# Patient Record
Sex: MALE | Race: WHITE | NOT HISPANIC OR LATINO | Employment: OTHER | URBAN - METROPOLITAN AREA
[De-identification: names, ages, dates, MRNs, and addresses within clinical notes are randomized per-mention and may not be internally consistent; named-entity substitution may affect disease eponyms.]

---

## 2017-03-07 ENCOUNTER — TRANSCRIBE ORDERS (OUTPATIENT)
Dept: ADMINISTRATIVE | Facility: HOSPITAL | Age: 77
End: 2017-03-07

## 2017-03-07 ENCOUNTER — HOSPITAL ENCOUNTER (OUTPATIENT)
Dept: RADIOLOGY | Facility: HOSPITAL | Age: 77
Discharge: HOME | End: 2017-03-07
Payer: MEDICARE

## 2017-03-07 DIAGNOSIS — N20.0 KIDNEY STONE: Primary | ICD-10-CM

## 2017-03-07 DIAGNOSIS — N20.0 KIDNEY STONE: ICD-10-CM

## 2017-03-07 PROCEDURE — 74000 HB X-RAY EXAM OF ABDOMEN (SINGLE ANTEROPOSTERIOR VIEW): CPT

## 2017-04-15 ENCOUNTER — HOSPITAL ENCOUNTER (INPATIENT)
Facility: HOSPITAL | Age: 77
LOS: 5 days | Discharge: HOME | DRG: 291 | End: 2017-04-20
Attending: EMERGENCY MEDICINE | Admitting: FAMILY MEDICINE
Payer: MEDICARE

## 2017-04-15 ENCOUNTER — APPOINTMENT (EMERGENCY)
Dept: RADIOLOGY | Facility: HOSPITAL | Age: 77
DRG: 291 | End: 2017-04-15
Payer: MEDICARE

## 2017-04-15 DIAGNOSIS — N17.9 AKI (ACUTE KIDNEY INJURY) (HCC): ICD-10-CM

## 2017-04-15 DIAGNOSIS — I48.91 A-FIB (HCC): ICD-10-CM

## 2017-04-15 DIAGNOSIS — I50.9 CONGESTIVE HEART FAILURE (HCC): Primary | ICD-10-CM

## 2017-04-15 DIAGNOSIS — N18.9 CKD (CHRONIC KIDNEY DISEASE): ICD-10-CM

## 2017-04-15 DIAGNOSIS — N13.30 HYDRONEPHROSIS: ICD-10-CM

## 2017-04-15 PROBLEM — I48.20 CHRONIC ATRIAL FIBRILLATION WITH RVR (HCC): Status: ACTIVE | Noted: 2017-04-15

## 2017-04-15 PROBLEM — R06.02 SHORTNESS OF BREATH: Status: ACTIVE | Noted: 2017-04-15

## 2017-04-15 LAB
ALBUMIN SERPL BCP-MCNC: 3.2 G/DL (ref 3.5–5)
ALP SERPL-CCNC: 127 U/L (ref 46–116)
ALT SERPL W P-5'-P-CCNC: 24 U/L (ref 12–78)
ANION GAP SERPL CALCULATED.3IONS-SCNC: 9 MMOL/L (ref 4–13)
APTT PPP: 28 SECONDS (ref 24–33)
AST SERPL W P-5'-P-CCNC: 48 U/L (ref 5–45)
BACTERIA UR QL AUTO: ABNORMAL /HPF
BASOPHILS # BLD AUTO: 0 THOUSANDS/ΜL (ref 0–0.1)
BASOPHILS NFR BLD AUTO: 1 % (ref 0–1)
BILIRUB SERPL-MCNC: 0.8 MG/DL (ref 0.2–1)
BILIRUB UR QL STRIP: NEGATIVE
BUN SERPL-MCNC: 28 MG/DL (ref 5–25)
CALCIUM SERPL-MCNC: 9.2 MG/DL (ref 8.3–10.1)
CHLORIDE SERPL-SCNC: 105 MMOL/L (ref 100–108)
CLARITY UR: CLEAR
CO2 SERPL-SCNC: 24 MMOL/L (ref 21–32)
COLOR UR: ABNORMAL
CREAT SERPL-MCNC: 1.61 MG/DL (ref 0.6–1.3)
EOSINOPHIL # BLD AUTO: 0.3 THOUSAND/ΜL (ref 0–0.61)
EOSINOPHIL NFR BLD AUTO: 4 % (ref 0–6)
ERYTHROCYTE [DISTWIDTH] IN BLOOD BY AUTOMATED COUNT: 17.5 % (ref 11.6–15.1)
GFR SERPL CREATININE-BSD FRML MDRD: 42.2 ML/MIN/1.73SQ M
GLUCOSE SERPL-MCNC: 99 MG/DL (ref 65–140)
GLUCOSE UR STRIP-MCNC: NEGATIVE MG/DL
HCT VFR BLD AUTO: 38.4 % (ref 42–52)
HGB BLD-MCNC: 12.4 G/DL (ref 14–18)
HGB UR QL STRIP.AUTO: ABNORMAL
INR PPP: 1.08 (ref 0.86–1.16)
KETONES UR STRIP-MCNC: NEGATIVE MG/DL
LACTATE SERPL-SCNC: 0.9 MMOL/L (ref 0.5–2)
LEUKOCYTE ESTERASE UR QL STRIP: NEGATIVE
LYMPHOCYTES # BLD AUTO: 1.5 THOUSANDS/ΜL (ref 0.6–4.47)
LYMPHOCYTES NFR BLD AUTO: 22 % (ref 14–44)
MCH RBC QN AUTO: 26 PG (ref 27–31)
MCHC RBC AUTO-ENTMCNC: 32.4 G/DL (ref 31.4–37.4)
MCV RBC AUTO: 80 FL (ref 82–98)
MONOCYTES # BLD AUTO: 0.6 THOUSAND/ΜL (ref 0.17–1.22)
MONOCYTES NFR BLD AUTO: 10 % (ref 4–12)
MUCOUS THREADS UR QL AUTO: ABNORMAL
NEUTROPHILS # BLD AUTO: 4.2 THOUSANDS/ΜL (ref 1.85–7.62)
NEUTS SEG NFR BLD AUTO: 63 % (ref 43–75)
NITRITE UR QL STRIP: NEGATIVE
NON-SQ EPI CELLS URNS QL MICRO: ABNORMAL /HPF
NRBC BLD AUTO-RTO: 0 /100 WBCS
NT-PROBNP SERPL-MCNC: 3561 PG/ML
PH UR STRIP.AUTO: 5.5 [PH] (ref 5–9)
PLATELET # BLD AUTO: 193 THOUSANDS/UL (ref 130–400)
PMV BLD AUTO: 8.4 FL (ref 8.9–12.7)
POTASSIUM SERPL-SCNC: 5.9 MMOL/L (ref 3.5–5.3)
PROT SERPL-MCNC: 7.6 G/DL (ref 6.4–8.2)
PROT UR STRIP-MCNC: NEGATIVE MG/DL
PROTHROMBIN TIME: 11.4 SECONDS (ref 9.4–11.7)
RBC # BLD AUTO: 4.78 MILLION/UL (ref 4.7–6.1)
RBC #/AREA URNS AUTO: ABNORMAL /HPF
SODIUM SERPL-SCNC: 138 MMOL/L (ref 136–145)
SP GR UR STRIP.AUTO: 1.01 (ref 1–1.03)
TROPONIN I SERPL-MCNC: <0.02 NG/ML
TROPONIN I SERPL-MCNC: <0.02 NG/ML
UROBILINOGEN UR QL STRIP.AUTO: 0.2 E.U./DL
WBC # BLD AUTO: 6.6 THOUSAND/UL (ref 4.8–10.8)
WBC #/AREA URNS AUTO: ABNORMAL /HPF

## 2017-04-15 PROCEDURE — 85610 PROTHROMBIN TIME: CPT | Performed by: EMERGENCY MEDICINE

## 2017-04-15 PROCEDURE — 81001 URINALYSIS AUTO W/SCOPE: CPT | Performed by: EMERGENCY MEDICINE

## 2017-04-15 PROCEDURE — 96374 THER/PROPH/DIAG INJ IV PUSH: CPT

## 2017-04-15 PROCEDURE — 87081 CULTURE SCREEN ONLY: CPT | Performed by: INTERNAL MEDICINE

## 2017-04-15 PROCEDURE — 96375 TX/PRO/DX INJ NEW DRUG ADDON: CPT

## 2017-04-15 PROCEDURE — 36415 COLL VENOUS BLD VENIPUNCTURE: CPT | Performed by: EMERGENCY MEDICINE

## 2017-04-15 PROCEDURE — 84484 ASSAY OF TROPONIN QUANT: CPT | Performed by: EMERGENCY MEDICINE

## 2017-04-15 PROCEDURE — 99285 EMERGENCY DEPT VISIT HI MDM: CPT

## 2017-04-15 PROCEDURE — 85025 COMPLETE CBC W/AUTO DIFF WBC: CPT | Performed by: EMERGENCY MEDICINE

## 2017-04-15 PROCEDURE — 83880 ASSAY OF NATRIURETIC PEPTIDE: CPT | Performed by: EMERGENCY MEDICINE

## 2017-04-15 PROCEDURE — 71020 HB CHEST X-RAY 2VW FRONTAL&LATL: CPT

## 2017-04-15 PROCEDURE — 93005 ELECTROCARDIOGRAM TRACING: CPT | Performed by: EMERGENCY MEDICINE

## 2017-04-15 PROCEDURE — 85730 THROMBOPLASTIN TIME PARTIAL: CPT | Performed by: EMERGENCY MEDICINE

## 2017-04-15 PROCEDURE — 83605 ASSAY OF LACTIC ACID: CPT | Performed by: EMERGENCY MEDICINE

## 2017-04-15 PROCEDURE — 96361 HYDRATE IV INFUSION ADD-ON: CPT

## 2017-04-15 PROCEDURE — 94640 AIRWAY INHALATION TREATMENT: CPT

## 2017-04-15 PROCEDURE — 84484 ASSAY OF TROPONIN QUANT: CPT | Performed by: INTERNAL MEDICINE

## 2017-04-15 PROCEDURE — 80053 COMPREHEN METABOLIC PANEL: CPT | Performed by: EMERGENCY MEDICINE

## 2017-04-15 RX ORDER — PANTOPRAZOLE SODIUM 40 MG/1
40 TABLET, DELAYED RELEASE ORAL
Status: DISCONTINUED | OUTPATIENT
Start: 2017-04-16 | End: 2017-04-20 | Stop reason: HOSPADM

## 2017-04-15 RX ORDER — AMLODIPINE BESYLATE 10 MG/1
10 TABLET ORAL DAILY
COMMUNITY
Start: 2017-04-14 | End: 2017-04-20 | Stop reason: HOSPADM

## 2017-04-15 RX ORDER — IPRATROPIUM BROMIDE AND ALBUTEROL SULFATE 2.5; .5 MG/3ML; MG/3ML
3 SOLUTION RESPIRATORY (INHALATION) EVERY 6 HOURS PRN
Status: DISCONTINUED | OUTPATIENT
Start: 2017-04-15 | End: 2017-04-17

## 2017-04-15 RX ORDER — NICOTINE 21 MG/24HR
1 PATCH, TRANSDERMAL 24 HOURS TRANSDERMAL DAILY
Status: DISCONTINUED | OUTPATIENT
Start: 2017-04-15 | End: 2017-04-20 | Stop reason: HOSPADM

## 2017-04-15 RX ORDER — METHYLPREDNISOLONE SODIUM SUCCINATE 125 MG/2ML
125 INJECTION, POWDER, LYOPHILIZED, FOR SOLUTION INTRAMUSCULAR; INTRAVENOUS ONCE
Status: COMPLETED | OUTPATIENT
Start: 2017-04-15 | End: 2017-04-15

## 2017-04-15 RX ORDER — IPRATROPIUM BROMIDE AND ALBUTEROL SULFATE 2.5; .5 MG/3ML; MG/3ML
3 SOLUTION RESPIRATORY (INHALATION) ONCE
Status: COMPLETED | OUTPATIENT
Start: 2017-04-15 | End: 2017-04-15

## 2017-04-15 RX ORDER — DILTIAZEM HYDROCHLORIDE 5 MG/ML
15 INJECTION INTRAVENOUS ONCE
Status: COMPLETED | OUTPATIENT
Start: 2017-04-15 | End: 2017-04-15

## 2017-04-15 RX ORDER — FUROSEMIDE 10 MG/ML
40 INJECTION INTRAMUSCULAR; INTRAVENOUS
Status: DISCONTINUED | OUTPATIENT
Start: 2017-04-15 | End: 2017-04-16

## 2017-04-15 RX ORDER — FUROSEMIDE 10 MG/ML
40 INJECTION INTRAMUSCULAR; INTRAVENOUS ONCE
Status: COMPLETED | OUTPATIENT
Start: 2017-04-15 | End: 2017-04-15

## 2017-04-15 RX ORDER — FUROSEMIDE 10 MG/ML
40 INJECTION INTRAMUSCULAR; INTRAVENOUS
Status: DISCONTINUED | OUTPATIENT
Start: 2017-04-15 | End: 2017-04-15

## 2017-04-15 RX ORDER — ONDANSETRON 2 MG/ML
4 INJECTION INTRAMUSCULAR; INTRAVENOUS EVERY 6 HOURS PRN
Status: DISCONTINUED | OUTPATIENT
Start: 2017-04-15 | End: 2017-04-20 | Stop reason: HOSPADM

## 2017-04-15 RX ORDER — ACETAMINOPHEN 325 MG/1
650 TABLET ORAL EVERY 6 HOURS PRN
Status: DISCONTINUED | OUTPATIENT
Start: 2017-04-15 | End: 2017-04-20 | Stop reason: HOSPADM

## 2017-04-15 RX ADMIN — FUROSEMIDE 40 MG: 10 INJECTION, SOLUTION INTRAMUSCULAR; INTRAVENOUS at 13:26

## 2017-04-15 RX ADMIN — IPRATROPIUM BROMIDE AND ALBUTEROL SULFATE 3 ML: .5; 3 SOLUTION RESPIRATORY (INHALATION) at 13:22

## 2017-04-15 RX ADMIN — SODIUM CHLORIDE 500 ML: 0.9 INJECTION, SOLUTION INTRAVENOUS at 11:36

## 2017-04-15 RX ADMIN — METHYLPREDNISOLONE SODIUM SUCCINATE 125 MG: 125 INJECTION, POWDER, FOR SOLUTION INTRAMUSCULAR; INTRAVENOUS at 11:37

## 2017-04-15 RX ADMIN — ENOXAPARIN SODIUM 90 MG: 100 INJECTION SUBCUTANEOUS at 21:02

## 2017-04-15 RX ADMIN — METOPROLOL TARTRATE 25 MG: 25 TABLET ORAL at 17:01

## 2017-04-15 RX ADMIN — IPRATROPIUM BROMIDE AND ALBUTEROL SULFATE 3 ML: .5; 3 SOLUTION RESPIRATORY (INHALATION) at 11:37

## 2017-04-15 RX ADMIN — DILTIAZEM HYDROCHLORIDE 15 MG: 5 INJECTION INTRAVENOUS at 17:01

## 2017-04-15 RX ADMIN — FUROSEMIDE 40 MG: 10 INJECTION, SOLUTION INTRAMUSCULAR; INTRAVENOUS at 18:53

## 2017-04-15 RX ADMIN — IPRATROPIUM BROMIDE AND ALBUTEROL SULFATE 3 ML: .5; 3 SOLUTION RESPIRATORY (INHALATION) at 11:27

## 2017-04-16 ENCOUNTER — APPOINTMENT (INPATIENT)
Dept: RADIOLOGY | Facility: HOSPITAL | Age: 77
DRG: 291 | End: 2017-04-16
Payer: MEDICARE

## 2017-04-16 LAB
ANION GAP SERPL CALCULATED.3IONS-SCNC: 12 MMOL/L (ref 4–13)
BASOPHILS # BLD AUTO: 0 THOUSANDS/ΜL (ref 0–0.1)
BASOPHILS NFR BLD AUTO: 0 % (ref 0–1)
BUN SERPL-MCNC: 31 MG/DL (ref 5–25)
CALCIUM SERPL-MCNC: 8.8 MG/DL (ref 8.3–10.1)
CHLORIDE SERPL-SCNC: 102 MMOL/L (ref 100–108)
CHLORIDE UR-SCNC: <10 MMOL/L (ref 10–330)
CHOLEST SERPL-MCNC: 135 MG/DL (ref 50–200)
CO2 SERPL-SCNC: 24 MMOL/L (ref 21–32)
CREAT SERPL-MCNC: 2.25 MG/DL (ref 0.6–1.3)
CREAT UR-MCNC: 118 MG/DL
EOSINOPHIL # BLD AUTO: 0 THOUSAND/ΜL (ref 0–0.61)
EOSINOPHIL NFR BLD AUTO: 0 % (ref 0–6)
ERYTHROCYTE [DISTWIDTH] IN BLOOD BY AUTOMATED COUNT: 17.5 % (ref 11.6–15.1)
GFR SERPL CREATININE-BSD FRML MDRD: 28.7 ML/MIN/1.73SQ M
GLUCOSE SERPL-MCNC: 151 MG/DL (ref 65–140)
HCT VFR BLD AUTO: 34.5 % (ref 42–52)
HDLC SERPL-MCNC: 40 MG/DL (ref 40–60)
HGB BLD-MCNC: 11.4 G/DL (ref 14–18)
LDLC SERPL CALC-MCNC: 87 MG/DL (ref 0–100)
LYMPHOCYTES # BLD AUTO: 0.8 THOUSANDS/ΜL (ref 0.6–4.47)
LYMPHOCYTES NFR BLD AUTO: 9 % (ref 14–44)
MAGNESIUM SERPL-MCNC: 1.8 MG/DL (ref 1.6–2.6)
MCH RBC QN AUTO: 27 PG (ref 27–31)
MCHC RBC AUTO-ENTMCNC: 33.2 G/DL (ref 31.4–37.4)
MCV RBC AUTO: 81 FL (ref 82–98)
MONOCYTES # BLD AUTO: 0.2 THOUSAND/ΜL (ref 0.17–1.22)
MONOCYTES NFR BLD AUTO: 3 % (ref 4–12)
NEUTROPHILS # BLD AUTO: 8 THOUSANDS/ΜL (ref 1.85–7.62)
NEUTS SEG NFR BLD AUTO: 89 % (ref 43–75)
NRBC BLD AUTO-RTO: 0 /100 WBCS
PLATELET # BLD AUTO: 179 THOUSANDS/UL (ref 130–400)
PMV BLD AUTO: 8.8 FL (ref 8.9–12.7)
POTASSIUM SERPL-SCNC: 4 MMOL/L (ref 3.5–5.3)
RBC # BLD AUTO: 4.24 MILLION/UL (ref 4.7–6.1)
SODIUM 24H UR-SCNC: 13 MOL/L
SODIUM SERPL-SCNC: 138 MMOL/L (ref 136–145)
TRIGL SERPL-MCNC: 38 MG/DL
TSH SERPL DL<=0.05 MIU/L-ACNC: 0.88 UIU/ML (ref 0.36–3.74)
UUN 24H UR-MCNC: 813 MG/DL
WBC # BLD AUTO: 9 THOUSAND/UL (ref 4.8–10.8)

## 2017-04-16 PROCEDURE — 84300 ASSAY OF URINE SODIUM: CPT | Performed by: INTERNAL MEDICINE

## 2017-04-16 PROCEDURE — 84443 ASSAY THYROID STIM HORMONE: CPT | Performed by: INTERNAL MEDICINE

## 2017-04-16 PROCEDURE — 82436 ASSAY OF URINE CHLORIDE: CPT | Performed by: INTERNAL MEDICINE

## 2017-04-16 PROCEDURE — 82570 ASSAY OF URINE CREATININE: CPT | Performed by: INTERNAL MEDICINE

## 2017-04-16 PROCEDURE — 84540 ASSAY OF URINE/UREA-N: CPT | Performed by: INTERNAL MEDICINE

## 2017-04-16 PROCEDURE — 94640 AIRWAY INHALATION TREATMENT: CPT

## 2017-04-16 PROCEDURE — 71020 HB CHEST X-RAY 2VW FRONTAL&LATL: CPT

## 2017-04-16 PROCEDURE — 87070 CULTURE OTHR SPECIMN AEROBIC: CPT | Performed by: INTERNAL MEDICINE

## 2017-04-16 PROCEDURE — 83735 ASSAY OF MAGNESIUM: CPT | Performed by: INTERNAL MEDICINE

## 2017-04-16 PROCEDURE — 94760 N-INVAS EAR/PLS OXIMETRY 1: CPT

## 2017-04-16 PROCEDURE — 87205 SMEAR GRAM STAIN: CPT | Performed by: INTERNAL MEDICINE

## 2017-04-16 PROCEDURE — 85025 COMPLETE CBC W/AUTO DIFF WBC: CPT | Performed by: INTERNAL MEDICINE

## 2017-04-16 PROCEDURE — 80048 BASIC METABOLIC PNL TOTAL CA: CPT | Performed by: INTERNAL MEDICINE

## 2017-04-16 PROCEDURE — 87181 SC STD AGAR DILUTION PER AGT: CPT | Performed by: INTERNAL MEDICINE

## 2017-04-16 PROCEDURE — 80061 LIPID PANEL: CPT | Performed by: INTERNAL MEDICINE

## 2017-04-16 RX ORDER — FUROSEMIDE 40 MG/1
40 TABLET ORAL DAILY
Status: DISCONTINUED | OUTPATIENT
Start: 2017-04-17 | End: 2017-04-17

## 2017-04-16 RX ORDER — GUAIFENESIN/DEXTROMETHORPHAN 100-10MG/5
10 SYRUP ORAL EVERY 4 HOURS PRN
Status: DISCONTINUED | OUTPATIENT
Start: 2017-04-16 | End: 2017-04-20 | Stop reason: HOSPADM

## 2017-04-16 RX ORDER — FUROSEMIDE 10 MG/ML
40 INJECTION INTRAMUSCULAR; INTRAVENOUS ONCE AS NEEDED
Status: COMPLETED | OUTPATIENT
Start: 2017-04-16 | End: 2017-04-17

## 2017-04-16 RX ADMIN — ENOXAPARIN SODIUM 90 MG: 100 INJECTION SUBCUTANEOUS at 08:58

## 2017-04-16 RX ADMIN — PANTOPRAZOLE SODIUM 40 MG: 40 TABLET, DELAYED RELEASE ORAL at 06:01

## 2017-04-16 RX ADMIN — METOPROLOL TARTRATE 25 MG: 25 TABLET ORAL at 08:59

## 2017-04-16 RX ADMIN — METOPROLOL TARTRATE 25 MG: 25 TABLET ORAL at 00:08

## 2017-04-16 RX ADMIN — IPRATROPIUM BROMIDE AND ALBUTEROL SULFATE 3 ML: .5; 3 SOLUTION RESPIRATORY (INHALATION) at 09:13

## 2017-04-16 RX ADMIN — GUAIFENESIN AND DEXTROMETHORPHAN 10 ML: 100; 10 SYRUP ORAL at 18:53

## 2017-04-16 RX ADMIN — APIXABAN 5 MG: 5 TABLET, FILM COATED ORAL at 20:26

## 2017-04-16 RX ADMIN — IPRATROPIUM BROMIDE AND ALBUTEROL SULFATE 3 ML: .5; 3 SOLUTION RESPIRATORY (INHALATION) at 18:14

## 2017-04-16 RX ADMIN — METOPROLOL TARTRATE 25 MG: 25 TABLET ORAL at 17:41

## 2017-04-17 ENCOUNTER — APPOINTMENT (INPATIENT)
Dept: RADIOLOGY | Facility: HOSPITAL | Age: 77
DRG: 291 | End: 2017-04-17
Payer: MEDICARE

## 2017-04-17 ENCOUNTER — APPOINTMENT (INPATIENT)
Dept: OCCUPATIONAL THERAPY | Facility: HOSPITAL | Age: 77
DRG: 291 | End: 2017-04-17
Payer: MEDICARE

## 2017-04-17 ENCOUNTER — APPOINTMENT (INPATIENT)
Dept: NON INVASIVE DIAGNOSTICS | Facility: HOSPITAL | Age: 77
DRG: 291 | End: 2017-04-17
Payer: MEDICARE

## 2017-04-17 ENCOUNTER — APPOINTMENT (INPATIENT)
Dept: PHYSICAL THERAPY | Facility: HOSPITAL | Age: 77
DRG: 291 | End: 2017-04-17
Payer: MEDICARE

## 2017-04-17 ENCOUNTER — GENERIC CONVERSION - ENCOUNTER (OUTPATIENT)
Dept: OTHER | Facility: OTHER | Age: 77
End: 2017-04-17

## 2017-04-17 LAB
ANION GAP SERPL CALCULATED.3IONS-SCNC: 13 MMOL/L (ref 4–13)
ATRIAL RATE: 65 BPM
BASOPHILS # BLD AUTO: 0 THOUSANDS/ΜL (ref 0–0.1)
BASOPHILS NFR BLD AUTO: 0 % (ref 0–1)
BUN SERPL-MCNC: 43 MG/DL (ref 5–25)
CALCIUM SERPL-MCNC: 8.6 MG/DL (ref 8.3–10.1)
CHLORIDE SERPL-SCNC: 101 MMOL/L (ref 100–108)
CO2 SERPL-SCNC: 22 MMOL/L (ref 21–32)
CREAT SERPL-MCNC: 2.34 MG/DL (ref 0.6–1.3)
EOSINOPHIL # BLD AUTO: 0 THOUSAND/ΜL (ref 0–0.61)
EOSINOPHIL NFR BLD AUTO: 0 % (ref 0–6)
ERYTHROCYTE [DISTWIDTH] IN BLOOD BY AUTOMATED COUNT: 17.7 % (ref 11.6–15.1)
GFR SERPL CREATININE-BSD FRML MDRD: 27.4 ML/MIN/1.73SQ M
GLUCOSE SERPL-MCNC: 101 MG/DL (ref 65–140)
HCT VFR BLD AUTO: 36.3 % (ref 42–52)
HGB BLD-MCNC: 11.8 G/DL (ref 14–18)
LYMPHOCYTES # BLD AUTO: 1.9 THOUSANDS/ΜL (ref 0.6–4.47)
LYMPHOCYTES NFR BLD AUTO: 16 % (ref 14–44)
MAGNESIUM SERPL-MCNC: 2.2 MG/DL (ref 1.6–2.6)
MCH RBC QN AUTO: 27.1 PG (ref 27–31)
MCHC RBC AUTO-ENTMCNC: 32.5 G/DL (ref 31.4–37.4)
MCV RBC AUTO: 83 FL (ref 82–98)
MONOCYTES # BLD AUTO: 1.1 THOUSAND/ΜL (ref 0.17–1.22)
MONOCYTES NFR BLD AUTO: 9 % (ref 4–12)
MRSA NOSE QL CULT: NORMAL
NEUTROPHILS # BLD AUTO: 9.4 THOUSANDS/ΜL (ref 1.85–7.62)
NEUTS SEG NFR BLD AUTO: 76 % (ref 43–75)
NRBC BLD AUTO-RTO: 0 /100 WBCS
OVALOCYTES BLD QL SMEAR: PRESENT
PLATELET # BLD AUTO: 192 THOUSANDS/UL (ref 130–400)
PLATELET BLD QL SMEAR: ADEQUATE
PMV BLD AUTO: 9.2 FL (ref 8.9–12.7)
POTASSIUM SERPL-SCNC: 3.8 MMOL/L (ref 3.5–5.3)
QRS AXIS: -28 DEGREES
QRSD INTERVAL: 118 MS
QT INTERVAL: 376 MS
QTC INTERVAL: 454 MS
RBC # BLD AUTO: 4.35 MILLION/UL (ref 4.7–6.1)
SODIUM SERPL-SCNC: 136 MMOL/L (ref 136–145)
T WAVE AXIS: 39 DEGREES
VENTRICULAR RATE: 88 BPM
WBC # BLD AUTO: 12.5 THOUSAND/UL (ref 4.8–10.8)

## 2017-04-17 PROCEDURE — G8979 MOBILITY GOAL STATUS: HCPCS

## 2017-04-17 PROCEDURE — 85025 COMPLETE CBC W/AUTO DIFF WBC: CPT | Performed by: INTERNAL MEDICINE

## 2017-04-17 PROCEDURE — 80048 BASIC METABOLIC PNL TOTAL CA: CPT | Performed by: INTERNAL MEDICINE

## 2017-04-17 PROCEDURE — 93306 TTE W/DOPPLER COMPLETE: CPT

## 2017-04-17 PROCEDURE — 94760 N-INVAS EAR/PLS OXIMETRY 1: CPT

## 2017-04-17 PROCEDURE — 93970 EXTREMITY STUDY: CPT

## 2017-04-17 PROCEDURE — G8978 MOBILITY CURRENT STATUS: HCPCS

## 2017-04-17 PROCEDURE — 83735 ASSAY OF MAGNESIUM: CPT | Performed by: INTERNAL MEDICINE

## 2017-04-17 PROCEDURE — 94640 AIRWAY INHALATION TREATMENT: CPT

## 2017-04-17 PROCEDURE — 97165 OT EVAL LOW COMPLEX 30 MIN: CPT

## 2017-04-17 PROCEDURE — G8988 SELF CARE GOAL STATUS: HCPCS

## 2017-04-17 PROCEDURE — 51798 US URINE CAPACITY MEASURE: CPT

## 2017-04-17 PROCEDURE — G8987 SELF CARE CURRENT STATUS: HCPCS

## 2017-04-17 PROCEDURE — 97161 PT EVAL LOW COMPLEX 20 MIN: CPT

## 2017-04-17 RX ORDER — LEVALBUTEROL INHALATION SOLUTION 0.63 MG/3ML
0.63 SOLUTION RESPIRATORY (INHALATION) EVERY 4 HOURS PRN
Status: DISCONTINUED | OUTPATIENT
Start: 2017-04-17 | End: 2017-04-20 | Stop reason: HOSPADM

## 2017-04-17 RX ORDER — FUROSEMIDE 40 MG/1
40 TABLET ORAL
Status: DISCONTINUED | OUTPATIENT
Start: 2017-04-17 | End: 2017-04-18

## 2017-04-17 RX ORDER — FLUTICASONE PROPIONATE 220 UG/1
1 AEROSOL, METERED RESPIRATORY (INHALATION) EVERY 12 HOURS SCHEDULED
Status: DISCONTINUED | OUTPATIENT
Start: 2017-04-17 | End: 2017-04-20 | Stop reason: HOSPADM

## 2017-04-17 RX ORDER — FUROSEMIDE 10 MG/ML
40 INJECTION INTRAMUSCULAR; INTRAVENOUS
Status: DISCONTINUED | OUTPATIENT
Start: 2017-04-17 | End: 2017-04-17

## 2017-04-17 RX ADMIN — FLUTICASONE PROPIONATE 1 PUFF: 220 AEROSOL, METERED RESPIRATORY (INHALATION) at 20:45

## 2017-04-17 RX ADMIN — APIXABAN 5 MG: 5 TABLET, FILM COATED ORAL at 08:10

## 2017-04-17 RX ADMIN — FUROSEMIDE 40 MG: 40 TABLET ORAL at 16:06

## 2017-04-17 RX ADMIN — LEVALBUTEROL 0.63 MG: 0.63 SOLUTION RESPIRATORY (INHALATION) at 15:54

## 2017-04-17 RX ADMIN — FUROSEMIDE 40 MG: 10 INJECTION, SOLUTION INTRAMUSCULAR; INTRAVENOUS at 05:10

## 2017-04-17 RX ADMIN — METOPROLOL TARTRATE 25 MG: 25 TABLET ORAL at 16:06

## 2017-04-17 RX ADMIN — FUROSEMIDE 40 MG: 40 TABLET ORAL at 08:10

## 2017-04-17 RX ADMIN — FLUTICASONE PROPIONATE 1 PUFF: 220 AEROSOL, METERED RESPIRATORY (INHALATION) at 12:06

## 2017-04-17 RX ADMIN — APIXABAN 5 MG: 5 TABLET, FILM COATED ORAL at 20:44

## 2017-04-17 RX ADMIN — PANTOPRAZOLE SODIUM 40 MG: 40 TABLET, DELAYED RELEASE ORAL at 05:16

## 2017-04-17 RX ADMIN — METOPROLOL TARTRATE 25 MG: 25 TABLET ORAL at 00:35

## 2017-04-17 RX ADMIN — IPRATROPIUM BROMIDE AND ALBUTEROL SULFATE 3 ML: .5; 3 SOLUTION RESPIRATORY (INHALATION) at 05:23

## 2017-04-17 RX ADMIN — METOPROLOL TARTRATE 25 MG: 25 TABLET ORAL at 08:10

## 2017-04-18 ENCOUNTER — GENERIC CONVERSION - ENCOUNTER (OUTPATIENT)
Dept: OTHER | Facility: OTHER | Age: 77
End: 2017-04-18

## 2017-04-18 ENCOUNTER — APPOINTMENT (INPATIENT)
Dept: NON INVASIVE DIAGNOSTICS | Facility: HOSPITAL | Age: 77
DRG: 291 | End: 2017-04-18
Payer: MEDICARE

## 2017-04-18 ENCOUNTER — ANESTHESIA (INPATIENT)
Dept: PERIOP | Facility: HOSPITAL | Age: 77
DRG: 291 | End: 2017-04-18
Payer: MEDICARE

## 2017-04-18 ENCOUNTER — APPOINTMENT (INPATIENT)
Dept: RADIOLOGY | Facility: HOSPITAL | Age: 77
DRG: 291 | End: 2017-04-18
Payer: MEDICARE

## 2017-04-18 ENCOUNTER — HOSPITAL ENCOUNTER (INPATIENT)
Dept: RADIOLOGY | Facility: HOSPITAL | Age: 77
Discharge: HOME | DRG: 291 | End: 2017-04-18
Payer: MEDICARE

## 2017-04-18 ENCOUNTER — ANESTHESIA EVENT (INPATIENT)
Dept: PERIOP | Facility: HOSPITAL | Age: 77
DRG: 291 | End: 2017-04-18
Payer: MEDICARE

## 2017-04-18 LAB
ANION GAP SERPL CALCULATED.3IONS-SCNC: 10 MMOL/L (ref 4–13)
BASOPHILS # BLD AUTO: 0 THOUSANDS/ΜL (ref 0–0.1)
BASOPHILS NFR BLD AUTO: 0 % (ref 0–1)
BUN SERPL-MCNC: 44 MG/DL (ref 5–25)
CALCIUM SERPL-MCNC: 8.4 MG/DL (ref 8.3–10.1)
CHLORIDE SERPL-SCNC: 101 MMOL/L (ref 100–108)
CK SERPL-CCNC: 68 U/L (ref 39–308)
CO2 SERPL-SCNC: 28 MMOL/L (ref 21–32)
CREAT SERPL-MCNC: 2.28 MG/DL (ref 0.6–1.3)
EOSINOPHIL # BLD AUTO: 0.2 THOUSAND/ΜL (ref 0–0.61)
EOSINOPHIL NFR BLD AUTO: 2 % (ref 0–6)
ERYTHROCYTE [DISTWIDTH] IN BLOOD BY AUTOMATED COUNT: 17.4 % (ref 11.6–15.1)
GFR SERPL CREATININE-BSD FRML MDRD: 28.2 ML/MIN/1.73SQ M
GLUCOSE SERPL-MCNC: 97 MG/DL (ref 65–140)
HCT VFR BLD AUTO: 36.8 % (ref 42–52)
HGB BLD-MCNC: 11.9 G/DL (ref 14–18)
LYMPHOCYTES # BLD AUTO: 1.9 THOUSANDS/ΜL (ref 0.6–4.47)
LYMPHOCYTES NFR BLD AUTO: 21 % (ref 14–44)
MAGNESIUM SERPL-MCNC: 2 MG/DL (ref 1.6–2.6)
MAX DIASTOLIC BP: 80 MMHG
MAX HEART RATE: 141 BPM
MAX PREDICTED HEART RATE: 146 BPM
MAX. SYSTOLIC BP: 146 MMHG
MCH RBC QN AUTO: 25.8 PG (ref 27–31)
MCHC RBC AUTO-ENTMCNC: 32.3 G/DL (ref 31.4–37.4)
MCV RBC AUTO: 80 FL (ref 82–98)
MONOCYTES # BLD AUTO: 1 THOUSAND/ΜL (ref 0.17–1.22)
MONOCYTES NFR BLD AUTO: 12 % (ref 4–12)
NEUTROPHILS # BLD AUTO: 5.7 THOUSANDS/ΜL (ref 1.85–7.62)
NEUTS SEG NFR BLD AUTO: 65 % (ref 43–75)
NRBC BLD AUTO-RTO: 0 /100 WBCS
PLATELET # BLD AUTO: 183 THOUSANDS/UL (ref 130–400)
PMV BLD AUTO: 8.4 FL (ref 8.9–12.7)
POTASSIUM SERPL-SCNC: 3.1 MMOL/L (ref 3.5–5.3)
PROTOCOL NAME: NORMAL
RBC # BLD AUTO: 4.61 MILLION/UL (ref 4.7–6.1)
SODIUM SERPL-SCNC: 139 MMOL/L (ref 136–145)
TIME IN EXERCISE PHASE: 259 S
WBC # BLD AUTO: 8.8 THOUSAND/UL (ref 4.8–10.8)

## 2017-04-18 PROCEDURE — A9502 TC99M TETROFOSMIN: HCPCS

## 2017-04-18 PROCEDURE — 94640 AIRWAY INHALATION TREATMENT: CPT

## 2017-04-18 PROCEDURE — 74176 CT ABD & PELVIS W/O CONTRAST: CPT

## 2017-04-18 PROCEDURE — 78452 HT MUSCLE IMAGE SPECT MULT: CPT

## 2017-04-18 PROCEDURE — C2617 STENT, NON-COR, TEM W/O DEL: HCPCS | Performed by: UROLOGY

## 2017-04-18 PROCEDURE — 93005 ELECTROCARDIOGRAM TRACING: CPT

## 2017-04-18 PROCEDURE — 0TF68ZZ FRAGMENTATION IN RIGHT URETER, VIA NATURAL OR ARTIFICIAL OPENING ENDOSCOPIC: ICD-10-PCS | Performed by: UROLOGY

## 2017-04-18 PROCEDURE — 82550 ASSAY OF CK (CPK): CPT | Performed by: INTERNAL MEDICINE

## 2017-04-18 PROCEDURE — BT1D1ZZ FLUOROSCOPY OF RIGHT KIDNEY, URETER AND BLADDER USING LOW OSMOLAR CONTRAST: ICD-10-PCS | Performed by: UROLOGY

## 2017-04-18 PROCEDURE — 94760 N-INVAS EAR/PLS OXIMETRY 1: CPT

## 2017-04-18 PROCEDURE — 83735 ASSAY OF MAGNESIUM: CPT | Performed by: INTERNAL MEDICINE

## 2017-04-18 PROCEDURE — 80048 BASIC METABOLIC PNL TOTAL CA: CPT | Performed by: INTERNAL MEDICINE

## 2017-04-18 PROCEDURE — 74450 X-RAY URETHRA/BLADDER: CPT

## 2017-04-18 PROCEDURE — 93017 CV STRESS TEST TRACING ONLY: CPT

## 2017-04-18 PROCEDURE — 0T768DZ DILATION OF RIGHT URETER WITH INTRALUMINAL DEVICE, VIA NATURAL OR ARTIFICIAL OPENING ENDOSCOPIC: ICD-10-PCS | Performed by: UROLOGY

## 2017-04-18 PROCEDURE — 85025 COMPLETE CBC W/AUTO DIFF WBC: CPT | Performed by: INTERNAL MEDICINE

## 2017-04-18 PROCEDURE — C1769 GUIDE WIRE: HCPCS | Performed by: UROLOGY

## 2017-04-18 DEVICE — URETERAL STENT 6 FR X 26 CM INLAY
Type: IMPLANTABLE DEVICE | Site: URETER | Status: NON-FUNCTIONAL
Removed: 2017-08-15

## 2017-04-18 RX ORDER — POTASSIUM CHLORIDE 20 MEQ/1
20 TABLET, EXTENDED RELEASE ORAL DAILY
Status: DISCONTINUED | OUTPATIENT
Start: 2017-04-18 | End: 2017-04-20

## 2017-04-18 RX ORDER — ONDANSETRON 2 MG/ML
4 INJECTION INTRAMUSCULAR; INTRAVENOUS ONCE
Status: DISCONTINUED | OUTPATIENT
Start: 2017-04-18 | End: 2017-04-18 | Stop reason: HOSPADM

## 2017-04-18 RX ORDER — FUROSEMIDE 20 MG/1
20 TABLET ORAL DAILY
Status: DISCONTINUED | OUTPATIENT
Start: 2017-04-19 | End: 2017-04-18

## 2017-04-18 RX ORDER — POTASSIUM CHLORIDE 20 MEQ/1
40 TABLET, EXTENDED RELEASE ORAL ONCE
Status: COMPLETED | OUTPATIENT
Start: 2017-04-18 | End: 2017-04-18

## 2017-04-18 RX ORDER — FUROSEMIDE 40 MG/1
40 TABLET ORAL DAILY
Status: DISCONTINUED | OUTPATIENT
Start: 2017-04-19 | End: 2017-04-19

## 2017-04-18 RX ORDER — FUROSEMIDE 40 MG/1
40 TABLET ORAL DAILY
Status: DISCONTINUED | OUTPATIENT
Start: 2017-04-19 | End: 2017-04-18

## 2017-04-18 RX ORDER — FENTANYL CITRATE 50 UG/ML
INJECTION, SOLUTION INTRAMUSCULAR; INTRAVENOUS AS NEEDED
Status: DISCONTINUED | OUTPATIENT
Start: 2017-04-18 | End: 2017-04-18 | Stop reason: SURG

## 2017-04-18 RX ORDER — FENTANYL CITRATE/PF 50 MCG/ML
50 SYRINGE (ML) INJECTION
Status: DISCONTINUED | OUTPATIENT
Start: 2017-04-18 | End: 2017-04-18 | Stop reason: HOSPADM

## 2017-04-18 RX ORDER — MAGNESIUM HYDROXIDE 1200 MG/15ML
LIQUID ORAL AS NEEDED
Status: DISCONTINUED | OUTPATIENT
Start: 2017-04-18 | End: 2017-04-18 | Stop reason: HOSPADM

## 2017-04-18 RX ORDER — PROPOFOL 10 MG/ML
INJECTION, EMULSION INTRAVENOUS AS NEEDED
Status: DISCONTINUED | OUTPATIENT
Start: 2017-04-18 | End: 2017-04-18 | Stop reason: SURG

## 2017-04-18 RX ORDER — SODIUM CHLORIDE, SODIUM LACTATE, POTASSIUM CHLORIDE, CALCIUM CHLORIDE 600; 310; 30; 20 MG/100ML; MG/100ML; MG/100ML; MG/100ML
INJECTION, SOLUTION INTRAVENOUS CONTINUOUS PRN
Status: DISCONTINUED | OUTPATIENT
Start: 2017-04-18 | End: 2017-04-18 | Stop reason: SURG

## 2017-04-18 RX ADMIN — POTASSIUM CHLORIDE 20 MEQ: 1500 TABLET, EXTENDED RELEASE ORAL at 15:31

## 2017-04-18 RX ADMIN — CEFAZOLIN SODIUM 1000 MG: 1 SOLUTION INTRAVENOUS at 17:04

## 2017-04-18 RX ADMIN — PANTOPRAZOLE SODIUM 40 MG: 40 TABLET, DELAYED RELEASE ORAL at 05:17

## 2017-04-18 RX ADMIN — FENTANYL CITRATE 50 MCG: 50 INJECTION, SOLUTION INTRAMUSCULAR; INTRAVENOUS at 17:05

## 2017-04-18 RX ADMIN — SODIUM CHLORIDE, SODIUM LACTATE, POTASSIUM CHLORIDE, AND CALCIUM CHLORIDE: .6; .31; .03; .02 INJECTION, SOLUTION INTRAVENOUS at 17:00

## 2017-04-18 RX ADMIN — METOPROLOL TARTRATE 25 MG: 25 TABLET ORAL at 17:00

## 2017-04-18 RX ADMIN — FLUTICASONE PROPIONATE 1 PUFF: 220 AEROSOL, METERED RESPIRATORY (INHALATION) at 10:19

## 2017-04-18 RX ADMIN — METOPROLOL TARTRATE 25 MG: 25 TABLET ORAL at 01:15

## 2017-04-18 RX ADMIN — FUROSEMIDE 40 MG: 40 TABLET ORAL at 10:20

## 2017-04-18 RX ADMIN — PROPOFOL 100 MG: 10 INJECTION, EMULSION INTRAVENOUS at 17:07

## 2017-04-18 RX ADMIN — APIXABAN 5 MG: 5 TABLET, FILM COATED ORAL at 20:51

## 2017-04-18 RX ADMIN — PROPOFOL 50 MG: 10 INJECTION, EMULSION INTRAVENOUS at 17:15

## 2017-04-18 RX ADMIN — APIXABAN 5 MG: 5 TABLET, FILM COATED ORAL at 10:20

## 2017-04-18 RX ADMIN — LEVALBUTEROL 0.63 MG: 0.63 SOLUTION RESPIRATORY (INHALATION) at 04:44

## 2017-04-18 RX ADMIN — LEVALBUTEROL 0.63 MG: 0.63 SOLUTION RESPIRATORY (INHALATION) at 12:45

## 2017-04-18 RX ADMIN — METOPROLOL TARTRATE 25 MG: 25 TABLET ORAL at 10:19

## 2017-04-18 RX ADMIN — PROPOFOL 50 MG: 10 INJECTION, EMULSION INTRAVENOUS at 17:20

## 2017-04-18 RX ADMIN — FENTANYL CITRATE 50 MCG: 50 INJECTION, SOLUTION INTRAMUSCULAR; INTRAVENOUS at 17:07

## 2017-04-18 RX ADMIN — POTASSIUM CHLORIDE 40 MEQ: 1500 TABLET, EXTENDED RELEASE ORAL at 15:32

## 2017-04-18 RX ADMIN — LIDOCAINE HYDROCHLORIDE 60 MG: 20 INJECTION, SOLUTION INTRAVENOUS at 17:07

## 2017-04-18 RX ADMIN — FLUTICASONE PROPIONATE 1 PUFF: 220 AEROSOL, METERED RESPIRATORY (INHALATION) at 20:52

## 2017-04-19 LAB
ANION GAP SERPL CALCULATED.3IONS-SCNC: 7 MMOL/L (ref 4–13)
BUN SERPL-MCNC: 39 MG/DL (ref 5–25)
CALCIUM SERPL-MCNC: 9 MG/DL (ref 8.3–10.1)
CHLORIDE SERPL-SCNC: 101 MMOL/L (ref 100–108)
CO2 SERPL-SCNC: 31 MMOL/L (ref 21–32)
CREAT SERPL-MCNC: 2.23 MG/DL (ref 0.6–1.3)
GFR SERPL CREATININE-BSD FRML MDRD: 28.9 ML/MIN/1.73SQ M
GLUCOSE SERPL-MCNC: 88 MG/DL (ref 65–140)
POTASSIUM SERPL-SCNC: 4 MMOL/L (ref 3.5–5.3)
SODIUM SERPL-SCNC: 139 MMOL/L (ref 136–145)

## 2017-04-19 PROCEDURE — 80048 BASIC METABOLIC PNL TOTAL CA: CPT | Performed by: INTERNAL MEDICINE

## 2017-04-19 RX ORDER — CEPHALEXIN 500 MG/1
500 CAPSULE ORAL EVERY 12 HOURS SCHEDULED
Status: DISCONTINUED | OUTPATIENT
Start: 2017-04-19 | End: 2017-04-20 | Stop reason: HOSPADM

## 2017-04-19 RX ORDER — SODIUM CHLORIDE 9 MG/ML
60 INJECTION, SOLUTION INTRAVENOUS CONTINUOUS
Status: DISCONTINUED | OUTPATIENT
Start: 2017-04-19 | End: 2017-04-20

## 2017-04-19 RX ORDER — CEPHALEXIN 500 MG/1
500 CAPSULE ORAL EVERY 8 HOURS SCHEDULED
Status: DISCONTINUED | OUTPATIENT
Start: 2017-04-19 | End: 2017-04-19

## 2017-04-19 RX ADMIN — SODIUM CHLORIDE 60 ML/HR: 0.9 INJECTION, SOLUTION INTRAVENOUS at 16:17

## 2017-04-19 RX ADMIN — POTASSIUM CHLORIDE 20 MEQ: 1500 TABLET, EXTENDED RELEASE ORAL at 09:12

## 2017-04-19 RX ADMIN — GUAIFENESIN AND DEXTROMETHORPHAN 10 ML: 100; 10 SYRUP ORAL at 17:56

## 2017-04-19 RX ADMIN — FLUTICASONE PROPIONATE 1 PUFF: 220 AEROSOL, METERED RESPIRATORY (INHALATION) at 21:50

## 2017-04-19 RX ADMIN — CEPHALEXIN 500 MG: 500 CAPSULE ORAL at 21:50

## 2017-04-19 RX ADMIN — APIXABAN 5 MG: 5 TABLET, FILM COATED ORAL at 09:12

## 2017-04-19 RX ADMIN — FUROSEMIDE 40 MG: 40 TABLET ORAL at 09:13

## 2017-04-19 RX ADMIN — CEPHALEXIN 500 MG: 500 CAPSULE ORAL at 09:12

## 2017-04-19 RX ADMIN — PANTOPRAZOLE SODIUM 40 MG: 40 TABLET, DELAYED RELEASE ORAL at 06:42

## 2017-04-19 RX ADMIN — METOPROLOL TARTRATE 25 MG: 25 TABLET ORAL at 09:12

## 2017-04-19 RX ADMIN — GUAIFENESIN AND DEXTROMETHORPHAN 10 ML: 100; 10 SYRUP ORAL at 09:13

## 2017-04-19 RX ADMIN — FLUTICASONE PROPIONATE 1 PUFF: 220 AEROSOL, METERED RESPIRATORY (INHALATION) at 09:12

## 2017-04-19 RX ADMIN — METOPROLOL TARTRATE 25 MG: 25 TABLET ORAL at 16:07

## 2017-04-19 RX ADMIN — METOPROLOL TARTRATE 25 MG: 25 TABLET ORAL at 00:55

## 2017-04-20 VITALS
DIASTOLIC BLOOD PRESSURE: 61 MMHG | SYSTOLIC BLOOD PRESSURE: 101 MMHG | OXYGEN SATURATION: 92 % | WEIGHT: 182.98 LBS | TEMPERATURE: 96.9 F | RESPIRATION RATE: 16 BRPM | HEIGHT: 70 IN | HEART RATE: 74 BPM | BODY MASS INDEX: 26.2 KG/M2

## 2017-04-20 PROBLEM — R06.02 SHORTNESS OF BREATH: Status: RESOLVED | Noted: 2017-04-15 | Resolved: 2017-04-20

## 2017-04-20 PROBLEM — I50.9 CONGESTIVE HEART FAILURE (HCC): Status: RESOLVED | Noted: 2017-04-15 | Resolved: 2017-04-20

## 2017-04-20 LAB
ANION GAP SERPL CALCULATED.3IONS-SCNC: 7 MMOL/L (ref 4–13)
ATRIAL RATE: 86 BPM
BASOPHILS # BLD AUTO: 0 THOUSANDS/ΜL (ref 0–0.1)
BASOPHILS NFR BLD AUTO: 0 % (ref 0–1)
BUN SERPL-MCNC: 36 MG/DL (ref 5–25)
CALCIUM SERPL-MCNC: 8.6 MG/DL (ref 8.3–10.1)
CHLORIDE SERPL-SCNC: 102 MMOL/L (ref 100–108)
CO2 SERPL-SCNC: 30 MMOL/L (ref 21–32)
CREAT SERPL-MCNC: 1.97 MG/DL (ref 0.6–1.3)
EOSINOPHIL # BLD AUTO: 0.2 THOUSAND/ΜL (ref 0–0.61)
EOSINOPHIL NFR BLD AUTO: 2 % (ref 0–6)
ERYTHROCYTE [DISTWIDTH] IN BLOOD BY AUTOMATED COUNT: 17.3 % (ref 11.6–15.1)
GFR SERPL CREATININE-BSD FRML MDRD: 33.4 ML/MIN/1.73SQ M
GLUCOSE SERPL-MCNC: 98 MG/DL (ref 65–140)
HCT VFR BLD AUTO: 37.8 % (ref 42–52)
HGB BLD-MCNC: 12.2 G/DL (ref 14–18)
LYMPHOCYTES # BLD AUTO: 1.4 THOUSANDS/ΜL (ref 0.6–4.47)
LYMPHOCYTES NFR BLD AUTO: 16 % (ref 14–44)
MAGNESIUM SERPL-MCNC: 2 MG/DL (ref 1.6–2.6)
MCH RBC QN AUTO: 26 PG (ref 27–31)
MCHC RBC AUTO-ENTMCNC: 32.2 G/DL (ref 31.4–37.4)
MCV RBC AUTO: 81 FL (ref 82–98)
MONOCYTES # BLD AUTO: 1.1 THOUSAND/ΜL (ref 0.17–1.22)
MONOCYTES NFR BLD AUTO: 12 % (ref 4–12)
NEUTROPHILS # BLD AUTO: 6.3 THOUSANDS/ΜL (ref 1.85–7.62)
NEUTS SEG NFR BLD AUTO: 70 % (ref 43–75)
NRBC BLD AUTO-RTO: 0 /100 WBCS
PHOSPHATE SERPL-MCNC: 3.1 MG/DL (ref 2.3–4.1)
PLATELET # BLD AUTO: 180 THOUSANDS/UL (ref 130–400)
PMV BLD AUTO: 8.8 FL (ref 8.9–12.7)
POTASSIUM SERPL-SCNC: 3.5 MMOL/L (ref 3.5–5.3)
QRS AXIS: -82 DEGREES
QRSD INTERVAL: 122 MS
QT INTERVAL: 386 MS
QTC INTERVAL: 461 MS
RBC # BLD AUTO: 4.68 MILLION/UL (ref 4.7–6.1)
SODIUM SERPL-SCNC: 139 MMOL/L (ref 136–145)
T WAVE AXIS: 90 DEGREES
VENTRICULAR RATE: 86 BPM
WBC # BLD AUTO: 9 THOUSAND/UL (ref 4.8–10.8)

## 2017-04-20 PROCEDURE — 84100 ASSAY OF PHOSPHORUS: CPT | Performed by: INTERNAL MEDICINE

## 2017-04-20 PROCEDURE — 80048 BASIC METABOLIC PNL TOTAL CA: CPT | Performed by: INTERNAL MEDICINE

## 2017-04-20 PROCEDURE — 85025 COMPLETE CBC W/AUTO DIFF WBC: CPT | Performed by: INTERNAL MEDICINE

## 2017-04-20 PROCEDURE — 83735 ASSAY OF MAGNESIUM: CPT | Performed by: INTERNAL MEDICINE

## 2017-04-20 RX ORDER — NICOTINE 21 MG/24HR
1 PATCH, TRANSDERMAL 24 HOURS TRANSDERMAL DAILY
Qty: 30 PATCH | Refills: 0 | Status: SHIPPED | OUTPATIENT
Start: 2017-04-20 | End: 2017-05-20

## 2017-04-20 RX ORDER — PANTOPRAZOLE SODIUM 40 MG/1
40 TABLET, DELAYED RELEASE ORAL
Qty: 30 TABLET | Refills: 0 | Status: ON HOLD | OUTPATIENT
Start: 2017-04-20 | End: 2017-05-16 | Stop reason: ALTCHOICE

## 2017-04-20 RX ORDER — FUROSEMIDE 20 MG/1
20 TABLET ORAL DAILY
Qty: 30 TABLET | Refills: 0 | Status: SHIPPED | OUTPATIENT
Start: 2017-04-20 | End: 2017-06-02 | Stop reason: HOSPADM

## 2017-04-20 RX ORDER — POTASSIUM CHLORIDE 20 MEQ/1
40 TABLET, EXTENDED RELEASE ORAL ONCE
Status: COMPLETED | OUTPATIENT
Start: 2017-04-20 | End: 2017-04-20

## 2017-04-20 RX ORDER — POTASSIUM CHLORIDE 20 MEQ/1
20 TABLET, EXTENDED RELEASE ORAL ONCE
Status: COMPLETED | OUTPATIENT
Start: 2017-04-20 | End: 2017-04-20

## 2017-04-20 RX ADMIN — POTASSIUM CHLORIDE 20 MEQ: 1500 TABLET, EXTENDED RELEASE ORAL at 08:03

## 2017-04-20 RX ADMIN — PANTOPRAZOLE SODIUM 40 MG: 40 TABLET, DELAYED RELEASE ORAL at 06:39

## 2017-04-20 RX ADMIN — CEPHALEXIN 500 MG: 500 CAPSULE ORAL at 08:03

## 2017-04-20 RX ADMIN — POTASSIUM CHLORIDE 20 MEQ: 1500 TABLET, EXTENDED RELEASE ORAL at 12:30

## 2017-04-20 RX ADMIN — GUAIFENESIN AND DEXTROMETHORPHAN 10 ML: 100; 10 SYRUP ORAL at 00:01

## 2017-04-20 RX ADMIN — FLUTICASONE PROPIONATE 1 PUFF: 220 AEROSOL, METERED RESPIRATORY (INHALATION) at 08:04

## 2017-04-20 RX ADMIN — METOPROLOL TARTRATE 25 MG: 25 TABLET ORAL at 08:03

## 2017-04-20 RX ADMIN — GUAIFENESIN AND DEXTROMETHORPHAN 10 ML: 100; 10 SYRUP ORAL at 06:46

## 2017-04-20 RX ADMIN — SODIUM CHLORIDE 60 ML/HR: 0.9 INJECTION, SOLUTION INTRAVENOUS at 06:50

## 2017-04-20 RX ADMIN — POTASSIUM CHLORIDE 40 MEQ: 1500 TABLET, EXTENDED RELEASE ORAL at 09:52

## 2017-04-21 LAB
BACTERIA SPT RESP CULT: ABNORMAL
BACTERIA SPT RESP CULT: ABNORMAL
GRAM STN SPEC: ABNORMAL

## 2017-04-24 ENCOUNTER — TRANSCRIBE ORDERS (OUTPATIENT)
Dept: ADMINISTRATIVE | Facility: HOSPITAL | Age: 77
End: 2017-04-24

## 2017-04-24 ENCOUNTER — APPOINTMENT (OUTPATIENT)
Dept: LAB | Facility: HOSPITAL | Age: 77
End: 2017-04-24
Attending: INTERNAL MEDICINE
Payer: MEDICARE

## 2017-04-24 DIAGNOSIS — N17.9 AKI (ACUTE KIDNEY INJURY) (HCC): ICD-10-CM

## 2017-04-24 DIAGNOSIS — N18.9 CKD (CHRONIC KIDNEY DISEASE): ICD-10-CM

## 2017-04-24 LAB
ANION GAP SERPL CALCULATED.3IONS-SCNC: 9 MMOL/L (ref 4–13)
BUN SERPL-MCNC: 27 MG/DL (ref 5–25)
CALCIUM SERPL-MCNC: 9.2 MG/DL (ref 8.3–10.1)
CHLORIDE SERPL-SCNC: 105 MMOL/L (ref 100–108)
CO2 SERPL-SCNC: 29 MMOL/L (ref 21–32)
CREAT SERPL-MCNC: 1.92 MG/DL (ref 0.6–1.3)
GFR SERPL CREATININE-BSD FRML MDRD: 34.4 ML/MIN/1.73SQ M
GLUCOSE P FAST SERPL-MCNC: 98 MG/DL (ref 65–99)
POTASSIUM SERPL-SCNC: 3.7 MMOL/L (ref 3.5–5.3)
SODIUM SERPL-SCNC: 143 MMOL/L (ref 136–145)

## 2017-04-24 PROCEDURE — 80048 BASIC METABOLIC PNL TOTAL CA: CPT

## 2017-04-24 PROCEDURE — 36415 COLL VENOUS BLD VENIPUNCTURE: CPT

## 2017-04-25 ENCOUNTER — APPOINTMENT (OUTPATIENT)
Dept: LAB | Facility: HOSPITAL | Age: 77
End: 2017-04-25
Attending: INTERNAL MEDICINE
Payer: MEDICARE

## 2017-04-25 ENCOUNTER — TRANSCRIBE ORDERS (OUTPATIENT)
Dept: ADMINISTRATIVE | Facility: HOSPITAL | Age: 77
End: 2017-04-25

## 2017-04-25 DIAGNOSIS — D63.1 ANEMIA IN CHRONIC KIDNEY DISEASE(285.21): Primary | ICD-10-CM

## 2017-04-25 DIAGNOSIS — N18.9 ANEMIA IN CHRONIC KIDNEY DISEASE(285.21): Primary | ICD-10-CM

## 2017-04-25 DIAGNOSIS — D63.1 ANEMIA IN CHRONIC KIDNEY DISEASE(285.21): ICD-10-CM

## 2017-04-25 DIAGNOSIS — N18.9 ANEMIA IN CHRONIC KIDNEY DISEASE(285.21): ICD-10-CM

## 2017-04-25 LAB
BASOPHILS # BLD AUTO: 0 THOUSANDS/ΜL (ref 0–0.1)
BASOPHILS NFR BLD AUTO: 0 % (ref 0–1)
EOSINOPHIL # BLD AUTO: 0.3 THOUSAND/ΜL (ref 0–0.61)
EOSINOPHIL NFR BLD AUTO: 3 % (ref 0–6)
ERYTHROCYTE [DISTWIDTH] IN BLOOD BY AUTOMATED COUNT: 17.3 % (ref 11.6–15.1)
HCT VFR BLD AUTO: 37.1 % (ref 42–52)
HGB BLD-MCNC: 12.1 G/DL (ref 14–18)
LYMPHOCYTES # BLD AUTO: 2.2 THOUSANDS/ΜL (ref 0.6–4.47)
LYMPHOCYTES NFR BLD AUTO: 23 % (ref 14–44)
MCH RBC QN AUTO: 27 PG (ref 27–31)
MCHC RBC AUTO-ENTMCNC: 32.8 G/DL (ref 31.4–37.4)
MCV RBC AUTO: 82 FL (ref 82–98)
MONOCYTES # BLD AUTO: 1.2 THOUSAND/ΜL (ref 0.17–1.22)
MONOCYTES NFR BLD AUTO: 13 % (ref 4–12)
NEUTROPHILS # BLD AUTO: 5.7 THOUSANDS/ΜL (ref 1.85–7.62)
NEUTS SEG NFR BLD AUTO: 61 % (ref 43–75)
NRBC BLD AUTO-RTO: 0 /100 WBCS
PLATELET # BLD AUTO: 195 THOUSANDS/UL (ref 130–400)
PMV BLD AUTO: 8.9 FL (ref 8.9–12.7)
RBC # BLD AUTO: 4.51 MILLION/UL (ref 4.7–6.1)
WBC # BLD AUTO: 9.5 THOUSAND/UL (ref 4.8–10.8)

## 2017-04-25 PROCEDURE — 36415 COLL VENOUS BLD VENIPUNCTURE: CPT

## 2017-04-25 PROCEDURE — 85025 COMPLETE CBC W/AUTO DIFF WBC: CPT

## 2017-05-01 ENCOUNTER — ANESTHESIA EVENT (OUTPATIENT)
Dept: PERIOP | Facility: HOSPITAL | Age: 77
End: 2017-05-01
Payer: MEDICARE

## 2017-05-02 ENCOUNTER — HOSPITAL ENCOUNTER (OUTPATIENT)
Dept: RADIOLOGY | Facility: HOSPITAL | Age: 77
Setting detail: OUTPATIENT SURGERY
Discharge: HOME | End: 2017-05-02
Payer: MEDICARE

## 2017-05-02 ENCOUNTER — HOSPITAL ENCOUNTER (OUTPATIENT)
Facility: HOSPITAL | Age: 77
Setting detail: OUTPATIENT SURGERY
Discharge: HOME | End: 2017-05-02
Attending: UROLOGY | Admitting: UROLOGY
Payer: MEDICARE

## 2017-05-02 ENCOUNTER — ANESTHESIA (OUTPATIENT)
Dept: PERIOP | Facility: HOSPITAL | Age: 77
End: 2017-05-02
Payer: MEDICARE

## 2017-05-02 VITALS
BODY MASS INDEX: 26.05 KG/M2 | OXYGEN SATURATION: 96 % | HEIGHT: 70 IN | WEIGHT: 182 LBS | DIASTOLIC BLOOD PRESSURE: 89 MMHG | SYSTOLIC BLOOD PRESSURE: 139 MMHG | HEART RATE: 68 BPM | TEMPERATURE: 97.6 F | RESPIRATION RATE: 20 BRPM

## 2017-05-02 DIAGNOSIS — N20.1 CALCULUS OF URETER: ICD-10-CM

## 2017-05-02 DIAGNOSIS — R52 PAIN AGGRAVATED BY ACTIVITIES OF DAILY LIVING: ICD-10-CM

## 2017-05-02 PROBLEM — N13.5 STRICTURE OF URETER: Status: ACTIVE | Noted: 2017-05-02

## 2017-05-02 PROCEDURE — 74450 X-RAY URETHRA/BLADDER: CPT

## 2017-05-02 PROCEDURE — C2617 STENT, NON-COR, TEM W/O DEL: HCPCS | Performed by: UROLOGY

## 2017-05-02 PROCEDURE — 82360 CALCULUS ASSAY QUANT: CPT | Performed by: UROLOGY

## 2017-05-02 PROCEDURE — C1894 INTRO/SHEATH, NON-LASER: HCPCS | Performed by: UROLOGY

## 2017-05-02 PROCEDURE — 88300 SURGICAL PATH GROSS: CPT | Performed by: UROLOGY

## 2017-05-02 PROCEDURE — C1769 GUIDE WIRE: HCPCS | Performed by: UROLOGY

## 2017-05-02 DEVICE — URETERAL STENT 6 FR X 26 CM INLAY
Type: IMPLANTABLE DEVICE | Site: URETER | Status: NON-FUNCTIONAL
Removed: 2017-08-15

## 2017-05-02 RX ORDER — SODIUM CHLORIDE 9 MG/ML
50 INJECTION, SOLUTION INTRAVENOUS CONTINUOUS
Status: DISCONTINUED | OUTPATIENT
Start: 2017-05-02 | End: 2017-05-02 | Stop reason: HOSPADM

## 2017-05-02 RX ORDER — PROPOFOL 10 MG/ML
INJECTION, EMULSION INTRAVENOUS AS NEEDED
Status: DISCONTINUED | OUTPATIENT
Start: 2017-05-02 | End: 2017-05-02 | Stop reason: SURG

## 2017-05-02 RX ORDER — SODIUM CHLORIDE 9 MG/ML
INJECTION, SOLUTION INTRAVENOUS CONTINUOUS PRN
Status: DISCONTINUED | OUTPATIENT
Start: 2017-05-02 | End: 2017-05-02 | Stop reason: SURG

## 2017-05-02 RX ORDER — SODIUM CHLORIDE, SODIUM LACTATE, POTASSIUM CHLORIDE, CALCIUM CHLORIDE 600; 310; 30; 20 MG/100ML; MG/100ML; MG/100ML; MG/100ML
125 INJECTION, SOLUTION INTRAVENOUS CONTINUOUS
Status: DISCONTINUED | OUTPATIENT
Start: 2017-05-02 | End: 2017-05-02 | Stop reason: HOSPADM

## 2017-05-02 RX ORDER — SODIUM CHLORIDE 9 MG/ML
75 INJECTION, SOLUTION INTRAVENOUS CONTINUOUS
Status: DISCONTINUED | OUTPATIENT
Start: 2017-05-02 | End: 2017-05-02

## 2017-05-02 RX ORDER — EPHEDRINE SULFATE 50 MG/ML
INJECTION, SOLUTION INTRAVENOUS AS NEEDED
Status: DISCONTINUED | OUTPATIENT
Start: 2017-05-02 | End: 2017-05-02 | Stop reason: SURG

## 2017-05-02 RX ORDER — FENTANYL CITRATE/PF 50 MCG/ML
25 SYRINGE (ML) INJECTION
Status: DISCONTINUED | OUTPATIENT
Start: 2017-05-02 | End: 2017-05-02 | Stop reason: HOSPADM

## 2017-05-02 RX ORDER — FENTANYL CITRATE 50 UG/ML
INJECTION, SOLUTION INTRAMUSCULAR; INTRAVENOUS AS NEEDED
Status: DISCONTINUED | OUTPATIENT
Start: 2017-05-02 | End: 2017-05-02 | Stop reason: SURG

## 2017-05-02 RX ORDER — ONDANSETRON 2 MG/ML
INJECTION INTRAMUSCULAR; INTRAVENOUS AS NEEDED
Status: DISCONTINUED | OUTPATIENT
Start: 2017-05-02 | End: 2017-05-02 | Stop reason: SURG

## 2017-05-02 RX ORDER — ONDANSETRON 2 MG/ML
4 INJECTION INTRAMUSCULAR; INTRAVENOUS ONCE
Status: DISCONTINUED | OUTPATIENT
Start: 2017-05-02 | End: 2017-05-02 | Stop reason: HOSPADM

## 2017-05-02 RX ORDER — SODIUM CHLORIDE, SODIUM LACTATE, POTASSIUM CHLORIDE, CALCIUM CHLORIDE 600; 310; 30; 20 MG/100ML; MG/100ML; MG/100ML; MG/100ML
75 INJECTION, SOLUTION INTRAVENOUS CONTINUOUS
Status: DISCONTINUED | OUTPATIENT
Start: 2017-05-02 | End: 2017-05-02 | Stop reason: HOSPADM

## 2017-05-02 RX ADMIN — ONDANSETRON 4 MG: 2 INJECTION INTRAMUSCULAR; INTRAVENOUS at 16:13

## 2017-05-02 RX ADMIN — CEFAZOLIN SODIUM 1000 MG: 1 SOLUTION INTRAVENOUS at 15:07

## 2017-05-02 RX ADMIN — SODIUM CHLORIDE, SODIUM LACTATE, POTASSIUM CHLORIDE, AND CALCIUM CHLORIDE 125 ML/HR: .6; .31; .03; .02 INJECTION, SOLUTION INTRAVENOUS at 16:40

## 2017-05-02 RX ADMIN — FENTANYL CITRATE 25 MCG: 50 INJECTION, SOLUTION INTRAMUSCULAR; INTRAVENOUS at 16:13

## 2017-05-02 RX ADMIN — SODIUM CHLORIDE: 0.9 INJECTION, SOLUTION INTRAVENOUS at 15:07

## 2017-05-02 RX ADMIN — DEXAMETHASONE SODIUM PHOSPHATE 8 MG: 10 INJECTION INTRAMUSCULAR; INTRAVENOUS at 15:13

## 2017-05-02 RX ADMIN — FENTANYL CITRATE 50 MCG: 50 INJECTION, SOLUTION INTRAMUSCULAR; INTRAVENOUS at 15:09

## 2017-05-02 RX ADMIN — LIDOCAINE HYDROCHLORIDE 50 MG: 20 INJECTION, SOLUTION INTRAVENOUS at 15:09

## 2017-05-02 RX ADMIN — FENTANYL CITRATE 25 MCG: 50 INJECTION, SOLUTION INTRAMUSCULAR; INTRAVENOUS at 15:26

## 2017-05-02 RX ADMIN — PROPOFOL 180 MG: 10 INJECTION, EMULSION INTRAVENOUS at 15:09

## 2017-05-02 RX ADMIN — EPHEDRINE SULFATE 5 MG: 50 INJECTION, SOLUTION INTRAMUSCULAR; INTRAVENOUS; SUBCUTANEOUS at 15:34

## 2017-05-02 RX ADMIN — SODIUM CHLORIDE 75 ML/HR: 0.9 INJECTION, SOLUTION INTRAVENOUS at 12:57

## 2017-05-05 ENCOUNTER — ALLSCRIPTS OFFICE VISIT (OUTPATIENT)
Dept: OTHER | Facility: OTHER | Age: 77
End: 2017-05-05

## 2017-05-10 LAB
CA PHOS MFR STONE: 15 %
COLOR STONE: NORMAL
COM MFR STONE: 85 %
COMMENT-STONE3: NORMAL
COMPOSITION: NORMAL
LABORATORY COMMENT REPORT: NORMAL
NIDUS STONE QL: NORMAL
PHOTO: NORMAL
SIZE STONE: NORMAL MM
STONE ANALYSIS-IMP: NORMAL
STONE ANALYSIS-IMP: NORMAL
WT STONE: 331 MG

## 2017-05-12 RX ORDER — ASPIRIN 81 MG/1
81 TABLET ORAL DAILY
COMMUNITY
End: 2017-05-16 | Stop reason: HOSPADM

## 2017-05-15 ENCOUNTER — ALLSCRIPTS OFFICE VISIT (OUTPATIENT)
Dept: OTHER | Facility: OTHER | Age: 77
End: 2017-05-15

## 2017-05-15 ENCOUNTER — ANESTHESIA EVENT (OUTPATIENT)
Dept: PERIOP | Facility: HOSPITAL | Age: 77
End: 2017-05-15
Payer: MEDICARE

## 2017-05-16 ENCOUNTER — HOSPITAL ENCOUNTER (OUTPATIENT)
Dept: RADIOLOGY | Facility: HOSPITAL | Age: 77
Setting detail: OUTPATIENT SURGERY
Discharge: HOME | End: 2017-05-16
Payer: MEDICARE

## 2017-05-16 ENCOUNTER — HOSPITAL ENCOUNTER (OUTPATIENT)
Facility: HOSPITAL | Age: 77
Setting detail: OUTPATIENT SURGERY
Discharge: HOME | End: 2017-05-16
Attending: UROLOGY | Admitting: UROLOGY
Payer: MEDICARE

## 2017-05-16 ENCOUNTER — ANESTHESIA (OUTPATIENT)
Dept: PERIOP | Facility: HOSPITAL | Age: 77
End: 2017-05-16
Payer: MEDICARE

## 2017-05-16 VITALS
SYSTOLIC BLOOD PRESSURE: 129 MMHG | WEIGHT: 182 LBS | TEMPERATURE: 97 F | HEIGHT: 70 IN | BODY MASS INDEX: 26.05 KG/M2 | OXYGEN SATURATION: 96 % | DIASTOLIC BLOOD PRESSURE: 67 MMHG | HEART RATE: 75 BPM | RESPIRATION RATE: 18 BRPM

## 2017-05-16 DIAGNOSIS — N20.0 CALCULUS OF KIDNEY: ICD-10-CM

## 2017-05-16 DIAGNOSIS — N20.1 CALCULUS OF URETER: ICD-10-CM

## 2017-05-16 PROCEDURE — C1894 INTRO/SHEATH, NON-LASER: HCPCS | Performed by: UROLOGY

## 2017-05-16 PROCEDURE — 82360 CALCULUS ASSAY QUANT: CPT | Performed by: UROLOGY

## 2017-05-16 PROCEDURE — 88300 SURGICAL PATH GROSS: CPT | Performed by: UROLOGY

## 2017-05-16 PROCEDURE — 74450 X-RAY URETHRA/BLADDER: CPT

## 2017-05-16 PROCEDURE — C2617 STENT, NON-COR, TEM W/O DEL: HCPCS | Performed by: UROLOGY

## 2017-05-16 PROCEDURE — C1769 GUIDE WIRE: HCPCS | Performed by: UROLOGY

## 2017-05-16 DEVICE — URETERAL STENT 6 FR X 26 CM INLAY
Type: IMPLANTABLE DEVICE | Site: URETER | Status: NON-FUNCTIONAL
Removed: 2017-08-15

## 2017-05-16 RX ORDER — ONDANSETRON 2 MG/ML
INJECTION INTRAMUSCULAR; INTRAVENOUS AS NEEDED
Status: DISCONTINUED | OUTPATIENT
Start: 2017-05-16 | End: 2017-05-16 | Stop reason: SURG

## 2017-05-16 RX ORDER — FENTANYL CITRATE 50 UG/ML
INJECTION, SOLUTION INTRAMUSCULAR; INTRAVENOUS AS NEEDED
Status: DISCONTINUED | OUTPATIENT
Start: 2017-05-16 | End: 2017-05-16 | Stop reason: SURG

## 2017-05-16 RX ORDER — MAGNESIUM HYDROXIDE 1200 MG/15ML
LIQUID ORAL AS NEEDED
Status: DISCONTINUED | OUTPATIENT
Start: 2017-05-16 | End: 2017-05-16 | Stop reason: HOSPADM

## 2017-05-16 RX ORDER — FENTANYL CITRATE/PF 50 MCG/ML
50 SYRINGE (ML) INJECTION
Status: DISCONTINUED | OUTPATIENT
Start: 2017-05-16 | End: 2017-05-16 | Stop reason: HOSPADM

## 2017-05-16 RX ORDER — SODIUM CHLORIDE, SODIUM LACTATE, POTASSIUM CHLORIDE, CALCIUM CHLORIDE 600; 310; 30; 20 MG/100ML; MG/100ML; MG/100ML; MG/100ML
125 INJECTION, SOLUTION INTRAVENOUS CONTINUOUS
Status: DISCONTINUED | OUTPATIENT
Start: 2017-05-16 | End: 2017-05-16

## 2017-05-16 RX ORDER — CEFADROXIL 500 MG/1
500 CAPSULE ORAL 2 TIMES DAILY
Qty: 20 CAPSULE | Refills: 0 | Status: SHIPPED | OUTPATIENT
Start: 2017-05-16 | End: 2017-05-26

## 2017-05-16 RX ORDER — PROPOFOL 10 MG/ML
INJECTION, EMULSION INTRAVENOUS AS NEEDED
Status: DISCONTINUED | OUTPATIENT
Start: 2017-05-16 | End: 2017-05-16 | Stop reason: SURG

## 2017-05-16 RX ORDER — METOCLOPRAMIDE HYDROCHLORIDE 5 MG/ML
INJECTION INTRAMUSCULAR; INTRAVENOUS AS NEEDED
Status: DISCONTINUED | OUTPATIENT
Start: 2017-05-16 | End: 2017-05-16 | Stop reason: SURG

## 2017-05-16 RX ORDER — SODIUM CHLORIDE, SODIUM LACTATE, POTASSIUM CHLORIDE, CALCIUM CHLORIDE 600; 310; 30; 20 MG/100ML; MG/100ML; MG/100ML; MG/100ML
50 INJECTION, SOLUTION INTRAVENOUS CONTINUOUS
Status: DISCONTINUED | OUTPATIENT
Start: 2017-05-16 | End: 2017-05-16 | Stop reason: HOSPADM

## 2017-05-16 RX ADMIN — ONDANSETRON 4 MG: 2 INJECTION INTRAMUSCULAR; INTRAVENOUS at 08:24

## 2017-05-16 RX ADMIN — FENTANYL CITRATE 25 MCG: 50 INJECTION, SOLUTION INTRAMUSCULAR; INTRAVENOUS at 08:40

## 2017-05-16 RX ADMIN — SODIUM CHLORIDE, SODIUM LACTATE, POTASSIUM CHLORIDE, AND CALCIUM CHLORIDE 125 ML/HR: .6; .31; .03; .02 INJECTION, SOLUTION INTRAVENOUS at 07:12

## 2017-05-16 RX ADMIN — METOCLOPRAMIDE HYDROCHLORIDE 10 MG: 5 INJECTION INTRAMUSCULAR; INTRAVENOUS at 08:40

## 2017-05-16 RX ADMIN — PROPOFOL 150 MG: 10 INJECTION, EMULSION INTRAVENOUS at 08:16

## 2017-05-16 RX ADMIN — LIDOCAINE HYDROCHLORIDE 40 MG: 20 INJECTION, SOLUTION INTRAVENOUS at 08:16

## 2017-05-16 RX ADMIN — CEFAZOLIN SODIUM 1000 MG: 1 SOLUTION INTRAVENOUS at 08:11

## 2017-05-16 RX ADMIN — FENTANYL CITRATE 50 MCG: 50 INJECTION, SOLUTION INTRAMUSCULAR; INTRAVENOUS at 08:16

## 2017-05-16 RX ADMIN — PROPOFOL 50 MG: 10 INJECTION, EMULSION INTRAVENOUS at 08:30

## 2017-05-16 RX ADMIN — FENTANYL CITRATE 25 MCG: 50 INJECTION, SOLUTION INTRAMUSCULAR; INTRAVENOUS at 08:30

## 2017-05-21 DIAGNOSIS — I12.9 HYPERTENSIVE CHRONIC KIDNEY DISEASE WITH STAGE 1 THROUGH STAGE 4 CHRONIC KIDNEY DISEASE, OR UNSPECIFIED CHRONIC KIDNEY DISEASE: ICD-10-CM

## 2017-05-24 LAB
CA PHOS MFR STONE: 5 %
COLOR STONE: NORMAL
COM MFR STONE: 92 %
COMMENT-STONE3: NORMAL
COMPOSITION: NORMAL
LABORATORY COMMENT REPORT: NORMAL
NIDUS STONE QL: NORMAL
PHOTO: NORMAL
SIZE STONE: NORMAL MM
STONE ANALYSIS-IMP: NORMAL
STONE ANALYSIS-IMP: NORMAL
WT STONE: 175 MG

## 2017-06-01 ENCOUNTER — ANESTHESIA EVENT (OUTPATIENT)
Dept: PERIOP | Facility: HOSPITAL | Age: 77
End: 2017-06-01
Payer: MEDICARE

## 2017-06-02 ENCOUNTER — HOSPITAL ENCOUNTER (OUTPATIENT)
Dept: RADIOLOGY | Facility: HOSPITAL | Age: 77
Setting detail: OUTPATIENT SURGERY
Discharge: HOME | End: 2017-06-02
Payer: MEDICARE

## 2017-06-02 ENCOUNTER — ANESTHESIA (OUTPATIENT)
Dept: PERIOP | Facility: HOSPITAL | Age: 77
End: 2017-06-02
Payer: MEDICARE

## 2017-06-02 ENCOUNTER — HOSPITAL ENCOUNTER (OUTPATIENT)
Facility: HOSPITAL | Age: 77
Setting detail: OUTPATIENT SURGERY
Discharge: HOME | End: 2017-06-02
Attending: UROLOGY | Admitting: UROLOGY
Payer: MEDICARE

## 2017-06-02 VITALS
WEIGHT: 182 LBS | OXYGEN SATURATION: 96 % | TEMPERATURE: 97.8 F | HEART RATE: 88 BPM | SYSTOLIC BLOOD PRESSURE: 168 MMHG | RESPIRATION RATE: 18 BRPM | BODY MASS INDEX: 26.05 KG/M2 | HEIGHT: 70 IN | DIASTOLIC BLOOD PRESSURE: 84 MMHG

## 2017-06-02 DIAGNOSIS — N20.0 CALCULUS OF KIDNEY: ICD-10-CM

## 2017-06-02 PROCEDURE — C1894 INTRO/SHEATH, NON-LASER: HCPCS | Performed by: UROLOGY

## 2017-06-02 PROCEDURE — 88300 SURGICAL PATH GROSS: CPT | Performed by: UROLOGY

## 2017-06-02 PROCEDURE — C1769 GUIDE WIRE: HCPCS | Performed by: UROLOGY

## 2017-06-02 PROCEDURE — 74450 X-RAY URETHRA/BLADDER: CPT

## 2017-06-02 PROCEDURE — C2617 STENT, NON-COR, TEM W/O DEL: HCPCS | Performed by: UROLOGY

## 2017-06-02 PROCEDURE — 82360 CALCULUS ASSAY QUANT: CPT | Performed by: UROLOGY

## 2017-06-02 DEVICE — URETERAL STENT 6 FR X 26 CM INLAY
Type: IMPLANTABLE DEVICE | Site: URETER | Status: NON-FUNCTIONAL
Removed: 2017-08-15

## 2017-06-02 RX ORDER — SODIUM CHLORIDE, SODIUM LACTATE, POTASSIUM CHLORIDE, CALCIUM CHLORIDE 600; 310; 30; 20 MG/100ML; MG/100ML; MG/100ML; MG/100ML
100 INJECTION, SOLUTION INTRAVENOUS CONTINUOUS
Status: DISCONTINUED | OUTPATIENT
Start: 2017-06-02 | End: 2017-06-02

## 2017-06-02 RX ORDER — FUROSEMIDE 20 MG/1
20 TABLET ORAL EVERY MORNING
COMMUNITY
End: 2018-05-07

## 2017-06-02 RX ORDER — SODIUM CHLORIDE, SODIUM LACTATE, POTASSIUM CHLORIDE, CALCIUM CHLORIDE 600; 310; 30; 20 MG/100ML; MG/100ML; MG/100ML; MG/100ML
50 INJECTION, SOLUTION INTRAVENOUS CONTINUOUS
Status: DISCONTINUED | OUTPATIENT
Start: 2017-06-02 | End: 2017-06-02 | Stop reason: HOSPADM

## 2017-06-02 RX ORDER — FENTANYL CITRATE 50 UG/ML
INJECTION, SOLUTION INTRAMUSCULAR; INTRAVENOUS AS NEEDED
Status: DISCONTINUED | OUTPATIENT
Start: 2017-06-02 | End: 2017-06-02 | Stop reason: SURG

## 2017-06-02 RX ORDER — PROPOFOL 10 MG/ML
INJECTION, EMULSION INTRAVENOUS AS NEEDED
Status: DISCONTINUED | OUTPATIENT
Start: 2017-06-02 | End: 2017-06-02 | Stop reason: SURG

## 2017-06-02 RX ORDER — MIDAZOLAM HYDROCHLORIDE 1 MG/ML
INJECTION INTRAMUSCULAR; INTRAVENOUS AS NEEDED
Status: DISCONTINUED | OUTPATIENT
Start: 2017-06-02 | End: 2017-06-02 | Stop reason: SURG

## 2017-06-02 RX ORDER — FENTANYL CITRATE/PF 50 MCG/ML
25 SYRINGE (ML) INJECTION
Status: DISCONTINUED | OUTPATIENT
Start: 2017-06-02 | End: 2017-06-02 | Stop reason: HOSPADM

## 2017-06-02 RX ADMIN — FENTANYL CITRATE 50 MCG: 50 INJECTION, SOLUTION INTRAMUSCULAR; INTRAVENOUS at 13:50

## 2017-06-02 RX ADMIN — FENTANYL CITRATE 50 MCG: 50 INJECTION, SOLUTION INTRAMUSCULAR; INTRAVENOUS at 13:34

## 2017-06-02 RX ADMIN — PROPOFOL 150 MG: 10 INJECTION, EMULSION INTRAVENOUS at 13:34

## 2017-06-02 RX ADMIN — LIDOCAINE HYDROCHLORIDE 60 MG: 20 INJECTION, SOLUTION INTRAVENOUS at 13:34

## 2017-06-02 RX ADMIN — CEFAZOLIN SODIUM 1000 MG: 1 SOLUTION INTRAVENOUS at 13:27

## 2017-06-02 RX ADMIN — MIDAZOLAM HYDROCHLORIDE 2 MG: 1 INJECTION, SOLUTION INTRAMUSCULAR; INTRAVENOUS at 13:27

## 2017-06-02 RX ADMIN — SODIUM CHLORIDE, SODIUM LACTATE, POTASSIUM CHLORIDE, AND CALCIUM CHLORIDE: .6; .31; .03; .02 INJECTION, SOLUTION INTRAVENOUS at 13:00

## 2017-06-09 LAB
CA PHOS MFR STONE: 10 %
COLOR STONE: NORMAL
COM MFR STONE: 90 %
COMMENT-STONE3: NORMAL
COMPOSITION: NORMAL
LABORATORY COMMENT REPORT: NORMAL
LABORATORY COMMENT REPORT: NORMAL
NIDUS STONE QL: NORMAL
PHOTO: NORMAL
SIZE STONE: NORMAL MM
STONE ANALYSIS-IMP: NORMAL
STONE ANALYSIS-IMP: NORMAL
WT STONE: 208 MG

## 2017-06-21 ENCOUNTER — TRANSCRIBE ORDERS (OUTPATIENT)
Dept: ADMINISTRATIVE | Facility: HOSPITAL | Age: 77
End: 2017-06-21

## 2017-06-21 DIAGNOSIS — N20.0 URIC ACID NEPHROLITHIASIS: Primary | ICD-10-CM

## 2017-06-21 DIAGNOSIS — N20.1 CALCULUS OF URETER: ICD-10-CM

## 2017-06-30 ENCOUNTER — APPOINTMENT (OUTPATIENT)
Dept: LAB | Facility: HOSPITAL | Age: 77
End: 2017-06-30
Payer: MEDICARE

## 2017-06-30 ENCOUNTER — TRANSCRIBE ORDERS (OUTPATIENT)
Dept: ADMINISTRATIVE | Facility: HOSPITAL | Age: 77
End: 2017-06-30

## 2017-06-30 DIAGNOSIS — I12.9 HYPERTENSIVE CHRONIC KIDNEY DISEASE WITH STAGE 1 THROUGH STAGE 4 CHRONIC KIDNEY DISEASE, OR UNSPECIFIED CHRONIC KIDNEY DISEASE: ICD-10-CM

## 2017-06-30 DIAGNOSIS — N18.30 CHRONIC KIDNEY DISEASE, STAGE III (MODERATE) (HCC): ICD-10-CM

## 2017-06-30 LAB
25(OH)D3 SERPL-MCNC: 19.6 NG/ML (ref 30–100)
ANION GAP SERPL CALCULATED.3IONS-SCNC: 10 MMOL/L (ref 4–13)
BACTERIA UR QL AUTO: ABNORMAL /HPF
BILIRUB UR QL STRIP: NEGATIVE
BUN SERPL-MCNC: 31 MG/DL (ref 5–25)
CALCIUM SERPL-MCNC: 8.9 MG/DL (ref 8.3–10.1)
CHLORIDE SERPL-SCNC: 104 MMOL/L (ref 100–108)
CLARITY UR: CLEAR
CO2 SERPL-SCNC: 26 MMOL/L (ref 21–32)
COLOR UR: YELLOW
CREAT SERPL-MCNC: 2.15 MG/DL (ref 0.6–1.3)
ERYTHROCYTE [DISTWIDTH] IN BLOOD BY AUTOMATED COUNT: 16.4 % (ref 11.6–15.1)
GFR SERPL CREATININE-BSD FRML MDRD: 30.2 ML/MIN/1.73SQ M
GLUCOSE P FAST SERPL-MCNC: 99 MG/DL (ref 65–99)
GLUCOSE UR STRIP-MCNC: NEGATIVE MG/DL
HCT VFR BLD AUTO: 38.7 % (ref 42–52)
HGB BLD-MCNC: 12.8 G/DL (ref 14–18)
HGB UR QL STRIP.AUTO: ABNORMAL
KETONES UR STRIP-MCNC: NEGATIVE MG/DL
LEUKOCYTE ESTERASE UR QL STRIP: NEGATIVE
MAGNESIUM SERPL-MCNC: 2 MG/DL (ref 1.6–2.6)
MCH RBC QN AUTO: 27.3 PG (ref 27–31)
MCHC RBC AUTO-ENTMCNC: 33.1 G/DL (ref 31.4–37.4)
MCV RBC AUTO: 83 FL (ref 82–98)
NITRITE UR QL STRIP: NEGATIVE
NON-SQ EPI CELLS URNS QL MICRO: ABNORMAL /HPF
PH UR STRIP.AUTO: 5.5 [PH] (ref 5–9)
PHOSPHATE SERPL-MCNC: 3 MG/DL (ref 2.3–4.1)
PLATELET # BLD AUTO: 180 THOUSANDS/UL (ref 130–400)
PMV BLD AUTO: 8.5 FL (ref 8.9–12.7)
POTASSIUM SERPL-SCNC: 4.6 MMOL/L (ref 3.5–5.3)
PROT UR STRIP-MCNC: NEGATIVE MG/DL
PTH-INTACT SERPL-MCNC: 132.3 PG/ML (ref 14–72)
RBC # BLD AUTO: 4.69 MILLION/UL (ref 4.7–6.1)
RBC #/AREA URNS AUTO: ABNORMAL /HPF
SODIUM SERPL-SCNC: 140 MMOL/L (ref 136–145)
SP GR UR STRIP.AUTO: 1.02 (ref 1–1.03)
UROBILINOGEN UR QL STRIP.AUTO: 0.2 E.U./DL
WBC # BLD AUTO: 8.8 THOUSAND/UL (ref 4.8–10.8)
WBC #/AREA URNS AUTO: ABNORMAL /HPF

## 2017-06-30 PROCEDURE — 82306 VITAMIN D 25 HYDROXY: CPT

## 2017-06-30 PROCEDURE — 83735 ASSAY OF MAGNESIUM: CPT

## 2017-06-30 PROCEDURE — 80048 BASIC METABOLIC PNL TOTAL CA: CPT

## 2017-06-30 PROCEDURE — 36415 COLL VENOUS BLD VENIPUNCTURE: CPT

## 2017-06-30 PROCEDURE — 83970 ASSAY OF PARATHORMONE: CPT

## 2017-06-30 PROCEDURE — 84100 ASSAY OF PHOSPHORUS: CPT

## 2017-06-30 PROCEDURE — 81001 URINALYSIS AUTO W/SCOPE: CPT

## 2017-06-30 PROCEDURE — 85027 COMPLETE CBC AUTOMATED: CPT

## 2017-07-01 DIAGNOSIS — I12.9 HYPERTENSIVE CHRONIC KIDNEY DISEASE WITH STAGE 1 THROUGH STAGE 4 CHRONIC KIDNEY DISEASE, OR UNSPECIFIED CHRONIC KIDNEY DISEASE: ICD-10-CM

## 2017-07-01 DIAGNOSIS — N18.30 CHRONIC KIDNEY DISEASE, STAGE III (MODERATE) (HCC): ICD-10-CM

## 2017-07-05 ENCOUNTER — GENERIC CONVERSION - ENCOUNTER (OUTPATIENT)
Dept: OTHER | Facility: OTHER | Age: 77
End: 2017-07-05

## 2017-07-10 ENCOUNTER — HOSPITAL ENCOUNTER (OUTPATIENT)
Dept: RADIOLOGY | Facility: HOSPITAL | Age: 77
Discharge: HOME | End: 2017-07-10
Attending: UROLOGY
Payer: MEDICARE

## 2017-07-10 ENCOUNTER — APPOINTMENT (OUTPATIENT)
Dept: LAB | Facility: HOSPITAL | Age: 77
End: 2017-07-10
Attending: UROLOGY
Payer: MEDICARE

## 2017-07-10 DIAGNOSIS — N20.1 CALCULUS OF URETER: ICD-10-CM

## 2017-07-10 DIAGNOSIS — N20.0 URIC ACID NEPHROLITHIASIS: ICD-10-CM

## 2017-07-10 LAB
ANION GAP SERPL CALCULATED.3IONS-SCNC: 7 MMOL/L (ref 4–13)
BUN SERPL-MCNC: 27 MG/DL (ref 5–25)
CALCIUM SERPL-MCNC: 9 MG/DL (ref 8.3–10.1)
CHLORIDE SERPL-SCNC: 102 MMOL/L (ref 100–108)
CO2 SERPL-SCNC: 29 MMOL/L (ref 21–32)
CREAT SERPL-MCNC: 2.08 MG/DL (ref 0.6–1.3)
GFR SERPL CREATININE-BSD FRML MDRD: 31.4 ML/MIN/1.73SQ M
GLUCOSE P FAST SERPL-MCNC: 91 MG/DL (ref 65–99)
POTASSIUM SERPL-SCNC: 4.7 MMOL/L (ref 3.5–5.3)
SODIUM SERPL-SCNC: 138 MMOL/L (ref 136–145)

## 2017-07-10 PROCEDURE — 80048 BASIC METABOLIC PNL TOTAL CA: CPT

## 2017-07-10 PROCEDURE — 76770 US EXAM ABDO BACK WALL COMP: CPT

## 2017-07-10 PROCEDURE — 36415 COLL VENOUS BLD VENIPUNCTURE: CPT

## 2017-07-14 ENCOUNTER — APPOINTMENT (OUTPATIENT)
Dept: LAB | Facility: HOSPITAL | Age: 77
End: 2017-07-14
Payer: MEDICARE

## 2017-07-14 ENCOUNTER — TRANSCRIBE ORDERS (OUTPATIENT)
Dept: ADMINISTRATIVE | Facility: HOSPITAL | Age: 77
End: 2017-07-14

## 2017-07-14 DIAGNOSIS — I12.9 HYPERTENSIVE CHRONIC KIDNEY DISEASE WITH STAGE 1 THROUGH STAGE 4 CHRONIC KIDNEY DISEASE, OR UNSPECIFIED CHRONIC KIDNEY DISEASE: ICD-10-CM

## 2017-07-14 LAB
ANION GAP SERPL CALCULATED.3IONS-SCNC: 6 MMOL/L (ref 4–13)
BUN SERPL-MCNC: 29 MG/DL (ref 5–25)
CALCIUM SERPL-MCNC: 9.3 MG/DL (ref 8.3–10.1)
CHLORIDE SERPL-SCNC: 105 MMOL/L (ref 100–108)
CO2 SERPL-SCNC: 29 MMOL/L (ref 21–32)
CREAT SERPL-MCNC: 2.2 MG/DL (ref 0.6–1.3)
GFR SERPL CREATININE-BSD FRML MDRD: 29.4 ML/MIN/1.73SQ M
GLUCOSE P FAST SERPL-MCNC: 100 MG/DL (ref 65–99)
POTASSIUM SERPL-SCNC: 4.8 MMOL/L (ref 3.5–5.3)
SODIUM SERPL-SCNC: 140 MMOL/L (ref 136–145)

## 2017-07-14 PROCEDURE — 80048 BASIC METABOLIC PNL TOTAL CA: CPT

## 2017-07-14 PROCEDURE — 36415 COLL VENOUS BLD VENIPUNCTURE: CPT

## 2017-07-17 ENCOUNTER — OFFICE VISIT (OUTPATIENT)
Dept: LAB | Facility: HOSPITAL | Age: 77
End: 2017-07-17
Attending: UROLOGY
Payer: MEDICARE

## 2017-07-17 ENCOUNTER — TRANSCRIBE ORDERS (OUTPATIENT)
Dept: ADMINISTRATIVE | Facility: HOSPITAL | Age: 77
End: 2017-07-17

## 2017-07-17 ENCOUNTER — ANESTHESIA EVENT (OUTPATIENT)
Dept: PERIOP | Facility: HOSPITAL | Age: 77
End: 2017-07-17
Payer: MEDICARE

## 2017-07-17 ENCOUNTER — APPOINTMENT (OUTPATIENT)
Dept: LAB | Facility: HOSPITAL | Age: 77
End: 2017-07-17
Attending: UROLOGY
Payer: MEDICARE

## 2017-07-17 ENCOUNTER — APPOINTMENT (OUTPATIENT)
Dept: PREADMISSION TESTING | Facility: HOSPITAL | Age: 77
End: 2017-07-17
Payer: MEDICARE

## 2017-07-17 VITALS — WEIGHT: 192 LBS | HEIGHT: 70 IN | BODY MASS INDEX: 27.49 KG/M2

## 2017-07-17 DIAGNOSIS — Z01.818 PRE-OP TESTING: Primary | ICD-10-CM

## 2017-07-17 DIAGNOSIS — Z01.818 PRE-OP TESTING: ICD-10-CM

## 2017-07-17 LAB
ABO GROUP BLD: NORMAL
BACTERIA UR QL AUTO: ABNORMAL /HPF
BASOPHILS # BLD AUTO: 0 THOUSANDS/ΜL (ref 0–0.1)
BASOPHILS NFR BLD AUTO: 1 % (ref 0–1)
BILIRUB UR QL STRIP: NEGATIVE
BLD GP AB SCN SERPL QL: NEGATIVE
CLARITY UR: CLEAR
COLOR UR: YELLOW
EOSINOPHIL # BLD AUTO: 0.2 THOUSAND/ΜL (ref 0–0.61)
EOSINOPHIL NFR BLD AUTO: 3 % (ref 0–6)
ERYTHROCYTE [DISTWIDTH] IN BLOOD BY AUTOMATED COUNT: 16.4 % (ref 11.6–15.1)
GLUCOSE UR STRIP-MCNC: NEGATIVE MG/DL
HCT VFR BLD AUTO: 42.5 % (ref 42–52)
HGB BLD-MCNC: 13.8 G/DL (ref 14–18)
HGB UR QL STRIP.AUTO: ABNORMAL
KETONES UR STRIP-MCNC: NEGATIVE MG/DL
LEUKOCYTE ESTERASE UR QL STRIP: NEGATIVE
LYMPHOCYTES # BLD AUTO: 1.9 THOUSANDS/ΜL (ref 0.6–4.47)
LYMPHOCYTES NFR BLD AUTO: 28 % (ref 14–44)
MCH RBC QN AUTO: 26.9 PG (ref 27–31)
MCHC RBC AUTO-ENTMCNC: 32.4 G/DL (ref 31.4–37.4)
MCV RBC AUTO: 83 FL (ref 82–98)
MONOCYTES # BLD AUTO: 0.6 THOUSAND/ΜL (ref 0.17–1.22)
MONOCYTES NFR BLD AUTO: 9 % (ref 4–12)
NEUTROPHILS # BLD AUTO: 3.9 THOUSANDS/ΜL (ref 1.85–7.62)
NEUTS SEG NFR BLD AUTO: 59 % (ref 43–75)
NITRITE UR QL STRIP: NEGATIVE
NON-SQ EPI CELLS URNS QL MICRO: ABNORMAL /HPF
NRBC BLD AUTO-RTO: 0 /100 WBCS
PH UR STRIP.AUTO: 5.5 [PH] (ref 5–9)
PLATELET # BLD AUTO: 182 THOUSANDS/UL (ref 130–400)
PMV BLD AUTO: 8.7 FL (ref 8.9–12.7)
PROT UR STRIP-MCNC: NEGATIVE MG/DL
RBC # BLD AUTO: 5.12 MILLION/UL (ref 4.7–6.1)
RBC #/AREA URNS AUTO: ABNORMAL /HPF
RH BLD: POSITIVE
SP GR UR STRIP.AUTO: 1.02 (ref 1–1.03)
SPECIMEN EXPIRATION DATE: NORMAL
UROBILINOGEN UR QL STRIP.AUTO: 0.2 E.U./DL
WBC # BLD AUTO: 6.7 THOUSAND/UL (ref 4.8–10.8)
WBC #/AREA URNS AUTO: ABNORMAL /HPF

## 2017-07-17 PROCEDURE — 86850 RBC ANTIBODY SCREEN: CPT

## 2017-07-17 PROCEDURE — 36415 COLL VENOUS BLD VENIPUNCTURE: CPT

## 2017-07-17 PROCEDURE — 85025 COMPLETE CBC W/AUTO DIFF WBC: CPT

## 2017-07-17 PROCEDURE — 86901 BLOOD TYPING SEROLOGIC RH(D): CPT

## 2017-07-17 PROCEDURE — 81001 URINALYSIS AUTO W/SCOPE: CPT | Performed by: UROLOGY

## 2017-07-17 PROCEDURE — 86900 BLOOD TYPING SEROLOGIC ABO: CPT

## 2017-07-17 PROCEDURE — 93005 ELECTROCARDIOGRAM TRACING: CPT

## 2017-07-17 PROCEDURE — 87086 URINE CULTURE/COLONY COUNT: CPT

## 2017-07-17 RX ORDER — ERGOCALCIFEROL 1.25 MG/1
50000 CAPSULE ORAL WEEKLY
COMMUNITY
End: 2017-11-08

## 2017-07-18 ENCOUNTER — HOSPITAL ENCOUNTER (OUTPATIENT)
Dept: RADIOLOGY | Facility: HOSPITAL | Age: 77
Setting detail: OUTPATIENT SURGERY
Discharge: HOME | End: 2017-07-18
Payer: MEDICARE

## 2017-07-18 ENCOUNTER — ANESTHESIA (OUTPATIENT)
Dept: PERIOP | Facility: HOSPITAL | Age: 77
End: 2017-07-18
Payer: MEDICARE

## 2017-07-18 ENCOUNTER — HOSPITAL ENCOUNTER (OUTPATIENT)
Facility: HOSPITAL | Age: 77
Setting detail: OUTPATIENT SURGERY
Discharge: HOME | End: 2017-07-18
Attending: UROLOGY | Admitting: UROLOGY
Payer: MEDICARE

## 2017-07-18 VITALS
OXYGEN SATURATION: 98 % | SYSTOLIC BLOOD PRESSURE: 156 MMHG | RESPIRATION RATE: 18 BRPM | TEMPERATURE: 97.2 F | HEART RATE: 74 BPM | DIASTOLIC BLOOD PRESSURE: 75 MMHG

## 2017-07-18 DIAGNOSIS — N20.0 RENAL STONES: ICD-10-CM

## 2017-07-18 DIAGNOSIS — N20.0 CALCULUS OF KIDNEY: ICD-10-CM

## 2017-07-18 LAB
ATRIAL RATE: 84 BPM
BACTERIA UR CULT: NORMAL
QRS AXIS: -46 DEGREES
QRSD INTERVAL: 118 MS
QT INTERVAL: 404 MS
QTC INTERVAL: 448 MS
T WAVE AXIS: -14 DEGREES
VENTRICULAR RATE: 74 BPM

## 2017-07-18 PROCEDURE — 88300 SURGICAL PATH GROSS: CPT | Performed by: UROLOGY

## 2017-07-18 PROCEDURE — 82360 CALCULUS ASSAY QUANT: CPT | Performed by: UROLOGY

## 2017-07-18 PROCEDURE — 74450 X-RAY URETHRA/BLADDER: CPT

## 2017-07-18 PROCEDURE — C1769 GUIDE WIRE: HCPCS | Performed by: UROLOGY

## 2017-07-18 PROCEDURE — C1894 INTRO/SHEATH, NON-LASER: HCPCS | Performed by: UROLOGY

## 2017-07-18 PROCEDURE — C2617 STENT, NON-COR, TEM W/O DEL: HCPCS | Performed by: UROLOGY

## 2017-07-18 DEVICE — URETERAL STENT 6 FR X 26 CM INLAY
Type: IMPLANTABLE DEVICE | Status: NON-FUNCTIONAL
Removed: 2017-08-15

## 2017-07-18 RX ORDER — SODIUM CHLORIDE, SODIUM LACTATE, POTASSIUM CHLORIDE, CALCIUM CHLORIDE 600; 310; 30; 20 MG/100ML; MG/100ML; MG/100ML; MG/100ML
75 INJECTION, SOLUTION INTRAVENOUS CONTINUOUS
Status: DISCONTINUED | OUTPATIENT
Start: 2017-07-18 | End: 2017-07-18 | Stop reason: HOSPADM

## 2017-07-18 RX ORDER — PROPOFOL 10 MG/ML
INJECTION, EMULSION INTRAVENOUS AS NEEDED
Status: DISCONTINUED | OUTPATIENT
Start: 2017-07-18 | End: 2017-07-18 | Stop reason: SURG

## 2017-07-18 RX ORDER — FENTANYL CITRATE 50 UG/ML
INJECTION, SOLUTION INTRAMUSCULAR; INTRAVENOUS AS NEEDED
Status: DISCONTINUED | OUTPATIENT
Start: 2017-07-18 | End: 2017-07-18 | Stop reason: SURG

## 2017-07-18 RX ORDER — FENTANYL CITRATE/PF 50 MCG/ML
25 SYRINGE (ML) INJECTION
Status: DISCONTINUED | OUTPATIENT
Start: 2017-07-18 | End: 2017-07-18 | Stop reason: HOSPADM

## 2017-07-18 RX ORDER — ONDANSETRON 2 MG/ML
4 INJECTION INTRAMUSCULAR; INTRAVENOUS ONCE AS NEEDED
Status: DISCONTINUED | OUTPATIENT
Start: 2017-07-18 | End: 2017-07-18 | Stop reason: HOSPADM

## 2017-07-18 RX ORDER — MIDAZOLAM HYDROCHLORIDE 1 MG/ML
INJECTION INTRAMUSCULAR; INTRAVENOUS AS NEEDED
Status: DISCONTINUED | OUTPATIENT
Start: 2017-07-18 | End: 2017-07-18 | Stop reason: SURG

## 2017-07-18 RX ORDER — MAGNESIUM HYDROXIDE 1200 MG/15ML
LIQUID ORAL AS NEEDED
Status: DISCONTINUED | OUTPATIENT
Start: 2017-07-18 | End: 2017-07-18 | Stop reason: HOSPADM

## 2017-07-18 RX ORDER — SODIUM CHLORIDE, SODIUM LACTATE, POTASSIUM CHLORIDE, CALCIUM CHLORIDE 600; 310; 30; 20 MG/100ML; MG/100ML; MG/100ML; MG/100ML
125 INJECTION, SOLUTION INTRAVENOUS CONTINUOUS
Status: DISCONTINUED | OUTPATIENT
Start: 2017-07-18 | End: 2017-07-18

## 2017-07-18 RX ADMIN — SODIUM CHLORIDE, SODIUM LACTATE, POTASSIUM CHLORIDE, AND CALCIUM CHLORIDE: .6; .31; .03; .02 INJECTION, SOLUTION INTRAVENOUS at 12:44

## 2017-07-18 RX ADMIN — SODIUM CHLORIDE, SODIUM LACTATE, POTASSIUM CHLORIDE, AND CALCIUM CHLORIDE 125 ML/HR: .6; .31; .03; .02 INJECTION, SOLUTION INTRAVENOUS at 11:42

## 2017-07-18 RX ADMIN — CEFAZOLIN SODIUM 1000 MG: 1 SOLUTION INTRAVENOUS at 12:49

## 2017-07-18 RX ADMIN — MIDAZOLAM HYDROCHLORIDE 2 MG: 1 INJECTION, SOLUTION INTRAMUSCULAR; INTRAVENOUS at 12:54

## 2017-07-18 RX ADMIN — FENTANYL CITRATE 50 MCG: 50 INJECTION, SOLUTION INTRAMUSCULAR; INTRAVENOUS at 13:10

## 2017-07-18 RX ADMIN — PROPOFOL 200 MG: 10 INJECTION, EMULSION INTRAVENOUS at 12:55

## 2017-07-18 RX ADMIN — FENTANYL CITRATE 50 MCG: 50 INJECTION, SOLUTION INTRAMUSCULAR; INTRAVENOUS at 13:04

## 2017-07-24 ENCOUNTER — ALLSCRIPTS OFFICE VISIT (OUTPATIENT)
Dept: OTHER | Facility: OTHER | Age: 77
End: 2017-07-24

## 2017-07-26 ENCOUNTER — GENERIC CONVERSION - ENCOUNTER (OUTPATIENT)
Dept: OTHER | Facility: OTHER | Age: 77
End: 2017-07-26

## 2017-07-26 ENCOUNTER — TRANSCRIBE ORDERS (OUTPATIENT)
Dept: ADMINISTRATIVE | Facility: HOSPITAL | Age: 77
End: 2017-07-26

## 2017-07-26 ENCOUNTER — APPOINTMENT (OUTPATIENT)
Dept: LAB | Facility: HOSPITAL | Age: 77
End: 2017-07-26
Attending: INTERNAL MEDICINE
Payer: MEDICARE

## 2017-07-26 DIAGNOSIS — N18.30 CHRONIC KIDNEY DISEASE, STAGE III (MODERATE) (HCC): ICD-10-CM

## 2017-07-26 LAB
ANION GAP SERPL CALCULATED.3IONS-SCNC: 8 MMOL/L (ref 4–13)
BACTERIA UR QL AUTO: ABNORMAL /HPF
BILIRUB UR QL STRIP: NEGATIVE
BUN SERPL-MCNC: 27 MG/DL (ref 5–25)
CALCIUM SERPL-MCNC: 9.1 MG/DL (ref 8.3–10.1)
CHLORIDE SERPL-SCNC: 105 MMOL/L (ref 100–108)
CLARITY UR: CLEAR
CO2 SERPL-SCNC: 28 MMOL/L (ref 21–32)
COLOR UR: YELLOW
CREAT SERPL-MCNC: 1.65 MG/DL (ref 0.6–1.3)
ERYTHROCYTE [DISTWIDTH] IN BLOOD BY AUTOMATED COUNT: 16.3 % (ref 11.6–15.1)
GFR SERPL CREATININE-BSD FRML MDRD: 40 ML/MIN/1.73SQ M
GLUCOSE P FAST SERPL-MCNC: 119 MG/DL (ref 65–99)
GLUCOSE UR STRIP-MCNC: NEGATIVE MG/DL
HCT VFR BLD AUTO: 41.1 % (ref 42–52)
HGB BLD-MCNC: 13.2 G/DL (ref 14–18)
HGB UR QL STRIP.AUTO: ABNORMAL
KETONES UR STRIP-MCNC: NEGATIVE MG/DL
LEUKOCYTE ESTERASE UR QL STRIP: ABNORMAL
MCH RBC QN AUTO: 26.7 PG (ref 27–31)
MCHC RBC AUTO-ENTMCNC: 32 G/DL (ref 31.4–37.4)
MCV RBC AUTO: 83 FL (ref 82–98)
NITRITE UR QL STRIP: NEGATIVE
NON-SQ EPI CELLS URNS QL MICRO: ABNORMAL /HPF
PH UR STRIP.AUTO: 6 [PH] (ref 5–9)
PLATELET # BLD AUTO: 138 THOUSANDS/UL (ref 130–400)
PMV BLD AUTO: 9.1 FL (ref 8.9–12.7)
POTASSIUM SERPL-SCNC: 4.5 MMOL/L (ref 3.5–5.3)
PROT UR STRIP-MCNC: ABNORMAL MG/DL
RBC # BLD AUTO: 4.93 MILLION/UL (ref 4.7–6.1)
RBC #/AREA URNS AUTO: ABNORMAL /HPF
SODIUM SERPL-SCNC: 141 MMOL/L (ref 136–145)
SP GR UR STRIP.AUTO: 1.01 (ref 1–1.03)
UROBILINOGEN UR QL STRIP.AUTO: 0.2 E.U./DL
WBC # BLD AUTO: 7.1 THOUSAND/UL (ref 4.8–10.8)
WBC #/AREA URNS AUTO: ABNORMAL /HPF

## 2017-07-26 PROCEDURE — 36415 COLL VENOUS BLD VENIPUNCTURE: CPT

## 2017-07-26 PROCEDURE — 80048 BASIC METABOLIC PNL TOTAL CA: CPT

## 2017-07-26 PROCEDURE — 85027 COMPLETE CBC AUTOMATED: CPT

## 2017-07-26 PROCEDURE — 81001 URINALYSIS AUTO W/SCOPE: CPT

## 2017-07-31 ENCOUNTER — GENERIC CONVERSION - ENCOUNTER (OUTPATIENT)
Dept: OTHER | Facility: OTHER | Age: 77
End: 2017-07-31

## 2017-08-01 ENCOUNTER — GENERIC CONVERSION - ENCOUNTER (OUTPATIENT)
Dept: OTHER | Facility: OTHER | Age: 77
End: 2017-08-01

## 2017-08-01 LAB
CA PHOS MFR STONE: 5 %
COLOR STONE: NORMAL
COM MFR STONE: 95 %
COMMENT-STONE3: NORMAL
COMPOSITION: NORMAL
LABORATORY COMMENT REPORT: NORMAL
NIDUS STONE QL: NORMAL
PHOTO: NORMAL
SIZE STONE: NORMAL MM
STONE ANALYSIS-IMP: NORMAL
WT STONE: 14 MG

## 2017-08-10 RX ORDER — AMLODIPINE BESYLATE 10 MG/1
10 TABLET ORAL EVERY MORNING
COMMUNITY
End: 2020-11-17 | Stop reason: HOSPADM

## 2017-08-14 ENCOUNTER — ANESTHESIA EVENT (OUTPATIENT)
Dept: PERIOP | Facility: HOSPITAL | Age: 77
End: 2017-08-14
Payer: MEDICARE

## 2017-08-15 ENCOUNTER — HOSPITAL ENCOUNTER (OUTPATIENT)
Facility: HOSPITAL | Age: 77
Setting detail: OUTPATIENT SURGERY
Discharge: HOME | End: 2017-08-15
Attending: UROLOGY | Admitting: UROLOGY
Payer: MEDICARE

## 2017-08-15 ENCOUNTER — ANESTHESIA (OUTPATIENT)
Dept: PERIOP | Facility: HOSPITAL | Age: 77
End: 2017-08-15
Payer: MEDICARE

## 2017-08-15 ENCOUNTER — HOSPITAL ENCOUNTER (OUTPATIENT)
Dept: RADIOLOGY | Facility: HOSPITAL | Age: 77
Setting detail: OUTPATIENT SURGERY
Discharge: HOME | End: 2017-08-15
Payer: MEDICARE

## 2017-08-15 VITALS
OXYGEN SATURATION: 98 % | BODY MASS INDEX: 27.49 KG/M2 | HEIGHT: 70 IN | HEART RATE: 64 BPM | TEMPERATURE: 96 F | DIASTOLIC BLOOD PRESSURE: 75 MMHG | WEIGHT: 192 LBS | SYSTOLIC BLOOD PRESSURE: 124 MMHG | RESPIRATION RATE: 16 BRPM

## 2017-08-15 DIAGNOSIS — N20.9 STONES, URINARY TRACT: ICD-10-CM

## 2017-08-15 PROCEDURE — C2617 STENT, NON-COR, TEM W/O DEL: HCPCS | Performed by: UROLOGY

## 2017-08-15 PROCEDURE — 74450 X-RAY URETHRA/BLADDER: CPT

## 2017-08-15 PROCEDURE — C1769 GUIDE WIRE: HCPCS | Performed by: UROLOGY

## 2017-08-15 DEVICE — IMPLANTABLE DEVICE
Type: IMPLANTABLE DEVICE | Site: URETER | Status: NON-FUNCTIONAL
Removed: 2019-10-22

## 2017-08-15 RX ORDER — MAGNESIUM HYDROXIDE 1200 MG/15ML
LIQUID ORAL AS NEEDED
Status: DISCONTINUED | OUTPATIENT
Start: 2017-08-15 | End: 2017-08-15 | Stop reason: HOSPADM

## 2017-08-15 RX ORDER — SODIUM CHLORIDE, SODIUM LACTATE, POTASSIUM CHLORIDE, CALCIUM CHLORIDE 600; 310; 30; 20 MG/100ML; MG/100ML; MG/100ML; MG/100ML
50 INJECTION, SOLUTION INTRAVENOUS CONTINUOUS
Status: DISCONTINUED | OUTPATIENT
Start: 2017-08-15 | End: 2017-08-15 | Stop reason: HOSPADM

## 2017-08-15 RX ORDER — PROPOFOL 10 MG/ML
INJECTION, EMULSION INTRAVENOUS AS NEEDED
Status: DISCONTINUED | OUTPATIENT
Start: 2017-08-15 | End: 2017-08-15 | Stop reason: SURG

## 2017-08-15 RX ORDER — SODIUM CHLORIDE 9 MG/ML
75 INJECTION, SOLUTION INTRAVENOUS CONTINUOUS
Status: DISCONTINUED | OUTPATIENT
Start: 2017-08-15 | End: 2017-08-15 | Stop reason: HOSPADM

## 2017-08-15 RX ORDER — LIDOCAINE HYDROCHLORIDE 10 MG/ML
INJECTION, SOLUTION INFILTRATION; PERINEURAL AS NEEDED
Status: DISCONTINUED | OUTPATIENT
Start: 2017-08-15 | End: 2017-08-15 | Stop reason: SURG

## 2017-08-15 RX ORDER — FENTANYL CITRATE 50 UG/ML
INJECTION, SOLUTION INTRAMUSCULAR; INTRAVENOUS AS NEEDED
Status: DISCONTINUED | OUTPATIENT
Start: 2017-08-15 | End: 2017-08-15 | Stop reason: SURG

## 2017-08-15 RX ORDER — SODIUM CHLORIDE, SODIUM LACTATE, POTASSIUM CHLORIDE, CALCIUM CHLORIDE 600; 310; 30; 20 MG/100ML; MG/100ML; MG/100ML; MG/100ML
INJECTION, SOLUTION INTRAVENOUS CONTINUOUS PRN
Status: DISCONTINUED | OUTPATIENT
Start: 2017-08-15 | End: 2017-08-15 | Stop reason: SURG

## 2017-08-15 RX ORDER — FENTANYL CITRATE/PF 50 MCG/ML
25 SYRINGE (ML) INJECTION
Status: DISCONTINUED | OUTPATIENT
Start: 2017-08-15 | End: 2017-08-15 | Stop reason: HOSPADM

## 2017-08-15 RX ADMIN — FENTANYL CITRATE 50 MCG: 50 INJECTION, SOLUTION INTRAMUSCULAR; INTRAVENOUS at 14:45

## 2017-08-15 RX ADMIN — PROPOFOL 50 MG: 10 INJECTION, EMULSION INTRAVENOUS at 14:45

## 2017-08-15 RX ADMIN — PROPOFOL 50 MG: 10 INJECTION, EMULSION INTRAVENOUS at 14:40

## 2017-08-15 RX ADMIN — SODIUM CHLORIDE, SODIUM LACTATE, POTASSIUM CHLORIDE, AND CALCIUM CHLORIDE: .6; .31; .03; .02 INJECTION, SOLUTION INTRAVENOUS at 14:30

## 2017-08-15 RX ADMIN — PROPOFOL 60 MG: 10 INJECTION, EMULSION INTRAVENOUS at 14:49

## 2017-08-15 RX ADMIN — PROPOFOL 20 MG: 10 INJECTION, EMULSION INTRAVENOUS at 14:55

## 2017-08-15 RX ADMIN — CEFAZOLIN SODIUM 1000 MG: 1 SOLUTION INTRAVENOUS at 14:34

## 2017-08-15 RX ADMIN — FENTANYL CITRATE 50 MCG: 50 INJECTION, SOLUTION INTRAMUSCULAR; INTRAVENOUS at 14:35

## 2017-08-15 RX ADMIN — PROPOFOL 50 MG: 10 INJECTION, EMULSION INTRAVENOUS at 14:35

## 2017-08-15 RX ADMIN — LIDOCAINE HYDROCHLORIDE 30 MG: 10 INJECTION, SOLUTION INFILTRATION; PERINEURAL at 14:35

## 2017-08-30 ENCOUNTER — ALLSCRIPTS OFFICE VISIT (OUTPATIENT)
Dept: OTHER | Facility: OTHER | Age: 77
End: 2017-08-30

## 2017-08-30 DIAGNOSIS — N18.30 CHRONIC KIDNEY DISEASE, STAGE III (MODERATE) (HCC): ICD-10-CM

## 2017-08-30 DIAGNOSIS — E55.9 VITAMIN D DEFICIENCY: ICD-10-CM

## 2017-09-05 ENCOUNTER — TRANSCRIBE ORDERS (OUTPATIENT)
Dept: ADMINISTRATIVE | Facility: HOSPITAL | Age: 77
End: 2017-09-05

## 2017-09-05 ENCOUNTER — APPOINTMENT (OUTPATIENT)
Dept: LAB | Facility: HOSPITAL | Age: 77
End: 2017-09-05
Attending: INTERNAL MEDICINE
Payer: MEDICARE

## 2017-09-05 DIAGNOSIS — N18.30 CHRONIC KIDNEY DISEASE, STAGE III (MODERATE) (HCC): ICD-10-CM

## 2017-09-05 DIAGNOSIS — E55.9 VITAMIN D DEFICIENCY: ICD-10-CM

## 2017-09-05 LAB
25(OH)D3 SERPL-MCNC: 54.3 NG/ML (ref 30–100)
ANION GAP SERPL CALCULATED.3IONS-SCNC: 7 MMOL/L (ref 4–13)
BACTERIA UR QL AUTO: ABNORMAL /HPF
BILIRUB UR QL STRIP: NEGATIVE
BUN SERPL-MCNC: 28 MG/DL (ref 5–25)
CALCIUM SERPL-MCNC: 9.2 MG/DL (ref 8.3–10.1)
CHLORIDE SERPL-SCNC: 104 MMOL/L (ref 100–108)
CLARITY UR: CLEAR
CO2 SERPL-SCNC: 29 MMOL/L (ref 21–32)
COLOR UR: YELLOW
CREAT SERPL-MCNC: 1.66 MG/DL (ref 0.6–1.3)
CREAT UR-MCNC: 127 MG/DL
ERYTHROCYTE [DISTWIDTH] IN BLOOD BY AUTOMATED COUNT: 17.1 % (ref 11.6–15.1)
GFR SERPL CREATININE-BSD FRML MDRD: 40 ML/MIN/1.73SQ M
GLUCOSE P FAST SERPL-MCNC: 84 MG/DL (ref 65–99)
GLUCOSE UR STRIP-MCNC: NEGATIVE MG/DL
HCT VFR BLD AUTO: 39.1 % (ref 42–52)
HGB BLD-MCNC: 13 G/DL (ref 14–18)
HGB UR QL STRIP.AUTO: ABNORMAL
KETONES UR STRIP-MCNC: NEGATIVE MG/DL
LEUKOCYTE ESTERASE UR QL STRIP: ABNORMAL
MCH RBC QN AUTO: 28.1 PG (ref 27–31)
MCHC RBC AUTO-ENTMCNC: 33.3 G/DL (ref 31.4–37.4)
MCV RBC AUTO: 84 FL (ref 82–98)
NITRITE UR QL STRIP: NEGATIVE
NON-SQ EPI CELLS URNS QL MICRO: ABNORMAL /HPF
PH UR STRIP.AUTO: 5.5 [PH] (ref 5–9)
PLATELET # BLD AUTO: 157 THOUSANDS/UL (ref 130–400)
PMV BLD AUTO: 9.6 FL (ref 8.9–12.7)
POTASSIUM SERPL-SCNC: 4.4 MMOL/L (ref 3.5–5.3)
PROT UR STRIP-MCNC: ABNORMAL MG/DL
PROT UR-MCNC: 25 MG/DL
PROT/CREAT UR: 0.2 MG/G{CREAT} (ref 0–0.1)
PTH-INTACT SERPL-MCNC: 86.6 PG/ML (ref 14–72)
RBC # BLD AUTO: 4.63 MILLION/UL (ref 4.7–6.1)
RBC #/AREA URNS AUTO: ABNORMAL /HPF
SODIUM SERPL-SCNC: 140 MMOL/L (ref 136–145)
SP GR UR STRIP.AUTO: 1.01 (ref 1–1.03)
UROBILINOGEN UR QL STRIP.AUTO: 0.2 E.U./DL
WBC # BLD AUTO: 7.5 THOUSAND/UL (ref 4.8–10.8)
WBC #/AREA URNS AUTO: ABNORMAL /HPF

## 2017-09-05 PROCEDURE — 85027 COMPLETE CBC AUTOMATED: CPT

## 2017-09-05 PROCEDURE — 82306 VITAMIN D 25 HYDROXY: CPT

## 2017-09-05 PROCEDURE — 36415 COLL VENOUS BLD VENIPUNCTURE: CPT

## 2017-09-05 PROCEDURE — 80048 BASIC METABOLIC PNL TOTAL CA: CPT

## 2017-09-05 PROCEDURE — 83970 ASSAY OF PARATHORMONE: CPT

## 2017-09-05 PROCEDURE — 84156 ASSAY OF PROTEIN URINE: CPT

## 2017-09-05 PROCEDURE — 81001 URINALYSIS AUTO W/SCOPE: CPT

## 2017-09-05 PROCEDURE — 82570 ASSAY OF URINE CREATININE: CPT

## 2017-09-06 ENCOUNTER — GENERIC CONVERSION - ENCOUNTER (OUTPATIENT)
Dept: OTHER | Facility: OTHER | Age: 77
End: 2017-09-06

## 2017-09-08 NOTE — H&P
History & Physical - [facility]  Edwin Talavera 78 y o  male MRN: [id number]  Unit/Bed#:  Encounter: [id number]  11/14/17  Patient seen and examined  No changes in exam of heart and lungs  Consent confirmed  7fr stent  ASSESSMENT/PLAN:    Right renal and ureteral stones  Stent placement and holmium laser of stone and stricture 4/18/17  Holmium laser of stone and stent exchange 5/2/17  Renal stones treated elsewhere in [place] in past year (7 procedures)  Right laser lithotripsy, laser infundibultomy, and stone basket extraction 6/2/17  Right CRUSH and laser incision of proximal ureter 7/18/17  · Monitor for reoccurrence of stone    Right ureteral stricture  Stricture noted distal to right ureteral stones in the proximal to mid right ureter  Laser incision of stricture performed 4/18/17 and again 7/18/17 with treatment of his ureteral stone  Desires for ureteral stent exchanges for current time versus more invasive surgical options  · Stent removal, and replacement with 7fr ureteral stent and then onto an 8Fr  · The risks, benefits, alternatives,and probabilities of success were discussed in detail with no guarantee made as to outcome  All questions were answered to the patient's satisfaction  A-fib, CHF, HTN, Moderate MR, TR, and aortic regurgitation    HISTORY OF PRESENT ILLNESS:  77 y/o male with history of renal stones underwent right stent and laser stricture incision  Pt at current time does not desire additional surgery for his ureteral stricture disease  Presents for right ureteral stent exchange       PAST MEDICAL HISTORY:  Past Medical History:   Diagnosis Date    Arthritis     Atrial fibrillation     Essential hypertension, long-standing     Heart murmur, lifelong     History of cigarette smoking, chronic     62 year smoker at one half pack per day, quit 04/1/17    Kidney stones     12 episodes of kidney stones    Kidney stones with lithotripsy 03/2017    Done at [facility]    Pneumonia      X 2    Vitamin D deficiency     Water retention     Wears glasses     Wears partial dentures     upper       PAST SURGICAL HISTORY:  Past Surgical History:   Procedure Laterality Date    APPENDECTOMY  1960    CAROTID ENDARTERECTOMY Left 1998    Supposedly no internal stenosis found    CYSTOSCOPY Right 8/15/2017    Procedure: CYSTOSCOPY RIGHT STENT EXCHANGE;  Surgeon: Francis Gallegos MD;  Location: [address] [place] [address];  Service: Urology    EXTRACORPOREAL SHOCK WAVE LITHOTRIPSY  03/2017    For nephrolithiasis at [address] [place] [address] &INDWELL STENT INSRT Right 5/2/2017    Procedure: CYSTOSCOPY URETEROSCOPY WITH LITHOTRIPSY HOLMIUM LASER, RETROGRADE PYELOGRAM AND INSERTION STENT URETERAL;  Surgeon: Francis Gallegos MD;  Location: [address] [place] [address];  Service: Urology    NH CYSTO/URETERO W/LITHOTRIPSY &INDWELL STENT INSRT Right 5/16/2017    Procedure: CYSTOSCOPY, RETROGRADE PYELOGRAM,  FLEXIBLE URETEROSCOPY,  HOLMIUM LASER LITHOTRIPSY, STONE BASKET MANIPULATION,  STENT URETERAL EXCHANGE;  Surgeon: Francis Gallegos MD;  Location: [address] [place] [address];  Service: Urology    NH CYSTO/URETERO W/LITHOTRIPSY &INDWELL STENT INSRT Right 6/2/2017    Procedure: CYSTOSCOPY, RETROGRADE, STONE MANIPULATION WITH HOLMIUM LASER, STENT PLACEMENT;  Surgeon: Francis Gallegos MD;  Location: [facility];  Service: Urology    NH CYSTO/URETERO W/LITHOTRIPSY &INDWELL STENT INSRT Right 7/18/2017    Procedure: CYSTOSCOPY, RETROGRADE, URETEROSCOPY HOLMIUM LASER Luwatasneem Davalosws;  Surgeon: Francis Gallegos MD;  Location: [address] [place] [address];  Service: Urology    PROSTATE SURGERY  2015    Laser Prostatectomy  by Dr Leandra Cash Right 4/18/2017    Procedure: INSERTION STENT URETERAL, RIGHT URETEROSCOPY,  LASER OF URETERAL STRICTURE AND STONE;  Surgeon: Francis Gallegos MD;  Location: [facility];  Service:        ALLERGIES:  No Known Allergies    SOCIAL HISTORY:  History   Alcohol Use    Yes     Comment: rare     History   Drug Use No     History   Smoking Status    Former Smoker    Packs/day: 0 50    Years: 62 00    Types: Cigarettes    Quit date: 4/15/2017   Smokeless Tobacco    Never Used       FAMILY HISTORY:  Family History   Problem Relation Age of Onset    Hypertension Mother     Alcohol abuse Father     Cirrhosis Father     Cancer Son 13     knee       MEDICATIONS:  No current facility-administered medications for this encounter  Current Outpatient Prescriptions:     amLODIPine (NORVASC) 10 mg tablet, Take 10 mg by mouth daily, Disp: , Rfl:     ergocalciferol (VITAMIN D2) 50,000 units, Take 50,000 Units by mouth once a week Once week on Sunday, Disp: , Rfl:     furosemide (LASIX) 20 mg tablet, Take 20 mg by mouth every morning  , Disp: , Rfl:     metoprolol tartrate (LOPRESSOR) 25 mg tablet, Take 25 mg by mouth every 8 (eight) hours  , Disp: , Rfl:     Review of Systems   Constitutional: Negative for chills and fever  Respiratory: Negative for cough and chest tightness  Genitourinary: Negative for dysuria, flank pain and hematuria  PHYSICAL EXAM:  Physical Exam   Constitutional: He appears well-developed and well-nourished  HENT:   Head: Normocephalic  Cardiovascular: Normal rate and regular rhythm  Pulmonary/Chest: Effort normal and breath sounds normal    Abdominal: Soft  He exhibits no distension  There is no tenderness  Genitourinary: Penis normal    Neurological: He is alert  Skin: Skin is warm and dry  Psychiatric: He has a normal mood and affect   His behavior is normal  Judgment and thought content normal        LAB RESULTS:  Lab Results   Component Value Date    WBC 7 50 09/05/2017    HGB 13 0 (L) 09/05/2017    HCT 39 1 (L) 09/05/2017    MCV 84 09/05/2017     09/05/2017     Lab Results   Component Value Date    GLUCOSE 98 04/20/2017    CALCIUM 9 2 09/05/2017     09/05/2017 K 4 4 09/05/2017    CO2 29 09/05/2017     09/05/2017    BUN 28 (H) 09/05/2017    CREATININE 1 66 (H) 09/05/2017     Lab Results   Component Value Date    CALCIUM 9 2 09/05/2017    PHOS 3 0 06/30/2017       OTHER STUDIES:  CT abd/pelvis without CTS 4/18/17    RIGHT KIDNEY AND URETER:  Multiple calculi are seen within the proximal ureter resulting in moderate hydronephrosis and more proximal hydroureter  For reference, the largest calculus measures approximately 1 7 cm craniocaudal  Multiple calculi are also seen within the lower   pole, largest measuring 8 mm transverse  There is mild perinephric stranding without perinephric fluid collection      LEFT KIDNEY AND URETER:  No urinary tract calculi  Cyst is noted in the mid to lower pole  No hydronephrosis or hydroureter  No perinephric collection      URINARY BLADDER:  Unremarkable      Small bilateral pleural effusions and bibasilar atelectasis are present  Limited low radiation dose noncontrast CT evaluation demonstrates no clinically significant abnormality of liver, spleen, pancreas, or adrenal glands  No calcified gallstones or gallbladder wall thickening noted  No bowel obstruction  No ascites or lymphadenopathy  Diverticulosis without active diverticulitis is present  Limited evaluation demonstrates no evidence to suggest acute appendicitis  No acute fracture or destructive osseous lesion is identified          IMPRESSION:     Multiple calculi within the proximal right ureter, largest measuring 1 7 cm and resulting in moderate hydronephrosis and more proximal hydroureter  Multiple calculi also seen in the lower pole the right kidney        Small bilateral pleural effusions and bibasilar atelectasis      Diverticulosis without active diverticulitis        Renal US 7/10/17  9mm right lower pole stone  9mm right mid pole stone  7mm right proximal ureteral stone hydronephrosis   4mm x2 bladder stones     Araceli Tapia PA-C  09/08/17    Portions of the record may have been created with voice recognition software   Occasional wrong word or "sound a like" substitutions may have occurred due to the inherent limitations of voice recognition software   Read the chart carefully and recognize, using context, where substitutions have occurred

## 2017-10-13 ENCOUNTER — TRANSCRIBE ORDERS (OUTPATIENT)
Dept: ADMINISTRATIVE | Facility: HOSPITAL | Age: 77
End: 2017-10-13

## 2017-10-13 DIAGNOSIS — I10 ESSENTIAL HYPERTENSION, MALIGNANT: Primary | ICD-10-CM

## 2017-10-16 ENCOUNTER — TRANSCRIBE ORDERS (OUTPATIENT)
Dept: ADMINISTRATIVE | Facility: HOSPITAL | Age: 77
End: 2017-10-16

## 2017-10-16 ENCOUNTER — APPOINTMENT (OUTPATIENT)
Dept: LAB | Facility: HOSPITAL | Age: 77
End: 2017-10-16
Payer: MEDICARE

## 2017-10-16 DIAGNOSIS — I48.0 PAROXYSMAL ATRIAL FIBRILLATION (HCC): ICD-10-CM

## 2017-10-16 DIAGNOSIS — I10 ESSENTIAL HYPERTENSION, MALIGNANT: ICD-10-CM

## 2017-10-16 DIAGNOSIS — I10 ESSENTIAL HYPERTENSION, MALIGNANT: Primary | ICD-10-CM

## 2017-10-16 LAB
ANION GAP SERPL CALCULATED.3IONS-SCNC: 8 MMOL/L (ref 4–13)
BUN SERPL-MCNC: 26 MG/DL (ref 5–25)
CALCIUM SERPL-MCNC: 9.3 MG/DL (ref 8.3–10.1)
CHLORIDE SERPL-SCNC: 104 MMOL/L (ref 100–108)
CHOLEST SERPL-MCNC: 159 MG/DL (ref 50–200)
CO2 SERPL-SCNC: 29 MMOL/L (ref 21–32)
CREAT SERPL-MCNC: 1.74 MG/DL (ref 0.6–1.3)
GFR SERPL CREATININE-BSD FRML MDRD: 38 ML/MIN/1.73SQ M
GLUCOSE P FAST SERPL-MCNC: 94 MG/DL (ref 65–99)
HDLC SERPL-MCNC: 38 MG/DL (ref 40–60)
LDLC SERPL CALC-MCNC: 102 MG/DL (ref 0–100)
POTASSIUM SERPL-SCNC: 4.3 MMOL/L (ref 3.5–5.3)
SODIUM SERPL-SCNC: 141 MMOL/L (ref 136–145)
TRIGL SERPL-MCNC: 93 MG/DL

## 2017-10-16 PROCEDURE — 80048 BASIC METABOLIC PNL TOTAL CA: CPT | Performed by: DENTIST

## 2017-10-16 PROCEDURE — 36415 COLL VENOUS BLD VENIPUNCTURE: CPT | Performed by: DENTIST

## 2017-10-16 PROCEDURE — 80061 LIPID PANEL: CPT

## 2017-11-08 ENCOUNTER — APPOINTMENT (OUTPATIENT)
Dept: PREADMISSION TESTING | Facility: HOSPITAL | Age: 77
End: 2017-11-08
Payer: MEDICARE

## 2017-11-08 ENCOUNTER — TRANSCRIBE ORDERS (OUTPATIENT)
Dept: ADMINISTRATIVE | Facility: HOSPITAL | Age: 77
End: 2017-11-08

## 2017-11-08 ENCOUNTER — APPOINTMENT (OUTPATIENT)
Dept: LAB | Facility: HOSPITAL | Age: 77
End: 2017-11-08
Attending: INTERNAL MEDICINE
Payer: MEDICARE

## 2017-11-08 VITALS — HEIGHT: 70 IN | WEIGHT: 197 LBS | BODY MASS INDEX: 28.2 KG/M2

## 2017-11-08 DIAGNOSIS — Z01.818 PREOP EXAMINATION: ICD-10-CM

## 2017-11-08 DIAGNOSIS — N18.30 CHRONIC KIDNEY DISEASE, STAGE III (MODERATE) (HCC): ICD-10-CM

## 2017-11-08 DIAGNOSIS — N18.9 CKD (CHRONIC KIDNEY DISEASE): ICD-10-CM

## 2017-11-08 DIAGNOSIS — Z01.818 PREOP EXAMINATION: Primary | ICD-10-CM

## 2017-11-08 LAB
BACTERIA UR QL AUTO: ABNORMAL /HPF
BASOPHILS # BLD AUTO: 0 THOUSANDS/ΜL (ref 0–0.1)
BASOPHILS NFR BLD AUTO: 1 % (ref 0–1)
BILIRUB UR QL STRIP: NEGATIVE
CLARITY UR: CLEAR
COLOR UR: YELLOW
EOSINOPHIL # BLD AUTO: 0.3 THOUSAND/ΜL (ref 0–0.61)
EOSINOPHIL NFR BLD AUTO: 4 % (ref 0–6)
ERYTHROCYTE [DISTWIDTH] IN BLOOD BY AUTOMATED COUNT: 15.5 % (ref 11.6–15.1)
GLUCOSE UR STRIP-MCNC: NEGATIVE MG/DL
HCT VFR BLD AUTO: 42 % (ref 42–52)
HGB BLD-MCNC: 13.8 G/DL (ref 14–18)
HGB UR QL STRIP.AUTO: ABNORMAL
KETONES UR STRIP-MCNC: NEGATIVE MG/DL
LEUKOCYTE ESTERASE UR QL STRIP: ABNORMAL
LYMPHOCYTES # BLD AUTO: 2.2 THOUSANDS/ΜL (ref 0.6–4.47)
LYMPHOCYTES NFR BLD AUTO: 31 % (ref 14–44)
MCH RBC QN AUTO: 28.9 PG (ref 27–31)
MCHC RBC AUTO-ENTMCNC: 32.9 G/DL (ref 31.4–37.4)
MCV RBC AUTO: 88 FL (ref 82–98)
MONOCYTES # BLD AUTO: 0.8 THOUSAND/ΜL (ref 0.17–1.22)
MONOCYTES NFR BLD AUTO: 11 % (ref 4–12)
NEUTROPHILS # BLD AUTO: 3.9 THOUSANDS/ΜL (ref 1.85–7.62)
NEUTS SEG NFR BLD AUTO: 54 % (ref 43–75)
NITRITE UR QL STRIP: NEGATIVE
NON-SQ EPI CELLS URNS QL MICRO: ABNORMAL /HPF
NRBC BLD AUTO-RTO: 0 /100 WBCS
PH UR STRIP.AUTO: 6.5 [PH] (ref 5–9)
PLATELET # BLD AUTO: 162 THOUSANDS/UL (ref 130–400)
PMV BLD AUTO: 8.8 FL (ref 8.9–12.7)
PROT UR STRIP-MCNC: NEGATIVE MG/DL
RBC # BLD AUTO: 4.78 MILLION/UL (ref 4.7–6.1)
RBC #/AREA URNS AUTO: ABNORMAL /HPF
SP GR UR STRIP.AUTO: 1.01 (ref 1–1.03)
UROBILINOGEN UR QL STRIP.AUTO: 0.2 E.U./DL
WBC # BLD AUTO: 7.3 THOUSAND/UL (ref 4.8–10.8)
WBC #/AREA URNS AUTO: ABNORMAL /HPF

## 2017-11-08 PROCEDURE — 85025 COMPLETE CBC W/AUTO DIFF WBC: CPT

## 2017-11-08 PROCEDURE — 81001 URINALYSIS AUTO W/SCOPE: CPT | Performed by: UROLOGY

## 2017-11-08 PROCEDURE — 36415 COLL VENOUS BLD VENIPUNCTURE: CPT

## 2017-11-08 PROCEDURE — 87086 URINE CULTURE/COLONY COUNT: CPT

## 2017-11-08 RX ORDER — MELATONIN
1000
COMMUNITY

## 2017-11-08 NOTE — PRE-PROCEDURE INSTRUCTIONS
Pre-Surgery Instructions:   Medication Instructions    amLODIPine (NORVASC) 10 mg tablet Instructed patient per Anesthesia Guidelines   cholecalciferol (VITAMIN D3) 1,000 units tablet Instructed patient per Anesthesia Guidelines   furosemide (LASIX) 20 mg tablet Instructed patient per Anesthesia Guidelines   metoprolol tartrate (LOPRESSOR) 25 mg tablet Instructed patient per Anesthesia Guidelines  Pre op instructions given  Pt to take amlodipine,metoprolol the am of surgery   CatherineMy Surgical Experience    The following information was developed to assist you to prepare for your operation  What do I need to do before coming to the hospital?   Arrange for a responsible person to drive you to and from the hospital    Arrange care for your children at home  Children are not allowed in the recovery areas of the hospital   Plan to wear clothing that is easy to put on and take off  If you are having shoulder surgery, wear a shirt that buttons or zippers in the front  Bathing  o Shower the evening before and the morning of your surgery with an antibacterial soap  Please refer to the Pre Op Showering Instructions for Surgery Patients Sheet   o Remove nail polish and all body piercing jewelry  o Do not shave any body part for at least 24 hours before surgery-this includes face, arms, legs and upper body  Food  o Nothing to eat or drink after midnight the night before your surgery   This includes candy and chewing gum  o Exception: If your surgery is after 12:00pm (noon), you may have clear liquids such as 7-Up®, ginger ale, apple or cranberry juice, Jell-O®, water, or clear broth until 8:00 am  o Do not drink milk or juice with pulp on the morning before surgery  o Do not drink alcohol 24 hours before surgery  Medicine  o Follow instructions you received from your surgeon about which medicines you may take on the day of surgery  o If instructed to take medicine on the morning of surgery, take pills with just a small sip of water  Call your prescribing doctor for specific infroamtion on what to do if you take insulin    What should I bring to the hospital?    Bring:  Vevelyn Patricia or a walker, if you have them, for foot or knee surgery   A list of the daily medicines, vitamins, minerals, herbals and nutritional supplements you take  Include the dosages of medicines and the time you take them each day   Glasses, dentures or hearing aids   Minimal clothing; you will be wearing hospital sleepwear   Photo ID; required to verify your identity   If you have a Living Will or Power of , bring a copy of the documents   If you have an ostomy, bring an extra pouch and any supplies you use    Do not bring   Medicines or inhalers   Money, valuables or jewelry    What other information should I know about the day of surgery?  Notify your surgeons if you develop a cold, sore throat, cough, fever, rash or any other illness   Report to the Ambulatory Surgical/Same Day Surgery Unit   You will be instructed to stop at Registration only if you have not been pre-registered   Inform your  fi they do not stay that they will be asked by the staff to leave a phone number where they can be reached   Be available to be reached before surgery  In the event the operating room schedule changes, you may be asked to come in earlier or later than expected    *It is important to tell your doctor and others involved in your health care if you are taking or have been taking any non-prescription drugs, vitamins, minerals, herbals or other nutritional supplements   Any of these may interact with some food or medicines and cause a reaction

## 2017-11-10 LAB — BACTERIA UR CULT: NORMAL

## 2017-11-13 ENCOUNTER — ANESTHESIA EVENT (OUTPATIENT)
Dept: PERIOP | Facility: HOSPITAL | Age: 77
End: 2017-11-13
Payer: MEDICARE

## 2017-11-14 ENCOUNTER — HOSPITAL ENCOUNTER (OUTPATIENT)
Dept: RADIOLOGY | Facility: HOSPITAL | Age: 77
Setting detail: OUTPATIENT SURGERY
Discharge: HOME | End: 2017-11-14
Payer: MEDICARE

## 2017-11-14 ENCOUNTER — HOSPITAL ENCOUNTER (OUTPATIENT)
Facility: HOSPITAL | Age: 77
Setting detail: OUTPATIENT SURGERY
Discharge: HOME | End: 2017-11-14
Attending: UROLOGY | Admitting: UROLOGY
Payer: MEDICARE

## 2017-11-14 ENCOUNTER — ANESTHESIA (OUTPATIENT)
Dept: PERIOP | Facility: HOSPITAL | Age: 77
End: 2017-11-14
Payer: MEDICARE

## 2017-11-14 VITALS
HEART RATE: 50 BPM | SYSTOLIC BLOOD PRESSURE: 120 MMHG | DIASTOLIC BLOOD PRESSURE: 82 MMHG | TEMPERATURE: 97.5 F | OXYGEN SATURATION: 98 % | RESPIRATION RATE: 16 BRPM

## 2017-11-14 LAB
ATRIAL RATE: 102 BPM
ATRIAL RATE: 83 BPM
QRS AXIS: -45 DEGREES
QRS AXIS: -46 DEGREES
QRSD INTERVAL: 120 MS
QRSD INTERVAL: 122 MS
QT INTERVAL: 424 MS
QT INTERVAL: 428 MS
QTC INTERVAL: 430 MS
QTC INTERVAL: 437 MS
T WAVE AXIS: -2 DEGREES
T WAVE AXIS: 5 DEGREES
VENTRICULAR RATE: 62 BPM
VENTRICULAR RATE: 63 BPM

## 2017-11-14 PROCEDURE — C1769 GUIDE WIRE: HCPCS | Performed by: UROLOGY

## 2017-11-14 PROCEDURE — C2617 STENT, NON-COR, TEM W/O DEL: HCPCS | Performed by: UROLOGY

## 2017-11-14 PROCEDURE — 74450 X-RAY URETHRA/BLADDER: CPT

## 2017-11-14 PROCEDURE — 93005 ELECTROCARDIOGRAM TRACING: CPT | Performed by: ANESTHESIOLOGY

## 2017-11-14 DEVICE — IMPLANTABLE DEVICE
Type: IMPLANTABLE DEVICE | Site: URETER | Status: NON-FUNCTIONAL
Removed: 2019-10-22

## 2017-11-14 RX ORDER — SODIUM CHLORIDE 9 MG/ML
INJECTION, SOLUTION INTRAVENOUS CONTINUOUS PRN
Status: DISCONTINUED | OUTPATIENT
Start: 2017-11-14 | End: 2017-11-14 | Stop reason: SURG

## 2017-11-14 RX ORDER — ONDANSETRON 2 MG/ML
4 INJECTION INTRAMUSCULAR; INTRAVENOUS ONCE AS NEEDED
Status: DISCONTINUED | OUTPATIENT
Start: 2017-11-14 | End: 2017-11-14 | Stop reason: HOSPADM

## 2017-11-14 RX ORDER — PROPOFOL 10 MG/ML
INJECTION, EMULSION INTRAVENOUS CONTINUOUS PRN
Status: DISCONTINUED | OUTPATIENT
Start: 2017-11-14 | End: 2017-11-14 | Stop reason: SURG

## 2017-11-14 RX ORDER — LIDOCAINE HYDROCHLORIDE 10 MG/ML
INJECTION, SOLUTION INFILTRATION; PERINEURAL AS NEEDED
Status: DISCONTINUED | OUTPATIENT
Start: 2017-11-14 | End: 2017-11-14 | Stop reason: SURG

## 2017-11-14 RX ORDER — MAGNESIUM HYDROXIDE 1200 MG/15ML
LIQUID ORAL AS NEEDED
Status: DISCONTINUED | OUTPATIENT
Start: 2017-11-14 | End: 2017-11-14 | Stop reason: HOSPADM

## 2017-11-14 RX ORDER — HYDROMORPHONE HCL 110MG/55ML
0.4 PATIENT CONTROLLED ANALGESIA SYRINGE INTRAVENOUS
Status: DISCONTINUED | OUTPATIENT
Start: 2017-11-14 | End: 2017-11-14 | Stop reason: HOSPADM

## 2017-11-14 RX ORDER — FENTANYL CITRATE 50 UG/ML
INJECTION, SOLUTION INTRAMUSCULAR; INTRAVENOUS AS NEEDED
Status: DISCONTINUED | OUTPATIENT
Start: 2017-11-14 | End: 2017-11-14 | Stop reason: SURG

## 2017-11-14 RX ORDER — PROPOFOL 10 MG/ML
INJECTION, EMULSION INTRAVENOUS AS NEEDED
Status: DISCONTINUED | OUTPATIENT
Start: 2017-11-14 | End: 2017-11-14 | Stop reason: SURG

## 2017-11-14 RX ORDER — SODIUM CHLORIDE 9 MG/ML
100 INJECTION, SOLUTION INTRAVENOUS CONTINUOUS
Status: DISCONTINUED | OUTPATIENT
Start: 2017-11-14 | End: 2017-11-14 | Stop reason: HOSPADM

## 2017-11-14 RX ORDER — FENTANYL CITRATE/PF 50 MCG/ML
25 SYRINGE (ML) INJECTION AS NEEDED
Status: DISCONTINUED | OUTPATIENT
Start: 2017-11-14 | End: 2017-11-14 | Stop reason: HOSPADM

## 2017-11-14 RX ADMIN — CEFAZOLIN SODIUM 1000 MG: 1 SOLUTION INTRAVENOUS at 07:44

## 2017-11-14 RX ADMIN — SODIUM CHLORIDE: 0.9 INJECTION, SOLUTION INTRAVENOUS at 06:31

## 2017-11-14 RX ADMIN — PROPOFOL 50 MG: 10 INJECTION, EMULSION INTRAVENOUS at 07:51

## 2017-11-14 RX ADMIN — PROPOFOL 90 MCG/KG/MIN: 10 INJECTION, EMULSION INTRAVENOUS at 07:51

## 2017-11-14 RX ADMIN — FENTANYL CITRATE 50 MCG: 50 INJECTION, SOLUTION INTRAMUSCULAR; INTRAVENOUS at 07:51

## 2017-11-14 RX ADMIN — FENTANYL CITRATE 50 MCG: 50 INJECTION, SOLUTION INTRAMUSCULAR; INTRAVENOUS at 07:44

## 2017-11-14 RX ADMIN — LIDOCAINE HYDROCHLORIDE 20 MG: 10 INJECTION, SOLUTION INFILTRATION; PERINEURAL at 07:51

## 2017-11-14 NOTE — OP NOTE
OPERATIVE REPORT- Dr Toma White  PATIENT NAME: June Mark    :  1940  MRN: [id number]  Pt Location: [facility] OR ROOM 03    SURGERY DATE: 2017    Surgeon: Jamila Howell MD    Pre-op Diagnosis:  1  Right hydronephrosis  2  Right ureteral stricture  3  Right ureteral stones  4  Right renal stones    Post-op Diagnosis:  1  same    Procedure:  1  Cystoscopy  2  Fluoroscopy  3  Right retrograde pyelography  4  Right ureteral stent placement    Specimen(s): * No specimens in log *    Estimated Blood Loss: Minimal    Complications: None    Drains:  1  7 X 28 centimeter right ureteral stent    Anesthesia type: IV Sedation with Anesthesia    Indications for surgery:  Numerous prior procedures elsewhere for right ureteral stone left him with ureteral stricture  I made numerous attempts to incise it and dilated as well as clear out the stones to no avail  The patient does not desire a reimplantation or ureteral ureterostomy or ileal ureter  He prefers simply to exchange the stent as needed  Findings:  1  Very tight ureter which would not allow wire to pass by it  It required the stent to be brought down just past the strictured area just as the wire was prepared to pass proximally  This is a very tenuous maneuver  Procedure and Technique:   After obtaining consent and indentifying the patient, antibiotics were given as ordered and the patient was brought to the room  All appropriate leads and monitors were placed and the patient was appropriately positioned on the table  Anesthesia was administered and the patient was sterilely prepped and draped  A timeout was performed where the patient name, , procedure, antibiotics, allergies, etc  were discussed  All in the room were satisfied before the start of the operative procedure  What follows are the operative findings and events  Cystoscopy was undertaken   The bladder was systematically surveyed and found to be free of stones, tumors or infection  The right ureteral orifice was identified with the stent emanating from it  There was some mild encrustation  The guidewire was passed up alongside the stent but would not pass by the obstruction  An x-ray was taken to document this  The stent was grasped and pulled down just past the obstruction and then the wire was passed up into the empty space in the ureter  The stent was completely removed and the wire passed up to the kidney  The five Western Dyan open-ended catheter was placed over the wire and the wire was removed  A retrograde was performed  A guidewire was inserted up into the right ureter  This was confirmed with x-ray  The catheter was removed and a stent was placed over the wire and there was a good coil proximally and distally  Efflux was noted from the stent  The procedure was terminated and the patient was awakened without incident and transferred to the PACU in satisfactory condition  Plan:  1  Stent exchange in three months to an eight Western Dyan stent  SIGNATURE: Clemencia Delgadillo MD  DATE: November 14, 2017  TIME: 8:09 AM    Portions of the record may have been created with voice recognition software   Occasional wrong word or "sound a like" substitutions may have occurred due to the inherent limitations of voice recognition software   Read the chart carefully and recognize, using context, where substitutions have occurred

## 2017-11-14 NOTE — ANESTHESIA PREPROCEDURE EVALUATION
Review of Systems/Medical History  Patient summary reviewed  Chart reviewed      Cardiovascular  Hypertension , Valvular heart disease , mitral regurgitation, Dysrhythmias, atrial fibrillation,   Comment: Stress test 4/2017: SUMMARY:  -  Stress results: Duration of exercise was 4 min and 19 sec  Target heart rate was achieved  There was no chest pain during stress  -  ECG conclusions: Arrhythmia during stress: atrial fibrillation   -  Perfusion imaging: There were no perfusion defects   -  Gated SPECT: The calculated left ventricular ejection fraction was 43 %  Left ventricular ejection fraction was mildly decreased by visual estimate  There was no left ventricular regional abnormality      IMPRESSIONS: Normal study after maximal exercise  Myocardial perfusion imaging was normal at rest and with stress  Left ventricular systolic function was reduced, without distinct regional wall motion abnormalities  Echo 2017: LEFT VENTRICLE:  Systolic function was normal by visual assessment  Ejection fraction was estimated in the range of 50 % to 55 %  There were no regional wall motion abnormalities  There was mild concentric hypertrophy      LEFT ATRIUM:  The atrium was moderately dilated      RIGHT ATRIUM:  The atrium was moderately dilated      MITRAL VALVE:  There was moderate regurgitation      AORTIC VALVE:  The valve was trileaflet  Leaflets exhibited moderate calcification and markedly reduced cuspal separation  There was moderate stenosis  There was moderate regurgitation      TRICUSPID VALVE:  There was moderate regurgitation  Pulmonary artery systolic pressure was mildly to moderately increased      PROCEDURE: The procedure was performed at the bedside  This was a routine study  The transthoracic approach was used  The study included complete 2D imaging, M-mode, complete spectral Doppler, and color Doppler  The heart rate was 74 bpm,  at the start of the study   Images were obtained from the parasternal, apical, subcostal, and suprasternal notch acoustic windows  Image quality was adequate      LEFT VENTRICLE: Size was normal  Systolic function was normal by visual assessment  Ejection fraction was estimated in the range of 50 % to 55 %  There were no regional wall motion abnormalities  There was mild concentric hypertrophy  DOPPLER: There was no evidence of elevated ventricular filling pressure by Doppler parameters      RIGHT VENTRICLE: The size was normal  Systolic function was normal  DOPPLER: Systolic pressure was within the normal range      LEFT ATRIUM: The atrium was moderately dilated      RIGHT ATRIUM: The atrium was moderately dilated      MITRAL VALVE: Valve structure was normal  There was normal leaflet separation  No echocardiographic evidence for prolapse  DOPPLER: The transmitral velocity was within the normal range  There was no evidence for stenosis  There was  moderate regurgitation      AORTIC VALVE: The valve was trileaflet  Leaflets exhibited moderate calcification and markedly reduced cuspal separation  DOPPLER: Transaortic velocity was within the normal range  There was moderate stenosis  There was moderate  regurgitation      TRICUSPID VALVE: The valve structure was normal  There was normal leaflet separation  DOPPLER: The transtricuspid velocity was within the normal range  There was moderate regurgitation  Pulmonary artery systolic pressure was mildly to  moderately increased  Estimated peak PA pressure was 49 mmHg      PULMONIC VALVE: Leaflets exhibited normal thickness, no calcification, and normal cuspal separation  DOPPLER: The transpulmonic velocity was within the normal range  There was moderate regurgitation      PERICARDIUM: There was no thickening   There was no pericardial effusion      AORTA: The root exhibited normal size      PULMONARY ARTERY: The size was normal  The morphology appeared normal      SYSTEM MEASUREMENT TABLES     2D mode  AoR Diam 2D: 3 6 cm  LA Diam (2D): 5 1 cm  LA/Ao (2D): 1 42  FS (2D Teich): 28 6 %  IVSd (2D): 1 23 cm  LVDEV: 113 cm³  LVEDV MOD BP: 137 cm³  LVESV: 50 9 cm³  LVIDd(2D): 4 9 cm  LVISd (2D): 3 5 cm  LVOT Area 2D: 3 14 cm squared  LVPWd (2D): 1 15 cm  SV (Teich): 62 1 cm³     Apical four chamber  LVEF A4C: 50 %     Apical two chamber  LA Area: 19 8 cm squared  LA Volume: 55 cm³  LVEF A2C: 52 %     Unspecified Scan Mode  JOCELINE Cont Eq (Peak George): 0 9 cm squared  LVOT (VTI): 17 8 cm  LVOT Diam : 2 cm  LVOT Vmax: 846 mm/s  LVOT Vmax; Mean: 846 mm/s  Peak Grad ; Mean: 3 mm(Hg)  SV (LVOT): 56 cm³  VTI;Mean: 1 mm(Hg)  JOCELINE Cont Eq (VTI): 1 82 cm squared  MR Vol  (PISA): 100 cm³  MV Peak A George: 296 mm/s  MV Peak E George  Mean: 1750 mm/s  MVA (PHT): 2 68 cm squared  Max P mm(Hg)  PHT: 71 ms  V Max: 5140 mm/s  Vmax: 5140 mm/s  Max P mm(Hg)  Max P mm(Hg)  V Max: 3160 mm/s  Vmax: 3120 mm/s  RA Area: 34 3 cm squared  RA Volume: 128 cm³  TAPSE: 1 3 cm, Pulmonary hypertension Pulmonary       GI/Hepatic       Kidney stones,        Endo/Other     GYN       Hematology   Musculoskeletal       Neurology   Psychology           Physical Exam    Airway    Mallampati score: II  TM Distance: >3 FB  Neck ROM: full     Dental       Cardiovascular  Rhythm: irregular, Rate: normal,     Pulmonary  Breath sounds clear to auscultation,     Other Findings        Anesthesia Plan  ASA Score- 3       Anesthesia Type- general with ASA Monitors  Additional Monitors:   Airway Plan: LMA  Induction- intravenous  Informed Consent- Anesthetic plan and risks discussed with patient

## 2017-11-29 ENCOUNTER — GENERIC CONVERSION - ENCOUNTER (OUTPATIENT)
Dept: OTHER | Facility: OTHER | Age: 77
End: 2017-11-29

## 2017-12-01 ENCOUNTER — APPOINTMENT (OUTPATIENT)
Dept: LAB | Facility: HOSPITAL | Age: 77
End: 2017-12-01
Attending: INTERNAL MEDICINE
Payer: MEDICARE

## 2017-12-01 ENCOUNTER — TRANSCRIBE ORDERS (OUTPATIENT)
Dept: ADMINISTRATIVE | Facility: HOSPITAL | Age: 77
End: 2017-12-01

## 2017-12-01 DIAGNOSIS — N18.30 CHRONIC KIDNEY DISEASE, STAGE III (MODERATE) (HCC): Primary | ICD-10-CM

## 2017-12-01 DIAGNOSIS — N18.30 CHRONIC KIDNEY DISEASE, STAGE III (MODERATE) (HCC): ICD-10-CM

## 2017-12-01 LAB
ANION GAP SERPL CALCULATED.3IONS-SCNC: 9 MMOL/L (ref 4–13)
BACTERIA UR QL AUTO: ABNORMAL /HPF
BILIRUB UR QL STRIP: NEGATIVE
BUN SERPL-MCNC: 33 MG/DL (ref 5–25)
CALCIUM SERPL-MCNC: 9.7 MG/DL (ref 8.3–10.1)
CHLORIDE SERPL-SCNC: 106 MMOL/L (ref 100–108)
CLARITY UR: CLEAR
CO2 SERPL-SCNC: 26 MMOL/L (ref 21–32)
COLOR UR: YELLOW
CREAT SERPL-MCNC: 1.58 MG/DL (ref 0.6–1.3)
GFR SERPL CREATININE-BSD FRML MDRD: 42 ML/MIN/1.73SQ M
GLUCOSE P FAST SERPL-MCNC: 102 MG/DL (ref 65–99)
GLUCOSE UR STRIP-MCNC: NEGATIVE MG/DL
HGB UR QL STRIP.AUTO: ABNORMAL
KETONES UR STRIP-MCNC: NEGATIVE MG/DL
LEUKOCYTE ESTERASE UR QL STRIP: ABNORMAL
MUCOUS THREADS UR QL AUTO: ABNORMAL
NITRITE UR QL STRIP: NEGATIVE
NON-SQ EPI CELLS URNS QL MICRO: ABNORMAL /HPF
PH UR STRIP.AUTO: 5 [PH] (ref 5–9)
POTASSIUM SERPL-SCNC: 4.2 MMOL/L (ref 3.5–5.3)
PROT UR STRIP-MCNC: NEGATIVE MG/DL
RBC #/AREA URNS AUTO: ABNORMAL /HPF
SODIUM SERPL-SCNC: 141 MMOL/L (ref 136–145)
SP GR UR STRIP.AUTO: 1.02 (ref 1–1.03)
UROBILINOGEN UR QL STRIP.AUTO: 0.2 E.U./DL
WBC #/AREA URNS AUTO: ABNORMAL /HPF

## 2017-12-01 PROCEDURE — 80048 BASIC METABOLIC PNL TOTAL CA: CPT

## 2017-12-01 PROCEDURE — 81001 URINALYSIS AUTO W/SCOPE: CPT

## 2017-12-01 PROCEDURE — 36415 COLL VENOUS BLD VENIPUNCTURE: CPT

## 2018-01-03 ENCOUNTER — ALLSCRIPTS OFFICE VISIT (OUTPATIENT)
Dept: OTHER | Facility: OTHER | Age: 78
End: 2018-01-03

## 2018-01-04 NOTE — PROGRESS NOTES
Assessment   1  Benign hypertension with chronic kidney disease, stage III (403 10,585 3) (I12 9,N18 3)   2  Chronic kidney disease (CKD) stage G3a/A1, moderately decreased glomerular filtration     rate (GFR) between 45-59 mL/min/1 73 square meter and albuminuria creatinine ratio     less than 30 mg/g (585 3) (N18 3)    Plan   Benign hypertension with chronic kidney disease, stage III    · Furosemide 20 MG Oral Tablet (Lasix)   Rx By: Diane Connors; Dispense: 90 Days ; #:90 Tablet; Refill: 3;For: Benign hypertension with chronic kidney disease, stage III; YANIQUE = N; Sent To: [address]   · HydroCHLOROthiazide 12 5 MG Oral Capsule; TAKE 1 CAPSULE ONCE DAILY   Rx By: Diane Connors; Dispense: 30 Days ; #:30 Capsule; Refill: 3;For: Benign hypertension with chronic kidney disease, stage III; YANIQUE = N; Verified Transmission to [facility]; Last Updated By: System, [employer]; 1/3/2018 10:52:26 AM   · (1) BASIC METABOLIC PROFILE; Status:Active; Requested RIH:[id number]; Perform:[facility]; KNI:[id number];NATALIYA; For:Benign hypertension with chronic kidney disease, stage III; Ordered By:Celeste Negron;   · (1) MAGNESIUM; Status:Active; Requested SAH:[id number]; Perform:[facility]; CKS:[id number];ULPSDXL; For:Benign hypertension with chronic kidney disease, stage III; Ordered By:Celeste Negron;    Discussion/Summary      81-year-old male with a past medical history of CK D stage III Baseline Cr 1 6-1 8, Nephrolithiasis,HTN, A  fib, D CHF, BPH, 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, former smoker quit in April, hospitalized at [facility] in April for shortness of breath at which time nephrology was consulted due to acute kidney injury with a rising creatinine  Patient was found to have right-sided hydronephrosis with ureteral stricture and stone  Patient underwent ureteral stent and eventually had a lithotripsy  Patient presents for follow up visit for CKD  had stent exchange on 8/15/17 and again in Nov 2017  Elevated creatinine - baseline Cr approx 1 5-1 7 mg/dL, slowly rising  acute injury during hosp was secondary to obstruction secondary to stone, hydro, poor perfusion in setting of a fib/RVR  Renal u/s R 12 3, L 10 7 cm, R mod hydro  UA still with mod blood, 4-10 RBC  Renal ultrasound July 10, right kidney 12 8 cm, moderate right hydronephrosis, lower pole calculus measuring 9 mm, Left kidney 11 2 cm  Follows with Urology regarding stent and nephrolithiasis may need to consider mag 3 in future if requires nephrectomy, though this may be for more prognostic information and not for management change  UPCR 0 2 check BMP, CBC, UA, UPCR, PTH, Vit D in 3 months - i will give slips at next visit check BMP and mag in 2 weeks since changing diuretics - see below   Nephrolithiasis - follows with Urology  renal stone mainly ca oxalate 24 hour urine litholink - reviewed  pt is not drinking enough fluids  (Urine volume was 1 42 L) -- needs to increase to 2 L day  monitor weights daily given CHF  Goal is to increase UOP without causing vol depletion or expansion Needs low salt diet - already following  takes in approx 1 8 gm salt daily  at goal will change furosemide to HCTZ check BMP in one week reviewed that HCTZ is not ideal in person of his age, but given stone issues, if can tolerate then this may be optimal option   HTN - Blood pressures are relatively stable  Volume - history of dCHF  history of mild to mod pulm HTN  Euvolemic    i have called and left message for call back from Cardiologist Dr Man Marie to discuss changes in meds and increase in fluid restriction to 2 L from 1 5 L that patient was doing  daily weights    CKD MBD -   phos 3  Vit D 19 - in June; Vit D improving to 47 in September and PTH improving to 86 in sept   Repeat PTH, vitamin D and 2-3 months  PTH improving     Anemia - stable Hb   was a pleasure evaluating Mr Vera Hightower in the renal office  Thank you for allowing our team to participate in the care of your patient  STOP furosemide Start HCTZ (hydrochlorthiazide) one tablet once daily increase fluid intake to 2 L daily labwork in 1-2 weeks If your weight increases by more than 3 lbs in one day or 5 lbs in one week, please call right away  Reason For Visit   Follow-up visit for CKD      History of Present Illness   72-year-old male who presents for f/u visit for CKD  Denies complaints  No recent fevers, chills, nausea, vomiting, diarrhea  Review of Systems        Constitutional: No complaints of fever, no chills, no anorexia, no tiredness, no recent weight gain or weight loss  Integumentary: No complaints of skin rash  Gastrointestinal: No complains of abdominal pain, no constipation or diarrhea, no nausea or vomiting  Respiratory: No complaints of shortness of breath, no cough, no productive sputum  Cardiovascular: No complaints of orthopnea, no PND, no chest pain, no palpitations, no lower extremity edema  Musculoskeletal: No complaints of joint pain or swelling  Neurological: No complaints of headache, no lightheadedness or dizziness  Genitourinary: No dysuria, no hematuria, no nocturia, no urinary frequency, no incomplete emptying of bladder, no foamy urine  Eyes: No complaints of eyesight problems or dryness of eyes  ENT: no complaints of hearing loss, no nasal discharge  Psychiatric: Not suicidal, no sleep disturbance, no anxiety or depression, no change in personality, no emotional problems  ROS reviewed  Past Medical History      The active problems and past medical history were reviewed and updated today  Surgical History      The surgical history was reviewed and updated today  Family History      The family history was reviewed and updated today  Social History   The social history was reviewed and updated today        Current Meds    1  AmLODIPine Besylate 10 MG Oral Tablet; Take 1 tablet daily; Therapy: 21LVN6008 to Recorded   2  Furosemide 20 MG Oral Tablet; Take 1 tablet daily; Therapy: 11NZR3590 to (Holli Severance)  Requested for: 10OKK9435; Last     Rx:86Ljy5604 Ordered   3  Metoprolol Tartrate 25 MG Oral Tablet; Take one tablet every eight hours; Therapy: 76Bsj6922 to (Last FL:73QSG1034)  Requested for: 91LKZ8746 Ordered   4  PA Vitamin D-3 1000 UNIT Oral Tablet; TAKE 1 TABLET DAILY; Therapy: 28DKH7970 to (Evaluate:06Oct2017); Last Rx:22Ufn6515 Ordered     The medication list was reviewed and updated today  Allergies   1  No Known Drug Allergies    Vitals   Vital Signs    Recorded: 63WSW3995 06:10DP   Systolic 337, RUE, Sitting   Diastolic 70, RUE, Sitting   Height 5 ft 10 in   Weight 203 lb    BMI Calculated 29 13   BSA Calculated 2 1     Physical Exam        Constitutional: General appearance: No acute distress, well appearing and well nourished  ENT: External ears and nose appear normal          Eyes: Anicteric sclerae  Neck: No bruit heard over either carotid  Pulmonary: Respiratory effort: No increased work of breathing or signs of respiratory distress  -- Auscultation of lungs: Clear to auscultation  Cardiovascular: Auscultation of heart: Normal rate and rhythm, normal S1 and S2, without murmurs  Abdomen: Non-tender, no masses  Extremities: No cyanosis, clubbing or edema  Rash: No rash present  Neurologic: Non Focal          Psychiatric: Orientation to person, place, and time: Normal  -- and-- Mood and affect: Normal        Back: No CVA tenderness  Results/Data   Diagnostic Studies Reviewed: I personally reviewed the films/images/results in the office today  My interpretation follows        Signatures    Electronically signed by : KATY Blankenship ; Dao  3 2018 11:01AM EST                       (Author)

## 2018-01-09 NOTE — RESULT NOTES
Message   Cr stable  microscopic hematuria  could be secondary to urological procedures  will repeat   Vit D appropriate  pt was changed from 80778 units to once daily 1000 units at last appt  no changes for now  24 hour urine pending        Verified Results  (1) BASIC METABOLIC PROFILE 22UAA3650 08:49AM Shelle Dakins    Order Number: [id number]     Test Name Result Flag Reference   SODIUM 140 mmol/L  136-145   POTASSIUM 4 4 mmol/L  3 5-5 3   CHLORIDE 104 mmol/L  100-108   CARBON DIOXIDE 29 mmol/L  21-32   ANION GAP (CALC) 7 mmol/L  4-13   BLOOD UREA NITROGEN 28 mg/dL H 5-25   CREATININE 1 66 mg/dL H 0 60-1 30   Standardized to IDMS reference method   CALCIUM 9 2 mg/dL  8 3-10 1   eGFR 40 ml/min/1 73sq m     National Kidney Disease Education Program recommendations are as follows:  GFR calculation is accurate only with a steady state creatinine  Chronic Kidney disease less than 60 ml/min/1 73 sq  meters  Kidney failure less than 15 ml/min/1 73 sq  meters  GLUCOSE FASTING 84 mg/dL  65-99   Specimen collection should occur prior to Sulfasalazine administration due to the potential for falsely depressed results  Specimen collection should occur prior to Sulfapyridine administration due to the potential for falsely elevated results       (1) CBC/ PLT (NO DIFF) 38HUP9615 08:49AM Kaitlin Dakena    Order Number: [id number]     Test Name Result Flag Reference   HEMATOCRIT 39 1 % L 42 0-52 0   HEMOGLOBIN 13 0 g/dL L 14 0-18 0   MCHC 33 3 g/dL  31 4-37 4   MCH 28 1 pg  27 0-31 0   MCV 84 fL  82-98   PLATELET COUNT 665 Thousands/uL  130-400   RBC COUNT 4 63 Million/uL L 4 70-6 10   RDW 17 1 % H 11 6-15 1   WBC COUNT 7 50 Thousand/uL  4 80-10 80   MPV 9 6 fL  8 9-12 7     (1) URINALYSIS w URINE C/S REFLEX (will reflex a microscopy if leukocytes, occult blood, or nitrites are not within normal limits) 25LOT1246 08:49AM Kaitlin Dakins    Order Number: [id number]     Test Name Result Flag Reference   COLOR Yellow     CLARITY Clear     PH UA 5 5  5 0-9 0   LEUKOCYTE ESTERASE UA Small A Negative   NITRITE UA Negative  Negative   PROTEIN UA Trace mg/dl A Negative   GLUCOSE UA Negative mg/dl  Negative   KETONES UA Negative mg/dl  Negative   UROBILINOGEN UA 0 2 E U /dl  0 2, 1 0 E U /dl   BILIRUBIN UA Negative  Negative   BLOOD UA Moderate A Negative   SPECIFIC GRAVITY UA 1 010  1 000-1 030   BACTERIA Occasional /hpf  None Seen, Occasional   EPITHELIAL CELLS Occasional /hpf  None Seen, Occasional   RBC UA 20-30 /hpf A None Seen   WBC UA 2-4 /hpf A None Seen     (1) URINE PROTEIN CREATININE RATIO 44Bif1562 08:49AM Dwayne Sarna   TW Order Number: [id number]     Test Name Result Flag Reference   CREATININE URINE 127 0 mg/dL     URINE PROTEIN:CREATININE RATIO 0 20 H 0 00-0 10   URINE PROTEIN 25 mg/dL       (1) PTH N-TERMINAL (INTACT) 45Abv9592 08:49AM Dwayne Sarna   TW Order Number: [id number]     Test Name Result Flag Reference   PARATHYROID HORMONE INTACT 86 6 pg/mL H 14 0-72 0     (1) VITAMIN D 25-HYDROXY 25Iac1048 08:49AM Dwayne Sarna   TW Order Number: [id number]     Test Name Result Flag Reference   VIT D 25-HYDROX 54 3 ng/mL  30 0-100 0   This assay is a certified procedure of the CDC Vitamin D Standardization Certification Program (VDSCP)     Deficiency <20ng/ml   Insufficiency 20-30ng/ml   Sufficient  ng/ml     *Patients undergoing fluorescein dye angiography may retain small amounts of fluorescein in the body for 48-72 hours post procedure  Samples containing fluorescein can produce falsely elevated Vitamin D values  If the patient had this procedure, a specimen should be resubmitted post fluorescein clearance         Plan  Vitamin D deficiency    · Vitamin D (Ergocalciferol) 41210 UNIT Oral Capsule   · PA Vitamin D-3 1000 UNIT Oral Tablet; TAKE 1 TABLET DAILY

## 2018-01-11 NOTE — MISCELLANEOUS
Message   Recorded as Task   Date: 07/31/2017 09:57 PM, Created By: Gabino Rodriguez   Task Name: Follow Up   Assigned To: Susan Dominguez   Regarding Patient: Isabelle Luis, Status: In Progress   Comment:    Tereso Negrongurjit - 31 Jul 2017 9:57 PM     TASK CREATED  Hello    can patient be notified that renal function is improving to 1 6 creatinine  UA has blood, but this may be due to stent  Patient is to see Urology on 8/2 for follow up appt  Can patient be advised to have their team fax over their office notes from that appt  Thank you    Susan Rich - 01 Aug 2017 9:06 AM     TASK IN PROGRESS   Per Dr Paredes Done please notify the patient that the renal function is improving to 1 6 creatinine  UA has blood, but this may be due to stent  Patient is seeing Urologist tomorrow make sure they send us office note  I spoke with the patient and he is aware  Rossy Shepard    thank you    np      Active Problems    1  Acute kidney injury (584 9) (N17 9)   2  Aortic stenosis, moderate (424 1) (I35 0)   3  Benign hypertension with chronic kidney disease, stage III (403 10,585 3) (I12 9,N18 3)   4  Bilateral shoulder pain (719 41) (M25 511,M25 512)   5  Chronic atrial fibrillation (427 31) (I48 2)   6  Chronic diastolic CHF (congestive heart failure) (428 32,428 0) (I50 32)   7  Chronic kidney disease (CKD) stage G3a/A1, moderately decreased glomerular filtration   rate (GFR) between 45-59 mL/min/1 73 square meter and albuminuria creatinine ratio   less than 30 mg/g (585 3) (N18 3)   8  Dyslipidemia (272 4) (E78 5)   9  GERD (gastroesophageal reflux disease) (530 81) (K21 9)   10  Moderate aortic regurgitation (424 1) (I35 1)   11  Moderate mitral regurgitation (424 0) (I34 0)   12  Nephrolithiasis (592 0) (N20 0)   13  Patellofemoral syndrome of left knee (719 46) (M22 2X2)   14  Vitamin D deficiency (268 9) (E55 9)    Current Meds   1  AmLODIPine Besylate 10 MG Oral Tablet;  Take 1 tablet daily; Therapy: 17XZE8021 to Recorded   2  Furosemide 20 MG Oral Tablet; Take 1 tablet daily; Therapy: 27Apr2017 to (Last RZ:77SWI3846)  Requested for: 40RNC7845 Ordered   3  Metoprolol Tartrate 25 MG Oral Tablet; Take one tablet every eight hours; Therapy: 27Apr2017 to (Last WN:99SCF3315)  Requested for: 14CEY2852 Ordered   4  Vitamin D (Ergocalciferol) 10279 UNIT Oral Capsule; TAKE 1 CAPSULE A WEEK FOR 8   WEEKS; Therapy: 74KQD8982 to (Last Rx:97Gzr3103)  Requested for: 73Neb9190 Ordered    Allergies    1   No Known Drug Allergies    Signatures   Electronically signed by : KATY Be ; Aug  2 2017  5:00PM EST                       (Author)

## 2018-01-12 ENCOUNTER — TRANSCRIBE ORDERS (OUTPATIENT)
Dept: ADMINISTRATIVE | Facility: HOSPITAL | Age: 78
End: 2018-01-12

## 2018-01-12 ENCOUNTER — APPOINTMENT (OUTPATIENT)
Dept: LAB | Facility: HOSPITAL | Age: 78
End: 2018-01-12
Attending: INTERNAL MEDICINE
Payer: MEDICARE

## 2018-01-12 DIAGNOSIS — I12.9 HYPERTENSIVE CHRONIC KIDNEY DISEASE WITH STAGE 1 THROUGH STAGE 4 CHRONIC KIDNEY DISEASE, OR UNSPECIFIED CHRONIC KIDNEY DISEASE: ICD-10-CM

## 2018-01-12 LAB
ANION GAP SERPL CALCULATED.3IONS-SCNC: 7 MMOL/L (ref 4–13)
BUN SERPL-MCNC: 29 MG/DL (ref 5–25)
CALCIUM SERPL-MCNC: 9 MG/DL (ref 8.3–10.1)
CHLORIDE SERPL-SCNC: 104 MMOL/L (ref 100–108)
CO2 SERPL-SCNC: 31 MMOL/L (ref 21–32)
CREAT SERPL-MCNC: 1.59 MG/DL (ref 0.6–1.3)
GFR SERPL CREATININE-BSD FRML MDRD: 42 ML/MIN/1.73SQ M
GLUCOSE SERPL-MCNC: 130 MG/DL (ref 65–140)
MAGNESIUM SERPL-MCNC: 1.8 MG/DL (ref 1.6–2.6)
POTASSIUM SERPL-SCNC: 4 MMOL/L (ref 3.5–5.3)
SODIUM SERPL-SCNC: 142 MMOL/L (ref 136–145)

## 2018-01-12 PROCEDURE — 36415 COLL VENOUS BLD VENIPUNCTURE: CPT

## 2018-01-12 PROCEDURE — 80048 BASIC METABOLIC PNL TOTAL CA: CPT

## 2018-01-12 PROCEDURE — 83735 ASSAY OF MAGNESIUM: CPT

## 2018-01-13 VITALS
DIASTOLIC BLOOD PRESSURE: 80 MMHG | SYSTOLIC BLOOD PRESSURE: 140 MMHG | HEART RATE: 82 BPM | BODY MASS INDEX: 26.79 KG/M2 | OXYGEN SATURATION: 97 % | WEIGHT: 187.1 LBS | HEIGHT: 70 IN

## 2018-01-13 VITALS
DIASTOLIC BLOOD PRESSURE: 64 MMHG | BODY MASS INDEX: 27.06 KG/M2 | WEIGHT: 189 LBS | HEIGHT: 70 IN | SYSTOLIC BLOOD PRESSURE: 138 MMHG

## 2018-01-14 VITALS — HEIGHT: 70 IN | BODY MASS INDEX: 27.2 KG/M2 | WEIGHT: 190 LBS

## 2018-01-15 VITALS
SYSTOLIC BLOOD PRESSURE: 110 MMHG | WEIGHT: 193 LBS | HEIGHT: 70 IN | BODY MASS INDEX: 27.63 KG/M2 | DIASTOLIC BLOOD PRESSURE: 60 MMHG

## 2018-01-15 NOTE — RESULT NOTES
Verified Results  (1) BASIC METABOLIC PROFILE 96JDU7506 10:26AM [employer]    Order Number: [id number]     Test Name Result Flag Reference   SODIUM 141 mmol/L  136-145   POTASSIUM 4 5 mmol/L  3 5-5 3   CHLORIDE 105 mmol/L  100-108   CARBON DIOXIDE 28 mmol/L  21-32   ANION GAP (CALC) 8 mmol/L  4-13   BLOOD UREA NITROGEN 27 mg/dL H 5-25   CREATININE 1 65 mg/dL H 0 60-1 30   Standardized to IDMS reference method   CALCIUM 9 1 mg/dL  8 3-10 1   eGFR 40 ml/min/1 73sq m     National Kidney Disease Education Program recommendations are as follows:  GFR calculation is accurate only with a steady state creatinine  Chronic Kidney disease less than 60 ml/min/1 73 sq  meters  Kidney failure less than 15 ml/min/1 73 sq  meters     GLUCOSE FASTING 119 mg/dL H 65-99     (1) CBC/ PLT (NO DIFF) 05XQZ3744 10:26AM [employer]    Order Number: [id number]     Test Name Result Flag Reference   HEMATOCRIT 41 1 % L 42 0-52 0   HEMOGLOBIN 13 2 g/dL L 14 0-18 0   MCHC 32 0 g/dL  31 4-37 4   MCH 26 7 pg L 27 0-31 0   MCV 83 fL  82-98   PLATELET COUNT 808 Thousands/uL  130-400   RBC COUNT 4 93 Million/uL  4 70-6 10   RDW 16 3 % H 11 6-15 1   WBC COUNT 7 10 Thousand/uL  4 80-10 80   MPV 9 1 fL  8 9-12 7     (1) URINALYSIS w URINE C/S REFLEX (will reflex a microscopy if leukocytes, occult blood, or nitrites are not within normal limits) 12Nlk7782 10:26AM [employer]    Order Number: [id number]     Test Name Result Flag Reference   COLOR Yellow     CLARITY Clear     PH UA 6 0  5 0-9 0   LEUKOCYTE ESTERASE UA Small A Negative   NITRITE UA Negative  Negative   PROTEIN UA 30 (1+) mg/dl A Negative   GLUCOSE UA Negative mg/dl  Negative   KETONES UA Negative mg/dl  Negative   UROBILINOGEN UA 0 2 E U /dl  0 2, 1 0 E U /dl   BILIRUBIN UA Negative  Negative   BLOOD UA Large A Negative   SPECIFIC GRAVITY UA 1 010  1 000-1 030   BACTERIA Occasional /hpf  None Seen, Occasional   EPITHELIAL CELLS Occasional /hpf  None Seen, Occasional   RBC UA 30-50 /hpf A None Seen   WBC UA 4-10 /hpf A None Seen

## 2018-01-16 NOTE — MISCELLANEOUS
Message     Recorded as Task   Date: 07/03/2017 03:34 PM, Created By: Jose Spence   Task Name: Follow Up   Assigned To: Pricilla Marrufo   Regarding Patient: Malina Mejia, Status: Active   CommentMonkrysten Abbott - 03 Jul 2017 3:34 PM     TASK CREATED  Please tell Mr Chavo Jaimes that he will need to be on Vitamin D2- ergocalciferol weekly for 8 weeks because his vitamin D level is low  He will be seeing [facility] soon for follow up  Pt aware of the above  Active Problems    1  Acute kidney injury (584 9) (N17 9)   2  Aortic stenosis, moderate (424 1) (I35 0)   3  Benign hypertension with chronic kidney disease, stage III (403 10,585 3) (I12 9,N18 3)   4  Bilateral shoulder pain (719 41) (M25 511,M25 512)   5  Chronic atrial fibrillation (427 31) (I48 2)   6  Chronic diastolic CHF (congestive heart failure) (428 32,428 0) (I50 32)   7  Chronic kidney disease (CKD) stage G3a/A1, moderately decreased glomerular filtration   rate (GFR) between 45-59 mL/min/1 73 square meter and albuminuria creatinine ratio   less than 30 mg/g (585 3) (N18 3)   8  Dyslipidemia (272 4) (E78 5)   9  GERD (gastroesophageal reflux disease) (530 81) (K21 9)   10  Moderate aortic regurgitation (424 1) (I35 1)   11  Moderate mitral regurgitation (424 0) (I34 0)   12  Nephrolithiasis (592 0) (N20 0)   13  Patellofemoral syndrome of left knee (719 46) (M22 2X2)   14  Vitamin D deficiency (268 9) (E55 9)    Current Meds   1  Furosemide 20 MG Oral Tablet (Lasix); Take 1 tablet daily; Therapy: 27Apr2017 to (Last KW:42UCB2492)  Requested for: 55CUS3508 Ordered   2  Metoprolol Tartrate 25 MG Oral Tablet; Take one tablet every eight hours; Therapy: 27Apr2017 to (Last HY:37JUY5882)  Requested for: 50OIK2626 Ordered   3  Pantoprazole Sodium 40 MG Oral Tablet Delayed Release; Take 1 tablet daily; Therapy: 80TUB2099 to (Renew:11Nov2017) Recorded   4   Vitamin D (Ergocalciferol) 22016 UNIT Oral Capsule; TAKE 1 CAPSULE A WEEK FOR 8   WEEKS; Therapy: 55ARE1853 to (Last Rx:04Uwk8612)  Requested for: 43Hgm0028 Ordered    Allergies    1   No Known Drug Allergies    Signatures   Electronically signed by : Angel Luis Jimenez, ; Jul 5 2017  2:37PM EST                       (Author)

## 2018-01-17 ENCOUNTER — GENERIC CONVERSION - ENCOUNTER (OUTPATIENT)
Dept: OTHER | Facility: OTHER | Age: 78
End: 2018-01-17

## 2018-01-22 VITALS
HEIGHT: 70 IN | SYSTOLIC BLOOD PRESSURE: 128 MMHG | WEIGHT: 203 LBS | DIASTOLIC BLOOD PRESSURE: 70 MMHG | BODY MASS INDEX: 29.06 KG/M2

## 2018-01-23 NOTE — RESULT NOTES
Message    can the patient be notified that electrolytes are stable  Renal function is stable  Can continue the medications at current doses  Can you please verify with the patient that his weight is stable with the change in diuretics  If the weight rises by more than 3 lb in 1 day or 5 lb in 1 week he should notify our office  Thank you      above task request sent  these labs were because pt was changed from furosemide to HCTZ due to stones  Verified Results  (1) BASIC METABOLIC PROFILE 59EZM2620 10:35AM [employer] Order Number: [id number]     Test Name Result Flag Reference   GLUCOSE,RANDM 130 mg/dL     If the patient is fasting, the ADA then defines impaired fasting glucose as > 100 mg/dL and diabetes as > or equal to 123 mg/dL  Specimen collection should occur prior to Sulfasalazine administration due to the potential for falsely depressed results  Specimen collection should occur prior to Sulfapyridine administration due to the potential for falsely elevated results  SODIUM 142 mmol/L  136-145   POTASSIUM 4 0 mmol/L  3 5-5 3   CHLORIDE 104 mmol/L  100-108   CARBON DIOXIDE 31 mmol/L  21-32   ANION GAP (CALC) 7 mmol/L  4-13   BLOOD UREA NITROGEN 29 mg/dL H 5-25   CREATININE 1 59 mg/dL H 0 60-1 30   Standardized to IDMS reference method   CALCIUM 9 0 mg/dL  8 3-10 1   eGFR 42 ml/min/1 73sq m     [facility] [employer] Disease Education Program recommendations are as follows:  GFR calculation is accurate only with a steady state creatinine  Chronic Kidney disease less than 60 ml/min/1 73 sq  meters  Kidney failure less than 15 ml/min/1 73 sq  meters       (1) MAGNESIUM 12Jan2018 10:35AM [employer] Order Number: [id number]     Test Name Result Flag Reference   MAGNESIUM 1 8 mg/dL  1 6-2 6

## 2018-01-23 NOTE — RESULT NOTES
Message   task request sent      can patient be notified that renal function is stable  still with microscopic hematuria  can these labs be forwarded to his Urologist including the UA        pt has renal stent and stone disease      Thank you      ralph        Verified Results  (1) URINALYSIS w URINE C/S REFLEX (will reflex a microscopy if leukocytes, occult blood, or nitrites are not within normal limits) 13IPU0315 09:03AM [employer]     Test Name Result Flag Reference   COLOR Yellow     CLARITY Clear     PH UA 5 0  5 0-9 0   LEUKOCYTE ESTERASE UA Small A Negative   NITRITE UA Negative  Negative   PROTEIN UA Negative mg/dl  Negative   GLUCOSE UA Negative mg/dl  Negative   KETONES UA Negative mg/dl  Negative   UROBILINOGEN UA 0 2 E U /dl  0 2, 1 0 E U /dl   BILIRUBIN UA Negative  Negative   BLOOD UA Large A Negative   SPECIFIC GRAVITY UA 1 020  1 000-1 030     (1) URINALYSIS w URINE C/S REFLEX (will reflex a microscopy if leukocytes, occult blood, or nitrites are not within normal limits) 57EZS5910 09:03AM [employer]     Test Name Result Flag Reference   BACTERIA None Seen /hpf  None Seen, Occasional   EPITHELIAL CELLS Occasional /hpf  None Seen, Occasional   RBC UA 30-50 /hpf A None Seen, 0-5   WBC UA 2-4 /hpf A None Seen, 0-5, 5-55, 5-65   MUCOUS THREADS Occasional  Occasional, Moderate, Innumerable     (1) BASIC METABOLIC PROFILE 65FNI5031 09:03AM [employer]     Test Name Result Flag Reference   SODIUM 141 mmol/L  136-145   POTASSIUM 4 2 mmol/L  3 5-5 3   CHLORIDE 106 mmol/L  100-108   CARBON DIOXIDE 26 mmol/L  21-32   ANION GAP (CALC) 9 mmol/L  4-13   BLOOD UREA NITROGEN 33 mg/dL H 5-25   CREATININE 1 58 mg/dL H 0 60-1 30   Standardized to IDMS reference method   CALCIUM 9 7 mg/dL  8 3-10 1   eGFR 42 ml/min/1 73sq m     This is a patient instruction: Patient fasting for 8 hours or longer recommended        National Kidney Disease Education Program recommendations are as follows:  GFR calculation is accurate only with a steady state creatinine  Chronic Kidney disease less than 60 ml/min/1 73 sq  meters  Kidney failure less than 15 ml/min/1 73 sq  meters  GLUCOSE FASTING 102 mg/dL H 65-99   Specimen collection should occur prior to Sulfasalazine administration due to the potential for falsely depressed results  Specimen collection should occur prior to Sulfapyridine administration due to the potential for falsely elevated results  Plan  Benign hypertension with chronic kidney disease, stage III    · Furosemide 20 MG Oral Tablet (Lasix);  Take 1 tablet daily

## 2018-01-26 NOTE — H&P
History & Physical - [facility]  [facility] 78 y o  male MRN: [id number]  Unit/Bed#:  Encounter: [id number]  2/13/18  Patient seen and examined  No changes in exam of heart and lungs  Consent confirmed  7 Fr  Stent  11/14/17  Patient seen and examined  No changes in exam of heart and lungs  Consent confirmed  7fr stent  ASSESSMENT/PLAN:    Right renal and ureteral stones  Stent placement and holmium laser of stone and stricture 4/18/17  Holmium laser of stone and stent exchange 5/2/17  Renal stones treated elsewhere in [place] in past year (7 procedures)  Right laser lithotripsy, laser infundibultomy, and stone basket extraction 6/2/17  Right CRUSH and laser incision of proximal ureter 7/18/17  Last stent changed 11/2017  · Monitor for reoccurrence of stone    Right ureteral stricture  Stricture noted distal to right ureteral stones in the proximal to mid right ureter  Laser incision of stricture performed 4/18/17 and again 7/18/17 with treatment of his ureteral stone  Desires for ureteral stent exchanges for current time versus more invasive surgical options  · Stent removal, and replacement   · The risks, benefits, alternatives,and probabilities of success were discussed in detail with no guarantee made as to outcome  All questions were answered to the patient's satisfaction  A-fib, CHF, HTN, Moderate MR, TR, and aortic regurgitation    HISTORY OF PRESENT ILLNESS:  77 y/o male with history of renal stones underwent right stent and laser stricture incision  Pt at current time does not desire additional surgery for his ureteral stricture disease  Presents for right ureteral stent exchange       PAST MEDICAL HISTORY:  Past Medical History:   Diagnosis Date    Atrial fibrillation ([address] )     Essential hypertension, long-standing     Heart murmur, lifelong     History of cigarette smoking, chronic     62 year smoker at one half pack per day, quit 04/1/17    Kidney stones     12 episodes of kidney stones    Kidney stones with lithotripsy 03/2017    Done at [facility]    Pneumonia      X 2    Vitamin D deficiency     maintained with 1000 units of D    Water retention     on furosemide-controlled    Wears glasses     Wears partial dentures     upper       PAST SURGICAL HISTORY:  Past Surgical History:   Procedure Laterality Date    APPENDECTOMY  1960    CAROTID ENDARTERECTOMY Left 1998    Supposedly no internal stenosis found    CYSTOSCOPY Right 8/15/2017    Procedure: CYSTOSCOPY RIGHT STENT EXCHANGE;  Surgeon: Benjamin Cleveland MD;  Location: [address] [place] [address];  Service: Urology    CYSTOSCOPY     [address]  LITHOTRIPSY  03/2017    For nephrolithiasis at [address] [place] [address] &INDWELL STENT INSRT Right 5/2/2017    Procedure: CYSTOSCOPY URETEROSCOPY WITH LITHOTRIPSY HOLMIUM LASER, RETROGRADE PYELOGRAM AND INSERTION STENT URETERAL;  Surgeon: Benjamin Cleveland MD;  Location: [address] [place] [address];  Service: Urology    [place] CYSTO/URETERO W/LITHOTRIPSY &INDWELL STENT INSRT Right 5/16/2017    Procedure: CYSTOSCOPY, RETROGRADE PYELOGRAM,  FLEXIBLE URETEROSCOPY,  HOLMIUM LASER LITHOTRIPSY, STONE BASKET MANIPULATION,  STENT URETERAL EXCHANGE;  Surgeon: Benjamin Cleveland MD;  Location: [address] [place] [address];  Service: Urology    IA CYSTO/URETERO W/LITHOTRIPSY &INDWELL STENT INSRT Right 6/2/2017    Procedure: CYSTOSCOPY, RETROGRADE, STONE MANIPULATION WITH HOLMIUM LASER, STENT PLACEMENT;  Surgeon: Benjamin Cleveland MD;  Location: [address] [place] [address];  Service: Urology    IA CYSTO/URETERO W/LITHOTRIPSY &INDWELL STENT INSRT Right 7/18/2017    Procedure: CYSTOSCOPY, RETROGRADE, URETEROSCOPY HOLMIUM LASER [address];  Surgeon: Benjamin Cleveland MD;  Location: [address] [place] [address];  Service: Urology    IA CYSTOSCOPY,INSERT URETERAL STENT Right 11/14/2017    Procedure: EXCHANGE STENT URETERAL;  Surgeon: Benjamin Cleveland MD;  Location: [facility] OR;  Service: Urology    [place] CYSTOURETHROSCOPY,URETER CATHETER Right 11/14/2017    Procedure: CYSTOSCOPY RETROGRADE, STENT EXCHANGE;  Surgeon: James Nye MD;  Location: [address] [place] [address];  Service: Urology    PROSTATE SURGERY  2015    Laser Prostatectomy  by Dr Seth Manzano Right 4/18/2017    Procedure: INSERTION STENT URETERAL, RIGHT URETEROSCOPY,  LASER OF URETERAL STRICTURE AND STONE;  Surgeon: James Nye MD;  Location: [facility] OR;  Service:        ALLERGIES:  No Known Allergies    SOCIAL HISTORY:  History   Alcohol Use    Yes     Comment: rare     History   Drug Use No     History   Smoking Status    Former Smoker    Packs/day: 0 50    Years: 62 00    Types: Cigarettes    Quit date: 4/15/2017   Smokeless Tobacco    Never Used       FAMILY HISTORY:  Family History   Problem Relation Age of Onset    Hypertension Mother     Alcohol abuse Father     Cirrhosis Father     Cancer Son      cancer-knee and above-left-amputation    Other Brother      legally blind in one eye       MEDICATIONS:  No current facility-administered medications for this encounter  Current Outpatient Prescriptions:     amLODIPine (NORVASC) 10 mg tablet, Take 10 mg by mouth every morning  , Disp: , Rfl:     cholecalciferol (VITAMIN D3) 1,000 units tablet, Take 1,000 Units by mouth every morning, Disp: , Rfl:     furosemide (LASIX) 20 mg tablet, Take 20 mg by mouth every morning  , Disp: , Rfl:     metoprolol tartrate (LOPRESSOR) 25 mg tablet, Take 25 mg by mouth every 8 (eight) hours  , Disp: , Rfl:     Review of Systems   Constitutional: Negative for chills and fever  Respiratory: Negative for cough and chest tightness  Genitourinary: Negative for dysuria, flank pain and hematuria  PHYSICAL EXAM:  Physical Exam   Constitutional: He appears well-developed and well-nourished  HENT:   Head: Normocephalic  Cardiovascular: Normal rate and regular rhythm      Pulmonary/Chest: Effort normal and breath sounds normal    Abdominal: Soft  He exhibits no distension  There is no tenderness  Genitourinary: Penis normal    Neurological: He is alert  Skin: Skin is warm and dry  Psychiatric: He has a normal mood and affect  His behavior is normal  Judgment and thought content normal        LAB RESULTS:  Lab Results   Component Value Date    WBC 7 30 11/08/2017    HGB 13 8 (L) 11/08/2017    HCT 42 0 11/08/2017    MCV 88 11/08/2017     11/08/2017     Lab Results   Component Value Date    GLUCOSE 130 01/12/2018    CALCIUM 9 0 01/12/2018     01/12/2018    K 4 0 01/12/2018    CO2 31 01/12/2018     01/12/2018    BUN 29 (H) 01/12/2018    CREATININE 1 59 (H) 01/12/2018     Lab Results   Component Value Date    CALCIUM 9 0 01/12/2018    PHOS 3 0 06/30/2017       OTHER STUDIES:  CT abd/pelvis without CTS 4/18/17    RIGHT KIDNEY AND URETER:  Multiple calculi are seen within the proximal ureter resulting in moderate hydronephrosis and more proximal hydroureter  For reference, the largest calculus measures approximately 1 7 cm craniocaudal  Multiple calculi are also seen within the lower   pole, largest measuring 8 mm transverse  There is mild perinephric stranding without perinephric fluid collection      LEFT KIDNEY AND URETER:  No urinary tract calculi  Cyst is noted in the mid to lower pole  No hydronephrosis or hydroureter  No perinephric collection      URINARY BLADDER:  Unremarkable      Small bilateral pleural effusions and bibasilar atelectasis are present  Limited low radiation dose noncontrast CT evaluation demonstrates no clinically significant abnormality of liver, spleen, pancreas, or adrenal glands  No calcified gallstones or gallbladder wall thickening noted  No bowel obstruction  No ascites or lymphadenopathy  Diverticulosis without active diverticulitis is present  Limited evaluation demonstrates no evidence to suggest acute appendicitis    No acute fracture or destructive osseous lesion is identified          IMPRESSION:     Multiple calculi within the proximal right ureter, largest measuring 1 7 cm and resulting in moderate hydronephrosis and more proximal hydroureter  Multiple calculi also seen in the lower pole the right kidney        Small bilateral pleural effusions and bibasilar atelectasis      Diverticulosis without active diverticulitis  Renal US 7/10/17  9mm right lower pole stone  9mm right mid pole stone  7mm right proximal ureteral stone hydronephrosis   4mm x2 bladder stones     Diana Cordero PA-C  01/26/18    Portions of the record may have been created with voice recognition software   Occasional wrong word or "sound a like" substitutions may have occurred due to the inherent limitations of voice recognition software   Read the chart carefully and recognize, using context, where substitutions have occurred

## 2018-02-06 ENCOUNTER — APPOINTMENT (OUTPATIENT)
Dept: LAB | Facility: HOSPITAL | Age: 78
End: 2018-02-06
Attending: UROLOGY
Payer: MEDICARE

## 2018-02-06 ENCOUNTER — APPOINTMENT (OUTPATIENT)
Dept: PREADMISSION TESTING | Facility: HOSPITAL | Age: 78
End: 2018-02-06
Payer: MEDICARE

## 2018-02-06 ENCOUNTER — TRANSCRIBE ORDERS (OUTPATIENT)
Dept: ADMINISTRATIVE | Facility: HOSPITAL | Age: 78
End: 2018-02-06

## 2018-02-06 DIAGNOSIS — Z01.818 PREOP EXAMINATION: Primary | ICD-10-CM

## 2018-02-06 DIAGNOSIS — I10 ESSENTIAL HYPERTENSION: Primary | ICD-10-CM

## 2018-02-06 DIAGNOSIS — Z01.818 PREOP EXAMINATION: ICD-10-CM

## 2018-02-06 LAB
ALBUMIN SERPL BCP-MCNC: 3.3 G/DL (ref 3.5–5)
ALP SERPL-CCNC: 100 U/L (ref 46–116)
ALT SERPL W P-5'-P-CCNC: 25 U/L (ref 12–78)
ANION GAP SERPL CALCULATED.3IONS-SCNC: 2 MMOL/L (ref 4–13)
AST SERPL W P-5'-P-CCNC: 22 U/L (ref 5–45)
BACTERIA UR QL AUTO: ABNORMAL /HPF
BASOPHILS # BLD AUTO: 0 THOUSANDS/ΜL (ref 0–0.1)
BASOPHILS NFR BLD AUTO: 0 % (ref 0–1)
BILIRUB SERPL-MCNC: 0.8 MG/DL (ref 0.2–1)
BILIRUB UR QL STRIP: NEGATIVE
BUN SERPL-MCNC: 25 MG/DL (ref 5–25)
CALCIUM SERPL-MCNC: 9.6 MG/DL (ref 8.3–10.1)
CHLORIDE SERPL-SCNC: 103 MMOL/L (ref 100–108)
CLARITY UR: ABNORMAL
CO2 SERPL-SCNC: 33 MMOL/L (ref 21–32)
COLOR UR: YELLOW
CREAT SERPL-MCNC: 1.49 MG/DL (ref 0.6–1.3)
EOSINOPHIL # BLD AUTO: 0.3 THOUSAND/ΜL (ref 0–0.61)
EOSINOPHIL NFR BLD AUTO: 3 % (ref 0–6)
ERYTHROCYTE [DISTWIDTH] IN BLOOD BY AUTOMATED COUNT: 14.5 % (ref 11.6–15.1)
GFR SERPL CREATININE-BSD FRML MDRD: 45 ML/MIN/1.73SQ M
GLUCOSE P FAST SERPL-MCNC: 98 MG/DL (ref 65–99)
GLUCOSE UR STRIP-MCNC: NEGATIVE MG/DL
HCT VFR BLD AUTO: 43 % (ref 42–52)
HGB BLD-MCNC: 14.2 G/DL (ref 14–18)
HGB UR QL STRIP.AUTO: ABNORMAL
KETONES UR STRIP-MCNC: NEGATIVE MG/DL
LEUKOCYTE ESTERASE UR QL STRIP: ABNORMAL
LYMPHOCYTES # BLD AUTO: 2 THOUSANDS/ΜL (ref 0.6–4.47)
LYMPHOCYTES NFR BLD AUTO: 23 % (ref 14–44)
MCH RBC QN AUTO: 29.2 PG (ref 27–31)
MCHC RBC AUTO-ENTMCNC: 33.1 G/DL (ref 31.4–37.4)
MCV RBC AUTO: 88 FL (ref 82–98)
MONOCYTES # BLD AUTO: 0.8 THOUSAND/ΜL (ref 0.17–1.22)
MONOCYTES NFR BLD AUTO: 9 % (ref 4–12)
MUCOUS THREADS UR QL AUTO: ABNORMAL
NEUTROPHILS # BLD AUTO: 5.5 THOUSANDS/ΜL (ref 1.85–7.62)
NEUTS SEG NFR BLD AUTO: 65 % (ref 43–75)
NITRITE UR QL STRIP: NEGATIVE
NON-SQ EPI CELLS URNS QL MICRO: ABNORMAL /HPF
NRBC BLD AUTO-RTO: 0 /100 WBCS
OTHER STN SPEC: ABNORMAL
PH UR STRIP.AUTO: 5.5 [PH] (ref 5–9)
PLATELET # BLD AUTO: 177 THOUSANDS/UL (ref 130–400)
PMV BLD AUTO: 8.4 FL (ref 8.9–12.7)
POTASSIUM SERPL-SCNC: 4 MMOL/L (ref 3.5–5.3)
PROT SERPL-MCNC: 6.9 G/DL (ref 6.4–8.2)
PROT UR STRIP-MCNC: ABNORMAL MG/DL
RBC # BLD AUTO: 4.88 MILLION/UL (ref 4.7–6.1)
RBC #/AREA URNS AUTO: ABNORMAL /HPF
SODIUM SERPL-SCNC: 138 MMOL/L (ref 136–145)
SP GR UR STRIP.AUTO: 1.02 (ref 1–1.03)
UROBILINOGEN UR QL STRIP.AUTO: 0.2 E.U./DL
WBC # BLD AUTO: 8.5 THOUSAND/UL (ref 4.8–10.8)
WBC #/AREA URNS AUTO: ABNORMAL /HPF

## 2018-02-06 PROCEDURE — 81001 URINALYSIS AUTO W/SCOPE: CPT | Performed by: UROLOGY

## 2018-02-06 PROCEDURE — 85025 COMPLETE CBC W/AUTO DIFF WBC: CPT | Performed by: UROLOGY

## 2018-02-06 PROCEDURE — 80053 COMPREHEN METABOLIC PANEL: CPT | Performed by: UROLOGY

## 2018-02-06 PROCEDURE — 36415 COLL VENOUS BLD VENIPUNCTURE: CPT | Performed by: UROLOGY

## 2018-02-06 PROCEDURE — 87086 URINE CULTURE/COLONY COUNT: CPT

## 2018-02-06 RX ORDER — HYDROCHLOROTHIAZIDE 12.5 MG/1
12.5 TABLET ORAL DAILY
COMMUNITY
End: 2018-08-14 | Stop reason: HOSPADM

## 2018-02-06 NOTE — PRE-PROCEDURE INSTRUCTIONS
My Surgical Experience    The following information was developed to assist you to prepare for your operation  What do I need to do before coming to the hospital?   Arrange for a responsible person to drive you to and from the hospital    Arrange care for your children at home  Children are not allowed in the recovery areas of the hospital   Plan to wear clothing that is easy to put on and take off  If you are having shoulder surgery, wear a shirt that buttons or zippers in the front  Bathing  o Shower the evening before and the morning of your surgery with an antibacterial soap  Please refer to the Pre Op Showering Instructions for Surgery Patients Sheet   o Remove nail polish and all body piercing jewelry  o Do not shave any body part for at least 24 hours before surgery-this includes face, arms, legs and upper body  Food  o Nothing to eat or drink after midnight the night before your surgery  This includes candy and chewing gum  o Exception: If your surgery is after 12:00pm (noon), you may have clear liquids such as 7-Up®, ginger ale, apple or cranberry juice, Jell-O®, water, or clear broth until 8:00 am  o Do not drink milk or juice with pulp on the morning before surgery  o Do not drink alcohol 24 hours before surgery  Medicine  o Follow instructions you received from your surgeon about which medicines you may take on the day of surgery  o If instructed to take medicine on the morning of surgery, take pills with just a small sip of water  Call your prescribing doctor for specific infroamtion on what to do if you take insulin    What should I bring to the hospital?    Bring:  Paula Potters or a walker, if you have them, for foot or knee surgery   A list of the daily medicines, vitamins, minerals, herbals and nutritional supplements you take   Include the dosages of medicines and the time you take them each day   Glasses, dentures or hearing aids   Minimal clothing; you will be wearing hospital sleepwear   Photo ID; required to verify your identity   If you have a Living Will or Power of , bring a copy of the documents   If you have an ostomy, bring an extra pouch and any supplies you use    Do not bring   Medicines or inhalers   Money, valuables or jewelry    What other information should I know about the day of surgery?  Notify your surgeons if you develop a cold, sore throat, cough, fever, rash or any other illness   Report to the Ambulatory Surgical/Same Day Surgery Unit   You will be instructed to stop at Registration only if you have not been pre-registered   Inform your  fi they do not stay that they will be asked by the staff to leave a phone number where they can be reached   Be available to be reached before surgery  In the event the operating room schedule changes, you may be asked to come in earlier or later than expected    *It is important to tell your doctor and others involved in your health care if you are taking or have been taking any non-prescription drugs, vitamins, minerals, herbals or other nutritional supplements  Any of these may interact with some food or medicines and cause a reaction      Pre-Surgery Instructions:   Medication Instructions    amLODIPine (NORVASC) 10 mg tablet Instructed patient per Anesthesia Guidelines   cholecalciferol (VITAMIN D3) 1,000 units tablet Instructed patient per Anesthesia Guidelines   hydrochlorothiazide (HYDRODIURIL) 12 5 mg tablet Instructed patient per Anesthesia Guidelines   metoprolol tartrate (LOPRESSOR) 25 mg tablet Instructed patient per Anesthesia Guidelines  To take amlodipine and metoprolol a m   Of surgery

## 2018-02-07 LAB — BACTERIA UR CULT: NORMAL

## 2018-02-12 ENCOUNTER — ANESTHESIA EVENT (OUTPATIENT)
Dept: PERIOP | Facility: HOSPITAL | Age: 78
End: 2018-02-12
Payer: MEDICARE

## 2018-02-12 NOTE — ANESTHESIA PREPROCEDURE EVALUATION
Review of Systems/Medical History  Patient summary reviewed  Chart reviewed  No history of anesthetic complications     Cardiovascular  Hypertension , Dysrhythmias, atrial fibrillation,    Pulmonary  Smoker (past smoker) cigarette smoker  Cumulative Pack Years: 32,        GI/Hepatic       Kidney stones, Kidney disease CKD,        Endo/Other     GYN       Hematology   Musculoskeletal       Neurology   Psychology           Physical Exam    Airway    Mallampati score: II  TM Distance: >3 FB  Neck ROM: full     Dental   No notable dental hx     Cardiovascular  Cardiovascular exam normal    Pulmonary  Pulmonary exam normal     Other Findings        Anesthesia Plan  ASA Score- 3     Anesthesia Type- IV sedation with anesthesia with ASA Monitors  Additional Monitors:   Airway Plan:         Plan Factors-    Induction-     Postoperative Plan-     Informed Consent- Anesthetic plan and risks discussed with patient

## 2018-02-13 ENCOUNTER — HOSPITAL ENCOUNTER (OUTPATIENT)
Facility: HOSPITAL | Age: 78
Setting detail: OUTPATIENT SURGERY
Discharge: HOME | End: 2018-02-13
Attending: UROLOGY | Admitting: UROLOGY
Payer: MEDICARE

## 2018-02-13 ENCOUNTER — HOSPITAL ENCOUNTER (OUTPATIENT)
Dept: RADIOLOGY | Facility: HOSPITAL | Age: 78
Setting detail: OUTPATIENT SURGERY
Discharge: HOME | End: 2018-02-13
Payer: MEDICARE

## 2018-02-13 ENCOUNTER — ANESTHESIA (OUTPATIENT)
Dept: PERIOP | Facility: HOSPITAL | Age: 78
End: 2018-02-13
Payer: MEDICARE

## 2018-02-13 VITALS
RESPIRATION RATE: 20 BRPM | BODY MASS INDEX: 28.98 KG/M2 | WEIGHT: 202 LBS | OXYGEN SATURATION: 95 % | DIASTOLIC BLOOD PRESSURE: 76 MMHG | TEMPERATURE: 96.9 F | HEART RATE: 60 BPM | SYSTOLIC BLOOD PRESSURE: 124 MMHG

## 2018-02-13 PROCEDURE — C1769 GUIDE WIRE: HCPCS | Performed by: UROLOGY

## 2018-02-13 PROCEDURE — 74450 X-RAY URETHRA/BLADDER: CPT

## 2018-02-13 PROCEDURE — C2617 STENT, NON-COR, TEM W/O DEL: HCPCS | Performed by: UROLOGY

## 2018-02-13 DEVICE — IMPLANTABLE DEVICE
Type: IMPLANTABLE DEVICE | Site: URETER | Status: NON-FUNCTIONAL
Removed: 2019-10-22

## 2018-02-13 RX ORDER — PROPOFOL 10 MG/ML
INJECTION, EMULSION INTRAVENOUS AS NEEDED
Status: DISCONTINUED | OUTPATIENT
Start: 2018-02-13 | End: 2018-02-13 | Stop reason: SURG

## 2018-02-13 RX ORDER — MELATONIN
1000 EVERY MORNING
Status: CANCELLED | OUTPATIENT
Start: 2018-02-13

## 2018-02-13 RX ORDER — DEXAMETHASONE SODIUM PHOSPHATE 4 MG/ML
INJECTION, SOLUTION INTRA-ARTICULAR; INTRALESIONAL; INTRAMUSCULAR; INTRAVENOUS; SOFT TISSUE AS NEEDED
Status: DISCONTINUED | OUTPATIENT
Start: 2018-02-13 | End: 2018-02-13 | Stop reason: SURG

## 2018-02-13 RX ORDER — EPHEDRINE SULFATE 50 MG/ML
INJECTION, SOLUTION INTRAVENOUS AS NEEDED
Status: DISCONTINUED | OUTPATIENT
Start: 2018-02-13 | End: 2018-02-13 | Stop reason: SURG

## 2018-02-13 RX ORDER — MIDAZOLAM HYDROCHLORIDE 1 MG/ML
INJECTION INTRAMUSCULAR; INTRAVENOUS AS NEEDED
Status: DISCONTINUED | OUTPATIENT
Start: 2018-02-13 | End: 2018-02-13 | Stop reason: SURG

## 2018-02-13 RX ORDER — SODIUM CHLORIDE 9 MG/ML
125 INJECTION, SOLUTION INTRAVENOUS CONTINUOUS
Status: DISCONTINUED | OUTPATIENT
Start: 2018-02-13 | End: 2018-02-13 | Stop reason: HOSPADM

## 2018-02-13 RX ORDER — FENTANYL CITRATE/PF 50 MCG/ML
25 SYRINGE (ML) INJECTION
Status: DISCONTINUED | OUTPATIENT
Start: 2018-02-13 | End: 2018-02-13 | Stop reason: HOSPADM

## 2018-02-13 RX ORDER — HYDROCHLOROTHIAZIDE 12.5 MG/1
12.5 TABLET ORAL DAILY
Status: CANCELLED | OUTPATIENT
Start: 2018-02-13

## 2018-02-13 RX ORDER — ONDANSETRON 2 MG/ML
INJECTION INTRAMUSCULAR; INTRAVENOUS AS NEEDED
Status: DISCONTINUED | OUTPATIENT
Start: 2018-02-13 | End: 2018-02-13 | Stop reason: SURG

## 2018-02-13 RX ORDER — ONDANSETRON 2 MG/ML
4 INJECTION INTRAMUSCULAR; INTRAVENOUS ONCE AS NEEDED
Status: DISCONTINUED | OUTPATIENT
Start: 2018-02-13 | End: 2018-02-13 | Stop reason: HOSPADM

## 2018-02-13 RX ORDER — MAGNESIUM HYDROXIDE 1200 MG/15ML
LIQUID ORAL AS NEEDED
Status: DISCONTINUED | OUTPATIENT
Start: 2018-02-13 | End: 2018-02-13 | Stop reason: HOSPADM

## 2018-02-13 RX ORDER — AMLODIPINE BESYLATE 10 MG/1
10 TABLET ORAL EVERY MORNING
Status: CANCELLED | OUTPATIENT
Start: 2018-02-13

## 2018-02-13 RX ORDER — FENTANYL CITRATE 50 UG/ML
INJECTION, SOLUTION INTRAMUSCULAR; INTRAVENOUS AS NEEDED
Status: DISCONTINUED | OUTPATIENT
Start: 2018-02-13 | End: 2018-02-13 | Stop reason: SURG

## 2018-02-13 RX ADMIN — FENTANYL CITRATE 50 MCG: 50 INJECTION, SOLUTION INTRAMUSCULAR; INTRAVENOUS at 07:43

## 2018-02-13 RX ADMIN — FENTANYL CITRATE 25 MCG: 50 INJECTION, SOLUTION INTRAMUSCULAR; INTRAVENOUS at 07:46

## 2018-02-13 RX ADMIN — SODIUM CHLORIDE 125 ML/HR: 0.9 INJECTION, SOLUTION INTRAVENOUS at 06:59

## 2018-02-13 RX ADMIN — DEXAMETHASONE SODIUM PHOSPHATE 4 MG: 4 INJECTION, SOLUTION INTRA-ARTICULAR; INTRALESIONAL; INTRAMUSCULAR; INTRAVENOUS; SOFT TISSUE at 07:48

## 2018-02-13 RX ADMIN — CEFAZOLIN SODIUM 1000 MG: 1 SOLUTION INTRAVENOUS at 07:47

## 2018-02-13 RX ADMIN — EPHEDRINE SULFATE 10 MG: 50 INJECTION, SOLUTION INTRAMUSCULAR; INTRAVENOUS; SUBCUTANEOUS at 07:55

## 2018-02-13 RX ADMIN — PROPOFOL 20 MG: 10 INJECTION, EMULSION INTRAVENOUS at 07:52

## 2018-02-13 RX ADMIN — FENTANYL CITRATE 25 MCG: 50 INJECTION, SOLUTION INTRAMUSCULAR; INTRAVENOUS at 07:49

## 2018-02-13 RX ADMIN — ONDANSETRON 4 MG: 2 INJECTION INTRAMUSCULAR; INTRAVENOUS at 07:48

## 2018-02-13 RX ADMIN — MIDAZOLAM HYDROCHLORIDE 2 MG: 1 INJECTION, SOLUTION INTRAMUSCULAR; INTRAVENOUS at 07:41

## 2018-02-13 RX ADMIN — PROPOFOL 20 MG: 10 INJECTION, EMULSION INTRAVENOUS at 07:47

## 2018-02-13 RX ADMIN — PROPOFOL 50 MG: 10 INJECTION, EMULSION INTRAVENOUS at 07:43

## 2018-02-13 NOTE — OP NOTE
OPERATIVE REPORT- Dr Mary Patton  PATIENT NAME: Jesus Dennison    :  1940  MRN: [id number]  Pt Location: [facility] OR ROOM 04    SURGERY DATE: 2018    Surgeon: Mauro Moreland MD    Pre-op Diagnosis:  1  Right hydronephrosis  2  Right ureteral stones  3  Right renal stones  4  Right ureteral stricture from prior ureteroscopy with stone treatment    Post-op Diagnosis:  1  same    Procedure:  1  Cystoscopy  2  Fluoroscopy  3  Right retrograde pyelography  4  Right ureteral stent placement    Specimen(s): * No specimens in log *    Estimated Blood Loss: Minimal    Complications: None    Drains:  1  7 X 28 centimeter right ureteral stent    Anesthesia type: IV Sedation with Anesthesia    Indications for surgery:  Obstructing right ureteral stones with dense stricture in the proximal to mid right ureter  Patient refuses to do surgical repair or have nephrectomy  Findings:  1  Stones in right ureter  2  Very dense stricture requiring stent to be removed simultaneous to passage of the guidewire  Guidewire will not pass around the stent without the stent being pulled distally first   Very high risk for nephrostomy tube in the future  Procedure and Technique:   After obtaining consent and indentifying the patient, antibiotics were given as ordered and the patient was brought to the room  All appropriate leads and monitors were placed and the patient was appropriately positioned on the table  Anesthesia was administered and the patient was sterilely prepped and draped  A timeout was performed where the patient name, , procedure, antibiotics, allergies, etc  were discussed  All in the room were satisfied before the start of the operative procedure  What follows are the operative findings and events  Cystoscopy was undertaken  The bladder was systematically surveyed and found to be free of stones, tumors or infection   The right ureteral orifice was identified a guidewire was inserted up into the right ureter  This was confirmed with x-ray  It was unable to pass proximal to the stricture so the stent was grasped and gently pulled down past the end of the wire allowing the wire then to pass proximally  A ureteral catheter was placed over the wire and a retrograde was performed  This confirmed that the wire was in the renal pelvis  The wire was reintroduced and the catheter was removed and a stent was placed over the wire and there was a good coil proximally and distally  Efflux was noted from the stent  The procedure was terminated and the patient was awakened without incident and transferred to the PACU in satisfactory condition  Plan:  1  Stent exchange three months    SIGNATURE: Jamshid Mustafa MD  DATE: February 13, 2018  TIME: 8:06 AM    Portions of the record may have been created with voice recognition software   Occasional wrong word or "sound a like" substitutions may have occurred due to the inherent limitations of voice recognition software   Read the chart carefully and recognize, using context, where substitutions have occurred

## 2018-02-13 NOTE — PERIOPERATIVE NURSING NOTE
Ambulated to bathroom with steady gait, voided without difficulty clear pink tinged urine without clots

## 2018-04-20 NOTE — H&P
History & Physical - [facility]  Jaswinder Cos 78 y o  male MRN: [id number]  Unit/Bed#:  Encounter: [id number]    ASSESSMENT/PLAN:    Right hydronephrosis  Right ureteral stones and dense stricture in the proximal to mid right ureter  Refuses surgical repair or nephrectomy  Stent placement and holmium laser of stone and stricture 4/18/17  Holmium laser of stone and stent exchange 5/2/17  Renal stones treated elsewhere in [address] [place] [address] in past year (7 procedures)  Right laser lithotripsy, laser infundibulotomy, and stone basket extraction 6/2/17  Right CRUSH and laser incision of proximal ureter 7/18/17  Last stent changed 2/13/18  · Right ureteral stent exchange    A-fib, CHF, HTN, Moderate MR, TR, and aortic regurgitation     HISTORY OF PRESENT ILLNESS:  79 y/o male with hx of renal stones and ureteral stricture disease presents for right ureteral stent exchange  Pt does not desire surgical treatment of his stones or stricture       PAST MEDICAL HISTORY:  Past Medical History:   Diagnosis Date    At[address] ([facility] [address] )     Essential hypertension, long-standing     Heart murmur, lifelong     History of cigarette smoking, chronic     64 year smoker at one half pack per day, quit 04/1/17    Kidney stones     12 episodes of kidney stones    Kidney stones with lithotripsy 03/2017    Done at [address] [place] [address] Pneumonia      X 2    Vitamin D deficiency     maintained with 1000 units of D    Water retention     on furosemide-controlled    Wears glasses     Wears partial dentures     upper       PAST SURGICAL HISTORY:  Past Surgical History:   Procedure Laterality Date    APPENDECTOMY  1960    CAROTID ENDARTERECTOMY Left 1998    Supposedly no internal stenosis found    CYSTOSCOPY Right 8/15/2017    Procedure: CYSTOSCOPY RIGHT STENT EXCHANGE;  Surgeon: Ashley Freed MD;  Location: [address] [place] [address];  Service: Urology    CYSTOSCOPY     [facility]  LITHOTRIPSY  03/2017    For nephrolithiasis at [facility] W/LITHOTRIPSY &INDWELL STENT INSRT Right 5/2/2017    Procedure: CYSTOSCOPY URETEROSCOPY WITH LITHOTRIPSY HOLMIUM LASER, RETROGRADE PYELOGRAM AND INSERTION STENT URETERAL;  Surgeon: Santhosh Serra MD;  Location: [address] [place] [address];  Service: Urology    [place] CYSTO/URETERO W/LITHOTRIPSY &INDWELL STENT INSRT Right 5/16/2017    Procedure: CYSTOSCOPY, RETROGRADE PYELOGRAM,  FLEXIBLE URETEROSCOPY,  HOLMIUM LASER LITHOTRIPSY, STONE BASKET MANIPULATION,  STENT URETERAL EXCHANGE;  Surgeon: Santhosh Serra MD;  Location: [address] [place] [address];  Service: Urology    HI CYSTO/URETERO W/LITHOTRIPSY &INDWELL STENT INSRT Right 6/2/2017    Procedure: CYSTOSCOPY, RETROGRADE, STONE MANIPULATION WITH HOLMIUM LASER, STENT PLACEMENT;  Surgeon: Santhosh Serra MD;  Location: [address] [place] [address];  Service: Urology    HI CYSTO/URETERO W/LITHOTRIPSY &INDWELL STENT INSRT Right 7/18/2017    Procedure: CYSTOSCOPY, RETROGRADE, URETEROSCOPY HOLMIUM LASER [place],  AND STENT PLACEMENT;  Surgeon: Santhosh Serra MD;  Location: [address] [place] [address];  Service: Urology    HI CYSTOSCOPY,INSERT URETERAL STENT Right 11/14/2017    Procedure: EXCHANGE STENT URETERAL;  Surgeon: Santhosh Serra MD;  Location: [address] [place] [address];  Service: Urology    HI CYSTOURETHROSCOPY,URETER CATHETER Right 11/14/2017    Procedure: CYSTOSCOPY RETROGRADE, STENT EXCHANGE;  Surgeon: Santhosh Serra MD;  Location: [address] [place] [address];  Service: Urology    PROSTATE SURGERY  2015    Laser Prostatectomy  by Dr Lc Narayan Right 4/18/2017    Procedure: INSERTION STENT URETERAL, RIGHT URETEROSCOPY,  LASER OF URETERAL STRICTURE AND STONE;  Surgeon: Santhosh Serra MD;  Location: [address] [place] [address];  Service:     URETERAL STENT PLACEMENT Right 2/13/2018    Procedure: David Phenes;  Surgeon: Santhosh Serra MD;  Location: [facility];  Service: Urology       ALLERGIES:  No Known Allergies    SOCIAL HISTORY:  History   Alcohol Use    Yes     Comment: rare     History   Drug Use No     History   Smoking Status    Former Smoker    Packs/day: 0 50    Years: 62 00    Types: Cigarettes    Quit date: 4/15/2017   Smokeless Tobacco    Never Used       FAMILY HISTORY:  Family History   Problem Relation Age of Onset    Hypertension Mother     Alcohol abuse Father     Cirrhosis Father     Cancer Son      cancer-knee and above-left-amputation    Other Brother      legally blind in one eye       MEDICATIONS:  No current facility-administered medications for this encounter  Current Outpatient Prescriptions:     amLODIPine (NORVASC) 10 mg tablet, Take 10 mg by mouth every morning  , Disp: , Rfl:     cholecalciferol (VITAMIN D3) 1,000 units tablet, Take 1,000 Units by mouth every morning, Disp: , Rfl:     furosemide (LASIX) 20 mg tablet, Take 20 mg by mouth every morning  , Disp: , Rfl:     hydrochlorothiazide (HYDRODIURIL) 12 5 mg tablet, Take 12 5 mg by mouth daily, Disp: , Rfl:     metoprolol tartrate (LOPRESSOR) 25 mg tablet, Take 1 tablet (25 mg total) by mouth every 8 (eight) hours, Disp: 90 tablet, Rfl: 3    Review of Systems   Constitutional: Negative for chills and fever  Respiratory: Negative for cough  Gastrointestinal: Negative for abdominal pain, diarrhea and nausea  Genitourinary: Negative for frequency, hematuria and urgency  Musculoskeletal: Negative for back pain  PHYSICAL EXAM:  Physical Exam   Constitutional: He appears well-developed  Cardiovascular: Normal rate and regular rhythm  Pulmonary/Chest: Effort normal    Abdominal: Soft  Genitourinary: Penis normal    Neurological: He is alert  Skin: Skin is warm  Psychiatric: He has a normal mood and affect         LAB RESULTS:  Lab Results   Component Value Date    WBC 8 50 02/06/2018    HGB 14 2 02/06/2018    HCT 43 0 02/06/2018    MCV 88 02/06/2018     02/06/2018     Lab Results Component Value Date    GLUCOSE 130 01/12/2018    CALCIUM 9 6 02/06/2018     02/06/2018    K 4 0 02/06/2018    CO2 33 (H) 02/06/2018     02/06/2018    BUN 25 02/06/2018    CREATININE 1 49 (H) 02/06/2018     Lab Results   Component Value Date    CALCIUM 9 6 02/06/2018    PHOS 3 0 06/30/2017       OTHER STUDIES:  Renal US 7/10/17  1  Moderate right hydroureteronephrosis  Right renal and ureteral calculi are present  2   Bilateral renal cysts  3  Bladder calculi  Moraima Ham PA-C  04/20/18    Portions of the record may have been created with voice recognition software   Occasional wrong word or "sound alike" substitutions may have occurred due to the inherent limitations of voice recognition software   Read the chart carefully and recognize, using context, where substitutions have occurred

## 2018-05-07 ENCOUNTER — OFFICE VISIT (OUTPATIENT)
Dept: NEPHROLOGY | Facility: CLINIC | Age: 78
End: 2018-05-07
Payer: MEDICARE

## 2018-05-07 ENCOUNTER — ANESTHESIA EVENT (OUTPATIENT)
Dept: PERIOP | Facility: HOSPITAL | Age: 78
End: 2018-05-07
Payer: MEDICARE

## 2018-05-07 VITALS
BODY MASS INDEX: 28.63 KG/M2 | DIASTOLIC BLOOD PRESSURE: 66 MMHG | HEIGHT: 70 IN | WEIGHT: 200 LBS | SYSTOLIC BLOOD PRESSURE: 124 MMHG

## 2018-05-07 DIAGNOSIS — N18.30 STAGE 3 CHRONIC KIDNEY DISEASE (HCC): Primary | ICD-10-CM

## 2018-05-07 PROCEDURE — 99213 OFFICE O/P EST LOW 20 MIN: CPT | Performed by: INTERNAL MEDICINE

## 2018-05-07 NOTE — PROGRESS NOTES
NEPHROLOGY OFFICE VISIT   Pretty Go 78 y o  male MRN: [id number]  5/7/2018    Reason for Visit: CKD    ASSESSMENT and PLAN:    I had the pleasure of seeing Mr Marry Montero today in the renal clinic for the continued management of CKD  Since our last visit, there has been no ER visits or hospitalizations  He currently has no complaints at this time and is feeling well  Patient denies any chest pain, shortness of breath and swelling  The last blood work was done on 2/6/18, which we have reviewed together  72-year-old male with a past medical history of CK D stage III Baseline Cr 1 6-1 8, Nephrolithiasis,HTN, A  fib, D CHF, BPH, 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, former smoker quit in April, hospitalized at [facility] in April 2017 for shortness of breath at which time nephrology was consulted due to acute kidney injury with a rising creatinine  Patient was found to have right-sided hydronephrosis with ureteral stricture and stone  Patient underwent ureteral stent and eventually had a lithotripsy  Patient presents for follow up visit for CKD  Patient had stent exchange on 8/15/17 and again in Nov 2017 and again in Feb  Due for next exchange 5/8  1) CKD - baseline Cr approx 1 5-1 7 mg/dL  acute injury during hosp was secondary to obstruction secondary to stone, hydro, poor perfusion in setting of a fib/RVR  Renal u/s R 12 3, L 10 7 cm, R mod hydro  UA still with mod blood, 4-10 RBC  Renal ultrasound July 10, right kidney 12 8 cm, moderate right hydronephrosis, lower pole calculus measuring 9 mm, Left kidney 11 2 cm  Etiology oc CKD likely solitary functional kidney, prior hydro, nephrolithiasis  - Follows with Urology regarding stent and nephrolithiasis  -most recent creatinine 1 49 mg/dL on 2/6/18   - UA large blood - diff to interpret given stent exchange  - may need to consider mag 3 in future if requires nephrectomy, though this may be for more prognostic information and not for management change     - UPCR 0 2  - check BMP, CBC, UA, UPCR, PTH, Vit D  - kidney smart class - referred   - if UPCR rising, will consider ACE or ARB low dose if tolerates and lowering amlodipine if needed    2) Nephrolithiasis - follows with Urology  - renal stone mainly ca oxalate  - 24 hour urine litholink - reviewed  - pt has increased fluid intake to 2 L  - Needs low salt diet - already following  takes in approx 1 8 gm salt daily  at goal  - changed furosemide to HCTZ in jan 2018  - underwent stent exchange in 2/2018 with Urology team  - due for stent exchange this month    3) HTN - Blood pressures are stable  4) Volume - history of dCHF  history of mild to mod pulm HTN  Euvolemic     - need to monitor fluid intake with HF history  - daily weights     5) CKD MBD -   phos 3  Vit D 19 - in June 2017; Vit D improving to 54 in September and PTH improving to 80 in sept 2017    - Repeat PTH, vitamin D - lab slips given  - PTH improving  6) Anemia - stable Hb    7) acid/base - bicarb 33 - monitoring for now    8) anemia - Hb above goal    It was a pleasure evaluating Mr Yanely Ceja in the renal office  Thank you for allowing our team to participate in the care of your patient  No problem-specific Assessment & Plan notes found for this encounter  HPI:    Patient seen today for follow-up visit for CK D  Denies complaints  Due for stent exchange tomorrow  PATIENT INSTRUCTIONS:    Patient Instructions   1) please continue your medications as you are currently doing        OBJECTIVE:  Current Weight: Weight - Scale: 90 7 kg (200 lb)  Vitals:    05/07/18 0948   BP: 124/66   BP Location: Right arm   Patient Position: Sitting   Cuff Size: Large   Weight: 90 7 kg (200 lb)   Height: 5' 10" (1 778 m)    Body mass index is 28 7 kg/m²  REVIEW OF SYSTEMS:    Review of Systems   Constitutional: Negative  Negative for appetite change and fatigue  HENT: Negative  Eyes: Negative  Respiratory: Negative    Negative for shortness of breath  Cardiovascular: Negative  Negative for leg swelling  Gastrointestinal: Negative  Endocrine: Negative  Genitourinary: Negative  Negative for difficulty urinating  Musculoskeletal: Negative  Allergic/Immunologic: Negative  Neurological: Negative  Hematological: Negative  Psychiatric/Behavioral: Negative  All other systems reviewed and are negative  PHYSICAL EXAM:      Physical Exam   Constitutional: He is oriented to person, place, and time  He appears well-developed and well-nourished  No distress  HENT:   Head: Normocephalic and atraumatic  Mouth/Throat: No oropharyngeal exudate  Eyes: Conjunctivae are normal  Right eye exhibits no discharge  Left eye exhibits no discharge  No scleral icterus  Neck: Normal range of motion  Neck supple  No JVD present  Cardiovascular: Normal rate, regular rhythm and normal heart sounds  Exam reveals no gallop and no friction rub  No murmur heard  Pulmonary/Chest: Effort normal and breath sounds normal  No respiratory distress  He has no wheezes  He has no rales  He exhibits no tenderness  Abdominal: Soft  Bowel sounds are normal  He exhibits no distension  There is no tenderness  There is no rebound  Musculoskeletal: Normal range of motion  He exhibits no edema, tenderness or deformity  Neurological: He is alert and oriented to person, place, and time  He has normal reflexes  No cranial nerve deficit  Coordination normal    Skin: Skin is warm and dry  No rash noted  He is not diaphoretic  No erythema  No pallor  Psychiatric: He has a normal mood and affect  His behavior is normal  Judgment and thought content normal    Nursing note and vitals reviewed        Medications:    Current Outpatient Prescriptions:     amLODIPine (NORVASC) 10 mg tablet, Take 10 mg by mouth every morning  , Disp: , Rfl:     cholecalciferol (VITAMIN D3) 1,000 units tablet, Take 1,000 Units by mouth every morning, Disp: , Rfl:    hydrochlorothiazide (HYDRODIURIL) 12 5 mg tablet, Take 12 5 mg by mouth daily, Disp: , Rfl:     metoprolol tartrate (LOPRESSOR) 25 mg tablet, Take 1 tablet (25 mg total) by mouth every 8 (eight) hours, Disp: 90 tablet, Rfl: 3    Laboratory Results:        Results for orders placed or performed in visit on 02/06/18   Urine culture   Result Value Ref Range    Urine Culture 30,000-39,000 cfu/ml

## 2018-05-07 NOTE — LETTER
May 7, 2018     Rosa Isela Bender PA-C  [address] [place] [address]    Patient: Luciana Odell   YOB: 1940   Date of Visit: 5/7/2018       Dear Dr Cara Carrington:    Thank you for referring Ami Ronquillo to me for evaluation  Below are my notes for this consultation  If you have questions, please do not hesitate to call me  I look forward to following your patient along with you  Sincerely,        Asia Burgos MD        CC: No Recipients  Asia Burgos MD  5/7/2018 10:15 AM  Signed  NEPHROLOGY OFFICE VISIT   Luciana Odell 78 y o  male MRN: [id number]  5/7/2018    Reason for Visit: CKD    ASSESSMENT and PLAN:    I had the pleasure of seeing Mr Gene Gotti today in the renal clinic for the continued management of CKD  Since our last visit, there has been no ER visits or hospitalizations  He currently has no complaints at this time and is feeling well  Patient denies any chest pain, shortness of breath and swelling  The last blood work was done on 2/6/18, which we have reviewed together  79-year-old male with a past medical history of CK D stage III Baseline Cr 1 6-1 8, Nephrolithiasis,HTN, A  fib, D CHF, BPH, 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, former smoker quit in April, hospitalized at [facility] in April 2017 for shortness of breath at which time nephrology was consulted due to acute kidney injury with a rising creatinine  Patient was found to have right-sided hydronephrosis with ureteral stricture and stone  Patient underwent ureteral stent and eventually had a lithotripsy  Patient presents for follow up visit for CKD  Patient had stent exchange on 8/15/17 and again in Nov 2017 and again in Feb  Due for next exchange 5/8  1) CKD - baseline Cr approx 1 5-1 7 mg/dL  acute injury during hosp was secondary to obstruction secondary to stone, hydro, poor perfusion in setting of a fib/RVR  Renal u/s R 12 3, L 10 7 cm, R mod hydro  UA still with mod blood, 4-10 RBC  Renal ultrasound July 10, right kidney 12 8 cm, moderate right hydronephrosis, lower pole calculus measuring 9 mm, Left kidney 11 2 cm  Etiology oc CKD likely solitary functional kidney, prior hydro, nephrolithiasis  - Follows with Urology regarding stent and nephrolithiasis  -most recent creatinine 1 49 mg/dL on 2/6/18   - UA large blood - diff to interpret given stent exchange  - may need to consider mag 3 in future if requires nephrectomy, though this may be for more prognostic information and not for management change  - UPCR 0 2  - check BMP, CBC, UA, UPCR, PTH, Vit D  - kidney smart class - referred   - if UPCR rising, will consider ACE or ARB low dose if tolerates and lowering amlodipine if needed    2) Nephrolithiasis - follows with Urology  - renal stone mainly ca oxalate  - 24 hour urine litholink - reviewed  - pt has increased fluid intake to 2 L  - Needs low salt diet - already following  takes in approx 1 8 gm salt daily  at goal  - changed furosemide to HCTZ in jan 2018  - underwent stent exchange in 2/2018 with Urology team  - due for stent exchange this month    3) HTN - Blood pressures are stable  4) Volume - history of dCHF  history of mild to mod pulm HTN  Euvolemic     - need to monitor fluid intake with HF history  - daily weights     5) CKD MBD -   phos 3  Vit D 19 - in June 2017; Vit D improving to 54 in September and PTH improving to 80 in sept 2017    - Repeat PTH, vitamin D - lab slips given  - PTH improving  6) Anemia - stable Hb    7) acid/base - bicarb 33 - monitoring for now    8) anemia - Hb above goal    It was a pleasure evaluating Mr Ceferino Mccurdy in the renal office  Thank you for allowing our team to participate in the care of your patient  No problem-specific Assessment & Plan notes found for this encounter  HPI:    Patient seen today for follow-up visit for CK D  Denies complaints  Due for stent exchange tomorrow      PATIENT INSTRUCTIONS:    Patient Instructions   1) please continue your medications as you are currently doing        OBJECTIVE:  Current Weight: Weight - Scale: 90 7 kg (200 lb)  Vitals:    05/07/18 0948   BP: 124/66   BP Location: Right arm   Patient Position: Sitting   Cuff Size: Large   Weight: 90 7 kg (200 lb)   Height: 5' 10" (1 778 m)    Body mass index is 28 7 kg/m²  REVIEW OF SYSTEMS:    Review of Systems   Constitutional: Negative  Negative for appetite change and fatigue  HENT: Negative  Eyes: Negative  Respiratory: Negative  Negative for shortness of breath  Cardiovascular: Negative  Negative for leg swelling  Gastrointestinal: Negative  Endocrine: Negative  Genitourinary: Negative  Negative for difficulty urinating  Musculoskeletal: Negative  Allergic/Immunologic: Negative  Neurological: Negative  Hematological: Negative  Psychiatric/Behavioral: Negative  All other systems reviewed and are negative  PHYSICAL EXAM:      Physical Exam   Constitutional: He is oriented to person, place, and time  He appears well-developed and well-nourished  No distress  HENT:   Head: Normocephalic and atraumatic  Mouth/Throat: No oropharyngeal exudate  Eyes: Conjunctivae are normal  Right eye exhibits no discharge  Left eye exhibits no discharge  No scleral icterus  Neck: Normal range of motion  Neck supple  No JVD present  Cardiovascular: Normal rate, regular rhythm and normal heart sounds  Exam reveals no gallop and no friction rub  No murmur heard  Pulmonary/Chest: Effort normal and breath sounds normal  No respiratory distress  He has no wheezes  He has no rales  He exhibits no tenderness  Abdominal: Soft  Bowel sounds are normal  He exhibits no distension  There is no tenderness  There is no rebound  Musculoskeletal: Normal range of motion  He exhibits no edema, tenderness or deformity  Neurological: He is alert and oriented to person, place, and time  He has normal reflexes   No cranial nerve deficit  Coordination normal    Skin: Skin is warm and dry  No rash noted  He is not diaphoretic  No erythema  No pallor  Psychiatric: He has a normal mood and affect  His behavior is normal  Judgment and thought content normal    Nursing note and vitals reviewed        Medications:    Current Outpatient Prescriptions:     amLODIPine (NORVASC) 10 mg tablet, Take 10 mg by mouth every morning  , Disp: , Rfl:     cholecalciferol (VITAMIN D3) 1,000 units tablet, Take 1,000 Units by mouth every morning, Disp: , Rfl:     hydrochlorothiazide (HYDRODIURIL) 12 5 mg tablet, Take 12 5 mg by mouth daily, Disp: , Rfl:     metoprolol tartrate (LOPRESSOR) 25 mg tablet, Take 1 tablet (25 mg total) by mouth every 8 (eight) hours, Disp: 90 tablet, Rfl: 3    Laboratory Results:        Results for orders placed or performed in visit on 02/06/18   Urine culture   Result Value Ref Range    Urine Culture 30,000-39,000 cfu/ml

## 2018-05-08 ENCOUNTER — HOSPITAL ENCOUNTER (OUTPATIENT)
Facility: HOSPITAL | Age: 78
Setting detail: OUTPATIENT SURGERY
Discharge: HOME | End: 2018-05-08
Attending: UROLOGY | Admitting: UROLOGY
Payer: MEDICARE

## 2018-05-08 ENCOUNTER — HOSPITAL ENCOUNTER (OUTPATIENT)
Dept: RADIOLOGY | Facility: HOSPITAL | Age: 78
Setting detail: OUTPATIENT SURGERY
Discharge: HOME | End: 2018-05-08
Payer: MEDICARE

## 2018-05-08 ENCOUNTER — ANESTHESIA (OUTPATIENT)
Dept: PERIOP | Facility: HOSPITAL | Age: 78
End: 2018-05-08
Payer: MEDICARE

## 2018-05-08 VITALS
WEIGHT: 197 LBS | SYSTOLIC BLOOD PRESSURE: 115 MMHG | DIASTOLIC BLOOD PRESSURE: 69 MMHG | TEMPERATURE: 97.1 F | BODY MASS INDEX: 28.27 KG/M2 | RESPIRATION RATE: 18 BRPM | HEART RATE: 51 BPM | OXYGEN SATURATION: 100 %

## 2018-05-08 DIAGNOSIS — N13.5 STRICTURE OR KINKING OF URETER: ICD-10-CM

## 2018-05-08 LAB
BACTERIA UR QL AUTO: ABNORMAL /HPF
BILIRUB UR QL STRIP: NEGATIVE
CLARITY UR: CLEAR
COLOR UR: YELLOW
GLUCOSE UR STRIP-MCNC: NEGATIVE MG/DL
HGB UR QL STRIP.AUTO: ABNORMAL
HYALINE CASTS #/AREA URNS LPF: ABNORMAL /LPF
KETONES UR STRIP-MCNC: NEGATIVE MG/DL
LEUKOCYTE ESTERASE UR QL STRIP: ABNORMAL
NITRITE UR QL STRIP: NEGATIVE
NON-SQ EPI CELLS URNS QL MICRO: ABNORMAL /HPF
PH UR STRIP.AUTO: 6 [PH] (ref 5–9)
PROT UR STRIP-MCNC: ABNORMAL MG/DL
RBC #/AREA URNS AUTO: ABNORMAL /HPF
SP GR UR STRIP.AUTO: 1.02 (ref 1–1.03)
UROBILINOGEN UR QL STRIP.AUTO: 0.2 E.U./DL
WBC #/AREA URNS AUTO: ABNORMAL /HPF

## 2018-05-08 PROCEDURE — C2617 STENT, NON-COR, TEM W/O DEL: HCPCS | Performed by: UROLOGY

## 2018-05-08 PROCEDURE — 87086 URINE CULTURE/COLONY COUNT: CPT | Performed by: UROLOGY

## 2018-05-08 PROCEDURE — 74450 X-RAY URETHRA/BLADDER: CPT

## 2018-05-08 PROCEDURE — 81001 URINALYSIS AUTO W/SCOPE: CPT | Performed by: UROLOGY

## 2018-05-08 DEVICE — INLAY URETERAL STENT W/O GUIDEWIRE
Type: IMPLANTABLE DEVICE | Status: NON-FUNCTIONAL
Brand: BARD® INLAY® URETERAL STENT
Removed: 2018-11-13

## 2018-05-08 RX ORDER — PROPOFOL 10 MG/ML
INJECTION, EMULSION INTRAVENOUS AS NEEDED
Status: DISCONTINUED | OUTPATIENT
Start: 2018-05-08 | End: 2018-05-08 | Stop reason: SURG

## 2018-05-08 RX ORDER — FENTANYL CITRATE/PF 50 MCG/ML
25 SYRINGE (ML) INJECTION
Status: DISCONTINUED | OUTPATIENT
Start: 2018-05-08 | End: 2018-05-08 | Stop reason: HOSPADM

## 2018-05-08 RX ORDER — SODIUM CHLORIDE 9 MG/ML
75 INJECTION, SOLUTION INTRAVENOUS CONTINUOUS
Status: DISCONTINUED | OUTPATIENT
Start: 2018-05-08 | End: 2018-05-08 | Stop reason: HOSPADM

## 2018-05-08 RX ORDER — PROPOFOL 10 MG/ML
INJECTION, EMULSION INTRAVENOUS CONTINUOUS PRN
Status: DISCONTINUED | OUTPATIENT
Start: 2018-05-08 | End: 2018-05-08 | Stop reason: SURG

## 2018-05-08 RX ORDER — MIDAZOLAM HYDROCHLORIDE 1 MG/ML
INJECTION INTRAMUSCULAR; INTRAVENOUS AS NEEDED
Status: DISCONTINUED | OUTPATIENT
Start: 2018-05-08 | End: 2018-05-08 | Stop reason: SURG

## 2018-05-08 RX ORDER — FENTANYL CITRATE 50 UG/ML
INJECTION, SOLUTION INTRAMUSCULAR; INTRAVENOUS AS NEEDED
Status: DISCONTINUED | OUTPATIENT
Start: 2018-05-08 | End: 2018-05-08 | Stop reason: SURG

## 2018-05-08 RX ADMIN — PROPOFOL 90 MCG/KG/MIN: 10 INJECTION, EMULSION INTRAVENOUS at 07:49

## 2018-05-08 RX ADMIN — MIDAZOLAM HYDROCHLORIDE 1 MG: 1 INJECTION, SOLUTION INTRAMUSCULAR; INTRAVENOUS at 07:45

## 2018-05-08 RX ADMIN — CEFAZOLIN SODIUM 1000 MG: 1 SOLUTION INTRAVENOUS at 07:45

## 2018-05-08 RX ADMIN — FENTANYL CITRATE 50 MCG: 50 INJECTION, SOLUTION INTRAMUSCULAR; INTRAVENOUS at 07:49

## 2018-05-08 RX ADMIN — MIDAZOLAM HYDROCHLORIDE 1 MG: 1 INJECTION, SOLUTION INTRAMUSCULAR; INTRAVENOUS at 07:49

## 2018-05-08 RX ADMIN — PROPOFOL 80 MG: 10 INJECTION, EMULSION INTRAVENOUS at 07:49

## 2018-05-08 RX ADMIN — SODIUM CHLORIDE 75 ML/HR: 0.9 INJECTION, SOLUTION INTRAVENOUS at 06:40

## 2018-05-08 NOTE — OP NOTE
OPERATIVE REPORT- Dr Jewels Hernandez  PATIENT NAME: Franchesca Balbuena    :  1940  MRN: [id number]  Pt Location: [facility] OR ROOM 04    SURGERY DATE: 2018    Surgeon: Cheryl Mcneil MD    Pre-op Diagnosis:  1  Right hydronephrosis  2  Right ureteral stricture  3  Right ureteral stones    Post-op Diagnosis:  1  same    Procedure:  1  Cystoscopy  2  Fluoroscopy  3  Right retrograde pyelography  4  Right ureteral stent replacement    Specimen(s): * No specimens in log *    Estimated Blood Loss: Minimal    Complications: None    Drains:  1  7 X 26 centimeter right ureteral stent      Anesthesia type: IV Sedation with Anesthesia    Indications for surgery:  See history and physical    Findings:  1  Right ureteral stricture with stones and hydronephrosis  Procedure and Technique:   After obtaining consent and indentifying the patient, antibiotics were given as ordered and the patient was brought to the room  All appropriate leads and monitors were placed and the patient was appropriately positioned on the table  Anesthesia was administered and the patient was sterilely prepped and draped  A timeout was performed where the patient name, , procedure, antibiotics, allergies, etc  were discussed  All in the room were satisfied before the start of the operative procedure  What follows are the operative findings and events  Cystoscopy was undertaken  The bladder was systematically surveyed and found to be free of stones, tumors or infection  The right ureteral orifice was identified and cannulated with a flexible tip guidewire  The wire would not go up to the kidney until the stent was pulled distal to it  Then the wire fortunately passed up to the kidney  A  5 [place] open-ended catheter was placed over the wire and the wire was removed  A retrograde was performed  Findings are noted above  A guidewire was inserted up into the right renal pelvis again  This was confirmed with x-ray    The stent was placed over the wire and there was a good coil proximally and distally  Efflux was noted from the stent  The procedure was terminated and the patient was awakened without incident and transferred to the PACU in satisfactory condition  Plan:  1  Stent exchange again in three months    SIGNATURE: Tessa Rodrigez MD  DATE: May 8, 2018  TIME: 8:22 AM    Portions of the record may have been created with voice recognition software   Occasional wrong word or "sound alike" substitutions may have occurred due to the inherent limitations of voice recognition software   Read the chart carefully and recognize, using context, where substitutions have occurred

## 2018-05-08 NOTE — PERIOPERATIVE NURSING NOTE
Lab will take ua from culture specimen called to verify collected urine in preop dual specimen unable to scan  Pt in or  Specimen in lab

## 2018-05-09 LAB — BACTERIA UR CULT: NORMAL

## 2018-05-17 ENCOUNTER — LAB (OUTPATIENT)
Dept: LAB | Facility: HOSPITAL | Age: 78
End: 2018-05-17
Attending: INTERNAL MEDICINE
Payer: MEDICARE

## 2018-05-17 ENCOUNTER — TRANSCRIBE ORDERS (OUTPATIENT)
Dept: ADMINISTRATIVE | Facility: HOSPITAL | Age: 78
End: 2018-05-17

## 2018-05-17 DIAGNOSIS — N18.30 STAGE 3 CHRONIC KIDNEY DISEASE (HCC): ICD-10-CM

## 2018-05-17 LAB
25(OH)D3 SERPL-MCNC: 42.4 NG/ML (ref 30–100)
ANION GAP SERPL CALCULATED.3IONS-SCNC: 8 MMOL/L (ref 4–13)
BUN SERPL-MCNC: 22 MG/DL (ref 5–25)
CALCIUM SERPL-MCNC: 9 MG/DL (ref 8.3–10.1)
CHLORIDE SERPL-SCNC: 103 MMOL/L (ref 100–108)
CO2 SERPL-SCNC: 28 MMOL/L (ref 21–32)
CREAT SERPL-MCNC: 1.65 MG/DL (ref 0.6–1.3)
CREAT UR-MCNC: 138 MG/DL
ERYTHROCYTE [DISTWIDTH] IN BLOOD BY AUTOMATED COUNT: 14 % (ref 11.6–15.1)
GFR SERPL CREATININE-BSD FRML MDRD: 40 ML/MIN/1.73SQ M
GLUCOSE SERPL-MCNC: 101 MG/DL (ref 65–140)
HCT VFR BLD AUTO: 43.9 % (ref 36.5–49.3)
HGB BLD-MCNC: 14.2 G/DL (ref 12–17)
MCH RBC QN AUTO: 28.6 PG (ref 26.8–34.3)
MCHC RBC AUTO-ENTMCNC: 32.3 G/DL (ref 31.4–37.4)
MCV RBC AUTO: 88 FL (ref 82–98)
PHOSPHATE SERPL-MCNC: 3.2 MG/DL (ref 2.3–4.1)
PLATELET # BLD AUTO: 151 THOUSANDS/UL (ref 149–390)
PMV BLD AUTO: 10.3 FL (ref 8.9–12.7)
POTASSIUM SERPL-SCNC: 4.3 MMOL/L (ref 3.5–5.3)
PROT UR-MCNC: 24 MG/DL
PROT/CREAT UR: 0.17 MG/G{CREAT} (ref 0–0.1)
PTH-INTACT SERPL-MCNC: 95 PG/ML (ref 18.4–80.1)
RBC # BLD AUTO: 4.97 MILLION/UL (ref 3.88–5.62)
SODIUM SERPL-SCNC: 139 MMOL/L (ref 136–145)
WBC # BLD AUTO: 7.96 THOUSAND/UL (ref 4.31–10.16)

## 2018-05-17 PROCEDURE — 84156 ASSAY OF PROTEIN URINE: CPT

## 2018-05-17 PROCEDURE — 36415 COLL VENOUS BLD VENIPUNCTURE: CPT

## 2018-05-17 PROCEDURE — 85027 COMPLETE CBC AUTOMATED: CPT

## 2018-05-17 PROCEDURE — 82306 VITAMIN D 25 HYDROXY: CPT

## 2018-05-17 PROCEDURE — 82570 ASSAY OF URINE CREATININE: CPT

## 2018-05-17 PROCEDURE — 84100 ASSAY OF PHOSPHORUS: CPT

## 2018-05-17 PROCEDURE — 83970 ASSAY OF PARATHORMONE: CPT

## 2018-05-17 PROCEDURE — 80048 BASIC METABOLIC PNL TOTAL CA: CPT

## 2018-05-17 NOTE — PROGRESS NOTES
Hello    Patient normally is followed up by Ms Yohana Montano  Can the patient be notified that renal function stable  PTH stable  Proteinuria stable  Vit D level stable  No changes to medication regimen        Thank you    np

## 2018-06-09 DIAGNOSIS — I10 ESSENTIAL HYPERTENSION: ICD-10-CM

## 2018-06-27 DIAGNOSIS — I10 BENIGN ESSENTIAL HTN: Primary | ICD-10-CM

## 2018-06-27 RX ORDER — HYDROCHLOROTHIAZIDE 12.5 MG/1
CAPSULE, GELATIN COATED ORAL
Qty: 90 CAPSULE | Refills: 1 | Status: SHIPPED | OUTPATIENT
Start: 2018-06-27 | End: 2018-12-24 | Stop reason: SDUPTHER

## 2018-07-06 NOTE — H&P
History & Physical - [facility]  June Mark 78 y o  male MRN: [id number]  Unit/Bed#:  Encounter: [id number]    ASSESSMENT/PLAN:    Right hydronephrosis  Right ureteral stones and dense stricture in the proximal to mid right ureter  Refuses surgical repair or nephrectomy  Stent placement and holmium laser of stone and stricture 4/18/17  Holmium laser of stone and stent exchange 5/2/17  Renal stones treated elsewhere in [place] in past year (7 procedures)  Right laser lithotripsy, laser infundibulotomy, and stone basket extraction 6/2/17  Right CRUSH and laser incision of proximal ureter 7/18/17  Last stent changed 2/13/18  · Right ureteral stent exchange    A-fib, CHF, HTN, Moderate MR, TR, and aortic regurgitation     HISTORY OF PRESENT ILLNESS:  79 y/o male with hx of renal stones and ureteral stricture disease presents for right ureteral stent exchange  Pt does not desire surgical treatment of his stones or stricture       PAST MEDICAL HISTORY:  Past Medical History:   Diagnosis Date    Atrial fibrillation ([address] )     Essential hypertension, long-standing     Heart murmur, lifelong     History of cigarette smoking, chronic     62 year smoker at one half pack per day, quit 04/1/17    Kidney stones     12 episodes of kidney stones    Kidney stones with lithotripsy 03/2017    Done at [address] [place] [address] Pneumonia      X 2    Vitamin D deficiency     maintained with 1000 units of D    Water retention     on furosemide-controlled    Wears glasses     Wears partial dentures     upper       PAST SURGICAL HISTORY:  Past Surgical History:   Procedure Laterality Date    APPENDECTOMY  1960    CAROTID ENDARTERECTOMY Left 1998    Supposedly no internal stenosis found    CYSTOSCOPY Right 8/15/2017    Procedure: CYSTOSCOPY RIGHT STENT EXCHANGE;  Surgeon: Jamila Howell MD;  Location: [address] [place] [address];  Service: Urology    CYSTOSCOPY     [address] [facility]  LITHOTRIPSY  03/2017    For nephrolithiasis at [address] [place] [address] &[place]WELL STENT INSRT Right 5/2/2017    Procedure: CYSTOSCOPY URETEROSCOPY WITH LITHOTRIPSY HOLMIUM LASER, RETROGRADE PYELOGRAM AND INSERTION STENT URETERAL;  Surgeon: Irving La MD;  Location: [address] [place] [address];  Service: Urology    MT CYSTO/URETERO W/LITHOTRIPSY &INDWELL STENT INSRT Right 5/16/2017    Procedure: CYSTOSCOPY, RETROGRADE PYELOGRAM,  FLEXIBLE URETEROSCOPY,  HOLMIUM LASER LITHOTRIPSY, STONE BASKET MANIPULATION,  STENT URETERAL EXCHANGE;  Surgeon: Irving La MD;  Location: [address] [place] [address];  Service: Urology    MT CYSTO/URETERO W/LITHOTRIPSY &INDWELL STENT INSRT Right 6/2/2017    Procedure: CYSTOSCOPY, RETROGRADE, STONE MANIPULATION WITH HOLMIUM LASER, STENT PLACEMENT;  Surgeon: Irving La MD;  Location: [address] [place] [address];  Service: Urology    MT CYSTO/URETERO W/LITHOTRIPSY &INDWELL STENT INSRT Right 7/18/2017    Procedure: CYSTOSCOPY, RETROGRADE, URETEROSCOPY HOLMIUM LASER [place],  AND STENT PLACEMENT;  Surgeon: Irving La MD;  Location: [address] [place] [address];  Service: Urology    MT CYSTOSCOPY,INSERT URETERAL STENT Right 11/14/2017    Procedure: EXCHANGE STENT URETERAL;  Surgeon: Irving La MD;  Location: [address] [place] [address];  Service: Urology    MT CYSTOURETHROSCOPY,URETER CATHETER Right 11/14/2017    Procedure: CYSTOSCOPY RETROGRADE, STENT EXCHANGE;  Surgeon: Irving La MD;  Location: [address] [place] [address];  Service: Urology    MT CYSTOURETHROSCOPY,URETER CATHETER Right 5/8/2018    Procedure: Tonya Alvarez;  Surgeon: Irving La MD;  Location: [address] [place] [address];  Service: Urology    PROSTATE SURGERY  2015    Laser Prostatectomy  by Dr Maribel Dalal Right 4/18/2017    Procedure: INSERTION STENT URETERAL, RIGHT URETEROSCOPY,  LASER OF URETERAL STRICTURE AND STONE;  Surgeon: Irving La MD;  Location: [address] [place] [address];  Service:    [address] [place] [address] Right 2/13/2018    Procedure: Maxime Vásquez;  Surgeon: Talia Mcdowell MD;  Location: [facility];  Service: Urology       ALLERGIES:  No Known Allergies    SOCIAL HISTORY:  History   Alcohol Use    Yes     Comment: rare     History   Drug Use No     History   Smoking Status    Former Smoker    Packs/day: 0 50    Years: 62 00    Types: Cigarettes    Quit date: 4/15/2017   Smokeless Tobacco    Never Used       FAMILY HISTORY:  Family History   Problem Relation Age of Onset    Hypertension Mother     Alcohol abuse Father     Cirrhosis Father     Cancer Son         cancer-knee and above-left-amputation    Other Brother         legally blind in one eye       MEDICATIONS:  No current facility-administered medications for this encounter  Current Outpatient Prescriptions:     amLODIPine (NORVASC) 10 mg tablet, Take 10 mg by mouth every morning  , Disp: , Rfl:     cholecalciferol (VITAMIN D3) 1,000 units tablet, Take 1,000 Units by mouth every morning, Disp: , Rfl:     hydrochlorothiazide (HYDRODIURIL) 12 5 mg tablet, Take 12 5 mg by mouth daily, Disp: , Rfl:     hydrochlorothiazide (MICROZIDE) 12 5 mg capsule, take 1 capsule by mouth once daily, Disp: 90 capsule, Rfl: 1    metoprolol tartrate (LOPRESSOR) 25 mg tablet, take 1 tablet by mouth every 8 hours, Disp: 90 tablet, Rfl: 3    Review of Systems    PHYSICAL EXAM:  Physical Exam   Constitutional: He appears well-developed  Cardiovascular: Normal rate and regular rhythm  Pulmonary/Chest: Effort normal    Abdominal: Soft  Genitourinary: Penis normal    Neurological: He is alert  Skin: Skin is warm  Psychiatric: He has a normal mood and affect         LAB RESULTS:  Lab Results   Component Value Date    WBC 7 96 05/17/2018    HGB 14 2 05/17/2018    HCT 43 9 05/17/2018    MCV 88 05/17/2018     05/17/2018     Lab Results   Component Value Date    GLUCOSE 101 05/17/2018    CALCIUM 9 0 05/17/2018     05/17/2018    K 4 3 05/17/2018    CO2 28 05/17/2018     05/17/2018    BUN 22 05/17/2018    CREATININE 1 65 (H) 05/17/2018     Lab Results   Component Value Date    CALCIUM 9 0 05/17/2018    PHOS 3 2 05/17/2018       OTHER STUDIES:  Renal US 7/10/17  1  Moderate right hydroureteronephrosis  Right renal and ureteral calculi are present  2   Bilateral renal cysts  3  Bladder calculi  Mai Francisco PA-C  07/06/18    Portions of the record may have been created with voice recognition software   Occasional wrong word or "sound alike" substitutions may have occurred due to the inherent limitations of voice recognition software   Read the chart carefully and recognize, using context, where substitutions have occurred

## 2018-08-09 ENCOUNTER — APPOINTMENT (OUTPATIENT)
Dept: PREADMISSION TESTING | Facility: HOSPITAL | Age: 78
End: 2018-08-09
Payer: MEDICARE

## 2018-08-09 ENCOUNTER — OFFICE VISIT (OUTPATIENT)
Dept: LAB | Facility: HOSPITAL | Age: 78
End: 2018-08-09
Attending: UROLOGY
Payer: MEDICARE

## 2018-08-09 ENCOUNTER — APPOINTMENT (OUTPATIENT)
Dept: LAB | Facility: HOSPITAL | Age: 78
End: 2018-08-09
Attending: UROLOGY
Payer: MEDICARE

## 2018-08-09 ENCOUNTER — TRANSCRIBE ORDERS (OUTPATIENT)
Dept: ADMINISTRATIVE | Facility: HOSPITAL | Age: 78
End: 2018-08-09

## 2018-08-09 DIAGNOSIS — Z01.818 PREOP TESTING: Primary | ICD-10-CM

## 2018-08-09 DIAGNOSIS — Z01.818 PREOP TESTING: ICD-10-CM

## 2018-08-09 LAB
ALBUMIN SERPL BCP-MCNC: 3.1 G/DL (ref 3.5–5)
ALP SERPL-CCNC: 97 U/L (ref 46–116)
ALT SERPL W P-5'-P-CCNC: 19 U/L (ref 12–78)
ANION GAP SERPL CALCULATED.3IONS-SCNC: 7 MMOL/L (ref 4–13)
AST SERPL W P-5'-P-CCNC: 21 U/L (ref 5–45)
BACTERIA UR QL AUTO: ABNORMAL /HPF
BASOPHILS # BLD AUTO: 0.04 THOUSANDS/ΜL (ref 0–0.1)
BASOPHILS NFR BLD AUTO: 1 % (ref 0–1)
BILIRUB SERPL-MCNC: 0.8 MG/DL (ref 0.2–1)
BILIRUB UR QL STRIP: NEGATIVE
BUN SERPL-MCNC: 26 MG/DL (ref 5–25)
CALCIUM SERPL-MCNC: 8.8 MG/DL (ref 8.3–10.1)
CHLORIDE SERPL-SCNC: 104 MMOL/L (ref 100–108)
CLARITY UR: ABNORMAL
CO2 SERPL-SCNC: 28 MMOL/L (ref 21–32)
COLOR UR: ABNORMAL
CREAT SERPL-MCNC: 1.68 MG/DL (ref 0.6–1.3)
EOSINOPHIL # BLD AUTO: 0.31 THOUSAND/ΜL (ref 0–0.61)
EOSINOPHIL NFR BLD AUTO: 4 % (ref 0–6)
ERYTHROCYTE [DISTWIDTH] IN BLOOD BY AUTOMATED COUNT: 13.5 % (ref 11.6–15.1)
GFR SERPL CREATININE-BSD FRML MDRD: 39 ML/MIN/1.73SQ M
GLUCOSE P FAST SERPL-MCNC: 95 MG/DL (ref 65–99)
GLUCOSE UR STRIP-MCNC: NEGATIVE MG/DL
HCT VFR BLD AUTO: 43 % (ref 36.5–49.3)
HGB BLD-MCNC: 13.9 G/DL (ref 12–17)
HGB UR QL STRIP.AUTO: ABNORMAL
IMM GRANULOCYTES # BLD AUTO: 0.03 THOUSAND/UL (ref 0–0.2)
IMM GRANULOCYTES NFR BLD AUTO: 0 % (ref 0–2)
KETONES UR STRIP-MCNC: NEGATIVE MG/DL
LEUKOCYTE ESTERASE UR QL STRIP: ABNORMAL
LYMPHOCYTES # BLD AUTO: 1.84 THOUSANDS/ΜL (ref 0.6–4.47)
LYMPHOCYTES NFR BLD AUTO: 25 % (ref 14–44)
MCH RBC QN AUTO: 28.7 PG (ref 26.8–34.3)
MCHC RBC AUTO-ENTMCNC: 32.3 G/DL (ref 31.4–37.4)
MCV RBC AUTO: 89 FL (ref 82–98)
MONOCYTES # BLD AUTO: 0.83 THOUSAND/ΜL (ref 0.17–1.22)
MONOCYTES NFR BLD AUTO: 11 % (ref 4–12)
MUCOUS THREADS UR QL AUTO: ABNORMAL
NEUTROPHILS # BLD AUTO: 4.23 THOUSANDS/ΜL (ref 1.85–7.62)
NEUTS SEG NFR BLD AUTO: 59 % (ref 43–75)
NITRITE UR QL STRIP: NEGATIVE
NON-SQ EPI CELLS URNS QL MICRO: ABNORMAL /HPF
NRBC BLD AUTO-RTO: 0 /100 WBCS
PH UR STRIP.AUTO: 6 [PH] (ref 5–9)
PLATELET # BLD AUTO: 165 THOUSANDS/UL (ref 149–390)
PMV BLD AUTO: 10.1 FL (ref 8.9–12.7)
POTASSIUM SERPL-SCNC: 4.2 MMOL/L (ref 3.5–5.3)
PROT SERPL-MCNC: 6.8 G/DL (ref 6.4–8.2)
PROT UR STRIP-MCNC: ABNORMAL MG/DL
RBC # BLD AUTO: 4.84 MILLION/UL (ref 3.88–5.62)
RBC #/AREA URNS AUTO: ABNORMAL /HPF
SODIUM SERPL-SCNC: 139 MMOL/L (ref 136–145)
SP GR UR STRIP.AUTO: 1.02 (ref 1–1.03)
UROBILINOGEN UR QL STRIP.AUTO: 0.2 E.U./DL
WBC # BLD AUTO: 7.28 THOUSAND/UL (ref 4.31–10.16)
WBC #/AREA URNS AUTO: ABNORMAL /HPF

## 2018-08-09 PROCEDURE — 80053 COMPREHEN METABOLIC PANEL: CPT | Performed by: UROLOGY

## 2018-08-09 PROCEDURE — 81001 URINALYSIS AUTO W/SCOPE: CPT | Performed by: UROLOGY

## 2018-08-09 PROCEDURE — 85025 COMPLETE CBC W/AUTO DIFF WBC: CPT | Performed by: UROLOGY

## 2018-08-09 PROCEDURE — 87086 URINE CULTURE/COLONY COUNT: CPT

## 2018-08-09 PROCEDURE — 36415 COLL VENOUS BLD VENIPUNCTURE: CPT | Performed by: UROLOGY

## 2018-08-09 PROCEDURE — 93005 ELECTROCARDIOGRAM TRACING: CPT

## 2018-08-09 NOTE — PRE-PROCEDURE INSTRUCTIONS
My Surgical Experience    The following information was developed to assist you to prepare for your operation  What do I need to do before coming to the hospital?   Arrange for a responsible person to drive you to and from the hospital    Arrange care for your children at home  Children are not allowed in the recovery areas of the hospital   Plan to wear clothing that is easy to put on and take off  If you are having shoulder surgery, wear a shirt that buttons or zippers in the front  Bathing  o Shower the evening before and the morning of your surgery with an antibacterial soap  Please refer to the Pre Op Showering Instructions for Surgery Patients Sheet   o Remove nail polish and all body piercing jewelry  o Do not shave any body part for at least 24 hours before surgery-this includes face, arms, legs and upper body  Food  o Nothing to eat or drink after midnight the night before your surgery  This includes candy and chewing gum  o Exception: If your surgery is after 12:00pm (noon), you may have clear liquids such as 7-Up®, ginger ale, apple or cranberry juice, Jell-O®, water, or clear broth until 8:00 am  o Do not drink milk or juice with pulp on the morning before surgery  o Do not drink alcohol 24 hours before surgery  Medicine  o Follow instructions you received from your surgeon about which medicines you may take on the day of surgery  o If instructed to take medicine on the morning of surgery, take pills with just a small sip of water  Call your prescribing doctor for specific infroamtion on what to do if you take insulin    What should I bring to the hospital?    Bring:  Clint Elias or a walker, if you have them, for foot or knee surgery   A list of the daily medicines, vitamins, minerals, herbals and nutritional supplements you take   Include the dosages of medicines and the time you take them each day   Glasses, dentures or hearing aids   Minimal clothing; you will be wearing hospital sleepwear   Photo ID; required to verify your identity   If you have a Living Will or Power of , bring a copy of the documents   If you have an ostomy, bring an extra pouch and any supplies you use    Do not bring   Medicines or inhalers   Money, valuables or jewelry    What other information should I know about the day of surgery?  Notify your surgeons if you develop a cold, sore throat, cough, fever, rash or any other illness   Report to the Ambulatory Surgical/Same Day Surgery Unit   You will be instructed to stop at Registration only if you have not been pre-registered   Inform your  fi they do not stay that they will be asked by the staff to leave a phone number where they can be reached   Be available to be reached before surgery  In the event the operating room schedule changes, you may be asked to come in earlier or later than expected    *It is important to tell your doctor and others involved in your health care if you are taking or have been taking any non-prescription drugs, vitamins, minerals, herbals or other nutritional supplements  Any of these may interact with some food or medicines and cause a reaction      Pre-Surgery Instructions:   Medication Instructions    amLODIPine (NORVASC) 10 mg tablet Instructed patient per Anesthesia Guidelines   cholecalciferol (VITAMIN D3) 1,000 units tablet Instructed patient per Anesthesia Guidelines   hydrochlorothiazide (MICROZIDE) 12 5 mg capsule Instructed patient per Anesthesia Guidelines   metoprolol tartrate (LOPRESSOR) 25 mg tablet Instructed patient per Anesthesia Guidelines  To take amlodipine and metoprolol a m   Of surgery

## 2018-08-10 LAB
ATRIAL RATE: 375 BPM
BACTERIA UR CULT: NORMAL
QRS AXIS: -54 DEGREES
QRSD INTERVAL: 110 MS
QT INTERVAL: 434 MS
QTC INTERVAL: 444 MS
T WAVE AXIS: -1 DEGREES
VENTRICULAR RATE: 63 BPM

## 2018-08-10 PROCEDURE — 93010 ELECTROCARDIOGRAM REPORT: CPT | Performed by: INTERNAL MEDICINE

## 2018-08-13 ENCOUNTER — ANESTHESIA EVENT (OUTPATIENT)
Dept: PERIOP | Facility: HOSPITAL | Age: 78
End: 2018-08-13
Payer: MEDICARE

## 2018-08-14 ENCOUNTER — HOSPITAL ENCOUNTER (OUTPATIENT)
Dept: RADIOLOGY | Facility: HOSPITAL | Age: 78
Setting detail: OUTPATIENT SURGERY
Discharge: HOME | End: 2018-08-14
Payer: MEDICARE

## 2018-08-14 ENCOUNTER — ANESTHESIA (OUTPATIENT)
Dept: PERIOP | Facility: HOSPITAL | Age: 78
End: 2018-08-14
Payer: MEDICARE

## 2018-08-14 ENCOUNTER — HOSPITAL ENCOUNTER (OUTPATIENT)
Facility: HOSPITAL | Age: 78
Setting detail: OUTPATIENT SURGERY
Discharge: HOME | End: 2018-08-14
Attending: UROLOGY | Admitting: UROLOGY
Payer: MEDICARE

## 2018-08-14 VITALS
RESPIRATION RATE: 18 BRPM | DIASTOLIC BLOOD PRESSURE: 66 MMHG | HEART RATE: 55 BPM | TEMPERATURE: 97 F | SYSTOLIC BLOOD PRESSURE: 118 MMHG | OXYGEN SATURATION: 97 %

## 2018-08-14 DIAGNOSIS — N13.5 STRICTURE OR KINKING OF URETER: ICD-10-CM

## 2018-08-14 PROCEDURE — C2617 STENT, NON-COR, TEM W/O DEL: HCPCS | Performed by: UROLOGY

## 2018-08-14 PROCEDURE — 74450 X-RAY URETHRA/BLADDER: CPT

## 2018-08-14 RX ORDER — MAGNESIUM HYDROXIDE 1200 MG/15ML
LIQUID ORAL AS NEEDED
Status: DISCONTINUED | OUTPATIENT
Start: 2018-08-14 | End: 2018-08-14 | Stop reason: HOSPADM

## 2018-08-14 RX ORDER — LIDOCAINE HYDROCHLORIDE 10 MG/ML
INJECTION, SOLUTION INFILTRATION; PERINEURAL AS NEEDED
Status: DISCONTINUED | OUTPATIENT
Start: 2018-08-14 | End: 2018-08-14 | Stop reason: SURG

## 2018-08-14 RX ORDER — PROPOFOL 10 MG/ML
INJECTION, EMULSION INTRAVENOUS CONTINUOUS PRN
Status: DISCONTINUED | OUTPATIENT
Start: 2018-08-14 | End: 2018-08-14 | Stop reason: SURG

## 2018-08-14 RX ORDER — MIDAZOLAM HYDROCHLORIDE 1 MG/ML
INJECTION INTRAMUSCULAR; INTRAVENOUS AS NEEDED
Status: DISCONTINUED | OUTPATIENT
Start: 2018-08-14 | End: 2018-08-14 | Stop reason: SURG

## 2018-08-14 RX ORDER — SODIUM CHLORIDE 9 MG/ML
75 INJECTION, SOLUTION INTRAVENOUS CONTINUOUS
Status: DISCONTINUED | OUTPATIENT
Start: 2018-08-14 | End: 2018-08-14 | Stop reason: HOSPADM

## 2018-08-14 RX ORDER — PROPOFOL 10 MG/ML
INJECTION, EMULSION INTRAVENOUS AS NEEDED
Status: DISCONTINUED | OUTPATIENT
Start: 2018-08-14 | End: 2018-08-14 | Stop reason: SURG

## 2018-08-14 RX ORDER — FENTANYL CITRATE 50 UG/ML
INJECTION, SOLUTION INTRAMUSCULAR; INTRAVENOUS AS NEEDED
Status: DISCONTINUED | OUTPATIENT
Start: 2018-08-14 | End: 2018-08-14 | Stop reason: SURG

## 2018-08-14 RX ADMIN — LIDOCAINE HYDROCHLORIDE 30 MG: 10 INJECTION, SOLUTION INFILTRATION; PERINEURAL at 07:48

## 2018-08-14 RX ADMIN — CEFAZOLIN SODIUM 1000 MG: 1 SOLUTION INTRAVENOUS at 07:45

## 2018-08-14 RX ADMIN — FENTANYL CITRATE 25 MCG: 50 INJECTION, SOLUTION INTRAMUSCULAR; INTRAVENOUS at 07:52

## 2018-08-14 RX ADMIN — PROPOFOL 80 MCG/KG/MIN: 10 INJECTION, EMULSION INTRAVENOUS at 07:48

## 2018-08-14 RX ADMIN — FENTANYL CITRATE 50 MCG: 50 INJECTION, SOLUTION INTRAMUSCULAR; INTRAVENOUS at 07:48

## 2018-08-14 RX ADMIN — PROPOFOL 70 MG: 10 INJECTION, EMULSION INTRAVENOUS at 07:48

## 2018-08-14 RX ADMIN — MIDAZOLAM HYDROCHLORIDE 2 MG: 1 INJECTION, SOLUTION INTRAMUSCULAR; INTRAVENOUS at 07:45

## 2018-08-14 RX ADMIN — SODIUM CHLORIDE: 0.9 INJECTION, SOLUTION INTRAVENOUS at 07:38

## 2018-08-14 NOTE — H&P
History & Physical - [facility]  Dm Smoke 78 y o  male MRN: [id number]  Unit/Bed#: OR POOL Encounter: [id number]    ASSESSMENT/PLAN:  Right hydronephrosis  Right ureteral stones and dense stricture in the proximal to mid right ureter  Refuses surgical repair or nephrectomy  Stent placement and holmium laser of stone and stricture 4/18/17  Holmium laser of stone and stent exchange 5/2/17  Renal stones treated elsewhere in [place] in past year (7 procedures)  Right laser lithotripsy, laser infundibulotomy, and stone basket extraction 6/2/17  Right CRUSH and laser incision of proximal ureter 7/18/17  Last stent changed 5/8/18  · Right ureteral stent exchange     A-fib, CHF, HTN, Moderate MR, TR, and aortic regurgitation     HISTORY OF PRESENT ILLNESS:  77 y/o male with hx of renal stones and ureteral stricture disease presents for right ureteral stent exchange    Pt does not desire surgical treatment of his stones or stricture    PAST MEDICAL HISTORY:  Past Medical History:   Diagnosis Date    Atrial fibrillation ([address] )     Essential hypertension, long-standing     Heart murmur, lifelong     History of cigarette smoking, chronic     64 year smoker at one half pack per day, quit 04/1/17    Kidney stones     12 episodes of kidney stones    Kidney stones with lithotripsy 03/2017    Done at [address] [place] [address] Pneumonia      X 2    Vitamin D deficiency     maintained with 1000 units of D    Water retention     on furosemide-controlled    Wears glasses     Wears partial dentures     upper       PAST SURGICAL HISTORY:  Past Surgical History:   Procedure Laterality Date    APPENDECTOMY  1960    CAROTID ENDARTERECTOMY Left 1998    Supposedly no internal stenosis found    CYSTOSCOPY Right 8/15/2017    Procedure: CYSTOSCOPY RIGHT STENT EXCHANGE;  Surgeon: Ariella Christianson MD;  Location: [address] [place] [address];  Service: Urology    CYSTOSCOPY     [facility]  LITHOTRIPSY  03/2017    For nephrolithiasis at [address] [place] [address] &INDWELL STENT INSRT Right 5/2/2017    Procedure: CYSTOSCOPY URETEROSCOPY WITH LITHOTRIPSY HOLMIUM LASER, RETROGRADE PYELOGRAM AND INSERTION STENT URETERAL;  Surgeon: Yamini Corley MD;  Location: [address] [place] [address];  Service: Urology    IN CYSTO/URETERO W/LITHOTRIPSY &INDWELL STENT INSRT Right 5/16/2017    Procedure: CYSTOSCOPY, RETROGRADE PYELOGRAM,  FLEXIBLE URETEROSCOPY,  HOLMIUM LASER LITHOTRIPSY, STONE BASKET MANIPULATION,  STENT URETERAL EXCHANGE;  Surgeon: Yamini Corley MD;  Location: [address] [place] [address];  Service: Urology    IN CYSTO/URETERO W/LITHOTRIPSY &INDWELL STENT INSRT Right 6/2/2017    Procedure: CYSTOSCOPY, RETROGRADE, STONE MANIPULATION WITH HOLMIUM LASER, STENT PLACEMENT;  Surgeon: Yamini Corley MD;  Location: [address] [place] [address];  Service: Urology    IN CYSTO/URETERO W/LITHOTRIPSY &INDWELL STENT INSRT Right 7/18/2017    Procedure: CYSTOSCOPY, RETROGRADE, URETEROSCOPY HOLMIUM LASER [place],  AND STENT PLACEMENT;  Surgeon: Yamini Corley MD;  Location: [address] [place] [address];  Service: Urology    IN CYSTOSCOPY,INSERT URETERAL STENT Right 11/14/2017    Procedure: EXCHANGE STENT URETERAL;  Surgeon: Yamini Corley MD;  Location: [address] [place] [address];  Service: Urology    IN CYSTOURETHROSCOPY,URETER CATHETER Right 11/14/2017    Procedure: CYSTOSCOPY RETROGRADE, STENT EXCHANGE;  Surgeon: Yamini Corley MD;  Location: [address] [place] [address];  Service: Urology    IN CYSTOURETHROSCOPY,URETER CATHETER Right 5/8/2018    Procedure: Edwige Murphy;  Surgeon: Yamini Corley MD;  Location: [address] [place] [address];  Service: Urology    PROSTATE SURGERY  2015    Laser Prostatectomy  by Dr Mauro Forte Right 4/18/2017    Procedure: INSERTION STENT URETERAL, RIGHT URETEROSCOPY,  LASER OF URETERAL STRICTURE AND STONE;  Surgeon: Yamini Corley MD;  Location: [address] [place] [address];  Service:    [address] [place] [address] Right 2/13/2018    Procedure: Edgar Mckeon;  Surgeon: Cheryl Mcneil MD;  Location: [facility];  Service: Urology       ALLERGIES:  No Known Allergies    SOCIAL HISTORY:  History   Alcohol Use    Yes     Comment: rare     History   Drug Use No     History   Smoking Status    Former Smoker    Packs/day: 0 50    Years: 62 00    Types: Cigarettes    Quit date: 4/15/2017   Smokeless Tobacco    Never Used       FAMILY HISTORY:  Family History   Problem Relation Age of Onset    Hypertension Mother     Alcohol abuse Father     Cirrhosis Father     Cancer Son         cancer-knee and above-left-amputation    Other Brother         legally blind in one eye       MEDICATIONS:    Current Facility-Administered Medications:     ceFAZolin (ANCEF) IVPB (premix) 1,000 mg, 1,000 mg, Intravenous, 30 min pre-procedure, Marc Fang PA-C    sodium chloride 0 9 % infusion, 75 mL/hr, Intravenous, Continuous, Ivette Banda MD    Review of Systems    PHYSICAL EXAM:  Physical Exam   Constitutional: He is oriented to person, place, and time  He appears well-developed  HENT:   Head: Normocephalic  Cardiovascular: Normal rate and regular rhythm  Pulmonary/Chest: Effort normal and breath sounds normal    Abdominal: Soft  Bowel sounds are normal    Genitourinary: Penis normal    Neurological: He is alert and oriented to person, place, and time  Skin: Skin is warm and dry         LAB RESULTS:  Lab Results   Component Value Date    WBC 7 28 08/09/2018    HGB 13 9 08/09/2018    HCT 43 0 08/09/2018    MCV 89 08/09/2018     08/09/2018     Lab Results   Component Value Date    GLUCOSE 101 05/17/2018    CALCIUM 8 8 08/09/2018     08/09/2018    K 4 2 08/09/2018    CO2 28 08/09/2018     08/09/2018    BUN 26 (H) 08/09/2018    CREATININE 1 68 (H) 08/09/2018     Lab Results   Component Value Date    CALCIUM 8 8 08/09/2018    PHOS 3 2 05/17/2018     No results found for: PSA    OTHER STUDIES:  none    Ronnie Nathan MD  08/14/18    Portions of the record may have been created with voice recognition software   Occasional wrong word or "sound alike" substitutions may have occurred due to the inherent limitations of voice recognition software   Read the chart carefully and recognize, using context, where substitutions have occurred

## 2018-08-14 NOTE — ANESTHESIA PREPROCEDURE EVALUATION
Review of Systems/Medical History  Patient summary reviewed  Chart reviewed  No history of anesthetic complications     Cardiovascular  EKG reviewed, Exercise tolerance (METS): >4,  Hypertension , Valvular heart disease , H/O Heart Murmur, Dysrhythmias , atrial fibrillation, No angina , No AMOR, PVD (s/p left CEA),   Comment: Stress test:    GATED SPECT:  The calculated left ventricular ejection fraction was 43 %  Left ventricular ejection fraction was mildly decreased by visual estimate  There was no left ventricular regional abnormality      SUMMARY:  -  Stress results: Duration of exercise was 4 min and 19 sec  Target heart rate was achieved  There was no chest pain during stress  -  ECG conclusions: Arrhythmia during stress: atrial fibrillation   -  Perfusion imaging: There were no perfusion defects   -  Gated SPECT: The calculated left ventricular ejection fraction was 43 %  Left ventricular ejection fraction was mildly decreased by visual estimate  There was no left ventricular regional abnormality      IMPRESSIONS: Normal study after maximal exercise  Myocardial perfusion imaging was normal at rest and with stress  Left ventricular systolic function was reduced, without distinct regional wall motion abnormalities      Prepared and signed by  Amairani Barrios DO  Signed 2017 13:43:32    Transthoracic Echocardiogram  2D, M-mode, Doppler, and Color Doppler     Study date:  2017     Patient: Roddy Whitaker  MR number: [id number]  Account number: [de-identified]  : 1940  Age: 78 years  Gender: Male  Status: Routine  Location: Bedside  Height: 70 in  Weight: 198 7 lb  BP: 136/ 60 mmHg     Indications: SOB     Diagnoses: I50 9 - Heart failure, unspecified     Sonographer:  BERLIN Becker  Primary Physician:  Bebo Knutson PA-C  Group:  [facility]  Interpreting Physician:  Ja Pepe DO     SUMMARY     LEFT VENTRICLE:  Systolic function was normal by visual assessment   Ejection fraction was estimated in the range of 50 % to 55 %  There were no regional wall motion abnormalities  There was mild concentric hypertrophy      LEFT ATRIUM:  The atrium was moderately dilated      RIGHT ATRIUM:  The atrium was moderately dilated      MITRAL VALVE:  There was moderate regurgitation      AORTIC VALVE:  The valve was trileaflet  Leaflets exhibited moderate calcification and markedly reduced cuspal separation  There was moderate stenosis  There was moderate regurgitation      TRICUSPID VALVE:  There was moderate regurgitation  Pulmonary artery systolic pressure was mildly to moderately increased  ,  Pulmonary  Smoker ex-smoker  , No shortness of breath, No recent URI ,        GI/Hepatic       Kidney stones,        Endo/Other     GYN       Hematology   Musculoskeletal       Neurology   Psychology           Physical Exam    Airway    Mallampati score: II  TM Distance: >3 FB  Neck ROM: full     Dental   upper dentures,     Cardiovascular  Rhythm: irregular, Rate: normal, Murmur,     Pulmonary  Breath sounds clear to auscultation,     Other Findings  Partial upper denture      Anesthesia Plan  ASA Score- 3     Anesthesia Type- IV sedation with anesthesia with ASA Monitors  Additional Monitors:   Airway Plan:         Plan Factors-    Induction- intravenous  Postoperative Plan-     Informed Consent- Anesthetic plan and risks discussed with patient  I personally reviewed this patient with the CRNA  Discussed and agreed on the Anesthesia Plan with the CRNA  Katerine Boogie

## 2018-08-14 NOTE — OP NOTE
OPERATIVE REPORT  PATIENT NAME: Jaswinder Avila    :  1940  MRN: [id number]  Pt Location: [facility] OR ROOM 04    SURGERY DATE: 2018    Surgeon(s) and Role:     * Ashley Freed MD - Primary    Preop Diagnosis:  Crossing vessel and stricture of ureter without hydronephrosis [N13 5]    Post-Op Diagnosis Codes:     * Crossing vessel and stricture of ureter without hydronephrosis [N13 5]    Procedure(s) (LRB):  CYSTOSCOPY, STENT EXCHANGE (Right)    Specimen(s):  * No specimens in log *    Estimated Blood Loss:   Minimal    Drains:  Ureteral Drain/Stent Right ureter 7 Fr  (Active)   Number of days: 0       [REMOVED] Ureteral Drain/Stent Right ureter 7 Fr  (Removed)   Number of days: 98       Anesthesia Type:   IV Sedation with Anesthesia    Operative Indications:  Crossing vessel and stricture of ureter without hydronephrosis [N13 5]      Operative Findings:  Tight  Needed stent out to get wire by the blockage  Complications:   None    Procedure and Technique:  Standard cysto wire placement retrograde and wire replaced an stent put over wire  Wire out     I was present for the entire procedure    Patient Disposition:  PACU     SIGNATURE: Ashley Freed MD  DATE: 2018  TIME: 3:32 PM

## 2018-08-19 NOTE — PROGRESS NOTES
NEPHROLOGY OFFICE VISIT   Venice Mejia 78 y o  male MRN: [id number]  8/20/2018    Reason for Visit: CKD III    ASSESSMENT and PLAN:    I had the pleasure of seeing Mr Jean Marie Redmond today in the renal clinic for the continued management of CKD III  Since our last visit, there has been no ER visits or hospitalizations  He currently has no complaints at this time and is feeling well  Patient denies any chest pain, shortness of breath and swelling  The last blood work was done on 8/9, which we have reviewed together  77-year-old male with a past medical history of CK D stage III Baseline Cr 1 6-1 8, Nephrolithiasis,HTN, A  fib, D CHF, BPH, 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, former smoker quit in April, hospitalized at [facility] in April 2017 for shortness of breath at which time nephrology was consulted due to acute kidney injury with a rising creatinine  Patient was found to have right-sided hydronephrosis with ureteral stricture and stone  Patient underwent ureteral stent and eventually had a lithotripsy  Patient presents for follow up visit for CKD       Patient had stent exchange on 8/15/17 and again in Nov 2017 and again in Feb 2018, and then in may 2018     1) CKD - baseline Cr approx 1 5-1 7 mg/dL  acute injury during hosp was secondary to obstruction secondary to stone, hydro, poor perfusion in setting of a fib/RVR  Renal u/s R 12 3, L 10 7 cm, R mod hydro  UA still with mod blood, 4-10 RBC  Renal ultrasound July 10 2017, right kidney 12 8 cm, moderate right hydronephrosis, lower pole calculus measuring 9 mm, Left kidney 11 2 cm   Etiology of CKD likely solitary functional kidney, prior hydro, nephrolithiasis      - Follows with Urology regarding stent and nephrolithiasis  -most recent creatinine 1 68 in august which is within baseline  - UA large blood - diff to interpret given stent exchange  - may need to consider mag 3 in future if requires nephrectomy, though this may be for more prognostic information and not for management change  - UPCR 0 17  - last UA in august with hematuria  - last stent exchange in august 14  - check BMP, CBC, UA, UPCR, PTH, Vit D in 3 months  - kidney smart class - completed  Patient is not sure which dialysis modality he would want if dialysis was ever needed  - if UPCR rising, will consider ACE or ARB low dose if tolerates and lowering amlodipine if needed     2) Nephrolithiasis - follows with Urology       - renal stone mainly ca oxalate  - 24 hour urine litholink - reviewed  - pt has increased fluid intake to 2 L  - Needs low salt diet - already following  takes in approx 1 8 gm salt daily  at goal  - changed furosemide to HCTZ in jan 2018  - underwent stent exchange recently with Urology team     3) HTN - Blood pressures are stable      4) Volume - history of dCHF  history of mild to mod pulm HTN  Euvolemic      - need to monitor fluid intake with HF history  - daily weights      5) CKD MBD -     - vitamin D 42 4 in may 2018   - PTH 95 - monitoring for now  - Repeat PTH, vitamin D - lab slips given    6) Anemia - stable Hb     7) acid/base - bicarb 28 in august - monitoring for now     8) anemia - Hb above goal     It was a pleasure evaluating Mr Gilson Hunter in the renal office  Thank you for allowing our team to participate in the care of your patient         No problem-specific Assessment & Plan notes found for this encounter  HPI:    Patient denies complaints  No fevers, chills, nausea, vomiting      PATIENT INSTRUCTIONS:    Patient Instructions   1) continue NSAIDs as doing  2) we will see you in 3-4 months  3) please complete labwork prior to next appointment  4) please think about what dialysis option you would want if the time ever came that you needed dialysis          OBJECTIVE:  Current Weight: Weight - Scale: 91 kg (200 lb 9 6 oz)  Vitals:    08/20/18 0937   BP: 110/62   BP Location: Right arm   Patient Position: Sitting   Cuff Size: Adult   Weight: 91 kg (200 lb 9 6 oz)   Height: 5' 10" (1 778 m)    Body mass index is 28 78 kg/m²  REVIEW OF SYSTEMS:    Review of Systems   Constitutional: Negative  Negative for appetite change and fatigue  HENT: Negative  Eyes: Negative  Respiratory: Negative  Negative for shortness of breath  Cardiovascular: Negative  Negative for leg swelling  Gastrointestinal: Negative  Endocrine: Negative  Genitourinary: Negative  Negative for difficulty urinating  Musculoskeletal: Negative  Allergic/Immunologic: Negative  Neurological: Negative  Hematological: Negative  Psychiatric/Behavioral: Negative  All other systems reviewed and are negative  PHYSICAL EXAM:      Physical Exam   Constitutional: He is oriented to person, place, and time  He appears well-developed and well-nourished  No distress  HENT:   Head: Normocephalic and atraumatic  Mouth/Throat: No oropharyngeal exudate  Eyes: Conjunctivae are normal  Right eye exhibits no discharge  Left eye exhibits no discharge  No scleral icterus  Neck: Normal range of motion  Neck supple  No JVD present  Cardiovascular: Normal rate, regular rhythm and normal heart sounds  Exam reveals no gallop and no friction rub  No murmur heard  Pulmonary/Chest: Effort normal and breath sounds normal  No respiratory distress  He has no wheezes  He has no rales  He exhibits no tenderness  Abdominal: Soft  Bowel sounds are normal  He exhibits no distension  There is no tenderness  There is no rebound  Musculoskeletal: Normal range of motion  He exhibits no edema, tenderness or deformity  Neurological: He is alert and oriented to person, place, and time  He has normal reflexes  No cranial nerve deficit  Coordination normal    Skin: Skin is warm and dry  No rash noted  He is not diaphoretic  No erythema  No pallor  Psychiatric: He has a normal mood and affect   His behavior is normal  Judgment and thought content normal    Nursing note and vitals reviewed        Medications:    Current Outpatient Prescriptions:     amLODIPine (NORVASC) 10 mg tablet, Take 10 mg by mouth every morning  , Disp: , Rfl:     cholecalciferol (VITAMIN D3) 1,000 units tablet, Take 1,000 Units by mouth every morning, Disp: , Rfl:     hydrochlorothiazide (MICROZIDE) 12 5 mg capsule, take 1 capsule by mouth once daily, Disp: 90 capsule, Rfl: 1    metoprolol tartrate (LOPRESSOR) 25 mg tablet, take 1 tablet by mouth every 8 hours, Disp: 90 tablet, Rfl: 3    Laboratory Results:        Results for orders placed or performed in visit on 08/09/18   ECG 12 lead   Result Value Ref Range    Ventricular Rate 63 BPM    Atrial Rate 375 BPM    FL Interval  ms    QRSD Interval 110 ms    QT Interval 434 ms    QTC Interval 444 ms    P Axis  degrees    QRS Axis -54 degrees    T Wave Axis -1 degrees

## 2018-08-20 ENCOUNTER — OFFICE VISIT (OUTPATIENT)
Dept: NEPHROLOGY | Facility: CLINIC | Age: 78
End: 2018-08-20
Payer: MEDICARE

## 2018-08-20 VITALS
HEIGHT: 70 IN | BODY MASS INDEX: 28.72 KG/M2 | DIASTOLIC BLOOD PRESSURE: 62 MMHG | SYSTOLIC BLOOD PRESSURE: 110 MMHG | WEIGHT: 200.6 LBS

## 2018-08-20 DIAGNOSIS — N18.30 CKD (CHRONIC KIDNEY DISEASE), STAGE III (HCC): Primary | ICD-10-CM

## 2018-08-20 DIAGNOSIS — I10 BENIGN ESSENTIAL HTN: ICD-10-CM

## 2018-08-20 DIAGNOSIS — N20.0 NEPHROLITHIASIS: ICD-10-CM

## 2018-08-20 PROCEDURE — 99213 OFFICE O/P EST LOW 20 MIN: CPT | Performed by: INTERNAL MEDICINE

## 2018-08-20 NOTE — PATIENT INSTRUCTIONS
1) continue NSAIDs as doing  2) we will see you in 3-4 months  3) please complete labwork prior to next appointment  4) please think about what dialysis option you would want if the time ever came that you needed dialysis

## 2018-08-20 NOTE — LETTER
August 20, 2018     Yvonne Pichardo PA-C  [address] [place] [address]    Patient: Pretty Cremimi   YOB: 1940   Date of Visit: 8/20/2018       Dear Dr Damien Neri:    Thank you for referring Vish Berry to me for evaluation  Below are my notes for this consultation  If you have questions, please do not hesitate to call me  I look forward to following your patient along with you  Sincerely,        Magdaleno Marshall MD        CC: No Recipients  Magdaleno Marshall MD  8/20/2018 10:01 AM  Sign at close encounter  Yaya Melo 78 y o  male MRN: [id number]  8/20/2018    Reason for Visit: CKD III    ASSESSMENT and PLAN:    I had the pleasure of seeing Mr Marry Montero today in the renal clinic for the continued management of CKD III  Since our last visit, there has been no ER visits or hospitalizations  He currently has no complaints at this time and is feeling well  Patient denies any chest pain, shortness of breath and swelling  The last blood work was done on 8/9, which we have reviewed together  68-year-old male with a past medical history of CK D stage III Baseline Cr 1 6-1 8, Nephrolithiasis,HTN, A  fib, D CHF, BPH, 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, former smoker quit in April, hospitalized at [facility] in April 2017 for shortness of breath at which time nephrology was consulted due to acute kidney injury with a rising creatinine  Patient was found to have right-sided hydronephrosis with ureteral stricture and stone  Patient underwent ureteral stent and eventually had a lithotripsy  Patient presents for follow up visit for CKD       Patient had stent exchange on 8/15/17 and again in Nov 2017 and again in Feb 2018, and then in may 2018     1) CKD - baseline Cr approx 1 5-1 7 mg/dL  acute injury during hosp was secondary to obstruction secondary to stone, hydro, poor perfusion in setting of a fib/RVR  Renal u/s R 12 3, L 10 7 cm, R mod hydro   UA still with mod blood, 4-10 RBC  Renal ultrasound July 10 2017, right kidney 12 8 cm, moderate right hydronephrosis, lower pole calculus measuring 9 mm, Left kidney 11 2 cm  Etiology of CKD likely solitary functional kidney, prior hydro, nephrolithiasis      - Follows with Urology regarding stent and nephrolithiasis  -most recent creatinine 1 68 in august which is within baseline  - UA large blood - diff to interpret given stent exchange  - may need to consider mag 3 in future if requires nephrectomy, though this may be for more prognostic information and not for management change  - UPCR 0 17  - last UA in august with hematuria  - last stent exchange in august 14  - check BMP, CBC, UA, UPCR, PTH, Vit D in 3 months  - kidney smart class - completed  Patient is not sure which dialysis modality he would want if dialysis was ever needed  - if UPCR rising, will consider ACE or ARB low dose if tolerates and lowering amlodipine if needed     2) Nephrolithiasis - follows with Urology       - renal stone mainly ca oxalate  - 24 hour urine litholink - reviewed  - pt has increased fluid intake to 2 L  - Needs low salt diet - already following  takes in approx 1 8 gm salt daily  at goal  - changed furosemide to HCTZ in jan 2018  - underwent stent exchange recently with Urology team     3) HTN - Blood pressures are stable      4) Volume - history of dCHF  history of mild to mod pulm HTN  Euvolemic      - need to monitor fluid intake with HF history  - daily weights      5) CKD MBD -     - vitamin D 42 4 in may 2018   - PTH 95 - monitoring for now  - Repeat PTH, vitamin D - lab slips given    6) Anemia - stable Hb     7) acid/base - bicarb 28 in august - monitoring for now     8) anemia - Hb above goal     It was a pleasure evaluating Mr Steven Gerardo in the renal office   Thank you for allowing our team to participate in the care of your patient         No problem-specific Assessment & Plan notes found for this encounter  HPI:    Patient denies complaints  No fevers, chills, nausea, vomiting  PATIENT INSTRUCTIONS:    Patient Instructions   1) continue NSAIDs as doing  2) we will see you in 3-4 months  3) please complete labwork prior to next appointment  4) please think about what dialysis option you would want if the time ever came that you needed dialysis          OBJECTIVE:  Current Weight: Weight - Scale: 91 kg (200 lb 9 6 oz)  Vitals:    08/20/18 0937   BP: 110/62   BP Location: Right arm   Patient Position: Sitting   Cuff Size: Adult   Weight: 91 kg (200 lb 9 6 oz)   Height: 5' 10" (1 778 m)    Body mass index is 28 78 kg/m²  REVIEW OF SYSTEMS:    Review of Systems   Constitutional: Negative  Negative for appetite change and fatigue  HENT: Negative  Eyes: Negative  Respiratory: Negative  Negative for shortness of breath  Cardiovascular: Negative  Negative for leg swelling  Gastrointestinal: Negative  Endocrine: Negative  Genitourinary: Negative  Negative for difficulty urinating  Musculoskeletal: Negative  Allergic/Immunologic: Negative  Neurological: Negative  Hematological: Negative  Psychiatric/Behavioral: Negative  All other systems reviewed and are negative  PHYSICAL EXAM:      Physical Exam   Constitutional: He is oriented to person, place, and time  He appears well-developed and well-nourished  No distress  HENT:   Head: Normocephalic and atraumatic  Mouth/Throat: No oropharyngeal exudate  Eyes: Conjunctivae are normal  Right eye exhibits no discharge  Left eye exhibits no discharge  No scleral icterus  Neck: Normal range of motion  Neck supple  No JVD present  Cardiovascular: Normal rate, regular rhythm and normal heart sounds  Exam reveals no gallop and no friction rub  No murmur heard  Pulmonary/Chest: Effort normal and breath sounds normal  No respiratory distress  He has no wheezes  He has no rales  He exhibits no tenderness  Abdominal: Soft  Bowel sounds are normal  He exhibits no distension  There is no tenderness  There is no rebound  Musculoskeletal: Normal range of motion  He exhibits no edema, tenderness or deformity  Neurological: He is alert and oriented to person, place, and time  He has normal reflexes  No cranial nerve deficit  Coordination normal    Skin: Skin is warm and dry  No rash noted  He is not diaphoretic  No erythema  No pallor  Psychiatric: He has a normal mood and affect  His behavior is normal  Judgment and thought content normal    Nursing note and vitals reviewed        Medications:    Current Outpatient Prescriptions:     amLODIPine (NORVASC) 10 mg tablet, Take 10 mg by mouth every morning  , Disp: , Rfl:     cholecalciferol (VITAMIN D3) 1,000 units tablet, Take 1,000 Units by mouth every morning, Disp: , Rfl:     hydrochlorothiazide (MICROZIDE) 12 5 mg capsule, take 1 capsule by mouth once daily, Disp: 90 capsule, Rfl: 1    metoprolol tartrate (LOPRESSOR) 25 mg tablet, take 1 tablet by mouth every 8 hours, Disp: 90 tablet, Rfl: 3    Laboratory Results:        Results for orders placed or performed in visit on 08/09/18   ECG 12 lead   Result Value Ref Range    Ventricular Rate 63 BPM    Atrial Rate 375 BPM    CO Interval  ms    QRSD Interval 110 ms    QT Interval 434 ms    QTC Interval 444 ms    P Axis  degrees    QRS Axis -54 degrees    T Wave Axis -1 degrees

## 2018-10-10 DIAGNOSIS — I10 ESSENTIAL HYPERTENSION: ICD-10-CM

## 2018-11-02 NOTE — H&P
History & Physical - [facility]  Nhi Mata 70 y o  male MRN: [id number]  Unit/Bed#:  Encounter: [id number]    ASSESSMENT/PLAN:  Right hydronephrosis  Right ureteral stones and dense stricture in the proximal to mid right ureter  Refuses surgical repair or nephrectomy, but again asking about his options  Offered to meet with him and his wife post-op in office to discuss again  Stent placement and holmium laser of stone and stricture 4/18/17  Holmium laser of stone and stent exchange 5/2/17  Renal stones treated elsewhere in [place] in past year (7 procedures)  Right laser lithotripsy, laser infundibulotomy, and stone basket extraction 6/2/17  Right CRUSH and laser incision of proximal ureter 7/18/17  Last stent changed 8/14/18  · Right ureteral stent exchange     A-fib, CHF, HTN, Moderate MR, TR, and aortic regurgitation     HISTORY OF PRESENT ILLNESS:  71 y/o male with hx of renal stones and ureteral stricture disease presents for right ureteral stent exchange  Pt does not desire surgical treatment of his stones or stricture as yet  Considering it again      PAST MEDICAL HISTORY:  Past Medical History:   Diagnosis Date    Atrial fibrillation ([facility] [address] )     Essential hypertension, long-standing     Heart murmur, lifelong     History of cigarette smoking, chronic     64 year smoker at one half pack per day, quit 04/1/17    Kidney stones     12 episodes of kidney stones    Kidney stones with lithotripsy 03/2017    Done at [facility]    Pneumonia      X 2    Vitamin D deficiency     maintained with 1000 units of D    Water retention     on furosemide-controlled    Wears glasses     Wears partial dentures     upper       PAST SURGICAL HISTORY:  Past Surgical History:   Procedure Laterality Date    APPENDECTOMY  1960    CAROTID ENDARTERECTOMY Left 1998    Supposedly no internal stenosis found    CYSTOSCOPY Right 8/15/2017    Procedure: CYSTOSCOPY RIGHT STENT EXCHANGE;  Surgeon: Radha Meehan Aurelia Thomas MD;  Location: [address] [place] [address];  Service: Urology    CYSTOSCOPY     [address]  LITHOTRIPSY  03/2017    For nephrolithiasis at [address] [place] [address] &[place]WELL STENT INSRT Right 5/2/2017    Procedure: CYSTOSCOPY URETEROSCOPY WITH LITHOTRIPSY HOLMIUM LASER, RETROGRADE PYELOGRAM AND INSERTION STENT URETERAL;  Surgeon: Nayely Rivera MD;  Location: [address] [place] [address];  Service: Urology    AK CYSTO/URETERO W/LITHOTRIPSY &INDWELL STENT INSRT Right 5/16/2017    Procedure: CYSTOSCOPY, RETROGRADE PYELOGRAM,  FLEXIBLE URETEROSCOPY,  HOLMIUM LASER LITHOTRIPSY, STONE BASKET MANIPULATION,  STENT URETERAL EXCHANGE;  Surgeon: Nayely Rivera MD;  Location: [address] [place] [address];  Service: Urology    AK CYSTO/URETERO W/LITHOTRIPSY &INDWELL STENT INSRT Right 6/2/2017    Procedure: CYSTOSCOPY, RETROGRADE, STONE MANIPULATION WITH HOLMIUM LASER, STENT PLACEMENT;  Surgeon: Nayely Rivera MD;  Location: [address] [place] [address];  Service: Urology    AK CYSTO/URETERO W/LITHOTRIPSY &INDWELL STENT INSRT Right 7/18/2017    Procedure: CYSTOSCOPY, RETROGRADE, URETEROSCOPY HOLMIUM LASER [id number] [facility];  Surgeon: Nayely Rivera MD;  Location: [address] [place] [address];  Service: Urology    AK CYSTOSCOPY,INSERT URETERAL STENT Right 11/14/2017    Procedure: EXCHANGE STENT URETERAL;  Surgeon: Nayely Rivera MD;  Location: [address] [place] [address];  Service: Urology    AK CYSTOURETHROSCOPY,URETER CATHETER Right 11/14/2017    Procedure: CYSTOSCOPY RETROGRADE, STENT EXCHANGE;  Surgeon: Nayely Rivera MD;  Location: [address] [place] [address];  Service: Urology    AK CYSTOURETHROSCOPY,URETER CATHETER Right 5/8/2018    Procedure: Tato Hernandez;  Surgeon: Nayely Rivera MD;  Location: [address] [place] [address];  Service: Urology    AK CYSTOURETHROSCOPY,URETER CATHETER Right 8/14/2018    Procedure: Tato Hernandez;  Surgeon: Nayely Rivera MD;  Location: [address] [place] [address];  Service: Urology    PROSTATE SURGERY  2015    Laser Prostatectomy  by Dr Kaylee Almanza Right 4/18/2017    Procedure: INSERTION STENT URETERAL, RIGHT URETEROSCOPY,  LASER OF URETERAL STRICTURE AND STONE;  Surgeon: Lesa Bucio MD;  Location: [address] [place] [address];  Service:    [address] [place] Right 2/13/2018    Procedure: Freddy Pont;  Surgeon: Lesa Bucio MD;  Location: [facility];  Service: Urology       ALLERGIES:  No Known Allergies    SOCIAL HISTORY:  History   Alcohol Use    Yes     Comment: rare     History   Drug Use No     History   Smoking Status    Former Smoker    Packs/day: 0 50    Years: 62 00    Types: Cigarettes    Quit date: 4/15/2017   Smokeless Tobacco    Never Used       FAMILY HISTORY:  Family History   Problem Relation Age of Onset    Hypertension Mother     Alcohol abuse Father     Cirrhosis Father     Cancer Son         cancer-knee and above-left-amputation    Other Brother         legally blind in one eye       MEDICATIONS:  No current facility-administered medications for this encounter  Current Outpatient Prescriptions:     amLODIPine (NORVASC) 10 mg tablet, Take 10 mg by mouth every morning  , Disp: , Rfl:     cholecalciferol (VITAMIN D3) 1,000 units tablet, Take 1,000 Units by mouth every morning, Disp: , Rfl:     hydrochlorothiazide (MICROZIDE) 12 5 mg capsule, take 1 capsule by mouth once daily, Disp: 90 capsule, Rfl: 1    metoprolol tartrate (LOPRESSOR) 25 mg tablet, take 1 tablet by mouth every 8 hours, Disp: 90 tablet, Rfl: 3    Review of Systems    PHYSICAL EXAM:  Physical Exam   Constitutional: He is oriented to person, place, and time  He appears well-developed  HENT:   Head: Normocephalic  Cardiovascular: Normal rate and regular rhythm  Pulmonary/Chest: Effort normal and breath sounds normal    Abdominal: Soft   Bowel sounds are normal    Genitourinary: Penis normal    Neurological: He is alert and oriented to person, place, and time  Skin: Skin is warm and dry  LAB RESULTS:  Lab Results   Component Value Date    WBC 7 28 08/09/2018    HGB 13 9 08/09/2018    HCT 43 0 08/09/2018    MCV 89 08/09/2018     08/09/2018     Lab Results   Component Value Date    CALCIUM 8 8 08/09/2018    K 4 2 08/09/2018    CO2 28 08/09/2018     08/09/2018    BUN 26 (H) 08/09/2018    CREATININE 1 68 (H) 08/09/2018     Lab Results   Component Value Date    CALCIUM 8 8 08/09/2018    PHOS 3 2 05/17/2018         Philly Harper PA-C  11/02/18    Portions of the record may have been created with voice recognition software   Occasional wrong word or "sound alike" substitutions may have occurred due to the inherent limitations of voice recognition software   Read the chart carefully and recognize, using context, where substitutions have occurred

## 2018-11-07 ENCOUNTER — APPOINTMENT (OUTPATIENT)
Dept: PREADMISSION TESTING | Facility: HOSPITAL | Age: 78
End: 2018-11-07
Payer: MEDICARE

## 2018-11-07 ENCOUNTER — TRANSCRIBE ORDERS (OUTPATIENT)
Dept: ADMINISTRATIVE | Facility: HOSPITAL | Age: 78
End: 2018-11-07

## 2018-11-07 ENCOUNTER — APPOINTMENT (OUTPATIENT)
Dept: LAB | Facility: HOSPITAL | Age: 78
End: 2018-11-07
Attending: UROLOGY
Payer: MEDICARE

## 2018-11-07 DIAGNOSIS — Z01.818 PRE-OP TESTING: ICD-10-CM

## 2018-11-07 DIAGNOSIS — Z01.818 PRE-OP TESTING: Primary | ICD-10-CM

## 2018-11-07 LAB
ALBUMIN SERPL BCP-MCNC: 3.2 G/DL (ref 3.5–5)
ALP SERPL-CCNC: 91 U/L (ref 46–116)
ALT SERPL W P-5'-P-CCNC: 18 U/L (ref 12–78)
ANION GAP SERPL CALCULATED.3IONS-SCNC: 8 MMOL/L (ref 4–13)
AST SERPL W P-5'-P-CCNC: 19 U/L (ref 5–45)
BACTERIA UR QL AUTO: ABNORMAL /HPF
BASOPHILS # BLD AUTO: 0.05 THOUSANDS/ΜL (ref 0–0.1)
BASOPHILS NFR BLD AUTO: 1 % (ref 0–1)
BILIRUB SERPL-MCNC: 0.6 MG/DL (ref 0.2–1)
BILIRUB UR QL STRIP: NEGATIVE
BUN SERPL-MCNC: 33 MG/DL (ref 5–25)
CALCIUM SERPL-MCNC: 9 MG/DL (ref 8.3–10.1)
CHLORIDE SERPL-SCNC: 105 MMOL/L (ref 100–108)
CLARITY UR: ABNORMAL
CO2 SERPL-SCNC: 28 MMOL/L (ref 21–32)
COLOR UR: YELLOW
CREAT SERPL-MCNC: 1.67 MG/DL (ref 0.6–1.3)
EOSINOPHIL # BLD AUTO: 0.26 THOUSAND/ΜL (ref 0–0.61)
EOSINOPHIL NFR BLD AUTO: 4 % (ref 0–6)
ERYTHROCYTE [DISTWIDTH] IN BLOOD BY AUTOMATED COUNT: 14.1 % (ref 11.6–15.1)
GFR SERPL CREATININE-BSD FRML MDRD: 39 ML/MIN/1.73SQ M
GLUCOSE P FAST SERPL-MCNC: 97 MG/DL (ref 65–99)
GLUCOSE UR STRIP-MCNC: NEGATIVE MG/DL
HCT VFR BLD AUTO: 46 % (ref 36.5–49.3)
HGB BLD-MCNC: 14.6 G/DL (ref 12–17)
HGB UR QL STRIP.AUTO: ABNORMAL
IMM GRANULOCYTES # BLD AUTO: 0.03 THOUSAND/UL (ref 0–0.2)
IMM GRANULOCYTES NFR BLD AUTO: 0 % (ref 0–2)
KETONES UR STRIP-MCNC: NEGATIVE MG/DL
LEUKOCYTE ESTERASE UR QL STRIP: ABNORMAL
LYMPHOCYTES # BLD AUTO: 2 THOUSANDS/ΜL (ref 0.6–4.47)
LYMPHOCYTES NFR BLD AUTO: 27 % (ref 14–44)
MCH RBC QN AUTO: 28.5 PG (ref 26.8–34.3)
MCHC RBC AUTO-ENTMCNC: 31.7 G/DL (ref 31.4–37.4)
MCV RBC AUTO: 90 FL (ref 82–98)
MONOCYTES # BLD AUTO: 0.86 THOUSAND/ΜL (ref 0.17–1.22)
MONOCYTES NFR BLD AUTO: 12 % (ref 4–12)
NEUTROPHILS # BLD AUTO: 4.14 THOUSANDS/ΜL (ref 1.85–7.62)
NEUTS SEG NFR BLD AUTO: 56 % (ref 43–75)
NITRITE UR QL STRIP: NEGATIVE
NON-SQ EPI CELLS URNS QL MICRO: ABNORMAL /HPF
NRBC BLD AUTO-RTO: 0 /100 WBCS
PH UR STRIP.AUTO: 6 [PH] (ref 5–9)
PLATELET # BLD AUTO: 171 THOUSANDS/UL (ref 149–390)
PMV BLD AUTO: 11.2 FL (ref 8.9–12.7)
POTASSIUM SERPL-SCNC: 4.2 MMOL/L (ref 3.5–5.3)
PROT SERPL-MCNC: 6.6 G/DL (ref 6.4–8.2)
PROT UR STRIP-MCNC: ABNORMAL MG/DL
RBC # BLD AUTO: 5.12 MILLION/UL (ref 3.88–5.62)
RBC #/AREA URNS AUTO: ABNORMAL /HPF
SODIUM SERPL-SCNC: 141 MMOL/L (ref 136–145)
SP GR UR STRIP.AUTO: 1.02 (ref 1–1.03)
UROBILINOGEN UR QL STRIP.AUTO: 0.2 E.U./DL
WBC # BLD AUTO: 7.34 THOUSAND/UL (ref 4.31–10.16)
WBC #/AREA URNS AUTO: ABNORMAL /HPF

## 2018-11-07 PROCEDURE — 85025 COMPLETE CBC W/AUTO DIFF WBC: CPT | Performed by: UROLOGY

## 2018-11-07 PROCEDURE — 87086 URINE CULTURE/COLONY COUNT: CPT

## 2018-11-07 PROCEDURE — 80053 COMPREHEN METABOLIC PANEL: CPT | Performed by: UROLOGY

## 2018-11-07 PROCEDURE — 81001 URINALYSIS AUTO W/SCOPE: CPT | Performed by: UROLOGY

## 2018-11-07 PROCEDURE — 36415 COLL VENOUS BLD VENIPUNCTURE: CPT | Performed by: UROLOGY

## 2018-11-07 NOTE — PRE-PROCEDURE INSTRUCTIONS
Pre-Surgery Instructions:   Medication Instructions    amLODIPine (NORVASC) 10 mg tablet Instructed patient per Anesthesia Guidelines   cholecalciferol (VITAMIN D3) 1,000 units tablet Instructed patient per Anesthesia Guidelines   hydrochlorothiazide (MICROZIDE) 12 5 mg capsule Instructed patient per Anesthesia Guidelines   metoprolol tartrate (LOPRESSOR) 25 mg tablet Instructed patient per Anesthesia Guidelines  Pre op instructions given  Pt to take amlodipine,metoprolol tartrate the am of surgery   daughter [facility] Surgical Experience    The following information was developed to assist you to prepare for your operation  What do I need to do before coming to the hospital?   Arrange for a responsible person to drive you to and from the hospital    Arrange care for your children at home  Children are not allowed in the recovery areas of the hospital   Plan to wear clothing that is easy to put on and take off  If you are having shoulder surgery, wear a shirt that buttons or zippers in the front  Bathing  o Shower the evening before and the morning of your surgery with an antibacterial soap  Please refer to the Pre Op Showering Instructions for Surgery Patients Sheet   o Remove nail polish and all body piercing jewelry  o Do not shave any body part for at least 24 hours before surgery-this includes face, arms, legs and upper body  Food  o Nothing to eat or drink after midnight the night before your surgery   This includes candy and chewing gum  o Exception: If your surgery is after 12:00pm (noon), you may have clear liquids such as 7-Up®, ginger ale, apple or cranberry juice, Jell-O®, water, or clear broth until 8:00 am  o Do not drink milk or juice with pulp on the morning before surgery  o Do not drink alcohol 24 hours before surgery  Medicine  o Follow instructions you received from your surgeon about which medicines you may take on the day of surgery  o If instructed to take medicine on the morning of surgery, take pills with just a small sip of water  Call your prescribing doctor for specific infroamtion on what to do if you take insulin    What should I bring to the hospital?    Bring:  Tamara Santacruzer or a walker, if you have them, for foot or knee surgery   A list of the daily medicines, vitamins, minerals, herbals and nutritional supplements you take  Include the dosages of medicines and the time you take them each day   Glasses, dentures or hearing aids   Minimal clothing; you will be wearing hospital sleepwear   Photo ID; required to verify your identity   If you have a Living Will or Power of , bring a copy of the documents   If you have an ostomy, bring an extra pouch and any supplies you use    Do not bring   Medicines or inhalers   Money, valuables or jewelry    What other information should I know about the day of surgery?  Notify your surgeons if you develop a cold, sore throat, cough, fever, rash or any other illness   Report to the Ambulatory Surgical/Same Day Surgery Unit   You will be instructed to stop at Registration only if you have not been pre-registered   Inform your  fi they do not stay that they will be asked by the staff to leave a phone number where they can be reached   Be available to be reached before surgery  In the event the operating room schedule changes, you may be asked to come in earlier or later than expected    *It is important to tell your doctor and others involved in your health care if you are taking or have been taking any non-prescription drugs, vitamins, minerals, herbals or other nutritional supplements   Any of these may interact with some food or medicines and cause a reaction

## 2018-11-09 LAB — BACTERIA UR CULT: ABNORMAL

## 2018-11-12 ENCOUNTER — ANESTHESIA EVENT (OUTPATIENT)
Dept: PERIOP | Facility: HOSPITAL | Age: 78
End: 2018-11-12
Payer: MEDICARE

## 2018-11-13 ENCOUNTER — ANESTHESIA (OUTPATIENT)
Dept: PERIOP | Facility: HOSPITAL | Age: 78
End: 2018-11-13
Payer: MEDICARE

## 2018-11-13 ENCOUNTER — HOSPITAL ENCOUNTER (OUTPATIENT)
Dept: RADIOLOGY | Facility: HOSPITAL | Age: 78
Setting detail: OUTPATIENT SURGERY
Discharge: HOME | End: 2018-11-13
Payer: MEDICARE

## 2018-11-13 ENCOUNTER — HOSPITAL ENCOUNTER (OUTPATIENT)
Facility: HOSPITAL | Age: 78
Setting detail: OUTPATIENT SURGERY
Discharge: HOME | End: 2018-11-13
Attending: UROLOGY | Admitting: UROLOGY
Payer: MEDICARE

## 2018-11-13 VITALS
OXYGEN SATURATION: 95 % | HEART RATE: 62 BPM | RESPIRATION RATE: 20 BRPM | BODY MASS INDEX: 28.49 KG/M2 | TEMPERATURE: 97 F | WEIGHT: 199 LBS | HEIGHT: 70 IN | SYSTOLIC BLOOD PRESSURE: 113 MMHG | DIASTOLIC BLOOD PRESSURE: 62 MMHG

## 2018-11-13 PROCEDURE — C1769 GUIDE WIRE: HCPCS | Performed by: UROLOGY

## 2018-11-13 PROCEDURE — 74450 X-RAY URETHRA/BLADDER: CPT

## 2018-11-13 PROCEDURE — C2617 STENT, NON-COR, TEM W/O DEL: HCPCS | Performed by: UROLOGY

## 2018-11-13 DEVICE — IMPLANTABLE DEVICE
Type: IMPLANTABLE DEVICE | Site: URETER | Status: NON-FUNCTIONAL
Removed: 2019-10-22

## 2018-11-13 RX ORDER — ONDANSETRON 2 MG/ML
INJECTION INTRAMUSCULAR; INTRAVENOUS AS NEEDED
Status: DISCONTINUED | OUTPATIENT
Start: 2018-11-13 | End: 2018-11-13 | Stop reason: SURG

## 2018-11-13 RX ORDER — SODIUM CHLORIDE 9 MG/ML
75 INJECTION, SOLUTION INTRAVENOUS CONTINUOUS
Status: DISCONTINUED | OUTPATIENT
Start: 2018-11-13 | End: 2018-11-13 | Stop reason: HOSPADM

## 2018-11-13 RX ORDER — ONDANSETRON 2 MG/ML
4 INJECTION INTRAMUSCULAR; INTRAVENOUS ONCE AS NEEDED
Status: DISCONTINUED | OUTPATIENT
Start: 2018-11-13 | End: 2018-11-13 | Stop reason: HOSPADM

## 2018-11-13 RX ORDER — FENTANYL CITRATE 50 UG/ML
INJECTION, SOLUTION INTRAMUSCULAR; INTRAVENOUS AS NEEDED
Status: DISCONTINUED | OUTPATIENT
Start: 2018-11-13 | End: 2018-11-13 | Stop reason: SURG

## 2018-11-13 RX ORDER — FENTANYL CITRATE/PF 50 MCG/ML
25 SYRINGE (ML) INJECTION
Status: DISCONTINUED | OUTPATIENT
Start: 2018-11-13 | End: 2018-11-13 | Stop reason: HOSPADM

## 2018-11-13 RX ORDER — MIDAZOLAM HYDROCHLORIDE 1 MG/ML
INJECTION INTRAMUSCULAR; INTRAVENOUS AS NEEDED
Status: DISCONTINUED | OUTPATIENT
Start: 2018-11-13 | End: 2018-11-13 | Stop reason: SURG

## 2018-11-13 RX ORDER — PROPOFOL 10 MG/ML
INJECTION, EMULSION INTRAVENOUS CONTINUOUS PRN
Status: DISCONTINUED | OUTPATIENT
Start: 2018-11-13 | End: 2018-11-13 | Stop reason: SURG

## 2018-11-13 RX ORDER — PROPOFOL 10 MG/ML
INJECTION, EMULSION INTRAVENOUS AS NEEDED
Status: DISCONTINUED | OUTPATIENT
Start: 2018-11-13 | End: 2018-11-13 | Stop reason: SURG

## 2018-11-13 RX ORDER — MAGNESIUM HYDROXIDE 1200 MG/15ML
LIQUID ORAL AS NEEDED
Status: DISCONTINUED | OUTPATIENT
Start: 2018-11-13 | End: 2018-11-13 | Stop reason: HOSPADM

## 2018-11-13 RX ADMIN — SODIUM CHLORIDE: 0.9 INJECTION, SOLUTION INTRAVENOUS at 06:53

## 2018-11-13 RX ADMIN — CEFAZOLIN SODIUM 1000 MG: 1 SOLUTION INTRAVENOUS at 07:47

## 2018-11-13 RX ADMIN — MIDAZOLAM HYDROCHLORIDE 2 MG: 1 INJECTION, SOLUTION INTRAMUSCULAR; INTRAVENOUS at 07:47

## 2018-11-13 RX ADMIN — SODIUM CHLORIDE 75 ML/HR: 0.9 INJECTION, SOLUTION INTRAVENOUS at 06:55

## 2018-11-13 RX ADMIN — PROPOFOL 50 MCG/KG/MIN: 10 INJECTION, EMULSION INTRAVENOUS at 07:53

## 2018-11-13 RX ADMIN — PROPOFOL 60 MG: 10 INJECTION, EMULSION INTRAVENOUS at 07:53

## 2018-11-13 RX ADMIN — FENTANYL CITRATE 50 MCG: 50 INJECTION, SOLUTION INTRAMUSCULAR; INTRAVENOUS at 07:50

## 2018-11-13 RX ADMIN — ONDANSETRON 4 MG: 2 INJECTION INTRAMUSCULAR; INTRAVENOUS at 08:02

## 2018-11-13 RX ADMIN — FENTANYL CITRATE 50 MCG: 50 INJECTION, SOLUTION INTRAMUSCULAR; INTRAVENOUS at 07:53

## 2018-11-13 NOTE — ANESTHESIA PREPROCEDURE EVALUATION
Review of Systems/Medical History  Patient summary reviewed  Chart reviewed  No history of anesthetic complications     Cardiovascular  Hypertension , Valvular heart disease , H/O Heart Murmur, Dysrhythmias , atrial fibrillation, PVD (s/p left CEA),    Pulmonary  Smoker ex-smoker  ,        GI/Hepatic       Kidney stones,        Endo/Other     GYN       Hematology   Musculoskeletal       Neurology   Psychology           Physical Exam    Airway    Mallampati score: III  TM Distance: >3 FB  Neck ROM: full     Dental   upper dentures,     Cardiovascular  Rhythm: regular, Rate: normal,     Pulmonary  Breath sounds clear to auscultation,     Other Findings  Partial upper denture      Anesthesia Plan  ASA Score- 3     Anesthesia Type- IV sedation with anesthesia with ASA Monitors  Additional Monitors:   Airway Plan:         Plan Factors-    Induction- intravenous  Postoperative Plan-     Informed Consent- Anesthetic plan and risks discussed with patient  I personally reviewed this patient with the CRNA  Discussed and agreed on the Anesthesia Plan with the CRNA  Jelly Francisco

## 2018-11-19 ENCOUNTER — TRANSCRIBE ORDERS (OUTPATIENT)
Dept: ADMINISTRATIVE | Facility: HOSPITAL | Age: 78
End: 2018-11-19

## 2018-11-19 ENCOUNTER — LAB (OUTPATIENT)
Dept: LAB | Facility: HOSPITAL | Age: 78
End: 2018-11-19
Attending: INTERNAL MEDICINE
Payer: MEDICARE

## 2018-11-19 DIAGNOSIS — N20.0 NEPHROLITHIASIS: ICD-10-CM

## 2018-11-19 DIAGNOSIS — I10 BENIGN ESSENTIAL HTN: ICD-10-CM

## 2018-11-19 DIAGNOSIS — N18.30 CKD (CHRONIC KIDNEY DISEASE), STAGE III (HCC): ICD-10-CM

## 2018-11-19 LAB
25(OH)D3 SERPL-MCNC: 38.3 NG/ML (ref 30–100)
ANION GAP SERPL CALCULATED.3IONS-SCNC: 7 MMOL/L (ref 4–13)
BACTERIA UR QL AUTO: ABNORMAL /HPF
BILIRUB UR QL STRIP: NEGATIVE
BUN SERPL-MCNC: 32 MG/DL (ref 5–25)
CALCIUM SERPL-MCNC: 9.1 MG/DL (ref 8.3–10.1)
CHLORIDE SERPL-SCNC: 106 MMOL/L (ref 100–108)
CLARITY UR: CLEAR
CO2 SERPL-SCNC: 28 MMOL/L (ref 21–32)
COLOR UR: YELLOW
CREAT SERPL-MCNC: 1.67 MG/DL (ref 0.6–1.3)
CREAT UR-MCNC: 146 MG/DL
ERYTHROCYTE [DISTWIDTH] IN BLOOD BY AUTOMATED COUNT: 14.1 % (ref 11.6–15.1)
GFR SERPL CREATININE-BSD FRML MDRD: 39 ML/MIN/1.73SQ M
GLUCOSE P FAST SERPL-MCNC: 105 MG/DL (ref 65–99)
GLUCOSE UR STRIP-MCNC: NEGATIVE MG/DL
HCT VFR BLD AUTO: 45.8 % (ref 36.5–49.3)
HGB BLD-MCNC: 14.4 G/DL (ref 12–17)
HGB UR QL STRIP.AUTO: ABNORMAL
KETONES UR STRIP-MCNC: NEGATIVE MG/DL
LEUKOCYTE ESTERASE UR QL STRIP: ABNORMAL
MCH RBC QN AUTO: 28.1 PG (ref 26.8–34.3)
MCHC RBC AUTO-ENTMCNC: 31.4 G/DL (ref 31.4–37.4)
MCV RBC AUTO: 89 FL (ref 82–98)
MUCOUS THREADS UR QL AUTO: ABNORMAL
NITRITE UR QL STRIP: NEGATIVE
NON-SQ EPI CELLS URNS QL MICRO: ABNORMAL /HPF
PH UR STRIP.AUTO: 5.5 [PH] (ref 5–9)
PHOSPHATE SERPL-MCNC: 2.7 MG/DL (ref 2.3–4.1)
PLATELET # BLD AUTO: 159 THOUSANDS/UL (ref 149–390)
PMV BLD AUTO: 10.8 FL (ref 8.9–12.7)
POTASSIUM SERPL-SCNC: 4.5 MMOL/L (ref 3.5–5.3)
PROT UR STRIP-MCNC: ABNORMAL MG/DL
PROT UR-MCNC: 26 MG/DL
PROT/CREAT UR: 0.18 MG/G{CREAT} (ref 0–0.1)
PTH-INTACT SERPL-MCNC: 89.5 PG/ML (ref 18.4–80.1)
RBC # BLD AUTO: 5.13 MILLION/UL (ref 3.88–5.62)
RBC #/AREA URNS AUTO: ABNORMAL /HPF
SODIUM SERPL-SCNC: 141 MMOL/L (ref 136–145)
SP GR UR STRIP.AUTO: 1.02 (ref 1–1.03)
UROBILINOGEN UR QL STRIP.AUTO: 0.2 E.U./DL
WBC # BLD AUTO: 7.6 THOUSAND/UL (ref 4.31–10.16)
WBC #/AREA URNS AUTO: ABNORMAL /HPF

## 2018-11-19 PROCEDURE — 85027 COMPLETE CBC AUTOMATED: CPT

## 2018-11-19 PROCEDURE — 82570 ASSAY OF URINE CREATININE: CPT

## 2018-11-19 PROCEDURE — 80048 BASIC METABOLIC PNL TOTAL CA: CPT

## 2018-11-19 PROCEDURE — 83970 ASSAY OF PARATHORMONE: CPT

## 2018-11-19 PROCEDURE — 84156 ASSAY OF PROTEIN URINE: CPT

## 2018-11-19 PROCEDURE — 84100 ASSAY OF PHOSPHORUS: CPT

## 2018-11-19 PROCEDURE — 81001 URINALYSIS AUTO W/SCOPE: CPT

## 2018-11-19 PROCEDURE — 36415 COLL VENOUS BLD VENIPUNCTURE: CPT

## 2018-11-19 PROCEDURE — 82306 VITAMIN D 25 HYDROXY: CPT

## 2018-11-19 NOTE — PROGRESS NOTES
Hello    Patient normally is followed up by Ms Raad Olivarez  Can patient be notified of renal function stable  Has blood in the urine, but this may be reflective of the recent stent exchange a few days ago  No changes for now      Thank you    np

## 2018-11-20 ENCOUNTER — TELEPHONE (OUTPATIENT)
Dept: NEPHROLOGY | Facility: CLINIC | Age: 78
End: 2018-11-20

## 2018-11-20 NOTE — TELEPHONE ENCOUNTER
----- Message from Torito Kramer MD sent at 11/19/2018  3:48 PM EST -----  Hello    Patient normally is followed up by Ms Sophie Sandhu  Can patient be notified of renal function stable  Has blood in the urine, but this may be reflective of the recent stent exchange a few days ago  No changes for now      Thank you    np

## 2018-12-03 ENCOUNTER — TRANSCRIBE ORDERS (OUTPATIENT)
Dept: ADMINISTRATIVE | Facility: HOSPITAL | Age: 78
End: 2018-12-03

## 2018-12-03 DIAGNOSIS — N18.4 CHRONIC KIDNEY DISEASE, STAGE IV (SEVERE) (HCC): Primary | ICD-10-CM

## 2018-12-10 ENCOUNTER — HOSPITAL ENCOUNTER (OUTPATIENT)
Dept: RADIOLOGY | Facility: HOSPITAL | Age: 78
Discharge: HOME | End: 2018-12-10
Attending: UROLOGY
Payer: MEDICARE

## 2018-12-10 DIAGNOSIS — N18.4 CHRONIC KIDNEY DISEASE, STAGE IV (SEVERE) (HCC): ICD-10-CM

## 2018-12-10 PROCEDURE — A9562 TC99M MERTIATIDE: HCPCS

## 2018-12-10 PROCEDURE — 78708 K FLOW/FUNCT IMAGE W/DRUG: CPT

## 2018-12-24 DIAGNOSIS — I10 BENIGN ESSENTIAL HTN: ICD-10-CM

## 2018-12-25 RX ORDER — HYDROCHLOROTHIAZIDE 12.5 MG/1
CAPSULE, GELATIN COATED ORAL
Qty: 90 CAPSULE | Refills: 1 | Status: SHIPPED | OUTPATIENT
Start: 2018-12-25 | End: 2019-06-21 | Stop reason: SDUPTHER

## 2019-01-04 ENCOUNTER — OFFICE VISIT (OUTPATIENT)
Dept: NEPHROLOGY | Facility: CLINIC | Age: 79
End: 2019-01-04
Payer: MEDICARE

## 2019-01-04 VITALS
WEIGHT: 198 LBS | DIASTOLIC BLOOD PRESSURE: 66 MMHG | HEIGHT: 70 IN | BODY MASS INDEX: 28.35 KG/M2 | SYSTOLIC BLOOD PRESSURE: 118 MMHG

## 2019-01-04 DIAGNOSIS — N18.30 CKD (CHRONIC KIDNEY DISEASE), STAGE III (HCC): Primary | ICD-10-CM

## 2019-01-04 DIAGNOSIS — I10 BENIGN ESSENTIAL HTN: ICD-10-CM

## 2019-01-04 PROCEDURE — 99213 OFFICE O/P EST LOW 20 MIN: CPT | Performed by: INTERNAL MEDICINE

## 2019-01-04 NOTE — PROGRESS NOTES
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, former smoker quit in April, hospitalized at [facility] in April 2017 for shortness of breath at which time nephrology was consulted due to acute kidney injury with a rising creatinine  Patient was found to have right-sided hydronephrosis with ureteral stricture and stone  Patient underwent ureteral stent and eventually had a lithotripsy  Patient presents for follow up visit for CKD       Patient had stent exchange on 8/15/17 and again in Nov 2017 and again in Feb 2018, and then in may 2018  Most recent stent exchange was in November 2018     1) CKD - baseline Cr approx 1 5-1 7 mg/dL  acute injury during hosp was secondary to obstruction secondary to stone, hydro, poor perfusion in setting of a fib/RVR  Renal u/s R 12 3, L 10 7 cm, R mod hydro  UA still with mod blood, 4-10 RBC  Renal ultrasound July 10 2017, right kidney 12 8 cm, moderate right hydronephrosis, lower pole calculus measuring 9 mm, Left kidney 11 2 cm  Etiology of CKD likely solitary functional kidney, prior hydro, nephrolithiasis      - Follows with Urology regarding stent and nephrolithiasis  -most recent creatinine 1 67 in November of 2018 which is stable  - UA moderate blood - diff to interpret given stent exchange  - split function test in December shows left kidney receives higher flow then the right kidney by approximately 70 vs 30%  - UPCR 0 18-stable  - last stent exchange in November 2018  - check BMP, CBC, UA, UPCR next month  - kidney smart class - completed    Patient is not sure which dialysis modality he would want if dialysis was ever needed  - if UPCR stable, will consider ACE or ARB low dose if UPCR rises and holding amlodipine if this is the case      2) Nephrolithiasis - follows with Urology       - renal stone mainly ca oxalate  - 24 hour urine litholink - reviewed  - pt has increased fluid intake to 2 L  - Needs low salt diet - already following  takes in approx 1 8 gm salt daily  at goal  - changed furosemide to HCTZ in jan 2018  - underwent stent exchange recently with Urology team in November     3) HTN - Blood pressures are stable      4) Volume - history of dCHF  history of mild to mod pulm HTN  Euvolemic      - need to monitor fluid intake with HF history  - daily weights      5) CKD MBD -      - vitamin D 42 4 in may 2018, and repeat is 38 3 in November  - PTH 95 - and repeat in November is 89 5  - check PTH, Vit D in summer 2019     6) Anemia - stable Hb     7) acid/base - bicarb 28 in august - monitoring for now     8) anemia - Hb above goal    9) wheezing-patient recent URI  On steroid inhaler  Advised to continue for 1 more week  Completed antibiotics  Further plan per primary team      It was a pleasure evaluating Mr Paola Ortiz in the renal office  Thank you for allowing our team to participate in the care of your patient  No problem-specific Assessment & Plan notes found for this encounter  HPI:    Patient recent URI  Treated with antibiotics  Improving  PATIENT INSTRUCTIONS:    Patient Instructions   1) Avoid NSAIDS - (Example - motrin, advil, ibuprofen, aleve, exederin, etc)  2) Always follow a low salt diet  3) labwork next month  4) Appointment in 3-4 months        OBJECTIVE:  Current Weight: Weight - Scale: 89 8 kg (198 lb)  Vitals:    01/04/19 0812   BP: 118/66   BP Location: Right arm   Patient Position: Sitting   Cuff Size: Large   Weight: 89 8 kg (198 lb)   Height: 5' 10" (1 778 m)    Body mass index is 28 41 kg/m²  REVIEW OF SYSTEMS:    Review of Systems   Constitutional: Negative    Negative for appetite change and fatigue  HENT: Negative  Eyes: Negative  Respiratory: Negative  Negative for shortness of breath  Cardiovascular: Negative  Negative for leg swelling  Gastrointestinal: Negative  Endocrine: Negative  Genitourinary: Negative  Negative for difficulty urinating  Musculoskeletal: Negative  Allergic/Immunologic: Negative  Neurological: Negative  Hematological: Negative  Psychiatric/Behavioral: Negative  All other systems reviewed and are negative  PHYSICAL EXAM:      Physical Exam   Constitutional: He is oriented to person, place, and time  He appears well-developed and well-nourished  No distress  HENT:   Head: Normocephalic and atraumatic  Mouth/Throat: No oropharyngeal exudate  Eyes: Conjunctivae are normal  Right eye exhibits no discharge  Left eye exhibits no discharge  No scleral icterus  Neck: Normal range of motion  Neck supple  No JVD present  Cardiovascular: Normal rate, regular rhythm and normal heart sounds  Exam reveals no gallop and no friction rub  No murmur heard  Pulmonary/Chest: Effort normal  No respiratory distress  He has wheezes  He has no rales  He exhibits no tenderness  Abdominal: Soft  Bowel sounds are normal  He exhibits no distension  There is no tenderness  There is no rebound  Musculoskeletal: Normal range of motion  He exhibits no edema, tenderness or deformity  Neurological: He is alert and oriented to person, place, and time  He has normal reflexes  No cranial nerve deficit  Coordination normal    Skin: Skin is warm and dry  No rash noted  He is not diaphoretic  No erythema  No pallor  Psychiatric: He has a normal mood and affect  His behavior is normal  Judgment and thought content normal    Nursing note and vitals reviewed        Medications:    Current Outpatient Prescriptions:     amLODIPine (NORVASC) 10 mg tablet, Take 10 mg by mouth every morning  , Disp: , Rfl:     cholecalciferol (VITAMIN D3) 1,000 units tablet, Take 1,000 Units by mouth daily after lunch  , Disp: , Rfl:     hydrochlorothiazide (MICROZIDE) 12 5 mg capsule, take 1 capsule by mouth once daily, Disp: 90 capsule, Rfl: 1    metoprolol tartrate (LOPRESSOR) 25 mg tablet, take 1 tablet by mouth every 8 hours, Disp: 90 tablet, Rfl: 3    Laboratory Results:        Invalid input(s): ALBUMIN    Results for orders placed or performed in visit on 11/19/18   CBC   Result Value Ref Range    WBC 7 60 4 31 - 10 16 Thousand/uL    RBC 5 13 3 88 - 5 62 Million/uL    Hemoglobin 14 4 12 0 - 17 0 g/dL    Hematocrit 45 8 36 5 - 49 3 %    MCV 89 82 - 98 fL    MCH 28 1 26 8 - 34 3 pg    MCHC 31 4 31 4 - 37 4 g/dL    RDW 14 1 11 6 - 15 1 %    Platelets 857 422 - 692 Thousands/uL    MPV 10 8 8 9 - 12 7 fL   Basic metabolic panel   Result Value Ref Range    Sodium 141 136 - 145 mmol/L    Potassium 4 5 3 5 - 5 3 mmol/L    Chloride 106 100 - 108 mmol/L    CO2 28 21 - 32 mmol/L    ANION GAP 7 4 - 13 mmol/L    BUN 32 (H) 5 - 25 mg/dL    Creatinine 1 67 (H) 0 60 - 1 30 mg/dL    Glucose, Fasting 105 (H) 65 - 99 mg/dL    Calcium 9 1 8 3 - 10 1 mg/dL    eGFR 39 ml/min/1 73sq m   Phosphorus   Result Value Ref Range    Phosphorus 2 7 2 3 - 4 1 mg/dL   Protein / creatinine ratio, urine   Result Value Ref Range    Creatinine, Ur 146 0 mg/dL    Protein Urine Random 26 mg/dL    Prot/Creat Ratio, Ur 0 18 (H) 0 00 - 0 10   PTH, intact   Result Value Ref Range    PTH 89 5 (H) 18 4 - 80 1 pg/mL   Vitamin D 25 hydroxy   Result Value Ref Range    Vit D, 25-Hydroxy 38 3 30 0 - 100 0 ng/mL   UA (URINE) with reflex to Microscopic   Result Value Ref Range    Color, UA Yellow     Clarity, UA Clear     Specific Gravity, UA 1 020 1 000 - 1 030    pH, UA 5 5 5 0 - 9 0    Leukocytes, UA Small (A) Negative    Nitrite, UA Negative Negative    Protein, UA Trace (A) Negative mg/dl    Glucose, UA Negative Negative mg/dl    Ketones, UA Negative Negative mg/dl    Urobilinogen, UA 0 2 0 2, 1 0 E U /dl E U /dl    Bilirubin, UA Negative Negative    Blood, UA Moderate (A) Negative   Urine Microscopic   Result Value Ref Range    RBC, UA 10-20 (A) None Seen, 0-5 /hpf    WBC, UA 4-10 (A) None Seen, 0-5, 5-55, 5-65 /hpf    Epithelial Cells Occasional None Seen, Occasional /hpf    Bacteria, UA None Seen None Seen, Occasional /hpf    MUCUS THREADS Occasional (A) None Seen

## 2019-01-04 NOTE — LETTER
January 4, 2019     Dario Sesay PA-C  [address] [place] [address]    Patient: Murali Ardon   YOB: 1940   Date of Visit: 1/4/2019       Dear Dr Kadi Miramontes:    Thank you for referring Violetta Byrnes to me for evaluation  Below are my notes for this consultation  If you have questions, please do not hesitate to call me  I look forward to following your patient along with you  Sincerely,        Emely Hodgson MD        CC: No Recipients  Emely Hodgson MD  1/4/2019  8:55 AM  Sign at close encounter  Yaya Melo 70 y o  male MRN: [id number]  1/4/2019    Reason for Visit: CKD III    ASSESSMENT and PLAN:    I had the pleasure of seeing Mr Jhoan Philip today in the renal clinic for the continued management of CKD III  56-year-old male with a past medical history of CK D stage III Baseline Cr 1 6-1 8, Nephrolithiasis,HTN, A  fib, D CHF, BPH, 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, former smoker quit in April, hospitalized at [facility] in April 2017 for shortness of breath at which time nephrology was consulted due to acute kidney injury with a rising creatinine  Patient was found to have right-sided hydronephrosis with ureteral stricture and stone  Patient underwent ureteral stent and eventually had a lithotripsy  Patient presents for follow up visit for CKD       Patient had stent exchange on 8/15/17 and again in Nov 2017 and again in Feb 2018, and then in may 2018  Most recent stent exchange was in November 2018     1) CKD - baseline Cr approx 1 5-1 7 mg/dL  acute injury during hosp was secondary to obstruction secondary to stone, hydro, poor perfusion in setting of a fib/RVR  Renal u/s R 12 3, L 10 7 cm, R mod hydro  UA still with mod blood, 4-10 RBC  Renal ultrasound July 10 2017, right kidney 12 8 cm, moderate right hydronephrosis, lower pole calculus measuring 9 mm, Left kidney 11 2 cm   Etiology of CKD likely solitary functional kidney, prior hydro, nephrolithiasis      - Follows with Urology regarding stent and nephrolithiasis  -most recent creatinine 1 67 in November of 2018 which is stable  - UA moderate blood - diff to interpret given stent exchange  - split function test in December shows left kidney receives higher flow then the right kidney by approximately 70 vs 30%  - UPCR 0 18-stable  - last stent exchange in November 2018  - check BMP, CBC, UA, UPCR next month  - kidney smart class - completed  Patient is not sure which dialysis modality he would want if dialysis was ever needed  - if UPCR stable, will consider ACE or ARB low dose if UPCR rises and holding amlodipine if this is the case      2) Nephrolithiasis - follows with Urology       - renal stone mainly ca oxalate  - 24 hour urine litholink - reviewed  - pt has increased fluid intake to 2 L  - Needs low salt diet - already following  takes in approx 1 8 gm salt daily  at goal  - changed furosemide to HCTZ in jan 2018  - underwent stent exchange recently with Urology team in November     3) HTN - Blood pressures are stable      4) Volume - history of dCHF  history of mild to mod pulm HTN  Euvolemic      - need to monitor fluid intake with HF history  - daily weights      5) CKD MBD -      - vitamin D 42 4 in may 2018, and repeat is 38 3 in November  - PTH 95 - and repeat in November is 89 5  - check PTH, Vit D in summer 2019     6) Anemia - stable Hb     7) acid/base - bicarb 28 in august - monitoring for now     8) anemia - Hb above goal    9) wheezing-patient recent URI  On steroid inhaler  Advised to continue for 1 more week  Completed antibiotics  Further plan per primary team      It was a pleasure evaluating Mr Danae Hooper in the renal office  Thank you for allowing our team to participate in the care of your patient  No problem-specific Assessment & Plan notes found for this encounter  HPI:    Patient recent URI  Treated with antibiotics  Improving      PATIENT INSTRUCTIONS:    Patient Instructions   1) Avoid NSAIDS - (Example - motrin, advil, ibuprofen, aleve, exederin, etc)  2) Always follow a low salt diet  3) labwork next month  4) Appointment in 3-4 months        OBJECTIVE:  Current Weight: Weight - Scale: 89 8 kg (198 lb)  Vitals:    01/04/19 0812   BP: 118/66   BP Location: Right arm   Patient Position: Sitting   Cuff Size: Large   Weight: 89 8 kg (198 lb)   Height: 5' 10" (1 778 m)    Body mass index is 28 41 kg/m²  REVIEW OF SYSTEMS:    Review of Systems   Constitutional: Negative  Negative for appetite change and fatigue  HENT: Negative  Eyes: Negative  Respiratory: Negative  Negative for shortness of breath  Cardiovascular: Negative  Negative for leg swelling  Gastrointestinal: Negative  Endocrine: Negative  Genitourinary: Negative  Negative for difficulty urinating  Musculoskeletal: Negative  Allergic/Immunologic: Negative  Neurological: Negative  Hematological: Negative  Psychiatric/Behavioral: Negative  All other systems reviewed and are negative  PHYSICAL EXAM:      Physical Exam   Constitutional: He is oriented to person, place, and time  He appears well-developed and well-nourished  No distress  HENT:   Head: Normocephalic and atraumatic  Mouth/Throat: No oropharyngeal exudate  Eyes: Conjunctivae are normal  Right eye exhibits no discharge  Left eye exhibits no discharge  No scleral icterus  Neck: Normal range of motion  Neck supple  No JVD present  Cardiovascular: Normal rate, regular rhythm and normal heart sounds  Exam reveals no gallop and no friction rub  No murmur heard  Pulmonary/Chest: Effort normal  No respiratory distress  He has wheezes  He has no rales  He exhibits no tenderness  Abdominal: Soft  Bowel sounds are normal  He exhibits no distension  There is no tenderness  There is no rebound  Musculoskeletal: Normal range of motion   He exhibits no edema, tenderness or deformity  Neurological: He is alert and oriented to person, place, and time  He has normal reflexes  No cranial nerve deficit  Coordination normal    Skin: Skin is warm and dry  No rash noted  He is not diaphoretic  No erythema  No pallor  Psychiatric: He has a normal mood and affect  His behavior is normal  Judgment and thought content normal    Nursing note and vitals reviewed        Medications:    Current Outpatient Prescriptions:     amLODIPine (NORVASC) 10 mg tablet, Take 10 mg by mouth every morning  , Disp: , Rfl:     cholecalciferol (VITAMIN D3) 1,000 units tablet, Take 1,000 Units by mouth daily after lunch  , Disp: , Rfl:     hydrochlorothiazide (MICROZIDE) 12 5 mg capsule, take 1 capsule by mouth once daily, Disp: 90 capsule, Rfl: 1    metoprolol tartrate (LOPRESSOR) 25 mg tablet, take 1 tablet by mouth every 8 hours, Disp: 90 tablet, Rfl: 3    Laboratory Results:        Invalid input(s): ALBUMIN    Results for orders placed or performed in visit on 11/19/18   CBC   Result Value Ref Range    WBC 7 60 4 31 - 10 16 Thousand/uL    RBC 5 13 3 88 - 5 62 Million/uL    Hemoglobin 14 4 12 0 - 17 0 g/dL    Hematocrit 45 8 36 5 - 49 3 %    MCV 89 82 - 98 fL    MCH 28 1 26 8 - 34 3 pg    MCHC 31 4 31 4 - 37 4 g/dL    RDW 14 1 11 6 - 15 1 %    Platelets 604 030 - 467 Thousands/uL    MPV 10 8 8 9 - 12 7 fL   Basic metabolic panel   Result Value Ref Range    Sodium 141 136 - 145 mmol/L    Potassium 4 5 3 5 - 5 3 mmol/L    Chloride 106 100 - 108 mmol/L    CO2 28 21 - 32 mmol/L    ANION GAP 7 4 - 13 mmol/L    BUN 32 (H) 5 - 25 mg/dL    Creatinine 1 67 (H) 0 60 - 1 30 mg/dL    Glucose, Fasting 105 (H) 65 - 99 mg/dL    Calcium 9 1 8 3 - 10 1 mg/dL    eGFR 39 ml/min/1 73sq m   Phosphorus   Result Value Ref Range    Phosphorus 2 7 2 3 - 4 1 mg/dL   Protein / creatinine ratio, urine   Result Value Ref Range    Creatinine, Ur 146 0 mg/dL    Protein Urine Random 26 mg/dL    Prot/Creat Ratio, Ur 0 18 (H) 0 00 - 0 10 PTH, intact   Result Value Ref Range    PTH 89 5 (H) 18 4 - 80 1 pg/mL   Vitamin D 25 hydroxy   Result Value Ref Range    Vit D, 25-Hydroxy 38 3 30 0 - 100 0 ng/mL   UA (URINE) with reflex to Microscopic   Result Value Ref Range    Color, UA Yellow     Clarity, UA Clear     Specific Gravity, UA 1 020 1 000 - 1 030    pH, UA 5 5 5 0 - 9 0    Leukocytes, UA Small (A) Negative    Nitrite, UA Negative Negative    Protein, UA Trace (A) Negative mg/dl    Glucose, UA Negative Negative mg/dl    Ketones, UA Negative Negative mg/dl    Urobilinogen, UA 0 2 0 2, 1 0 E U /dl E U /dl    Bilirubin, UA Negative Negative    Blood, UA Moderate (A) Negative   Urine Microscopic   Result Value Ref Range    RBC, UA 10-20 (A) None Seen, 0-5 /hpf    WBC, UA 4-10 (A) None Seen, 0-5, 5-55, 5-65 /hpf    Epithelial Cells Occasional None Seen, Occasional /hpf    Bacteria, UA None Seen None Seen, Occasional /hpf    MUCUS THREADS Occasional (A) None Seen

## 2019-01-04 NOTE — PATIENT INSTRUCTIONS
1) Avoid NSAIDS - (Example - motrin, advil, ibuprofen, aleve, exederin, etc)  2) Always follow a low salt diet  3) labwork next month  4) Appointment in 3-4 months

## 2019-01-13 ENCOUNTER — HOSPITAL ENCOUNTER (EMERGENCY)
Facility: HOSPITAL | Age: 79
Discharge: HOME | End: 2019-01-13
Attending: EMERGENCY MEDICINE | Admitting: EMERGENCY MEDICINE
Payer: MEDICARE

## 2019-01-13 ENCOUNTER — APPOINTMENT (EMERGENCY)
Dept: RADIOLOGY | Facility: HOSPITAL | Age: 79
End: 2019-01-13
Payer: MEDICARE

## 2019-01-13 VITALS
DIASTOLIC BLOOD PRESSURE: 75 MMHG | RESPIRATION RATE: 18 BRPM | OXYGEN SATURATION: 99 % | HEART RATE: 79 BPM | WEIGHT: 188 LBS | TEMPERATURE: 97.5 F | BODY MASS INDEX: 26.92 KG/M2 | SYSTOLIC BLOOD PRESSURE: 117 MMHG | HEIGHT: 70 IN

## 2019-01-13 DIAGNOSIS — N39.0 UTI (URINARY TRACT INFECTION): ICD-10-CM

## 2019-01-13 DIAGNOSIS — J40 BRONCHITIS: Primary | ICD-10-CM

## 2019-01-13 LAB
ANION GAP SERPL CALCULATED.3IONS-SCNC: 11 MMOL/L (ref 4–13)
BACTERIA UR QL AUTO: ABNORMAL /HPF
BASOPHILS # BLD AUTO: 0.04 THOUSANDS/ΜL (ref 0–0.1)
BASOPHILS NFR BLD AUTO: 0 % (ref 0–1)
BILIRUB UR QL STRIP: NEGATIVE
BUN SERPL-MCNC: 27 MG/DL (ref 5–25)
CALCIUM SERPL-MCNC: 9.3 MG/DL (ref 8.3–10.1)
CHLORIDE SERPL-SCNC: 96 MMOL/L (ref 100–108)
CLARITY UR: ABNORMAL
CO2 SERPL-SCNC: 27 MMOL/L (ref 21–32)
COLOR UR: YELLOW
CREAT SERPL-MCNC: 1.73 MG/DL (ref 0.6–1.3)
EOSINOPHIL # BLD AUTO: 0.11 THOUSAND/ΜL (ref 0–0.61)
EOSINOPHIL NFR BLD AUTO: 1 % (ref 0–6)
ERYTHROCYTE [DISTWIDTH] IN BLOOD BY AUTOMATED COUNT: 13.7 % (ref 11.6–15.1)
FLUAV AG SPEC QL IA: NEGATIVE
FLUBV AG SPEC QL IA: NEGATIVE
GFR SERPL CREATININE-BSD FRML MDRD: 38 ML/MIN/1.73SQ M
GLUCOSE SERPL-MCNC: 99 MG/DL (ref 65–140)
GLUCOSE UR STRIP-MCNC: NEGATIVE MG/DL
HCT VFR BLD AUTO: 48 % (ref 36.5–49.3)
HGB BLD-MCNC: 15.7 G/DL (ref 12–17)
HGB UR QL STRIP.AUTO: ABNORMAL
IMM GRANULOCYTES # BLD AUTO: 0.15 THOUSAND/UL (ref 0–0.2)
IMM GRANULOCYTES NFR BLD AUTO: 1 % (ref 0–2)
KETONES UR STRIP-MCNC: NEGATIVE MG/DL
LEUKOCYTE ESTERASE UR QL STRIP: ABNORMAL
LYMPHOCYTES # BLD AUTO: 2.32 THOUSANDS/ΜL (ref 0.6–4.47)
LYMPHOCYTES NFR BLD AUTO: 18 % (ref 14–44)
MCH RBC QN AUTO: 28.5 PG (ref 26.8–34.3)
MCHC RBC AUTO-ENTMCNC: 32.7 G/DL (ref 31.4–37.4)
MCV RBC AUTO: 87 FL (ref 82–98)
MONOCYTES # BLD AUTO: 1.85 THOUSAND/ΜL (ref 0.17–1.22)
MONOCYTES NFR BLD AUTO: 14 % (ref 4–12)
NEUTROPHILS # BLD AUTO: 8.67 THOUSANDS/ΜL (ref 1.85–7.62)
NEUTS SEG NFR BLD AUTO: 66 % (ref 43–75)
NITRITE UR QL STRIP: NEGATIVE
NON-SQ EPI CELLS URNS QL MICRO: ABNORMAL /HPF
NRBC BLD AUTO-RTO: 0 /100 WBCS
NT-PROBNP SERPL-MCNC: 3339 PG/ML
PH UR STRIP.AUTO: 5.5 [PH] (ref 5–9)
PLATELET # BLD AUTO: 197 THOUSANDS/UL (ref 149–390)
PMV BLD AUTO: 10.5 FL (ref 8.9–12.7)
POTASSIUM SERPL-SCNC: 3.2 MMOL/L (ref 3.5–5.3)
PROT UR STRIP-MCNC: ABNORMAL MG/DL
RBC # BLD AUTO: 5.5 MILLION/UL (ref 3.88–5.62)
RBC #/AREA URNS AUTO: ABNORMAL /HPF
SODIUM SERPL-SCNC: 134 MMOL/L (ref 136–145)
SP GR UR STRIP.AUTO: 1.02 (ref 1–1.03)
TROPONIN I SERPL-MCNC: <0.02 NG/ML
UROBILINOGEN UR QL STRIP.AUTO: 0.2 E.U./DL
WBC # BLD AUTO: 13.14 THOUSAND/UL (ref 4.31–10.16)
WBC #/AREA URNS AUTO: ABNORMAL /HPF

## 2019-01-13 PROCEDURE — 84484 ASSAY OF TROPONIN QUANT: CPT | Performed by: PHYSICIAN ASSISTANT

## 2019-01-13 PROCEDURE — 81001 URINALYSIS AUTO W/SCOPE: CPT | Performed by: PHYSICIAN ASSISTANT

## 2019-01-13 PROCEDURE — 87631 RESP VIRUS 3-5 TARGETS: CPT | Performed by: PHYSICIAN ASSISTANT

## 2019-01-13 PROCEDURE — 71046 X-RAY EXAM CHEST 2 VIEWS: CPT

## 2019-01-13 PROCEDURE — 83880 ASSAY OF NATRIURETIC PEPTIDE: CPT | Performed by: PHYSICIAN ASSISTANT

## 2019-01-13 PROCEDURE — 94640 AIRWAY INHALATION TREATMENT: CPT

## 2019-01-13 PROCEDURE — 96361 HYDRATE IV INFUSION ADD-ON: CPT

## 2019-01-13 PROCEDURE — 85025 COMPLETE CBC W/AUTO DIFF WBC: CPT | Performed by: PHYSICIAN ASSISTANT

## 2019-01-13 PROCEDURE — 80048 BASIC METABOLIC PNL TOTAL CA: CPT | Performed by: PHYSICIAN ASSISTANT

## 2019-01-13 PROCEDURE — 96360 HYDRATION IV INFUSION INIT: CPT

## 2019-01-13 PROCEDURE — 93005 ELECTROCARDIOGRAM TRACING: CPT

## 2019-01-13 PROCEDURE — 36415 COLL VENOUS BLD VENIPUNCTURE: CPT | Performed by: PHYSICIAN ASSISTANT

## 2019-01-13 PROCEDURE — 99284 EMERGENCY DEPT VISIT MOD MDM: CPT

## 2019-01-13 RX ORDER — IPRATROPIUM BROMIDE AND ALBUTEROL SULFATE 2.5; .5 MG/3ML; MG/3ML
3 SOLUTION RESPIRATORY (INHALATION) ONCE
Status: COMPLETED | OUTPATIENT
Start: 2019-01-13 | End: 2019-01-13

## 2019-01-13 RX ORDER — SULFAMETHOXAZOLE AND TRIMETHOPRIM 800; 160 MG/1; MG/1
1 TABLET ORAL 2 TIMES DAILY
Qty: 10 TABLET | Refills: 0 | Status: SHIPPED | OUTPATIENT
Start: 2019-01-13 | End: 2019-01-18

## 2019-01-13 RX ADMIN — SODIUM CHLORIDE 500 ML: 0.9 INJECTION, SOLUTION INTRAVENOUS at 12:39

## 2019-01-13 RX ADMIN — SODIUM CHLORIDE 1000 ML: 0.9 INJECTION, SOLUTION INTRAVENOUS at 12:33

## 2019-01-13 RX ADMIN — IPRATROPIUM BROMIDE AND ALBUTEROL SULFATE 3 ML: 2.5; .5 SOLUTION RESPIRATORY (INHALATION) at 15:32

## 2019-01-13 NOTE — DISCHARGE INSTRUCTIONS
Urinary Traction Infection in Older Adults   WHAT YOU NEED TO KNOW:   A urinary tract infection (UTI) is caused by bacteria that get inside your urinary tract  Your urinary tract includes your kidneys, ureters, bladder, and urethra  Urine is made in your kidneys, and it flows from the ureters to the bladder  Urine leaves the bladder through the urethra  A UTI is more common in your lower urinary tract, which includes your bladder and urethra  DISCHARGE INSTRUCTIONS:   Return to the emergency department if:   · You are urinating very little or not at all  · You are vomiting  · You have a high fever with shaking chills  · You have side or back pain that gets worse  Contact your healthcare provider if:   · You have a fever  · You are a woman and you have increased white or yellow discharge from your vagina  · You do not feel better after 2 days of taking antibiotics  · You have questions or concerns about your condition or care  Medicines:   · Medicines  help treat the bacterial infection or decrease pain and burning when you urinate  You may also need medicines to decrease the urge to urinate often  Your healthcare provider may recommend cranberry juice or cranberry supplements to help decrease your symptoms  · Take your medicine as directed  Contact your healthcare provider if you think your medicine is not helping or if you have side effects  Tell him or her if you are allergic to any medicine  Keep a list of the medicines, vitamins, and herbs you take  Include the amounts, and when and why you take them  Bring the list or the pill bottles to follow-up visits  Carry your medicine list with you in case of an emergency  Self-care:   · Urinate when you feel the urge  Do not hold your urine because bacteria can grow in the bladder if urine stays in the bladder too long  It may be helpful to urinate at least every 3 to 4 hours  · Drink liquids as directed    Liquids can help flush bacteria from your urinary tract  Ask how much liquid to drink each day and which liquids are best for you  You may need to drink more liquids than usual to help flush out the bacteria  Do not drink alcohol, caffeine, and citrus juices  These can irritate your bladder and increase your symptoms  · Apply heat  on your abdomen for 20 to 30 minutes every 2 hours for as many days as directed  Heat helps decrease discomfort and pressure in your bladder  Prevent a UTI:   · Women should wipe front to back  after urinating or having a bowel movement  This may prevent germs from getting into the urinary tract  · Urinate after you have sex  to flush away bacteria that can enter your urinary tract during sex  · Wear cotton underwear and clothes that fit loose  Tight pants and nylon underwear can trap moisture and cause bacteria to grow  Follow up with your healthcare provider as directed:  Write down your questions so you remember to ask them during your visits  © 2017 [employer] Information is for End User's use only and may not be sold, redistributed or otherwise used for commercial purposes  All illustrations and images included in CareNotes® are the copyrighted property of [employer]  or [employer]ug[employer]  The above information is an  only  It is not intended as medical advice for individual conditions or treatments  Talk to your doctor, nurse or pharmacist before following any medical regimen to see if it is safe and effective for you

## 2019-01-13 NOTE — ED PROVIDER NOTES
History  Chief Complaint   Patient presents with    Flu Symptoms     Patient states " I think I have the flu "   Patient complains of being dizzy, worried that there is blood in his urine"     Patient was seen by pcp and started on inhailers and augmentin  73-year-old male with history of hypertension presents with cough x2 weeks  He states he has been seen by urgent care several times with a cough  He was placed on an extended course of Augmentin, very inhalers as well as steroids  He states he continues to have ongoing cough  The cough is productive, nonbloody  He states he has had a decreased appetite, has lost about 5 pounds in the past few weeks  He is also concerned he has blood in his urine however has had that in the past due to stents  He feels dizzy  He denies any fever, chills, chest pain, shortness of breath, difficulty breathing, abdominal pain, nausea, vomiting, diarrhea, constipation  He is a former smoker  Prior to Admission Medications   Prescriptions Last Dose Informant Patient Reported? Taking?    amLODIPine (NORVASC) 10 mg tablet  Self Yes No   Sig: Take 10 mg by mouth every morning     cholecalciferol (VITAMIN D3) 1,000 units tablet  Self Yes No   Sig: Take 1,000 Units by mouth daily after lunch     hydrochlorothiazide (MICROZIDE) 12 5 mg capsule  Self No No   Sig: take 1 capsule by mouth once daily   metoprolol tartrate (LOPRESSOR) 25 mg tablet  Self No No   Sig: take 1 tablet by mouth every 8 hours      Facility-Administered Medications: None       Past Medical History:   Diagnosis Date    Atrial fibrillation ([address] )     Essential hypertension, long-standing     Heart murmur, lifelong     heart murmur since birth    History of cigarette smoking, chronic     64 year smoker at one half pack per day, quit 04/1/17    Kidney stones     12 episodes of kidney stones    Kidney stones with lithotripsy 03/2017    [facility]    Pneumonia      X 2    Vitamin D deficiency     maintained with 1000 units of D    Water retention     Wears glasses     Wears partial dentures     upper       Past Surgical History:   Procedure Laterality Date    APPENDECTOMY  1960    CAROTID ENDARTERECTOMY Left 1998    Supposedly no internal stenosis found    CYSTOSCOPY Right 8/15/2017    Procedure: CYSTOSCOPY RIGHT STENT EXCHANGE;  Surgeon: Mauro Moreland MD;  Location: [address] [place] [address];  Service: Urology    CYSTOSCOPY     [address]  LITHOTRIPSY  03/2017    For nephrolithiasis at [address] [place] [address] &INDWELL STENT INSRT Right 5/2/2017    Procedure: CYSTOSCOPY URETEROSCOPY WITH LITHOTRIPSY HOLMIUM LASER, RETROGRADE PYELOGRAM AND INSERTION STENT URETERAL;  Surgeon: Mauro Moreland MD;  Location: [address] [place] [address];  Service: Urology    [place] CYSTO/URETERO W/LITHOTRIPSY &INDWELL STENT INSRT Right 5/16/2017    Procedure: CYSTOSCOPY, RETROGRADE PYELOGRAM,  FLEXIBLE URETEROSCOPY,  HOLMIUM LASER LITHOTRIPSY, STONE BASKET MANIPULATION,  STENT URETERAL EXCHANGE;  Surgeon: Mauro Moreland MD;  Location: [address] [place] [address];  Service: Urology    [place] CYSTO/URETERO W/LITHOTRIPSY &INDWELL STENT INSRT Right 6/2/2017    Procedure: CYSTOSCOPY, RETROGRADE, STONE MANIPULATION WITH HOLMIUM LASER, STENT PLACEMENT;  Surgeon: Mauro Moreland MD;  Location: [address] [place] [address];  Service: Urology    [place] CYSTO/URETERO W/LITHOTRIPSY &INDWELL STENT INSRT Right 7/18/2017    Procedure: CYSTOSCOPY, RETROGRADE, URETEROSCOPY HOLMIUM LASER Nicko Pretty;  Surgeon: Mauro Moreland MD;  Location: [address] [place] [address];  Service: Urology    NH CYSTOSCOPY,INSERT URETERAL STENT Right 11/14/2017    Procedure: EXCHANGE STENT URETERAL;  Surgeon: Mauro Moreland MD;  Location: [address] [place] [address];  Service: Urology    [place] CYSTOURETHROSCOPY,URETER CATHETER Right 11/14/2017    Procedure: CYSTOSCOPY RETROGRADE, STENT EXCHANGE;  Surgeon: Mauro Moreland MD;  [facility];  Service: Urology    [place] CYSTOURETHROSCOPY,URETER CATHETER Right 5/8/2018    Procedure: Colin Delgadillo;  Surgeon: Thomas Monzon MD;  Location: [address] [place] [address];  Service: Urology    [place] CYSTOURETHROSCOPY,URETER CATHETER Right 8/14/2018    Procedure: Colin Delgadillo;  Surgeon: Thomas Monzon MD;  Location: [address] [place] [address];  Service: Urology    [place] CYSTOURETHROSCOPY,URETER CATHETER Right 11/13/2018    Procedure: Colin Delgadillo, RETROGRADE;  Surgeon: Thomas Monzon MD;  Location: [address] [place] [address];  Service: Urology    PROSTATE SURGERY  2015    Laser Prostatectomy  by Dr Evaristo Sylvester Right 4/18/2017    Procedure: INSERTION STENT URETERAL, RIGHT URETEROSCOPY,  LASER OF URETERAL STRICTURE AND STONE;  Surgeon: Thomas Monzon MD;  Location: [address] [place] [address];  Service:     URETERAL STENT PLACEMENT Right 2/13/2018    Procedure: Colin Delgadillo;  Surgeon: Thomas Monzon MD;  Location: [address] [place] [address];  Service: Urology       Family History   Problem Relation Age of Onset    Hypertension Mother     Alcohol abuse Father     Cirrhosis Father     Cancer Son 15        cancer-knee and above-left-amputation    Other Brother         legally blind in one eye     I have reviewed and agree with the history as documented  Social History   Substance Use Topics    Smoking status: Former Smoker     Packs/day: 0 50     Years: 62 00     Types: Cigarettes     Quit date: 4/15/2017    Smokeless tobacco: Never Used    Alcohol use Yes      Comment: rare        Review of Systems   Constitutional: Negative for chills and fever  HENT: Negative for sneezing and sore throat  Respiratory: Positive for cough  Negative for shortness of breath and wheezing  Cardiovascular: Negative for chest pain, palpitations and leg swelling  Gastrointestinal: Negative for abdominal pain, constipation, diarrhea, nausea and vomiting  Genitourinary: Positive for hematuria  Musculoskeletal: Negative for back pain, gait problem and joint swelling  Skin: Negative for color change, pallor, rash and wound  Neurological: Positive for dizziness  Negative for syncope, weakness, light-headedness, numbness and headaches  All other systems reviewed and are negative  Physical Exam  Physical Exam   Constitutional: He appears well-developed and well-nourished  No distress  HENT:   Head: Normocephalic and atraumatic  Nose: Nose normal    Eyes: EOM are normal    Neck: Normal range of motion  Cardiovascular: Normal rate, regular rhythm, normal heart sounds and intact distal pulses  Exam reveals no gallop and no friction rub  No murmur heard  Pulmonary/Chest: Effort normal and breath sounds normal  No respiratory distress  He has no wheezes  He has no rales  Sp02 is 99% indicating adequate oxygenation on room air   Skin: Skin is warm and dry  Capillary refill takes less than 2 seconds  No rash noted  He is not diaphoretic  No erythema  No pallor  Nursing note and vitals reviewed        Vital Signs  ED Triage Vitals   Temperature Pulse Respirations Blood Pressure SpO2   01/13/19 1203 01/13/19 1203 01/13/19 1201 01/13/19 1203 01/13/19 1203   97 5 °F (36 4 °C) 77 18 91/57 98 %      Temp src Heart Rate Source Patient Position - Orthostatic VS BP Location FiO2 (%)   -- 01/13/19 1514 01/13/19 1514 01/13/19 1514 --    Monitor Sitting Right arm       Pain Score       01/13/19 1201       3           Vitals:    01/13/19 1203 01/13/19 1514   BP: 91/57 117/75   Pulse: 77 79   Patient Position - Orthostatic VS:  Sitting       Visual Acuity      ED Medications  Medications   sodium chloride 0 9 % bolus 500 mL (0 mL Intravenous Stopped 1/13/19 1445)   ipratropium-albuterol (DUO-NEB) 0 5-2 5 mg/3 mL inhalation solution 3 mL (3 mL Nebulization Given 1/13/19 1532)       Diagnostic Studies  Results Reviewed     Procedure Component Value Units Date/Time    Urine Microscopic [[id number]] (Abnormal) Collected:  01/13/19 1502    Lab Status:  Final result Specimen:  Urine from Urine, Clean Catch Updated:  01/13/19 1526     RBC, UA 30-50 (A) /hpf      WBC, UA 4-10 (A) /hpf      Epithelial Cells Occasional /hpf      Bacteria, UA Moderate (A) /hpf     UA (URINE) with reflex to Microscopic [[id number]]  (Abnormal) Collected:  01/13/19 1502    Lab Status:  Final result Specimen:  Urine from Urine, Clean Catch Updated:  01/13/19 1508     Color, UA Yellow     Clarity, UA Slightly Cloudy     Specific Gravity, UA 1 025     pH, UA 5 5     Leukocytes, UA Small (A)     Nitrite, UA Negative     Protein, UA Trace (A) mg/dl      Glucose, UA Negative mg/dl      Ketones, UA Negative mg/dl      Urobilinogen, UA 0 2 E U /dl      Bilirubin, UA Negative     Blood, UA Large (A)    Basic metabolic panel [[id number]]  (Abnormal) Collected:  01/13/19 1237    Lab Status:  Final result Specimen:  Blood from Arm, Right Updated:  01/13/19 1311     Sodium 134 (L) mmol/L      Potassium 3 2 (L) mmol/L      Chloride 96 (L) mmol/L      CO2 27 mmol/L      ANION GAP 11 mmol/L      BUN 27 (H) mg/dL      Creatinine 1 73 (H) mg/dL      Glucose 99 mg/dL      Calcium 9 3 mg/dL      eGFR 38 ml/min/1 73sq m     Narrative:         National Kidney Disease Education Program recommendations are as follows:  GFR calculation is accurate only with a steady state creatinine  Chronic Kidney disease less than 60 ml/min/1 73 sq  meters  Kidney failure less than 15 ml/min/1 73 sq  meters      BNP [[id number]]  (Abnormal) Collected:  01/13/19 1237    Lab Status:  Final result Specimen:  Blood from Arm, Right Updated:  01/13/19 1311     NT-proBNP 3,339 (H) pg/mL     Troponin I [[id number]]  (Normal) Collected:  01/13/19 1237    Lab Status:  Final result Specimen:  Blood from Arm, Right Updated:  01/13/19 1308     Troponin I <0 02 ng/mL     Rapid Influenza Screen with Reflex PCR [[id number]]  (Normal) Collected:  01/13/19 1237    Lab Status:  Final result Specimen: Nasopharyngeal from Nasopharyngeal Swab Updated:  01/13/19 1304     Rapid Influenza A Ag Negative     Rapid Influenza B Ag Negative    INFLUENZA A/B AND RSV, PCR [[id number]] Collected:  01/13/19 1237    Lab Status: In process Specimen:  Nasopharyngeal from Nasopharyngeal Swab Updated:  01/13/19 1304    CBC and differential [[id number]]  (Abnormal) Collected:  01/13/19 1237    Lab Status:  Final result Specimen:  Blood from Arm, Right Updated:  01/13/19 1247     WBC 13 14 (H) Thousand/uL      RBC 5 50 Million/uL      Hemoglobin 15 7 g/dL      Hematocrit 48 0 %      MCV 87 fL      MCH 28 5 pg      MCHC 32 7 g/dL      RDW 13 7 %      MPV 10 5 fL      Platelets 323 Thousands/uL      nRBC 0 /100 WBCs      Neutrophils Relative 66 %      Immat GRANS % 1 %      Lymphocytes Relative 18 %      Monocytes Relative 14 (H) %      Eosinophils Relative 1 %      Basophils Relative 0 %      Neutrophils Absolute 8 67 (H) Thousands/µL      Immature Grans Absolute 0 15 Thousand/uL      Lymphocytes Absolute 2 32 Thousands/µL      Monocytes Absolute 1 85 (H) Thousand/µL      Eosinophils Absolute 0 11 Thousand/µL      Basophils Absolute 0 04 Thousands/µL                  XR chest 2 views   Final Result by Demarco Mohan MD (01/13 1739)      No acute cardiopulmonary disease              Workstation performed: [id number]                    Procedures  Procedures       Phone Contacts  ED Phone Contact    ED Course  ED Course as of Jan 13 2212   Kerline Loss Jan 13, 2019   1452 Reassessed patient, feels well, will give urine sample                                MDM  Number of Diagnoses or Management Options  Bronchitis:   UTI (urinary tract infection):   Diagnosis management comments: UA +leuk, will treat with abx, patient has follow up appointment Dr Shirin Best this week  BNP consistent with previous levels, no shortness of breath   CXR no acute cardiopulm disease  Advised to continue steroids and inhalers as needed for cough  Advised to increase potassium via diet  Gave patient proper education regarding diagnosis  Answered all questions  Return to ED for any worsening of symptoms otherwise follow up with primary care physician for re-evaluation  Discussed plan with patient who verbalized understanding and agreed to plan  Amount and/or Complexity of Data Reviewed  Clinical lab tests: reviewed and ordered  Tests in the radiology section of CPT®: ordered and reviewed  Tests in the medicine section of CPT®: ordered and reviewed  Discussion of test results with the performing providers: yes  Review and summarize past medical records: yes  Discuss the patient with other providers: yes  Independent visualization of images, tracings, or specimens: yes      CritCare Time    Disposition  Final diagnoses:   Bronchitis   UTI (urinary tract infection)     Time reflects when diagnosis was documented in both MDM as applicable and the Disposition within this note     Time User Action Codes Description Comment    1/13/2019  3:38 PM Manisha Phillips Add [J40] Bronchitis     1/13/2019  3:39 PM Manisha Phillips Add [N39 0] UTI (urinary tract infection)       ED Disposition     ED Disposition Condition Comment    Discharge  Gokul Melo discharge to home/self care      Condition at discharge: Good        Follow-up Information     Follow up With Specialties Details Why Contact Info Additional Information    Oliver Downing PA-C Physician Assistant Schedule an appointment as soon as possible for a visit in 3 days If symptoms worsen [address]   [address]       Lizzy Ireland MD Urology Go in 3 days for scheduled appointment Felipe Walton [phone]       [facility] Emergency Department Emergency Medicine Go to As needed [address] [facility] [address] [facility] ED, [place], [place], [address]          Discharge Medication List as of 1/13/2019  3:40 PM      START taking these medications    Details   sulfamethoxazole-trimethoprim (BACTRIM DS) 800-160 mg per tablet Take 1 tablet by mouth 2 (two) times a day for 5 days smx-tmp DS (BACTRIM) 800-160 mg tabs (1tab q12 D10), Starting Sun 1/13/2019, Until Fri 1/18/2019, Print         CONTINUE these medications which have NOT CHANGED    Details   amLODIPine (NORVASC) 10 mg tablet Take 10 mg by mouth every morning  , Historical Med      cholecalciferol (VITAMIN D3) 1,000 units tablet Take 1,000 Units by mouth daily after lunch  , Historical Med      hydrochlorothiazide (MICROZIDE) 12 5 mg capsule take 1 capsule by mouth once daily, Normal      metoprolol tartrate (LOPRESSOR) 25 mg tablet take 1 tablet by mouth every 8 hours, Normal           No discharge procedures on file      ED Provider  Electronically Signed by           Ivy Hunter PA-C  01/13/19 8067

## 2019-01-14 LAB
FLUAV AG SPEC QL: NORMAL
FLUBV AG SPEC QL: NORMAL
RSV B RNA SPEC QL NAA+PROBE: NORMAL

## 2019-01-15 LAB
ATRIAL RATE: 67 BPM
QRS AXIS: -49 DEGREES
QRSD INTERVAL: 116 MS
QT INTERVAL: 418 MS
QTC INTERVAL: 438 MS
T WAVE AXIS: 28 DEGREES
VENTRICULAR RATE: 66 BPM

## 2019-01-15 PROCEDURE — 93010 ELECTROCARDIOGRAM REPORT: CPT | Performed by: INTERNAL MEDICINE

## 2019-01-15 NOTE — H&P
History & Physical - [facility]  Billy Dubin 70 y o  male MRN: [id number]  Unit/Bed#:  Encounter: [id number]    ASSESSMENT/PLAN:  Right hydronephrosis  Right ureteral stones and dense stricture in the proximal to mid right ureter  Refuses surgical repair or nephrectomy until stent can not be exchanged and he requires nephrostomy tubes  Stent placement and holmium laser of stone and stricture 4/18/17  Holmium laser of stone and stent exchange 5/2/17  Renal stones treated elsewhere in [facility] in past year (7 procedures)  Right laser lithotripsy, laser infundibulotomy, and stone basket extraction 6/2/17  Right CRUSH and laser incision of proximal ureter 7/18/17  Last stent changed 11/13/18 with needing the obstructing stent removed prior to the guide wire being passed  · Right ureteral stent exchange     A-fib, CHF, HTN, CKD stage III, Moderate MR, TR, and aortic regurgitation     HISTORY OF PRESENT ILLNESS:  69 y/o male with hx of renal stones and ureteral stricture disease presents for right ureteral stent exchange  Pt does not desire surgical treatment of his stones or stricture until he is at the point of requiring a nephrostomy tube  Desires to continue with routine right ureteral stent exchanges       PAST MEDICAL HISTORY:  Past Medical History:   Diagnosis Date    Atrial fibrillation ([address] )     Essential hypertension, long-standing     Heart murmur, lifelong     heart murmur since birth    History of cigarette smoking, chronic     64 year smoker at one half pack per day, quit 04/1/17    Kidney stones     12 episodes of kidney stones    Kidney stones with lithotripsy 03/2017    Done at [facility]    Pneumonia      X 2    Vitamin D deficiency     maintained with 1000 units of D    Water retention     Wears glasses     Wears partial dentures     upper       PAST SURGICAL HISTORY:  Past Surgical History:   Procedure Laterality Date    APPENDECTOMY  1960    CAROTID ENDARTERECTOMY Left 1998    Supposedly no internal stenosis found    CYSTOSCOPY Right 8/15/2017    Procedure: CYSTOSCOPY RIGHT STENT EXCHANGE;  Surgeon: Yoly Jenkins MD;  Location: [address] [place] [address];  Service: Urology    CYSTOSCOPY     [address]  LITHOTRIPSY  03/2017    For nephrolithiasis at [address] [place] [address] &INDWELL STENT INSRT Right 5/2/2017    Procedure: CYSTOSCOPY URETEROSCOPY WITH LITHOTRIPSY HOLMIUM LASER, RETROGRADE PYELOGRAM AND INSERTION STENT URETERAL;  Surgeon: Yoly Jenkins MD;  Location: [address] [place] [address];  Service: Urology    [place] CYSTO/URETERO W/LITHOTRIPSY &INDWELL STENT INSRT Right 5/16/2017    Procedure: CYSTOSCOPY, RETROGRADE PYELOGRAM,  FLEXIBLE URETEROSCOPY,  HOLMIUM LASER LITHOTRIPSY, STONE BASKET MANIPULATION,  STENT URETERAL EXCHANGE;  Surgeon: Yoly Jenkins MD;  Location: [address] [place] [address];  Service: Urology    [place] CYSTO/URETERO W/LITHOTRIPSY &INDWELL STENT INSRT Right 6/2/2017    Procedure: CYSTOSCOPY, RETROGRADE, STONE MANIPULATION WITH HOLMIUM LASER, STENT PLACEMENT;  Surgeon: Yoly Jenkins MD;  Location: [address] [place] [address];  Service: Urology    [place] CYSTO/URETERO W/LITHOTRIPSY &INDWELL STENT INSRT Right 7/18/2017    Procedure: CYSTOSCOPY, RETROGRADE, URETEROSCOPY HOLMIUM LASER [address] [facility];  Surgeon: Yoly Jenkins MD;  Location: [address] [place] [address];  Service: Urology    [place] CYSTOSCOPY,INSERT URETERAL STENT Right 11/14/2017    Procedure: EXCHANGE STENT URETERAL;  Surgeon: Yoly Jenkins MD;  Location: [address] [place] [address];  Service: Urology    [place] CYSTOURETHROSCOPY,URETER CATHETER Right 11/14/2017    Procedure: CYSTOSCOPY RETROGRADE, STENT EXCHANGE;  Surgeon: Yoly Jenkins MD;  Location: [address] [place] [address];  Service: Urology    [place] CYSTOURETHROSCOPY,URETER CATHETER Right 5/8/2018    Procedure: Orie Satish EXCHANGE;  Surgeon: Yoly Jenkins MD;  Location: [address] [place] [address];  Service: Urology    [place] CYSTOURETHROSCOPY,URETER CATHETER Right 8/14/2018    Procedure: [place];  Surgeon: Kiel Simental MD;  Location: [address] [place] [address];  Service: Urology    [place] CYSTOURETHROSCOPY,URETER CATHETER Right 11/13/2018    Procedure: Robert Stable;  Surgeon: Kiel Simental MD;  Location: [address] [place] [address];  Service: Urology    PROSTATE SURGERY  2015    Laser Prostatectomy  by Dr Madeleine Velasco Right 4/18/2017    Procedure: INSERTION STENT URETERAL, RIGHT URETEROSCOPY,  LASER OF URETERAL STRICTURE AND STONE;  Surgeon: Kiel Simental MD;  Location: [address] [place] [address];  Service:    [address] [place] [address] Right 2/13/2018    Procedure: [place];  Surgeon: Kiel Simental MD;  Location: [facility];  Service: Urology       ALLERGIES:  No Known Allergies    SOCIAL HISTORY:  History   Alcohol Use    Yes     Comment: rare     History   Drug Use No     History   Smoking Status    Former Smoker    Packs/day: 0 50    Years: 62 00    Types: Cigarettes    Quit date: 4/15/2017   Smokeless Tobacco    Never Used       FAMILY HISTORY:  Family History   Problem Relation Age of Onset    Hypertension Mother     Alcohol abuse Father     Cirrhosis Father     Cancer Son 13        cancer-knee and above-left-amputation    Other Brother         legally blind in one eye       MEDICATIONS:  No current facility-administered medications for this encounter       Current Outpatient Prescriptions:     amLODIPine (NORVASC) 10 mg tablet, Take 10 mg by mouth every morning  , Disp: , Rfl:     cholecalciferol (VITAMIN D3) 1,000 units tablet, Take 1,000 Units by mouth daily after lunch  , Disp: , Rfl:     hydrochlorothiazide (MICROZIDE) 12 5 mg capsule, take 1 capsule by mouth once daily, Disp: 90 capsule, Rfl: 1    metoprolol tartrate (LOPRESSOR) 25 mg tablet, take 1 tablet by mouth every 8 hours, Disp: 90 tablet, Rfl: 3    sulfamethoxazole-trimethoprim (BACTRIM DS) 800-160 mg per tablet, Take 1 tablet by mouth 2 (two) times a day for 5 days smx-tmp DS (BACTRIM) 800-160 mg tabs (1tab q12 D10), Disp: 10 tablet, Rfl: 0    Review of Systems    PHYSICAL EXAM:  Physical Exam   Constitutional: He appears well-developed  HENT:   Head: Normocephalic  Cardiovascular: Normal rate and regular rhythm  Pulmonary/Chest: Effort normal    Abdominal: Soft  Genitourinary: Penis normal    Neurological: He is alert  Skin: Skin is warm and dry  LAB RESULTS:  Lab Results   Component Value Date    WBC 13 14 (H) 01/13/2019    HGB 15 7 01/13/2019    HCT 48 0 01/13/2019    MCV 87 01/13/2019     01/13/2019     Lab Results   Component Value Date    CALCIUM 9 3 01/13/2019    K 3 2 (L) 01/13/2019    CO2 27 01/13/2019    CL 96 (L) 01/13/2019    BUN 27 (H) 01/13/2019    CREATININE 1 73 (H) 01/13/2019     Lab Results   Component Value Date    CALCIUM 9 3 01/13/2019    PHOS 2 7 11/19/2018         Darren Bui PA-C  01/15/19    Portions of the record may have been created with voice recognition software   Occasional wrong word or "sound alike" substitutions may have occurred due to the inherent limitations of voice recognition software   Read the chart carefully and recognize, using context, where substitutions have occurred

## 2019-02-02 DIAGNOSIS — I10 ESSENTIAL HYPERTENSION: ICD-10-CM

## 2019-02-05 ENCOUNTER — APPOINTMENT (OUTPATIENT)
Dept: LAB | Facility: HOSPITAL | Age: 79
End: 2019-02-05
Attending: UROLOGY
Payer: MEDICARE

## 2019-02-05 ENCOUNTER — APPOINTMENT (OUTPATIENT)
Dept: PREADMISSION TESTING | Facility: HOSPITAL | Age: 79
End: 2019-02-05
Payer: MEDICARE

## 2019-02-05 ENCOUNTER — TRANSCRIBE ORDERS (OUTPATIENT)
Dept: ADMINISTRATIVE | Facility: HOSPITAL | Age: 79
End: 2019-02-05

## 2019-02-05 DIAGNOSIS — Z01.818 PRE-OP TESTING: ICD-10-CM

## 2019-02-05 DIAGNOSIS — Z01.818 PRE-OP TESTING: Primary | ICD-10-CM

## 2019-02-05 LAB
ALBUMIN SERPL BCP-MCNC: 3 G/DL (ref 3.5–5)
ALP SERPL-CCNC: 100 U/L (ref 46–116)
ALT SERPL W P-5'-P-CCNC: 14 U/L (ref 12–78)
ANION GAP SERPL CALCULATED.3IONS-SCNC: 8 MMOL/L (ref 4–13)
AST SERPL W P-5'-P-CCNC: 20 U/L (ref 5–45)
BACTERIA UR QL AUTO: ABNORMAL /HPF
BILIRUB SERPL-MCNC: 0.5 MG/DL (ref 0.2–1)
BILIRUB UR QL STRIP: NEGATIVE
BUN SERPL-MCNC: 19 MG/DL (ref 5–25)
CALCIUM SERPL-MCNC: 9 MG/DL (ref 8.3–10.1)
CHLORIDE SERPL-SCNC: 104 MMOL/L (ref 100–108)
CLARITY UR: ABNORMAL
CO2 SERPL-SCNC: 29 MMOL/L (ref 21–32)
COLOR UR: YELLOW
CREAT SERPL-MCNC: 1.61 MG/DL (ref 0.6–1.3)
GFR SERPL CREATININE-BSD FRML MDRD: 41 ML/MIN/1.73SQ M
GLUCOSE P FAST SERPL-MCNC: 98 MG/DL (ref 65–99)
GLUCOSE UR STRIP-MCNC: NEGATIVE MG/DL
HGB UR QL STRIP.AUTO: ABNORMAL
KETONES UR STRIP-MCNC: NEGATIVE MG/DL
LEUKOCYTE ESTERASE UR QL STRIP: ABNORMAL
NITRITE UR QL STRIP: NEGATIVE
NON-SQ EPI CELLS URNS QL MICRO: ABNORMAL /HPF
OTHER STN SPEC: ABNORMAL
PH UR STRIP.AUTO: 6.5 [PH] (ref 5–9)
POTASSIUM SERPL-SCNC: 4.5 MMOL/L (ref 3.5–5.3)
PROT SERPL-MCNC: 6.9 G/DL (ref 6.4–8.2)
PROT UR STRIP-MCNC: ABNORMAL MG/DL
RBC #/AREA URNS AUTO: ABNORMAL /HPF
SODIUM SERPL-SCNC: 141 MMOL/L (ref 136–145)
SP GR UR STRIP.AUTO: 1.02 (ref 1–1.03)
UROBILINOGEN UR QL STRIP.AUTO: 0.2 E.U./DL
WBC #/AREA URNS AUTO: ABNORMAL /HPF

## 2019-02-05 PROCEDURE — 36415 COLL VENOUS BLD VENIPUNCTURE: CPT | Performed by: UROLOGY

## 2019-02-05 PROCEDURE — 87086 URINE CULTURE/COLONY COUNT: CPT

## 2019-02-05 PROCEDURE — 81001 URINALYSIS AUTO W/SCOPE: CPT | Performed by: UROLOGY

## 2019-02-05 PROCEDURE — 80053 COMPREHEN METABOLIC PANEL: CPT | Performed by: UROLOGY

## 2019-02-05 NOTE — PRE-PROCEDURE INSTRUCTIONS
My Surgical Experience    The following information was developed to assist you to prepare for your operation  What do I need to do before coming to the hospital?   Arrange for a responsible person to drive you to and from the hospital    Arrange care for your children at home  Children are not allowed in the recovery areas of the hospital   Plan to wear clothing that is easy to put on and take off  If you are having shoulder surgery, wear a shirt that buttons or zippers in the front  Bathing  o Shower the evening before and the morning of your surgery with an antibacterial soap  Please refer to the Pre Op Showering Instructions for Surgery Patients Sheet   o Remove nail polish and all body piercing jewelry  o Do not shave any body part for at least 24 hours before surgery-this includes face, arms, legs and upper body  Food  o Nothing to eat or drink after midnight the night before your surgery  This includes candy and chewing gum  o Exception: If your surgery is after 12:00pm (noon), you may have clear liquids such as 7-Up®, ginger ale, apple or cranberry juice, Jell-O®, water, or clear broth until 8:00 am  o Do not drink milk or juice with pulp on the morning before surgery  o Do not drink alcohol 24 hours before surgery  Medicine  o Follow instructions you received from your surgeon about which medicines you may take on the day of surgery  o If instructed to take medicine on the morning of surgery, take pills with just a small sip of water  Call your prescribing doctor for specific infroamtion on what to do if you take insulin    What should I bring to the hospital?    Bring:  Jaclyn Marie or a walker, if you have them, for foot or knee surgery   A list of the daily medicines, vitamins, minerals, herbals and nutritional supplements you take   Include the dosages of medicines and the time you take them each day   Glasses, dentures or hearing aids   Minimal clothing; you will be wearing hospital sleepwear   Photo ID; required to verify your identity   If you have a Living Will or Power of , bring a copy of the documents   If you have an ostomy, bring an extra pouch and any supplies you use    Do not bring   Medicines or inhalers   Money, valuables or jewelry    What other information should I know about the day of surgery?  Notify your surgeons if you develop a cold, sore throat, cough, fever, rash or any other illness   Report to the Ambulatory Surgical/Same Day Surgery Unit   You will be instructed to stop at Registration only if you have not been pre-registered   Inform your  fi they do not stay that they will be asked by the staff to leave a phone number where they can be reached   Be available to be reached before surgery  In the event the operating room schedule changes, you may be asked to come in earlier or later than expected    *It is important to tell your doctor and others involved in your health care if you are taking or have been taking any non-prescription drugs, vitamins, minerals, herbals or other nutritional supplements  Any of these may interact with some food or medicines and cause a reaction      Pre-Surgery Instructions:   Medication Instructions    amLODIPine (NORVASC) 10 mg tablet Instructed patient per Anesthesia Guidelines   cholecalciferol (VITAMIN D3) 1,000 units tablet Instructed patient per Anesthesia Guidelines   hydrochlorothiazide (MICROZIDE) 12 5 mg capsule Instructed patient per Anesthesia Guidelines   metoprolol tartrate (LOPRESSOR) 25 mg tablet Instructed patient per Anesthesia Guidelines  To take metoprolol and amlodipine a m  Of surgery

## 2019-02-06 LAB — BACTERIA UR CULT: NORMAL

## 2019-02-11 ENCOUNTER — ANESTHESIA EVENT (OUTPATIENT)
Dept: PERIOP | Facility: HOSPITAL | Age: 79
End: 2019-02-11
Payer: MEDICARE

## 2019-02-12 ENCOUNTER — APPOINTMENT (OUTPATIENT)
Dept: RADIOLOGY | Facility: HOSPITAL | Age: 79
End: 2019-02-12
Payer: MEDICARE

## 2019-02-12 ENCOUNTER — ANESTHESIA (OUTPATIENT)
Dept: PERIOP | Facility: HOSPITAL | Age: 79
End: 2019-02-12
Payer: MEDICARE

## 2019-02-12 ENCOUNTER — HOSPITAL ENCOUNTER (OUTPATIENT)
Facility: HOSPITAL | Age: 79
Setting detail: OUTPATIENT SURGERY
Discharge: HOME | End: 2019-02-12
Attending: UROLOGY | Admitting: UROLOGY
Payer: MEDICARE

## 2019-02-12 VITALS
HEART RATE: 71 BPM | OXYGEN SATURATION: 95 % | RESPIRATION RATE: 16 BRPM | SYSTOLIC BLOOD PRESSURE: 129 MMHG | TEMPERATURE: 97.3 F | BODY MASS INDEX: 28.35 KG/M2 | DIASTOLIC BLOOD PRESSURE: 76 MMHG | HEIGHT: 70 IN | WEIGHT: 198 LBS

## 2019-02-12 PROCEDURE — C2617 STENT, NON-COR, TEM W/O DEL: HCPCS | Performed by: UROLOGY

## 2019-02-12 PROCEDURE — C1769 GUIDE WIRE: HCPCS | Performed by: UROLOGY

## 2019-02-12 PROCEDURE — 74450 X-RAY URETHRA/BLADDER: CPT

## 2019-02-12 DEVICE — IMPLANTABLE DEVICE
Type: IMPLANTABLE DEVICE | Site: URETER | Status: NON-FUNCTIONAL
Removed: 2019-10-22

## 2019-02-12 RX ORDER — FENTANYL CITRATE 50 UG/ML
INJECTION, SOLUTION INTRAMUSCULAR; INTRAVENOUS AS NEEDED
Status: DISCONTINUED | OUTPATIENT
Start: 2019-02-12 | End: 2019-02-12 | Stop reason: SURG

## 2019-02-12 RX ORDER — SODIUM CHLORIDE 9 MG/ML
75 INJECTION, SOLUTION INTRAVENOUS CONTINUOUS
Status: DISCONTINUED | OUTPATIENT
Start: 2019-02-12 | End: 2019-02-12 | Stop reason: HOSPADM

## 2019-02-12 RX ORDER — MAGNESIUM HYDROXIDE 1200 MG/15ML
LIQUID ORAL AS NEEDED
Status: DISCONTINUED | OUTPATIENT
Start: 2019-02-12 | End: 2019-02-12 | Stop reason: HOSPADM

## 2019-02-12 RX ORDER — PROPOFOL 10 MG/ML
INJECTION, EMULSION INTRAVENOUS AS NEEDED
Status: DISCONTINUED | OUTPATIENT
Start: 2019-02-12 | End: 2019-02-12 | Stop reason: SURG

## 2019-02-12 RX ORDER — ONDANSETRON 2 MG/ML
4 INJECTION INTRAMUSCULAR; INTRAVENOUS ONCE AS NEEDED
Status: DISCONTINUED | OUTPATIENT
Start: 2019-02-12 | End: 2019-02-12 | Stop reason: HOSPADM

## 2019-02-12 RX ORDER — ASPIRIN 81 MG/1
81 TABLET ORAL DAILY
COMMUNITY
End: 2020-10-30 | Stop reason: HOSPADM

## 2019-02-12 RX ORDER — HYDROMORPHONE HCL/PF 1 MG/ML
0.5 SYRINGE (ML) INJECTION
Status: DISCONTINUED | OUTPATIENT
Start: 2019-02-12 | End: 2019-02-12 | Stop reason: HOSPADM

## 2019-02-12 RX ORDER — CEFAZOLIN SODIUM 1 G/50ML
1000 SOLUTION INTRAVENOUS ONCE
Status: COMPLETED | OUTPATIENT
Start: 2019-02-12 | End: 2019-02-12

## 2019-02-12 RX ORDER — PROPOFOL 10 MG/ML
INJECTION, EMULSION INTRAVENOUS CONTINUOUS PRN
Status: DISCONTINUED | OUTPATIENT
Start: 2019-02-12 | End: 2019-02-12 | Stop reason: SURG

## 2019-02-12 RX ORDER — CEFAZOLIN SODIUM 1 G/50ML
SOLUTION INTRAVENOUS AS NEEDED
Status: DISCONTINUED | OUTPATIENT
Start: 2019-02-12 | End: 2019-02-12 | Stop reason: SURG

## 2019-02-12 RX ORDER — DIPHENHYDRAMINE HYDROCHLORIDE 50 MG/ML
12.5 INJECTION INTRAMUSCULAR; INTRAVENOUS ONCE AS NEEDED
Status: DISCONTINUED | OUTPATIENT
Start: 2019-02-12 | End: 2019-02-12 | Stop reason: HOSPADM

## 2019-02-12 RX ADMIN — FENTANYL CITRATE 50 MCG: 50 INJECTION, SOLUTION INTRAMUSCULAR; INTRAVENOUS at 08:01

## 2019-02-12 RX ADMIN — PROPOFOL 110 MCG/KG/MIN: 10 INJECTION, EMULSION INTRAVENOUS at 07:52

## 2019-02-12 RX ADMIN — FENTANYL CITRATE 50 MCG: 50 INJECTION, SOLUTION INTRAMUSCULAR; INTRAVENOUS at 07:52

## 2019-02-12 RX ADMIN — PROPOFOL 70 MG: 10 INJECTION, EMULSION INTRAVENOUS at 07:52

## 2019-02-12 RX ADMIN — CEFAZOLIN SODIUM 1000 MG: 1 SOLUTION INTRAVENOUS at 07:50

## 2019-02-12 RX ADMIN — SODIUM CHLORIDE 75 ML/HR: 0.9 INJECTION, SOLUTION INTRAVENOUS at 06:33

## 2019-02-12 RX ADMIN — PROPOFOL 30 MG: 10 INJECTION, EMULSION INTRAVENOUS at 08:01

## 2019-02-12 RX ADMIN — CEFAZOLIN SODIUM 1000 MG: 1 SOLUTION INTRAVENOUS at 07:24

## 2019-02-12 NOTE — OP NOTE
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]  Ureteral stones    Post-Op Diagnosis Codes:  Same    Procedure(s) (LRB):  CYSTOSCOPY, RETROGRADE, STENT REMOVAL AND STENT PLACEMENT (Right)   Fluoroscopy    Specimen(s):  * No specimens in log *    Estimated Blood Loss:   none    Drains:  Ureteral Drain/Stent Right ureter 28 Fr  (Active)   Number of days: 0   7 28 stent    Anesthesia Type:   IV Sedation with Anesthesia    Operative Indications:  Dense stricture and ureteral stones mid ureter    Operative Findings:  Dense stricture and ureteral stones mid ureter  Wire able to pass this time without stent coming out first    Complications:   None    Procedure and Technique:  After obtaining consent the patient was identified and brought to the operating room  Preoperative antibiotics were given  Sedation was performed  Cystoscopy was performed after the appropriate time-out  Guidewire was passed alongside the right stent into the area of the kidney without needing to remove the stent first   The stent was then grasped by its distal coil and pulled to the meatus leaving the wire in position  The stent was removed and a five Western Dyan open-ended catheter was positioned proximally in the ureter past the area of dense stricture and stone  The wire was removed and a retrograde was gently performed outlining the renal pelvis and calices  The wire was then reintroduced and the catheter was removed  The wire was used to place a seven Western Dyan 28 cm stent into position with a good coil in the upper pole calyx  The stent appeared to be draining nicely on direct visualization in the bladder and x-ray study proved good positioning and coil proximally and distally    The bladder was drained and he was awakened and transferred to the [facility] in satisfactory condition  Patient Disposition:  PACU  he will be discharged home today and he will have his stent exchanged again in three months      SIGNATURE: Chivo Naranjo MD  DATE: February 12, 2019  TIME: 8:13 AM

## 2019-02-12 NOTE — ANESTHESIA PREPROCEDURE EVALUATION
Review of Systems/Medical History  Patient summary reviewed  Chart reviewed      Cardiovascular  EKG reviewed, Exercise tolerance (METS): >4,  Hypertension , Valvular heart disease , mitral regurgitation, aortic valve stenosis and aortic regurgitation, Dysrhythmias , atrial fibrillation,   Pulmonary hypertension Pulmonary  No pneumonia,        GI/Hepatic       Kidney stones, Chronic kidney disease stage 3,        Endo/Other     GYN       Hematology   Musculoskeletal       Neurology   Psychology           Physical Exam    Airway    Mallampati score: I  TM Distance: >3 FB  Neck ROM: full     Dental   upper dentures,     Cardiovascular  Rhythm: irregular, Rate: normal, Murmur,     Pulmonary  Breath sounds clear to auscultation,     Other Findings        Anesthesia Plan  ASA Score- 3     Anesthesia Type- general with ASA Monitors  Additional Monitors:   Airway Plan: LMA  Plan Factors-    Induction- intravenous  Postoperative Plan-     Informed Consent- Anesthetic plan and risks discussed with patient  I personally reviewed this patient with the CRNA  Discussed and agreed on the Anesthesia Plan with the CRNA  Edyta Michaels

## 2019-02-12 NOTE — ANESTHESIA POSTPROCEDURE EVALUATION
Post-Op Assessment Note    CV Status:  Stable  Pain Score: 0    Pain management: adequate     Mental Status:  Somnolent   Hydration Status:  Stable   PONV Controlled:  None   Airway Patency:  Patent and adequate   Post Op Vitals Reviewed: Yes      Staff: CRNA   Comments: asleep          BP (P) 111/63 (02/12/19 0815)    Temp (!) (P) 97 °F (36 1 °C) (02/12/19 0815)    Pulse (P) 66 (02/12/19 0815)   Resp (P) 16 (02/12/19 0815)    SpO2 (P) 97 % (02/12/19 0815)

## 2019-03-29 NOTE — ANESTHESIA POSTPROCEDURE EVALUATION
Post-Op Assessment Note      CV Status:  Stable    Mental Status:  Alert and awake    Hydration Status:  Stable    PONV Controlled:  None    Airway Patency:  Patent    Post Op Vitals Reviewed: Yes          Staff: CRNA           BP      Temp     Pulse     Resp      SpO2
normal...

## 2019-04-12 ENCOUNTER — TELEPHONE (OUTPATIENT)
Dept: NEPHROLOGY | Facility: CLINIC | Age: 79
End: 2019-04-12

## 2019-04-29 ENCOUNTER — TELEPHONE (OUTPATIENT)
Dept: NEPHROLOGY | Facility: CLINIC | Age: 79
End: 2019-04-29

## 2019-04-29 ENCOUNTER — LAB (OUTPATIENT)
Dept: LAB | Facility: HOSPITAL | Age: 79
End: 2019-04-29
Attending: INTERNAL MEDICINE
Payer: MEDICARE

## 2019-04-29 ENCOUNTER — TRANSCRIBE ORDERS (OUTPATIENT)
Dept: ADMINISTRATIVE | Facility: HOSPITAL | Age: 79
End: 2019-04-29

## 2019-04-29 DIAGNOSIS — I10 BENIGN ESSENTIAL HTN: ICD-10-CM

## 2019-04-29 DIAGNOSIS — N18.30 CKD (CHRONIC KIDNEY DISEASE), STAGE III (HCC): ICD-10-CM

## 2019-04-29 LAB
ANION GAP SERPL CALCULATED.3IONS-SCNC: 5 MMOL/L (ref 4–13)
BACTERIA UR QL AUTO: ABNORMAL /HPF
BILIRUB UR QL STRIP: NEGATIVE
BUN SERPL-MCNC: 25 MG/DL (ref 5–25)
CALCIUM SERPL-MCNC: 9.1 MG/DL (ref 8.3–10.1)
CHLORIDE SERPL-SCNC: 102 MMOL/L (ref 100–108)
CLARITY UR: ABNORMAL
CO2 SERPL-SCNC: 30 MMOL/L (ref 21–32)
COLOR UR: YELLOW
CREAT SERPL-MCNC: 1.91 MG/DL (ref 0.6–1.3)
CREAT UR-MCNC: 143 MG/DL
ERYTHROCYTE [DISTWIDTH] IN BLOOD BY AUTOMATED COUNT: 13.4 % (ref 11.6–15.1)
GFR SERPL CREATININE-BSD FRML MDRD: 33 ML/MIN/1.73SQ M
GLUCOSE P FAST SERPL-MCNC: 98 MG/DL (ref 65–99)
GLUCOSE UR STRIP-MCNC: NEGATIVE MG/DL
HCT VFR BLD AUTO: 45.8 % (ref 36.5–49.3)
HGB BLD-MCNC: 14.5 G/DL (ref 12–17)
HGB UR QL STRIP.AUTO: ABNORMAL
KETONES UR STRIP-MCNC: NEGATIVE MG/DL
LEUKOCYTE ESTERASE UR QL STRIP: ABNORMAL
MCH RBC QN AUTO: 28.4 PG (ref 26.8–34.3)
MCHC RBC AUTO-ENTMCNC: 31.7 G/DL (ref 31.4–37.4)
MCV RBC AUTO: 90 FL (ref 82–98)
NITRITE UR QL STRIP: NEGATIVE
NON-SQ EPI CELLS URNS QL MICRO: ABNORMAL /HPF
OTHER STN SPEC: ABNORMAL
PH UR STRIP.AUTO: 7 [PH]
PHOSPHATE SERPL-MCNC: 2.7 MG/DL (ref 2.3–4.1)
PLATELET # BLD AUTO: 157 THOUSANDS/UL (ref 149–390)
PMV BLD AUTO: 10.4 FL (ref 8.9–12.7)
POTASSIUM SERPL-SCNC: 4 MMOL/L (ref 3.5–5.3)
PROT UR STRIP-MCNC: ABNORMAL MG/DL
PROT UR-MCNC: 48 MG/DL
PROT/CREAT UR: 0.34 MG/G{CREAT} (ref 0–0.1)
RBC # BLD AUTO: 5.11 MILLION/UL (ref 3.88–5.62)
RBC #/AREA URNS AUTO: ABNORMAL /HPF
SODIUM SERPL-SCNC: 137 MMOL/L (ref 136–145)
SP GR UR STRIP.AUTO: 1.01 (ref 1–1.03)
UROBILINOGEN UR QL STRIP.AUTO: 0.2 E.U./DL
WBC # BLD AUTO: 7.25 THOUSAND/UL (ref 4.31–10.16)
WBC #/AREA URNS AUTO: ABNORMAL /HPF

## 2019-04-29 PROCEDURE — 84100 ASSAY OF PHOSPHORUS: CPT

## 2019-04-29 PROCEDURE — 82570 ASSAY OF URINE CREATININE: CPT

## 2019-04-29 PROCEDURE — 85027 COMPLETE CBC AUTOMATED: CPT

## 2019-04-29 PROCEDURE — 81001 URINALYSIS AUTO W/SCOPE: CPT

## 2019-04-29 PROCEDURE — 80048 BASIC METABOLIC PNL TOTAL CA: CPT

## 2019-04-29 PROCEDURE — 36415 COLL VENOUS BLD VENIPUNCTURE: CPT

## 2019-04-29 PROCEDURE — 84156 ASSAY OF PROTEIN URINE: CPT

## 2019-05-06 ENCOUNTER — OFFICE VISIT (OUTPATIENT)
Dept: NEPHROLOGY | Facility: CLINIC | Age: 79
End: 2019-05-06
Payer: MEDICARE

## 2019-05-06 VITALS
WEIGHT: 201.8 LBS | SYSTOLIC BLOOD PRESSURE: 114 MMHG | DIASTOLIC BLOOD PRESSURE: 70 MMHG | BODY MASS INDEX: 28.89 KG/M2 | HEIGHT: 70 IN | HEART RATE: 70 BPM

## 2019-05-06 DIAGNOSIS — N18.30 CKD (CHRONIC KIDNEY DISEASE), STAGE III (HCC): Primary | ICD-10-CM

## 2019-05-06 DIAGNOSIS — N25.81 SECONDARY HYPERPARATHYROIDISM (HCC): ICD-10-CM

## 2019-05-06 DIAGNOSIS — N20.0 NEPHROLITHIASIS: ICD-10-CM

## 2019-05-06 DIAGNOSIS — I10 BENIGN ESSENTIAL HTN: ICD-10-CM

## 2019-05-06 PROCEDURE — 99214 OFFICE O/P EST MOD 30 MIN: CPT | Performed by: INTERNAL MEDICINE

## 2019-05-09 ENCOUNTER — ANESTHESIA EVENT (OUTPATIENT)
Dept: PERIOP | Facility: HOSPITAL | Age: 79
End: 2019-05-09
Payer: MEDICARE

## 2019-05-10 ENCOUNTER — HOSPITAL ENCOUNTER (OUTPATIENT)
Facility: HOSPITAL | Age: 79
Setting detail: OUTPATIENT SURGERY
Discharge: HOME | End: 2019-05-10
Attending: UROLOGY | Admitting: UROLOGY
Payer: MEDICARE

## 2019-05-10 ENCOUNTER — APPOINTMENT (OUTPATIENT)
Dept: RADIOLOGY | Facility: HOSPITAL | Age: 79
End: 2019-05-10
Payer: MEDICARE

## 2019-05-10 ENCOUNTER — ANESTHESIA (OUTPATIENT)
Dept: PERIOP | Facility: HOSPITAL | Age: 79
End: 2019-05-10
Payer: MEDICARE

## 2019-05-10 VITALS
HEART RATE: 63 BPM | OXYGEN SATURATION: 95 % | RESPIRATION RATE: 18 BRPM | BODY MASS INDEX: 28.77 KG/M2 | TEMPERATURE: 97.1 F | HEIGHT: 70 IN | WEIGHT: 201 LBS | SYSTOLIC BLOOD PRESSURE: 109 MMHG | DIASTOLIC BLOOD PRESSURE: 68 MMHG

## 2019-05-10 DIAGNOSIS — B37.2 CANDIDIASIS, INTERTRIGINOUS: Primary | ICD-10-CM

## 2019-05-10 DIAGNOSIS — N13.5 STRICTURE OR KINKING OF URETER: ICD-10-CM

## 2019-05-10 PROBLEM — R31.0 HEMATURIA, GROSS: Status: ACTIVE | Noted: 2019-05-10

## 2019-05-10 PROCEDURE — C1769 GUIDE WIRE: HCPCS | Performed by: UROLOGY

## 2019-05-10 PROCEDURE — 74420 UROGRAPHY RTRGR +-KUB: CPT

## 2019-05-10 PROCEDURE — C2617 STENT, NON-COR, TEM W/O DEL: HCPCS | Performed by: UROLOGY

## 2019-05-10 DEVICE — INLAY URETERAL STENT W/O GUIDEWIRE
Type: IMPLANTABLE DEVICE | Site: URETER | Status: NON-FUNCTIONAL
Brand: BARD® INLAY® URETERAL STENT
Removed: 2019-10-22

## 2019-05-10 RX ORDER — NYSTATIN 100000 U/G
CREAM TOPICAL 2 TIMES DAILY
Qty: 45 G | Refills: 1 | Status: SHIPPED | OUTPATIENT
Start: 2019-05-10 | End: 2019-07-24

## 2019-05-10 RX ORDER — FENTANYL CITRATE 50 UG/ML
INJECTION, SOLUTION INTRAMUSCULAR; INTRAVENOUS AS NEEDED
Status: DISCONTINUED | OUTPATIENT
Start: 2019-05-10 | End: 2019-05-10 | Stop reason: SURG

## 2019-05-10 RX ORDER — MAGNESIUM HYDROXIDE 1200 MG/15ML
LIQUID ORAL AS NEEDED
Status: DISCONTINUED | OUTPATIENT
Start: 2019-05-10 | End: 2019-05-10 | Stop reason: HOSPADM

## 2019-05-10 RX ORDER — SODIUM CHLORIDE 9 MG/ML
75 INJECTION, SOLUTION INTRAVENOUS CONTINUOUS
Status: DISCONTINUED | OUTPATIENT
Start: 2019-05-10 | End: 2019-05-10 | Stop reason: HOSPADM

## 2019-05-10 RX ORDER — PROPOFOL 10 MG/ML
INJECTION, EMULSION INTRAVENOUS AS NEEDED
Status: DISCONTINUED | OUTPATIENT
Start: 2019-05-10 | End: 2019-05-10 | Stop reason: SURG

## 2019-05-10 RX ORDER — DIPHENHYDRAMINE HYDROCHLORIDE 50 MG/ML
12.5 INJECTION INTRAMUSCULAR; INTRAVENOUS ONCE AS NEEDED
Status: DISCONTINUED | OUTPATIENT
Start: 2019-05-10 | End: 2019-05-10 | Stop reason: HOSPADM

## 2019-05-10 RX ORDER — CEFAZOLIN SODIUM 2 G/50ML
2000 SOLUTION INTRAVENOUS ONCE
Status: COMPLETED | OUTPATIENT
Start: 2019-05-10 | End: 2019-05-10

## 2019-05-10 RX ORDER — ONDANSETRON 2 MG/ML
INJECTION INTRAMUSCULAR; INTRAVENOUS AS NEEDED
Status: DISCONTINUED | OUTPATIENT
Start: 2019-05-10 | End: 2019-05-10 | Stop reason: SURG

## 2019-05-10 RX ORDER — SODIUM CHLORIDE 9 MG/ML
125 INJECTION, SOLUTION INTRAVENOUS CONTINUOUS
Status: DISCONTINUED | OUTPATIENT
Start: 2019-05-10 | End: 2019-05-10 | Stop reason: SDUPTHER

## 2019-05-10 RX ORDER — PROPOFOL 10 MG/ML
INJECTION, EMULSION INTRAVENOUS CONTINUOUS PRN
Status: DISCONTINUED | OUTPATIENT
Start: 2019-05-10 | End: 2019-05-10 | Stop reason: SURG

## 2019-05-10 RX ORDER — ONDANSETRON 2 MG/ML
4 INJECTION INTRAMUSCULAR; INTRAVENOUS ONCE AS NEEDED
Status: DISCONTINUED | OUTPATIENT
Start: 2019-05-10 | End: 2019-05-10 | Stop reason: HOSPADM

## 2019-05-10 RX ORDER — DEXAMETHASONE SODIUM PHOSPHATE 4 MG/ML
INJECTION, SOLUTION INTRA-ARTICULAR; INTRALESIONAL; INTRAMUSCULAR; INTRAVENOUS; SOFT TISSUE AS NEEDED
Status: DISCONTINUED | OUTPATIENT
Start: 2019-05-10 | End: 2019-05-10 | Stop reason: SURG

## 2019-05-10 RX ORDER — MEPERIDINE HYDROCHLORIDE 25 MG/ML
12.5 INJECTION INTRAMUSCULAR; INTRAVENOUS; SUBCUTANEOUS
Status: DISCONTINUED | OUTPATIENT
Start: 2019-05-10 | End: 2019-05-10 | Stop reason: HOSPADM

## 2019-05-10 RX ORDER — FENTANYL CITRATE/PF 50 MCG/ML
25 SYRINGE (ML) INJECTION
Status: DISCONTINUED | OUTPATIENT
Start: 2019-05-10 | End: 2019-05-10 | Stop reason: HOSPADM

## 2019-05-10 RX ADMIN — PROPOFOL 100 MG: 10 INJECTION, EMULSION INTRAVENOUS at 09:25

## 2019-05-10 RX ADMIN — DEXAMETHASONE SODIUM PHOSPHATE 4 MG: 4 INJECTION, SOLUTION INTRA-ARTICULAR; INTRALESIONAL; INTRAMUSCULAR; INTRAVENOUS; SOFT TISSUE at 09:36

## 2019-05-10 RX ADMIN — FENTANYL CITRATE 100 MCG: 50 INJECTION, SOLUTION INTRAMUSCULAR; INTRAVENOUS at 09:25

## 2019-05-10 RX ADMIN — ONDANSETRON 4 MG: 2 INJECTION INTRAMUSCULAR; INTRAVENOUS at 09:36

## 2019-05-10 RX ADMIN — CEFAZOLIN SODIUM 2000 MG: 2 SOLUTION INTRAVENOUS at 09:20

## 2019-05-10 RX ADMIN — SODIUM CHLORIDE: 0.9 INJECTION, SOLUTION INTRAVENOUS at 09:20

## 2019-05-10 RX ADMIN — PROPOFOL 100 MCG/KG/MIN: 10 INJECTION, EMULSION INTRAVENOUS at 09:25

## 2019-05-30 DIAGNOSIS — I10 ESSENTIAL HYPERTENSION: ICD-10-CM

## 2019-06-21 DIAGNOSIS — I10 BENIGN ESSENTIAL HTN: ICD-10-CM

## 2019-06-21 RX ORDER — HYDROCHLOROTHIAZIDE 12.5 MG/1
CAPSULE, GELATIN COATED ORAL
Qty: 90 CAPSULE | Refills: 1 | Status: SHIPPED | OUTPATIENT
Start: 2019-06-21 | End: 2019-12-15 | Stop reason: SDUPTHER

## 2019-07-12 ENCOUNTER — APPOINTMENT (OUTPATIENT)
Dept: LAB | Facility: HOSPITAL | Age: 79
End: 2019-07-12
Attending: UROLOGY
Payer: MEDICARE

## 2019-07-12 ENCOUNTER — TRANSCRIBE ORDERS (OUTPATIENT)
Dept: ADMINISTRATIVE | Facility: HOSPITAL | Age: 79
End: 2019-07-12

## 2019-07-12 DIAGNOSIS — N13.1 HYDRONEPHROSIS WITH URETERAL STRICTURE: ICD-10-CM

## 2019-07-12 DIAGNOSIS — N13.5 STRICTURE OR KINKING OF URETER: ICD-10-CM

## 2019-07-12 DIAGNOSIS — N13.5 STRICTURE OR KINKING OF URETER: Primary | ICD-10-CM

## 2019-07-12 LAB
ANION GAP SERPL CALCULATED.3IONS-SCNC: 9 MMOL/L (ref 4–13)
BUN SERPL-MCNC: 19 MG/DL (ref 5–25)
CALCIUM SERPL-MCNC: 8.8 MG/DL (ref 8.3–10.1)
CHLORIDE SERPL-SCNC: 103 MMOL/L (ref 100–108)
CO2 SERPL-SCNC: 26 MMOL/L (ref 21–32)
CREAT SERPL-MCNC: 1.66 MG/DL (ref 0.6–1.3)
GFR SERPL CREATININE-BSD FRML MDRD: 39 ML/MIN/1.73SQ M
GLUCOSE SERPL-MCNC: 98 MG/DL (ref 65–140)
POTASSIUM SERPL-SCNC: 4.2 MMOL/L (ref 3.5–5.3)
SODIUM SERPL-SCNC: 138 MMOL/L (ref 136–145)

## 2019-07-12 PROCEDURE — 80048 BASIC METABOLIC PNL TOTAL CA: CPT

## 2019-07-12 PROCEDURE — 36415 COLL VENOUS BLD VENIPUNCTURE: CPT

## 2019-07-18 NOTE — H&P
History & Physical - [facility]  Benny Men 70 y o  male MRN: [id number]  Unit/Bed#:  Encounter: [id number]    ASSESSMENT/PLAN:  Right hydronephrosis  Right ureteral stones and dense stricture in the proximal to mid right ureter  Refuses surgical repair or nephrectomy until stent can not be exchanged and he requires nephrostomy tubes  Stent placement and holmium laser of stone and stricture 4/18/17  Holmium laser of stone and stent exchange 5/2/17  Renal stones treated elsewhere in [place] in past year (7 procedures)  Right laser lithotripsy, laser infundibulotomy, and stone basket extraction 6/2/17  Right CRUSH and laser incision of proximal ureter 7/18/17  Last stent changed 05/10/19-- stone causing near complete obstruction of the right proximal ureter  Required stent to be removed in order for a guidewire to pass  JUNIOR on CKD with hematuria  · Right ureteral stent exchange, at 10-11 weeks      A-fib, CHF, HTN, CKD stage III, Moderate MR, TR, and aortic regurgitation     HISTORY OF PRESENT ILLNESS:  69 y/o male with hx of renal stones and ureteral stricture disease presents for right ureteral stent exchange  Pt does not desire surgical treatment of his stones or stricture until he is at the point of requiring a nephrostomy tube  Desires to continue with routine right ureteral stent exchanges  Pt previously had last stent exchange performed early secondary to change in renal function  Presents for routine stent exchange       PAST MEDICAL HISTORY:  Past Medical History:   Diagnosis Date    Atrial fibrillation ([address] )     Essential hypertension, long-standing     Heart murmur, lifelong     heart murmur since birth    History of cigarette smoking, chronic     62 year smoker at one half pack per day, quit 04/1/17    Kidney stones     12 episodes of kidney stones    Kidney stones with lithotripsy 03/2017    [facility]    Pneumonia      X 2    Vitamin D deficiency     maintained with 1000 units of D    Water retention     Wears glasses     Wears partial dentures     upper       PAST SURGICAL HISTORY:  Past Surgical History:   Procedure Laterality Date    APPENDECTOMY  1960    CAROTID ENDARTERECTOMY Left 1998    Supposedly no internal stenosis found    CYSTOSCOPY Right 8/15/2017    Procedure: CYSTOSCOPY RIGHT STENT EXCHANGE;  Surgeon: Clemencia Delgadillo MD;  Location: [address] [place] [address];  Service: Urology    CYSTOSCOPY     [address] [facility] [address]  LITHOTRIPSY  03/2017    For nephrolithiasis at [address]  5/10/2019    CT CYSTO/URETERO W/LITHOTRIPSY &INDWELL STENT INSRT Right 5/2/2017    Procedure: CYSTOSCOPY URETEROSCOPY WITH LITHOTRIPSY HOLMIUM LASER, RETROGRADE PYELOGRAM AND INSERTION STENT URETERAL;  Surgeon: Clemencia Delgadillo MD;  Location: [address] [place] [address];  Service: Urology    CT CYSTO/URETERO W/LITHOTRIPSY &INDWELL STENT INSRT Right 5/16/2017    Procedure: CYSTOSCOPY, RETROGRADE PYELOGRAM,  FLEXIBLE URETEROSCOPY,  HOLMIUM LASER LITHOTRIPSY, STONE BASKET MANIPULATION,  STENT URETERAL EXCHANGE;  Surgeon: Clemnecia Delgadillo MD;  Location: [address] [place] [address];  Service: Urology    CT CYSTO/URETERO W/LITHOTRIPSY &INDWELL STENT INSRT Right 6/2/2017    Procedure: CYSTOSCOPY, RETROGRADE, STONE MANIPULATION WITH HOLMIUM LASER, STENT PLACEMENT;  Surgeon: Clemencia Delgadillo MD;  Location: [address] [place] [address];  Service: Urology    CT CYSTO/URETERO W/LITHOTRIPSY &INDWELL STENT INSRT Right 7/18/2017    Procedure: CYSTOSCOPY, RETROGRADE, URETEROSCOPY HOLMIUM LASER [address];  Surgeon: Clemencia Delgadillo MD;  Location: [address] [place] [address];  Service: Urology    CT CYSTOSCOPY,INSERT URETERAL STENT Right 11/14/2017    Procedure: EXCHANGE STENT URETERAL;  Surgeon: Clemencia Delgadillo MD;  Location: [address] [place] [address];  Service: Urology    CT CYSTOURETHROSCOPY,URETER CATHETER Right 11/14/2017    Procedure: CYSTOSCOPY RETROGRADE, STENT EXCHANGE; Surgeon: Jordan Kirby MD;  Location: [address] [place] [address];  Service: Urology    IA CYSTOURETHROSCOPY,URETER CATHETER Right 5/8/2018    Procedure: Nikolay Davis;  Surgeon: Jordan Kirby MD;  Location: [address] [place] [address];  Service: Urology    IA CYSTOURETHROSCOPY,URETER CATHETER Right 8/14/2018    Procedure: Nikolay Davis;  Surgeon: Jordan Kirby MD;  Location: [address] [place] [address];  Service: Urology    IA CYSTOURETHROSCOPY,URETER CATHETER Right 11/13/2018    Procedure: Nikolay Davis, RETROGRADE;  Surgeon: Jordan Kirby MD;  Location: [address] [place] [address];  Service: Urology    IA CYSTOURETHROSCOPY,URETER CATHETER Right 2/12/2019    Procedure: Kristie Malik, STENT REMOVAL AND STENT PLACEMENT;  Surgeon: Jordan Kirby MD;  Location: [address] [place] [address];  Service: Urology    IA CYSTOURETHROSCOPY,URETER CATHETER Right 5/10/2019    Procedure: Kristie Malik, STENT EXCHANGE;  Surgeon: Jordan Kirby MD;  Location: [address] [place] [address];  Service: Urology    PROSTATE SURGERY  2015    Laser Prostatectomy  by Dr Clayton Hansen Right 4/18/2017    Procedure: INSERTION STENT URETERAL, RIGHT URETEROSCOPY,  LASER OF URETERAL STRICTURE AND STONE;  Surgeon: Jordan Kirby MD;  Location: [address] [place] [address];  Service:     URETERAL STENT PLACEMENT Right 2/13/2018    Procedure: Nikolay Davis;  Surgeon: Jordan Kirby MD;  Location: [address] [place] [address];  Service: Urology       ALLERGIES:  No Known Allergies    SOCIAL HISTORY:  Social History     Substance and Sexual Activity   Alcohol Use Yes    Alcohol/week: 4 0 standard drinks    Types: 4 Shots of liquor per week    Drinks per session: 1 or 2    Binge frequency: Monthly    Comment: rare     Social History     Substance and Sexual Activity   Drug Use No     Social History     Tobacco Use   Smoking Status Former Smoker    Packs/day: 0 50    Years: 62 00    Pack years: 31 00    Types: Cigarettes    Last attempt to quit: 4/15/2017    Years since quittin 2   Smokeless Tobacco Never Used       FAMILY HISTORY:  Family History   Problem Relation Age of Onset    Hypertension Mother     Alcohol abuse Father     Cirrhosis Father     Cancer Son 13        cancer-knee and above-left-amputation    Cancer Sister     Other Brother         head mass    Thyroid disease unspecified Sister     Arthritis Sister     Other Brother         legally blind in one eye       MEDICATIONS:  No current facility-administered medications for this encounter  Current Outpatient Medications:     amLODIPine (NORVASC) 10 mg tablet, Take 10 mg by mouth every morning  , Disp: , Rfl:     aspirin (ECOTRIN LOW STRENGTH) 81 mg EC tablet, Take 81 mg by mouth as needed , Disp: , Rfl:     cholecalciferol (VITAMIN D3) 1,000 units tablet, Take 1,000 Units by mouth daily after lunch  , Disp: , Rfl:     hydrochlorothiazide (MICROZIDE) 12 5 mg capsule, take 1 capsule by mouth once daily, Disp: 90 capsule, Rfl: 1    metoprolol tartrate (LOPRESSOR) 25 mg tablet, take 1 tablet by mouth every 8 hours, Disp: 90 tablet, Rfl: 3    nystatin (MYCOSTATIN) cream, Apply topically 2 (two) times a day Apply to right groin for 1 week due to redness and likely yeast infection  Clean and dry twice daily prior to applying cream , Disp: 45 g, Rfl: 1    Review of Systems    PHYSICAL EXAM:  Physical Exam   Constitutional: He appears well-developed  HENT:   Head: Normocephalic  Cardiovascular: Normal rate and regular rhythm  Pulmonary/Chest: Effort normal and breath sounds normal    Abdominal: Soft  Bowel sounds are normal  There is no tenderness  There is no guarding  Genitourinary: Penis normal    Neurological: He is alert  Skin: Skin is warm and dry         LAB RESULTS:  Lab Results   Component Value Date    WBC 7 25 2019    HGB 14 5 2019    HCT 45 8 2019    MCV 90 2019     2019     Lab Results   Component Value Date    CALCIUM 8 8 07/12/2019    K 4 2 07/12/2019    CO2 26 07/12/2019     07/12/2019    BUN 19 07/12/2019    CREATININE 1 66 (H) 07/12/2019     Lab Results   Component Value Date    CALCIUM 8 8 07/12/2019    PHOS 2 7 04/29/2019         Grazyna Stanley PA-C  07/18/19    Portions of the record may have been created with voice recognition software   Occasional wrong word or "sound alike" substitutions may have occurred due to the inherent limitations of voice recognition software   Read the chart carefully and recognize, using context, where substitutions have occurred

## 2019-07-24 ENCOUNTER — APPOINTMENT (OUTPATIENT)
Dept: LAB | Facility: HOSPITAL | Age: 79
End: 2019-07-24
Attending: UROLOGY
Payer: MEDICARE

## 2019-07-24 ENCOUNTER — TRANSCRIBE ORDERS (OUTPATIENT)
Dept: ADMINISTRATIVE | Facility: HOSPITAL | Age: 79
End: 2019-07-24

## 2019-07-24 ENCOUNTER — APPOINTMENT (OUTPATIENT)
Dept: PREADMISSION TESTING | Facility: HOSPITAL | Age: 79
End: 2019-07-24
Payer: MEDICARE

## 2019-07-24 DIAGNOSIS — Z01.818 PRE-OP TESTING: ICD-10-CM

## 2019-07-24 DIAGNOSIS — Z01.818 PRE-OP TESTING: Primary | ICD-10-CM

## 2019-07-24 LAB
BACTERIA UR QL AUTO: ABNORMAL /HPF
BASOPHILS # BLD AUTO: 0.04 THOUSANDS/ΜL (ref 0–0.1)
BASOPHILS NFR BLD AUTO: 1 % (ref 0–1)
BILIRUB UR QL STRIP: ABNORMAL
CLARITY UR: ABNORMAL
COLOR UR: ABNORMAL
EOSINOPHIL # BLD AUTO: 0.26 THOUSAND/ΜL (ref 0–0.61)
EOSINOPHIL NFR BLD AUTO: 3 % (ref 0–6)
ERYTHROCYTE [DISTWIDTH] IN BLOOD BY AUTOMATED COUNT: 14.5 % (ref 11.6–15.1)
GLUCOSE UR STRIP-MCNC: NEGATIVE MG/DL
HCT VFR BLD AUTO: 47.3 % (ref 36.5–49.3)
HGB BLD-MCNC: 15.3 G/DL (ref 12–17)
HGB UR QL STRIP.AUTO: ABNORMAL
IMM GRANULOCYTES # BLD AUTO: 0.03 THOUSAND/UL (ref 0–0.2)
IMM GRANULOCYTES NFR BLD AUTO: 0 % (ref 0–2)
KETONES UR STRIP-MCNC: NEGATIVE MG/DL
LEUKOCYTE ESTERASE UR QL STRIP: ABNORMAL
LYMPHOCYTES # BLD AUTO: 2.34 THOUSANDS/ΜL (ref 0.6–4.47)
LYMPHOCYTES NFR BLD AUTO: 28 % (ref 14–44)
MCH RBC QN AUTO: 28.4 PG (ref 26.8–34.3)
MCHC RBC AUTO-ENTMCNC: 32.3 G/DL (ref 31.4–37.4)
MCV RBC AUTO: 88 FL (ref 82–98)
MONOCYTES # BLD AUTO: 0.96 THOUSAND/ΜL (ref 0.17–1.22)
MONOCYTES NFR BLD AUTO: 12 % (ref 4–12)
NEUTROPHILS # BLD AUTO: 4.66 THOUSANDS/ΜL (ref 1.85–7.62)
NEUTS SEG NFR BLD AUTO: 56 % (ref 43–75)
NITRITE UR QL STRIP: NEGATIVE
NON-SQ EPI CELLS URNS QL MICRO: ABNORMAL /HPF
NRBC BLD AUTO-RTO: 0 /100 WBCS
PH UR STRIP.AUTO: 6.5 [PH]
PLATELET # BLD AUTO: 153 THOUSANDS/UL (ref 149–390)
PMV BLD AUTO: 10.9 FL (ref 8.9–12.7)
PROT UR STRIP-MCNC: ABNORMAL MG/DL
RBC # BLD AUTO: 5.38 MILLION/UL (ref 3.88–5.62)
RBC #/AREA URNS AUTO: ABNORMAL /HPF
SP GR UR STRIP.AUTO: 1.01 (ref 1–1.03)
UROBILINOGEN UR QL STRIP.AUTO: 1 E.U./DL
WBC # BLD AUTO: 8.29 THOUSAND/UL (ref 4.31–10.16)
WBC #/AREA URNS AUTO: ABNORMAL /HPF

## 2019-07-24 PROCEDURE — 87086 URINE CULTURE/COLONY COUNT: CPT

## 2019-07-24 PROCEDURE — 85025 COMPLETE CBC W/AUTO DIFF WBC: CPT

## 2019-07-24 PROCEDURE — 36415 COLL VENOUS BLD VENIPUNCTURE: CPT

## 2019-07-24 PROCEDURE — 81001 URINALYSIS AUTO W/SCOPE: CPT | Performed by: UROLOGY

## 2019-07-24 NOTE — PRE-PROCEDURE INSTRUCTIONS
Pre-Surgery Instructions:   Medication Instructions    amLODIPine (NORVASC) 10 mg tablet Instructed patient per Anesthesia Guidelines   aspirin (ECOTRIN LOW STRENGTH) 81 mg EC tablet Patient was instructed by Physician and understands   cholecalciferol (VITAMIN D3) 1,000 units tablet Instructed patient per Anesthesia Guidelines   hydrochlorothiazide (MICROZIDE) 12 5 mg capsule Instructed patient per Anesthesia Guidelines   metoprolol tartrate (LOPRESSOR) 25 mg tablet Instructed patient per Anesthesia Guidelines  Pre op instructions given Pt to take metoprolol tartrate,amlodipine the am of surgery   daughter [facility] Surgical Experience    The following information was developed to assist you to prepare for your operation  What do I need to do before coming to the hospital?   Arrange for a responsible person to drive you to and from the hospital    Arrange care for your children at home  Children are not allowed in the recovery areas of the hospital   Plan to wear clothing that is easy to put on and take off  If you are having shoulder surgery, wear a shirt that buttons or zippers in the front  Bathing  o Shower the evening before and the morning of your surgery with an antibacterial soap  Please refer to the Pre Op Showering Instructions for Surgery Patients Sheet   o Remove nail polish and all body piercing jewelry  o Do not shave any body part for at least 24 hours before surgery-this includes face, arms, legs and upper body  Food  o Nothing to eat or drink after midnight the night before your surgery   This includes candy and chewing gum  o Exception: If your surgery is after 12:00pm (noon), you may have clear liquids such as 7-Up®, ginger ale, apple or cranberry juice, Jell-O®, water, or clear broth until 8:00 am  o Do not drink milk or juice with pulp on the morning before surgery  o Do not drink alcohol 24 hours before surgery  Medicine  o Follow instructions you received from your surgeon about which medicines you may take on the day of surgery  o If instructed to take medicine on the morning of surgery, take pills with just a small sip of water  Call your prescribing doctor for specific infroamtion on what to do if you take insulin    What should I bring to the hospital?    Bring:  Rohit Sabins or a walker, if you have them, for foot or knee surgery   A list of the daily medicines, vitamins, minerals, herbals and nutritional supplements you take  Include the dosages of medicines and the time you take them each day   Glasses, dentures or hearing aids   Minimal clothing; you will be wearing hospital sleepwear   Photo ID; required to verify your identity   If you have a Living Will or Power of , bring a copy of the documents   If you have an ostomy, bring an extra pouch and any supplies you use    Do not bring   Medicines or inhalers   Money, valuables or jewelry    What other information should I know about the day of surgery?  Notify your surgeons if you develop a cold, sore throat, cough, fever, rash or any other illness   Report to the Ambulatory Surgical/Same Day Surgery Unit   You will be instructed to stop at Registration only if you have not been pre-registered   Inform your  fi they do not stay that they will be asked by the staff to leave a phone number where they can be reached   Be available to be reached before surgery  In the event the operating room schedule changes, you may be asked to come in earlier or later than expected    *It is important to tell your doctor and others involved in your health care if you are taking or have been taking any non-prescription drugs, vitamins, minerals, herbals or other nutritional supplements   Any of these may interact with some food or medicines and cause a reaction

## 2019-07-26 LAB — BACTERIA UR CULT: NORMAL

## 2019-07-29 ENCOUNTER — ANESTHESIA EVENT (OUTPATIENT)
Dept: PERIOP | Facility: HOSPITAL | Age: 79
End: 2019-07-29
Payer: MEDICARE

## 2019-07-30 ENCOUNTER — HOSPITAL ENCOUNTER (OUTPATIENT)
Facility: HOSPITAL | Age: 79
Setting detail: OUTPATIENT SURGERY
Discharge: HOME | End: 2019-07-30
Attending: UROLOGY | Admitting: UROLOGY
Payer: MEDICARE

## 2019-07-30 ENCOUNTER — APPOINTMENT (OUTPATIENT)
Dept: RADIOLOGY | Facility: HOSPITAL | Age: 79
End: 2019-07-30
Payer: MEDICARE

## 2019-07-30 ENCOUNTER — ANESTHESIA (OUTPATIENT)
Dept: PERIOP | Facility: HOSPITAL | Age: 79
End: 2019-07-30
Payer: MEDICARE

## 2019-07-30 VITALS
SYSTOLIC BLOOD PRESSURE: 121 MMHG | OXYGEN SATURATION: 97 % | HEART RATE: 61 BPM | WEIGHT: 206.13 LBS | DIASTOLIC BLOOD PRESSURE: 8 MMHG | HEIGHT: 70 IN | BODY MASS INDEX: 29.51 KG/M2 | TEMPERATURE: 97 F | RESPIRATION RATE: 18 BRPM

## 2019-07-30 DIAGNOSIS — N13.5 STRICTURE OR KINKING OF URETER: ICD-10-CM

## 2019-07-30 PROCEDURE — C1769 GUIDE WIRE: HCPCS | Performed by: UROLOGY

## 2019-07-30 PROCEDURE — 74420 UROGRAPHY RTRGR +-KUB: CPT

## 2019-07-30 PROCEDURE — C2617 STENT, NON-COR, TEM W/O DEL: HCPCS | Performed by: UROLOGY

## 2019-07-30 DEVICE — IMPLANTABLE DEVICE
Type: IMPLANTABLE DEVICE | Status: NON-FUNCTIONAL
Removed: 2019-10-22

## 2019-07-30 RX ORDER — SODIUM CHLORIDE, SODIUM LACTATE, POTASSIUM CHLORIDE, CALCIUM CHLORIDE 600; 310; 30; 20 MG/100ML; MG/100ML; MG/100ML; MG/100ML
100 INJECTION, SOLUTION INTRAVENOUS CONTINUOUS
Status: DISCONTINUED | OUTPATIENT
Start: 2019-07-30 | End: 2019-07-30 | Stop reason: HOSPADM

## 2019-07-30 RX ORDER — CEFAZOLIN SODIUM 2 G/50ML
2000 SOLUTION INTRAVENOUS ONCE
Status: COMPLETED | OUTPATIENT
Start: 2019-07-30 | End: 2019-07-30

## 2019-07-30 RX ORDER — FENTANYL CITRATE 50 UG/ML
INJECTION, SOLUTION INTRAMUSCULAR; INTRAVENOUS AS NEEDED
Status: DISCONTINUED | OUTPATIENT
Start: 2019-07-30 | End: 2019-07-30 | Stop reason: SURG

## 2019-07-30 RX ORDER — SODIUM CHLORIDE, SODIUM LACTATE, POTASSIUM CHLORIDE, CALCIUM CHLORIDE 600; 310; 30; 20 MG/100ML; MG/100ML; MG/100ML; MG/100ML
125 INJECTION, SOLUTION INTRAVENOUS CONTINUOUS
Status: DISCONTINUED | OUTPATIENT
Start: 2019-07-30 | End: 2019-07-30

## 2019-07-30 RX ORDER — PROPOFOL 10 MG/ML
INJECTION, EMULSION INTRAVENOUS AS NEEDED
Status: DISCONTINUED | OUTPATIENT
Start: 2019-07-30 | End: 2019-07-30 | Stop reason: SURG

## 2019-07-30 RX ORDER — PROPOFOL 10 MG/ML
INJECTION, EMULSION INTRAVENOUS CONTINUOUS PRN
Status: DISCONTINUED | OUTPATIENT
Start: 2019-07-30 | End: 2019-07-30 | Stop reason: SURG

## 2019-07-30 RX ORDER — FENTANYL CITRATE/PF 50 MCG/ML
50 SYRINGE (ML) INJECTION
Status: DISCONTINUED | OUTPATIENT
Start: 2019-07-30 | End: 2019-07-30 | Stop reason: HOSPADM

## 2019-07-30 RX ORDER — MIDAZOLAM HYDROCHLORIDE 1 MG/ML
INJECTION INTRAMUSCULAR; INTRAVENOUS AS NEEDED
Status: DISCONTINUED | OUTPATIENT
Start: 2019-07-30 | End: 2019-07-30 | Stop reason: SURG

## 2019-07-30 RX ORDER — ONDANSETRON 2 MG/ML
4 INJECTION INTRAMUSCULAR; INTRAVENOUS ONCE AS NEEDED
Status: DISCONTINUED | OUTPATIENT
Start: 2019-07-30 | End: 2019-07-30 | Stop reason: HOSPADM

## 2019-07-30 RX ORDER — MAGNESIUM HYDROXIDE 1200 MG/15ML
LIQUID ORAL AS NEEDED
Status: DISCONTINUED | OUTPATIENT
Start: 2019-07-30 | End: 2019-07-30 | Stop reason: HOSPADM

## 2019-07-30 RX ADMIN — PROPOFOL 30 MG: 10 INJECTION, EMULSION INTRAVENOUS at 09:25

## 2019-07-30 RX ADMIN — SODIUM CHLORIDE, SODIUM LACTATE, POTASSIUM CHLORIDE, AND CALCIUM CHLORIDE 125 ML/HR: .6; .31; .03; .02 INJECTION, SOLUTION INTRAVENOUS at 07:41

## 2019-07-30 RX ADMIN — MIDAZOLAM HYDROCHLORIDE 2 MG: 1 INJECTION, SOLUTION INTRAMUSCULAR; INTRAVENOUS at 09:25

## 2019-07-30 RX ADMIN — CEFAZOLIN SODIUM 2000 MG: 2 SOLUTION INTRAVENOUS at 09:19

## 2019-07-30 RX ADMIN — FENTANYL CITRATE 50 MCG: 50 INJECTION, SOLUTION INTRAMUSCULAR; INTRAVENOUS at 09:25

## 2019-07-30 RX ADMIN — PROPOFOL 100 MCG/KG/MIN: 10 INJECTION, EMULSION INTRAVENOUS at 09:25

## 2019-07-30 RX ADMIN — SODIUM CHLORIDE, SODIUM LACTATE, POTASSIUM CHLORIDE, AND CALCIUM CHLORIDE: .6; .31; .03; .02 INJECTION, SOLUTION INTRAVENOUS at 08:02

## 2019-07-30 NOTE — OP NOTE
OPERATIVE REPORT  PATIENT NAME: Divine Saleem    :  1940  MRN: [id number]  Pt Location: [facility] OR ROOM 04    SURGERY DATE: 2019    Surgeon(s) and Role:     * Clemencia Delgadillo MD - Primary    Preop Diagnosis:  Crossing vessel and stricture of ureter without hydronephrosis [N13 5]  Actually post op stricture from prior surgery for ureteral stones elsewhere  Refuses nephrectomy  Refuses nephrostomy    Post-Op Diagnosis Codes:  Stricture right ureter  Stones right ureter    Procedure(s) (LRB):  CYSTOSCOPY, RETROGRADE, STENT REMOVAL AND PLACEMENT OF STENT (Right)    Specimen(s):  * No specimens in log *    Estimated Blood Loss:   Minimal    Drains:  Ureteral Drain/Stent Right ureter  (Active)   Number of days: 81       Anesthesia Type:   IV Sedation with Anesthesia    Operative Indications:  Stricture and stones right proximal ureter    Operative Findings:  As above    Complications:   None    Procedure and Technique:  After obtaining consent, he was identified brought to the operating room  He was sedated and put lithotomy position he was sterilely prepped and draped  Time-out was performed  Cystoscopy was undertaken  Right retrograde was performed over a guidewire after the stent was removed  The wire was reintroduced and a new stent was placed and there was a good coil proximally and distally  He tolerated the procedure well  The bladder was drained      Patient Disposition:  PACU     SIGNATURE: Clemencia Delgadillo MD  DATE: 2019  TIME: 9:58 AM

## 2019-07-30 NOTE — DISCHARGE INSTRUCTIONS
Drink plenty of fluids, avoiding tea (hot and iced), cola drinks, chocolate, nuts, spinach and oxalate-rich foods at least until we get the stone analysis back  Call if you experience fevers, chills, severe pain in the side, or significant bleeding

## 2019-07-30 NOTE — ANESTHESIA POSTPROCEDURE EVALUATION
Post-Op Assessment Note    CV Status:  Stable  Pain Score: 0    Pain management: satisfactory to patient     Mental Status:  Alert   Hydration Status:  Stable   PONV Controlled:  Controlled   Airway Patency:  Patent   Post Op Vitals Reviewed: Yes      Staff: CRNA           BP   114/67   Temp      Pulse 57   Resp 20   SpO2 94

## 2019-07-30 NOTE — ANESTHESIA PREPROCEDURE EVALUATION
Review of Systems/Medical History  Patient summary reviewed    No history of anesthetic complications     Cardiovascular  EKG reviewed, Exercise tolerance (METS): >4,  Hypertension controlled, Dysrhythmias , atrial fibrillation, No angina ,   Comment: 04/2017  SUMMARY:  -  Stress results: Duration of exercise was 4 min and 19 sec  Target heart rate was achieved  There was no chest pain during stress  -  ECG conclusions: Arrhythmia during stress: atrial fibrillation   -  Perfusion imaging: There were no perfusion defects   -  Gated SPECT: The calculated left ventricular ejection fraction was 43 %  Left ventricular ejection fraction was mildly decreased by visual estimate  There was no left ventricular regional abnormality      IMPRESSIONS: Normal study after maximal exercise  Myocardial perfusion imaging was normal at rest and with stress  Left ventricular systolic function was reduced, without distinct regional wall motion abnormalities,  Pulmonary  Smoker ex-smoker  ,   Comment: H/o pneumonia  GI/Hepatic  Negative GI/hepatic ROS          Kidney stones,        Endo/Other  Negative endo/other ROS      GYN       Hematology  Negative hematology ROS      Musculoskeletal  Negative musculoskeletal ROS        Neurology  Negative neurology ROS      Psychology   Negative psychology ROS              Physical Exam    Airway    Mallampati score: II  TM Distance: >3 FB  Neck ROM: full     Dental   Comment: Upper partial denture ,     Cardiovascular  Rhythm: irregular, Rate: normal, Murmur,     Pulmonary  Breath sounds clear to auscultation,     Other Findings        Anesthesia Plan  ASA Score- 3     Anesthesia Type- IV sedation with anesthesia with ASA Monitors  Additional Monitors:   Airway Plan:         Plan Factors-  Patient did not smoke on day of surgery  Induction- intravenous  Postoperative Plan-     Informed Consent- Anesthetic plan and risks discussed with patient    I personally reviewed this patient with the CRNA  Discussed and agreed on the Anesthesia Plan with the CRNA  Artem Machado

## 2019-08-02 ENCOUNTER — TRANSCRIBE ORDERS (OUTPATIENT)
Dept: ADMINISTRATIVE | Facility: HOSPITAL | Age: 79
End: 2019-08-02

## 2019-08-02 ENCOUNTER — TELEPHONE (OUTPATIENT)
Dept: NEPHROLOGY | Facility: CLINIC | Age: 79
End: 2019-08-02

## 2019-08-02 ENCOUNTER — LAB (OUTPATIENT)
Dept: LAB | Facility: HOSPITAL | Age: 79
End: 2019-08-02
Attending: INTERNAL MEDICINE
Payer: MEDICARE

## 2019-08-02 DIAGNOSIS — N18.30 CKD (CHRONIC KIDNEY DISEASE), STAGE III (HCC): ICD-10-CM

## 2019-08-02 DIAGNOSIS — I10 BENIGN ESSENTIAL HTN: ICD-10-CM

## 2019-08-02 DIAGNOSIS — N25.81 SECONDARY HYPERPARATHYROIDISM (HCC): ICD-10-CM

## 2019-08-02 DIAGNOSIS — N20.0 NEPHROLITHIASIS: ICD-10-CM

## 2019-08-02 LAB
25(OH)D3 SERPL-MCNC: 46.5 NG/ML (ref 30–100)
ANION GAP SERPL CALCULATED.3IONS-SCNC: 7 MMOL/L (ref 4–13)
BACTERIA UR QL AUTO: ABNORMAL /HPF
BILIRUB UR QL STRIP: NEGATIVE
BUN SERPL-MCNC: 23 MG/DL (ref 5–25)
CALCIUM SERPL-MCNC: 9 MG/DL (ref 8.3–10.1)
CHLORIDE SERPL-SCNC: 103 MMOL/L (ref 100–108)
CLARITY UR: CLEAR
CO2 SERPL-SCNC: 29 MMOL/L (ref 21–32)
COLOR UR: YELLOW
CREAT SERPL-MCNC: 1.51 MG/DL (ref 0.6–1.3)
CREAT UR-MCNC: 122 MG/DL
ERYTHROCYTE [DISTWIDTH] IN BLOOD BY AUTOMATED COUNT: 14.6 % (ref 11.6–15.1)
GFR SERPL CREATININE-BSD FRML MDRD: 44 ML/MIN/1.73SQ M
GLUCOSE P FAST SERPL-MCNC: 99 MG/DL (ref 65–99)
GLUCOSE UR STRIP-MCNC: NEGATIVE MG/DL
HCT VFR BLD AUTO: 45.7 % (ref 36.5–49.3)
HGB BLD-MCNC: 14.6 G/DL (ref 12–17)
HGB UR QL STRIP.AUTO: ABNORMAL
KETONES UR STRIP-MCNC: NEGATIVE MG/DL
LEUKOCYTE ESTERASE UR QL STRIP: ABNORMAL
MCH RBC QN AUTO: 28.3 PG (ref 26.8–34.3)
MCHC RBC AUTO-ENTMCNC: 31.9 G/DL (ref 31.4–37.4)
MCV RBC AUTO: 89 FL (ref 82–98)
NITRITE UR QL STRIP: NEGATIVE
NON-SQ EPI CELLS URNS QL MICRO: ABNORMAL /HPF
PH UR STRIP.AUTO: 7.5 [PH]
PHOSPHATE SERPL-MCNC: 2.8 MG/DL (ref 2.3–4.1)
PLATELET # BLD AUTO: 152 THOUSANDS/UL (ref 149–390)
PMV BLD AUTO: 10.6 FL (ref 8.9–12.7)
POTASSIUM SERPL-SCNC: 4.5 MMOL/L (ref 3.5–5.3)
PROT UR STRIP-MCNC: ABNORMAL MG/DL
PROT UR-MCNC: 38 MG/DL
PROT/CREAT UR: 0.31 MG/G{CREAT} (ref 0–0.1)
PTH-INTACT SERPL-MCNC: 71.5 PG/ML (ref 18.4–80.1)
RBC # BLD AUTO: 5.15 MILLION/UL (ref 3.88–5.62)
RBC #/AREA URNS AUTO: ABNORMAL /HPF
SODIUM SERPL-SCNC: 139 MMOL/L (ref 136–145)
SP GR UR STRIP.AUTO: 1.01 (ref 1–1.03)
UROBILINOGEN UR QL STRIP.AUTO: 1 E.U./DL
WBC # BLD AUTO: 7.28 THOUSAND/UL (ref 4.31–10.16)
WBC #/AREA URNS AUTO: ABNORMAL /HPF

## 2019-08-02 PROCEDURE — 84156 ASSAY OF PROTEIN URINE: CPT

## 2019-08-02 PROCEDURE — 84100 ASSAY OF PHOSPHORUS: CPT

## 2019-08-02 PROCEDURE — 82570 ASSAY OF URINE CREATININE: CPT

## 2019-08-02 PROCEDURE — 82306 VITAMIN D 25 HYDROXY: CPT

## 2019-08-02 PROCEDURE — 81001 URINALYSIS AUTO W/SCOPE: CPT

## 2019-08-02 PROCEDURE — 36415 COLL VENOUS BLD VENIPUNCTURE: CPT

## 2019-08-02 PROCEDURE — 80048 BASIC METABOLIC PNL TOTAL CA: CPT

## 2019-08-02 PROCEDURE — 83970 ASSAY OF PARATHORMONE: CPT

## 2019-08-02 PROCEDURE — 85027 COMPLETE CBC AUTOMATED: CPT

## 2019-08-02 NOTE — TELEPHONE ENCOUNTER
----- Message from Alyssa Reardon MD sent at 8/2/2019  3:08 PM EDT -----  Hello    Patient normally is followed up by Ms Marcial Banda  Can patient be notified the renal function is stable  Urine has blood but the patient has stent exchange 2 days ago and this could explain this  No changes for now      Thank you    np

## 2019-08-02 NOTE — RESULT ENCOUNTER NOTE
Await rest of blood work  Renal function stable  Hematuria-patient had stent exchange 2 days ago    Await rest of lab work

## 2019-08-02 NOTE — RESULT ENCOUNTER NOTE
Hello    Patient normally is followed up by Ms Yuliana Dempsey  Can patient be notified the renal function is stable  Urine has blood but the patient has stent exchange 2 days ago and this could explain this  No changes for now      Thank you    np

## 2019-09-12 ENCOUNTER — OFFICE VISIT (OUTPATIENT)
Dept: NEPHROLOGY | Facility: CLINIC | Age: 79
End: 2019-09-12
Payer: MEDICARE

## 2019-09-12 VITALS
SYSTOLIC BLOOD PRESSURE: 120 MMHG | HEIGHT: 70 IN | BODY MASS INDEX: 29.32 KG/M2 | WEIGHT: 204.8 LBS | DIASTOLIC BLOOD PRESSURE: 78 MMHG

## 2019-09-12 DIAGNOSIS — N18.30 CKD (CHRONIC KIDNEY DISEASE), STAGE III (HCC): Primary | ICD-10-CM

## 2019-09-12 DIAGNOSIS — I10 BENIGN ESSENTIAL HTN: ICD-10-CM

## 2019-09-12 PROCEDURE — 99214 OFFICE O/P EST MOD 30 MIN: CPT | Performed by: INTERNAL MEDICINE

## 2019-09-12 PROCEDURE — 1124F ACP DISCUSS-NO DSCNMKR DOCD: CPT | Performed by: INTERNAL MEDICINE

## 2019-09-12 PROCEDURE — 1123F ACP DISCUSS/DSCN MKR DOCD: CPT | Performed by: INTERNAL MEDICINE

## 2019-09-12 NOTE — PATIENT INSTRUCTIONS
1) Avoid NSAIDS - (Example - motrin, advil, ibuprofen, aleve, exederin, etc)  2) Always follow a low salt diet  3) please have labwork prior to next appointment  4) please call if you have any dizziness, lightheadedness

## 2019-09-12 NOTE — LETTER
September 12, 2019     Vince Tejeda PA-C  [address] [place] [address]    Patient: Nj Sherwood   YOB: 1940   Date of Visit: 9/12/2019       Dear Dr Mark Mackey:    Thank you for referring Guanakito Albarado to me for evaluation  Below are my notes for this consultation  If you have questions, please do not hesitate to call me  I look forward to following your patient along with you  Sincerely,        Jori Jones MD        CC: No Recipients  Jori Jones MD  9/12/2019  1:51 PM  Sign at close encounter  Yaya Melo 70 y o  male MRN: [id number]  9/12/2019    Reason for Visit: CKD III    ASSESSMENT and PLAN:    I had the pleasure of seeing Mr Yanely Ceja today in the renal clinic for the continued management of CKD III  53-year-old male with a past medical history of CKD stage III Baseline Cr 1 6-1 8, Nephrolithiasis,HTN, A  fib, D CHF, BPH, 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, former smoker quit in April, hospitalized at [facility] in April 2017 for shortness of breath at which time nephrology was consulted due to acute kidney injury with a rising creatinine  Patient was found to have right-sided hydronephrosis with ureteral stricture and stone  Patient underwent ureteral stent and eventually had a lithotripsy  Patient presents for follow up visit for CKD       Patient had stent exchange of the right side in august 2019     1) CKD - baseline Cr approx 1 5-1 7 mg/dL  acute injury during hosp was secondary to obstruction secondary to stone, hydro, poor perfusion in setting of a fib/RVR  Renal u/s R 12 3, L 10 7 cm, R mod hydro  UA still with mod blood, 4-10 RBC  Renal ultrasound July 10 2017, right kidney 12 8 cm, moderate right hydronephrosis, lower pole calculus measuring 9 mm, Left kidney 11 2 cm   Etiology of CKD likely solitary functional kidney, prior hydro, nephrolithiasis      - Follows with Urology regarding stent and nephrolithiasis  -most recent creatinine is  at baseline 1 5 mg/dL  - UA with blood but had stent exchange recently prior  - split function test in December shows left kidney receives higher flow then the right kidney by approximately 70 vs 30%  - UPCR slightly higher at 0 34 g/g  - last stent exchange in November 2018  - kidney smart class - completed   Patient is not sure which dialysis modality he would want if dialysis was ever needed  - repeat labwork in 3-4 months  - appt in 4 months  - monitor BP closely --> consider lowering amlodipine if BP lower further    2) Nephrolithiasis - follows with Urology       - renal stone mainly ca oxalate  - 24 hour urine litholink - reviewed  - pt has increased fluid intake to 2 L  - Needs low salt diet - already following  takes in approx 1 8 gm salt daily  at goal  - changed furosemide to HCTZ in jan 2018  - underwent stent exchange recently with Urology team in  august 2019     3) HTN - Blood pressures are stable      4) Volume - history of dCHF  history of mild to mod pulm HTN  Euvolemic      - need to monitor fluid intake with HF history  - daily weights      5) CKD MBD -      - PTH 71  - Vit D 46     6) Anemia - stable Hb     7) acid/base - bicarb 29 in august - monitoring for now     8) anemia - Hb above goal     It was a pleasure evaluating Mr  Karma Reddy in the renal office  Thank you for allowing our team to participate in the care of your patient         No problem-specific Assessment & Plan notes found for this encounter  HPI:    Pt denies complaints  Denies nausea, vomiting       PATIENT INSTRUCTIONS:    Patient Instructions   1) Avoid NSAIDS - (Example - motrin, advil, ibuprofen, aleve, exederin, etc)  2) Always follow a low salt diet  3) please have labwork prior to next appointment  4) please call if you have any dizziness, lightheadedness         OBJECTIVE:  Current Weight: Weight - Scale: 92 9 kg (204 lb 12 8 oz)  Vitals:    09/12/19 1323 09/12/19 1345 BP: 112/60 120/78   BP Location: Right arm    Patient Position: Sitting    Cuff Size: Standard    Weight: 92 9 kg (204 lb 12 8 oz)    Height: 5' 10" (1 778 m)     Body mass index is 29 39 kg/m²  REVIEW OF SYSTEMS:    Review of Systems   Constitutional: Negative  Negative for appetite change and fatigue  HENT: Negative  Eyes: Negative  Respiratory: Negative  Negative for shortness of breath  Cardiovascular: Negative  Negative for leg swelling  Gastrointestinal: Negative  Endocrine: Negative  Genitourinary: Negative  Negative for difficulty urinating  Musculoskeletal: Negative  Allergic/Immunologic: Negative  Neurological: Negative  Hematological: Negative  Psychiatric/Behavioral: Negative  All other systems reviewed and are negative  PHYSICAL EXAM:      Physical Exam   Constitutional: He is oriented to person, place, and time  He appears well-developed and well-nourished  No distress  HENT:   Head: Normocephalic and atraumatic  Mouth/Throat: No oropharyngeal exudate  Eyes: Conjunctivae are normal  Right eye exhibits no discharge  Left eye exhibits no discharge  No scleral icterus  Neck: Normal range of motion  Neck supple  No JVD present  Cardiovascular: Normal rate, regular rhythm and normal heart sounds  Exam reveals no gallop and no friction rub  No murmur heard  Pulmonary/Chest: Effort normal and breath sounds normal  No respiratory distress  He has no wheezes  He has no rales  He exhibits no tenderness  Abdominal: Soft  Bowel sounds are normal  He exhibits no distension  There is no tenderness  There is no rebound  Musculoskeletal: Normal range of motion  He exhibits no edema, tenderness or deformity  Neurological: He is alert and oriented to person, place, and time  He has normal reflexes  No cranial nerve deficit  Coordination normal    Skin: Skin is warm and dry  No rash noted  He is not diaphoretic  No erythema  No pallor     Psychiatric: He has a normal mood and affect  His behavior is normal  Judgment and thought content normal    Nursing note and vitals reviewed        Medications:    Current Outpatient Medications:     amLODIPine (NORVASC) 10 mg tablet, Take 10 mg by mouth every morning  , Disp: , Rfl:     aspirin (ECOTRIN LOW STRENGTH) 81 mg EC tablet, Take 81 mg by mouth as needed , Disp: , Rfl:     cholecalciferol (VITAMIN D3) 1,000 units tablet, Take 1,000 Units by mouth daily after lunch  , Disp: , Rfl:     hydrochlorothiazide (MICROZIDE) 12 5 mg capsule, take 1 capsule by mouth once daily, Disp: 90 capsule, Rfl: 1    metoprolol tartrate (LOPRESSOR) 25 mg tablet, take 1 tablet by mouth every 8 hours, Disp: 90 tablet, Rfl: 3    Laboratory Results:        Invalid input(s): ALBUMIN    Results for orders placed or performed in visit on 08/02/19   CBC   Result Value Ref Range    WBC 7 28 4 31 - 10 16 Thousand/uL    RBC 5 15 3 88 - 5 62 Million/uL    Hemoglobin 14 6 12 0 - 17 0 g/dL    Hematocrit 45 7 36 5 - 49 3 %    MCV 89 82 - 98 fL    MCH 28 3 26 8 - 34 3 pg    MCHC 31 9 31 4 - 37 4 g/dL    RDW 14 6 11 6 - 15 1 %    Platelets 386 095 - 123 Thousands/uL    MPV 10 6 8 9 - 12 7 fL   Protein / creatinine ratio, urine   Result Value Ref Range    Creatinine, Ur 122 0 mg/dL    Protein Urine Random 38 mg/dL    Prot/Creat Ratio, Ur 0 31 (H) 0 00 - 0 10   PTH, intact   Result Value Ref Range    PTH 71 5 18 4 - 80 1 pg/mL   Phosphorus   Result Value Ref Range    Phosphorus 2 8 2 3 - 4 1 mg/dL   Vitamin D 25 hydroxy   Result Value Ref Range    Vit D, 25-Hydroxy 46 5 30 0 - 100 0 ng/mL   Basic metabolic panel   Result Value Ref Range    Sodium 139 136 - 145 mmol/L    Potassium 4 5 3 5 - 5 3 mmol/L    Chloride 103 100 - 108 mmol/L    CO2 29 21 - 32 mmol/L    ANION GAP 7 4 - 13 mmol/L    BUN 23 5 - 25 mg/dL    Creatinine 1 51 (H) 0 60 - 1 30 mg/dL    Glucose, Fasting 99 65 - 99 mg/dL    Calcium 9 0 8 3 - 10 1 mg/dL    eGFR 44 ml/min/1 73sq m   UA (URINE) with reflex to Microscopic   Result Value Ref Range    Color, UA Yellow     Clarity, UA Clear     Specific [place], UA 1 010 1 000 - 1 030    pH, UA 7 5 5 0, 5 5, 6 0, 6 5, 7 0, 7 5, 8 0, 8 5, 9 0    Leukocytes, UA Small (A) Negative    Nitrite, UA Negative Negative    Protein, UA 30 (1+) (A) Negative mg/dl    Glucose, UA Negative Negative mg/dl    Ketones, UA Negative Negative mg/dl    Urobilinogen, UA 1 0 0 2, 1 0 E U /dl E U /dl    Bilirubin, UA Negative Negative    Blood, UA Large (A) Negative   Urine Microscopic   Result Value Ref Range    RBC, UA Innumerable (A) None Seen, 0-5 /hpf    WBC, UA 4-10 (A) None Seen, 0-5, 5-55, 5-65 /hpf    Epithelial Cells Occasional None Seen, Occasional /hpf    Bacteria, UA Occasional None Seen, Occasional /hpf

## 2019-10-18 ENCOUNTER — APPOINTMENT (OUTPATIENT)
Dept: PREADMISSION TESTING | Facility: HOSPITAL | Age: 79
End: 2019-10-18
Payer: MEDICARE

## 2019-10-18 ENCOUNTER — APPOINTMENT (OUTPATIENT)
Dept: LAB | Facility: HOSPITAL | Age: 79
End: 2019-10-18
Attending: UROLOGY
Payer: MEDICARE

## 2019-10-18 ENCOUNTER — TRANSCRIBE ORDERS (OUTPATIENT)
Dept: ADMINISTRATIVE | Facility: HOSPITAL | Age: 79
End: 2019-10-18

## 2019-10-18 DIAGNOSIS — Z01.818 OTHER SPECIFIED PRE-OPERATIVE EXAMINATION: Primary | ICD-10-CM

## 2019-10-18 DIAGNOSIS — Z01.818 OTHER SPECIFIED PRE-OPERATIVE EXAMINATION: ICD-10-CM

## 2019-10-18 LAB
BACTERIA UR QL AUTO: ABNORMAL /HPF
BILIRUB UR QL STRIP: NEGATIVE
CLARITY UR: ABNORMAL
COLOR UR: ABNORMAL
GLUCOSE UR STRIP-MCNC: NEGATIVE MG/DL
HGB UR QL STRIP.AUTO: ABNORMAL
KETONES UR STRIP-MCNC: NEGATIVE MG/DL
LEUKOCYTE ESTERASE UR QL STRIP: ABNORMAL
NITRITE UR QL STRIP: NEGATIVE
NON-SQ EPI CELLS URNS QL MICRO: ABNORMAL /HPF
PH UR STRIP.AUTO: 6.5 [PH]
PROT UR STRIP-MCNC: ABNORMAL MG/DL
RBC #/AREA URNS AUTO: ABNORMAL /HPF
SP GR UR STRIP.AUTO: 1.02 (ref 1–1.03)
UROBILINOGEN UR QL STRIP.AUTO: 0.2 E.U./DL
WBC #/AREA URNS AUTO: ABNORMAL /HPF

## 2019-10-18 PROCEDURE — 81001 URINALYSIS AUTO W/SCOPE: CPT | Performed by: UROLOGY

## 2019-10-18 PROCEDURE — 87086 URINE CULTURE/COLONY COUNT: CPT

## 2019-10-18 NOTE — PRE-PROCEDURE INSTRUCTIONS
My Surgical Experience    The following information was developed to assist you to prepare for your operation  What do I need to do before coming to the hospital?   Arrange for a responsible person to drive you to and from the hospital    Arrange care for your children at home  Children are not allowed in the recovery areas of the hospital   Plan to wear clothing that is easy to put on and take off  If you are having shoulder surgery, wear a shirt that buttons or zippers in the front  Bathing  o Shower the evening before and the morning of your surgery with an antibacterial soap  Please refer to the Pre Op Showering Instructions for Surgery Patients Sheet   o Remove nail polish and all body piercing jewelry  o Do not shave any body part for at least 24 hours before surgery-this includes face, arms, legs and upper body  Food  o Nothing to eat or drink after midnight the night before your surgery  This includes candy and chewing gum  o Exception: If your surgery is after 12:00pm (noon), you may have clear liquids such as 7-Up®, ginger ale, apple or cranberry juice, Jell-O®, water, or clear broth until 8:00 am  o Do not drink milk or juice with pulp on the morning before surgery  o Do not drink alcohol 24 hours before surgery  Medicine  o Follow instructions you received from your surgeon about which medicines you may take on the day of surgery  o If instructed to take medicine on the morning of surgery, take pills with just a small sip of water  Call your prescribing doctor for specific infroamtion on what to do if you take insulin    What should I bring to the hospital?    Bring:  Meagan Mosher or a walker, if you have them, for foot or knee surgery   A list of the daily medicines, vitamins, minerals, herbals and nutritional supplements you take   Include the dosages of medicines and the time you take them each day   Glasses, dentures or hearing aids   Minimal clothing; you will be wearing hospital sleepwear   Photo ID; required to verify your identity   If you have a Living Will or Power of , bring a copy of the documents   If you have an ostomy, bring an extra pouch and any supplies you use    Do not bring   Medicines or inhalers   Money, valuables or jewelry    What other information should I know about the day of surgery?  Notify your surgeons if you develop a cold, sore throat, cough, fever, rash or any other illness   Report to the Ambulatory Surgical/Same Day Surgery Unit   You will be instructed to stop at Registration only if you have not been pre-registered   Inform your  fi they do not stay that they will be asked by the staff to leave a phone number where they can be reached   Be available to be reached before surgery  In the event the operating room schedule changes, you may be asked to come in earlier or later than expected    *It is important to tell your doctor and others involved in your health care if you are taking or have been taking any non-prescription drugs, vitamins, minerals, herbals or other nutritional supplements  Any of these may interact with some food or medicines and cause a reaction      Pre-Surgery Instructions:   Medication Instructions    amLODIPine (NORVASC) 10 mg tablet Instructed patient per Anesthesia Guidelines   aspirin (ECOTRIN LOW STRENGTH) 81 mg EC tablet Instructed patient per Anesthesia Guidelines   cholecalciferol (VITAMIN D3) 1,000 units tablet Instructed patient per Anesthesia Guidelines   hydrochlorothiazide (MICROZIDE) 12 5 mg capsule Instructed patient per Anesthesia Guidelines   metoprolol tartrate (LOPRESSOR) 25 mg tablet Instructed patient per Anesthesia Guidelines      To take metoprolol, and amlodipine

## 2019-10-18 NOTE — H&P
History & Physical - [facility]  Gely Barnhart 70 y o  male MRN: [id number]  Unit/Bed#:  Encounter: [id number]    ASSESSMENT/PLAN:  Right hydronephrosis  Right ureteral stones and dense stricture in the proximal to mid right ureter  Refuses surgical repair or nephrectomy until stent can not be exchanged and he requires nephrostomy tubes  Stent placement and holmium laser of stone and stricture 4/18/17  Holmium laser of stone and stent exchange 5/2/17  Renal stones treated elsewhere in [place] in past year (7 procedures)  Right laser lithotripsy, laser infundibulotomy, and stone basket extraction 6/2/17  Right CRUSH and laser incision of proximal ureter 7/18/17  Stent changed 05/10/19-- stone causing near complete obstruction of the right proximal ureter  Required stent to be removed in order for a guidewire to pass  Last stent exchange 07/30/19 was without complications  · Right ureteral stent exchange, at 10-11 weeks  · The risks, benefits, alternatives,and probabilities of success were discussed in detail with no guarantee made as to outcome  All questions were answered to the patient's satisfaction  · No changes since h and p done     A-fib, CHF, HTN, CKD stage III, Moderate MR, TR, and aortic regurgitation     HISTORY OF PRESENT ILLNESS:  71 y/o male with hx of renal stones and ureteral stricture disease presents for right ureteral stent exchange  Pt does not desire surgical treatment of his stones or stricture until he is at the point of requiring a nephrostomy tube  Desires to continue with routine right ureteral stent exchanges    Presents for routine stent exchange       PAST MEDICAL HISTORY:  Past Medical History:   Diagnosis Date    Atrial fibrillation ([address] )     Essential hypertension, long-standing     Heart murmur, lifelong     heart murmur since birth    Hematuria     intermittent    History of cigarette smoking, chronic     64 year smoker at one half pack per day, quit 04/1/17    Kidney stones     12 episodes of kidney stones    Kidney stones with lithotripsy 03/2017    [facility]    Pneumonia      X 2    Vitamin D deficiency     maintained with 1000 units of D    Water retention     Wears glasses     Wears partial dentures     upper       PAST SURGICAL HISTORY:  Past Surgical History:   Procedure Laterality Date    APPENDECTOMY  1960    CAROTID ENDARTERECTOMY Left 1998    Supposedly no internal stenosis found    CYSTOSCOPY Right 8/15/2017    Procedure: CYSTOSCOPY RIGHT STENT EXCHANGE;  Surgeon: David Patiño MD;  Location: [address] [place] [address];  Service: Urology    CYSTOSCOPY     [address] [facility] [address]  LITHOTRIPSY  03/2017    For nephrolithiasis at [address]  5/10/2019    [place] RETROGRADE PYELOGRAM  7/30/2019    [place] CYSTO/URETERO W/LITHOTRIPSY &INDWELL STENT INSRT Right 5/2/2017    Procedure: CYSTOSCOPY URETEROSCOPY WITH LITHOTRIPSY HOLMIUM LASER, RETROGRADE PYELOGRAM AND INSERTION STENT URETERAL;  Surgeon: David Patiño MD;  Location: [address] [place] [address];  Service: Urology    [place] CYSTO/URETERO W/LITHOTRIPSY &INDWELL STENT INSRT Right 5/16/2017    Procedure: CYSTOSCOPY, RETROGRADE PYELOGRAM,  FLEXIBLE URETEROSCOPY,  HOLMIUM LASER LITHOTRIPSY, STONE BASKET MANIPULATION,  STENT URETERAL EXCHANGE;  Surgeon: David Patiño MD;  Location: [address] [place] [address];  Service: Urology    [place] CYSTO/URETERO W/LITHOTRIPSY &INDWELL STENT INSRT Right 6/2/2017    Procedure: CYSTOSCOPY, RETROGRADE, STONE MANIPULATION WITH HOLMIUM LASER, STENT PLACEMENT;  Surgeon: David Patiño MD;  Location: [address] [place] [address];  Service: Urology    [place] CYSTO/URETERO W/LITHOTRIPSY &INDWELL STENT INSRT Right 7/18/2017    Procedure: CYSTOSCOPY, RETROGRADE, URETEROSCOPY HOLMIUM LASER [facility];  Surgeon: David Patiño MD;  Location: [address] [place] [address];  Service: Urology    [place] Janette Right 11/14/2017 Procedure: EXCHANGE STENT URETERAL;  Surgeon: Kp Ortega MD;  Location: [address] [place] [address];  Service: Urology    [place] CYSTOURETHROSCOPY,URETER CATHETER Right 11/14/2017    Procedure: [place], STENT EXCHANGE;  Surgeon: Kp Ortega MD;  Location: [address] [place] [address];  Service: Urology    [place] CYSTOURETHROSCOPY,URETER CATHETER Right 5/8/2018    Procedure: Letta Hint;  Surgeon: Kp Ortega MD;  Location: [address] [place] [address];  Service: Urology    [place] CYSTOURETHROSCOPY,URETER CATHETER Right 8/14/2018    Procedure: Letta Hint;  Surgeon: Kp Ortega MD;  Location: [address] [place] [address];  Service: Urology    [place] CYSTOURETHROSCOPY,URETER CATHETER Right 11/13/2018    Procedure: Gilson Dean, RETROGRADE;  Surgeon: Kp Ortega MD;  Location: [address] [place] [address];  Service: Urology    [place] CYSTOURETHROSCOPY,URETER CATHETER Right 2/12/2019    Procedure: [place], STENT REMOVAL AND STENT PLACEMENT;  Surgeon: Kp Ortega MD;  Location: [address] [place] [address];  Service: Urology    [place] CYSTOURETHROSCOPY,URETER CATHETER Right 5/10/2019    Procedure: [place], STENT EXCHANGE;  Surgeon: Kp Ortega MD;  Location: [address] [place] [address];  Service: Urology    [place] CYSTOURETHROSCOPY,URETER CATHETER Right 7/30/2019    Procedure: CYSTOSCOPY, RETROGRADE, STENT REMOVAL AND PLACEMENT OF STENT;  Surgeon: Kp Ortega MD;  Location: [address] [place] [address];  Service: Urology    PROSTATE SURGERY  2015    Laser Prostatectomy  by Dr Yarely Randhawa Right 4/18/2017    Procedure: INSERTION STENT URETERAL, RIGHT URETEROSCOPY,  LASER OF URETERAL STRICTURE AND STONE;  Surgeon: Kp Ortega MD;  Location: [address] [place] [address];  Service:    [address] [place], [place] [address]way [address] [place] [address] Right 2/13/2018    Procedure: Letta Hint;  Surgeon: Kp Ortega MD;  Location: [facility];  Service: Urology       ALLERGIES:  No Known Allergies    SOCIAL HISTORY:  Social History     Substance and Sexual Activity   Alcohol Use Yes    Alcohol/week: 4 0 standard drinks    Types: 4 Shots of liquor per week    Drinks per session: 1 or 2    Binge frequency: Monthly    Comment: rare     Social History     Substance and Sexual Activity   Drug Use No     Social History     Tobacco Use   Smoking Status Former Smoker    Packs/day: 0 50    Years: 62 00    Pack years: 31 00    Types: Cigarettes    Last attempt to quit: 4/15/2017    Years since quittin 5   Smokeless Tobacco Never Used       FAMILY HISTORY:  Family History   Problem Relation Age of Onset    Hypertension Mother     Alcohol abuse Father     Cirrhosis Father     Cancer Son 13        cancer-knee and above-left-amputation    Cancer Sister     Other Brother         head mass    Thyroid disease unspecified Sister     Arthritis Sister     Other Brother         legally blind in one eye       MEDICATIONS:  No current facility-administered medications for this encounter  Current Outpatient Medications:     amLODIPine (NORVASC) 10 mg tablet, Take 10 mg by mouth every morning  , Disp: , Rfl:     aspirin (ECOTRIN LOW STRENGTH) 81 mg EC tablet, Take 81 mg by mouth daily Last dose 1 week ago, Disp: , Rfl:     cholecalciferol (VITAMIN D3) 1,000 units tablet, Take 1,000 Units by mouth daily after lunch  , Disp: , Rfl:     hydrochlorothiazide (MICROZIDE) 12 5 mg capsule, take 1 capsule by mouth once daily, Disp: 90 capsule, Rfl: 1    metoprolol tartrate (LOPRESSOR) 25 mg tablet, take 1 tablet by mouth every 8 hours, Disp: 90 tablet, Rfl: 3    Review of Systems    PHYSICAL EXAM:  Physical Exam   Constitutional: He appears well-developed  HENT:   Head: Normocephalic  Abdominal: Soft  There is no tenderness  There is no guarding  Neurological: He is alert  Skin: Skin is warm and dry         LAB RESULTS:  Lab Results   Component Value Date    WBC 7 28 2019    HGB 14 6 2019    HCT 45 7 2019    MCV 89 2019    PLT 152 08/02/2019     Lab Results   Component Value Date    CALCIUM 9 0 08/02/2019    K 4 5 08/02/2019    CO2 29 08/02/2019     08/02/2019    BUN 23 08/02/2019    CREATININE 1 51 (H) 08/02/2019     Lab Results   Component Value Date    CALCIUM 9 0 08/02/2019    PHOS 2 8 08/02/2019         Gail Gamble PA-C  10/18/19    Portions of the record may have been created with voice recognition software   Occasional wrong word or "sound alike" substitutions may have occurred due to the inherent limitations of voice recognition software   Read the chart carefully and recognize, using context, where substitutions have occurred

## 2019-10-19 LAB — BACTERIA UR CULT: NORMAL

## 2019-10-21 ENCOUNTER — ANESTHESIA EVENT (OUTPATIENT)
Dept: PERIOP | Facility: HOSPITAL | Age: 79
End: 2019-10-21
Payer: MEDICARE

## 2019-10-21 NOTE — ANESTHESIA PREPROCEDURE EVALUATION
Review of Systems/Medical History  Patient summary reviewed  Chart reviewed      Cardiovascular  Hypertension controlled, Dysrhythmias , atrial fibrillation,    Pulmonary  Smoker ex-smoker  , Pneumonia,        GI/Hepatic  Negative GI/hepatic ROS          Kidney stones,        Endo/Other  Negative endo/other ROS      GYN  Negative gynecology ROS          Hematology  Negative hematology ROS      Musculoskeletal  Negative musculoskeletal ROS        Neurology  Negative neurology ROS      Psychology   Negative psychology ROS              Physical Exam    Airway    Mallampati score: III  TM Distance: <3 FB  Neck ROM: limited     Dental   No notable dental hx upper dentures,     Cardiovascular  Rhythm: irregular, Rate: abnormal, Cardiovascular exam normal    Pulmonary  Pulmonary exam normal Breath sounds clear to auscultation,     Other Findings        Anesthesia Plan  ASA Score- 2     Anesthesia Type- IV sedation with anesthesia with ASA Monitors  Additional Monitors:   Airway Plan:         Plan Factors-    Induction- intravenous  Postoperative Plan- Plan for postoperative opioid use  Informed Consent- Anesthetic plan and risks discussed with patient  I personally reviewed this patient with the CRNA  Discussed and agreed on the Anesthesia Plan with the CRNA  Ivet Fernandez

## 2019-10-22 ENCOUNTER — APPOINTMENT (OUTPATIENT)
Dept: RADIOLOGY | Facility: HOSPITAL | Age: 79
End: 2019-10-22
Payer: MEDICARE

## 2019-10-22 ENCOUNTER — ANESTHESIA (OUTPATIENT)
Dept: PERIOP | Facility: HOSPITAL | Age: 79
End: 2019-10-22
Payer: MEDICARE

## 2019-10-22 ENCOUNTER — HOSPITAL ENCOUNTER (OUTPATIENT)
Facility: HOSPITAL | Age: 79
Setting detail: OUTPATIENT SURGERY
Discharge: HOME | End: 2019-10-22
Attending: UROLOGY | Admitting: UROLOGY
Payer: MEDICARE

## 2019-10-22 VITALS
RESPIRATION RATE: 18 BRPM | OXYGEN SATURATION: 95 % | HEART RATE: 61 BPM | BODY MASS INDEX: 29.04 KG/M2 | TEMPERATURE: 97 F | DIASTOLIC BLOOD PRESSURE: 72 MMHG | SYSTOLIC BLOOD PRESSURE: 111 MMHG | WEIGHT: 202.38 LBS

## 2019-10-22 PROCEDURE — C2617 STENT, NON-COR, TEM W/O DEL: HCPCS | Performed by: UROLOGY

## 2019-10-22 PROCEDURE — C1769 GUIDE WIRE: HCPCS | Performed by: UROLOGY

## 2019-10-22 PROCEDURE — 74420 UROGRAPHY RTRGR +-KUB: CPT

## 2019-10-22 DEVICE — IMPLANTABLE DEVICE
Type: IMPLANTABLE DEVICE | Site: URETER | Status: NON-FUNCTIONAL
Removed: 2020-01-28

## 2019-10-22 RX ORDER — PROPOFOL 10 MG/ML
INJECTION, EMULSION INTRAVENOUS AS NEEDED
Status: DISCONTINUED | OUTPATIENT
Start: 2019-10-22 | End: 2019-10-22 | Stop reason: SURG

## 2019-10-22 RX ORDER — FENTANYL CITRATE 50 UG/ML
INJECTION, SOLUTION INTRAMUSCULAR; INTRAVENOUS AS NEEDED
Status: DISCONTINUED | OUTPATIENT
Start: 2019-10-22 | End: 2019-10-22 | Stop reason: SURG

## 2019-10-22 RX ORDER — SODIUM CHLORIDE, SODIUM LACTATE, POTASSIUM CHLORIDE, CALCIUM CHLORIDE 600; 310; 30; 20 MG/100ML; MG/100ML; MG/100ML; MG/100ML
125 INJECTION, SOLUTION INTRAVENOUS CONTINUOUS
Status: DISCONTINUED | OUTPATIENT
Start: 2019-10-22 | End: 2019-10-22 | Stop reason: HOSPADM

## 2019-10-22 RX ORDER — CEFAZOLIN SODIUM 2 G/50ML
2000 SOLUTION INTRAVENOUS ONCE
Status: COMPLETED | OUTPATIENT
Start: 2019-10-22 | End: 2019-10-22

## 2019-10-22 RX ORDER — MAGNESIUM HYDROXIDE 1200 MG/15ML
LIQUID ORAL AS NEEDED
Status: DISCONTINUED | OUTPATIENT
Start: 2019-10-22 | End: 2019-10-22 | Stop reason: HOSPADM

## 2019-10-22 RX ORDER — PROPOFOL 10 MG/ML
INJECTION, EMULSION INTRAVENOUS CONTINUOUS PRN
Status: DISCONTINUED | OUTPATIENT
Start: 2019-10-22 | End: 2019-10-22 | Stop reason: SURG

## 2019-10-22 RX ADMIN — CEFAZOLIN SODIUM 2000 MG: 2 SOLUTION INTRAVENOUS at 11:53

## 2019-10-22 RX ADMIN — PROPOFOL 50 MG: 10 INJECTION, EMULSION INTRAVENOUS at 11:57

## 2019-10-22 RX ADMIN — FENTANYL CITRATE 50 MCG: 50 INJECTION, SOLUTION INTRAMUSCULAR; INTRAVENOUS at 11:57

## 2019-10-22 RX ADMIN — SODIUM CHLORIDE, SODIUM LACTATE, POTASSIUM CHLORIDE, AND CALCIUM CHLORIDE 125 ML/HR: .6; .31; .03; .02 INJECTION, SOLUTION INTRAVENOUS at 09:38

## 2019-10-22 RX ADMIN — FENTANYL CITRATE 50 MCG: 50 INJECTION, SOLUTION INTRAMUSCULAR; INTRAVENOUS at 12:04

## 2019-10-22 RX ADMIN — PROPOFOL 150 MCG/KG/MIN: 10 INJECTION, EMULSION INTRAVENOUS at 11:57

## 2019-10-22 NOTE — OP NOTE
OPERATIVE REPORT- Dr Leeroy Tran  PATIENT NAME: Kimo Delcid    :  1940  MRN: [id number]  Pt Location: [facility] OR ROOM 04    SURGERY DATE: 10/22/2019    Surgeon: Gabo Luevano MD    Pre-op Diagnosis:  1  Right hydronephrosis  2  Right ureteral stricture  3  Right ureteral stones  4  Chronic right ureteral stent exchange    Post-op Diagnosis:  1  same    Procedure:  1  Cystoscopy  2  Fluoroscopy  3  Right retrograde pyelography  4  Right ureteral stent placement    Specimen(s): * No specimens in log *    Estimated Blood Loss: 0 mL    Complications: None    Drains:  1  6 X 28 centimeter right ureteral stent    Anesthesia type: IV Sedation with Anesthesia    Indications for surgery:  Right ureteral stones with stricture refusing nephro ureterectomy or nephrostomy tube placement    Findings:  1  Obstructing stones in the right ureter  Impossible to advance wire without remove the stent first     Procedure and Technique:   After obtaining consent and identifying the patient, antibiotics were given as ordered and the patient was brought to the room  All appropriate leads and monitors were placed and the patient was appropriately positioned on the table  Anesthesia was administered and the patient was sterilely prepped and draped  A timeout was performed where the patient name, , procedure, antibiotics, allergies, etc  were discussed  All in the room were satisfied before the start of the operative procedure  What follows are the operative findings and events  Cystoscopy was undertaken  The bladder was systematically surveyed and found to be free of stones, tumors or infection  The right ureteral orifice was identified with a stent emanating from it  There was moderate calcification on the stent  A guidewire was passed up to the level of the stones in the proximal ureter and would go no further    The stent was grasped and pulled down past the level of obstruction and the wire was quickly advanced up into the area of the renal pelvis  A five [place] open-ended catheter was placed over the wire and the wire was removed  A retrograde was performed  Findings are noted above  A guidewire was inserted up into the right ureter  This was confirmed with x-ray  The catheter was removed and a stent was placed over the wire and there was a good coil proximally and distally  Efflux was noted from the stent  The procedure was terminated and the patient was awakened without incident and transferred to the PACU in satisfactory condition  Plan:  1  Stent exchange 12 weeks  SIGNATURE: Chi Mares MD  DATE: October 22, 2019  TIME: 12:22 PM    Portions of the record may have been created with voice recognition software   Occasional wrong word or "sound alike" substitutions may have occurred due to the inherent limitations of voice recognition software   Read the chart carefully and recognize, using context, where substitutions have occurred

## 2019-12-15 DIAGNOSIS — I10 BENIGN ESSENTIAL HTN: ICD-10-CM

## 2019-12-16 ENCOUNTER — LAB (OUTPATIENT)
Dept: LAB | Facility: HOSPITAL | Age: 79
End: 2019-12-16
Attending: INTERNAL MEDICINE
Payer: MEDICARE

## 2019-12-16 ENCOUNTER — TRANSCRIBE ORDERS (OUTPATIENT)
Dept: ADMINISTRATIVE | Facility: HOSPITAL | Age: 79
End: 2019-12-16

## 2019-12-16 DIAGNOSIS — N18.30 CKD (CHRONIC KIDNEY DISEASE), STAGE III (HCC): ICD-10-CM

## 2019-12-16 DIAGNOSIS — I10 BENIGN ESSENTIAL HTN: ICD-10-CM

## 2019-12-16 LAB
ANION GAP SERPL CALCULATED.3IONS-SCNC: 7 MMOL/L (ref 4–13)
BACTERIA UR QL AUTO: ABNORMAL /HPF
BILIRUB UR QL STRIP: NEGATIVE
BUN SERPL-MCNC: 26 MG/DL (ref 5–25)
CALCIUM SERPL-MCNC: 9.1 MG/DL (ref 8.3–10.1)
CHLORIDE SERPL-SCNC: 105 MMOL/L (ref 100–108)
CLARITY UR: ABNORMAL
CO2 SERPL-SCNC: 30 MMOL/L (ref 21–32)
COLOR UR: YELLOW
CREAT SERPL-MCNC: 1.48 MG/DL (ref 0.6–1.3)
CREAT UR-MCNC: 159 MG/DL
ERYTHROCYTE [DISTWIDTH] IN BLOOD BY AUTOMATED COUNT: 14.2 % (ref 11.6–15.1)
GFR SERPL CREATININE-BSD FRML MDRD: 45 ML/MIN/1.73SQ M
GLUCOSE P FAST SERPL-MCNC: 101 MG/DL (ref 65–99)
GLUCOSE UR STRIP-MCNC: NEGATIVE MG/DL
HCT VFR BLD AUTO: 47.6 % (ref 36.5–49.3)
HGB BLD-MCNC: 15.3 G/DL (ref 12–17)
HGB UR QL STRIP.AUTO: ABNORMAL
KETONES UR STRIP-MCNC: NEGATIVE MG/DL
LEUKOCYTE ESTERASE UR QL STRIP: ABNORMAL
MAGNESIUM SERPL-MCNC: 1.6 MG/DL (ref 1.6–2.6)
MCH RBC QN AUTO: 29 PG (ref 26.8–34.3)
MCHC RBC AUTO-ENTMCNC: 32.1 G/DL (ref 31.4–37.4)
MCV RBC AUTO: 90 FL (ref 82–98)
MUCOUS THREADS UR QL AUTO: ABNORMAL
NITRITE UR QL STRIP: NEGATIVE
NON-SQ EPI CELLS URNS QL MICRO: ABNORMAL /HPF
PH UR STRIP.AUTO: 5 [PH]
PHOSPHATE SERPL-MCNC: 2.7 MG/DL (ref 2.3–4.1)
PLATELET # BLD AUTO: 191 THOUSANDS/UL (ref 149–390)
PMV BLD AUTO: 10.4 FL (ref 8.9–12.7)
POTASSIUM SERPL-SCNC: 3.6 MMOL/L (ref 3.5–5.3)
PROT UR STRIP-MCNC: ABNORMAL MG/DL
PROT UR-MCNC: 52 MG/DL
PROT/CREAT UR: 0.33 MG/G{CREAT} (ref 0–0.1)
PTH-INTACT SERPL-MCNC: 84.6 PG/ML (ref 18.4–80.1)
RBC # BLD AUTO: 5.28 MILLION/UL (ref 3.88–5.62)
RBC #/AREA URNS AUTO: ABNORMAL /HPF
SODIUM SERPL-SCNC: 142 MMOL/L (ref 136–145)
SP GR UR STRIP.AUTO: 1.02 (ref 1–1.03)
UROBILINOGEN UR QL STRIP.AUTO: 0.2 E.U./DL
WBC # BLD AUTO: 8.63 THOUSAND/UL (ref 4.31–10.16)
WBC #/AREA URNS AUTO: ABNORMAL /HPF

## 2019-12-16 PROCEDURE — 82570 ASSAY OF URINE CREATININE: CPT

## 2019-12-16 PROCEDURE — 84156 ASSAY OF PROTEIN URINE: CPT

## 2019-12-16 PROCEDURE — 83970 ASSAY OF PARATHORMONE: CPT

## 2019-12-16 PROCEDURE — 36415 COLL VENOUS BLD VENIPUNCTURE: CPT

## 2019-12-16 PROCEDURE — 81001 URINALYSIS AUTO W/SCOPE: CPT

## 2019-12-16 PROCEDURE — 83735 ASSAY OF MAGNESIUM: CPT

## 2019-12-16 PROCEDURE — 84100 ASSAY OF PHOSPHORUS: CPT

## 2019-12-16 PROCEDURE — 80048 BASIC METABOLIC PNL TOTAL CA: CPT

## 2019-12-16 PROCEDURE — 85027 COMPLETE CBC AUTOMATED: CPT

## 2019-12-16 RX ORDER — HYDROCHLOROTHIAZIDE 12.5 MG/1
CAPSULE, GELATIN COATED ORAL
Qty: 90 CAPSULE | Refills: 3 | Status: SHIPPED | OUTPATIENT
Start: 2019-12-16 | End: 2020-10-30 | Stop reason: HOSPADM

## 2019-12-16 NOTE — RESULT ENCOUNTER NOTE
Renal function stable  Urine with RBC and WBC which is chronic due to the stones and stent  Await rest of blood work

## 2019-12-18 ENCOUNTER — OFFICE VISIT (OUTPATIENT)
Dept: NEPHROLOGY | Facility: CLINIC | Age: 79
End: 2019-12-18
Payer: MEDICARE

## 2019-12-18 VITALS
WEIGHT: 200.8 LBS | BODY MASS INDEX: 28.75 KG/M2 | SYSTOLIC BLOOD PRESSURE: 132 MMHG | HEIGHT: 70 IN | DIASTOLIC BLOOD PRESSURE: 78 MMHG

## 2019-12-18 DIAGNOSIS — N18.30 CKD (CHRONIC KIDNEY DISEASE), STAGE III (HCC): Primary | ICD-10-CM

## 2019-12-18 DIAGNOSIS — I10 ESSENTIAL HYPERTENSION: ICD-10-CM

## 2019-12-18 PROCEDURE — 99213 OFFICE O/P EST LOW 20 MIN: CPT | Performed by: INTERNAL MEDICINE

## 2019-12-18 NOTE — PROGRESS NOTES
NEPHROLOGY OFFICE VISIT   Pedro Kaufman 70 y o  male MRN: [id number]  12/18/2019    Reason for Visit: CKD III    ASSESSMENT and PLAN:    I had the pleasure of seeing Mr Isael Paulino today in the renal clinic for the continued management of CKD III  Since our last visit, there has been no ER visits or hospitalizations; but the patient did have a stent exchange in October which was routine  Had hematuria when he lifting heavy things such as groceries which has resolved      17-year-old male with a past medical history of CKD stage III Baseline Cr 1 6-1 8, Nephrolithiasis,HTN, A  fib, D CHF, BPH, 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, former smoker quit in April, hospitalized at [facility] in April 2017 for shortness of breath at which time nephrology was consulted due to acute kidney injury with a rising creatinine  Patient was found to have right-sided hydronephrosis with ureteral stricture and stone  Patient underwent ureteral stent and eventually had a lithotripsy  Patient presents for follow up visit for CKD       Patient had stent exchange of the right side in October 2019     1) CKD - baseline Cr approx 1 5-1 7 mg/dL  acute injury during hosp was secondary to obstruction secondary to stone, hydro, poor perfusion in setting of a fib/RVR  Etiology of CKD likely solitary functional kidney, prior hydro, nephrolithiasis      - Follows with Urology regarding stent and nephrolithiasis  -most recent creatinine is at baseline 1 5 mg/dL  - UA with blood but had stent exchange recently prior  - split function test in December shows left kidney receives higher flow then the right kidney by approximately 70 vs 30%  - UPCR relatively stable at 0 33  Though the patient also had hematuria during this urine test   In December   - last stent exchange October 2019  - kidney smart class - completed   Patient is not sure which dialysis modality he would want if dialysis was ever needed    - repeat labwork in 4-5 months  - appt in 5 months  - monitor BP closely  -no changes to medication regimen today     2) Nephrolithiasis - follows with Urology       - renal stone mainly ca oxalate  - 24 hour urine litholink - reviewed  - pt has increased fluid intake to 2 L  - Needs low salt diet - already following  takes in approx 1 8 gm salt daily  at goal  - changed furosemide to HCTZ in jan 2018  - underwent stent exchange recently with Urology October 2019     3) HTN - Blood pressures are stable      4) Volume - history of dCHF  history of mild to mod pulm HTN  Euvolemic      - need to monitor fluid intake with HF history  - daily weights      5) CKD MBD -      - PTH 71 and slightly rising to 85 in December  Monitor for now  - Vit D 46     6) Anemia - stable Hb     7) acid/base - bicarb 30 in august - monitoring for now     8) anemia - Hb above goal     It was a pleasure evaluating Mr Sandra Parisi in the renal office  Thank you for allowing our team to participate in the care of your patient         No problem-specific Assessment & Plan notes found for this encounter  HPI:    Patient denies fevers, chills, nausea, vomiting, diarrhea  PATIENT INSTRUCTIONS:    Patient Instructions   1) Avoid NSAIDS - (Example - motrin, advil, ibuprofen, aleve, exederin, etc)  2) Always follow a low salt diet  3) labwork in 4 months  4) appointment 5 months  5) no changes in medications         OBJECTIVE:  Current Weight: Weight - Scale: 91 1 kg (200 lb 12 8 oz)  Vitals:    12/18/19 1540   BP: 132/78   BP Location: Right arm   Patient Position: Sitting   Cuff Size: Large   Weight: 91 1 kg (200 lb 12 8 oz)   Height: 5' 10" (1 778 m)    Body mass index is 28 81 kg/m²  REVIEW OF SYSTEMS:    Review of Systems   Constitutional: Negative  Negative for appetite change and fatigue  HENT: Negative  Eyes: Negative  Respiratory: Negative  Negative for shortness of breath  Cardiovascular: Negative  Negative for leg swelling  Gastrointestinal: Negative  Endocrine: Negative  Genitourinary: Negative  Negative for difficulty urinating  Musculoskeletal: Negative  Allergic/Immunologic: Negative  Neurological: Negative  Hematological: Negative  Psychiatric/Behavioral: Negative  All other systems reviewed and are negative  PHYSICAL EXAM:      Physical Exam   Constitutional: He is oriented to person, place, and time  He appears well-developed and well-nourished  No distress  HENT:   Head: Normocephalic and atraumatic  Mouth/Throat: No oropharyngeal exudate  Eyes: Conjunctivae are normal  Right eye exhibits no discharge  Left eye exhibits no discharge  No scleral icterus  Neck: Normal range of motion  Neck supple  No JVD present  Cardiovascular: Normal rate, regular rhythm and normal heart sounds  Exam reveals no gallop and no friction rub  No murmur heard  Pulmonary/Chest: Effort normal and breath sounds normal  No respiratory distress  He has no wheezes  He has no rales  He exhibits no tenderness  Abdominal: Soft  Bowel sounds are normal  He exhibits no distension  There is no tenderness  There is no rebound  Musculoskeletal: Normal range of motion  He exhibits no edema, tenderness or deformity  Neurological: He is alert and oriented to person, place, and time  He has normal reflexes  No cranial nerve deficit  Coordination normal    Skin: Skin is warm and dry  No rash noted  He is not diaphoretic  No erythema  No pallor  Psychiatric: He has a normal mood and affect  His behavior is normal  Judgment and thought content normal    Nursing note and vitals reviewed        Medications:    Current Outpatient Medications:     amLODIPine (NORVASC) 10 mg tablet, Take 10 mg by mouth every morning  , Disp: , Rfl:     aspirin (ECOTRIN LOW STRENGTH) 81 mg EC tablet, Take 81 mg by mouth daily Last dose 1 week ago, Disp: , Rfl:     cholecalciferol (VITAMIN D3) 1,000 units tablet, Take 1,000 Units by mouth daily after lunch  , Disp: , Rfl:     hydrochlorothiazide (MICROZIDE) 12 5 mg capsule, take 1 capsule by mouth once daily, Disp: 90 capsule, Rfl: 3    metoprolol tartrate (LOPRESSOR) 25 mg tablet, Take 1 tablet (25 mg total) by mouth every 8 (eight) hours, Disp: 270 tablet, Rfl: 3    Laboratory Results:  Results from last 7 days   Lab Units 12/16/19  1333   WBC Thousand/uL 8 63   HEMOGLOBIN g/dL 15 3   HEMATOCRIT % 47 6   PLATELETS Thousands/uL 191   POTASSIUM mmol/L 3 6   CHLORIDE mmol/L 105   CO2 mmol/L 30   BUN mg/dL 26*   CREATININE mg/dL 1 48*   CALCIUM mg/dL 9 1   MAGNESIUM mg/dL 1 6   PHOSPHORUS mg/dL 2 7       Results for orders placed or performed in visit on 38/57/00   Basic metabolic panel   Result Value Ref Range    Sodium 142 136 - 145 mmol/L    Potassium 3 6 3 5 - 5 3 mmol/L    Chloride 105 100 - 108 mmol/L    CO2 30 21 - 32 mmol/L    ANION GAP 7 4 - 13 mmol/L    BUN 26 (H) 5 - 25 mg/dL    Creatinine 1 48 (H) 0 60 - 1 30 mg/dL    Glucose, Fasting 101 (H) 65 - 99 mg/dL    Calcium 9 1 8 3 - 10 1 mg/dL    eGFR 45 ml/min/1 73sq m   CBC   Result Value Ref Range    WBC 8 63 4 31 - 10 16 Thousand/uL    RBC 5 28 3 88 - 5 62 Million/uL    Hemoglobin 15 3 12 0 - 17 0 g/dL    Hematocrit 47 6 36 5 - 49 3 %    MCV 90 82 - 98 fL    MCH 29 0 26 8 - 34 3 pg    MCHC 32 1 31 4 - 37 4 g/dL    RDW 14 2 11 6 - 15 1 %    Platelets 026 714 - 872 Thousands/uL    MPV 10 4 8 9 - 12 7 fL   Phosphorus   Result Value Ref Range    Phosphorus 2 7 2 3 - 4 1 mg/dL   Protein / creatinine ratio, urine   Result Value Ref Range    Creatinine, Ur 159 0 mg/dL    Protein Urine Random 52 mg/dL    Prot/Creat Ratio, Ur 0 33 (H) 0 00 - 0 10   PTH, intact   Result Value Ref Range    PTH 84 6 (H) 18 4 - 80 1 pg/mL   Magnesium   Result Value Ref Range    Magnesium 1 6 1 6 - 2 6 mg/dL   UA (URINE) with reflex to Scope   Result Value Ref Range    Color, UA Yellow     Clarity, UA Slightly Cloudy     Specific [place], UA 1 025 1 000 - 1 030    pH, UA 5 0 5 0, 5 5, 6 0, 6 5, 7 0, 7  5, 8 0, 8 5, 9 0    Leukocytes, UA Small (A) Negative    Nitrite, UA Negative Negative    Protein, UA 30 (1+) (A) Negative mg/dl    Glucose, UA Negative Negative mg/dl    Ketones, UA Negative Negative mg/dl    Urobilinogen, UA 0 2 0 2, 1 0 E U /dl E U /dl    Bilirubin, UA Negative Negative    Blood, UA Large (A) Negative   Urine Microscopic   Result Value Ref Range    RBC, UA Innumerable (A) None Seen, 0-5 /hpf    WBC, UA 10-20 (A) None Seen, 0-5, 5-55, 5-65 /hpf    Epithelial Cells Occasional None Seen, Occasional /hpf    Bacteria, UA Occasional None Seen, Occasional /hpf    MUCUS THREADS Occasional (A) None Seen

## 2019-12-18 NOTE — LETTER
December 18, 2019     Zaynab Maloney PA-C  [address] [place], [place] [id number]    Patient: Saud Truong   YOB: 1940   Date of Visit: 12/18/2019       Dear Dr Zoe Garcia:    Thank you for referring Connie Orlando to me for evaluation  Below are my notes for this consultation  If you have questions, please do not hesitate to call me  I look forward to following your patient along with you  Sincerely,        Julius Davies MD        CC: No Recipients  Julius Davies MD  12/18/2019  4:35 PM  Sign at close encounter  Yaya Melo 70 y o  male MRN: [id number]  12/18/2019    Reason for Visit: CKD III    ASSESSMENT and PLAN:    I had the pleasure of seeing Mr Claudia Courtney today in the renal clinic for the continued management of CKD III  Since our last visit, there has been no ER visits or hospitalizations; but the patient did have a stent exchange in October which was routine  Had hematuria when he lifting heavy things such as groceries which has resolved      82-year-old male with a past medical history of CKD stage III Baseline Cr 1 6-1 8, Nephrolithiasis,HTN, A  fib, D CHF, BPH, 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, former smoker quit in April, hospitalized at [facility] in April 2017 for shortness of breath at which time nephrology was consulted due to acute kidney injury with a rising creatinine  Patient was found to have right-sided hydronephrosis with ureteral stricture and stone  Patient underwent ureteral stent and eventually had a lithotripsy  Patient presents for follow up visit for CKD       Patient had stent exchange of the right side in  October 2019     1) CKD - baseline Cr approx 1 5-1 7 mg/dL  acute injury during hosp was secondary to obstruction secondary to stone, hydro, poor perfusion in setting of a fib/RVR   Etiology of CKD likely solitary functional kidney, prior hydro, nephrolithiasis      - Follows with Urology regarding stent and nephrolithiasis  -most recent creatinine is at baseline 1 5 mg/dL  - UA with blood but had stent exchange recently prior  - split function test in December shows left kidney receives higher flow then the right kidney by approximately 70 vs 30%  - UPCR relatively stable at 0 33  Though the patient also had hematuria during this urine test   In December   - last stent exchange October 2019  - kidney smart class - completed   Patient is not sure which dialysis modality he would want if dialysis was ever needed  - repeat labwork in 4-5 months  - appt in 5 months  - monitor BP closely  -no changes to medication regimen today     2) Nephrolithiasis - follows with Urology       - renal stone mainly ca oxalate  - 24 hour urine litholink - reviewed  - pt has increased fluid intake to 2 L  - Needs low salt diet - already following  takes in approx 1 8 gm salt daily  at goal  - changed furosemide to HCTZ in jan 2018  - underwent stent exchange recently with Urology October 2019     3) HTN - Blood pressures are stable      4) Volume - history of dCHF  history of mild to mod pulm HTN  Euvolemic      - need to monitor fluid intake with HF history  - daily weights      5) CKD MBD -      - PTH 71 and slightly rising to 85 in December  Monitor for now  - Vit D 46     6) Anemia - stable Hb     7) acid/base - bicarb 30 in august - monitoring for now     8) anemia - Hb above goal     It was a pleasure evaluating Mr Jailyn Jo in the renal office  Thank you for allowing our team to participate in the care of your patient         No problem-specific Assessment & Plan notes found for this encounter  HPI:    Patient denies fevers, chills, nausea, vomiting, diarrhea      PATIENT INSTRUCTIONS:    Patient Instructions   1) Avoid NSAIDS - (Example - motrin, advil, ibuprofen, aleve, exederin, etc)  2) Always follow a low salt diet  3) labwork in 4 months  4) appointment 5 months  5) no changes in medications OBJECTIVE:  Current Weight: Weight - Scale: 91 1 kg (200 lb 12 8 oz)  Vitals:    12/18/19 1540   BP: 132/78   BP Location: Right arm   Patient Position: Sitting   Cuff Size: Large   Weight: 91 1 kg (200 lb 12 8 oz)   Height: 5' 10" (1 778 m)    Body mass index is 28 81 kg/m²  REVIEW OF SYSTEMS:    Review of Systems   Constitutional: Negative  Negative for appetite change and fatigue  HENT: Negative  Eyes: Negative  Respiratory: Negative  Negative for shortness of breath  Cardiovascular: Negative  Negative for leg swelling  Gastrointestinal: Negative  Endocrine: Negative  Genitourinary: Negative  Negative for difficulty urinating  Musculoskeletal: Negative  Allergic/Immunologic: Negative  Neurological: Negative  Hematological: Negative  Psychiatric/Behavioral: Negative  All other systems reviewed and are negative  PHYSICAL EXAM:      Physical Exam   Constitutional: He is oriented to person, place, and time  He appears well-developed and well-nourished  No distress  HENT:   Head: Normocephalic and atraumatic  Mouth/Throat: No oropharyngeal exudate  Eyes: Conjunctivae are normal  Right eye exhibits no discharge  Left eye exhibits no discharge  No scleral icterus  Neck: Normal range of motion  Neck supple  No JVD present  Cardiovascular: Normal rate, regular rhythm and normal heart sounds  Exam reveals no gallop and no friction rub  No murmur heard  Pulmonary/Chest: Effort normal and breath sounds normal  No respiratory distress  He has no wheezes  He has no rales  He exhibits no tenderness  Abdominal: Soft  Bowel sounds are normal  He exhibits no distension  There is no tenderness  There is no rebound  Musculoskeletal: Normal range of motion  He exhibits no edema, tenderness or deformity  Neurological: He is alert and oriented to person, place, and time  He has normal reflexes  No cranial nerve deficit   Coordination normal    Skin: Skin is warm and dry  No rash noted  He is not diaphoretic  No erythema  No pallor  Psychiatric: He has a normal mood and affect  His behavior is normal  Judgment and thought content normal    Nursing note and vitals reviewed        Medications:    Current Outpatient Medications:     amLODIPine (NORVASC) 10 mg tablet, Take 10 mg by mouth every morning  , Disp: , Rfl:     aspirin (ECOTRIN LOW STRENGTH) 81 mg EC tablet, Take 81 mg by mouth daily Last dose 1 week ago, Disp: , Rfl:     cholecalciferol (VITAMIN D3) 1,000 units tablet, Take 1,000 Units by mouth daily after lunch  , Disp: , Rfl:     hydrochlorothiazide (MICROZIDE) 12 5 mg capsule, take 1 capsule by mouth once daily, Disp: 90 capsule, Rfl: 3    metoprolol tartrate (LOPRESSOR) 25 mg tablet, Take 1 tablet (25 mg total) by mouth every 8 (eight) hours, Disp: 270 tablet, Rfl: 3    Laboratory Results:  Results from last 7 days   Lab Units 12/16/19  1333   WBC Thousand/uL 8 63   HEMOGLOBIN g/dL 15 3   HEMATOCRIT % 47 6   PLATELETS Thousands/uL 191   POTASSIUM mmol/L 3 6   CHLORIDE mmol/L 105   CO2 mmol/L 30   BUN mg/dL 26*   CREATININE mg/dL 1 48*   CALCIUM mg/dL 9 1   MAGNESIUM mg/dL 1 6   PHOSPHORUS mg/dL 2 7       Results for orders placed or performed in visit on 61/81/00   Basic metabolic panel   Result Value Ref Range    Sodium 142 136 - 145 mmol/L    Potassium 3 6 3 5 - 5 3 mmol/L    Chloride 105 100 - 108 mmol/L    CO2 30 21 - 32 mmol/L    ANION GAP 7 4 - 13 mmol/L    BUN 26 (H) 5 - 25 mg/dL    Creatinine 1 48 (H) 0 60 - 1 30 mg/dL    Glucose, Fasting 101 (H) 65 - 99 mg/dL    Calcium 9 1 8 3 - 10 1 mg/dL    eGFR 45 ml/min/1 73sq m   CBC   Result Value Ref Range    WBC 8 63 4 31 - 10 16 Thousand/uL    RBC 5 28 3 88 - 5 62 Million/uL    Hemoglobin 15 3 12 0 - 17 0 g/dL    Hematocrit 47 6 36 5 - 49 3 %    MCV 90 82 - 98 fL    MCH 29 0 26 8 - 34 3 pg    MCHC 32 1 31 4 - 37 4 g/dL    RDW 14 2 11 6 - 15 1 %    Platelets 806 620 - 585 Thousands/uL    MPV 10 4 8 9 - 12 7 fL   Phosphorus   Result Value Ref Range    Phosphorus 2 7 2 3 - 4 1 mg/dL   Protein / creatinine ratio, urine   Result Value Ref Range    Creatinine, Ur 159 0 mg/dL    Protein Urine Random 52 mg/dL    Prot/Creat Ratio, Ur 0 33 (H) 0 00 - 0 10   PTH, intact   Result Value Ref Range    PTH 84 6 (H) 18 4 - 80 1 pg/mL   Magnesium   Result Value Ref Range    Magnesium 1 6 1 6 - 2 6 mg/dL   UA (URINE) with reflex to Scope   Result Value Ref Range    Color, UA Yellow     Clarity, UA Slightly Cloudy     Specific [place], UA 1 025 1 000 - 1 030    pH, UA 5 0 5 0, 5 5, 6 0, 6 5, 7 0, 7 5, 8 0, 8 5, 9 0    Leukocytes, UA Small (A) Negative    Nitrite, UA Negative Negative    Protein, UA 30 (1+) (A) Negative mg/dl    Glucose, UA Negative Negative mg/dl    Ketones, UA Negative Negative mg/dl    Urobilinogen, UA 0 2 0 2, 1 0 E U /dl E U /dl    Bilirubin, UA Negative Negative    Blood, UA Large (A) Negative   Urine Microscopic   Result Value Ref Range    RBC, UA Innumerable (A) None Seen, 0-5 /hpf    WBC, UA 10-20 (A) None Seen, 0-5, 5-55, 5-65 /hpf    Epithelial Cells Occasional None Seen, Occasional /hpf    Bacteria, UA Occasional None Seen, Occasional /hpf    MUCUS THREADS Occasional (A) None Seen

## 2019-12-18 NOTE — PATIENT INSTRUCTIONS
1) Avoid NSAIDS - (Example - motrin, advil, ibuprofen, aleve, exederin, etc)  2) Always follow a low salt diet  3) labwork in 4 months  4) appointment 5 months  5) no changes in medications

## 2020-01-23 NOTE — H&P
History & Physical - [facility]  Awa Wang 70 y o  male MRN: [id number]  Unit/Bed#:  Encounter: [id number]    ASSESSMENT/PLAN:  Right hydronephrosis  Right ureteral stones and dense stricture in the proximal to mid right ureter  Refuses surgical repair or nephrectomy until stent can not be exchanged and he requires nephrostomy tubes  Stent placement and holmium laser of stone and stricture 4/18/17  Holmium laser of stone and stent exchange 5/2/17  Renal stones treated elsewhere in [place] in past year (7 procedures)  Right laser lithotripsy, laser infundibulotomy, and stone basket extraction 6/2/17  Right CRUSH and laser incision of proximal ureter 7/18/17  Stent changed 05/10/19-- stone causing near complete obstruction of the right proximal ureter  Required stent to be removed in order for a guidewire to pass  Last stent exchange 10/22/19 found it impossible to advance the wire without removing the stent first    · Right ureteral stent exchange, at 10-11 weeks  · The risks, benefits, alternatives,and probabilities of success were discussed in detail with no guarantee made as to outcome  All questions were answered to the patient's satisfaction  · No changes since h and p done     A-fib, CHF, HTN, CKD stage III, Moderate MR, TR, and aortic regurgitation     HISTORY OF PRESENT ILLNESS:  71 y/o male with hx of renal stones and ureteral stricture disease presents for right ureteral stent exchange  Pt does not desire surgical treatment of his stones or stricture until he is at the point of requiring a nephrostomy tube  Desires to continue with routine right ureteral stent exchanges    Presents for routine stent exchange       PAST MEDICAL HISTORY:  Past Medical History:   Diagnosis Date    Atrial fibrillation ([address] )     Essential hypertension, long-standing     Heart murmur, lifelong     heart murmur since birth    Hematuria     intermittent    History of cigarette smoking, chronic     64 year smoker at one half pack per day, quit 04/1/17    Kidney stones     12 episodes of kidney stones    Kidney stones with lithotripsy 03/2017    Done at [facility]    Pneumonia      X 2    Vitamin D deficiency     maintained with 1000 units of D    Water retention     Wears glasses     Wears partial dentures     upper       PAST SURGICAL HISTORY:  Past Surgical History:   Procedure Laterality Date    APPENDECTOMY  1960    CAROTID ENDARTERECTOMY Left 1998    Supposedly no internal stenosis found    CYSTOSCOPY Right 8/15/2017    Procedure: CYSTOSCOPY RIGHT STENT EXCHANGE;  Surgeon: Jaron Rm MD;  Location: [address] [place] [address];  Service: Urology    CYSTOSCOPY     [address] [facility]  LITHOTRIPSY  03/2017    For nephrolithiasis at [address]  5/10/2019    FL RETROGRADE PYELOGRAM  7/30/2019    FL RETROGRADE PYELOGRAM  10/22/2019    DE CYSTO/URETERO W/LITHOTRIPSY &INDWELL STENT INSRT Right 5/2/2017    Procedure: CYSTOSCOPY URETEROSCOPY WITH LITHOTRIPSY HOLMIUM LASER, RETROGRADE PYELOGRAM AND INSERTION STENT URETERAL;  Surgeon: Jaron Rm MD;  Location: [address] [place] [address];  Service: Urology    DE CYSTO/URETERO W/LITHOTRIPSY &INDWELL STENT INSRT Right 5/16/2017    Procedure: CYSTOSCOPY, RETROGRADE PYELOGRAM,  FLEXIBLE URETEROSCOPY,  HOLMIUM LASER LITHOTRIPSY, STONE BASKET MANIPULATION,  STENT URETERAL EXCHANGE;  Surgeon: Jaron Rm MD;  Location: [address] [place] [address];  Service: Urology    DE CYSTO/URETERO W/LITHOTRIPSY &INDWELL STENT INSRT Right 6/2/2017    Procedure: CYSTOSCOPY, RETROGRADE, STONE MANIPULATION WITH HOLMIUM LASER, STENT PLACEMENT;  Surgeon: Jaron Rm MD;  Location: [address] [place] [address];  Service: Urology    DE CYSTO/URETERO W/LITHOTRIPSY &INDWELL STENT INSRT Right 7/18/2017    Procedure: CYSTOSCOPY, RETROGRADE, URETEROSCOPY HOLMIUM LASER Kirit Ryan;  Surgeon: Jaorn Rm MD;  Location: [facility] MAIN OR;  Service: Urology    MT CYSTOSCOPY,INSERT URETERAL STENT Right 11/14/2017    Procedure: EXCHANGE STENT URETERAL;  Surgeon: Ankit Dominguez MD;  Location: [address] [place] [address];  Service: Urology    MT CYSTOURETHROSCOPY,URETER CATHETER Right 11/14/2017    Procedure: Tomjohnnie Nobles, STENT EXCHANGE;  Surgeon: Ankit Dominguez MD;  Location: [address] [place] [address];  Service: Urology    MT CYSTOURETHROSCOPY,URETER CATHETER Right 5/8/2018    Procedure: Blanca Logjay;  Surgeon: Ankit Dominguez MD;  Location: [address] [place] [address];  Service: Urology    MT CYSTOURETHROSCOPY,URETER CATHETER Right 8/14/2018    Procedure: Blanca Loges;  Surgeon: Ankit Dominguez MD;  Location: [address] [place] [address];  Service: Urology    MT CYSTOURETHROSCOPY,URETER CATHETER Right 11/13/2018    Procedure: CYSTOSCOPY, Brian Dobson EXCHANGE, RETROGRADE;  Surgeon: Ankit Dominguez MD;  Location: [address] [place] [address];  Service: Urology    MT CYSTOURETHROSCOPY,URETER CATHETER Right 2/12/2019    Procedure: Tomjohnnie Nobles, STENT REMOVAL AND STENT PLACEMENT;  Surgeon: Ankit Dominguez MD;  Location: [address] [place] [address];  Service: Urology    MT CYSTOURETHROSCOPY,URETER CATHETER Right 5/10/2019    Procedure: CYSTOSCOPY, RETROGRADE, STENT EXCHANGE;  Surgeon: Ankit Dominugez MD;  Location: [address] [place] [address];  Service: Urology    MT CYSTOURETHROSCOPY,URETER CATHETER Right 7/30/2019    Procedure: CYSTOSCOPY, RETROGRADE, STENT REMOVAL AND PLACEMENT OF STENT;  Surgeon: Ankit Dominguez MD;  Location: [address] [place] [address];  Service: Urology    MT CYSTOURETHROSCOPY,URETER CATHETER Right 10/22/2019    Procedure: CYSTOSCOPY, RETROGRADE, STENT EXCHANGE;  Surgeon: Ankit Dominguez MD;  Location: [address] [place] [address];  Service: Urology    PROSTATE SURGERY  2015    Laser Prostatectomy  by Dr Melissa Cuevas Right 4/18/2017    Procedure: INSERTION STENT URETERAL, RIGHT URETEROSCOPY,  LASER OF URETERAL STRICTURE AND STONE;  Surgeon: Ankit Dominguez MD; Location: [facility];  Service:     URETERAL STENT PLACEMENT Right 2018    Procedure: Mecca Brush;  Surgeon: Griselda Albe, MD;  Location: [facility];  Service: Urology       ALLERGIES:  No Known Allergies    SOCIAL HISTORY:  Social History     Substance and Sexual Activity   Alcohol Use Yes    Alcohol/week: 4 0 standard drinks    Types: 4 Shots of liquor per week    Drinks per session: 1 or 2    Binge frequency: Monthly    Comment: rare     Social History     Substance and Sexual Activity   Drug Use No     Social History     Tobacco Use   Smoking Status Former Smoker    Packs/day: 0 50    Years: 62 00    Pack years: 31 00    Types: Cigarettes    Last attempt to quit: 4/15/2017    Years since quittin 7   Smokeless Tobacco Never Used       FAMILY HISTORY:  Family History   Problem Relation Age of Onset    Hypertension Mother     Alcohol abuse Father     Cirrhosis Father     Cancer Son 13        cancer-knee and above-left-amputation    Cancer Sister     Other Brother         head mass    Thyroid disease unspecified Sister     Arthritis Sister     Other Brother         legally blind in one eye       MEDICATIONS:  No current facility-administered medications for this encounter  Current Outpatient Medications:     amLODIPine (NORVASC) 10 mg tablet, Take 10 mg by mouth every morning  , Disp: , Rfl:     aspirin (ECOTRIN LOW STRENGTH) 81 mg EC tablet, Take 81 mg by mouth daily Last dose 1 week ago, Disp: , Rfl:     cholecalciferol (VITAMIN D3) 1,000 units tablet, Take 1,000 Units by mouth daily after lunch  , Disp: , Rfl:     hydrochlorothiazide (MICROZIDE) 12 5 mg capsule, take 1 capsule by mouth once daily, Disp: 90 capsule, Rfl: 3    metoprolol tartrate (LOPRESSOR) 25 mg tablet, Take 1 tablet (25 mg total) by mouth every 8 (eight) hours, Disp: 270 tablet, Rfl: 3    Review of Systems    PHYSICAL EXAM:  Physical Exam   Constitutional: He appears well-developed  HENT:   Head: Normocephalic  Cardiovascular: Normal rate  Pulmonary/Chest: Effort normal    Abdominal: Soft  There is no tenderness  There is no guarding  Genitourinary: Penis normal    Musculoskeletal: He exhibits no edema  Neurological: He is alert  Skin: Skin is warm and dry  LAB RESULTS:  Lab Results   Component Value Date    WBC 8 63 12/16/2019    HGB 15 3 12/16/2019    HCT 47 6 12/16/2019    MCV 90 12/16/2019     12/16/2019     Lab Results   Component Value Date    CALCIUM 9 1 12/16/2019    K 3 6 12/16/2019    CO2 30 12/16/2019     12/16/2019    BUN 26 (H) 12/16/2019    CREATININE 1 48 (H) 12/16/2019     Lab Results   Component Value Date    CALCIUM 9 1 12/16/2019    PHOS 2 7 12/16/2019         Shazia Enriquez PA-C  01/23/20    Portions of the record may have been created with voice recognition software   Occasional wrong word or "sound alike" substitutions may have occurred due to the inherent limitations of voice recognition software   Read the chart carefully and recognize, using context, where substitutions have occurred

## 2020-01-27 ENCOUNTER — APPOINTMENT (OUTPATIENT)
Dept: LAB | Facility: HOSPITAL | Age: 80
End: 2020-01-27
Attending: UROLOGY
Payer: MEDICARE

## 2020-01-27 ENCOUNTER — APPOINTMENT (OUTPATIENT)
Dept: PREADMISSION TESTING | Facility: HOSPITAL | Age: 80
End: 2020-01-27
Payer: MEDICARE

## 2020-01-27 ENCOUNTER — OFFICE VISIT (OUTPATIENT)
Dept: LAB | Facility: HOSPITAL | Age: 80
End: 2020-01-27
Attending: UROLOGY
Payer: MEDICARE

## 2020-01-27 ENCOUNTER — ANESTHESIA EVENT (OUTPATIENT)
Dept: PERIOP | Facility: HOSPITAL | Age: 80
End: 2020-01-27
Payer: MEDICARE

## 2020-01-27 ENCOUNTER — TRANSCRIBE ORDERS (OUTPATIENT)
Dept: ADMINISTRATIVE | Facility: HOSPITAL | Age: 80
End: 2020-01-27

## 2020-01-27 DIAGNOSIS — Z01.818 PREOP TESTING: ICD-10-CM

## 2020-01-27 DIAGNOSIS — Z01.818 PREOP TESTING: Primary | ICD-10-CM

## 2020-01-27 LAB
ATRIAL RATE: 220 BPM
ATRIAL RATE: 394 BPM
BACTERIA UR QL AUTO: ABNORMAL /HPF
BILIRUB UR QL STRIP: NEGATIVE
CLARITY UR: ABNORMAL
COLOR UR: YELLOW
GLUCOSE UR STRIP-MCNC: NEGATIVE MG/DL
HGB UR QL STRIP.AUTO: ABNORMAL
KETONES UR STRIP-MCNC: NEGATIVE MG/DL
LEUKOCYTE ESTERASE UR QL STRIP: ABNORMAL
NITRITE UR QL STRIP: NEGATIVE
NON-SQ EPI CELLS URNS QL MICRO: ABNORMAL /HPF
PH UR STRIP.AUTO: 5.5 [PH]
PROT UR STRIP-MCNC: ABNORMAL MG/DL
QRS AXIS: -64 DEGREES
QRS AXIS: -65 DEGREES
QRSD INTERVAL: 108 MS
QRSD INTERVAL: 116 MS
QT INTERVAL: 416 MS
QT INTERVAL: 430 MS
QTC INTERVAL: 414 MS
QTC INTERVAL: 449 MS
RBC #/AREA URNS AUTO: ABNORMAL /HPF
SP GR UR STRIP.AUTO: 1.02 (ref 1–1.03)
T WAVE AXIS: 15 DEGREES
T WAVE AXIS: 24 DEGREES
UROBILINOGEN UR QL STRIP.AUTO: 0.2 E.U./DL
VENTRICULAR RATE: 56 BPM
VENTRICULAR RATE: 70 BPM
WBC #/AREA URNS AUTO: ABNORMAL /HPF

## 2020-01-27 PROCEDURE — 93005 ELECTROCARDIOGRAM TRACING: CPT

## 2020-01-27 PROCEDURE — 93010 ELECTROCARDIOGRAM REPORT: CPT | Performed by: INTERNAL MEDICINE

## 2020-01-27 PROCEDURE — 87086 URINE CULTURE/COLONY COUNT: CPT

## 2020-01-27 PROCEDURE — 81001 URINALYSIS AUTO W/SCOPE: CPT | Performed by: UROLOGY

## 2020-01-27 NOTE — ANESTHESIA PREPROCEDURE EVALUATION
Review of Systems/Medical History  Patient summary reviewed  Chart reviewed  No history of anesthetic complications     Cardiovascular  Hypertension , Dysrhythmias , atrial fibrillation,    Pulmonary  Smoker ex-smoker  Cumulative Pack Years: 32,        GI/Hepatic       Kidney stones, Chronic kidney disease ,        Endo/Other    Obesity    GYN       Hematology   Musculoskeletal       Neurology   Psychology           Physical Exam    Airway    Mallampati score: II  TM Distance: >3 FB  Neck ROM: full     Dental       Cardiovascular  Cardiovascular exam normal    Pulmonary  Pulmonary exam normal     Other Findings        Anesthesia Plan  ASA Score- 3     Anesthesia Type- general with ASA Monitors  Additional Monitors:   Airway Plan: LMA  Plan Factors-    Induction- intravenous  Postoperative Plan- Plan for postoperative opioid use  Informed Consent- Anesthetic plan and risks discussed with patient  I personally reviewed this patient with the CRNA  Discussed and agreed on the Anesthesia Plan with the CRNA  Nona Solis

## 2020-01-27 NOTE — PRE-PROCEDURE INSTRUCTIONS
My Surgical Experience    The following information was developed to assist you to prepare for your operation  What do I need to do before coming to the hospital?   Arrange for a responsible person to drive you to and from the hospital    Arrange care for your children at home  Children are not allowed in the recovery areas of the hospital   Plan to wear clothing that is easy to put on and take off  If you are having shoulder surgery, wear a shirt that buttons or zippers in the front  Bathing  o Shower the evening before and the morning of your surgery with an antibacterial soap  Please refer to the Pre Op Showering Instructions for Surgery Patients Sheet   o Remove nail polish and all body piercing jewelry  o Do not shave any body part for at least 24 hours before surgery-this includes face, arms, legs and upper body  Food  o Nothing to eat or drink after midnight the night before your surgery  This includes candy and chewing gum  o Exception: If your surgery is after 12:00pm (noon), you may have clear liquids such as 7-Up®, ginger ale, apple or cranberry juice, Jell-O®, water, or clear broth until 8:00 am  o Do not drink milk or juice with pulp on the morning before surgery  o Do not drink alcohol 24 hours before surgery  Medicine  o Follow instructions you received from your surgeon about which medicines you may take on the day of surgery  o If instructed to take medicine on the morning of surgery, take pills with just a small sip of water  Call your prescribing doctor for specific infroamtion on what to do if you take insulin    What should I bring to the hospital?    Bring:  Marrianne Libman or a walker, if you have them, for foot or knee surgery   A list of the daily medicines, vitamins, minerals, herbals and nutritional supplements you take   Include the dosages of medicines and the time you take them each day   Glasses, dentures or hearing aids   Minimal clothing; you will be wearing hospital sleepwear   Photo ID; required to verify your identity   If you have a Living Will or Power of , bring a copy of the documents   If you have an ostomy, bring an extra pouch and any supplies you use    Do not bring   Medicines or inhalers   Money, valuables or jewelry    What other information should I know about the day of surgery?  Notify your surgeons if you develop a cold, sore throat, cough, fever, rash or any other illness   Report to the Ambulatory Surgical/Same Day Surgery Unit   You will be instructed to stop at Registration only if you have not been pre-registered   Inform your  fi they do not stay that they will be asked by the staff to leave a phone number where they can be reached   Be available to be reached before surgery  In the event the operating room schedule changes, you may be asked to come in earlier or later than expected    *It is important to tell your doctor and others involved in your health care if you are taking or have been taking any non-prescription drugs, vitamins, minerals, herbals or other nutritional supplements  Any of these may interact with some food or medicines and cause a reaction      Pre-Surgery Instructions:   Medication Instructions    amLODIPine (NORVASC) 10 mg tablet Instructed patient per Anesthesia Guidelines   aspirin (ECOTRIN LOW STRENGTH) 81 mg EC tablet Instructed patient per Anesthesia Guidelines   cholecalciferol (VITAMIN D3) 1,000 units tablet Instructed patient per Anesthesia Guidelines   hydrochlorothiazide (MICROZIDE) 12 5 mg capsule Instructed patient per Anesthesia Guidelines   metoprolol tartrate (LOPRESSOR) 25 mg tablet Instructed patient per Anesthesia Guidelines  To take metoprolol and amlodipine a m  Of surgery

## 2020-01-28 ENCOUNTER — ANESTHESIA (OUTPATIENT)
Dept: PERIOP | Facility: HOSPITAL | Age: 80
End: 2020-01-28
Payer: MEDICARE

## 2020-01-28 ENCOUNTER — HOSPITAL ENCOUNTER (OUTPATIENT)
Facility: HOSPITAL | Age: 80
Setting detail: OUTPATIENT SURGERY
Discharge: HOME | End: 2020-01-28
Attending: UROLOGY | Admitting: UROLOGY
Payer: MEDICARE

## 2020-01-28 ENCOUNTER — APPOINTMENT (OUTPATIENT)
Dept: RADIOLOGY | Facility: HOSPITAL | Age: 80
End: 2020-01-28
Payer: MEDICARE

## 2020-01-28 VITALS
OXYGEN SATURATION: 96 % | WEIGHT: 202.13 LBS | BODY MASS INDEX: 29 KG/M2 | DIASTOLIC BLOOD PRESSURE: 71 MMHG | SYSTOLIC BLOOD PRESSURE: 118 MMHG | RESPIRATION RATE: 18 BRPM | HEART RATE: 71 BPM | TEMPERATURE: 97.2 F

## 2020-01-28 DIAGNOSIS — N13.5 STRICTURE OR KINKING OF URETER: ICD-10-CM

## 2020-01-28 LAB — BACTERIA UR CULT: NORMAL

## 2020-01-28 PROCEDURE — 74420 UROGRAPHY RTRGR +-KUB: CPT

## 2020-01-28 PROCEDURE — C1769 GUIDE WIRE: HCPCS | Performed by: UROLOGY

## 2020-01-28 PROCEDURE — C2617 STENT, NON-COR, TEM W/O DEL: HCPCS | Performed by: UROLOGY

## 2020-01-28 DEVICE — IMPLANTABLE DEVICE
Type: IMPLANTABLE DEVICE | Site: URETER | Status: NON-FUNCTIONAL
Removed: 2020-05-22

## 2020-01-28 RX ORDER — FENTANYL CITRATE/PF 50 MCG/ML
25 SYRINGE (ML) INJECTION
Status: DISCONTINUED | OUTPATIENT
Start: 2020-01-28 | End: 2020-01-28 | Stop reason: HOSPADM

## 2020-01-28 RX ORDER — EPHEDRINE SULFATE 50 MG/ML
INJECTION INTRAVENOUS AS NEEDED
Status: DISCONTINUED | OUTPATIENT
Start: 2020-01-28 | End: 2020-01-28 | Stop reason: SURG

## 2020-01-28 RX ORDER — MAGNESIUM HYDROXIDE 1200 MG/15ML
LIQUID ORAL AS NEEDED
Status: DISCONTINUED | OUTPATIENT
Start: 2020-01-28 | End: 2020-01-28 | Stop reason: HOSPADM

## 2020-01-28 RX ORDER — PROPOFOL 10 MG/ML
INJECTION, EMULSION INTRAVENOUS AS NEEDED
Status: DISCONTINUED | OUTPATIENT
Start: 2020-01-28 | End: 2020-01-28 | Stop reason: SURG

## 2020-01-28 RX ORDER — SODIUM CHLORIDE, SODIUM LACTATE, POTASSIUM CHLORIDE, CALCIUM CHLORIDE 600; 310; 30; 20 MG/100ML; MG/100ML; MG/100ML; MG/100ML
125 INJECTION, SOLUTION INTRAVENOUS CONTINUOUS
Status: DISCONTINUED | OUTPATIENT
Start: 2020-01-28 | End: 2020-01-28 | Stop reason: HOSPADM

## 2020-01-28 RX ORDER — CEFAZOLIN SODIUM 2 G/50ML
2000 SOLUTION INTRAVENOUS ONCE
Status: COMPLETED | OUTPATIENT
Start: 2020-01-28 | End: 2020-01-28

## 2020-01-28 RX ORDER — LIDOCAINE HYDROCHLORIDE 10 MG/ML
INJECTION, SOLUTION EPIDURAL; INFILTRATION; INTRACAUDAL; PERINEURAL AS NEEDED
Status: DISCONTINUED | OUTPATIENT
Start: 2020-01-28 | End: 2020-01-28 | Stop reason: SURG

## 2020-01-28 RX ORDER — FENTANYL CITRATE 50 UG/ML
INJECTION, SOLUTION INTRAMUSCULAR; INTRAVENOUS AS NEEDED
Status: DISCONTINUED | OUTPATIENT
Start: 2020-01-28 | End: 2020-01-28 | Stop reason: SURG

## 2020-01-28 RX ADMIN — SODIUM CHLORIDE, SODIUM LACTATE, POTASSIUM CHLORIDE, AND CALCIUM CHLORIDE 125 ML/HR: .6; .31; .03; .02 INJECTION, SOLUTION INTRAVENOUS at 07:08

## 2020-01-28 RX ADMIN — FENTANYL CITRATE 50 MCG: 50 INJECTION, SOLUTION INTRAMUSCULAR; INTRAVENOUS at 07:48

## 2020-01-28 RX ADMIN — FENTANYL CITRATE 25 MCG: 50 INJECTION, SOLUTION INTRAMUSCULAR; INTRAVENOUS at 07:54

## 2020-01-28 RX ADMIN — CEFAZOLIN SODIUM 2000 MG: 2 SOLUTION INTRAVENOUS at 07:40

## 2020-01-28 RX ADMIN — LIDOCAINE HYDROCHLORIDE 50 MG: 10 INJECTION, SOLUTION EPIDURAL; INFILTRATION; INTRACAUDAL; PERINEURAL at 07:43

## 2020-01-28 RX ADMIN — EPHEDRINE SULFATE 5 MG: 50 INJECTION, SOLUTION INTRAVENOUS at 07:41

## 2020-01-28 RX ADMIN — FENTANYL CITRATE 25 MCG: 50 INJECTION, SOLUTION INTRAMUSCULAR; INTRAVENOUS at 07:59

## 2020-01-28 RX ADMIN — PROPOFOL 200 MG: 10 INJECTION, EMULSION INTRAVENOUS at 07:43

## 2020-01-28 NOTE — OP NOTE
OPERATIVE REPORT- Dr Yesica Durán  PATIENT NAME: Sameer Grant    :  1940  MRN: [id number]  Pt Location: [facility] OR ROOM 04    SURGERY DATE: 2020    Surgeon: Herminia Pringle MD     Pre-op Diagnosis:  1  Right hydronephrosis  2  Right ureteral stricture  3  Right ureteral stones  4  Chronic right ureteral stent exchange     Post-op Diagnosis:  1  same     Procedure:  1  Cystoscopy  2  Fluoroscopy  3  Right retrograde pyelography  4  Right ureteral stent placement     Specimen(s): * No specimens in log *     Estimated Blood Loss: 0 mL     Complications: None     Drains:  1  6 X 28 centimeter right ureteral stent     Anesthesia type: IV Sedation with Anesthesia     Indications for surgery:  Right ureteral stones with stricture refusing nephro ureterectomy or nephrostomy tube placement     Findings:  1  Obstructing stones in the right ureter  Impossible to advance wire without removing the stent first      Procedure and Technique:              After obtaining consent and identifying the patient, antibiotics were given as ordered and the patient was brought to the room  All appropriate leads and monitors were placed and the patient was appropriately positioned on the table  Anesthesia was administered and the patient was sterilely prepped and draped  A timeout was performed where the patient name, , procedure, antibiotics, allergies, etc  were discussed  All in the room were satisfied before the start of the operative procedure  What follows are the operative findings and events  Cystoscopy was undertaken  The bladder was systematically surveyed and found to be free of stones, tumors or infection  The right ureteral orifice was identified with a stent emanating from it  There was moderate calcification on the stent  A guidewire was passed up to the level of the stones in the proximal ureter and would go no further    The stent was grasped and pulled down past the level of obstruction and the wire was quickly advanced up into the area of the renal pelvis  A 5 [place]ian open-ended catheter was placed over the wire and the wire was removed  A retrograde was performed  Findings are noted above  A guidewire was inserted up into the right ureter  This was confirmed with x-ray  The catheter was removed and a stent was placed over the wire and there was a good coil proximally and distally  Efflux was noted from the stent  The procedure was terminated and the patient was awakened without incident and transferred to the PACU in satisfactory condition  Plan:  1  Stent exchange 12 weeks  SIGNATURE: Pastor Bobby MD  DATE: January 28, 2020  TIME: 8:06 AM    Portions of the record may have been created with voice recognition software   Occasional wrong word or "sound alike" substitutions may have occurred due to the inherent limitations of voice recognition software   Read the chart carefully and recognize, using context, where substitutions have occurred

## 2020-01-28 NOTE — ANESTHESIA POSTPROCEDURE EVALUATION
Post-Op Assessment Note    CV Status:  Stable  Pain Score: 0    Pain management: adequate     Mental Status:  Alert and awake   Hydration Status:  Stable   PONV Controlled:  None   Airway Patency:  Patent and adequate   Post Op Vitals Reviewed: Yes      Staff: CRNA           BP (P) 134/71 (01/28/20 0812)    Temp (!) (P) 97 2 °F (36 2 °C) (01/28/20 0812)    Pulse (P) 67 (01/28/20 0812)   Resp (P) 16 (01/28/20 0812)    SpO2 (P) 95 % (01/28/20 0812)

## 2020-01-28 NOTE — DISCHARGE INSTRUCTIONS
Drink plenty of fluids, avoiding tea (hot and iced), cola drinks, chocolate, nuts, spinach and oxalate-rich foods at least until we get the stone analysis back

## 2020-01-30 LAB
EXT GLUCOSE BLD: 99
EXTERNAL ALBUMIN: 3.3
EXTERNAL ALK PHOS: 105
EXTERNAL ALT: 15
EXTERNAL AST: 21
EXTERNAL BICARBONATE: 29
EXTERNAL BUN: 23
EXTERNAL CALCIUM: 9.3
EXTERNAL CHLORIDE: 104
EXTERNAL CREATININE: 1.4
EXTERNAL POTASSIUM: 4.1
EXTERNAL SODIUM: 137

## 2020-01-30 NOTE — PROGRESS NOTES
NEPHROLOGY OFFICE VISIT   Celia Castellano 70 y o  male MRN: [id number]  1/31/2020    Reason for Visit:  Renal clearance for procedure    INTERVAL HISTORY and SUBJECTIVE:    I had the pleasure of seeing Shani Thapa today in the renal clinic for the continued management of chronic kidney disease and preprocedure clearance/prevention of acute kidney injury  He was last seen in the nephrology clinic 12/18/2019 by Dr Corinne Hoar  He has a past medical history of nephrolithiasis, right-sided hydronephrosis with ureteral stricture and stone prior stent and lithotripsy,  hypertension, atrial fibrillation, diastolic CHF, BPH, moderate MR/TR/aortic regurg, former smoker who quit in April 2019  He was recently referred to [facility] for a specialty visit regarding evaluation of his valvular heart disease by his cardiologist   They are planning for contrast study and requesting clearance by Nephrology  He was recently seen by Urology for further intervention regarding right ureteral stricture/right stones/right hydronephrosis  He is followed every 3 months for stent exchange Patient denies any chest pain, shortness of breath and swelling  The last blood work was done on 01/21/2020, which we have reviewed together  ASSESSMENT AND PLAN:  1  Chronic kidney disease, stage III:    · Current creatinine 1 4 on 01/21/2020  Baseline creatinine 1 5-1 7 mg/dL  · Etiology:  Solitary functional kidney, prior hydronephrosis, nephrolithiasis  · Prior Mag 3 scan:  Split renal function-left kidney 67%, right kidney 33%  · Nephrologist:  Dr Corinne Hoar  · Kidney Smart class completed  · Plan:    · Follow-up with Dr Corinne Hoar in 5 months as previously planned, studies before next appointment  · Regarding contrast study  I spent additional time discussing risks and benefits  At this time the patient feels that he may not be returning for another visit to the specialist at [facility]    I did encourage him to make sure he looked at all of his options closely  I also explained to him that when properly prepped risk of acute kidney injury with contrast is greatly minimized  Because he feels that there has been no significant progression in the aortic stenosis he is reluctant to proceed  I referred him back to his cardiac specialist in [place] for further discussion  We will be happy to discuss this further with Elizabeth Farrellfabiania if he chooses to proceed with study  Will be more than happy to support his cardiac team in any way possible  2  Nephrolithiasis:    · Follows with Dr Robb Haywood  Renal stone makeup primarily calcium oxalate  3  Right hydronephrosis, right ureteral stricture, right ureteral stones:    · 01/28/2020 cystoscopy, right retrograde pyelogram and right ureteral stent placement  · Long prior history of intervention:  Multiple treatments with laser lithotripsy and prior stent placement  4  Hypertension:    · Blood pressure well controlled  · Patient reports that systolic blood pressure is generally in the 120s to 130s  5  Volume status:    · Euvolemic   · History of diastolic CHF and mild to moderate pulmonary hypertension  6  Anemia:  Hemoglobin normal, 15 3  7  Acid-base:  No acid-base disturbance noted  8  [facility] planning angio       PATIENT INSTRUCTIONS:    Patient Instructions   1  Follow-up with Dr Joanna Rueda in June  Have blood work and urine studies in mid-May  2  No change in medications at this time  3  We will communicate with your doctor at [facility] if needed for clearance prior to contrast study  4  Continue to avoid the use of NSAIDs such as Motrin, Aleve, ibuprofen and Advil  5  If you have pain Tylenol is okay  6   Please call us with any questions or concerns        Orders Placed This Encounter   Procedures    Comprehensive metabolic panel     This order was created through External Result Entry       OBJECTIVE:  Current Weight: Weight - Scale: 91 7 kg (202 lb 3 2 oz)  Vitals:    01/31/20 1104 01/31/20 1153   BP:  134/64   BP Location:  Left arm   Patient Position:  Sitting   Pulse:  72   Weight: 91 7 kg (202 lb 3 2 oz)    Height: 5' 10" (1 778 m)     Body mass index is 29 01 kg/m²  REVIEW OF SYSTEMS:    Review of Systems   Constitutional: Negative  Negative for activity change, fatigue and unexpected weight change  HENT: Negative  Eyes: Negative  Respiratory: Negative  Negative for shortness of breath  Cardiovascular: Negative  Negative for chest pain, palpitations and leg swelling  Gastrointestinal: Negative  Negative for abdominal distention, abdominal pain, anal bleeding, blood in stool, constipation and diarrhea  Genitourinary: Negative  Negative for difficulty urinating, dysuria, flank pain and hematuria  Musculoskeletal: Positive for arthralgias  Skin: Negative  Neurological: Negative  Negative for dizziness, syncope, weakness and light-headedness  Psychiatric/Behavioral: Negative  PHYSICAL EXAM:      Physical Exam   Constitutional: He is oriented to person, place, and time  He appears well-developed and well-nourished  HENT:   Head: Normocephalic and atraumatic  Eyes: Pupils are equal, round, and reactive to light  Neck: Normal range of motion  Neck supple  No JVD present  No tracheal deviation present  No thyromegaly present  Cardiovascular: Normal rate and intact distal pulses  An irregularly irregular rhythm present  Exam reveals no gallop and no friction rub  Murmur heard  Systolic murmur is present with a grade of 2/6  Pulmonary/Chest: Effort normal and breath sounds normal  No stridor  No respiratory distress  He has no wheezes  He has no rales  Abdominal: Soft  Bowel sounds are normal  He exhibits no distension and no mass  There is no tenderness  There is no rebound and no guarding  Musculoskeletal: Normal range of motion  He exhibits no edema, tenderness or deformity     Neurological: He is alert and oriented to person, place, and time  Skin: Skin is warm and dry  No rash noted  No erythema  No pallor  Psychiatric: He has a normal mood and affect   His behavior is normal  Judgment and thought content normal        Medications:    Current Outpatient Medications:     amLODIPine (NORVASC) 10 mg tablet, Take 10 mg by mouth every morning  , Disp: , Rfl:     aspirin (ECOTRIN LOW STRENGTH) 81 mg EC tablet, Take 81 mg by mouth daily Last dose 1 week ago 1/23/20, Disp: , Rfl:     cholecalciferol (VITAMIN D3) 1,000 units tablet, Take 1,000 Units by mouth daily after lunch  , Disp: , Rfl:     hydrochlorothiazide (MICROZIDE) 12 5 mg capsule, take 1 capsule by mouth once daily, Disp: 90 capsule, Rfl: 3    metoprolol tartrate (LOPRESSOR) 25 mg tablet, Take 1 tablet (25 mg total) by mouth every 8 (eight) hours, Disp: 270 tablet, Rfl: 3    Laboratory Results:        Invalid input(s): ALBUMIN    Results for orders placed or performed in visit on 01/31/20   Comprehensive metabolic panel   Result Value Ref Range    SODIUM 137     POTASSIUM 4 1     CHLORIDE 104     BICARB 29     BUN 23     CREATININE 1 40     Glucose 99     EXTERNAL CALCIUM 9 3     ALBUMIN 3 3     AST 21     ALT 15     ALK PHOS 105

## 2020-01-31 ENCOUNTER — OFFICE VISIT (OUTPATIENT)
Dept: NEPHROLOGY | Facility: CLINIC | Age: 80
End: 2020-01-31
Payer: MEDICARE

## 2020-01-31 VITALS
WEIGHT: 202.2 LBS | DIASTOLIC BLOOD PRESSURE: 64 MMHG | HEART RATE: 72 BPM | SYSTOLIC BLOOD PRESSURE: 134 MMHG | BODY MASS INDEX: 28.95 KG/M2 | HEIGHT: 70 IN

## 2020-01-31 DIAGNOSIS — N18.30 STAGE 3 CHRONIC KIDNEY DISEASE (HCC): ICD-10-CM

## 2020-01-31 DIAGNOSIS — I10 BENIGN ESSENTIAL HYPERTENSION: Primary | ICD-10-CM

## 2020-01-31 PROCEDURE — 99214 OFFICE O/P EST MOD 30 MIN: CPT | Performed by: NURSE PRACTITIONER

## 2020-01-31 NOTE — PATIENT INSTRUCTIONS
1  Follow-up with Dr Bladimir Whitney in June  Have blood work and urine studies in mid-May  2  No change in medications at this time  3  We will communicate with your doctors  in [place] and at [facility] regarding  clearance prior to contrast study  4  Continue to avoid the use of NSAIDs such as Motrin, Aleve, ibuprofen and Advil  5  If you have pain Tylenol is okay  6   Please call us with any questions or concerns

## 2020-05-15 ENCOUNTER — TRANSCRIBE ORDERS (OUTPATIENT)
Dept: ADMINISTRATIVE | Facility: HOSPITAL | Age: 80
End: 2020-05-15

## 2020-05-15 ENCOUNTER — LAB (OUTPATIENT)
Dept: LAB | Facility: HOSPITAL | Age: 80
End: 2020-05-15
Attending: INTERNAL MEDICINE
Payer: MEDICARE

## 2020-05-15 ENCOUNTER — TELEPHONE (OUTPATIENT)
Dept: NEPHROLOGY | Facility: CLINIC | Age: 80
End: 2020-05-15

## 2020-05-15 DIAGNOSIS — N18.30 CKD (CHRONIC KIDNEY DISEASE), STAGE III (HCC): ICD-10-CM

## 2020-05-15 DIAGNOSIS — Z01.818 PREOPERATIVE EXAMINATION, UNSPECIFIED: Primary | ICD-10-CM

## 2020-05-15 LAB
ANION GAP SERPL CALCULATED.3IONS-SCNC: 7 MMOL/L (ref 4–13)
BACTERIA UR QL AUTO: ABNORMAL /HPF
BILIRUB UR QL STRIP: ABNORMAL
BUN SERPL-MCNC: 30 MG/DL (ref 5–25)
CALCIUM SERPL-MCNC: 9.4 MG/DL (ref 8.3–10.1)
CHLORIDE SERPL-SCNC: 104 MMOL/L (ref 100–108)
CLARITY UR: ABNORMAL
CO2 SERPL-SCNC: 29 MMOL/L (ref 21–32)
COLOR UR: ABNORMAL
CREAT SERPL-MCNC: 1.98 MG/DL (ref 0.6–1.3)
CREAT UR-MCNC: 189 MG/DL
ERYTHROCYTE [DISTWIDTH] IN BLOOD BY AUTOMATED COUNT: 13.8 % (ref 11.6–15.1)
GFR SERPL CREATININE-BSD FRML MDRD: 32 ML/MIN/1.73SQ M
GLUCOSE P FAST SERPL-MCNC: 103 MG/DL (ref 65–99)
GLUCOSE UR STRIP-MCNC: NEGATIVE MG/DL
HCT VFR BLD AUTO: 47.7 % (ref 36.5–49.3)
HGB BLD-MCNC: 15.4 G/DL (ref 12–17)
HGB UR QL STRIP.AUTO: ABNORMAL
KETONES UR STRIP-MCNC: NEGATIVE MG/DL
LEUKOCYTE ESTERASE UR QL STRIP: ABNORMAL
MAGNESIUM SERPL-MCNC: 1.9 MG/DL (ref 1.6–2.6)
MCH RBC QN AUTO: 28.5 PG (ref 26.8–34.3)
MCHC RBC AUTO-ENTMCNC: 32.3 G/DL (ref 31.4–37.4)
MCV RBC AUTO: 88 FL (ref 82–98)
NITRITE UR QL STRIP: NEGATIVE
NON-SQ EPI CELLS URNS QL MICRO: ABNORMAL /HPF
PH UR STRIP.AUTO: 6 [PH]
PHOSPHATE SERPL-MCNC: 2.8 MG/DL (ref 2.3–4.1)
PLATELET # BLD AUTO: 177 THOUSANDS/UL (ref 149–390)
PMV BLD AUTO: 10.2 FL (ref 8.9–12.7)
POTASSIUM SERPL-SCNC: 4.1 MMOL/L (ref 3.5–5.3)
PROT UR STRIP-MCNC: ABNORMAL MG/DL
PROT UR-MCNC: 104 MG/DL
PROT/CREAT UR: 0.55 MG/G{CREAT} (ref 0–0.1)
PTH-INTACT SERPL-MCNC: 68.2 PG/ML (ref 18.4–80.1)
RBC # BLD AUTO: 5.41 MILLION/UL (ref 3.88–5.62)
RBC #/AREA URNS AUTO: ABNORMAL /HPF
SODIUM SERPL-SCNC: 140 MMOL/L (ref 136–145)
SP GR UR STRIP.AUTO: 1.02 (ref 1–1.03)
UROBILINOGEN UR QL STRIP.AUTO: 0.2 E.U./DL
WBC # BLD AUTO: 8.39 THOUSAND/UL (ref 4.31–10.16)
WBC #/AREA URNS AUTO: ABNORMAL /HPF

## 2020-05-15 PROCEDURE — 83735 ASSAY OF MAGNESIUM: CPT

## 2020-05-15 PROCEDURE — 83970 ASSAY OF PARATHORMONE: CPT

## 2020-05-15 PROCEDURE — 84100 ASSAY OF PHOSPHORUS: CPT

## 2020-05-15 PROCEDURE — 81001 URINALYSIS AUTO W/SCOPE: CPT

## 2020-05-15 PROCEDURE — 85027 COMPLETE CBC AUTOMATED: CPT

## 2020-05-15 PROCEDURE — 82570 ASSAY OF URINE CREATININE: CPT

## 2020-05-15 PROCEDURE — 84156 ASSAY OF PROTEIN URINE: CPT

## 2020-05-15 PROCEDURE — 36415 COLL VENOUS BLD VENIPUNCTURE: CPT

## 2020-05-15 PROCEDURE — 80048 BASIC METABOLIC PNL TOTAL CA: CPT

## 2020-05-18 ENCOUNTER — TELEPHONE (OUTPATIENT)
Dept: NEPHROLOGY | Facility: CLINIC | Age: 80
End: 2020-05-18

## 2020-05-18 DIAGNOSIS — N18.31 CHRONIC KIDNEY DISEASE (CKD) STAGE G3A/A3, MODERATELY DECREASED GLOMERULAR FILTRATION RATE (GFR) BETWEEN 45-59 ML/MIN/1.73 SQUARE METER AND ALBUMINURIA CREATININE RATIO GREATER THAN 300 MG/G (HCC): Primary | ICD-10-CM

## 2020-05-20 DIAGNOSIS — Z01.818 PREOPERATIVE EXAMINATION, UNSPECIFIED: ICD-10-CM

## 2020-05-20 PROCEDURE — U0003 INFECTIOUS AGENT DETECTION BY NUCLEIC ACID (DNA OR RNA); SEVERE ACUTE RESPIRATORY SYNDROME CORONAVIRUS 2 (SARS-COV-2) (CORONAVIRUS DISEASE [COVID-19]), AMPLIFIED PROBE TECHNIQUE, MAKING USE OF HIGH THROUGHPUT TECHNOLOGIES AS DESCRIBED BY CMS-2020-01-R: HCPCS

## 2020-05-21 ENCOUNTER — ANESTHESIA EVENT (OUTPATIENT)
Dept: PERIOP | Facility: HOSPITAL | Age: 80
End: 2020-05-21
Payer: MEDICARE

## 2020-05-21 LAB — SARS-COV-2 RNA SPEC QL NAA+PROBE: NOT DETECTED

## 2020-05-22 ENCOUNTER — ANESTHESIA (OUTPATIENT)
Dept: PERIOP | Facility: HOSPITAL | Age: 80
End: 2020-05-22
Payer: MEDICARE

## 2020-05-22 ENCOUNTER — HOSPITAL ENCOUNTER (OUTPATIENT)
Dept: RADIOLOGY | Facility: HOSPITAL | Age: 80
Setting detail: OUTPATIENT SURGERY
Discharge: HOME | End: 2020-05-22
Payer: MEDICARE

## 2020-05-22 ENCOUNTER — HOSPITAL ENCOUNTER (OUTPATIENT)
Facility: HOSPITAL | Age: 80
Setting detail: OUTPATIENT SURGERY
Discharge: HOME | End: 2020-05-22
Attending: UROLOGY | Admitting: UROLOGY
Payer: MEDICARE

## 2020-05-22 VITALS
DIASTOLIC BLOOD PRESSURE: 54 MMHG | OXYGEN SATURATION: 97 % | SYSTOLIC BLOOD PRESSURE: 110 MMHG | TEMPERATURE: 98.6 F | HEART RATE: 64 BPM | RESPIRATION RATE: 18 BRPM

## 2020-05-22 DIAGNOSIS — R52 PAIN AGGRAVATED BY ACTIVITIES OF DAILY LIVING: ICD-10-CM

## 2020-05-22 PROCEDURE — 74420 UROGRAPHY RTRGR +-KUB: CPT

## 2020-05-22 PROCEDURE — C1769 GUIDE WIRE: HCPCS | Performed by: UROLOGY

## 2020-05-22 PROCEDURE — C2617 STENT, NON-COR, TEM W/O DEL: HCPCS | Performed by: UROLOGY

## 2020-05-22 DEVICE — IMPLANTABLE DEVICE
Type: IMPLANTABLE DEVICE | Site: URETER | Status: NON-FUNCTIONAL
Removed: 2021-10-19

## 2020-05-22 RX ORDER — SODIUM CHLORIDE, SODIUM LACTATE, POTASSIUM CHLORIDE, CALCIUM CHLORIDE 600; 310; 30; 20 MG/100ML; MG/100ML; MG/100ML; MG/100ML
100 INJECTION, SOLUTION INTRAVENOUS CONTINUOUS
Status: DISCONTINUED | OUTPATIENT
Start: 2020-05-22 | End: 2020-05-22 | Stop reason: HOSPADM

## 2020-05-22 RX ORDER — MAGNESIUM HYDROXIDE 1200 MG/15ML
LIQUID ORAL AS NEEDED
Status: DISCONTINUED | OUTPATIENT
Start: 2020-05-22 | End: 2020-05-22 | Stop reason: HOSPADM

## 2020-05-22 RX ORDER — ONDANSETRON 2 MG/ML
INJECTION INTRAMUSCULAR; INTRAVENOUS AS NEEDED
Status: DISCONTINUED | OUTPATIENT
Start: 2020-05-22 | End: 2020-05-22 | Stop reason: SURG

## 2020-05-22 RX ORDER — FENTANYL CITRATE 50 UG/ML
INJECTION, SOLUTION INTRAMUSCULAR; INTRAVENOUS AS NEEDED
Status: DISCONTINUED | OUTPATIENT
Start: 2020-05-22 | End: 2020-05-22 | Stop reason: SURG

## 2020-05-22 RX ORDER — SODIUM CHLORIDE, SODIUM LACTATE, POTASSIUM CHLORIDE, CALCIUM CHLORIDE 600; 310; 30; 20 MG/100ML; MG/100ML; MG/100ML; MG/100ML
INJECTION, SOLUTION INTRAVENOUS CONTINUOUS PRN
Status: DISCONTINUED | OUTPATIENT
Start: 2020-05-22 | End: 2020-05-22 | Stop reason: SURG

## 2020-05-22 RX ORDER — FENTANYL CITRATE/PF 50 MCG/ML
50 SYRINGE (ML) INJECTION
Status: DISCONTINUED | OUTPATIENT
Start: 2020-05-22 | End: 2020-05-22 | Stop reason: HOSPADM

## 2020-05-22 RX ORDER — PROPOFOL 10 MG/ML
INJECTION, EMULSION INTRAVENOUS AS NEEDED
Status: DISCONTINUED | OUTPATIENT
Start: 2020-05-22 | End: 2020-05-22 | Stop reason: SURG

## 2020-05-22 RX ORDER — CEFAZOLIN SODIUM 2 G/50ML
2000 SOLUTION INTRAVENOUS ONCE
Status: COMPLETED | OUTPATIENT
Start: 2020-05-22 | End: 2020-05-22

## 2020-05-22 RX ORDER — LIDOCAINE HYDROCHLORIDE 10 MG/ML
INJECTION, SOLUTION EPIDURAL; INFILTRATION; INTRACAUDAL; PERINEURAL AS NEEDED
Status: DISCONTINUED | OUTPATIENT
Start: 2020-05-22 | End: 2020-05-22 | Stop reason: SURG

## 2020-05-22 RX ORDER — MIDAZOLAM HYDROCHLORIDE 2 MG/2ML
INJECTION, SOLUTION INTRAMUSCULAR; INTRAVENOUS AS NEEDED
Status: DISCONTINUED | OUTPATIENT
Start: 2020-05-22 | End: 2020-05-22 | Stop reason: SURG

## 2020-05-22 RX ORDER — ONDANSETRON 2 MG/ML
4 INJECTION INTRAMUSCULAR; INTRAVENOUS ONCE AS NEEDED
Status: DISCONTINUED | OUTPATIENT
Start: 2020-05-22 | End: 2020-05-22 | Stop reason: HOSPADM

## 2020-05-22 RX ADMIN — LIDOCAINE HYDROCHLORIDE 50 MG: 10 INJECTION, SOLUTION EPIDURAL; INFILTRATION; INTRACAUDAL; PERINEURAL at 12:18

## 2020-05-22 RX ADMIN — ONDANSETRON 4 MG: 2 INJECTION INTRAMUSCULAR; INTRAVENOUS at 12:24

## 2020-05-22 RX ADMIN — PROPOFOL 150 MG: 10 INJECTION, EMULSION INTRAVENOUS at 12:18

## 2020-05-22 RX ADMIN — FENTANYL CITRATE 50 MCG: 50 INJECTION, SOLUTION INTRAMUSCULAR; INTRAVENOUS at 12:25

## 2020-05-22 RX ADMIN — MIDAZOLAM HYDROCHLORIDE 0.5 MG: 1 INJECTION, SOLUTION INTRAMUSCULAR; INTRAVENOUS at 12:16

## 2020-05-22 RX ADMIN — CEFAZOLIN SODIUM 2000 MG: 2 SOLUTION INTRAVENOUS at 12:24

## 2020-05-22 RX ADMIN — SODIUM CHLORIDE, SODIUM LACTATE, POTASSIUM CHLORIDE, AND CALCIUM CHLORIDE: .6; .31; .03; .02 INJECTION, SOLUTION INTRAVENOUS at 12:13

## 2020-05-26 ENCOUNTER — LAB (OUTPATIENT)
Dept: LAB | Facility: HOSPITAL | Age: 80
End: 2020-05-26
Attending: INTERNAL MEDICINE
Payer: MEDICARE

## 2020-05-26 ENCOUNTER — TELEPHONE (OUTPATIENT)
Dept: NEPHROLOGY | Facility: CLINIC | Age: 80
End: 2020-05-26

## 2020-05-26 DIAGNOSIS — N18.31 CHRONIC KIDNEY DISEASE (CKD) STAGE G3A/A3, MODERATELY DECREASED GLOMERULAR FILTRATION RATE (GFR) BETWEEN 45-59 ML/MIN/1.73 SQUARE METER AND ALBUMINURIA CREATININE RATIO GREATER THAN 300 MG/G (HCC): ICD-10-CM

## 2020-05-26 LAB
ANION GAP SERPL CALCULATED.3IONS-SCNC: 8 MMOL/L (ref 4–13)
BUN SERPL-MCNC: 22 MG/DL (ref 5–25)
CALCIUM SERPL-MCNC: 9.1 MG/DL (ref 8.3–10.1)
CHLORIDE SERPL-SCNC: 103 MMOL/L (ref 100–108)
CO2 SERPL-SCNC: 30 MMOL/L (ref 21–32)
CREAT SERPL-MCNC: 1.61 MG/DL (ref 0.6–1.3)
GFR SERPL CREATININE-BSD FRML MDRD: 41 ML/MIN/1.73SQ M
GLUCOSE P FAST SERPL-MCNC: 98 MG/DL (ref 65–99)
POTASSIUM SERPL-SCNC: 4 MMOL/L (ref 3.5–5.3)
SODIUM SERPL-SCNC: 141 MMOL/L (ref 136–145)

## 2020-05-26 PROCEDURE — 80048 BASIC METABOLIC PNL TOTAL CA: CPT

## 2020-05-26 PROCEDURE — 36415 COLL VENOUS BLD VENIPUNCTURE: CPT

## 2020-06-03 ENCOUNTER — TELEPHONE (OUTPATIENT)
Dept: NEPHROLOGY | Facility: CLINIC | Age: 80
End: 2020-06-03

## 2020-06-10 ENCOUNTER — TELEMEDICINE (OUTPATIENT)
Dept: NEPHROLOGY | Facility: CLINIC | Age: 80
End: 2020-06-10
Payer: MEDICARE

## 2020-06-10 VITALS — WEIGHT: 203 LBS | BODY MASS INDEX: 29.06 KG/M2 | HEIGHT: 70 IN

## 2020-06-10 DIAGNOSIS — N18.30 CKD (CHRONIC KIDNEY DISEASE), STAGE III (HCC): Primary | ICD-10-CM

## 2020-06-10 PROCEDURE — 99441 PR PHYS/QHP TELEPHONE EVALUATION 5-10 MIN: CPT | Performed by: INTERNAL MEDICINE

## 2020-07-23 ENCOUNTER — TRANSCRIBE ORDERS (OUTPATIENT)
Dept: LAB | Facility: CLINIC | Age: 80
End: 2020-07-23

## 2020-07-23 DIAGNOSIS — N13.1 HYDRONEPHROSIS WITH URETERAL STRICTURE: ICD-10-CM

## 2020-07-23 DIAGNOSIS — Z01.818 OTHER SPECIFIED PRE-OPERATIVE EXAMINATION: Primary | ICD-10-CM

## 2020-07-27 NOTE — H&P
History & Physical - [facility]  Rosemary Alexandre 70 y o  male MRN: [id number]  Unit/Bed#:  Encounter: [id number]    ASSESSMENT/PLAN:  Right hydronephrosis  Right ureteral stones and dense stricture in the proximal to mid right ureter  Refuses surgical repair or nephrectomy until stent can not be exchanged and he requires nephrostomy tubes  Stent placement and holmium laser of stone and stricture 4/18/17  Holmium laser of stone and stent exchange 5/2/17  Renal stones treated elsewhere in [place] in past year (7 procedures)  Right laser lithotripsy, laser infundibulotomy, and stone basket extraction 6/2/17  Right CRUSH and laser incision of proximal ureter 7/18/17  Stent changed 05/10/19-- stone causing near complete obstruction of the right proximal ureter  Required stent to be removed in order for a guidewire to pass  Stent exchange 10/22/19 found it impossible to advance the wire without removing the stent first    Stent exchange 01/28/20 found it once again impossible to advance the wire without removing the stent first     Stent exchange 05/22/20 - Unable to pass flexible tip guidewire until the stent was grasped and pulled just past the obstruction  · The risks, benefits, alternatives,and probabilities of success were discussed in detail with no guarantee made as to outcome  All questions were answered to the patient's satisfaction  · No changes since h and p done     A-fib, CHF, HTN, CKD stage III, Moderate MR, TR, and aortic regurgitation     Covid 19  Currently in a pandemic  · Covid 19 testing requested prior to surgery     HISTORY OF PRESENT ILLNESS:  69 y/o male with hx of renal stones and right ureteral stricture disease presents for right ureteral stent exchange  Pt does not desire surgical treatment of his stones or stricture until he is at the point of requiring a nephrostomy tube  Desires to continue with routine right ureteral stent exchanges  Presents for routine stent exchange  PAST MEDICAL HISTORY:  Past Medical History:   Diagnosis Date    Atrial fibrillation ([address] )     Essential hypertension, long-standing     Heart murmur, lifelong     heart murmur since birth    Hematuria     intermittent    History of cigarette smoking, chronic     64 year smoker at one half pack per day, quit 04/1/17    Kidney stones     12 episodes of kidney stones    Kidney stones with lithotripsy 03/2017    Done at [facility] Pneumonia      X 2    Vitamin D deficiency     maintained with 1000 units of D    Water retention     Wears glasses     Wears partial dentures     upper       PAST SURGICAL HISTORY:  Past Surgical History:   Procedure Laterality Date    APPENDECTOMY  1960    CAROTID ENDARTERECTOMY Left 1998    Supposedly no internal stenosis found    CYSTOSCOPY Right 8/15/2017    Procedure: CYSTOSCOPY RIGHT STENT EXCHANGE;  Surgeon: Christiano Lucas MD;  Location: [address] [place] [address];  Service: Urology    CYSTOSCOPY     [address]  LITHOTRIPSY  03/2017    For nephrolithiasis at [address]  5/10/2019    FL RETROGRADE PYELOGRAM  7/30/2019    FL RETROGRADE PYELOGRAM  10/22/2019    FL RETROGRADE PYELOGRAM  1/28/2020    RI CYSTO/URETERO W/LITHOTRIPSY &INDWELL STENT INSRT Right 5/2/2017    Procedure: CYSTOSCOPY URETEROSCOPY WITH LITHOTRIPSY HOLMIUM LASER, RETROGRADE PYELOGRAM AND INSERTION STENT URETERAL;  Surgeon: Christiano Lucas MD;  Location: [address] [place] [address];  Service: Urology    RI CYSTO/URETERO W/LITHOTRIPSY &INDWELL STENT INSRT Right 5/16/2017    Procedure: CYSTOSCOPY, RETROGRADE PYELOGRAM,  FLEXIBLE URETEROSCOPY,  HOLMIUM LASER LITHOTRIPSY, STONE BASKET MANIPULATION,  STENT URETERAL EXCHANGE;  Surgeon: Christiano Lucas MD;  Location: [address] [place] [address];  Service: Urology    RI CYSTO/URETERO W/LITHOTRIPSY &INDWELL STENT INSRT Right 6/2/2017    Procedure: CYSTOSCOPY, RETROGRADE, STONE MANIPULATION WITH HOLMIUM LASER, STENT PLACEMENT;  Surgeon: Lesa Bucio MD;  Location: [address] [place] [address];  Service: Urology    MO CYSTO/URETERO W/LITHOTRIPSY &INDWELL STENT INSRT Right 7/18/2017    Procedure: CYSTOSCOPY, RETROGRADE, URETEROSCOPY HOLMIUM LASER [place],  AND STENT PLACEMENT;  Surgeon: Lesa Bucio MD;  Location: [address] [place] [address];  Service: Urology    MO CYSTOSCOPY,INSERT URETERAL STENT Right 11/14/2017    Procedure: EXCHANGE STENT URETERAL;  Surgeon: Lesa Bucio MD;  Location: [address] [place] [address];  Service: Urology    MO CYSTOURETHROSCOPY,URETER CATHETER Right 11/14/2017    Procedure: Millie Yazidi, STENT EXCHANGE;  Surgeon: Lesa Bucio MD;  Location: [address] [place] [address];  Service: Urology    MO CYSTOURETHROSCOPY,URETER CATHETER Right 5/8/2018    Procedure: Freddy Reed;  Surgeon: Lesa Bucio MD;  Location: [address] [place] [address];  Service: Urology    MO CYSTOURETHROSCOPY,URETER CATHETER Right 8/14/2018    Procedure: Freddy Reed;  Surgeon: Lesa Bucio MD;  Location: [address] [place] [address];  Service: Urology    MO CYSTOURETHROSCOPY,URETER CATHETER Right 11/13/2018    Procedure: CYSTOSCOPY, Alphia Sorrow EXCHANGE, RETROGRADE;  Surgeon: Lesa Bucio MD;  Location: [address] [place] [address];  Service: Urology    MO CYSTOURETHROSCOPY,URETER CATHETER Right 2/12/2019    Procedure: Millie Yazidi, STENT REMOVAL AND STENT PLACEMENT;  Surgeon: Lesa Bucio MD;  Location: [address] [place] [address];  Service: Urology    MO CYSTOURETHROSCOPY,URETER CATHETER Right 5/10/2019    Procedure: Millie Yazidi, STENT EXCHANGE;  Surgeon: Lesa Bucio MD;  Location: [address] [place] [address];  Service: Urology    MO CYSTOURETHROSCOPY,URETER CATHETER Right 7/30/2019    Procedure: CYSTOSCOPY, RETROGRADE, STENT REMOVAL AND PLACEMENT OF STENT;  Surgeon: Lesa Bucio MD;  Location: [address] [place] [address];  Service: Urology    MO CYSTOURETHROSCOPY,URETER CATHETER Right 10/22/2019    Procedure: CYSTOSCOPY, RETROGRADE, STENT EXCHANGE; Surgeon: Yoly Jenkins MD;  Location: [address] [place] [address];  Service: Urology    VT CYSTOURETHROSCOPY,URETER CATHETER Right 1/28/2020    Procedure: Charlott Double, STENT REMOVAL AND STENT PLACEMENT;  Surgeon: Yoly Jenkins MD;  Location: [address] [place] [address];  Service: Urology    VT CYSTOURETHROSCOPY,URETER CATHETER Right 5/22/2020    Procedure: CYSTOSCOPY RETROGRADE, STENT EXCHANGE;  Surgeon: Yoly Jenkins MD;  Location: [address] [place] [address];  Service: Urology    PROSTATE SURGERY  2015    Laser Prostatectomy  by [facility] [place] Right 4/18/2017    Procedure: INSERTION STENT URETERAL, RIGHT URETEROSCOPY,  LASER OF URETERAL STRICTURE AND STONE;  Surgeon: Yoly Jenkins MD;  Location: [address] [place] [address];  Service:    [address] [place], [place] [address]way [address] [place] [address] Right 2/13/2018    Procedure: Anca Angelucci;  Surgeon: Yoly Jenkins MD;  [facility]: [facility];  Service: Urology       ALLERGIES:  No Known Allergies    SOCIAL HISTORY:  Social History     Substance and Sexual Activity   Alcohol Use Yes    Alcohol/week: 4 0 standard drinks    Types: 4 Shots of liquor per week    Drinks per session: 1 or 2    Binge frequency: Monthly    Comment: rare     Social History     Substance and Sexual Activity   Drug Use No     Social History     Tobacco Use   Smoking Status Former Smoker    Packs/day: 0 50    Years: 62 00    Pack years: 31 00    Types: Cigarettes    Last attempt to quit: 4/15/2017    Years since quitting: 3 2   Smokeless Tobacco Never Used       FAMILY HISTORY:  Family History   Problem Relation Age of Onset    Hypertension Mother     Alcohol abuse Father     Cirrhosis Father     Cancer Son 13        cancer-knee and above-left-amputation    Cancer Sister     Other Brother         head mass    Thyroid disease unspecified Sister     Arthritis Sister     Other Brother         legally blind in one eye       MEDICATIONS:  No current facility-administered medications for this encounter  Current Outpatient Medications:     amLODIPine (NORVASC) 10 mg tablet, Take 10 mg by mouth every morning  , Disp: , Rfl:     aspirin (ECOTRIN LOW STRENGTH) 81 mg EC tablet, Take 81 mg by mouth daily Last dose 1 week ago 1/23/20, Disp: , Rfl:     cholecalciferol (VITAMIN D3) 1,000 units tablet, Take 1,000 Units by mouth daily after lunch  , Disp: , Rfl:     hydrochlorothiazide (MICROZIDE) 12 5 mg capsule, take 1 capsule by mouth once daily, Disp: 90 capsule, Rfl: 3    metoprolol tartrate (LOPRESSOR) 25 mg tablet, Take 1 tablet (25 mg total) by mouth every 8 (eight) hours, Disp: 270 tablet, Rfl: 3    Review of Systems    PHYSICAL EXAM:  Physical Exam   Constitutional: He appears well-developed  HENT:   Head: Normocephalic  Cardiovascular: Normal rate  Pulmonary/Chest: Effort normal    Abdominal: Soft  There is no tenderness  There is no guarding  Genitourinary: Penis normal    Musculoskeletal: He exhibits no edema  Neurological: He is alert  Skin: Skin is warm and dry  LAB RESULTS:  Lab Results   Component Value Date    WBC 8 39 05/15/2020    HGB 15 4 05/15/2020    HCT 47 7 05/15/2020    MCV 88 05/15/2020     05/15/2020     Lab Results   Component Value Date    CALCIUM 9 1 05/26/2020    K 4 0 05/26/2020    CO2 30 05/26/2020     05/26/2020    BUN 22 05/26/2020    CREATININE 1 61 (H) 05/26/2020     Lab Results   Component Value Date    CALCIUM 9 1 05/26/2020    PHOS 2 8 05/15/2020         Martin Cam PA-C  07/27/20    Portions of the record may have been created with voice recognition software   Occasional wrong word or "sound alike" substitutions may have occurred due to the inherent limitations of voice recognition software   Read the chart carefully and recognize, using context, where substitutions have occurred

## 2020-08-04 ENCOUNTER — APPOINTMENT (OUTPATIENT)
Dept: LAB | Facility: HOSPITAL | Age: 80
End: 2020-08-04
Attending: UROLOGY
Payer: MEDICARE

## 2020-08-04 ENCOUNTER — OFFICE VISIT (OUTPATIENT)
Dept: LAB | Facility: HOSPITAL | Age: 80
End: 2020-08-04
Attending: UROLOGY
Payer: MEDICARE

## 2020-08-04 ENCOUNTER — TRANSCRIBE ORDERS (OUTPATIENT)
Dept: ADMINISTRATIVE | Facility: HOSPITAL | Age: 80
End: 2020-08-04

## 2020-08-04 DIAGNOSIS — Z01.818 PRE-OP TESTING: ICD-10-CM

## 2020-08-04 DIAGNOSIS — Z01.818 PRE-OP TESTING: Primary | ICD-10-CM

## 2020-08-04 LAB
ALBUMIN SERPL BCP-MCNC: 3.1 G/DL (ref 3.5–5)
ALP SERPL-CCNC: 99 U/L (ref 46–116)
ALT SERPL W P-5'-P-CCNC: 13 U/L (ref 12–78)
ANION GAP SERPL CALCULATED.3IONS-SCNC: 3 MMOL/L (ref 4–13)
AST SERPL W P-5'-P-CCNC: 21 U/L (ref 5–45)
BACTERIA UR QL AUTO: ABNORMAL /HPF
BASOPHILS # BLD AUTO: 0.04 THOUSANDS/ΜL (ref 0–0.1)
BASOPHILS NFR BLD AUTO: 1 % (ref 0–1)
BILIRUB SERPL-MCNC: 0.7 MG/DL (ref 0.2–1)
BILIRUB UR QL STRIP: ABNORMAL
BUN SERPL-MCNC: 25 MG/DL (ref 5–25)
CALCIUM SERPL-MCNC: 8.6 MG/DL (ref 8.3–10.1)
CHLORIDE SERPL-SCNC: 104 MMOL/L (ref 100–108)
CLARITY UR: ABNORMAL
CO2 SERPL-SCNC: 32 MMOL/L (ref 21–32)
COLOR UR: ABNORMAL
CREAT SERPL-MCNC: 1.72 MG/DL (ref 0.6–1.3)
EOSINOPHIL # BLD AUTO: 0.29 THOUSAND/ΜL (ref 0–0.61)
EOSINOPHIL NFR BLD AUTO: 4 % (ref 0–6)
ERYTHROCYTE [DISTWIDTH] IN BLOOD BY AUTOMATED COUNT: 14 % (ref 11.6–15.1)
GFR SERPL CREATININE-BSD FRML MDRD: 38 ML/MIN/1.73SQ M
GLUCOSE SERPL-MCNC: 98 MG/DL (ref 65–140)
GLUCOSE UR STRIP-MCNC: NEGATIVE MG/DL
HCT VFR BLD AUTO: 45.8 % (ref 36.5–49.3)
HGB BLD-MCNC: 14.8 G/DL (ref 12–17)
HGB UR QL STRIP.AUTO: ABNORMAL
IMM GRANULOCYTES # BLD AUTO: 0.04 THOUSAND/UL (ref 0–0.2)
IMM GRANULOCYTES NFR BLD AUTO: 1 % (ref 0–2)
KETONES UR STRIP-MCNC: ABNORMAL MG/DL
LEUKOCYTE ESTERASE UR QL STRIP: ABNORMAL
LYMPHOCYTES # BLD AUTO: 1.94 THOUSANDS/ΜL (ref 0.6–4.47)
LYMPHOCYTES NFR BLD AUTO: 27 % (ref 14–44)
MCH RBC QN AUTO: 28.7 PG (ref 26.8–34.3)
MCHC RBC AUTO-ENTMCNC: 32.3 G/DL (ref 31.4–37.4)
MCV RBC AUTO: 89 FL (ref 82–98)
MONOCYTES # BLD AUTO: 0.85 THOUSAND/ΜL (ref 0.17–1.22)
MONOCYTES NFR BLD AUTO: 12 % (ref 4–12)
NEUTROPHILS # BLD AUTO: 4 THOUSANDS/ΜL (ref 1.85–7.62)
NEUTS SEG NFR BLD AUTO: 55 % (ref 43–75)
NITRITE UR QL STRIP: NEGATIVE
NON-SQ EPI CELLS URNS QL MICRO: ABNORMAL /HPF
NRBC BLD AUTO-RTO: 0 /100 WBCS
PH UR STRIP.AUTO: 6.5 [PH]
PLATELET # BLD AUTO: 149 THOUSANDS/UL (ref 149–390)
PMV BLD AUTO: 10.4 FL (ref 8.9–12.7)
POTASSIUM SERPL-SCNC: 4.3 MMOL/L (ref 3.5–5.3)
PROT SERPL-MCNC: 6.7 G/DL (ref 6.4–8.2)
PROT UR STRIP-MCNC: ABNORMAL MG/DL
RBC # BLD AUTO: 5.15 MILLION/UL (ref 3.88–5.62)
RBC #/AREA URNS AUTO: ABNORMAL /HPF
SODIUM SERPL-SCNC: 139 MMOL/L (ref 136–145)
SP GR UR STRIP.AUTO: 1.02 (ref 1–1.03)
UROBILINOGEN UR QL STRIP.AUTO: 1 E.U./DL
WBC # BLD AUTO: 7.16 THOUSAND/UL (ref 4.31–10.16)
WBC #/AREA URNS AUTO: ABNORMAL /HPF

## 2020-08-04 PROCEDURE — 87086 URINE CULTURE/COLONY COUNT: CPT

## 2020-08-04 PROCEDURE — 81001 URINALYSIS AUTO W/SCOPE: CPT | Performed by: UROLOGY

## 2020-08-04 PROCEDURE — 85025 COMPLETE CBC W/AUTO DIFF WBC: CPT

## 2020-08-04 PROCEDURE — 36415 COLL VENOUS BLD VENIPUNCTURE: CPT

## 2020-08-04 PROCEDURE — 93005 ELECTROCARDIOGRAM TRACING: CPT

## 2020-08-04 PROCEDURE — 80053 COMPREHEN METABOLIC PANEL: CPT

## 2020-08-04 NOTE — PRE-PROCEDURE INSTRUCTIONS
My Surgical Experience    The following information was developed to assist you to prepare for your operation  What do I need to do before coming to the hospital?   Arrange for a responsible person to drive you to and from the hospital    Arrange care for your children at home  Children are not allowed in the recovery areas of the hospital   Plan to wear clothing that is easy to put on and take off  If you are having shoulder surgery, wear a shirt that buttons or zippers in the front  Bathing  o Shower the evening before and the morning of your surgery with an antibacterial soap  Please refer to the Pre Op Showering Instructions for Surgery Patients Sheet   o Remove nail polish and all body piercing jewelry  o Do not shave any body part for at least 24 hours before surgery-this includes face, arms, legs and upper body  Food  o Nothing to eat or drink after midnight the night before your surgery  This includes candy and chewing gum  o Exception: If your surgery is after 12:00pm (noon), you may have clear liquids such as 7-Up®, ginger ale, apple or cranberry juice, Jell-O®, water, or clear broth until 8:00 am  o Do not drink milk or juice with pulp on the morning before surgery  o Do not drink alcohol 24 hours before surgery  Medicine  o Follow instructions you received from your surgeon about which medicines you may take on the day of surgery  o If instructed to take medicine on the morning of surgery, take pills with just a small sip of water  Call your prescribing doctor for specific infroamtion on what to do if you take insulin    What should I bring to the hospital?    Bring:  Tamara Olmos or a walker, if you have them, for foot or knee surgery   A list of the daily medicines, vitamins, minerals, herbals and nutritional supplements you take   Include the dosages of medicines and the time you take them each day   Glasses, dentures or hearing aids   Minimal clothing; you will be wearing hospital sleepwear   Photo ID; required to verify your identity   If you have a Living Will or Power of , bring a copy of the documents   If you have an ostomy, bring an extra pouch and any supplies you use    Do not bring   Medicines or inhalers   Money, valuables or jewelry    What other information should I know about the day of surgery?  Notify your surgeons if you develop a cold, sore throat, cough, fever, rash or any other illness   Report to the Ambulatory Surgical/Same Day Surgery Unit   You will be instructed to stop at Registration only if you have not been pre-registered   Inform your  fi they do not stay that they will be asked by the staff to leave a phone number where they can be reached   Be available to be reached before surgery  In the event the operating room schedule changes, you may be asked to come in earlier or later than expected    *It is important to tell your doctor and others involved in your health care if you are taking or have been taking any non-prescription drugs, vitamins, minerals, herbals or other nutritional supplements  Any of these may interact with some food or medicines and cause a reaction      Pre-Surgery Instructions:   Medication Instructions    amLODIPine (NORVASC) 10 mg tablet Instructed patient per Anesthesia Guidelines   aspirin (ECOTRIN LOW STRENGTH) 81 mg EC tablet Instructed patient per Anesthesia Guidelines   cholecalciferol (VITAMIN D3) 1,000 units tablet Instructed patient per Anesthesia Guidelines   hydrochlorothiazide (MICROZIDE) 12 5 mg capsule Instructed patient per Anesthesia Guidelines   metoprolol tartrate (LOPRESSOR) 25 mg tablet Instructed patient per Anesthesia Guidelines  To take metoprolol  And amlodipine a m  of surgery

## 2020-08-05 DIAGNOSIS — N13.1 HYDRONEPHROSIS WITH URETERAL STRICTURE: ICD-10-CM

## 2020-08-05 DIAGNOSIS — Z01.818 OTHER SPECIFIED PRE-OPERATIVE EXAMINATION: ICD-10-CM

## 2020-08-05 LAB — BACTERIA UR CULT: ABNORMAL

## 2020-08-05 PROCEDURE — U0003 INFECTIOUS AGENT DETECTION BY NUCLEIC ACID (DNA OR RNA); SEVERE ACUTE RESPIRATORY SYNDROME CORONAVIRUS 2 (SARS-COV-2) (CORONAVIRUS DISEASE [COVID-19]), AMPLIFIED PROBE TECHNIQUE, MAKING USE OF HIGH THROUGHPUT TECHNOLOGIES AS DESCRIBED BY CMS-2020-01-R: HCPCS

## 2020-08-06 LAB — SARS-COV-2 RNA SPEC QL NAA+PROBE: NOT DETECTED

## 2020-08-07 LAB
ATRIAL RATE: 54 BPM
QRS AXIS: -58 DEGREES
QRSD INTERVAL: 112 MS
QT INTERVAL: 424 MS
QTC INTERVAL: 405 MS
T WAVE AXIS: 1 DEGREES
VENTRICULAR RATE: 55 BPM

## 2020-08-07 PROCEDURE — 93010 ELECTROCARDIOGRAM REPORT: CPT | Performed by: INTERNAL MEDICINE

## 2020-08-10 ENCOUNTER — ANESTHESIA EVENT (OUTPATIENT)
Dept: PERIOP | Facility: HOSPITAL | Age: 80
End: 2020-08-10
Payer: MEDICARE

## 2020-08-10 NOTE — ANESTHESIA PREPROCEDURE EVALUATION
Procedure:  CYSTOSCOPY, STENT EXCHANGE, RETROGRADE (Right Bladder)    Relevant Problems   CARDIO   (+) Benign essential hypertension   (+) Chronic atrial fibrillation with RVR (HCC)      /RENAL   (+) CKD (chronic kidney disease)   (+) Calculus of kidney        Physical Exam    Airway    Mallampati score: II  TM Distance: <3 FB       Dental   upper dentures,     Cardiovascular  Rhythm: irregular, Rate: abnormal,     Pulmonary  Breath sounds clear to auscultation,     Other Findings  Upper partial      Anesthesia Plan  ASA Score- 3     Anesthesia Type- IV sedation with anesthesia with ASA Monitors  Additional Monitors:   Airway Plan:           Plan Factors-    Chart reviewed  EKG reviewed  Imaging results reviewed  Existing labs reviewed  Patient is not a current smoker  Induction- intravenous  Postoperative Plan- Plan for postoperative opioid use  Informed Consent- Anesthetic plan and risks discussed with patient  I personally reviewed this patient with the CRNA  Discussed and agreed on the Anesthesia Plan with the CHRISTINA Kelley

## 2020-08-11 ENCOUNTER — HOSPITAL ENCOUNTER (OUTPATIENT)
Facility: HOSPITAL | Age: 80
Setting detail: OUTPATIENT SURGERY
Discharge: HOME | End: 2020-08-11
Attending: UROLOGY | Admitting: UROLOGY
Payer: MEDICARE

## 2020-08-11 ENCOUNTER — ANESTHESIA (OUTPATIENT)
Dept: PERIOP | Facility: HOSPITAL | Age: 80
End: 2020-08-11
Payer: MEDICARE

## 2020-08-11 ENCOUNTER — APPOINTMENT (OUTPATIENT)
Dept: RADIOLOGY | Facility: HOSPITAL | Age: 80
End: 2020-08-11
Payer: MEDICARE

## 2020-08-11 VITALS
WEIGHT: 203.13 LBS | SYSTOLIC BLOOD PRESSURE: 106 MMHG | DIASTOLIC BLOOD PRESSURE: 57 MMHG | HEART RATE: 63 BPM | RESPIRATION RATE: 18 BRPM | OXYGEN SATURATION: 96 % | HEIGHT: 70 IN | TEMPERATURE: 97.2 F | BODY MASS INDEX: 29.08 KG/M2

## 2020-08-11 PROBLEM — N13.1 HYDRONEPHROSIS WITH URETERAL STRICTURE, NOT ELSEWHERE CLASSIFIED: Status: ACTIVE | Noted: 2020-08-11

## 2020-08-11 PROCEDURE — C2617 STENT, NON-COR, TEM W/O DEL: HCPCS | Performed by: UROLOGY

## 2020-08-11 PROCEDURE — 74420 UROGRAPHY RTRGR +-KUB: CPT

## 2020-08-11 PROCEDURE — C1769 GUIDE WIRE: HCPCS | Performed by: UROLOGY

## 2020-08-11 DEVICE — IMPLANTABLE DEVICE
Type: IMPLANTABLE DEVICE | Site: URETER | Status: NON-FUNCTIONAL
Removed: 2021-10-19

## 2020-08-11 RX ORDER — CEFAZOLIN SODIUM 2 G/50ML
2000 SOLUTION INTRAVENOUS ONCE
Status: DISCONTINUED | OUTPATIENT
Start: 2020-08-11 | End: 2020-08-11 | Stop reason: HOSPADM

## 2020-08-11 RX ORDER — DEXAMETHASONE SODIUM PHOSPHATE 4 MG/ML
INJECTION, SOLUTION INTRA-ARTICULAR; INTRALESIONAL; INTRAMUSCULAR; INTRAVENOUS; SOFT TISSUE AS NEEDED
Status: DISCONTINUED | OUTPATIENT
Start: 2020-08-11 | End: 2020-08-11

## 2020-08-11 RX ORDER — CEFAZOLIN SODIUM 2 G/50ML
SOLUTION INTRAVENOUS AS NEEDED
Status: DISCONTINUED | OUTPATIENT
Start: 2020-08-11 | End: 2020-08-11

## 2020-08-11 RX ORDER — MIDAZOLAM HYDROCHLORIDE 2 MG/2ML
INJECTION, SOLUTION INTRAMUSCULAR; INTRAVENOUS AS NEEDED
Status: DISCONTINUED | OUTPATIENT
Start: 2020-08-11 | End: 2020-08-11

## 2020-08-11 RX ORDER — LIDOCAINE HYDROCHLORIDE 10 MG/ML
INJECTION, SOLUTION EPIDURAL; INFILTRATION; INTRACAUDAL; PERINEURAL AS NEEDED
Status: DISCONTINUED | OUTPATIENT
Start: 2020-08-11 | End: 2020-08-11

## 2020-08-11 RX ORDER — SODIUM CHLORIDE, SODIUM LACTATE, POTASSIUM CHLORIDE, CALCIUM CHLORIDE 600; 310; 30; 20 MG/100ML; MG/100ML; MG/100ML; MG/100ML
125 INJECTION, SOLUTION INTRAVENOUS CONTINUOUS
Status: DISCONTINUED | OUTPATIENT
Start: 2020-08-11 | End: 2020-08-11 | Stop reason: HOSPADM

## 2020-08-11 RX ORDER — MAGNESIUM HYDROXIDE 1200 MG/15ML
LIQUID ORAL AS NEEDED
Status: DISCONTINUED | OUTPATIENT
Start: 2020-08-11 | End: 2020-08-11 | Stop reason: HOSPADM

## 2020-08-11 RX ORDER — ONDANSETRON 2 MG/ML
INJECTION INTRAMUSCULAR; INTRAVENOUS AS NEEDED
Status: DISCONTINUED | OUTPATIENT
Start: 2020-08-11 | End: 2020-08-11

## 2020-08-11 RX ORDER — PROPOFOL 10 MG/ML
INJECTION, EMULSION INTRAVENOUS AS NEEDED
Status: DISCONTINUED | OUTPATIENT
Start: 2020-08-11 | End: 2020-08-11

## 2020-08-11 RX ORDER — FENTANYL CITRATE 50 UG/ML
INJECTION, SOLUTION INTRAMUSCULAR; INTRAVENOUS AS NEEDED
Status: DISCONTINUED | OUTPATIENT
Start: 2020-08-11 | End: 2020-08-11

## 2020-08-11 RX ADMIN — ONDANSETRON 4 MG: 2 INJECTION INTRAMUSCULAR; INTRAVENOUS at 07:58

## 2020-08-11 RX ADMIN — SODIUM CHLORIDE, SODIUM LACTATE, POTASSIUM CHLORIDE, AND CALCIUM CHLORIDE 125 ML/HR: .6; .31; .03; .02 INJECTION, SOLUTION INTRAVENOUS at 07:29

## 2020-08-11 RX ADMIN — MIDAZOLAM HYDROCHLORIDE 1 MG: 1 INJECTION, SOLUTION INTRAMUSCULAR; INTRAVENOUS at 07:47

## 2020-08-11 RX ADMIN — CEFAZOLIN SODIUM 2000 MG: 2 SOLUTION INTRAVENOUS at 07:42

## 2020-08-11 RX ADMIN — DEXAMETHASONE SODIUM PHOSPHATE 4 MG: 4 INJECTION, SOLUTION INTRA-ARTICULAR; INTRALESIONAL; INTRAMUSCULAR; INTRAVENOUS; SOFT TISSUE at 07:49

## 2020-08-11 RX ADMIN — LIDOCAINE HYDROCHLORIDE 50 MG: 10 INJECTION, SOLUTION EPIDURAL; INFILTRATION; INTRACAUDAL; PERINEURAL at 07:46

## 2020-08-11 RX ADMIN — FENTANYL CITRATE 50 MCG: 50 INJECTION, SOLUTION INTRAMUSCULAR; INTRAVENOUS at 07:49

## 2020-08-11 RX ADMIN — PROPOFOL 50 MG: 10 INJECTION, EMULSION INTRAVENOUS at 07:47

## 2020-08-11 RX ADMIN — MIDAZOLAM HYDROCHLORIDE 1 MG: 1 INJECTION, SOLUTION INTRAMUSCULAR; INTRAVENOUS at 07:42

## 2020-08-11 RX ADMIN — PROPOFOL 50 MG: 10 INJECTION, EMULSION INTRAVENOUS at 07:48

## 2020-08-11 NOTE — ANESTHESIA POSTPROCEDURE EVALUATION
Post-Op Assessment Note    CV Status:  Stable    Pain management: adequate     Mental Status:  Alert and awake   Hydration Status:  Euvolemic   PONV Controlled:  Controlled   Airway Patency:  Patent      Post Op Vitals Reviewed: Yes      Staff: CRNA         No complications documented      BP   92/55   Temp   97 2   Pulse 51   Resp   15   SpO2 96

## 2020-08-11 NOTE — OP NOTE
OPERATIVE REPORT  PATIENT NAME: Caitie Chaves    :  1940  MRN: [id number]  Pt Location: [facility] OR ROOM 04    SURGERY DATE: 2020    Surgeon(s) and Role:     * Julieth Vásquez MD - Primary    Preop Diagnosis:  Hydronephrosis with ureteral stricture, not elsewhere classified [N13 1]    Post-Op Diagnosis Codes: * Hydronephrosis with ureteral stricture, not elsewhere classified [N13 1]    Procedure(s) (LRB):  CYSTOSCOPY, STENT EXCHANGE, RETROGRADE (Right)    Specimen(s):  * No specimens in log *    Estimated Blood Loss:   Minimal    Drains:  Ureteral Drain/Stent Right ureter  (Active)   Number of days: 0       [REMOVED] Ureteral Drain/Stent Right ureter 7 Fr  (Removed)   Number of days: 81       Anesthesia Type:   IV Sedation with Anesthesia    Operative Indications:  Hydronephrosis with ureteral stricture, not elsewhere classified [N13 1]      Operative Findings:  stricture    Complications:   None    Procedure and Technique:  After obtaining consent, patient was identified brought to the operating room  He was given sedation and placed in lithotomy position  He was sterilely prepped and draped  Time-out was performed  Cystoscopy was undertaken  A guidewire was passed up the right ureter until it reached a point of obstruction  The stent was grasped and pulled distally allowing the wire to pass into the kidney  Five Turkmen catheter was placed over the wire and the wire was removed  A retrograde was performed  The wire was reintroduced  Catheter was removed  A seven Turkmen 28 cm length stent was placed over the wire with a good coil proximally and distally  Case was terminated  Bladder was emptied  Patient Disposition:  PACU   He will have the stent exchanged again in 12 weeks      SIGNATURE: Julieth Vásquez MD  DATE: 2020  TIME: 8:16 AM

## 2020-09-10 ENCOUNTER — TELEPHONE (OUTPATIENT)
Dept: NEPHROLOGY | Facility: CLINIC | Age: 80
End: 2020-09-10

## 2020-09-10 ENCOUNTER — TRANSCRIBE ORDERS (OUTPATIENT)
Dept: ADMINISTRATIVE | Facility: HOSPITAL | Age: 80
End: 2020-09-10

## 2020-09-10 ENCOUNTER — LAB (OUTPATIENT)
Dept: LAB | Facility: HOSPITAL | Age: 80
End: 2020-09-10
Attending: INTERNAL MEDICINE
Payer: MEDICARE

## 2020-09-10 DIAGNOSIS — N18.30 CKD (CHRONIC KIDNEY DISEASE), STAGE III (HCC): ICD-10-CM

## 2020-09-10 LAB
ANION GAP SERPL CALCULATED.3IONS-SCNC: 7 MMOL/L (ref 4–13)
BACTERIA UR QL AUTO: ABNORMAL /HPF
BILIRUB UR QL STRIP: NEGATIVE
BUN SERPL-MCNC: 24 MG/DL (ref 5–25)
CALCIUM SERPL-MCNC: 8.9 MG/DL (ref 8.3–10.1)
CHLORIDE SERPL-SCNC: 103 MMOL/L (ref 100–108)
CLARITY UR: ABNORMAL
CO2 SERPL-SCNC: 29 MMOL/L (ref 21–32)
COLOR UR: ABNORMAL
CREAT SERPL-MCNC: 1.65 MG/DL (ref 0.6–1.3)
CREAT UR-MCNC: 154 MG/DL
ERYTHROCYTE [DISTWIDTH] IN BLOOD BY AUTOMATED COUNT: 14.2 % (ref 11.6–15.1)
GFR SERPL CREATININE-BSD FRML MDRD: 39 ML/MIN/1.73SQ M
GLUCOSE P FAST SERPL-MCNC: 98 MG/DL (ref 65–99)
GLUCOSE UR STRIP-MCNC: NEGATIVE MG/DL
HCT VFR BLD AUTO: 45.7 % (ref 36.5–49.3)
HGB BLD-MCNC: 14.6 G/DL (ref 12–17)
HGB UR QL STRIP.AUTO: ABNORMAL
KETONES UR STRIP-MCNC: NEGATIVE MG/DL
LEUKOCYTE ESTERASE UR QL STRIP: ABNORMAL
MAGNESIUM SERPL-MCNC: 1.9 MG/DL (ref 1.6–2.6)
MCH RBC QN AUTO: 28.7 PG (ref 26.8–34.3)
MCHC RBC AUTO-ENTMCNC: 31.9 G/DL (ref 31.4–37.4)
MCV RBC AUTO: 90 FL (ref 82–98)
NITRITE UR QL STRIP: NEGATIVE
NON-SQ EPI CELLS URNS QL MICRO: ABNORMAL /HPF
PH UR STRIP.AUTO: 7.5 [PH]
PHOSPHATE SERPL-MCNC: 2.7 MG/DL (ref 2.3–4.1)
PLATELET # BLD AUTO: 159 THOUSANDS/UL (ref 149–390)
PMV BLD AUTO: 10.5 FL (ref 8.9–12.7)
POTASSIUM SERPL-SCNC: 3.9 MMOL/L (ref 3.5–5.3)
PROT UR STRIP-MCNC: ABNORMAL MG/DL
PROT UR-MCNC: 41 MG/DL
PROT/CREAT UR: 0.27 MG/G{CREAT} (ref 0–0.1)
PTH-INTACT SERPL-MCNC: 106.7 PG/ML (ref 18.4–80.1)
RBC # BLD AUTO: 5.08 MILLION/UL (ref 3.88–5.62)
RBC #/AREA URNS AUTO: ABNORMAL /HPF
SODIUM SERPL-SCNC: 139 MMOL/L (ref 136–145)
SP GR UR STRIP.AUTO: 1.01 (ref 1–1.03)
UROBILINOGEN UR QL STRIP.AUTO: 1 E.U./DL
WBC # BLD AUTO: 7.76 THOUSAND/UL (ref 4.31–10.16)
WBC #/AREA URNS AUTO: ABNORMAL /HPF

## 2020-09-10 PROCEDURE — 84100 ASSAY OF PHOSPHORUS: CPT

## 2020-09-10 PROCEDURE — 80048 BASIC METABOLIC PNL TOTAL CA: CPT

## 2020-09-10 PROCEDURE — 85027 COMPLETE CBC AUTOMATED: CPT

## 2020-09-10 PROCEDURE — 36415 COLL VENOUS BLD VENIPUNCTURE: CPT

## 2020-09-10 PROCEDURE — 83735 ASSAY OF MAGNESIUM: CPT

## 2020-09-10 PROCEDURE — 82570 ASSAY OF URINE CREATININE: CPT

## 2020-09-10 PROCEDURE — 84156 ASSAY OF PROTEIN URINE: CPT

## 2020-09-10 PROCEDURE — 81001 URINALYSIS AUTO W/SCOPE: CPT

## 2020-09-10 PROCEDURE — 83970 ASSAY OF PARATHORMONE: CPT

## 2020-09-10 NOTE — RESULT ENCOUNTER NOTE
Hello    Patient normally is followed up by Ms Judd Roa  Please let pt know that renal function stable  Urine with RBC  Recent stent exchange last month which could explain? Is he having gross hematuria?  If yes, he should call his Urologist    Thank you    np

## 2020-09-10 NOTE — TELEPHONE ENCOUNTER
----- Message from Abril Bradshaw MD sent at 9/10/2020 11:46 AM EDT -----  Hello    Patient normally is followed up by Ms Manan Pope  Please let pt know that renal function stable  Urine with RBC  Recent stent exchange last month which could explain? Is he having gross hematuria?  If yes, he should call his Urologist    Thank you    np

## 2020-09-14 NOTE — TELEPHONE ENCOUNTER
Pt aware of the above, he has the hematuria sometimes especially after exercising but it cleares in a day or so, urology aware

## 2020-09-17 ENCOUNTER — OFFICE VISIT (OUTPATIENT)
Dept: NEPHROLOGY | Facility: CLINIC | Age: 80
End: 2020-09-17
Payer: MEDICARE

## 2020-09-17 ENCOUNTER — TELEPHONE (OUTPATIENT)
Dept: NEPHROLOGY | Facility: CLINIC | Age: 80
End: 2020-09-17

## 2020-09-17 VITALS
SYSTOLIC BLOOD PRESSURE: 128 MMHG | DIASTOLIC BLOOD PRESSURE: 78 MMHG | BODY MASS INDEX: 28.75 KG/M2 | WEIGHT: 200.4 LBS | TEMPERATURE: 97.3 F

## 2020-09-17 DIAGNOSIS — N18.30 CKD (CHRONIC KIDNEY DISEASE), STAGE III (HCC): Primary | ICD-10-CM

## 2020-09-17 DIAGNOSIS — N25.81 SECONDARY HYPERPARATHYROIDISM (HCC): ICD-10-CM

## 2020-09-17 PROCEDURE — 99214 OFFICE O/P EST MOD 30 MIN: CPT | Performed by: INTERNAL MEDICINE

## 2020-09-17 NOTE — PROGRESS NOTES
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, former smoker quit in April, hospitalized at [facility] in April 2017 for shortness of breath at which time nephrology was consulted due to acute kidney injury with a rising creatinine  Patient was found to have right-sided hydronephrosis with ureteral stricture and stone  Patient underwent ureteral stent and eventually had a lithotripsy  Patient presents for follow up visit for CKD       Patient had stent exchange in august 2020     1) CKD - baseline Cr approx 1 5-1 7 mg/dL  acute injury during hosp was secondary to obstruction secondary to stone, hydro, poor perfusion in setting of a fib/RVR  Etiology of CKD likely solitary functional kidney, prior hydro, nephrolithiasis      - Follows with Urology regarding stent and nephrolithiasis  -most recent creatinine is at baseline 1 6 mg/dL august 2020  - [facility] Luis blood in august (recent stent exchange and heavy lifting)  Repeat for next labs  - split function test in December shows left kidney receives higher flow then the right kidney by approximately 70 vs 30%  - UPCR higher 0 55 in may and repeat is 0 27 in august   - last stent exchange may 2020    Plan:  - kidney smart class - completed   Patient is not sure which dialysis modality he would want if dialysis was ever needed  But, states he would be interested in home hemodialysis  - repeat labwork in 4-5 months  - appt in 5 months  - monitor BP closely  -no changes to medication regimen today     2) Nephrolithiasis - follows with Urology       - renal stone mainly ca oxalate  - 24 hour urine litholink - reviewed     - pt has increased fluid intake to 2 L  - Needs low salt diet - already following  takes in approx 1 8 gm salt daily  at goal  - changed furosemide to HCTZ in jan 2018  - underwent stent exchange recently with august 2020     3) HTN - Blood pressures are stable     4) Volume - history of dCHF  history of mild to mod pulm HTN  Euvolemic      - need to monitor fluid intake with HF history  - daily weights      5) CKD MBD -      -  85 in December 2019; 68 in may 2020; 106 7 in September 2020  - Vit D 46 in aug 2019  - recheck vit D     6) Anemia - stable Hb     7) acid/base - bicarb 29 stable     8) anemia - Hb above goal    9) a fib    - metoprolol 25 mg q 8 hours     It was a pleasure evaluating Mr  Pedro Cr  Thank you for allowing our team to participate in the care of your patient         No problem-specific Assessment & Plan notes found for this encounter  HPI:    Denies fevers, chills, nausea, vomiting  PATIENT INSTRUCTIONS:    There are no Patient Instructions on file for this visit  OBJECTIVE:  Current Weight: Weight - Scale: 90 9 kg (200 lb 6 4 oz)  Vitals:    09/17/20 1359   Temp: (!) 97 3 °F (36 3 °C)   Weight: 90 9 kg (200 lb 6 4 oz)    Body mass index is 28 75 kg/m²  REVIEW OF SYSTEMS:    Review of Systems   Constitutional: Negative  Negative for appetite change and fatigue  HENT: Negative  Eyes: Negative  Respiratory: Negative  Negative for shortness of breath  Cardiovascular: Negative  Negative for leg swelling  Gastrointestinal: Negative  Endocrine: Negative  Genitourinary: Negative  Negative for difficulty urinating  Musculoskeletal: Negative  Allergic/Immunologic: Negative  Neurological: Negative  Hematological: Negative  Psychiatric/Behavioral: Negative  All other systems reviewed and are negative  PHYSICAL EXAM:      Physical Exam  Vitals signs and nursing note reviewed  Constitutional:       General: He is not in acute distress  Appearance: He is well-developed  He is not diaphoretic  HENT:      Head: Normocephalic and atraumatic  Mouth/Throat:      Pharynx: No oropharyngeal exudate  Eyes:      General: No scleral icterus  Right eye: No discharge  Left eye: No discharge  Conjunctiva/sclera: Conjunctivae normal    Neck:      Musculoskeletal: Normal range of motion and neck supple  Vascular: No JVD  Cardiovascular:      Rate and Rhythm: Normal rate and regular rhythm  Heart sounds: Normal heart sounds  No murmur  No friction rub  No gallop  Pulmonary:      Effort: Pulmonary effort is normal  No respiratory distress  Breath sounds: Normal breath sounds  No wheezing or rales  Chest:      Chest wall: No tenderness  Abdominal:      General: Bowel sounds are normal  There is no distension  Palpations: Abdomen is soft  Tenderness: There is no abdominal tenderness  There is no rebound  Musculoskeletal: Normal range of motion  General: No tenderness or deformity  Skin:     General: Skin is warm and dry  Coloration: Skin is not pale  Findings: No erythema or rash  Neurological:      Mental Status: He is alert and oriented to person, place, and time  Cranial Nerves: No cranial nerve deficit  Coordination: Coordination normal       Deep Tendon Reflexes: Reflexes are normal and symmetric  Psychiatric:         Behavior: Behavior normal          Thought Content:  Thought content normal          Judgment: Judgment normal          Medications:    Current Outpatient Medications:     amLODIPine (NORVASC) 10 mg tablet, Take 10 mg by mouth every morning  , Disp: , Rfl:     aspirin (ECOTRIN LOW STRENGTH) 81 mg EC tablet, Take 81 mg by mouth daily Last dose 1 week ago 1/23/20, Disp: , Rfl:     cholecalciferol (VITAMIN D3) 1,000 units tablet, Take 1,000 Units by mouth daily after lunch  , Disp: , Rfl:     hydrochlorothiazide (MICROZIDE) 12 5 mg capsule, take 1 capsule by mouth once daily, Disp: 90 capsule, Rfl: 3    metoprolol tartrate (LOPRESSOR) 25 mg tablet, Take 1 tablet (25 mg total) by mouth every 8 (eight) hours, Disp: 270 tablet, Rfl: 3    Laboratory Results:        Invalid input(s): ALBUMIN    Results for orders placed or performed in visit on 63/08/57   Basic metabolic panel   Result Value Ref Range    Sodium 139 136 - 145 mmol/L    Potassium 3 9 3 5 - 5 3 mmol/L    Chloride 103 100 - 108 mmol/L    CO2 29 21 - 32 mmol/L    ANION GAP 7 4 - 13 mmol/L    BUN 24 5 - 25 mg/dL    Creatinine 1 65 (H) 0 60 - 1 30 mg/dL    Glucose, Fasting 98 65 - 99 mg/dL    Calcium 8 9 8 3 - 10 1 mg/dL    eGFR 39 ml/min/1 73sq m   CBC   Result Value Ref Range    WBC 7 76 4 31 - 10 16 Thousand/uL    RBC 5 08 3 88 - 5 62 Million/uL    Hemoglobin 14 6 12 0 - 17 0 g/dL    Hematocrit 45 7 36 5 - 49 3 %    MCV 90 82 - 98 fL    MCH 28 7 26 8 - 34 3 pg    MCHC 31 9 31 4 - 37 4 g/dL    RDW 14 2 11 6 - 15 1 %    Platelets 529 154 - 789 Thousands/uL    MPV 10 5 8 9 - 12 7 fL   Magnesium   Result Value Ref Range    Magnesium 1 9 1 6 - 2 6 mg/dL   Phosphorus   Result Value Ref Range    Phosphorus 2 7 2 3 - 4 1 mg/dL   Protein / creatinine ratio, urine   Result Value Ref Range    Creatinine, Ur 154 0 mg/dL    Protein Urine Random 41 mg/dL    Prot/Creat Ratio, Ur 0 27 (H) 0 00 - 0 10   PTH, intact   Result Value Ref Range     7 (H) 18 4 - 80 1 pg/mL   Urinalysis with microscopic   Result Value Ref Range    Clarity, UA Slightly Cloudy     Color, UA Orange     Specific [place], UA 1 015 1 000 - 1 030    pH, UA 7 5 5 0, 5 5, 6 0, 6 5, 7 0, 7 5, 8 0, 8 5, 9 0    Glucose, UA Negative Negative mg/dl    Ketones, UA Negative Negative mg/dl    Blood, UA Large (A) Negative    Protein, UA 30 (1+) (A) Negative mg/dl    Nitrite, UA Negative Negative    Bilirubin, UA Negative Negative    Urobilinogen, UA 1 0 0 2, 1 0 E U /dl E U /dl    Leukocytes, UA Moderate (A) Negative    WBC, UA 0-5 None Seen, 0-5, 5-55, 5-65 /hpf    RBC, UA Innumerable (A) None Seen, 0-5 /hpf    Bacteria, UA Occasional None Seen, Occasional /hpf    Epithelial Cells Occasional None Seen, Occasional /hpf

## 2020-09-17 NOTE — PATIENT INSTRUCTIONS
1) Avoid NSAIDS - (Example - motrin, advil, ibuprofen, aleve, exederin, etc)  2) Always follow a low salt diet  3) please have labwork in 4 months  4) appointment in 4-5 months  5) no changes to your medications today

## 2020-09-17 NOTE — LETTER
September 17, 2020     Denis De La Torre PA-C  [address] [place] [address]    Patient: Billy Dubin   YOB: 1940   Date of Visit: 9/17/2020       Dear Dr Adrian Chase:    Thank you for referring Mary Juarez to me for evaluation  Below are my notes for this consultation  If you have questions, please do not hesitate to call me  I look forward to following your patient along with you  Sincerely,        Dora Robledo MD        CC: No Recipients  Dora Robledo MD  9/17/2020  2:19 PM  Sign when Signing Visit  NEPHROLOGY OFFICE VISIT   Billy Dubin 70 y o  male MRN: [id number]  9/17/2020    Reason for Visit: CKD    ASSESSMENT and PLAN:    I had the pleasure of seeing Mr Levon Simental today in the renal clinic for the continued management of CKD  57-year-old male with a past medical history of CKD stage III Baseline Cr 1 6-1 8, Nephrolithiasis,HTN, A  fib, D CHF, BPH, 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, former smoker quit in April, hospitalized at [facility] in April 2017 for shortness of breath at which time nephrology was consulted due to acute kidney injury with a rising creatinine  Patient was found to have right-sided hydronephrosis with ureteral stricture and stone  Patient underwent ureteral stent and eventually had a lithotripsy  Patient presents for follow up visit for CKD       Patient had stent exchange in august 2020     1) CKD - baseline Cr approx 1 5-1 7 mg/dL  acute injury during hosp was secondary to obstruction secondary to stone, hydro, poor perfusion in setting of a fib/RVR  Etiology of CKD likely solitary functional kidney, prior hydro, nephrolithiasis      - Follows with Urology regarding stent and nephrolithiasis  -most recent creatinine is at baseline 1 6 mg/dL august 2020  - [place] blood in august (recent stent exchange and heavy lifting)   Repeat for next labs  - split function test in December shows left kidney receives higher flow then the right kidney by approximately 70 vs 30%  - UPCR higher 0 55 in may and repeat is 0 27 in august   - last stent exchange may 2020    Plan:  - kidney smart class - completed   Patient is not sure which dialysis modality he would want if dialysis was ever needed  But, states he would be interested in home hemodialysis  - repeat labwork in 4-5 months  - appt in 5 months  - monitor BP closely  -no changes to medication regimen today     2) Nephrolithiasis - follows with Urology       - renal stone mainly ca oxalate  - 24 hour urine litholink - reviewed  - pt has increased fluid intake to 2 L  - Needs low salt diet - already following  takes in approx 1 8 gm salt daily  at goal  - changed furosemide to HCTZ in jan 2018  - underwent stent exchange recently with august 2020     3) HTN - Blood pressures are stable     4) Volume - history of dCHF  history of mild to mod pulm HTN  Euvolemic      - need to monitor fluid intake with HF history  - daily weights      5) CKD MBD -      -  85 in December 2019; 68 in may 2020; 106 7 in September 2020  - Vit D 46 in aug 2019  - recheck vit D     6) Anemia - stable Hb     7) acid/base - bicarb 29 stable     8) anemia - Hb above goal    9) a fib    - metoprolol 25 mg q 8 hours     It was a pleasure evaluating Mr Oz Rodriguez  Thank you for allowing our team to participate in the care of your patient         No problem-specific Assessment & Plan notes found for this encounter  HPI:    Denies fevers, chills, nausea, vomiting  PATIENT INSTRUCTIONS:    There are no Patient Instructions on file for this visit  OBJECTIVE:  Current Weight: Weight - Scale: 90 9 kg (200 lb 6 4 oz)  Vitals:    09/17/20 1359   Temp: (!) 97 3 °F (36 3 °C)   Weight: 90 9 kg (200 lb 6 4 oz)    Body mass index is 28 75 kg/m²  REVIEW OF SYSTEMS:    Review of Systems   Constitutional: Negative  Negative for appetite change and fatigue  HENT: Negative  Eyes: Negative  Respiratory: Negative  Negative for shortness of breath  Cardiovascular: Negative  Negative for leg swelling  Gastrointestinal: Negative  Endocrine: Negative  Genitourinary: Negative  Negative for difficulty urinating  Musculoskeletal: Negative  Allergic/Immunologic: Negative  Neurological: Negative  Hematological: Negative  Psychiatric/Behavioral: Negative  All other systems reviewed and are negative  PHYSICAL EXAM:      Physical Exam  Vitals signs and nursing note reviewed  Constitutional:       General: He is not in acute distress  Appearance: He is well-developed  He is not diaphoretic  HENT:      Head: Normocephalic and atraumatic  Mouth/Throat:      Pharynx: No oropharyngeal exudate  Eyes:      General: No scleral icterus  Right eye: No discharge  Left eye: No discharge  Conjunctiva/sclera: Conjunctivae normal    Neck:      Musculoskeletal: Normal range of motion and neck supple  Vascular: No JVD  Cardiovascular:      Rate and Rhythm: Normal rate and regular rhythm  Heart sounds: Normal heart sounds  No murmur  No friction rub  No gallop  Pulmonary:      Effort: Pulmonary effort is normal  No respiratory distress  Breath sounds: Normal breath sounds  No wheezing or rales  Chest:      Chest wall: No tenderness  Abdominal:      General: Bowel sounds are normal  There is no distension  Palpations: Abdomen is soft  Tenderness: There is no abdominal tenderness  There is no rebound  Musculoskeletal: Normal range of motion  General: No tenderness or deformity  Skin:     General: Skin is warm and dry  Coloration: Skin is not pale  Findings: No erythema or rash  Neurological:      Mental Status: He is alert and oriented to person, place, and time  Cranial Nerves: No cranial nerve deficit  Coordination: Coordination normal       Deep Tendon Reflexes: Reflexes are normal and symmetric     Psychiatric: Behavior: Behavior normal          Thought Content:  Thought content normal          Judgment: Judgment normal          Medications:    Current Outpatient Medications:     amLODIPine (NORVASC) 10 mg tablet, Take 10 mg by mouth every morning  , Disp: , Rfl:     aspirin (ECOTRIN LOW STRENGTH) 81 mg EC tablet, Take 81 mg by mouth daily Last dose 1 week ago 1/23/20, Disp: , Rfl:     cholecalciferol (VITAMIN D3) 1,000 units tablet, Take 1,000 Units by mouth daily after lunch  , Disp: , Rfl:     hydrochlorothiazide (MICROZIDE) 12 5 mg capsule, take 1 capsule by mouth once daily, Disp: 90 capsule, Rfl: 3    metoprolol tartrate (LOPRESSOR) 25 mg tablet, Take 1 tablet (25 mg total) by mouth every 8 (eight) hours, Disp: 270 tablet, Rfl: 3    Laboratory Results:        Invalid input(s): ALBUMIN    Results for orders placed or performed in visit on 17/14/68   Basic metabolic panel   Result Value Ref Range    Sodium 139 136 - 145 mmol/L    Potassium 3 9 3 5 - 5 3 mmol/L    Chloride 103 100 - 108 mmol/L    CO2 29 21 - 32 mmol/L    ANION GAP 7 4 - 13 mmol/L    BUN 24 5 - 25 mg/dL    Creatinine 1 65 (H) 0 60 - 1 30 mg/dL    Glucose, Fasting 98 65 - 99 mg/dL    Calcium 8 9 8 3 - 10 1 mg/dL    eGFR 39 ml/min/1 73sq m   CBC   Result Value Ref Range    WBC 7 76 4 31 - 10 16 Thousand/uL    RBC 5 08 3 88 - 5 62 Million/uL    Hemoglobin 14 6 12 0 - 17 0 g/dL    Hematocrit 45 7 36 5 - 49 3 %    MCV 90 82 - 98 fL    MCH 28 7 26 8 - 34 3 pg    MCHC 31 9 31 4 - 37 4 g/dL    RDW 14 2 11 6 - 15 1 %    Platelets 506 294 - 509 Thousands/uL    MPV 10 5 8 9 - 12 7 fL   Magnesium   Result Value Ref Range    Magnesium 1 9 1 6 - 2 6 mg/dL   Phosphorus   Result Value Ref Range    Phosphorus 2 7 2 3 - 4 1 mg/dL   Protein / creatinine ratio, urine   Result Value Ref Range    Creatinine, Ur 154 0 mg/dL    Protein Urine Random 41 mg/dL    Prot/Creat Ratio, Ur 0 27 (H) 0 00 - 0 10   PTH, intact   Result Value Ref Range     7 (H) 18 4 - 80 1 pg/mL Urinalysis with microscopic   Result Value Ref Range    Clarity, UA Slightly Cloudy     Color, UA Orange     Specific [place], UA 1 015 1 000 - 1 030    pH, UA 7 5 5 0, 5 5, 6 0, 6 5, 7 0, 7 5, 8 0, 8 5, 9 0    Glucose, UA Negative Negative mg/dl    Ketones, UA Negative Negative mg/dl    Blood, UA Large (A) Negative    Protein, UA 30 (1+) (A) Negative mg/dl    Nitrite, UA Negative Negative    Bilirubin, UA Negative Negative    Urobilinogen, UA 1 0 0 2, 1 0 E U /dl E U /dl    Leukocytes, UA Moderate (A) Negative    WBC, UA 0-5 None Seen, 0-5, 5-55, 5-65 /hpf    RBC, UA Innumerable (A) None Seen, 0-5 /hpf    Bacteria, UA Occasional None Seen, Occasional /hpf    Epithelial Cells Occasional None Seen, Occasional /hpf

## 2020-10-28 ENCOUNTER — APPOINTMENT (EMERGENCY)
Dept: RADIOLOGY | Facility: HOSPITAL | Age: 80
DRG: 062 | End: 2020-10-28
Payer: MEDICARE

## 2020-10-28 ENCOUNTER — HOSPITAL ENCOUNTER (INPATIENT)
Facility: HOSPITAL | Age: 80
LOS: 2 days | DRG: 062 | End: 2020-10-30
Attending: EMERGENCY MEDICINE | Admitting: INTERNAL MEDICINE
Payer: MEDICARE

## 2020-10-28 ENCOUNTER — APPOINTMENT (INPATIENT)
Dept: RADIOLOGY | Facility: HOSPITAL | Age: 80
DRG: 062 | End: 2020-10-28
Payer: MEDICARE

## 2020-10-28 DIAGNOSIS — I65.23 BILATERAL CAROTID ARTERY STENOSIS: ICD-10-CM

## 2020-10-28 DIAGNOSIS — I48.20 CHRONIC ATRIAL FIBRILLATION WITH RVR (HCC): ICD-10-CM

## 2020-10-28 DIAGNOSIS — I63.9 CVA (CEREBRAL VASCULAR ACCIDENT) (HCC): Primary | ICD-10-CM

## 2020-10-28 DIAGNOSIS — K59.00 CONSTIPATION: ICD-10-CM

## 2020-10-28 DIAGNOSIS — I35.0 AORTIC VALVE STENOSIS, ETIOLOGY OF CARDIAC VALVE DISEASE UNSPECIFIED: ICD-10-CM

## 2020-10-28 PROBLEM — I77.9 BILATERAL CAROTID ARTERY DISEASE (HCC): Status: ACTIVE | Noted: 2020-10-28

## 2020-10-28 PROBLEM — I50.30 DIASTOLIC HEART FAILURE (HCC): Status: ACTIVE | Noted: 2020-10-28

## 2020-10-28 LAB
ALBUMIN SERPL BCP-MCNC: 3.6 G/DL (ref 3.5–5)
ALP SERPL-CCNC: 115 U/L (ref 46–116)
ALT SERPL W P-5'-P-CCNC: 15 U/L (ref 12–78)
ANION GAP SERPL CALCULATED.3IONS-SCNC: 8 MMOL/L (ref 4–13)
APTT PPP: 30 SECONDS (ref 23–37)
AST SERPL W P-5'-P-CCNC: 31 U/L (ref 5–45)
BACTERIA UR QL AUTO: ABNORMAL /HPF
BASOPHILS # BLD AUTO: 0.05 THOUSANDS/ΜL (ref 0–0.1)
BASOPHILS NFR BLD AUTO: 1 % (ref 0–1)
BILIRUB SERPL-MCNC: 0.7 MG/DL (ref 0.2–1)
BILIRUB UR QL STRIP: NEGATIVE
BUN SERPL-MCNC: 29 MG/DL (ref 5–25)
CALCIUM SERPL-MCNC: 9.4 MG/DL (ref 8.3–10.1)
CHLORIDE SERPL-SCNC: 101 MMOL/L (ref 100–108)
CHOLEST SERPL-MCNC: 177 MG/DL (ref 50–200)
CLARITY UR: ABNORMAL
CO2 SERPL-SCNC: 29 MMOL/L (ref 21–32)
COLOR UR: ABNORMAL
CREAT SERPL-MCNC: 1.8 MG/DL (ref 0.6–1.3)
EOSINOPHIL # BLD AUTO: 0.18 THOUSAND/ΜL (ref 0–0.61)
EOSINOPHIL NFR BLD AUTO: 2 % (ref 0–6)
ERYTHROCYTE [DISTWIDTH] IN BLOOD BY AUTOMATED COUNT: 14 % (ref 11.6–15.1)
EST. AVERAGE GLUCOSE BLD GHB EST-MCNC: 131 MG/DL
GFR SERPL CREATININE-BSD FRML MDRD: 35 ML/MIN/1.73SQ M
GLUCOSE SERPL-MCNC: 125 MG/DL (ref 65–140)
GLUCOSE SERPL-MCNC: 129 MG/DL (ref 65–140)
GLUCOSE UR STRIP-MCNC: NEGATIVE MG/DL
HBA1C MFR BLD: 6.2 %
HCT VFR BLD AUTO: 49.9 % (ref 36.5–49.3)
HDLC SERPL-MCNC: 39 MG/DL
HGB BLD-MCNC: 16 G/DL (ref 12–17)
HGB UR QL STRIP.AUTO: ABNORMAL
IMM GRANULOCYTES # BLD AUTO: 0.04 THOUSAND/UL (ref 0–0.2)
IMM GRANULOCYTES NFR BLD AUTO: 0 % (ref 0–2)
INR PPP: 1.03 (ref 0.84–1.19)
KETONES UR STRIP-MCNC: NEGATIVE MG/DL
LDLC SERPL CALC-MCNC: 120 MG/DL (ref 0–100)
LEUKOCYTE ESTERASE UR QL STRIP: ABNORMAL
LYMPHOCYTES # BLD AUTO: 1.91 THOUSANDS/ΜL (ref 0.6–4.47)
LYMPHOCYTES NFR BLD AUTO: 18 % (ref 14–44)
MCH RBC QN AUTO: 28.2 PG (ref 26.8–34.3)
MCHC RBC AUTO-ENTMCNC: 32.1 G/DL (ref 31.4–37.4)
MCV RBC AUTO: 88 FL (ref 82–98)
MONOCYTES # BLD AUTO: 1.04 THOUSAND/ΜL (ref 0.17–1.22)
MONOCYTES NFR BLD AUTO: 10 % (ref 4–12)
NEUTROPHILS # BLD AUTO: 7.19 THOUSANDS/ΜL (ref 1.85–7.62)
NEUTS SEG NFR BLD AUTO: 69 % (ref 43–75)
NITRITE UR QL STRIP: NEGATIVE
NON-SQ EPI CELLS URNS QL MICRO: ABNORMAL /HPF
NRBC BLD AUTO-RTO: 0 /100 WBCS
PH UR STRIP.AUTO: 8 [PH]
PLATELET # BLD AUTO: 176 THOUSANDS/UL (ref 149–390)
PMV BLD AUTO: 9.9 FL (ref 8.9–12.7)
POTASSIUM SERPL-SCNC: 4.3 MMOL/L (ref 3.5–5.3)
PROT SERPL-MCNC: 7.6 G/DL (ref 6.4–8.2)
PROT UR STRIP-MCNC: ABNORMAL MG/DL
PROTHROMBIN TIME: 13.5 SECONDS (ref 11.6–14.5)
RBC # BLD AUTO: 5.67 MILLION/UL (ref 3.88–5.62)
RBC #/AREA URNS AUTO: ABNORMAL /HPF
SODIUM SERPL-SCNC: 138 MMOL/L (ref 136–145)
SP GR UR STRIP.AUTO: 1.01 (ref 1–1.03)
TRIGL SERPL-MCNC: 90 MG/DL
TROPONIN I SERPL-MCNC: <0.02 NG/ML
UROBILINOGEN UR QL STRIP.AUTO: 0.2 E.U./DL
WBC # BLD AUTO: 10.41 THOUSAND/UL (ref 4.31–10.16)
WBC #/AREA URNS AUTO: ABNORMAL /HPF

## 2020-10-28 PROCEDURE — 97110 THERAPEUTIC EXERCISES: CPT

## 2020-10-28 PROCEDURE — G1004 CDSM NDSC: HCPCS

## 2020-10-28 PROCEDURE — 97163 PT EVAL HIGH COMPLEX 45 MIN: CPT

## 2020-10-28 PROCEDURE — 84484 ASSAY OF TROPONIN QUANT: CPT | Performed by: EMERGENCY MEDICINE

## 2020-10-28 PROCEDURE — 83036 HEMOGLOBIN GLYCOSYLATED A1C: CPT | Performed by: NURSE PRACTITIONER

## 2020-10-28 PROCEDURE — 85610 PROTHROMBIN TIME: CPT | Performed by: EMERGENCY MEDICINE

## 2020-10-28 PROCEDURE — 85025 COMPLETE CBC W/AUTO DIFF WBC: CPT | Performed by: EMERGENCY MEDICINE

## 2020-10-28 PROCEDURE — 80053 COMPREHEN METABOLIC PANEL: CPT | Performed by: EMERGENCY MEDICINE

## 2020-10-28 PROCEDURE — 80061 LIPID PANEL: CPT | Performed by: NURSE PRACTITIONER

## 2020-10-28 PROCEDURE — 92610 EVALUATE SWALLOWING FUNCTION: CPT

## 2020-10-28 PROCEDURE — 3E03317 INTRODUCTION OF OTHER THROMBOLYTIC INTO PERIPHERAL VEIN, PERCUTANEOUS APPROACH: ICD-10-PCS | Performed by: INTERNAL MEDICINE

## 2020-10-28 PROCEDURE — 99291 CRITICAL CARE FIRST HOUR: CPT

## 2020-10-28 PROCEDURE — 93005 ELECTROCARDIOGRAM TRACING: CPT

## 2020-10-28 PROCEDURE — 36415 COLL VENOUS BLD VENIPUNCTURE: CPT | Performed by: EMERGENCY MEDICINE

## 2020-10-28 PROCEDURE — 70496 CT ANGIOGRAPHY HEAD: CPT

## 2020-10-28 PROCEDURE — 81001 URINALYSIS AUTO W/SCOPE: CPT | Performed by: NURSE PRACTITIONER

## 2020-10-28 PROCEDURE — 70551 MRI BRAIN STEM W/O DYE: CPT

## 2020-10-28 PROCEDURE — 99291 CRITICAL CARE FIRST HOUR: CPT | Performed by: EMERGENCY MEDICINE

## 2020-10-28 PROCEDURE — 99223 1ST HOSP IP/OBS HIGH 75: CPT | Performed by: PHYSICIAN ASSISTANT

## 2020-10-28 PROCEDURE — 1124F ACP DISCUSS-NO DSCNMKR DOCD: CPT | Performed by: EMERGENCY MEDICINE

## 2020-10-28 PROCEDURE — 85730 THROMBOPLASTIN TIME PARTIAL: CPT | Performed by: EMERGENCY MEDICINE

## 2020-10-28 PROCEDURE — 99223 1ST HOSP IP/OBS HIGH 75: CPT | Performed by: INTERNAL MEDICINE

## 2020-10-28 PROCEDURE — 70498 CT ANGIOGRAPHY NECK: CPT

## 2020-10-28 PROCEDURE — 71045 X-RAY EXAM CHEST 1 VIEW: CPT

## 2020-10-28 PROCEDURE — 87081 CULTURE SCREEN ONLY: CPT | Performed by: INTERNAL MEDICINE

## 2020-10-28 PROCEDURE — 82948 REAGENT STRIP/BLOOD GLUCOSE: CPT

## 2020-10-28 RX ORDER — ACETAMINOPHEN 325 MG/1
650 TABLET ORAL EVERY 6 HOURS PRN
Status: DISCONTINUED | OUTPATIENT
Start: 2020-10-28 | End: 2020-10-30 | Stop reason: HOSPADM

## 2020-10-28 RX ORDER — LABETALOL 20 MG/4 ML (5 MG/ML) INTRAVENOUS SYRINGE
10 EVERY 4 HOURS PRN
Status: DISCONTINUED | OUTPATIENT
Start: 2020-10-28 | End: 2020-10-30 | Stop reason: HOSPADM

## 2020-10-28 RX ORDER — ATORVASTATIN CALCIUM 40 MG/1
40 TABLET, FILM COATED ORAL EVERY EVENING
Status: DISCONTINUED | OUTPATIENT
Start: 2020-10-28 | End: 2020-10-30

## 2020-10-28 RX ORDER — SODIUM CHLORIDE, SODIUM GLUCONATE, SODIUM ACETATE, POTASSIUM CHLORIDE, MAGNESIUM CHLORIDE, SODIUM PHOSPHATE, DIBASIC, AND POTASSIUM PHOSPHATE .53; .5; .37; .037; .03; .012; .00082 G/100ML; G/100ML; G/100ML; G/100ML; G/100ML; G/100ML; G/100ML
75 INJECTION, SOLUTION INTRAVENOUS CONTINUOUS
Status: DISCONTINUED | OUTPATIENT
Start: 2020-10-28 | End: 2020-10-28

## 2020-10-28 RX ADMIN — ACETAMINOPHEN 650 MG: 325 TABLET, FILM COATED ORAL at 19:57

## 2020-10-28 RX ADMIN — ALTEPLASE 73.6 MG: KIT at 01:29

## 2020-10-28 RX ADMIN — IOHEXOL 85 ML: 350 INJECTION, SOLUTION INTRAVENOUS at 01:20

## 2020-10-28 RX ADMIN — ATORVASTATIN CALCIUM 40 MG: 40 TABLET, FILM COATED ORAL at 18:45

## 2020-10-28 RX ADMIN — SODIUM CHLORIDE, SODIUM GLUCONATE, SODIUM ACETATE, POTASSIUM CHLORIDE AND MAGNESIUM CHLORIDE 75 ML/HR: 526; 502; 368; 37; 30 INJECTION, SOLUTION INTRAVENOUS at 03:23

## 2020-10-29 ENCOUNTER — APPOINTMENT (INPATIENT)
Dept: RADIOLOGY | Facility: HOSPITAL | Age: 80
DRG: 062 | End: 2020-10-29
Payer: MEDICARE

## 2020-10-29 ENCOUNTER — APPOINTMENT (INPATIENT)
Dept: NON INVASIVE DIAGNOSTICS | Facility: HOSPITAL | Age: 80
DRG: 062 | End: 2020-10-29
Payer: MEDICARE

## 2020-10-29 PROBLEM — K08.89 TOOTH PAIN: Chronic | Status: ACTIVE | Noted: 2020-10-29

## 2020-10-29 PROBLEM — I67.1 ANEURYSM OF ANTERIOR COMMUNICATING ARTERY: Status: ACTIVE | Noted: 2020-10-29

## 2020-10-29 LAB
ALBUMIN SERPL BCP-MCNC: 3.1 G/DL (ref 3.5–5)
ALP SERPL-CCNC: 101 U/L (ref 46–116)
ALT SERPL W P-5'-P-CCNC: 14 U/L (ref 12–78)
ANION GAP SERPL CALCULATED.3IONS-SCNC: 8 MMOL/L (ref 4–13)
AST SERPL W P-5'-P-CCNC: 23 U/L (ref 5–45)
ATRIAL RATE: 85 BPM
BASOPHILS # BLD AUTO: 0.04 THOUSANDS/ΜL (ref 0–0.1)
BASOPHILS NFR BLD AUTO: 1 % (ref 0–1)
BILIRUB SERPL-MCNC: 0.8 MG/DL (ref 0.2–1)
BUN SERPL-MCNC: 18 MG/DL (ref 5–25)
CALCIUM ALBUM COR SERPL-MCNC: 9.4 MG/DL (ref 8.3–10.1)
CALCIUM SERPL-MCNC: 8.7 MG/DL (ref 8.3–10.1)
CHLORIDE SERPL-SCNC: 103 MMOL/L (ref 100–108)
CO2 SERPL-SCNC: 26 MMOL/L (ref 21–32)
CREAT SERPL-MCNC: 1.42 MG/DL (ref 0.6–1.3)
EOSINOPHIL # BLD AUTO: 0.22 THOUSAND/ΜL (ref 0–0.61)
EOSINOPHIL NFR BLD AUTO: 3 % (ref 0–6)
ERYTHROCYTE [DISTWIDTH] IN BLOOD BY AUTOMATED COUNT: 14.8 % (ref 11.6–15.1)
GFR SERPL CREATININE-BSD FRML MDRD: 47 ML/MIN/1.73SQ M
GLUCOSE SERPL-MCNC: 106 MG/DL (ref 65–140)
HCT VFR BLD AUTO: 45.8 % (ref 36.5–49.3)
HGB BLD-MCNC: 15.2 G/DL (ref 12–17)
IMM GRANULOCYTES # BLD AUTO: 0.03 THOUSAND/UL (ref 0–0.2)
IMM GRANULOCYTES NFR BLD AUTO: 0 % (ref 0–2)
LYMPHOCYTES # BLD AUTO: 1.95 THOUSANDS/ΜL (ref 0.6–4.47)
LYMPHOCYTES NFR BLD AUTO: 24 % (ref 14–44)
MCH RBC QN AUTO: 29.2 PG (ref 26.8–34.3)
MCHC RBC AUTO-ENTMCNC: 33.2 G/DL (ref 31.4–37.4)
MCV RBC AUTO: 88 FL (ref 82–98)
MONOCYTES # BLD AUTO: 0.9 THOUSAND/ΜL (ref 0.17–1.22)
MONOCYTES NFR BLD AUTO: 11 % (ref 4–12)
MRSA NOSE QL CULT: NORMAL
NEUTROPHILS # BLD AUTO: 4.94 THOUSANDS/ΜL (ref 1.85–7.62)
NEUTS SEG NFR BLD AUTO: 61 % (ref 43–75)
NRBC BLD AUTO-RTO: 0 /100 WBCS
PLATELET # BLD AUTO: 156 THOUSANDS/UL (ref 149–390)
PMV BLD AUTO: 10.1 FL (ref 8.9–12.7)
POTASSIUM SERPL-SCNC: 3.5 MMOL/L (ref 3.5–5.3)
PROT SERPL-MCNC: 6.7 G/DL (ref 6.4–8.2)
QRS AXIS: -59 DEGREES
QRSD INTERVAL: 104 MS
QT INTERVAL: 408 MS
QTC INTERVAL: 465 MS
RBC # BLD AUTO: 5.2 MILLION/UL (ref 3.88–5.62)
SODIUM SERPL-SCNC: 137 MMOL/L (ref 136–145)
T WAVE AXIS: 11 DEGREES
VENTRICULAR RATE: 78 BPM
WBC # BLD AUTO: 8.08 THOUSAND/UL (ref 4.31–10.16)

## 2020-10-29 PROCEDURE — 80053 COMPREHEN METABOLIC PANEL: CPT | Performed by: PHYSICIAN ASSISTANT

## 2020-10-29 PROCEDURE — 97116 GAIT TRAINING THERAPY: CPT

## 2020-10-29 PROCEDURE — 99223 1ST HOSP IP/OBS HIGH 75: CPT | Performed by: INTERNAL MEDICINE

## 2020-10-29 PROCEDURE — 85025 COMPLETE CBC W/AUTO DIFF WBC: CPT | Performed by: PHYSICIAN ASSISTANT

## 2020-10-29 PROCEDURE — G1004 CDSM NDSC: HCPCS

## 2020-10-29 PROCEDURE — 97110 THERAPEUTIC EXERCISES: CPT

## 2020-10-29 PROCEDURE — 93880 EXTRACRANIAL BILAT STUDY: CPT

## 2020-10-29 PROCEDURE — 93306 TTE W/DOPPLER COMPLETE: CPT

## 2020-10-29 PROCEDURE — 99223 1ST HOSP IP/OBS HIGH 75: CPT | Performed by: PSYCHIATRY & NEUROLOGY

## 2020-10-29 PROCEDURE — 70450 CT HEAD/BRAIN W/O DYE: CPT

## 2020-10-29 PROCEDURE — 93306 TTE W/DOPPLER COMPLETE: CPT | Performed by: INTERNAL MEDICINE

## 2020-10-29 PROCEDURE — 93880 EXTRACRANIAL BILAT STUDY: CPT | Performed by: SURGERY

## 2020-10-29 PROCEDURE — 97167 OT EVAL HIGH COMPLEX 60 MIN: CPT

## 2020-10-29 PROCEDURE — 99232 SBSQ HOSP IP/OBS MODERATE 35: CPT | Performed by: INTERNAL MEDICINE

## 2020-10-29 PROCEDURE — NC001 PR NO CHARGE: Performed by: RADIOLOGY

## 2020-10-29 PROCEDURE — 93010 ELECTROCARDIOGRAM REPORT: CPT | Performed by: INTERNAL MEDICINE

## 2020-10-29 RX ORDER — POTASSIUM CHLORIDE 20 MEQ/1
40 TABLET, EXTENDED RELEASE ORAL ONCE
Status: COMPLETED | OUTPATIENT
Start: 2020-10-29 | End: 2020-10-29

## 2020-10-29 RX ORDER — POTASSIUM CHLORIDE 14.9 MG/ML
20 INJECTION INTRAVENOUS ONCE
Status: COMPLETED | OUTPATIENT
Start: 2020-10-29 | End: 2020-10-29

## 2020-10-29 RX ORDER — LIDOCAINE HYDROCHLORIDE 20 MG/ML
15 SOLUTION OROPHARYNGEAL 4 TIMES DAILY PRN
Status: DISCONTINUED | OUTPATIENT
Start: 2020-10-29 | End: 2020-10-30 | Stop reason: HOSPADM

## 2020-10-29 RX ADMIN — ATORVASTATIN CALCIUM 40 MG: 40 TABLET, FILM COATED ORAL at 19:44

## 2020-10-29 RX ADMIN — POTASSIUM CHLORIDE 40 MEQ: 1500 TABLET, EXTENDED RELEASE ORAL at 16:43

## 2020-10-29 RX ADMIN — POTASSIUM CHLORIDE 20 MEQ: 14.9 INJECTION, SOLUTION INTRAVENOUS at 16:43

## 2020-10-29 RX ADMIN — ACETAMINOPHEN 650 MG: 325 TABLET, FILM COATED ORAL at 09:16

## 2020-10-30 ENCOUNTER — HOSPITAL ENCOUNTER (INPATIENT)
Facility: HOSPITAL | Age: 80
LOS: 18 days | Discharge: HOME | DRG: 057 | End: 2020-11-17
Attending: STUDENT IN AN ORGANIZED HEALTH CARE EDUCATION/TRAINING PROGRAM | Admitting: STUDENT IN AN ORGANIZED HEALTH CARE EDUCATION/TRAINING PROGRAM
Payer: MEDICARE

## 2020-10-30 VITALS
DIASTOLIC BLOOD PRESSURE: 96 MMHG | RESPIRATION RATE: 18 BRPM | HEIGHT: 69 IN | HEART RATE: 95 BPM | BODY MASS INDEX: 29.62 KG/M2 | SYSTOLIC BLOOD PRESSURE: 157 MMHG | WEIGHT: 200 LBS | OXYGEN SATURATION: 94 % | TEMPERATURE: 98 F

## 2020-10-30 DIAGNOSIS — I63.9 CVA (CEREBRAL VASCULAR ACCIDENT) (HCC): ICD-10-CM

## 2020-10-30 DIAGNOSIS — I10 BENIGN ESSENTIAL HYPERTENSION: Primary | ICD-10-CM

## 2020-10-30 DIAGNOSIS — I10 ESSENTIAL HYPERTENSION: ICD-10-CM

## 2020-10-30 DIAGNOSIS — I48.20 CHRONIC ATRIAL FIBRILLATION WITH RVR (HCC): ICD-10-CM

## 2020-10-30 DIAGNOSIS — I63.9 CEREBROVASCULAR ACCIDENT (CVA), UNSPECIFIED MECHANISM (HCC): ICD-10-CM

## 2020-10-30 DIAGNOSIS — N13.5 CROSSING VESSEL AND STRICTURE OF URETER WITHOUT HYDRONEPHROSIS: ICD-10-CM

## 2020-10-30 DIAGNOSIS — F51.01 PRIMARY INSOMNIA: ICD-10-CM

## 2020-10-30 DIAGNOSIS — R31.0 HEMATURIA, GROSS: ICD-10-CM

## 2020-10-30 DIAGNOSIS — B35.1 ONYCHOMYCOSIS: ICD-10-CM

## 2020-10-30 DIAGNOSIS — N13.1 HYDRONEPHROSIS WITH URETERAL STRICTURE, NOT ELSEWHERE CLASSIFIED: ICD-10-CM

## 2020-10-30 DIAGNOSIS — N40.1 BPH WITH OBSTRUCTION/LOWER URINARY TRACT SYMPTOMS: ICD-10-CM

## 2020-10-30 DIAGNOSIS — N13.8 BPH WITH OBSTRUCTION/LOWER URINARY TRACT SYMPTOMS: ICD-10-CM

## 2020-10-30 PROBLEM — N18.30 STAGE 3 CHRONIC KIDNEY DISEASE (HCC): Status: ACTIVE | Noted: 2017-04-15

## 2020-10-30 LAB — SARS-COV-2 RNA RESP QL NAA+PROBE: NEGATIVE

## 2020-10-30 PROCEDURE — 99232 SBSQ HOSP IP/OBS MODERATE 35: CPT | Performed by: INTERNAL MEDICINE

## 2020-10-30 PROCEDURE — 99232 SBSQ HOSP IP/OBS MODERATE 35: CPT | Performed by: PSYCHIATRY & NEUROLOGY

## 2020-10-30 PROCEDURE — 99239 HOSP IP/OBS DSCHRG MGMT >30: CPT | Performed by: INTERNAL MEDICINE

## 2020-10-30 PROCEDURE — 87635 SARS-COV-2 COVID-19 AMP PRB: CPT | Performed by: INTERNAL MEDICINE

## 2020-10-30 RX ORDER — ATORVASTATIN CALCIUM 80 MG/1
80 TABLET, FILM COATED ORAL EVERY EVENING
Status: DISCONTINUED | OUTPATIENT
Start: 2020-10-30 | End: 2020-10-30 | Stop reason: HOSPADM

## 2020-10-30 RX ORDER — ACETAMINOPHEN 325 MG/1
650 TABLET ORAL EVERY 6 HOURS PRN
Refills: 0 | Status: ON HOLD
Start: 2020-10-30 | End: 2020-11-17 | Stop reason: SDUPTHER

## 2020-10-30 RX ORDER — LIDOCAINE HYDROCHLORIDE 20 MG/ML
15 SOLUTION OROPHARYNGEAL 4 TIMES DAILY PRN
Status: CANCELLED | OUTPATIENT
Start: 2020-10-30

## 2020-10-30 RX ORDER — AMLODIPINE BESYLATE 10 MG/1
10 TABLET ORAL DAILY
Status: DISCONTINUED | OUTPATIENT
Start: 2020-10-30 | End: 2020-10-30 | Stop reason: HOSPADM

## 2020-10-30 RX ORDER — ATORVASTATIN CALCIUM 40 MG/1
40 TABLET, FILM COATED ORAL EVERY EVENING
Refills: 0
Start: 2020-10-30 | End: 2020-11-17 | Stop reason: HOSPADM

## 2020-10-30 RX ORDER — DOCUSATE SODIUM 100 MG/1
100 CAPSULE, LIQUID FILLED ORAL 2 TIMES DAILY
Status: CANCELLED | OUTPATIENT
Start: 2020-10-30 | End: 2020-10-31

## 2020-10-30 RX ORDER — DOCUSATE SODIUM 100 MG/1
100 CAPSULE, LIQUID FILLED ORAL 2 TIMES DAILY
Status: COMPLETED | OUTPATIENT
Start: 2020-10-30 | End: 2020-10-31

## 2020-10-30 RX ORDER — AMLODIPINE BESYLATE 10 MG/1
10 TABLET ORAL DAILY
Status: CANCELLED | OUTPATIENT
Start: 2020-10-31

## 2020-10-30 RX ORDER — ATORVASTATIN CALCIUM 40 MG/1
40 TABLET, FILM COATED ORAL
Status: CANCELLED | OUTPATIENT
Start: 2020-10-30

## 2020-10-30 RX ORDER — SENNOSIDES 8.6 MG
17.2 TABLET ORAL
Qty: 8 TABLET | Refills: 0 | Status: SHIPPED | OUTPATIENT
Start: 2020-10-30 | End: 2020-12-07

## 2020-10-30 RX ORDER — POLYETHYLENE GLYCOL 3350 17 G/17G
17 POWDER, FOR SOLUTION ORAL DAILY
Status: CANCELLED | OUTPATIENT
Start: 2020-10-31 | End: 2020-11-02

## 2020-10-30 RX ORDER — ATORVASTATIN CALCIUM 40 MG/1
40 TABLET, FILM COATED ORAL
Status: DISCONTINUED | OUTPATIENT
Start: 2020-10-30 | End: 2020-11-17 | Stop reason: HOSPADM

## 2020-10-30 RX ORDER — POLYETHYLENE GLYCOL 3350 17 G/17G
17 POWDER, FOR SOLUTION ORAL DAILY
Status: DISPENSED | OUTPATIENT
Start: 2020-10-31 | End: 2020-11-02

## 2020-10-30 RX ORDER — SENNOSIDES 8.6 MG
2 TABLET ORAL
Status: DISCONTINUED | OUTPATIENT
Start: 2020-10-30 | End: 2020-10-30 | Stop reason: HOSPADM

## 2020-10-30 RX ORDER — ACETAMINOPHEN 325 MG/1
650 TABLET ORAL EVERY 6 HOURS PRN
Status: DISCONTINUED | OUTPATIENT
Start: 2020-10-30 | End: 2020-11-17 | Stop reason: HOSPADM

## 2020-10-30 RX ORDER — POLYETHYLENE GLYCOL 3350 17 G/17G
17 POWDER, FOR SOLUTION ORAL DAILY
Status: DISCONTINUED | OUTPATIENT
Start: 2020-10-30 | End: 2020-10-30 | Stop reason: HOSPADM

## 2020-10-30 RX ORDER — SENNOSIDES 8.6 MG
2 TABLET ORAL
Status: DISPENSED | OUTPATIENT
Start: 2020-10-30 | End: 2020-11-03

## 2020-10-30 RX ORDER — SENNOSIDES 8.6 MG
2 TABLET ORAL
Status: CANCELLED | OUTPATIENT
Start: 2020-10-30 | End: 2020-11-03

## 2020-10-30 RX ORDER — BISACODYL 10 MG
10 SUPPOSITORY, RECTAL RECTAL DAILY PRN
Status: DISCONTINUED | OUTPATIENT
Start: 2020-10-30 | End: 2020-11-09

## 2020-10-30 RX ORDER — ACETAMINOPHEN 325 MG/1
650 TABLET ORAL EVERY 6 HOURS PRN
Status: CANCELLED | OUTPATIENT
Start: 2020-10-30

## 2020-10-30 RX ORDER — DOCUSATE SODIUM 100 MG/1
100 CAPSULE, LIQUID FILLED ORAL 2 TIMES DAILY
Qty: 10 CAPSULE | Refills: 0 | Status: SHIPPED | OUTPATIENT
Start: 2020-10-30 | End: 2020-12-07

## 2020-10-30 RX ORDER — ASPIRIN 81 MG/1
81 TABLET ORAL DAILY
Status: DISCONTINUED | OUTPATIENT
Start: 2020-10-30 | End: 2020-10-30 | Stop reason: HOSPADM

## 2020-10-30 RX ORDER — POLYETHYLENE GLYCOL 3350 17 G/17G
17 POWDER, FOR SOLUTION ORAL DAILY
Qty: 51 G | Refills: 0 | Status: SHIPPED | OUTPATIENT
Start: 2020-10-30 | End: 2020-12-07

## 2020-10-30 RX ORDER — AMLODIPINE BESYLATE 10 MG/1
10 TABLET ORAL DAILY
Status: DISCONTINUED | OUTPATIENT
Start: 2020-10-31 | End: 2020-11-03

## 2020-10-30 RX ORDER — DOCUSATE SODIUM 100 MG/1
100 CAPSULE, LIQUID FILLED ORAL 2 TIMES DAILY
Status: DISCONTINUED | OUTPATIENT
Start: 2020-10-30 | End: 2020-10-30 | Stop reason: HOSPADM

## 2020-10-30 RX ADMIN — DOCUSATE SODIUM 100 MG: 100 CAPSULE, LIQUID FILLED ORAL at 12:28

## 2020-10-30 RX ADMIN — APIXABAN 5 MG: 5 TABLET, FILM COATED ORAL at 18:40

## 2020-10-30 RX ADMIN — METOPROLOL TARTRATE 25 MG: 25 TABLET, FILM COATED ORAL at 22:00

## 2020-10-30 RX ADMIN — ASPIRIN 81 MG: 81 TABLET, COATED ORAL at 12:28

## 2020-10-30 RX ADMIN — METOPROLOL TARTRATE 25 MG: 25 TABLET, FILM COATED ORAL at 12:28

## 2020-10-30 RX ADMIN — AMLODIPINE BESYLATE 10 MG: 10 TABLET ORAL at 12:28

## 2020-10-30 RX ADMIN — ATORVASTATIN CALCIUM 40 MG: 40 TABLET, FILM COATED ORAL at 16:01

## 2020-10-30 RX ADMIN — POLYETHYLENE GLYCOL 3350 17 G: 17 POWDER, FOR SOLUTION ORAL at 12:28

## 2020-10-30 RX ADMIN — MAGNESIUM HYDROXIDE 30 ML: 400 SUSPENSION ORAL at 16:01

## 2020-10-30 RX ADMIN — DOCUSATE SODIUM 100 MG: 100 CAPSULE ORAL at 18:40

## 2020-10-30 RX ADMIN — STANDARDIZED SENNA CONCENTRATE 17.2 MG: 8.6 TABLET ORAL at 22:02

## 2020-10-31 ENCOUNTER — APPOINTMENT (INPATIENT)
Dept: RADIOLOGY | Facility: HOSPITAL | Age: 80
DRG: 057 | End: 2020-10-31
Payer: MEDICARE

## 2020-10-31 PROBLEM — B35.1 ONYCHOMYCOSIS: Status: ACTIVE | Noted: 2020-10-31

## 2020-10-31 LAB
ANION GAP SERPL CALCULATED.3IONS-SCNC: 3 MMOL/L (ref 4–13)
BASOPHILS # BLD AUTO: 0.05 THOUSANDS/ΜL (ref 0–0.1)
BASOPHILS NFR BLD AUTO: 1 % (ref 0–1)
BUN SERPL-MCNC: 20 MG/DL (ref 5–25)
CALCIUM SERPL-MCNC: 8.9 MG/DL (ref 8.3–10.1)
CHLORIDE SERPL-SCNC: 110 MMOL/L (ref 100–108)
CO2 SERPL-SCNC: 27 MMOL/L (ref 21–32)
CREAT SERPL-MCNC: 1.34 MG/DL (ref 0.6–1.3)
EOSINOPHIL # BLD AUTO: 0.26 THOUSAND/ΜL (ref 0–0.61)
EOSINOPHIL NFR BLD AUTO: 3 % (ref 0–6)
ERYTHROCYTE [DISTWIDTH] IN BLOOD BY AUTOMATED COUNT: 14.4 % (ref 11.6–15.1)
GFR SERPL CREATININE-BSD FRML MDRD: 50 ML/MIN/1.73SQ M
GLUCOSE P FAST SERPL-MCNC: 96 MG/DL (ref 65–99)
GLUCOSE SERPL-MCNC: 96 MG/DL (ref 65–140)
HCT VFR BLD AUTO: 49.5 % (ref 36.5–49.3)
HGB BLD-MCNC: 16.4 G/DL (ref 12–17)
IMM GRANULOCYTES # BLD AUTO: 0.04 THOUSAND/UL (ref 0–0.2)
IMM GRANULOCYTES NFR BLD AUTO: 0 % (ref 0–2)
LYMPHOCYTES # BLD AUTO: 1.46 THOUSANDS/ΜL (ref 0.6–4.47)
LYMPHOCYTES NFR BLD AUTO: 16 % (ref 14–44)
MCH RBC QN AUTO: 28.7 PG (ref 26.8–34.3)
MCHC RBC AUTO-ENTMCNC: 33.1 G/DL (ref 31.4–37.4)
MCV RBC AUTO: 87 FL (ref 82–98)
MONOCYTES # BLD AUTO: 0.92 THOUSAND/ΜL (ref 0.17–1.22)
MONOCYTES NFR BLD AUTO: 10 % (ref 4–12)
NEUTROPHILS # BLD AUTO: 6.28 THOUSANDS/ΜL (ref 1.85–7.62)
NEUTS SEG NFR BLD AUTO: 70 % (ref 43–75)
NRBC BLD AUTO-RTO: 0 /100 WBCS
PLATELET # BLD AUTO: 174 THOUSANDS/UL (ref 149–390)
PMV BLD AUTO: 10.5 FL (ref 8.9–12.7)
POTASSIUM SERPL-SCNC: 4.4 MMOL/L (ref 3.5–5.3)
RBC # BLD AUTO: 5.72 MILLION/UL (ref 3.88–5.62)
SODIUM SERPL-SCNC: 140 MMOL/L (ref 136–145)
WBC # BLD AUTO: 9.01 THOUSAND/UL (ref 4.31–10.16)

## 2020-10-31 PROCEDURE — 92523 SPEECH SOUND LANG COMPREHEN: CPT

## 2020-10-31 PROCEDURE — 85025 COMPLETE CBC W/AUTO DIFF WBC: CPT | Performed by: PHYSICIAN ASSISTANT

## 2020-10-31 PROCEDURE — G1004 CDSM NDSC: HCPCS

## 2020-10-31 PROCEDURE — 97130 THER IVNTJ EA ADDL 15 MIN: CPT

## 2020-10-31 PROCEDURE — 97166 OT EVAL MOD COMPLEX 45 MIN: CPT

## 2020-10-31 PROCEDURE — 97129 THER IVNTJ 1ST 15 MIN: CPT

## 2020-10-31 PROCEDURE — 74176 CT ABD & PELVIS W/O CONTRAST: CPT

## 2020-10-31 PROCEDURE — 97535 SELF CARE MNGMENT TRAINING: CPT

## 2020-10-31 PROCEDURE — 97530 THERAPEUTIC ACTIVITIES: CPT

## 2020-10-31 PROCEDURE — 80048 BASIC METABOLIC PNL TOTAL CA: CPT | Performed by: NURSE PRACTITIONER

## 2020-10-31 PROCEDURE — 97161 PT EVAL LOW COMPLEX 20 MIN: CPT

## 2020-10-31 PROCEDURE — 99232 SBSQ HOSP IP/OBS MODERATE 35: CPT | Performed by: UROLOGY

## 2020-10-31 PROCEDURE — 0HBRXZZ EXCISION OF TOE NAIL, EXTERNAL APPROACH: ICD-10-PCS | Performed by: PODIATRIST

## 2020-10-31 PROCEDURE — 99232 SBSQ HOSP IP/OBS MODERATE 35: CPT | Performed by: PHYSICAL MEDICINE & REHABILITATION

## 2020-10-31 RX ORDER — FINASTERIDE 5 MG/1
5 TABLET, FILM COATED ORAL DAILY
Status: DISCONTINUED | OUTPATIENT
Start: 2020-10-31 | End: 2020-11-17 | Stop reason: HOSPADM

## 2020-10-31 RX ORDER — FINASTERIDE 5 MG/1
5 TABLET, FILM COATED ORAL DAILY
Qty: 90 TABLET | Refills: 3 | Status: SHIPPED | OUTPATIENT
Start: 2020-10-31 | End: 2021-01-20 | Stop reason: SDUPTHER

## 2020-10-31 RX ORDER — LANOLIN ALCOHOL/MO/W.PET/CERES
3 CREAM (GRAM) TOPICAL
Status: DISCONTINUED | OUTPATIENT
Start: 2020-10-31 | End: 2020-11-17 | Stop reason: HOSPADM

## 2020-10-31 RX ADMIN — ATORVASTATIN CALCIUM 40 MG: 40 TABLET, FILM COATED ORAL at 17:40

## 2020-10-31 RX ADMIN — APIXABAN 5 MG: 5 TABLET, FILM COATED ORAL at 10:43

## 2020-10-31 RX ADMIN — APIXABAN 5 MG: 5 TABLET, FILM COATED ORAL at 17:40

## 2020-10-31 RX ADMIN — MELATONIN 3 MG: at 21:04

## 2020-10-31 RX ADMIN — AMLODIPINE BESYLATE 10 MG: 10 TABLET ORAL at 09:06

## 2020-10-31 RX ADMIN — DOCUSATE SODIUM 100 MG: 100 CAPSULE ORAL at 09:06

## 2020-10-31 RX ADMIN — METOPROLOL TARTRATE 25 MG: 25 TABLET, FILM COATED ORAL at 09:06

## 2020-10-31 RX ADMIN — POLYETHYLENE GLYCOL 3350 17 G: 17 POWDER, FOR SOLUTION ORAL at 09:06

## 2020-10-31 RX ADMIN — FINASTERIDE 5 MG: 5 TABLET, FILM COATED ORAL at 14:37

## 2020-10-31 RX ADMIN — METOPROLOL TARTRATE 25 MG: 25 TABLET, FILM COATED ORAL at 20:50

## 2020-11-01 LAB
ANION GAP SERPL CALCULATED.3IONS-SCNC: 5 MMOL/L (ref 4–13)
BUN SERPL-MCNC: 27 MG/DL (ref 5–25)
CALCIUM SERPL-MCNC: 8.6 MG/DL (ref 8.3–10.1)
CHLORIDE SERPL-SCNC: 110 MMOL/L (ref 100–108)
CO2 SERPL-SCNC: 23 MMOL/L (ref 21–32)
CREAT SERPL-MCNC: 1.41 MG/DL (ref 0.6–1.3)
ERYTHROCYTE [DISTWIDTH] IN BLOOD BY AUTOMATED COUNT: 14.2 % (ref 11.6–15.1)
GFR SERPL CREATININE-BSD FRML MDRD: 47 ML/MIN/1.73SQ M
GLUCOSE SERPL-MCNC: 92 MG/DL (ref 65–140)
HCT VFR BLD AUTO: 47.8 % (ref 36.5–49.3)
HGB BLD-MCNC: 15.4 G/DL (ref 12–17)
MCH RBC QN AUTO: 28.1 PG (ref 26.8–34.3)
MCHC RBC AUTO-ENTMCNC: 32.2 G/DL (ref 31.4–37.4)
MCV RBC AUTO: 87 FL (ref 82–98)
PLATELET # BLD AUTO: 161 THOUSANDS/UL (ref 149–390)
PMV BLD AUTO: 10.9 FL (ref 8.9–12.7)
POTASSIUM SERPL-SCNC: 4 MMOL/L (ref 3.5–5.3)
RBC # BLD AUTO: 5.49 MILLION/UL (ref 3.88–5.62)
SODIUM SERPL-SCNC: 138 MMOL/L (ref 136–145)
WBC # BLD AUTO: 7.89 THOUSAND/UL (ref 4.31–10.16)

## 2020-11-01 PROCEDURE — 85027 COMPLETE CBC AUTOMATED: CPT | Performed by: PHYSICAL MEDICINE & REHABILITATION

## 2020-11-01 PROCEDURE — 97112 NEUROMUSCULAR REEDUCATION: CPT

## 2020-11-01 PROCEDURE — 80048 BASIC METABOLIC PNL TOTAL CA: CPT | Performed by: PHYSICAL MEDICINE & REHABILITATION

## 2020-11-01 PROCEDURE — 97129 THER IVNTJ 1ST 15 MIN: CPT

## 2020-11-01 PROCEDURE — 97130 THER IVNTJ EA ADDL 15 MIN: CPT

## 2020-11-01 PROCEDURE — 97110 THERAPEUTIC EXERCISES: CPT

## 2020-11-01 RX ADMIN — AMLODIPINE BESYLATE 10 MG: 10 TABLET ORAL at 08:03

## 2020-11-01 RX ADMIN — FINASTERIDE 5 MG: 5 TABLET, FILM COATED ORAL at 08:03

## 2020-11-01 RX ADMIN — METOPROLOL TARTRATE 25 MG: 25 TABLET, FILM COATED ORAL at 21:18

## 2020-11-01 RX ADMIN — MELATONIN 3 MG: at 21:18

## 2020-11-01 RX ADMIN — APIXABAN 5 MG: 5 TABLET, FILM COATED ORAL at 17:02

## 2020-11-01 RX ADMIN — ATORVASTATIN CALCIUM 40 MG: 40 TABLET, FILM COATED ORAL at 16:38

## 2020-11-01 RX ADMIN — METOPROLOL TARTRATE 25 MG: 25 TABLET, FILM COATED ORAL at 08:03

## 2020-11-01 RX ADMIN — APIXABAN 5 MG: 5 TABLET, FILM COATED ORAL at 08:03

## 2020-11-01 RX ADMIN — STANDARDIZED SENNA CONCENTRATE 17.2 MG: 8.6 TABLET ORAL at 21:18

## 2020-11-02 ENCOUNTER — EPISODE CHANGES (OUTPATIENT)
Dept: CASE MANAGEMENT | Facility: HOSPITAL | Age: 80
End: 2020-11-02

## 2020-11-02 ENCOUNTER — EPISODE CHANGES (OUTPATIENT)
Dept: CASE MANAGEMENT | Facility: OTHER | Age: 80
End: 2020-11-02

## 2020-11-02 ENCOUNTER — PATIENT OUTREACH (OUTPATIENT)
Dept: CASE MANAGEMENT | Facility: OTHER | Age: 80
End: 2020-11-02

## 2020-11-02 PROCEDURE — 99232 SBSQ HOSP IP/OBS MODERATE 35: CPT | Performed by: PHYSICIAN ASSISTANT

## 2020-11-02 PROCEDURE — 97129 THER IVNTJ 1ST 15 MIN: CPT

## 2020-11-02 PROCEDURE — 97112 NEUROMUSCULAR REEDUCATION: CPT

## 2020-11-02 PROCEDURE — 97530 THERAPEUTIC ACTIVITIES: CPT

## 2020-11-02 PROCEDURE — 99231 SBSQ HOSP IP/OBS SF/LOW 25: CPT | Performed by: STUDENT IN AN ORGANIZED HEALTH CARE EDUCATION/TRAINING PROGRAM

## 2020-11-02 PROCEDURE — 97110 THERAPEUTIC EXERCISES: CPT

## 2020-11-02 PROCEDURE — 97535 SELF CARE MNGMENT TRAINING: CPT

## 2020-11-02 PROCEDURE — 97130 THER IVNTJ EA ADDL 15 MIN: CPT

## 2020-11-02 RX ORDER — DOCUSATE SODIUM 100 MG/1
100 CAPSULE, LIQUID FILLED ORAL 2 TIMES DAILY
Status: DISCONTINUED | OUTPATIENT
Start: 2020-11-02 | End: 2020-11-17 | Stop reason: HOSPADM

## 2020-11-02 RX ORDER — TAMSULOSIN HYDROCHLORIDE 0.4 MG/1
0.4 CAPSULE ORAL
Status: DISCONTINUED | OUTPATIENT
Start: 2020-11-02 | End: 2020-11-03

## 2020-11-02 RX ADMIN — ATORVASTATIN CALCIUM 40 MG: 40 TABLET, FILM COATED ORAL at 16:48

## 2020-11-02 RX ADMIN — METOPROLOL TARTRATE 25 MG: 25 TABLET, FILM COATED ORAL at 21:25

## 2020-11-02 RX ADMIN — DOCUSATE SODIUM 100 MG: 100 CAPSULE ORAL at 17:56

## 2020-11-02 RX ADMIN — AMLODIPINE BESYLATE 10 MG: 10 TABLET ORAL at 09:44

## 2020-11-02 RX ADMIN — STANDARDIZED SENNA CONCENTRATE 17.2 MG: 8.6 TABLET ORAL at 21:25

## 2020-11-02 RX ADMIN — FINASTERIDE 5 MG: 5 TABLET, FILM COATED ORAL at 09:44

## 2020-11-02 RX ADMIN — APIXABAN 5 MG: 5 TABLET, FILM COATED ORAL at 09:44

## 2020-11-02 RX ADMIN — METOPROLOL TARTRATE 25 MG: 25 TABLET, FILM COATED ORAL at 09:44

## 2020-11-02 RX ADMIN — TAMSULOSIN HYDROCHLORIDE 0.4 MG: 0.4 CAPSULE ORAL at 16:48

## 2020-11-02 RX ADMIN — APIXABAN 5 MG: 5 TABLET, FILM COATED ORAL at 17:57

## 2020-11-02 RX ADMIN — MELATONIN 3 MG: at 21:25

## 2020-11-03 ENCOUNTER — APPOINTMENT (INPATIENT)
Dept: RADIOLOGY | Facility: HOSPITAL | Age: 80
DRG: 057 | End: 2020-11-03
Payer: MEDICARE

## 2020-11-03 PROBLEM — R55 SYNCOPE: Status: ACTIVE | Noted: 2020-11-03

## 2020-11-03 PROBLEM — R55 VASOVAGAL NEAR SYNCOPE: Status: ACTIVE | Noted: 2020-11-03

## 2020-11-03 LAB
ANION GAP SERPL CALCULATED.3IONS-SCNC: 10 MMOL/L (ref 4–13)
ATRIAL RATE: 250 BPM
BASE EXCESS BLDA CALC-SCNC: -5 MMOL/L (ref -2–3)
BASOPHILS # BLD AUTO: 0.05 THOUSANDS/ΜL (ref 0–0.1)
BASOPHILS NFR BLD AUTO: 1 % (ref 0–1)
BUN SERPL-MCNC: 34 MG/DL (ref 5–25)
CA-I BLD-SCNC: 1.21 MMOL/L (ref 1.12–1.32)
CALCIUM SERPL-MCNC: 8.6 MG/DL (ref 8.3–10.1)
CHLORIDE SERPL-SCNC: 106 MMOL/L (ref 100–108)
CO2 SERPL-SCNC: 22 MMOL/L (ref 21–32)
CREAT SERPL-MCNC: 1.69 MG/DL (ref 0.6–1.3)
EOSINOPHIL # BLD AUTO: 0.27 THOUSAND/ΜL (ref 0–0.61)
EOSINOPHIL NFR BLD AUTO: 3 % (ref 0–6)
ERYTHROCYTE [DISTWIDTH] IN BLOOD BY AUTOMATED COUNT: 14.1 % (ref 11.6–15.1)
FIO2 GAS DIL.REBREATH: 21 L
GFR SERPL CREATININE-BSD FRML MDRD: 38 ML/MIN/1.73SQ M
GLUCOSE P FAST SERPL-MCNC: 121 MG/DL (ref 65–99)
GLUCOSE SERPL-MCNC: 118 MG/DL (ref 65–140)
GLUCOSE SERPL-MCNC: 121 MG/DL (ref 65–140)
GLUCOSE SERPL-MCNC: 135 MG/DL (ref 65–140)
HCO3 BLDA-SCNC: 18.1 MMOL/L (ref 22–28)
HCT VFR BLD AUTO: 42.8 % (ref 36.5–49.3)
HCT VFR BLD CALC: 44 % (ref 36.5–49.3)
HGB BLD-MCNC: 14.2 G/DL (ref 12–17)
HGB BLDA-MCNC: 15 G/DL (ref 12–17)
IMM GRANULOCYTES # BLD AUTO: 0.03 THOUSAND/UL (ref 0–0.2)
IMM GRANULOCYTES NFR BLD AUTO: 0 % (ref 0–2)
LYMPHOCYTES # BLD AUTO: 1.55 THOUSANDS/ΜL (ref 0.6–4.47)
LYMPHOCYTES NFR BLD AUTO: 18 % (ref 14–44)
MCH RBC QN AUTO: 28.3 PG (ref 26.8–34.3)
MCHC RBC AUTO-ENTMCNC: 33.2 G/DL (ref 31.4–37.4)
MCV RBC AUTO: 85 FL (ref 82–98)
MONOCYTES # BLD AUTO: 1.13 THOUSAND/ΜL (ref 0.17–1.22)
MONOCYTES NFR BLD AUTO: 13 % (ref 4–12)
NEUTROPHILS # BLD AUTO: 5.63 THOUSANDS/ΜL (ref 1.85–7.62)
NEUTS SEG NFR BLD AUTO: 65 % (ref 43–75)
NRBC BLD AUTO-RTO: 0 /100 WBCS
PCO2 BLD: 19 MMOL/L (ref 21–32)
PCO2 BLD: 27.7 MM HG (ref 36–44)
PH BLD: 7.42 [PH] (ref 7.35–7.45)
PLATELET # BLD AUTO: 161 THOUSANDS/UL (ref 149–390)
PMV BLD AUTO: 10.5 FL (ref 8.9–12.7)
PO2 BLD: 63 MM HG (ref 75–129)
POTASSIUM BLD-SCNC: 3.8 MMOL/L (ref 3.5–5.3)
POTASSIUM SERPL-SCNC: 3.6 MMOL/L (ref 3.5–5.3)
QRS AXIS: -53 DEGREES
QRSD INTERVAL: 118 MS
QT INTERVAL: 434 MS
QTC INTERVAL: 440 MS
RBC # BLD AUTO: 5.01 MILLION/UL (ref 3.88–5.62)
SAO2 % BLD FROM PO2: 93 % (ref 60–85)
SODIUM BLD-SCNC: 135 MMOL/L (ref 136–145)
SODIUM SERPL-SCNC: 138 MMOL/L (ref 136–145)
SPECIMEN SOURCE: ABNORMAL
T WAVE AXIS: 4 DEGREES
VENTRICULAR RATE: 62 BPM
WBC # BLD AUTO: 8.66 THOUSAND/UL (ref 4.31–10.16)

## 2020-11-03 PROCEDURE — 82330 ASSAY OF CALCIUM: CPT

## 2020-11-03 PROCEDURE — 99239 HOSP IP/OBS DSCHRG MGMT >30: CPT | Performed by: PHYSICAL MEDICINE & REHABILITATION

## 2020-11-03 PROCEDURE — 97112 NEUROMUSCULAR REEDUCATION: CPT

## 2020-11-03 PROCEDURE — 99233 SBSQ HOSP IP/OBS HIGH 50: CPT | Performed by: NURSE PRACTITIONER

## 2020-11-03 PROCEDURE — 82803 BLOOD GASES ANY COMBINATION: CPT

## 2020-11-03 PROCEDURE — 94760 N-INVAS EAR/PLS OXIMETRY 1: CPT

## 2020-11-03 PROCEDURE — G1004 CDSM NDSC: HCPCS

## 2020-11-03 PROCEDURE — 97530 THERAPEUTIC ACTIVITIES: CPT

## 2020-11-03 PROCEDURE — 85025 COMPLETE CBC W/AUTO DIFF WBC: CPT | Performed by: INTERNAL MEDICINE

## 2020-11-03 PROCEDURE — 99233 SBSQ HOSP IP/OBS HIGH 50: CPT | Performed by: STUDENT IN AN ORGANIZED HEALTH CARE EDUCATION/TRAINING PROGRAM

## 2020-11-03 PROCEDURE — 97129 THER IVNTJ 1ST 15 MIN: CPT

## 2020-11-03 PROCEDURE — 82948 REAGENT STRIP/BLOOD GLUCOSE: CPT

## 2020-11-03 PROCEDURE — 36600 WITHDRAWAL OF ARTERIAL BLOOD: CPT

## 2020-11-03 PROCEDURE — 93005 ELECTROCARDIOGRAM TRACING: CPT

## 2020-11-03 PROCEDURE — 82947 ASSAY GLUCOSE BLOOD QUANT: CPT

## 2020-11-03 PROCEDURE — 97110 THERAPEUTIC EXERCISES: CPT

## 2020-11-03 PROCEDURE — 93010 ELECTROCARDIOGRAM REPORT: CPT | Performed by: INTERNAL MEDICINE

## 2020-11-03 PROCEDURE — 80048 BASIC METABOLIC PNL TOTAL CA: CPT | Performed by: INTERNAL MEDICINE

## 2020-11-03 PROCEDURE — 70450 CT HEAD/BRAIN W/O DYE: CPT

## 2020-11-03 PROCEDURE — 85014 HEMATOCRIT: CPT

## 2020-11-03 PROCEDURE — 97130 THER IVNTJ EA ADDL 15 MIN: CPT

## 2020-11-03 PROCEDURE — 84295 ASSAY OF SERUM SODIUM: CPT

## 2020-11-03 PROCEDURE — 84132 ASSAY OF SERUM POTASSIUM: CPT

## 2020-11-03 RX ORDER — AMLODIPINE BESYLATE 10 MG/1
10 TABLET ORAL DAILY
Status: CANCELLED | OUTPATIENT
Start: 2020-11-03

## 2020-11-03 RX ORDER — DOCUSATE SODIUM 100 MG/1
100 CAPSULE, LIQUID FILLED ORAL 2 TIMES DAILY
Status: CANCELLED | OUTPATIENT
Start: 2020-11-03

## 2020-11-03 RX ORDER — SODIUM CHLORIDE 9 MG/ML
75 INJECTION, SOLUTION INTRAVENOUS ONCE
Status: COMPLETED | OUTPATIENT
Start: 2020-11-03 | End: 2020-11-03

## 2020-11-03 RX ORDER — ATORVASTATIN CALCIUM 40 MG/1
40 TABLET, FILM COATED ORAL
Status: CANCELLED | OUTPATIENT
Start: 2020-11-03

## 2020-11-03 RX ORDER — GUAIFENESIN 600 MG
600 TABLET, EXTENDED RELEASE 12 HR ORAL EVERY 12 HOURS SCHEDULED
Status: DISCONTINUED | OUTPATIENT
Start: 2020-11-03 | End: 2020-11-14

## 2020-11-03 RX ORDER — FINASTERIDE 5 MG/1
5 TABLET, FILM COATED ORAL DAILY
Status: CANCELLED | OUTPATIENT
Start: 2020-11-03

## 2020-11-03 RX ORDER — ACETAMINOPHEN 325 MG/1
650 TABLET ORAL EVERY 6 HOURS PRN
Status: CANCELLED | OUTPATIENT
Start: 2020-11-03

## 2020-11-03 RX ORDER — LANOLIN ALCOHOL/MO/W.PET/CERES
3 CREAM (GRAM) TOPICAL
Status: CANCELLED | OUTPATIENT
Start: 2020-11-03

## 2020-11-03 RX ORDER — SENNOSIDES 8.6 MG
2 TABLET ORAL
Status: DISCONTINUED | OUTPATIENT
Start: 2020-11-03 | End: 2020-11-06

## 2020-11-03 RX ORDER — GUAIFENESIN 100 MG/5ML
100 SOLUTION ORAL EVERY 6 HOURS PRN
Status: DISCONTINUED | OUTPATIENT
Start: 2020-11-03 | End: 2020-11-17 | Stop reason: HOSPADM

## 2020-11-03 RX ORDER — TAMSULOSIN HYDROCHLORIDE 0.4 MG/1
0.4 CAPSULE ORAL
Status: CANCELLED | OUTPATIENT
Start: 2020-11-03

## 2020-11-03 RX ORDER — AMLODIPINE BESYLATE 5 MG/1
5 TABLET ORAL DAILY
Status: DISCONTINUED | OUTPATIENT
Start: 2020-11-04 | End: 2020-11-06

## 2020-11-03 RX ORDER — SENNOSIDES 8.6 MG
2 TABLET ORAL
Status: CANCELLED | OUTPATIENT
Start: 2020-11-03 | End: 2020-11-07

## 2020-11-03 RX ORDER — BISACODYL 10 MG
10 SUPPOSITORY, RECTAL RECTAL DAILY PRN
Status: CANCELLED | OUTPATIENT
Start: 2020-11-03

## 2020-11-03 RX ADMIN — ATORVASTATIN CALCIUM 40 MG: 40 TABLET, FILM COATED ORAL at 16:35

## 2020-11-03 RX ADMIN — AMLODIPINE BESYLATE 10 MG: 10 TABLET ORAL at 08:55

## 2020-11-03 RX ADMIN — APIXABAN 5 MG: 5 TABLET, FILM COATED ORAL at 17:32

## 2020-11-03 RX ADMIN — SODIUM CHLORIDE 75 ML/HR: 0.9 INJECTION, SOLUTION INTRAVENOUS at 11:49

## 2020-11-03 RX ADMIN — DOCUSATE SODIUM 100 MG: 100 CAPSULE ORAL at 17:32

## 2020-11-03 RX ADMIN — MELATONIN 3 MG: at 21:03

## 2020-11-03 RX ADMIN — APIXABAN 5 MG: 5 TABLET, FILM COATED ORAL at 08:55

## 2020-11-03 RX ADMIN — FINASTERIDE 5 MG: 5 TABLET, FILM COATED ORAL at 08:55

## 2020-11-03 RX ADMIN — METOPROLOL TARTRATE 25 MG: 25 TABLET, FILM COATED ORAL at 21:03

## 2020-11-03 RX ADMIN — GUAIFENESIN 600 MG: 600 TABLET, EXTENDED RELEASE ORAL at 21:03

## 2020-11-03 RX ADMIN — METOPROLOL TARTRATE 25 MG: 25 TABLET, FILM COATED ORAL at 08:55

## 2020-11-03 RX ADMIN — GUAIFENESIN 600 MG: 600 TABLET, EXTENDED RELEASE ORAL at 14:33

## 2020-11-03 RX ADMIN — STANDARDIZED SENNA CONCENTRATE 17.2 MG: 8.6 TABLET ORAL at 22:02

## 2020-11-04 LAB
ANION GAP SERPL CALCULATED.3IONS-SCNC: 4 MMOL/L (ref 4–13)
BUN SERPL-MCNC: 29 MG/DL (ref 5–25)
CALCIUM SERPL-MCNC: 9.1 MG/DL (ref 8.3–10.1)
CHLORIDE SERPL-SCNC: 109 MMOL/L (ref 100–108)
CO2 SERPL-SCNC: 27 MMOL/L (ref 21–32)
CREAT SERPL-MCNC: 1.45 MG/DL (ref 0.6–1.3)
GFR SERPL CREATININE-BSD FRML MDRD: 46 ML/MIN/1.73SQ M
GLUCOSE SERPL-MCNC: 93 MG/DL (ref 65–140)
POTASSIUM SERPL-SCNC: 4.7 MMOL/L (ref 3.5–5.3)
SODIUM SERPL-SCNC: 140 MMOL/L (ref 136–145)

## 2020-11-04 PROCEDURE — 97110 THERAPEUTIC EXERCISES: CPT

## 2020-11-04 PROCEDURE — 97129 THER IVNTJ 1ST 15 MIN: CPT

## 2020-11-04 PROCEDURE — 99231 SBSQ HOSP IP/OBS SF/LOW 25: CPT | Performed by: STUDENT IN AN ORGANIZED HEALTH CARE EDUCATION/TRAINING PROGRAM

## 2020-11-04 PROCEDURE — 97130 THER IVNTJ EA ADDL 15 MIN: CPT

## 2020-11-04 PROCEDURE — 97535 SELF CARE MNGMENT TRAINING: CPT

## 2020-11-04 PROCEDURE — 97112 NEUROMUSCULAR REEDUCATION: CPT

## 2020-11-04 PROCEDURE — 80048 BASIC METABOLIC PNL TOTAL CA: CPT | Performed by: NURSE PRACTITIONER

## 2020-11-04 PROCEDURE — 97530 THERAPEUTIC ACTIVITIES: CPT

## 2020-11-04 RX ADMIN — AMLODIPINE BESYLATE 5 MG: 5 TABLET ORAL at 08:59

## 2020-11-04 RX ADMIN — FINASTERIDE 5 MG: 5 TABLET, FILM COATED ORAL at 08:59

## 2020-11-04 RX ADMIN — STANDARDIZED SENNA CONCENTRATE 17.2 MG: 8.6 TABLET ORAL at 21:04

## 2020-11-04 RX ADMIN — METOPROLOL TARTRATE 25 MG: 25 TABLET, FILM COATED ORAL at 09:00

## 2020-11-04 RX ADMIN — ATORVASTATIN CALCIUM 40 MG: 40 TABLET, FILM COATED ORAL at 17:37

## 2020-11-04 RX ADMIN — DOCUSATE SODIUM 100 MG: 100 CAPSULE ORAL at 08:59

## 2020-11-04 RX ADMIN — DOCUSATE SODIUM 100 MG: 100 CAPSULE ORAL at 17:37

## 2020-11-04 RX ADMIN — GUAIFENESIN 600 MG: 600 TABLET, EXTENDED RELEASE ORAL at 08:59

## 2020-11-04 RX ADMIN — APIXABAN 5 MG: 5 TABLET, FILM COATED ORAL at 08:59

## 2020-11-04 RX ADMIN — MELATONIN 3 MG: at 21:04

## 2020-11-04 RX ADMIN — METOPROLOL TARTRATE 25 MG: 25 TABLET, FILM COATED ORAL at 21:04

## 2020-11-04 RX ADMIN — GUAIFENESIN 600 MG: 600 TABLET, EXTENDED RELEASE ORAL at 21:04

## 2020-11-04 RX ADMIN — APIXABAN 5 MG: 5 TABLET, FILM COATED ORAL at 17:37

## 2020-11-05 PROCEDURE — 99231 SBSQ HOSP IP/OBS SF/LOW 25: CPT | Performed by: STUDENT IN AN ORGANIZED HEALTH CARE EDUCATION/TRAINING PROGRAM

## 2020-11-05 PROCEDURE — 97110 THERAPEUTIC EXERCISES: CPT

## 2020-11-05 PROCEDURE — 97530 THERAPEUTIC ACTIVITIES: CPT

## 2020-11-05 PROCEDURE — 97129 THER IVNTJ 1ST 15 MIN: CPT

## 2020-11-05 PROCEDURE — 97130 THER IVNTJ EA ADDL 15 MIN: CPT

## 2020-11-05 PROCEDURE — 97535 SELF CARE MNGMENT TRAINING: CPT

## 2020-11-05 RX ADMIN — AMLODIPINE BESYLATE 5 MG: 5 TABLET ORAL at 08:54

## 2020-11-05 RX ADMIN — APIXABAN 5 MG: 5 TABLET, FILM COATED ORAL at 17:00

## 2020-11-05 RX ADMIN — GUAIFENESIN 600 MG: 600 TABLET, EXTENDED RELEASE ORAL at 08:54

## 2020-11-05 RX ADMIN — ATORVASTATIN CALCIUM 40 MG: 40 TABLET, FILM COATED ORAL at 17:00

## 2020-11-05 RX ADMIN — DOCUSATE SODIUM 100 MG: 100 CAPSULE ORAL at 08:54

## 2020-11-05 RX ADMIN — GUAIFENESIN 600 MG: 600 TABLET, EXTENDED RELEASE ORAL at 22:17

## 2020-11-05 RX ADMIN — MELATONIN 3 MG: at 22:17

## 2020-11-05 RX ADMIN — FINASTERIDE 5 MG: 5 TABLET, FILM COATED ORAL at 08:54

## 2020-11-05 RX ADMIN — METOPROLOL TARTRATE 25 MG: 25 TABLET, FILM COATED ORAL at 22:17

## 2020-11-05 RX ADMIN — METOPROLOL TARTRATE 25 MG: 25 TABLET, FILM COATED ORAL at 08:54

## 2020-11-05 RX ADMIN — APIXABAN 5 MG: 5 TABLET, FILM COATED ORAL at 08:54

## 2020-11-06 ENCOUNTER — TRANSCRIBE ORDERS (OUTPATIENT)
Dept: NEUROSURGERY | Facility: CLINIC | Age: 80
End: 2020-11-06

## 2020-11-06 DIAGNOSIS — R93.89 ABNORMAL COMPUTED TOMOGRAPHY ANGIOGRAPHY (CTA): Primary | ICD-10-CM

## 2020-11-06 LAB
ANION GAP SERPL CALCULATED.3IONS-SCNC: 4 MMOL/L (ref 4–13)
BASOPHILS # BLD AUTO: 0.04 THOUSANDS/ΜL (ref 0–0.1)
BASOPHILS NFR BLD AUTO: 1 % (ref 0–1)
BUN SERPL-MCNC: 29 MG/DL (ref 5–25)
CALCIUM SERPL-MCNC: 9.1 MG/DL (ref 8.3–10.1)
CHLORIDE SERPL-SCNC: 107 MMOL/L (ref 100–108)
CO2 SERPL-SCNC: 27 MMOL/L (ref 21–32)
CREAT SERPL-MCNC: 1.53 MG/DL (ref 0.6–1.3)
EOSINOPHIL # BLD AUTO: 0.29 THOUSAND/ΜL (ref 0–0.61)
EOSINOPHIL NFR BLD AUTO: 4 % (ref 0–6)
ERYTHROCYTE [DISTWIDTH] IN BLOOD BY AUTOMATED COUNT: 14.2 % (ref 11.6–15.1)
GFR SERPL CREATININE-BSD FRML MDRD: 43 ML/MIN/1.73SQ M
GLUCOSE SERPL-MCNC: 86 MG/DL (ref 65–140)
HCT VFR BLD AUTO: 43.2 % (ref 36.5–49.3)
HGB BLD-MCNC: 14.2 G/DL (ref 12–17)
IMM GRANULOCYTES # BLD AUTO: 0.03 THOUSAND/UL (ref 0–0.2)
IMM GRANULOCYTES NFR BLD AUTO: 0 % (ref 0–2)
LYMPHOCYTES # BLD AUTO: 1.74 THOUSANDS/ΜL (ref 0.6–4.47)
LYMPHOCYTES NFR BLD AUTO: 23 % (ref 14–44)
MCH RBC QN AUTO: 28.7 PG (ref 26.8–34.3)
MCHC RBC AUTO-ENTMCNC: 32.9 G/DL (ref 31.4–37.4)
MCV RBC AUTO: 87 FL (ref 82–98)
MONOCYTES # BLD AUTO: 1.08 THOUSAND/ΜL (ref 0.17–1.22)
MONOCYTES NFR BLD AUTO: 14 % (ref 4–12)
NEUTROPHILS # BLD AUTO: 4.47 THOUSANDS/ΜL (ref 1.85–7.62)
NEUTS SEG NFR BLD AUTO: 58 % (ref 43–75)
NRBC BLD AUTO-RTO: 0 /100 WBCS
PLATELET # BLD AUTO: 186 THOUSANDS/UL (ref 149–390)
PMV BLD AUTO: 10.1 FL (ref 8.9–12.7)
POTASSIUM SERPL-SCNC: 4.1 MMOL/L (ref 3.5–5.3)
RBC # BLD AUTO: 4.94 MILLION/UL (ref 3.88–5.62)
SODIUM SERPL-SCNC: 138 MMOL/L (ref 136–145)
WBC # BLD AUTO: 7.65 THOUSAND/UL (ref 4.31–10.16)

## 2020-11-06 PROCEDURE — 97112 NEUROMUSCULAR REEDUCATION: CPT

## 2020-11-06 PROCEDURE — 97110 THERAPEUTIC EXERCISES: CPT

## 2020-11-06 PROCEDURE — 80048 BASIC METABOLIC PNL TOTAL CA: CPT | Performed by: NURSE PRACTITIONER

## 2020-11-06 PROCEDURE — 97129 THER IVNTJ 1ST 15 MIN: CPT

## 2020-11-06 PROCEDURE — 97130 THER IVNTJ EA ADDL 15 MIN: CPT

## 2020-11-06 PROCEDURE — 85025 COMPLETE CBC W/AUTO DIFF WBC: CPT | Performed by: NURSE PRACTITIONER

## 2020-11-06 PROCEDURE — 97530 THERAPEUTIC ACTIVITIES: CPT

## 2020-11-06 PROCEDURE — 97535 SELF CARE MNGMENT TRAINING: CPT

## 2020-11-06 PROCEDURE — 99231 SBSQ HOSP IP/OBS SF/LOW 25: CPT | Performed by: STUDENT IN AN ORGANIZED HEALTH CARE EDUCATION/TRAINING PROGRAM

## 2020-11-06 RX ORDER — SENNOSIDES 8.6 MG
1 TABLET ORAL
Status: COMPLETED | OUTPATIENT
Start: 2020-11-06 | End: 2020-11-06

## 2020-11-06 RX ORDER — AMLODIPINE BESYLATE 2.5 MG/1
2.5 TABLET ORAL DAILY
Status: DISCONTINUED | OUTPATIENT
Start: 2020-11-07 | End: 2020-11-09

## 2020-11-06 RX ADMIN — METOPROLOL TARTRATE 25 MG: 25 TABLET, FILM COATED ORAL at 09:04

## 2020-11-06 RX ADMIN — METOPROLOL TARTRATE 25 MG: 25 TABLET, FILM COATED ORAL at 21:00

## 2020-11-06 RX ADMIN — GUAIFENESIN 600 MG: 600 TABLET, EXTENDED RELEASE ORAL at 09:04

## 2020-11-06 RX ADMIN — AMLODIPINE BESYLATE 5 MG: 5 TABLET ORAL at 09:04

## 2020-11-06 RX ADMIN — GUAIFENESIN 600 MG: 600 TABLET, EXTENDED RELEASE ORAL at 21:00

## 2020-11-06 RX ADMIN — STANDARDIZED SENNA CONCENTRATE 8.6 MG: 8.6 TABLET ORAL at 21:04

## 2020-11-06 RX ADMIN — MELATONIN 3 MG: at 21:00

## 2020-11-06 RX ADMIN — APIXABAN 5 MG: 5 TABLET, FILM COATED ORAL at 09:04

## 2020-11-06 RX ADMIN — FINASTERIDE 5 MG: 5 TABLET, FILM COATED ORAL at 09:04

## 2020-11-06 RX ADMIN — APIXABAN 5 MG: 5 TABLET, FILM COATED ORAL at 17:33

## 2020-11-06 RX ADMIN — ATORVASTATIN CALCIUM 40 MG: 40 TABLET, FILM COATED ORAL at 17:33

## 2020-11-07 PROCEDURE — 99232 SBSQ HOSP IP/OBS MODERATE 35: CPT | Performed by: PHYSICAL MEDICINE & REHABILITATION

## 2020-11-07 RX ADMIN — APIXABAN 5 MG: 5 TABLET, FILM COATED ORAL at 17:01

## 2020-11-07 RX ADMIN — MELATONIN 3 MG: at 21:35

## 2020-11-07 RX ADMIN — METOPROLOL TARTRATE 25 MG: 25 TABLET, FILM COATED ORAL at 08:01

## 2020-11-07 RX ADMIN — AMLODIPINE BESYLATE 2.5 MG: 2.5 TABLET ORAL at 08:01

## 2020-11-07 RX ADMIN — GUAIFENESIN 600 MG: 600 TABLET, EXTENDED RELEASE ORAL at 21:36

## 2020-11-07 RX ADMIN — FINASTERIDE 5 MG: 5 TABLET, FILM COATED ORAL at 08:01

## 2020-11-07 RX ADMIN — GUAIFENESIN 600 MG: 600 TABLET, EXTENDED RELEASE ORAL at 08:01

## 2020-11-07 RX ADMIN — METOPROLOL TARTRATE 25 MG: 25 TABLET, FILM COATED ORAL at 21:35

## 2020-11-07 RX ADMIN — ATORVASTATIN CALCIUM 40 MG: 40 TABLET, FILM COATED ORAL at 17:01

## 2020-11-07 RX ADMIN — APIXABAN 5 MG: 5 TABLET, FILM COATED ORAL at 08:01

## 2020-11-08 PROCEDURE — 97130 THER IVNTJ EA ADDL 15 MIN: CPT

## 2020-11-08 PROCEDURE — 97129 THER IVNTJ 1ST 15 MIN: CPT

## 2020-11-08 PROCEDURE — 97530 THERAPEUTIC ACTIVITIES: CPT

## 2020-11-08 PROCEDURE — 97110 THERAPEUTIC EXERCISES: CPT

## 2020-11-08 RX ADMIN — METOPROLOL TARTRATE 25 MG: 25 TABLET, FILM COATED ORAL at 22:00

## 2020-11-08 RX ADMIN — ATORVASTATIN CALCIUM 40 MG: 40 TABLET, FILM COATED ORAL at 16:54

## 2020-11-08 RX ADMIN — MELATONIN 3 MG: at 22:00

## 2020-11-08 RX ADMIN — METOPROLOL TARTRATE 25 MG: 25 TABLET, FILM COATED ORAL at 08:11

## 2020-11-08 RX ADMIN — FINASTERIDE 5 MG: 5 TABLET, FILM COATED ORAL at 08:12

## 2020-11-08 RX ADMIN — GUAIFENESIN 600 MG: 600 TABLET, EXTENDED RELEASE ORAL at 22:00

## 2020-11-08 RX ADMIN — GUAIFENESIN 600 MG: 600 TABLET, EXTENDED RELEASE ORAL at 08:12

## 2020-11-08 RX ADMIN — APIXABAN 5 MG: 5 TABLET, FILM COATED ORAL at 17:00

## 2020-11-08 RX ADMIN — APIXABAN 5 MG: 5 TABLET, FILM COATED ORAL at 08:12

## 2020-11-08 RX ADMIN — AMLODIPINE BESYLATE 2.5 MG: 2.5 TABLET ORAL at 08:12

## 2020-11-09 ENCOUNTER — PATIENT OUTREACH (OUTPATIENT)
Dept: CASE MANAGEMENT | Facility: OTHER | Age: 80
End: 2020-11-09

## 2020-11-09 LAB
ANION GAP SERPL CALCULATED.3IONS-SCNC: 4 MMOL/L (ref 4–13)
BASOPHILS # BLD AUTO: 0.07 THOUSANDS/ΜL (ref 0–0.1)
BASOPHILS NFR BLD AUTO: 1 % (ref 0–1)
BUN SERPL-MCNC: 23 MG/DL (ref 5–25)
CALCIUM SERPL-MCNC: 9.3 MG/DL (ref 8.3–10.1)
CHLORIDE SERPL-SCNC: 107 MMOL/L (ref 100–108)
CO2 SERPL-SCNC: 26 MMOL/L (ref 21–32)
CREAT SERPL-MCNC: 1.44 MG/DL (ref 0.6–1.3)
EOSINOPHIL # BLD AUTO: 0.3 THOUSAND/ΜL (ref 0–0.61)
EOSINOPHIL NFR BLD AUTO: 4 % (ref 0–6)
ERYTHROCYTE [DISTWIDTH] IN BLOOD BY AUTOMATED COUNT: 14.2 % (ref 11.6–15.1)
GFR SERPL CREATININE-BSD FRML MDRD: 46 ML/MIN/1.73SQ M
GLUCOSE P FAST SERPL-MCNC: 87 MG/DL (ref 65–99)
GLUCOSE SERPL-MCNC: 87 MG/DL (ref 65–140)
HCT VFR BLD AUTO: 45.4 % (ref 36.5–49.3)
HGB BLD-MCNC: 14.7 G/DL (ref 12–17)
IMM GRANULOCYTES # BLD AUTO: 0.03 THOUSAND/UL (ref 0–0.2)
IMM GRANULOCYTES NFR BLD AUTO: 0 % (ref 0–2)
LYMPHOCYTES # BLD AUTO: 2.01 THOUSANDS/ΜL (ref 0.6–4.47)
LYMPHOCYTES NFR BLD AUTO: 26 % (ref 14–44)
MCH RBC QN AUTO: 28.9 PG (ref 26.8–34.3)
MCHC RBC AUTO-ENTMCNC: 32.4 G/DL (ref 31.4–37.4)
MCV RBC AUTO: 89 FL (ref 82–98)
MONOCYTES # BLD AUTO: 0.88 THOUSAND/ΜL (ref 0.17–1.22)
MONOCYTES NFR BLD AUTO: 12 % (ref 4–12)
NEUTROPHILS # BLD AUTO: 4.31 THOUSANDS/ΜL (ref 1.85–7.62)
NEUTS SEG NFR BLD AUTO: 57 % (ref 43–75)
NRBC BLD AUTO-RTO: 0 /100 WBCS
PLATELET # BLD AUTO: 203 THOUSANDS/UL (ref 149–390)
PMV BLD AUTO: 10.2 FL (ref 8.9–12.7)
POTASSIUM SERPL-SCNC: 4.2 MMOL/L (ref 3.5–5.3)
RBC # BLD AUTO: 5.09 MILLION/UL (ref 3.88–5.62)
SODIUM SERPL-SCNC: 137 MMOL/L (ref 136–145)
WBC # BLD AUTO: 7.6 THOUSAND/UL (ref 4.31–10.16)

## 2020-11-09 PROCEDURE — 97530 THERAPEUTIC ACTIVITIES: CPT

## 2020-11-09 PROCEDURE — 85025 COMPLETE CBC W/AUTO DIFF WBC: CPT | Performed by: NURSE PRACTITIONER

## 2020-11-09 PROCEDURE — 97112 NEUROMUSCULAR REEDUCATION: CPT

## 2020-11-09 PROCEDURE — 97130 THER IVNTJ EA ADDL 15 MIN: CPT

## 2020-11-09 PROCEDURE — 80048 BASIC METABOLIC PNL TOTAL CA: CPT | Performed by: NURSE PRACTITIONER

## 2020-11-09 PROCEDURE — 97110 THERAPEUTIC EXERCISES: CPT

## 2020-11-09 PROCEDURE — 97129 THER IVNTJ 1ST 15 MIN: CPT

## 2020-11-09 PROCEDURE — 97535 SELF CARE MNGMENT TRAINING: CPT

## 2020-11-09 RX ORDER — POLYETHYLENE GLYCOL 3350 17 G/17G
17 POWDER, FOR SOLUTION ORAL DAILY PRN
Status: DISCONTINUED | OUTPATIENT
Start: 2020-11-09 | End: 2020-11-17 | Stop reason: HOSPADM

## 2020-11-09 RX ORDER — BISACODYL 10 MG
10 SUPPOSITORY, RECTAL RECTAL DAILY PRN
Status: DISCONTINUED | OUTPATIENT
Start: 2020-11-09 | End: 2020-11-17 | Stop reason: HOSPADM

## 2020-11-09 RX ADMIN — DOCUSATE SODIUM 100 MG: 100 CAPSULE ORAL at 18:10

## 2020-11-09 RX ADMIN — METOPROLOL TARTRATE 25 MG: 25 TABLET, FILM COATED ORAL at 21:42

## 2020-11-09 RX ADMIN — APIXABAN 5 MG: 5 TABLET, FILM COATED ORAL at 18:10

## 2020-11-09 RX ADMIN — ATORVASTATIN CALCIUM 40 MG: 40 TABLET, FILM COATED ORAL at 16:35

## 2020-11-09 RX ADMIN — FINASTERIDE 5 MG: 5 TABLET, FILM COATED ORAL at 08:49

## 2020-11-09 RX ADMIN — POLYETHYLENE GLYCOL 3350 17 G: 17 POWDER, FOR SOLUTION ORAL at 12:55

## 2020-11-09 RX ADMIN — GUAIFENESIN 600 MG: 600 TABLET, EXTENDED RELEASE ORAL at 08:49

## 2020-11-09 RX ADMIN — MELATONIN 3 MG: at 21:42

## 2020-11-09 RX ADMIN — APIXABAN 5 MG: 5 TABLET, FILM COATED ORAL at 08:49

## 2020-11-09 RX ADMIN — GUAIFENESIN 600 MG: 600 TABLET, EXTENDED RELEASE ORAL at 21:42

## 2020-11-10 PROCEDURE — 97110 THERAPEUTIC EXERCISES: CPT

## 2020-11-10 PROCEDURE — 97530 THERAPEUTIC ACTIVITIES: CPT

## 2020-11-10 PROCEDURE — 97535 SELF CARE MNGMENT TRAINING: CPT

## 2020-11-10 PROCEDURE — 97130 THER IVNTJ EA ADDL 15 MIN: CPT

## 2020-11-10 PROCEDURE — 99233 SBSQ HOSP IP/OBS HIGH 50: CPT | Performed by: STUDENT IN AN ORGANIZED HEALTH CARE EDUCATION/TRAINING PROGRAM

## 2020-11-10 PROCEDURE — 97129 THER IVNTJ 1ST 15 MIN: CPT

## 2020-11-10 RX ORDER — SENNOSIDES 8.6 MG
1 TABLET ORAL DAILY
Status: DISCONTINUED | OUTPATIENT
Start: 2020-11-10 | End: 2020-11-17 | Stop reason: HOSPADM

## 2020-11-10 RX ADMIN — DOCUSATE SODIUM 100 MG: 100 CAPSULE ORAL at 08:37

## 2020-11-10 RX ADMIN — MELATONIN 3 MG: at 22:26

## 2020-11-10 RX ADMIN — ATORVASTATIN CALCIUM 40 MG: 40 TABLET, FILM COATED ORAL at 16:43

## 2020-11-10 RX ADMIN — GUAIFENESIN 600 MG: 600 TABLET, EXTENDED RELEASE ORAL at 22:26

## 2020-11-10 RX ADMIN — APIXABAN 5 MG: 5 TABLET, FILM COATED ORAL at 16:42

## 2020-11-10 RX ADMIN — DOCUSATE SODIUM 100 MG: 100 CAPSULE ORAL at 16:43

## 2020-11-10 RX ADMIN — GUAIFENESIN 600 MG: 600 TABLET, EXTENDED RELEASE ORAL at 08:37

## 2020-11-10 RX ADMIN — FINASTERIDE 5 MG: 5 TABLET, FILM COATED ORAL at 08:37

## 2020-11-10 RX ADMIN — APIXABAN 5 MG: 5 TABLET, FILM COATED ORAL at 08:37

## 2020-11-10 RX ADMIN — METOPROLOL TARTRATE 25 MG: 25 TABLET, FILM COATED ORAL at 22:26

## 2020-11-10 RX ADMIN — STANDARDIZED SENNA CONCENTRATE 8.6 MG: 8.6 TABLET ORAL at 16:43

## 2020-11-10 RX ADMIN — METOPROLOL TARTRATE 25 MG: 25 TABLET, FILM COATED ORAL at 08:37

## 2020-11-11 PROCEDURE — 97130 THER IVNTJ EA ADDL 15 MIN: CPT

## 2020-11-11 PROCEDURE — 97110 THERAPEUTIC EXERCISES: CPT

## 2020-11-11 PROCEDURE — 97530 THERAPEUTIC ACTIVITIES: CPT

## 2020-11-11 PROCEDURE — 97535 SELF CARE MNGMENT TRAINING: CPT

## 2020-11-11 PROCEDURE — 99231 SBSQ HOSP IP/OBS SF/LOW 25: CPT | Performed by: STUDENT IN AN ORGANIZED HEALTH CARE EDUCATION/TRAINING PROGRAM

## 2020-11-11 PROCEDURE — 97129 THER IVNTJ 1ST 15 MIN: CPT

## 2020-11-11 PROCEDURE — 97112 NEUROMUSCULAR REEDUCATION: CPT

## 2020-11-11 RX ADMIN — ACETAMINOPHEN 650 MG: 325 TABLET, FILM COATED ORAL at 21:06

## 2020-11-11 RX ADMIN — APIXABAN 5 MG: 5 TABLET, FILM COATED ORAL at 08:51

## 2020-11-11 RX ADMIN — ATORVASTATIN CALCIUM 40 MG: 40 TABLET, FILM COATED ORAL at 16:39

## 2020-11-11 RX ADMIN — DOCUSATE SODIUM 100 MG: 100 CAPSULE ORAL at 18:13

## 2020-11-11 RX ADMIN — METOPROLOL TARTRATE 25 MG: 25 TABLET, FILM COATED ORAL at 08:52

## 2020-11-11 RX ADMIN — STANDARDIZED SENNA CONCENTRATE 8.6 MG: 8.6 TABLET ORAL at 08:52

## 2020-11-11 RX ADMIN — GUAIFENESIN 600 MG: 600 TABLET, EXTENDED RELEASE ORAL at 08:51

## 2020-11-11 RX ADMIN — FINASTERIDE 5 MG: 5 TABLET, FILM COATED ORAL at 08:51

## 2020-11-11 RX ADMIN — APIXABAN 5 MG: 5 TABLET, FILM COATED ORAL at 18:13

## 2020-11-11 RX ADMIN — DOCUSATE SODIUM 100 MG: 100 CAPSULE ORAL at 08:51

## 2020-11-11 RX ADMIN — GUAIFENESIN 600 MG: 600 TABLET, EXTENDED RELEASE ORAL at 21:07

## 2020-11-11 RX ADMIN — POLYETHYLENE GLYCOL 3350 17 G: 17 POWDER, FOR SOLUTION ORAL at 18:15

## 2020-11-11 RX ADMIN — METOPROLOL TARTRATE 25 MG: 25 TABLET, FILM COATED ORAL at 21:07

## 2020-11-11 RX ADMIN — MELATONIN 3 MG: at 21:06

## 2020-11-12 LAB
ANION GAP SERPL CALCULATED.3IONS-SCNC: 3 MMOL/L (ref 4–13)
BASOPHILS # BLD AUTO: 0.06 THOUSANDS/ΜL (ref 0–0.1)
BASOPHILS NFR BLD AUTO: 1 % (ref 0–1)
BUN SERPL-MCNC: 26 MG/DL (ref 5–25)
CALCIUM SERPL-MCNC: 9 MG/DL (ref 8.3–10.1)
CHLORIDE SERPL-SCNC: 110 MMOL/L (ref 100–108)
CO2 SERPL-SCNC: 28 MMOL/L (ref 21–32)
CREAT SERPL-MCNC: 1.42 MG/DL (ref 0.6–1.3)
EOSINOPHIL # BLD AUTO: 0.28 THOUSAND/ΜL (ref 0–0.61)
EOSINOPHIL NFR BLD AUTO: 4 % (ref 0–6)
ERYTHROCYTE [DISTWIDTH] IN BLOOD BY AUTOMATED COUNT: 14.4 % (ref 11.6–15.1)
GFR SERPL CREATININE-BSD FRML MDRD: 47 ML/MIN/1.73SQ M
GLUCOSE P FAST SERPL-MCNC: 85 MG/DL (ref 65–99)
GLUCOSE SERPL-MCNC: 85 MG/DL (ref 65–140)
HCT VFR BLD AUTO: 41.4 % (ref 36.5–49.3)
HGB BLD-MCNC: 13.4 G/DL (ref 12–17)
IMM GRANULOCYTES # BLD AUTO: 0.03 THOUSAND/UL (ref 0–0.2)
IMM GRANULOCYTES NFR BLD AUTO: 0 % (ref 0–2)
LYMPHOCYTES # BLD AUTO: 2.04 THOUSANDS/ΜL (ref 0.6–4.47)
LYMPHOCYTES NFR BLD AUTO: 26 % (ref 14–44)
MCH RBC QN AUTO: 28.5 PG (ref 26.8–34.3)
MCHC RBC AUTO-ENTMCNC: 32.4 G/DL (ref 31.4–37.4)
MCV RBC AUTO: 88 FL (ref 82–98)
MONOCYTES # BLD AUTO: 0.87 THOUSAND/ΜL (ref 0.17–1.22)
MONOCYTES NFR BLD AUTO: 11 % (ref 4–12)
NEUTROPHILS # BLD AUTO: 4.48 THOUSANDS/ΜL (ref 1.85–7.62)
NEUTS SEG NFR BLD AUTO: 58 % (ref 43–75)
NRBC BLD AUTO-RTO: 0 /100 WBCS
PLATELET # BLD AUTO: 206 THOUSANDS/UL (ref 149–390)
PMV BLD AUTO: 10.3 FL (ref 8.9–12.7)
POTASSIUM SERPL-SCNC: 4.1 MMOL/L (ref 3.5–5.3)
RBC # BLD AUTO: 4.7 MILLION/UL (ref 3.88–5.62)
SODIUM SERPL-SCNC: 141 MMOL/L (ref 136–145)
WBC # BLD AUTO: 7.76 THOUSAND/UL (ref 4.31–10.16)

## 2020-11-12 PROCEDURE — 80048 BASIC METABOLIC PNL TOTAL CA: CPT | Performed by: NURSE PRACTITIONER

## 2020-11-12 PROCEDURE — 97110 THERAPEUTIC EXERCISES: CPT

## 2020-11-12 PROCEDURE — 97530 THERAPEUTIC ACTIVITIES: CPT

## 2020-11-12 PROCEDURE — 97112 NEUROMUSCULAR REEDUCATION: CPT

## 2020-11-12 PROCEDURE — 97129 THER IVNTJ 1ST 15 MIN: CPT

## 2020-11-12 PROCEDURE — 85025 COMPLETE CBC W/AUTO DIFF WBC: CPT | Performed by: NURSE PRACTITIONER

## 2020-11-12 PROCEDURE — 97130 THER IVNTJ EA ADDL 15 MIN: CPT

## 2020-11-12 PROCEDURE — 99231 SBSQ HOSP IP/OBS SF/LOW 25: CPT | Performed by: STUDENT IN AN ORGANIZED HEALTH CARE EDUCATION/TRAINING PROGRAM

## 2020-11-12 RX ADMIN — ATORVASTATIN CALCIUM 40 MG: 40 TABLET, FILM COATED ORAL at 17:49

## 2020-11-12 RX ADMIN — DOCUSATE SODIUM 100 MG: 100 CAPSULE ORAL at 10:12

## 2020-11-12 RX ADMIN — METOPROLOL TARTRATE 25 MG: 25 TABLET, FILM COATED ORAL at 10:12

## 2020-11-12 RX ADMIN — APIXABAN 5 MG: 5 TABLET, FILM COATED ORAL at 17:50

## 2020-11-12 RX ADMIN — FINASTERIDE 5 MG: 5 TABLET, FILM COATED ORAL at 10:12

## 2020-11-12 RX ADMIN — METOPROLOL TARTRATE 25 MG: 25 TABLET, FILM COATED ORAL at 21:40

## 2020-11-12 RX ADMIN — GUAIFENESIN 600 MG: 600 TABLET, EXTENDED RELEASE ORAL at 10:12

## 2020-11-12 RX ADMIN — POLYETHYLENE GLYCOL 3350 17 G: 17 POWDER, FOR SOLUTION ORAL at 10:13

## 2020-11-12 RX ADMIN — APIXABAN 5 MG: 5 TABLET, FILM COATED ORAL at 10:12

## 2020-11-12 RX ADMIN — MELATONIN 3 MG: at 21:40

## 2020-11-12 RX ADMIN — GUAIFENESIN 600 MG: 600 TABLET, EXTENDED RELEASE ORAL at 21:40

## 2020-11-12 RX ADMIN — STANDARDIZED SENNA CONCENTRATE 8.6 MG: 8.6 TABLET ORAL at 10:13

## 2020-11-12 RX ADMIN — DOCUSATE SODIUM 100 MG: 100 CAPSULE ORAL at 17:50

## 2020-11-13 PROCEDURE — 97110 THERAPEUTIC EXERCISES: CPT

## 2020-11-13 PROCEDURE — 97530 THERAPEUTIC ACTIVITIES: CPT

## 2020-11-13 PROCEDURE — 97129 THER IVNTJ 1ST 15 MIN: CPT

## 2020-11-13 PROCEDURE — 97112 NEUROMUSCULAR REEDUCATION: CPT

## 2020-11-13 PROCEDURE — 97535 SELF CARE MNGMENT TRAINING: CPT

## 2020-11-13 PROCEDURE — 97130 THER IVNTJ EA ADDL 15 MIN: CPT

## 2020-11-13 RX ADMIN — DOCUSATE SODIUM 100 MG: 100 CAPSULE ORAL at 09:44

## 2020-11-13 RX ADMIN — APIXABAN 5 MG: 5 TABLET, FILM COATED ORAL at 18:23

## 2020-11-13 RX ADMIN — GUAIFENESIN 600 MG: 600 TABLET, EXTENDED RELEASE ORAL at 21:20

## 2020-11-13 RX ADMIN — METOPROLOL TARTRATE 25 MG: 25 TABLET, FILM COATED ORAL at 09:44

## 2020-11-13 RX ADMIN — METOPROLOL TARTRATE 25 MG: 25 TABLET, FILM COATED ORAL at 21:21

## 2020-11-13 RX ADMIN — MELATONIN 3 MG: at 21:21

## 2020-11-13 RX ADMIN — APIXABAN 5 MG: 5 TABLET, FILM COATED ORAL at 09:44

## 2020-11-13 RX ADMIN — GUAIFENESIN 600 MG: 600 TABLET, EXTENDED RELEASE ORAL at 09:44

## 2020-11-13 RX ADMIN — FINASTERIDE 5 MG: 5 TABLET, FILM COATED ORAL at 09:44

## 2020-11-13 RX ADMIN — ATORVASTATIN CALCIUM 40 MG: 40 TABLET, FILM COATED ORAL at 16:49

## 2020-11-14 PROCEDURE — 97130 THER IVNTJ EA ADDL 15 MIN: CPT

## 2020-11-14 PROCEDURE — 99231 SBSQ HOSP IP/OBS SF/LOW 25: CPT | Performed by: STUDENT IN AN ORGANIZED HEALTH CARE EDUCATION/TRAINING PROGRAM

## 2020-11-14 PROCEDURE — 97129 THER IVNTJ 1ST 15 MIN: CPT

## 2020-11-14 RX ORDER — GUAIFENESIN 600 MG
600 TABLET, EXTENDED RELEASE 12 HR ORAL EVERY 12 HOURS PRN
Status: DISCONTINUED | OUTPATIENT
Start: 2020-11-14 | End: 2020-11-17 | Stop reason: HOSPADM

## 2020-11-14 RX ADMIN — FINASTERIDE 5 MG: 5 TABLET, FILM COATED ORAL at 08:58

## 2020-11-14 RX ADMIN — APIXABAN 5 MG: 5 TABLET, FILM COATED ORAL at 08:58

## 2020-11-14 RX ADMIN — GUAIFENESIN 600 MG: 600 TABLET, EXTENDED RELEASE ORAL at 08:58

## 2020-11-14 RX ADMIN — APIXABAN 5 MG: 5 TABLET, FILM COATED ORAL at 17:29

## 2020-11-14 RX ADMIN — STANDARDIZED SENNA CONCENTRATE 8.6 MG: 8.6 TABLET ORAL at 08:57

## 2020-11-14 RX ADMIN — ATORVASTATIN CALCIUM 40 MG: 40 TABLET, FILM COATED ORAL at 17:29

## 2020-11-14 RX ADMIN — METOPROLOL TARTRATE 25 MG: 25 TABLET, FILM COATED ORAL at 08:58

## 2020-11-14 RX ADMIN — DOCUSATE SODIUM 100 MG: 100 CAPSULE ORAL at 08:57

## 2020-11-15 PROCEDURE — 97530 THERAPEUTIC ACTIVITIES: CPT

## 2020-11-15 RX ADMIN — ATORVASTATIN CALCIUM 40 MG: 40 TABLET, FILM COATED ORAL at 16:42

## 2020-11-15 RX ADMIN — FINASTERIDE 5 MG: 5 TABLET, FILM COATED ORAL at 09:32

## 2020-11-15 RX ADMIN — APIXABAN 5 MG: 5 TABLET, FILM COATED ORAL at 09:32

## 2020-11-15 RX ADMIN — APIXABAN 5 MG: 5 TABLET, FILM COATED ORAL at 17:32

## 2020-11-15 RX ADMIN — METOPROLOL TARTRATE 25 MG: 25 TABLET, FILM COATED ORAL at 21:02

## 2020-11-15 RX ADMIN — MELATONIN 3 MG: at 21:02

## 2020-11-15 RX ADMIN — METOPROLOL TARTRATE 25 MG: 25 TABLET, FILM COATED ORAL at 09:32

## 2020-11-16 ENCOUNTER — PATIENT OUTREACH (OUTPATIENT)
Dept: CASE MANAGEMENT | Facility: OTHER | Age: 80
End: 2020-11-16

## 2020-11-16 LAB
ANION GAP SERPL CALCULATED.3IONS-SCNC: 4 MMOL/L (ref 4–13)
BASOPHILS # BLD AUTO: 0.06 THOUSANDS/ΜL (ref 0–0.1)
BASOPHILS NFR BLD AUTO: 1 % (ref 0–1)
BUN SERPL-MCNC: 25 MG/DL (ref 5–25)
CALCIUM SERPL-MCNC: 9.3 MG/DL (ref 8.3–10.1)
CHLORIDE SERPL-SCNC: 113 MMOL/L (ref 100–108)
CO2 SERPL-SCNC: 26 MMOL/L (ref 21–32)
CREAT SERPL-MCNC: 1.42 MG/DL (ref 0.6–1.3)
EOSINOPHIL # BLD AUTO: 0.29 THOUSAND/ΜL (ref 0–0.61)
EOSINOPHIL NFR BLD AUTO: 4 % (ref 0–6)
ERYTHROCYTE [DISTWIDTH] IN BLOOD BY AUTOMATED COUNT: 14.6 % (ref 11.6–15.1)
GFR SERPL CREATININE-BSD FRML MDRD: 47 ML/MIN/1.73SQ M
GLUCOSE SERPL-MCNC: 93 MG/DL (ref 65–140)
HCT VFR BLD AUTO: 42.8 % (ref 36.5–49.3)
HGB BLD-MCNC: 13.8 G/DL (ref 12–17)
IMM GRANULOCYTES # BLD AUTO: 0.02 THOUSAND/UL (ref 0–0.2)
IMM GRANULOCYTES NFR BLD AUTO: 0 % (ref 0–2)
LYMPHOCYTES # BLD AUTO: 1.86 THOUSANDS/ΜL (ref 0.6–4.47)
LYMPHOCYTES NFR BLD AUTO: 24 % (ref 14–44)
MCH RBC QN AUTO: 28.5 PG (ref 26.8–34.3)
MCHC RBC AUTO-ENTMCNC: 32.2 G/DL (ref 31.4–37.4)
MCV RBC AUTO: 88 FL (ref 82–98)
MONOCYTES # BLD AUTO: 0.96 THOUSAND/ΜL (ref 0.17–1.22)
MONOCYTES NFR BLD AUTO: 12 % (ref 4–12)
NEUTROPHILS # BLD AUTO: 4.66 THOUSANDS/ΜL (ref 1.85–7.62)
NEUTS SEG NFR BLD AUTO: 59 % (ref 43–75)
NRBC BLD AUTO-RTO: 0 /100 WBCS
PLATELET # BLD AUTO: 203 THOUSANDS/UL (ref 149–390)
PMV BLD AUTO: 10.6 FL (ref 8.9–12.7)
POTASSIUM SERPL-SCNC: 4.1 MMOL/L (ref 3.5–5.3)
RBC # BLD AUTO: 4.85 MILLION/UL (ref 3.88–5.62)
SODIUM SERPL-SCNC: 143 MMOL/L (ref 136–145)
WBC # BLD AUTO: 7.85 THOUSAND/UL (ref 4.31–10.16)

## 2020-11-16 PROCEDURE — 80048 BASIC METABOLIC PNL TOTAL CA: CPT | Performed by: NURSE PRACTITIONER

## 2020-11-16 PROCEDURE — 85025 COMPLETE CBC W/AUTO DIFF WBC: CPT | Performed by: NURSE PRACTITIONER

## 2020-11-16 PROCEDURE — 97112 NEUROMUSCULAR REEDUCATION: CPT

## 2020-11-16 PROCEDURE — 97129 THER IVNTJ 1ST 15 MIN: CPT

## 2020-11-16 PROCEDURE — 97110 THERAPEUTIC EXERCISES: CPT

## 2020-11-16 PROCEDURE — 99231 SBSQ HOSP IP/OBS SF/LOW 25: CPT | Performed by: STUDENT IN AN ORGANIZED HEALTH CARE EDUCATION/TRAINING PROGRAM

## 2020-11-16 PROCEDURE — 97130 THER IVNTJ EA ADDL 15 MIN: CPT

## 2020-11-16 PROCEDURE — 97530 THERAPEUTIC ACTIVITIES: CPT

## 2020-11-16 PROCEDURE — 97535 SELF CARE MNGMENT TRAINING: CPT

## 2020-11-16 RX ADMIN — APIXABAN 5 MG: 5 TABLET, FILM COATED ORAL at 08:23

## 2020-11-16 RX ADMIN — METOPROLOL TARTRATE 25 MG: 25 TABLET, FILM COATED ORAL at 22:04

## 2020-11-16 RX ADMIN — APIXABAN 5 MG: 5 TABLET, FILM COATED ORAL at 17:07

## 2020-11-16 RX ADMIN — METOPROLOL TARTRATE 25 MG: 25 TABLET, FILM COATED ORAL at 08:23

## 2020-11-16 RX ADMIN — FINASTERIDE 5 MG: 5 TABLET, FILM COATED ORAL at 08:23

## 2020-11-16 RX ADMIN — ATORVASTATIN CALCIUM 40 MG: 40 TABLET, FILM COATED ORAL at 17:07

## 2020-11-16 RX ADMIN — MELATONIN 3 MG: at 22:04

## 2020-11-17 ENCOUNTER — PATIENT OUTREACH (OUTPATIENT)
Dept: CASE MANAGEMENT | Facility: OTHER | Age: 80
End: 2020-11-17

## 2020-11-17 VITALS
BODY MASS INDEX: 27.85 KG/M2 | OXYGEN SATURATION: 98 % | HEIGHT: 69 IN | SYSTOLIC BLOOD PRESSURE: 128 MMHG | DIASTOLIC BLOOD PRESSURE: 76 MMHG | WEIGHT: 188.05 LBS | HEART RATE: 64 BPM | RESPIRATION RATE: 20 BRPM | TEMPERATURE: 97.7 F

## 2020-11-17 PROCEDURE — 99239 HOSP IP/OBS DSCHRG MGMT >30: CPT | Performed by: STUDENT IN AN ORGANIZED HEALTH CARE EDUCATION/TRAINING PROGRAM

## 2020-11-17 RX ORDER — ATORVASTATIN CALCIUM 40 MG/1
40 TABLET, FILM COATED ORAL
Qty: 30 TABLET | Refills: 1 | Status: SHIPPED | OUTPATIENT
Start: 2020-11-17

## 2020-11-17 RX ORDER — ACETAMINOPHEN 325 MG/1
650 TABLET ORAL EVERY 8 HOURS PRN
Refills: 0
Start: 2020-11-17 | End: 2021-08-01

## 2020-11-17 RX ORDER — LANOLIN ALCOHOL/MO/W.PET/CERES
3 CREAM (GRAM) TOPICAL
Refills: 0
Start: 2020-11-17 | End: 2020-12-07

## 2020-11-17 RX ORDER — FINASTERIDE 5 MG/1
5 TABLET, FILM COATED ORAL DAILY
Qty: 30 TABLET | Refills: 1 | Status: SHIPPED | OUTPATIENT
Start: 2020-11-17 | End: 2021-02-14

## 2020-11-17 RX ADMIN — METOPROLOL TARTRATE 25 MG: 25 TABLET, FILM COATED ORAL at 08:42

## 2020-11-17 RX ADMIN — FINASTERIDE 5 MG: 5 TABLET, FILM COATED ORAL at 08:42

## 2020-11-17 RX ADMIN — APIXABAN 5 MG: 5 TABLET, FILM COATED ORAL at 08:42

## 2020-11-18 ENCOUNTER — PATIENT OUTREACH (OUTPATIENT)
Dept: CASE MANAGEMENT | Facility: OTHER | Age: 80
End: 2020-11-18

## 2020-11-18 ENCOUNTER — TELEPHONE (OUTPATIENT)
Dept: NEUROLOGY | Facility: CLINIC | Age: 80
End: 2020-11-18

## 2020-11-19 ENCOUNTER — TRANSCRIBE ORDERS (OUTPATIENT)
Dept: ADMINISTRATIVE | Facility: HOSPITAL | Age: 80
End: 2020-11-19

## 2020-11-19 ENCOUNTER — EVALUATION (OUTPATIENT)
Dept: PHYSICAL THERAPY | Facility: CLINIC | Age: 80
End: 2020-11-19
Payer: MEDICARE

## 2020-11-19 ENCOUNTER — EVALUATION (OUTPATIENT)
Dept: OCCUPATIONAL THERAPY | Facility: CLINIC | Age: 80
End: 2020-11-19
Payer: MEDICARE

## 2020-11-19 DIAGNOSIS — I63.40 CEREBROVASCULAR ACCIDENT (CVA) DUE TO EMBOLISM OF CEREBRAL ARTERY (HCC): Primary | ICD-10-CM

## 2020-11-19 DIAGNOSIS — Z01.818 OTHER SPECIFIED PRE-OPERATIVE EXAMINATION: Primary | ICD-10-CM

## 2020-11-19 DIAGNOSIS — I67.1 ANEURYSM OF ANTERIOR COMMUNICATING ARTERY: ICD-10-CM

## 2020-11-19 DIAGNOSIS — I63.9 CEREBROVASCULAR ACCIDENT (CVA), UNSPECIFIED MECHANISM (HCC): Primary | ICD-10-CM

## 2020-11-19 DIAGNOSIS — G81.91 RIGHT HEMIPARESIS (HCC): ICD-10-CM

## 2020-11-19 PROCEDURE — 97163 PT EVAL HIGH COMPLEX 45 MIN: CPT

## 2020-11-19 PROCEDURE — 97167 OT EVAL HIGH COMPLEX 60 MIN: CPT

## 2020-11-20 ENCOUNTER — PATIENT OUTREACH (OUTPATIENT)
Dept: CASE MANAGEMENT | Facility: OTHER | Age: 80
End: 2020-11-20

## 2020-11-20 ENCOUNTER — TELEMEDICINE (OUTPATIENT)
Dept: NEPHROLOGY | Facility: CLINIC | Age: 80
End: 2020-11-20
Payer: MEDICARE

## 2020-11-20 DIAGNOSIS — I10 BENIGN ESSENTIAL HYPERTENSION: ICD-10-CM

## 2020-11-20 DIAGNOSIS — R31.0 HEMATURIA, GROSS: Primary | ICD-10-CM

## 2020-11-20 DIAGNOSIS — I50.32 CHRONIC DIASTOLIC HEART FAILURE (HCC): ICD-10-CM

## 2020-11-20 DIAGNOSIS — I48.20 CHRONIC ATRIAL FIBRILLATION (HCC): ICD-10-CM

## 2020-11-20 DIAGNOSIS — I63.40 CEREBROVASCULAR ACCIDENT (CVA) DUE TO EMBOLISM OF CEREBRAL ARTERY (HCC): ICD-10-CM

## 2020-11-20 DIAGNOSIS — N18.31 STAGE 3A CHRONIC KIDNEY DISEASE (HCC): ICD-10-CM

## 2020-11-20 PROCEDURE — 99214 OFFICE O/P EST MOD 30 MIN: CPT | Performed by: NURSE PRACTITIONER

## 2020-11-25 ENCOUNTER — APPOINTMENT (OUTPATIENT)
Dept: LAB | Facility: HOSPITAL | Age: 80
End: 2020-11-25
Payer: MEDICARE

## 2020-11-25 ENCOUNTER — TELEPHONE (OUTPATIENT)
Dept: OTHER | Facility: HOSPITAL | Age: 80
End: 2020-11-25

## 2020-11-25 DIAGNOSIS — N18.31 STAGE 3A CHRONIC KIDNEY DISEASE (HCC): ICD-10-CM

## 2020-11-25 DIAGNOSIS — I48.20 CHRONIC ATRIAL FIBRILLATION (HCC): ICD-10-CM

## 2020-11-25 DIAGNOSIS — I63.40 CEREBROVASCULAR ACCIDENT (CVA) DUE TO EMBOLISM OF CEREBRAL ARTERY (HCC): ICD-10-CM

## 2020-11-25 DIAGNOSIS — I50.32 CHRONIC DIASTOLIC HEART FAILURE (HCC): ICD-10-CM

## 2020-11-25 DIAGNOSIS — I10 BENIGN ESSENTIAL HYPERTENSION: ICD-10-CM

## 2020-11-25 DIAGNOSIS — R31.0 HEMATURIA, GROSS: ICD-10-CM

## 2020-11-25 LAB
ANION GAP SERPL CALCULATED.3IONS-SCNC: 4 MMOL/L (ref 4–13)
BUN SERPL-MCNC: 23 MG/DL (ref 5–25)
CALCIUM SERPL-MCNC: 8.9 MG/DL (ref 8.3–10.1)
CHLORIDE SERPL-SCNC: 108 MMOL/L (ref 100–108)
CO2 SERPL-SCNC: 29 MMOL/L (ref 21–32)
CREAT SERPL-MCNC: 1.72 MG/DL (ref 0.6–1.3)
ERYTHROCYTE [DISTWIDTH] IN BLOOD BY AUTOMATED COUNT: 14.6 % (ref 11.6–15.1)
GFR SERPL CREATININE-BSD FRML MDRD: 37 ML/MIN/1.73SQ M
GLUCOSE P FAST SERPL-MCNC: 100 MG/DL (ref 65–99)
HCT VFR BLD AUTO: 41.6 % (ref 36.5–49.3)
HGB BLD-MCNC: 12.8 G/DL (ref 12–17)
MCH RBC QN AUTO: 28.8 PG (ref 26.8–34.3)
MCHC RBC AUTO-ENTMCNC: 30.8 G/DL (ref 31.4–37.4)
MCV RBC AUTO: 94 FL (ref 82–98)
PLATELET # BLD AUTO: 164 THOUSANDS/UL (ref 149–390)
PMV BLD AUTO: 10.8 FL (ref 8.9–12.7)
POTASSIUM SERPL-SCNC: 4.5 MMOL/L (ref 3.5–5.3)
RBC # BLD AUTO: 4.45 MILLION/UL (ref 3.88–5.62)
SODIUM SERPL-SCNC: 141 MMOL/L (ref 136–145)
WBC # BLD AUTO: 7.35 THOUSAND/UL (ref 4.31–10.16)

## 2020-11-25 PROCEDURE — 85027 COMPLETE CBC AUTOMATED: CPT

## 2020-11-25 PROCEDURE — 80048 BASIC METABOLIC PNL TOTAL CA: CPT

## 2020-11-25 PROCEDURE — 36415 COLL VENOUS BLD VENIPUNCTURE: CPT

## 2020-12-02 ENCOUNTER — OFFICE VISIT (OUTPATIENT)
Dept: OCCUPATIONAL THERAPY | Facility: CLINIC | Age: 80
End: 2020-12-02
Payer: MEDICARE

## 2020-12-02 ENCOUNTER — OFFICE VISIT (OUTPATIENT)
Dept: PHYSICAL THERAPY | Facility: CLINIC | Age: 80
End: 2020-12-02
Payer: MEDICARE

## 2020-12-02 DIAGNOSIS — I63.40 CEREBROVASCULAR ACCIDENT (CVA) DUE TO EMBOLISM OF CEREBRAL ARTERY (HCC): Primary | ICD-10-CM

## 2020-12-02 DIAGNOSIS — G81.91 RIGHT HEMIPARESIS (HCC): ICD-10-CM

## 2020-12-02 DIAGNOSIS — I67.1 ANEURYSM OF ANTERIOR COMMUNICATING ARTERY: ICD-10-CM

## 2020-12-02 DIAGNOSIS — I63.9 CEREBROVASCULAR ACCIDENT (CVA), UNSPECIFIED MECHANISM (HCC): Primary | ICD-10-CM

## 2020-12-02 PROCEDURE — 97530 THERAPEUTIC ACTIVITIES: CPT

## 2020-12-02 PROCEDURE — 97112 NEUROMUSCULAR REEDUCATION: CPT | Performed by: PHYSICAL THERAPIST

## 2020-12-02 PROCEDURE — 97112 NEUROMUSCULAR REEDUCATION: CPT

## 2020-12-02 PROCEDURE — 97530 THERAPEUTIC ACTIVITIES: CPT | Performed by: PHYSICAL THERAPIST

## 2020-12-03 ENCOUNTER — TRANSCRIBE ORDERS (OUTPATIENT)
Dept: PHYSICAL THERAPY | Facility: CLINIC | Age: 80
End: 2020-12-03

## 2020-12-03 ENCOUNTER — OFFICE VISIT (OUTPATIENT)
Dept: OCCUPATIONAL THERAPY | Facility: CLINIC | Age: 80
End: 2020-12-03
Payer: MEDICARE

## 2020-12-03 ENCOUNTER — OFFICE VISIT (OUTPATIENT)
Dept: PHYSICAL THERAPY | Facility: CLINIC | Age: 80
End: 2020-12-03
Payer: MEDICARE

## 2020-12-03 DIAGNOSIS — G81.91 RIGHT HEMIPARESIS (HCC): ICD-10-CM

## 2020-12-03 DIAGNOSIS — I63.40 CEREBROVASCULAR ACCIDENT (CVA) DUE TO EMBOLISM OF CEREBRAL ARTERY (HCC): Primary | ICD-10-CM

## 2020-12-03 DIAGNOSIS — I67.1 ANEURYSM OF ANTERIOR COMMUNICATING ARTERY: ICD-10-CM

## 2020-12-03 DIAGNOSIS — I63.9 CEREBROVASCULAR ACCIDENT (CVA), UNSPECIFIED MECHANISM (HCC): Primary | ICD-10-CM

## 2020-12-03 PROCEDURE — 97530 THERAPEUTIC ACTIVITIES: CPT

## 2020-12-03 PROCEDURE — 97112 NEUROMUSCULAR REEDUCATION: CPT

## 2020-12-04 DIAGNOSIS — I10 ESSENTIAL HYPERTENSION: ICD-10-CM

## 2020-12-07 ENCOUNTER — CONSULT (OUTPATIENT)
Dept: NEUROSURGERY | Facility: CLINIC | Age: 80
End: 2020-12-07
Payer: MEDICARE

## 2020-12-07 VITALS
RESPIRATION RATE: 16 BRPM | SYSTOLIC BLOOD PRESSURE: 122 MMHG | WEIGHT: 196 LBS | TEMPERATURE: 97 F | DIASTOLIC BLOOD PRESSURE: 66 MMHG | HEART RATE: 56 BPM | BODY MASS INDEX: 29.03 KG/M2 | HEIGHT: 69 IN

## 2020-12-07 DIAGNOSIS — I48.20 CHRONIC ATRIAL FIBRILLATION (HCC): Primary | ICD-10-CM

## 2020-12-07 DIAGNOSIS — I67.1 ANEURYSM OF ANTERIOR COMMUNICATING ARTERY: ICD-10-CM

## 2020-12-07 DIAGNOSIS — I63.40 CEREBROVASCULAR ACCIDENT (CVA) DUE TO EMBOLISM OF CEREBRAL ARTERY (HCC): ICD-10-CM

## 2020-12-07 DIAGNOSIS — R93.89 ABNORMAL COMPUTED TOMOGRAPHY ANGIOGRAPHY (CTA): ICD-10-CM

## 2020-12-07 PROCEDURE — 99204 OFFICE O/P NEW MOD 45 MIN: CPT | Performed by: RADIOLOGY

## 2020-12-08 ENCOUNTER — TRANSCRIBE ORDERS (OUTPATIENT)
Dept: ADMINISTRATIVE | Facility: HOSPITAL | Age: 80
End: 2020-12-08

## 2020-12-08 ENCOUNTER — LAB (OUTPATIENT)
Dept: LAB | Facility: HOSPITAL | Age: 80
End: 2020-12-08
Attending: UROLOGY
Payer: MEDICARE

## 2020-12-08 ENCOUNTER — PATIENT OUTREACH (OUTPATIENT)
Dept: CASE MANAGEMENT | Facility: HOSPITAL | Age: 80
End: 2020-12-08

## 2020-12-08 ENCOUNTER — APPOINTMENT (OUTPATIENT)
Dept: PREADMISSION TESTING | Facility: HOSPITAL | Age: 80
End: 2020-12-08
Payer: MEDICARE

## 2020-12-08 DIAGNOSIS — Z01.818 PRE-OP TESTING: ICD-10-CM

## 2020-12-08 DIAGNOSIS — Z01.818 PRE-OP TESTING: Primary | ICD-10-CM

## 2020-12-08 LAB
BACTERIA UR QL AUTO: ABNORMAL /HPF
BILIRUB UR QL STRIP: NEGATIVE
CLARITY UR: ABNORMAL
COLOR UR: ABNORMAL
GLUCOSE UR STRIP-MCNC: NEGATIVE MG/DL
HGB UR QL STRIP.AUTO: ABNORMAL
KETONES UR STRIP-MCNC: ABNORMAL MG/DL
LEUKOCYTE ESTERASE UR QL STRIP: ABNORMAL
NITRITE UR QL STRIP: POSITIVE
NON-SQ EPI CELLS URNS QL MICRO: ABNORMAL /HPF
PH UR STRIP.AUTO: 6.5 [PH]
PROT UR STRIP-MCNC: >=300 MG/DL
RBC #/AREA URNS AUTO: ABNORMAL /HPF
SP GR UR STRIP.AUTO: 1.02 (ref 1–1.03)
UROBILINOGEN UR QL STRIP.AUTO: 1 E.U./DL
WBC #/AREA URNS AUTO: ABNORMAL /HPF

## 2020-12-08 PROCEDURE — 87086 URINE CULTURE/COLONY COUNT: CPT

## 2020-12-08 PROCEDURE — 81001 URINALYSIS AUTO W/SCOPE: CPT | Performed by: UROLOGY

## 2020-12-09 ENCOUNTER — OFFICE VISIT (OUTPATIENT)
Dept: PHYSICAL THERAPY | Facility: CLINIC | Age: 80
End: 2020-12-09
Payer: MEDICARE

## 2020-12-09 DIAGNOSIS — G81.91 RIGHT HEMIPARESIS (HCC): ICD-10-CM

## 2020-12-09 DIAGNOSIS — I67.1 ANEURYSM OF ANTERIOR COMMUNICATING ARTERY: ICD-10-CM

## 2020-12-09 DIAGNOSIS — I63.40 CEREBROVASCULAR ACCIDENT (CVA) DUE TO EMBOLISM OF CEREBRAL ARTERY (HCC): Primary | ICD-10-CM

## 2020-12-09 DIAGNOSIS — Z01.818 OTHER SPECIFIED PRE-OPERATIVE EXAMINATION: ICD-10-CM

## 2020-12-09 LAB — BACTERIA UR CULT: NORMAL

## 2020-12-09 PROCEDURE — U0003 INFECTIOUS AGENT DETECTION BY NUCLEIC ACID (DNA OR RNA); SEVERE ACUTE RESPIRATORY SYNDROME CORONAVIRUS 2 (SARS-COV-2) (CORONAVIRUS DISEASE [COVID-19]), AMPLIFIED PROBE TECHNIQUE, MAKING USE OF HIGH THROUGHPUT TECHNOLOGIES AS DESCRIBED BY CMS-2020-01-R: HCPCS | Performed by: UROLOGY

## 2020-12-09 PROCEDURE — 97530 THERAPEUTIC ACTIVITIES: CPT | Performed by: PHYSICAL THERAPIST

## 2020-12-09 PROCEDURE — 97112 NEUROMUSCULAR REEDUCATION: CPT | Performed by: PHYSICAL THERAPIST

## 2020-12-10 ENCOUNTER — OFFICE VISIT (OUTPATIENT)
Dept: PHYSICAL THERAPY | Facility: CLINIC | Age: 80
End: 2020-12-10
Payer: MEDICARE

## 2020-12-10 DIAGNOSIS — I63.40 CEREBROVASCULAR ACCIDENT (CVA) DUE TO EMBOLISM OF CEREBRAL ARTERY (HCC): Primary | ICD-10-CM

## 2020-12-10 LAB — SARS-COV-2 RNA SPEC QL NAA+PROBE: NOT DETECTED

## 2020-12-10 PROCEDURE — 97112 NEUROMUSCULAR REEDUCATION: CPT | Performed by: PHYSICAL THERAPIST

## 2020-12-14 ENCOUNTER — OFFICE VISIT (OUTPATIENT)
Dept: OCCUPATIONAL THERAPY | Facility: CLINIC | Age: 80
End: 2020-12-14
Payer: MEDICARE

## 2020-12-14 ENCOUNTER — OFFICE VISIT (OUTPATIENT)
Dept: PHYSICAL THERAPY | Facility: CLINIC | Age: 80
End: 2020-12-14
Payer: MEDICARE

## 2020-12-14 DIAGNOSIS — I10 ESSENTIAL HYPERTENSION: ICD-10-CM

## 2020-12-14 DIAGNOSIS — I63.40 CEREBROVASCULAR ACCIDENT (CVA) DUE TO EMBOLISM OF CEREBRAL ARTERY (HCC): Primary | ICD-10-CM

## 2020-12-14 DIAGNOSIS — I63.9 CEREBROVASCULAR ACCIDENT (CVA), UNSPECIFIED MECHANISM (HCC): Primary | ICD-10-CM

## 2020-12-14 DIAGNOSIS — I67.1 ANEURYSM OF ANTERIOR COMMUNICATING ARTERY: ICD-10-CM

## 2020-12-14 DIAGNOSIS — G81.91 RIGHT HEMIPARESIS (HCC): ICD-10-CM

## 2020-12-14 PROCEDURE — 97112 NEUROMUSCULAR REEDUCATION: CPT

## 2020-12-14 PROCEDURE — 97530 THERAPEUTIC ACTIVITIES: CPT

## 2020-12-15 ENCOUNTER — APPOINTMENT (OUTPATIENT)
Dept: RADIOLOGY | Facility: HOSPITAL | Age: 80
End: 2020-12-15
Payer: MEDICARE

## 2020-12-15 ENCOUNTER — ANESTHESIA (OUTPATIENT)
Dept: PERIOP | Facility: HOSPITAL | Age: 80
End: 2020-12-15
Payer: MEDICARE

## 2020-12-15 ENCOUNTER — ANESTHESIA EVENT (OUTPATIENT)
Dept: PERIOP | Facility: HOSPITAL | Age: 80
End: 2020-12-15
Payer: MEDICARE

## 2020-12-15 ENCOUNTER — HOSPITAL ENCOUNTER (OUTPATIENT)
Facility: HOSPITAL | Age: 80
Setting detail: OUTPATIENT SURGERY
Discharge: HOME | End: 2020-12-15
Attending: UROLOGY | Admitting: UROLOGY
Payer: MEDICARE

## 2020-12-15 VITALS
HEART RATE: 74 BPM | WEIGHT: 194.5 LBS | DIASTOLIC BLOOD PRESSURE: 78 MMHG | SYSTOLIC BLOOD PRESSURE: 168 MMHG | RESPIRATION RATE: 18 BRPM | OXYGEN SATURATION: 98 % | BODY MASS INDEX: 28.81 KG/M2 | TEMPERATURE: 97.5 F | HEIGHT: 69 IN

## 2020-12-15 DIAGNOSIS — Z46.6 ENCOUNTER FOR REMOVAL OF URETERAL STENT: ICD-10-CM

## 2020-12-15 PROCEDURE — 74420 UROGRAPHY RTRGR +-KUB: CPT

## 2020-12-15 PROCEDURE — C1769 GUIDE WIRE: HCPCS | Performed by: UROLOGY

## 2020-12-15 PROCEDURE — C2617 STENT, NON-COR, TEM W/O DEL: HCPCS | Performed by: UROLOGY

## 2020-12-15 DEVICE — IMPLANTABLE DEVICE
Type: IMPLANTABLE DEVICE | Site: URETER | Status: NON-FUNCTIONAL
Removed: 2021-10-19

## 2020-12-15 RX ORDER — FENTANYL CITRATE/PF 50 MCG/ML
25 SYRINGE (ML) INJECTION
Status: DISCONTINUED | OUTPATIENT
Start: 2020-12-15 | End: 2020-12-15 | Stop reason: HOSPADM

## 2020-12-15 RX ORDER — PROPOFOL 10 MG/ML
INJECTION, EMULSION INTRAVENOUS AS NEEDED
Status: DISCONTINUED | OUTPATIENT
Start: 2020-12-15 | End: 2020-12-15

## 2020-12-15 RX ORDER — CEFAZOLIN SODIUM 2 G/50ML
2000 SOLUTION INTRAVENOUS ONCE
Status: COMPLETED | OUTPATIENT
Start: 2020-12-15 | End: 2020-12-15

## 2020-12-15 RX ORDER — LIDOCAINE HYDROCHLORIDE 10 MG/ML
INJECTION, SOLUTION EPIDURAL; INFILTRATION; INTRACAUDAL; PERINEURAL AS NEEDED
Status: DISCONTINUED | OUTPATIENT
Start: 2020-12-15 | End: 2020-12-15

## 2020-12-15 RX ORDER — MIDAZOLAM HYDROCHLORIDE 2 MG/2ML
INJECTION, SOLUTION INTRAMUSCULAR; INTRAVENOUS AS NEEDED
Status: DISCONTINUED | OUTPATIENT
Start: 2020-12-15 | End: 2020-12-15

## 2020-12-15 RX ORDER — SODIUM CHLORIDE, SODIUM LACTATE, POTASSIUM CHLORIDE, CALCIUM CHLORIDE 600; 310; 30; 20 MG/100ML; MG/100ML; MG/100ML; MG/100ML
75 INJECTION, SOLUTION INTRAVENOUS CONTINUOUS
Status: DISCONTINUED | OUTPATIENT
Start: 2020-12-15 | End: 2020-12-15 | Stop reason: HOSPADM

## 2020-12-15 RX ORDER — SODIUM CHLORIDE, SODIUM LACTATE, POTASSIUM CHLORIDE, CALCIUM CHLORIDE 600; 310; 30; 20 MG/100ML; MG/100ML; MG/100ML; MG/100ML
INJECTION, SOLUTION INTRAVENOUS CONTINUOUS PRN
Status: DISCONTINUED | OUTPATIENT
Start: 2020-12-15 | End: 2020-12-15

## 2020-12-15 RX ORDER — FENTANYL CITRATE 50 UG/ML
INJECTION, SOLUTION INTRAMUSCULAR; INTRAVENOUS AS NEEDED
Status: DISCONTINUED | OUTPATIENT
Start: 2020-12-15 | End: 2020-12-15

## 2020-12-15 RX ORDER — PROPOFOL 10 MG/ML
INJECTION, EMULSION INTRAVENOUS CONTINUOUS PRN
Status: DISCONTINUED | OUTPATIENT
Start: 2020-12-15 | End: 2020-12-15

## 2020-12-15 RX ORDER — MAGNESIUM HYDROXIDE 1200 MG/15ML
LIQUID ORAL AS NEEDED
Status: DISCONTINUED | OUTPATIENT
Start: 2020-12-15 | End: 2020-12-15 | Stop reason: HOSPADM

## 2020-12-15 RX ADMIN — MIDAZOLAM 1 MG: 1 INJECTION INTRAMUSCULAR; INTRAVENOUS at 14:17

## 2020-12-15 RX ADMIN — SODIUM CHLORIDE, SODIUM LACTATE, POTASSIUM CHLORIDE, AND CALCIUM CHLORIDE: .6; .31; .03; .02 INJECTION, SOLUTION INTRAVENOUS at 12:56

## 2020-12-15 RX ADMIN — PROPOFOL 120 MCG/KG/MIN: 10 INJECTION, EMULSION INTRAVENOUS at 14:12

## 2020-12-15 RX ADMIN — FENTANYL CITRATE 50 MCG: 50 INJECTION, SOLUTION INTRAMUSCULAR; INTRAVENOUS at 14:14

## 2020-12-15 RX ADMIN — CEFAZOLIN SODIUM 2000 MG: 2 SOLUTION INTRAVENOUS at 14:12

## 2020-12-15 RX ADMIN — LIDOCAINE HYDROCHLORIDE 50 MG: 10 INJECTION, SOLUTION EPIDURAL; INFILTRATION; INTRACAUDAL; PERINEURAL at 14:17

## 2020-12-15 RX ADMIN — PHENYLEPHRINE HYDROCHLORIDE 100 MCG: 10 INJECTION INTRAVENOUS at 14:14

## 2020-12-15 RX ADMIN — PROPOFOL 60 MG: 10 INJECTION, EMULSION INTRAVENOUS at 14:17

## 2020-12-15 RX ADMIN — MIDAZOLAM 1 MG: 1 INJECTION INTRAMUSCULAR; INTRAVENOUS at 14:07

## 2020-12-15 RX ADMIN — FENTANYL CITRATE 50 MCG: 50 INJECTION, SOLUTION INTRAMUSCULAR; INTRAVENOUS at 14:24

## 2020-12-16 ENCOUNTER — PATIENT OUTREACH (OUTPATIENT)
Dept: CASE MANAGEMENT | Facility: HOSPITAL | Age: 80
End: 2020-12-16

## 2020-12-17 ENCOUNTER — APPOINTMENT (OUTPATIENT)
Dept: PHYSICAL THERAPY | Facility: CLINIC | Age: 80
End: 2020-12-17
Payer: MEDICARE

## 2020-12-17 ENCOUNTER — APPOINTMENT (OUTPATIENT)
Dept: OCCUPATIONAL THERAPY | Facility: CLINIC | Age: 80
End: 2020-12-17
Payer: MEDICARE

## 2020-12-21 ENCOUNTER — OFFICE VISIT (OUTPATIENT)
Dept: PHYSICAL THERAPY | Facility: CLINIC | Age: 80
End: 2020-12-21
Payer: MEDICARE

## 2020-12-21 ENCOUNTER — EVALUATION (OUTPATIENT)
Dept: OCCUPATIONAL THERAPY | Facility: CLINIC | Age: 80
End: 2020-12-21
Payer: MEDICARE

## 2020-12-21 DIAGNOSIS — G81.91 RIGHT HEMIPARESIS (HCC): ICD-10-CM

## 2020-12-21 DIAGNOSIS — I67.1 ANEURYSM OF ANTERIOR COMMUNICATING ARTERY: ICD-10-CM

## 2020-12-21 DIAGNOSIS — I63.40 CEREBROVASCULAR ACCIDENT (CVA) DUE TO EMBOLISM OF CEREBRAL ARTERY (HCC): Primary | ICD-10-CM

## 2020-12-21 DIAGNOSIS — I63.9 CEREBROVASCULAR ACCIDENT (CVA), UNSPECIFIED MECHANISM (HCC): Primary | ICD-10-CM

## 2020-12-21 PROCEDURE — 97530 THERAPEUTIC ACTIVITIES: CPT

## 2020-12-21 PROCEDURE — 97112 NEUROMUSCULAR REEDUCATION: CPT

## 2020-12-21 PROCEDURE — 97112 NEUROMUSCULAR REEDUCATION: CPT | Performed by: PHYSICAL THERAPIST

## 2020-12-23 ENCOUNTER — OFFICE VISIT (OUTPATIENT)
Dept: OCCUPATIONAL THERAPY | Facility: CLINIC | Age: 80
End: 2020-12-23
Payer: MEDICARE

## 2020-12-23 ENCOUNTER — OFFICE VISIT (OUTPATIENT)
Dept: PHYSICAL THERAPY | Facility: CLINIC | Age: 80
End: 2020-12-23
Payer: MEDICARE

## 2020-12-23 DIAGNOSIS — I63.40 CEREBROVASCULAR ACCIDENT (CVA) DUE TO EMBOLISM OF CEREBRAL ARTERY (HCC): Primary | ICD-10-CM

## 2020-12-23 DIAGNOSIS — I67.1 ANEURYSM OF ANTERIOR COMMUNICATING ARTERY: ICD-10-CM

## 2020-12-23 DIAGNOSIS — I63.9 CEREBROVASCULAR ACCIDENT (CVA), UNSPECIFIED MECHANISM (HCC): Primary | ICD-10-CM

## 2020-12-23 DIAGNOSIS — G81.91 RIGHT HEMIPARESIS (HCC): ICD-10-CM

## 2020-12-23 PROCEDURE — 97110 THERAPEUTIC EXERCISES: CPT

## 2020-12-23 PROCEDURE — 97112 NEUROMUSCULAR REEDUCATION: CPT

## 2020-12-23 PROCEDURE — 97530 THERAPEUTIC ACTIVITIES: CPT | Performed by: PHYSICAL THERAPIST

## 2020-12-23 PROCEDURE — 97112 NEUROMUSCULAR REEDUCATION: CPT | Performed by: PHYSICAL THERAPIST

## 2020-12-28 ENCOUNTER — OFFICE VISIT (OUTPATIENT)
Dept: PHYSICAL THERAPY | Facility: CLINIC | Age: 80
End: 2020-12-28
Payer: MEDICARE

## 2020-12-28 DIAGNOSIS — I67.1 ANEURYSM OF ANTERIOR COMMUNICATING ARTERY: ICD-10-CM

## 2020-12-28 DIAGNOSIS — I63.40 CEREBROVASCULAR ACCIDENT (CVA) DUE TO EMBOLISM OF CEREBRAL ARTERY (HCC): Primary | ICD-10-CM

## 2020-12-28 DIAGNOSIS — G81.91 RIGHT HEMIPARESIS (HCC): ICD-10-CM

## 2020-12-28 PROCEDURE — 97112 NEUROMUSCULAR REEDUCATION: CPT | Performed by: PHYSICAL THERAPIST

## 2020-12-28 PROCEDURE — 97530 THERAPEUTIC ACTIVITIES: CPT | Performed by: PHYSICAL THERAPIST

## 2020-12-30 ENCOUNTER — OFFICE VISIT (OUTPATIENT)
Dept: PHYSICAL THERAPY | Facility: CLINIC | Age: 80
End: 2020-12-30
Payer: MEDICARE

## 2020-12-30 ENCOUNTER — OFFICE VISIT (OUTPATIENT)
Dept: OCCUPATIONAL THERAPY | Facility: CLINIC | Age: 80
End: 2020-12-30
Payer: MEDICARE

## 2020-12-30 DIAGNOSIS — I63.40 CEREBROVASCULAR ACCIDENT (CVA) DUE TO EMBOLISM OF CEREBRAL ARTERY (HCC): Primary | ICD-10-CM

## 2020-12-30 DIAGNOSIS — I63.9 CEREBROVASCULAR ACCIDENT (CVA), UNSPECIFIED MECHANISM (HCC): Primary | ICD-10-CM

## 2020-12-30 DIAGNOSIS — G81.91 RIGHT HEMIPARESIS (HCC): ICD-10-CM

## 2020-12-30 DIAGNOSIS — I67.1 ANEURYSM OF ANTERIOR COMMUNICATING ARTERY: ICD-10-CM

## 2020-12-30 PROCEDURE — 97110 THERAPEUTIC EXERCISES: CPT

## 2020-12-30 PROCEDURE — 97112 NEUROMUSCULAR REEDUCATION: CPT

## 2020-12-30 PROCEDURE — 97530 THERAPEUTIC ACTIVITIES: CPT

## 2021-01-04 ENCOUNTER — APPOINTMENT (OUTPATIENT)
Dept: PHYSICAL THERAPY | Facility: CLINIC | Age: 81
End: 2021-01-04
Payer: MEDICARE

## 2021-01-06 ENCOUNTER — APPOINTMENT (OUTPATIENT)
Dept: PHYSICAL THERAPY | Facility: CLINIC | Age: 81
End: 2021-01-06
Payer: MEDICARE

## 2021-01-08 ENCOUNTER — TELEPHONE (OUTPATIENT)
Dept: NEPHROLOGY | Facility: CLINIC | Age: 81
End: 2021-01-08

## 2021-01-08 DIAGNOSIS — N13.1 HYDRONEPHROSIS WITH URETERAL STRICTURE, NOT ELSEWHERE CLASSIFIED: ICD-10-CM

## 2021-01-08 DIAGNOSIS — I63.9 CVA (CEREBRAL VASCULAR ACCIDENT) (HCC): ICD-10-CM

## 2021-01-08 DIAGNOSIS — N13.5 CROSSING VESSEL AND STRICTURE OF URETER WITHOUT HYDRONEPHROSIS: ICD-10-CM

## 2021-01-08 RX ORDER — FINASTERIDE 5 MG/1
TABLET, FILM COATED ORAL
Qty: 90 TABLET | OUTPATIENT
Start: 2021-01-08

## 2021-01-08 RX ORDER — ATORVASTATIN CALCIUM 40 MG/1
TABLET, FILM COATED ORAL
Qty: 30 TABLET | Refills: 1 | OUTPATIENT
Start: 2021-01-08

## 2021-01-11 ENCOUNTER — APPOINTMENT (OUTPATIENT)
Dept: PHYSICAL THERAPY | Facility: CLINIC | Age: 81
End: 2021-01-11
Payer: MEDICARE

## 2021-01-13 ENCOUNTER — APPOINTMENT (OUTPATIENT)
Dept: PHYSICAL THERAPY | Facility: CLINIC | Age: 81
End: 2021-01-13
Payer: MEDICARE

## 2021-01-14 ENCOUNTER — TRANSCRIBE ORDERS (OUTPATIENT)
Dept: ADMINISTRATIVE | Facility: HOSPITAL | Age: 81
End: 2021-01-14

## 2021-01-14 ENCOUNTER — TELEPHONE (OUTPATIENT)
Dept: NEPHROLOGY | Facility: CLINIC | Age: 81
End: 2021-01-14

## 2021-01-14 ENCOUNTER — LAB (OUTPATIENT)
Dept: LAB | Facility: HOSPITAL | Age: 81
End: 2021-01-14
Attending: INTERNAL MEDICINE
Payer: MEDICARE

## 2021-01-14 DIAGNOSIS — N18.30 CKD (CHRONIC KIDNEY DISEASE), STAGE III (HCC): ICD-10-CM

## 2021-01-14 DIAGNOSIS — N18.9 CHRONIC KIDNEY DISEASE, UNSPECIFIED CKD STAGE: ICD-10-CM

## 2021-01-14 DIAGNOSIS — N25.81 SECONDARY HYPERPARATHYROIDISM (HCC): ICD-10-CM

## 2021-01-14 DIAGNOSIS — N18.31 STAGE 3A CHRONIC KIDNEY DISEASE (HCC): Primary | ICD-10-CM

## 2021-01-14 DIAGNOSIS — N18.9 CHRONIC KIDNEY DISEASE, UNSPECIFIED CKD STAGE: Primary | ICD-10-CM

## 2021-01-14 LAB
25(OH)D3 SERPL-MCNC: 39.3 NG/ML (ref 30–100)
ANION GAP SERPL CALCULATED.3IONS-SCNC: 6 MMOL/L (ref 4–13)
BACTERIA UR QL AUTO: ABNORMAL /HPF
BILIRUB UR QL STRIP: NEGATIVE
BUN SERPL-MCNC: 26 MG/DL (ref 5–25)
CALCIUM SERPL-MCNC: 8.8 MG/DL (ref 8.3–10.1)
CHLORIDE SERPL-SCNC: 108 MMOL/L (ref 100–108)
CLARITY UR: ABNORMAL
CO2 SERPL-SCNC: 28 MMOL/L (ref 21–32)
COLOR UR: ABNORMAL
CREAT SERPL-MCNC: 1.59 MG/DL (ref 0.6–1.3)
CREAT UR-MCNC: 132 MG/DL
ERYTHROCYTE [DISTWIDTH] IN BLOOD BY AUTOMATED COUNT: 13.7 % (ref 11.6–15.1)
GFR SERPL CREATININE-BSD FRML MDRD: 41 ML/MIN/1.73SQ M
GLUCOSE P FAST SERPL-MCNC: 104 MG/DL (ref 65–99)
GLUCOSE UR STRIP-MCNC: NEGATIVE MG/DL
HCT VFR BLD AUTO: 37.3 % (ref 36.5–49.3)
HGB BLD-MCNC: 11 G/DL (ref 12–17)
HGB UR QL STRIP.AUTO: ABNORMAL
KETONES UR STRIP-MCNC: NEGATIVE MG/DL
LEUKOCYTE ESTERASE UR QL STRIP: NEGATIVE
MAGNESIUM SERPL-MCNC: 1.8 MG/DL (ref 1.6–2.6)
MCH RBC QN AUTO: 26.3 PG (ref 26.8–34.3)
MCHC RBC AUTO-ENTMCNC: 29.5 G/DL (ref 31.4–37.4)
MCV RBC AUTO: 89 FL (ref 82–98)
NITRITE UR QL STRIP: NEGATIVE
NON-SQ EPI CELLS URNS QL MICRO: ABNORMAL /HPF
PH UR STRIP.AUTO: 5.5 [PH]
PHOSPHATE SERPL-MCNC: 3.3 MG/DL (ref 2.3–4.1)
PLATELET # BLD AUTO: 202 THOUSANDS/UL (ref 149–390)
PMV BLD AUTO: 10.1 FL (ref 8.9–12.7)
POTASSIUM SERPL-SCNC: 4.7 MMOL/L (ref 3.5–5.3)
PROT UR STRIP-MCNC: ABNORMAL MG/DL
PROT UR-MCNC: 26 MG/DL
PROT/CREAT UR: 0.2 MG/G{CREAT} (ref 0–0.1)
PTH-INTACT SERPL-MCNC: 83.6 PG/ML (ref 18.4–80.1)
RBC # BLD AUTO: 4.19 MILLION/UL (ref 3.88–5.62)
RBC #/AREA URNS AUTO: ABNORMAL /HPF
SODIUM SERPL-SCNC: 142 MMOL/L (ref 136–145)
SP GR UR STRIP.AUTO: 1.02 (ref 1–1.03)
UROBILINOGEN UR QL STRIP.AUTO: 0.2 E.U./DL
WBC # BLD AUTO: 7.84 THOUSAND/UL (ref 4.31–10.16)
WBC #/AREA URNS AUTO: ABNORMAL /HPF

## 2021-01-14 PROCEDURE — 36415 COLL VENOUS BLD VENIPUNCTURE: CPT

## 2021-01-14 PROCEDURE — 82306 VITAMIN D 25 HYDROXY: CPT

## 2021-01-14 PROCEDURE — 84100 ASSAY OF PHOSPHORUS: CPT

## 2021-01-14 PROCEDURE — 85027 COMPLETE CBC AUTOMATED: CPT

## 2021-01-14 PROCEDURE — 82570 ASSAY OF URINE CREATININE: CPT

## 2021-01-14 PROCEDURE — 81001 URINALYSIS AUTO W/SCOPE: CPT

## 2021-01-14 PROCEDURE — 84156 ASSAY OF PROTEIN URINE: CPT

## 2021-01-14 PROCEDURE — 83735 ASSAY OF MAGNESIUM: CPT

## 2021-01-14 PROCEDURE — 83970 ASSAY OF PARATHORMONE: CPT

## 2021-01-14 PROCEDURE — 80048 BASIC METABOLIC PNL TOTAL CA: CPT

## 2021-01-14 NOTE — RESULT ENCOUNTER NOTE
Hello    Patient normally is followed up by Ms Mehnaz Lucia  Please let the patient know that the renal function is stable  Urine with significant white and red cells  This may be related to the stents and as long as the patient is feeling well no changes needed for now   -hemoglobin is lower than before  At 11   Please ask the patient to repeat hemoglobin in 1 month with a CBC     Thank you    np

## 2021-01-14 NOTE — TELEPHONE ENCOUNTER
----- Message from Elian Egan MD sent at 1/14/2021 10:03 AM EST -----  Hello    Patient normally is followed up by Ms Staci De La Cruz  Please let the patient know that the renal function is stable  Urine with significant white and red cells  This may be related to the stents and as long as the patient is feeling well no changes needed for now   -hemoglobin is lower than before  At 11   Please ask the patient to repeat hemoglobin in 1 month with a CBC     Thank you    np

## 2021-01-18 ENCOUNTER — APPOINTMENT (OUTPATIENT)
Dept: PHYSICAL THERAPY | Facility: CLINIC | Age: 81
End: 2021-01-18
Payer: MEDICARE

## 2021-01-20 ENCOUNTER — APPOINTMENT (OUTPATIENT)
Dept: PHYSICAL THERAPY | Facility: CLINIC | Age: 81
End: 2021-01-20
Payer: MEDICARE

## 2021-01-20 DIAGNOSIS — N13.8 BPH WITH OBSTRUCTION/LOWER URINARY TRACT SYMPTOMS: ICD-10-CM

## 2021-01-20 DIAGNOSIS — R31.0 HEMATURIA, GROSS: ICD-10-CM

## 2021-01-20 DIAGNOSIS — N40.1 BPH WITH OBSTRUCTION/LOWER URINARY TRACT SYMPTOMS: ICD-10-CM

## 2021-01-20 RX ORDER — FINASTERIDE 5 MG/1
5 TABLET, FILM COATED ORAL DAILY
Qty: 90 TABLET | Refills: 3 | Status: SHIPPED | OUTPATIENT
Start: 2021-01-20 | End: 2022-01-11

## 2021-01-22 ENCOUNTER — PATIENT OUTREACH (OUTPATIENT)
Dept: CASE MANAGEMENT | Facility: HOSPITAL | Age: 81
End: 2021-01-22

## 2021-01-22 NOTE — PROGRESS NOTES
Chart review completed  Outside records through [facility] requested and refreshed  Patient scheduled for OT 1/28 and neuro 2/2  CM contacted patient and spoke briefly with patient before phone line was disconnected  Patient had informed CM that he had a watchman device installed and had a follow up appointment with cardiology already this past Tuesday  CM contacted patient this evening who reports Internet interruption for which phone was disconnected earlier this afternoon  Patient is unaware of OT appointment scheduled for 1/28 in which he reports he had to put on hold for two weeks after having the watchman device installed  CM explained that appointment may have been created as a placeholder to get patient on the calendar and provided patient with appointment time and phone number: 130 pm on 1/28 P  [phone]  CM informed patient that medicare bundle program will soon close and this will be the final outreach of this CM  Patient encouraged to call CM beyond closure date as needed for assistance    Patient verbalized understanding  ;  This CM will continue to monitor electronically via chart review, outreach and [facility]

## 2021-01-25 ENCOUNTER — APPOINTMENT (OUTPATIENT)
Dept: PHYSICAL THERAPY | Facility: CLINIC | Age: 81
End: 2021-01-25
Payer: MEDICARE

## 2021-01-27 ENCOUNTER — APPOINTMENT (OUTPATIENT)
Dept: PHYSICAL THERAPY | Facility: CLINIC | Age: 81
End: 2021-01-27
Payer: MEDICARE

## 2021-01-27 ENCOUNTER — PATIENT OUTREACH (OUTPATIENT)
Dept: CASE MANAGEMENT | Facility: HOSPITAL | Age: 81
End: 2021-01-27

## 2021-01-27 NOTE — PROGRESS NOTES
Medicare Bundle episode ends today  BPCI form updated, care coordination note removed and bundle episode resolved  CM removed self from care team and sent Inbasket message to Transitional Care Specialist regarding bundle closure

## 2021-01-28 ENCOUNTER — EVALUATION (OUTPATIENT)
Dept: OCCUPATIONAL THERAPY | Facility: CLINIC | Age: 81
End: 2021-01-28
Payer: MEDICARE

## 2021-01-28 DIAGNOSIS — I63.9 CEREBROVASCULAR ACCIDENT (CVA), UNSPECIFIED MECHANISM (HCC): Primary | ICD-10-CM

## 2021-01-28 PROCEDURE — 97168 OT RE-EVAL EST PLAN CARE: CPT

## 2021-01-28 NOTE — PROGRESS NOTES
OT Discharge     Today's date: 2021  Patient name: Martha Glynn  : 1940  MRN: [id number]  Referring provider: Toro Mejia DO  Dx:   Encounter Diagnosis     ICD-10-CM    1  Cerebrovascular accident (CVA), unspecified mechanism ([facility] [address] )  I63 9        Start Time: 1325  Stop Time: 1408  Total time in clinic (min): 43 minutes    Subjective:   Pt reported surgery went well except for difficulty stopping bleeding at the incision site at the groin  He had a "umbrella" inserted at the valve to prevent further leaking at the valve  Needs a valve replacement in ~4-6 weeks depending on blood thinners and healing of watchman surgery  No changes in UE strength, FMC, and cognition  Objective:       9 HOLE PEG TEST: performed 9 hole peg test to assess dexterity/fine motor coordination with pt scoring 27 45 seconds on R hand (affected) and 33 52 seconds on L hand (unaffected) side   Pt demonstrating decreased [address] related to age-related norms     Functional Dexterity Test for in-hand manipulation     R UE 24 03      L UE 26 01     Further Observations in UE Assessment     Subluxation: none      None Scapular winging noted      none Spasticity Noted     PROPRIOCEPTION:  appears to not be impaired            [place] Cognitive Assessment Version 8 1 (MoCA V8 1)  Visuospatial/executive functionin/5  Naming:  3/3  Memory: 1st trial:  5, 2nd trial:  4/5  Attention/concentration: 2/2  List of letters: 1/1  Serial Seven Subtraction:  3/3   Language/sentence repetition:  1/2  Language Fluency:  1/  Abstract/Correlational Thinkin/2  Delayed Recall:  5  Orientation:  6              Memory Index Score: 13/15  MoCA V1 8 1 Raw Score:  28/30, MIS:  13/15, indicative of no neurocognitive impairments         Trail making Part A and Part B:   Trial A: 35 79 seconds with 1 error  Trial B: 74 seconds with 2 VCs for locating numbers        Impairments Section:  UE Strength:              STEBV: RUE:60/200 LUE: 72/200  The age norm is approximately 65 7 lbs and indicating decreased  strength              2 point pinch: RUE: 13, LUE: 14               lateral point pinch: RUE: 17, LUE: 18 5                       3 point pinch: RUE: 16, LUE: 18       Range of Motion:  AROM:   RUE   -  shoulder flexion 100 % however everyother joint WNLs  LUE within normal limits     MMT:  R UE: 5/5 in all pivots   L UE 5/5 in all pivots         Pt is R hand dominant       GOALS:   Short Term Goals:  Pt will demo with G carryover of compensatory strategies for visual tracking/gazing/awareness in R peripheral visual fields and complete bells cancellation test with 10% decrease to improve engagement and functional performance in salient tasks and eventual  role, if cleared 4 weeks Pt reports no difficulties   Pt will increase dexterity by 10% to complete IADLs ACHIEVED   Pt will increase hand  by 10% on R UE to complete IADLs including grocery shopping and cooking ACHIEVED   Pt will increase FMC by 10% to complete IADLs including cooking ACHHIVED               Long Term Goals: 8-12 weeks  Pt will demo with G carryover of compensatory strategies for visual tracking/gazing/awareness in R peripheral visual fields and complete bells canclelation test with 25% decrease to improve engagement and functional performance in salient tasks and eventual  role, if cleared 4 weeks Pt reports no difficulties ACHIEVED   Pt will increase dexterity by  25% to complete IADLs ACHIEVED   Pt will increase hand  by 25% on R UE to complete IADLs including grocery shopping and cooking ACHIEVED   Pt will increase FMC by 25% to complete IADLs including cooking ACHIEVED   Pt will complete HEP independently and be compliant with HEP     NEW GOAL: 12/21/2020 approximately 4 weeks   Pt will increase UE strength for shoulder flexion by 4+/5 to complete IADL routine ACHIEVED    TREATMENT:  Educated and performed HEP of UE strength 3-4 times a week Assessment: Tolerated treatment well  Patient would benefit from continued OT  Pt reports improvements in shoulder strength, and increased hand strength and [address]  Pt reports no difficulties in hand strength, FMC, dexterity, divided attention, and UE strength  Pt demonstrating improvements in [address], hand strength, and dexterity, divided attention, and UE strength  Recommending discharge with UE strength and hand strengthening HEP  pt in understanding and agreement  Plan: Continue per plan of care        Precautions: AFIB HTN        Outcome measures  IE11/19/20                 5XSTS 15 41 sec   Retropulsed x 1        TUG                  Parks  47/56        10MWT 12 71 sec   0 78 m/sec  2 58 ft/sec                                    6MWT         REO 30 sec         REC 30 sec         SREO 2 sec         SREC NT        Foam FTEO 30 sec         Foam FTEC 10 sec         SLS

## 2021-02-02 ENCOUNTER — TELEMEDICINE (OUTPATIENT)
Dept: NEUROLOGY | Facility: CLINIC | Age: 81
End: 2021-02-02
Payer: MEDICARE

## 2021-02-02 DIAGNOSIS — I48.20 CHRONIC ATRIAL FIBRILLATION (HCC): ICD-10-CM

## 2021-02-02 DIAGNOSIS — I67.1 ANEURYSM OF ANTERIOR COMMUNICATING ARTERY: ICD-10-CM

## 2021-02-02 DIAGNOSIS — I63.40 CEREBROVASCULAR ACCIDENT (CVA) DUE TO EMBOLISM OF CEREBRAL ARTERY (HCC): Primary | ICD-10-CM

## 2021-02-02 DIAGNOSIS — E11.9 DIABETES MELLITUS TYPE 2, NONINSULIN DEPENDENT (HCC): ICD-10-CM

## 2021-02-02 PROCEDURE — 99204 OFFICE O/P NEW MOD 45 MIN: CPT | Performed by: PSYCHIATRY & NEUROLOGY

## 2021-02-02 NOTE — PROGRESS NOTES
Virtual Regular Visit      Assessment/Plan:    Problem List Items Addressed This Visit        Cardiovascular and Mediastinum    Chronic atrial fibrillation ([facility] [address] )     Hx of chronic afib was not on ac prior to stroke in October  Now on eliquis  Had watchman device implanted 01/07/2021 and will remain on eliquis for 4-6 weeks per cards  Continue to follow with cardiology  Continue Eliquis         CVA (cerebral vascular accident) [facility]) - Primary     Kailyn Vargas is a 68 y o  male presenting to clinic for stroke follow up, was seen at [facility] from 10/28 to 10/30 and then attended post acute rehab  He has a hx of afib not on AC prior to that stroke as well as numerous other vascular risk factors  He is also a former smoker  He is now on eliquis 5 mg BID  He completed speech and occupational therapy, still in PT  Exam: No pronator drift, no facial droop or strength deficits appreciated    Workup:  · MRI brain 10/28 - acute ischemia in the left parafalcine frontal lobe  Old lacunar infarct within the left caudate head  Minor chronic microangiopathy  Acomm artery aneurysm  L ICA stenosis  · MRA/MRV ordered by neurosurgery for 6/2021    Plan:   Continue to follow with neurosurg for aneurysm surveillance   Continue eliquis as directed by cardiology/CT surgery s/p watchman placement   Continue atorvastatin 40 mg daily   Continue to follow with cardiology and vascular as previously   Update A1c and lipid panel           Relevant Orders    HEMOGLOBIN A1C W/ EAG ESTIMATION    Lipid panel    Aneurysm of anterior communicating artery     5x3x3 mm acomm and 2 mm pcomm aneurysm noted on CTA/MRI brain during stroke workup in October 2020  Follows with neurosurgery  MRA/MRV ordered by neurosurg for 6/2021  Will continue to follow with nsgy             Other Visit Diagnoses     Diabetes mellitus type 2, noninsulin dependent ([facility] [address] )         Relevant Orders    HEMOGLOBIN A1C W/ EAG ESTIMATION               Reason for visit is No chief complaint on file  Encounter provider Scot Buckley MD    Provider located at [address] [place] [id number]      Recent Visits  No visits were found meeting these conditions  Showing recent visits within past 7 days and meeting all other requirements     Today's Visits  Date Type Provider Dept   02/02/21 Telemedicine Scot Buckley MD [facility]   Showing today's visits and meeting all other requirements     Future Appointments  No visits were found meeting these conditions  Showing future appointments within next 150 days and meeting all other requirements        The patient was identified by name and date of birth  Shena Rodriguez was informed that this is a telemedicine visit and that the visit is being conducted through Audience and patient was informed that this is a secure, HIPAA-compliant platform  He agrees to proceed     My office door was closed  No one else was in the room  He acknowledged consent and understanding of privacy and security of the video platform  The patient has agreed to participate and understands they can discontinue the visit at any time  Patient is aware this is a billable service  Subjective  Shena Rodriguez is a 68 y o  male presenting as hospital follow up for stroke  Consult HPI 02/02/21    Shena Rodriguez is a 68 y o  male who was seen at [facility] from 10/28/20-10/30/20 as a stroke alert  His initial presentation was right sided weakness and was found on imaging to have left parafalcine stroke  He has numerous vascular risk factors including afib not on AC due to comorbid hematuria, HFpEF, moderate to severe AS, hx of CEA   Vascular imaging during his hospitalization showed a 5 x 3 x 3 mm inferiorly projecting anterior communicating artery aneurysm and probable 2 mm anteriorly projecting wide based aneurysm versus ectasia at the left P1 to P2 junction  Also appeared to have L ICA stenosis on CTA however carotid duplex showed non occlusive irregular plaque with 50-60%  Was felt his stroke was more likely cardio embolic due to afib not on ac at that time versus symptomatic carotid  He was started on eliquis given embolic strokes int he setting of afib and discharged to rehab  Since discharge from rehab pt got a watchman installed on January 7th and still on blood thinners for 4-6 weeks  Worsening AS discovered and is being followed by cardiology  He has made significant improvements in his strength  He is still attending PT but has no complaints, notes his strength on the right side is not as good as prior to his stroke but he is able to do all his ADLs and has no difficulty walking  Exam was grossly non focal via video         Past Medical History:   Diagnosis Date    Aneurysm ([address] )     of brain  being monitored    Atrial fibrillation ([address] )     Essential hypertension, long-standing     Heart murmur, lifelong     heart murmur since birth    Hematuria     intermittent    History of cigarette smoking, chronic     64 year smoker at one half pack per day, quit 04/1/17    Hyperlipidemia     Hypertension     Irregular heart beat     Kidney stones     12 episodes of kidney stones    Kidney stones with lithotripsy 03/2017    [facility]    Pneumonia      X 2    Stroke [facility]) 10/29/2020    right sided  weakness almost full recovery    Vitamin D deficiency     maintained with 1000 units of D    Water retention     Wears glasses     Wears partial dentures     upper       Past Surgical History:   Procedure Laterality Date    APPENDECTOMY  1960    CAROTID ENDARTERECTOMY Left 1998    Supposedly no internal stenosis found    CYSTOSCOPY Right 8/15/2017    Procedure: CYSTOSCOPY RIGHT STENT EXCHANGE;  Surgeon: Miguel Arellano MD;  Location: [address] [place] [address];  Service: Urology    CYSTOSCOPY     [address] 03/2017    For nephrolithiasis at [address]  5/10/2019    FL RETROGRADE PYELOGRAM  7/30/2019    FL RETROGRADE PYELOGRAM  10/22/2019    FL RETROGRADE PYELOGRAM  1/28/2020    FL RETROGRADE PYELOGRAM  8/11/2020    FL RETROGRADE PYELOGRAM  12/15/2020    NC CYSTO/URETERO W/LITHOTRIPSY &INDWELL STENT INSRT Right 5/2/2017    Procedure: CYSTOSCOPY URETEROSCOPY WITH LITHOTRIPSY HOLMIUM LASER, RETROGRADE PYELOGRAM AND INSERTION STENT URETERAL;  Surgeon: Chi Mares MD;  Location: [address] [place] [address];  Service: Urology    NC CYSTO/URETERO W/LITHOTRIPSY &INDWELL STENT INSRT Right 5/16/2017    Procedure: CYSTOSCOPY, RETROGRADE PYELOGRAM,  FLEXIBLE URETEROSCOPY,  HOLMIUM LASER LITHOTRIPSY, STONE BASKET MANIPULATION,  STENT URETERAL EXCHANGE;  Surgeon: Chi Mares MD;  Location: [address] [place] [address];  Service: Urology    NC CYSTO/URETERO W/LITHOTRIPSY &INDWELL STENT INSRT Right 6/2/2017    Procedure: CYSTOSCOPY, RETROGRADE, STONE MANIPULATION WITH HOLMIUM LASER, STENT PLACEMENT;  Surgeon: Chi Mares MD;  Location: [address] [place] [address];  Service: Urology    NC CYSTO/URETERO W/LITHOTRIPSY &INDWELL STENT INSRT Right 7/18/2017    Procedure: CYSTOSCOPY, RETROGRADE, URETEROSCOPY HOLMIUM LASER [place],  AND STENT PLACEMENT;  Surgeon: Chi Mares MD;  Location: [address] [place] [address];  Service: Urology    NC CYSTOSCOPY,INSERT URETERAL STENT Right 11/14/2017    Procedure: EXCHANGE STENT URETERAL;  Surgeon: Chi Mares MD;  Location: [address] [place] [address];  Service: Urology    NC CYSTOURETHROSCOPY,URETER CATHETER Right 11/14/2017    Procedure: CYSTOSCOPY RETROGRADE, STENT EXCHANGE;  Surgeon: Chi Mares MD;  Location: [address] [place] [address];  Service: Urology    NC CYSTOURETHROSCOPY,URETER CATHETER Right 5/8/2018    Procedure: Isabella Hinkle;  Surgeon: Chi Mares MD;  Location: [address] [place] [address];  Service: Urology    NC CYSTOURETHROSCOPY,URETER CATHETER Right 8/14/2018 Procedure: Juliet Qamar;  Surgeon: Pretty Crouch MD;  Location: [address] [place] [address];  Service: Urology    SD CYSTOURETHROSCOPY,URETER CATHETER Right 11/13/2018    Procedure: Juliet Foote, RETROGRADE;  Surgeon: Pretty Crouch MD;  Location: [address] [place] [address];  Service: Urology    SD CYSTOURETHROSCOPY,URETER CATHETER Right 2/12/2019    Procedure: Junita Ape, STENT REMOVAL AND STENT PLACEMENT;  Surgeon: Pretty Crouch MD;  Location: [address] [place] [address];  Service: Urology    SD CYSTOURETHROSCOPY,URETER CATHETER Right 5/10/2019    Procedure: Junita Ape, STENT EXCHANGE;  Surgeon: Pretty Crouch MD;  Location: [address] [place] [address];  Service: Urology    SD CYSTOURETHROSCOPY,URETER CATHETER Right 7/30/2019    Procedure: CYSTOSCOPY, RETROGRADE, STENT REMOVAL AND PLACEMENT OF STENT;  Surgeon: Pretty Crouch MD;  Location: [address] [place] [address];  Service: Urology    SD CYSTOURETHROSCOPY,URETER CATHETER Right 10/22/2019    Procedure: Junita Ape, STENT EXCHANGE;  Surgeon: Pretty Crouch MD;  Location: [address] [place] [address];  Service: Urology    SD CYSTOURETHROSCOPY,URETER CATHETER Right 1/28/2020    Procedure: CYSTOSCOPY, RETROGRADE, STENT REMOVAL AND STENT PLACEMENT;  Surgeon: Pretty Crouch MD;  Location: [address] [place] [address];  Service: Urology    SD CYSTOURETHROSCOPY,URETER CATHETER Right 5/22/2020    Procedure: CYSTOSCOPY RETROGRADE, STENT EXCHANGE;  Surgeon: Pretty Crouch MD;  Location: [address] [place] [address];  Service: Urology    SD CYSTOURETHROSCOPY,URETER CATHETER Right 8/11/2020    Procedure: CYSTOSCOPY, STENT EXCHANGE, RETROGRADE;  Surgeon: Pretty Crouch MD;  Location: [address] [place] [address];  Service: Urology    SD CYSTOURETHROSCOPY,URETER CATHETER Right 12/15/2020    Procedure: CYSTOSCOPY, RETROGRADE, STENT REMOVAL, AND STENT PLACEMENT;  Surgeon: Pretty Crouch MD;  Location: [address] [place] [address];  Service: Urology    PROSTATE SURGERY  2015    Laser Prostatectomy  by Dr Gonzalez Barker PLACEMENT Right 4/18/2017    Procedure: INSERTION STENT URETERAL, RIGHT URETEROSCOPY,  LASER OF URETERAL STRICTURE AND STONE;  Surgeon: Pastor Bobby MD;  Location: [address] [place] [address];  Service:     URETERAL STENT PLACEMENT Right 2/13/2018    Procedure: Dave Kay;  Surgeon: Pastor Bobby MD;  Location: [facility];  Service: Urology       Current Outpatient Medications   Medication Sig Dispense Refill    acetaminophen (TYLENOL) 325 mg tablet Take 2 tablets (650 mg total) by mouth every 8 (eight) hours as needed for mild pain  0    apixaban (ELIQUIS) 5 mg Take 1 tablet (5 mg total) by mouth 2 (two) times a day (Patient taking differently: Take 5 mg by mouth 2 (two) times a day Was told he did not need to stop ahead of surgery  ) 60 tablet 1    atorvastatin (LIPITOR) 40 mg tablet Take 1 tablet (40 mg total) by mouth daily with dinner 30 tablet 1    cholecalciferol (VITAMIN D3) 1,000 units tablet Take 1,000 Units by mouth daily after lunch        finasteride (PROSCAR) 5 mg tablet Take 1 tablet (5 mg total) by mouth daily 30 tablet 1    finasteride (PROSCAR) 5 mg tablet Take 1 tablet (5 mg total) by mouth daily 90 tablet 3    metoprolol tartrate (LOPRESSOR) 25 mg tablet take 1 tablet by mouth every 8 hours 270 tablet 0     No current facility-administered medications for this visit  No Known Allergies    Review of Systems   Constitutional: Negative for chills and fever  HENT: Negative for ear pain and sore throat  Eyes: Negative for pain and visual disturbance  Respiratory: Negative for cough and shortness of breath  Cardiovascular: Negative for chest pain and palpitations  Gastrointestinal: Negative for abdominal pain and vomiting  Genitourinary: Negative for dysuria and hematuria  Musculoskeletal: Negative for arthralgias and back pain  Skin: Negative for color change and rash  Neurological: Negative for seizures and syncope     All other systems reviewed and are negative  Video Exam    There were no vitals filed for this visit  Physical Exam  Vitals signs reviewed  Constitutional:       General: He is not in acute distress  HENT:      Head: Normocephalic and atraumatic  Eyes:      Extraocular Movements: EOM normal    Pulmonary:      Effort: Pulmonary effort is normal  No respiratory distress  Neurological:      Mental Status: He is alert and oriented to person, place, and time  Coordination: Finger-Nose-Finger Test and Romberg Test normal    Psychiatric:         Mood and Affect: Mood normal          Behavior: Behavior normal         Neurologic Exam     Mental Status   Oriented to person, place, and time  Attention: normal  Concentration: normal    Level of consciousness: alert  Knowledge: good  Normal comprehension  Cranial Nerves     CN III, IV, VI   Extraocular motions are normal      CN V   Facial sensation intact  CN VII   Facial expression full, symmetric  CN VIII   CN VIII normal      CN IX, X   CN IX normal    CN X normal      CN XII   CN XII normal      Exam limited by nature of video visit     Motor Exam   Muscle bulk: normal  Right arm pronator drift: absent  Left arm pronator drift: absent  No apparent strength issues on video exam     Sensory Exam   Light touch normal      Gait, Coordination, and Reflexes     Coordination   Romberg: negative  Finger to nose coordination: normal    Tremor   Resting tremor: absent  Action tremor: absent  No reflexes due to video visit     I spent 15 minutes directly with the patient during this visit      VIRTUAL VISIT DISCLAIMER    Roselia Muñiz acknowledges that he has consented to an online visit or consultation  He understands that the online visit is based solely on information provided by him, and that, in the absence of a face-to-face physical evaluation by the physician, the diagnosis he receives is both limited and provisional in terms of accuracy and completeness   This is not intended to replace a full medical face-to-face evaluation by the physician  Johana Orta understands and accepts these terms

## 2021-02-02 NOTE — ASSESSMENT & PLAN NOTE
5x3x3 mm acomm and 2 mm pcomm aneurysm noted on CTA/MRI brain during stroke workup in October 2020  Follows with neurosurgery  MRA/MRV ordered by neurosurg for 6/2021  Will continue to follow with nsgy

## 2021-02-02 NOTE — ASSESSMENT & PLAN NOTE
Hx of chronic afib was not on ac prior to stroke in October  Now on eliquis  Had watchman device implanted 01/07/2021 and will remain on eliquis for 4-6 weeks per cards       Continue to follow with cardiology  Continue Eliquis

## 2021-02-03 ENCOUNTER — APPOINTMENT (OUTPATIENT)
Dept: PHYSICAL THERAPY | Facility: CLINIC | Age: 81
End: 2021-02-03
Payer: MEDICARE

## 2021-02-09 ENCOUNTER — OFFICE VISIT (OUTPATIENT)
Dept: PHYSICAL THERAPY | Facility: CLINIC | Age: 81
End: 2021-02-09
Payer: MEDICARE

## 2021-02-09 DIAGNOSIS — I63.00 CEREBROVASCULAR ACCIDENT (CVA) DUE TO THROMBOSIS OF PRECEREBRAL ARTERY (HCC): Primary | ICD-10-CM

## 2021-02-09 DIAGNOSIS — R26.89 BALANCE DISORDER: ICD-10-CM

## 2021-02-09 PROCEDURE — 97164 PT RE-EVAL EST PLAN CARE: CPT | Performed by: PHYSICAL THERAPIST

## 2021-02-09 NOTE — PROGRESS NOTES
PT Re-eval    Today's date: 2021  Patient name: Ariel Morton  : 1940  MRN: [id number]  Referring provider: Chester Garduno DO  Dx:   Encounter Diagnosis     ICD-10-CM    1  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 ([facility] [address] )  I63 00    2  Balance disorder  R26 89        Start Time: 1454  Stop Time: 3809  Total time in clinic (min): 39 minutes    Assessment  Assessment details:   Mr Maral Boone is 68year old male s/p stroke late 2020 (L frontal lobe, and small anterior communicating artery aneurysm, with 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 event ) The pt transferred from [address] [place] [address] to [facility], then was discharged home 20  He was making good progress when participating in skilled OPPT services, however was discharged in December, due to having a cardiac watchman procedure  As per client, he was then placed on hold from physical activity for 3 weeks  It should be noted that as per client report, he has to go back on 3/1 for another cardiac procedure for a valve repair  This PT called his cardiologist office during session, however he was in surgery at this time and no one else from the office could confirm activity status of client  This PT requested a written confirmation of clearance to resume PT  Vitals monitored and stable, with HR variable while at rest  This PT reviewed clients history, assessed his strength, posture and goals  Clinical decision to defer physical assessments until formally cleared by Dr Albert Asencio  Plan to complete assessments once cleared in order to assess functional impairments  Clients current functional limitations include difficulty with endurance, prolonged ambulation, "unsteadiness" with ambulation, and difficulty with unlevel surfaces           Impairments: abnormal coordination, abnormal gait, abnormal movement, impaired balance, lacks appropriate home exercise program and safety issue  Understanding of Dx/Px/POC: excellent  Goals  Short term goals 2-4 weeks  1  Pt will demonstrate safe ambulation on level surfaces, curbs and stairs  -MET  2  Pt will be independent with early HEP  -MET    Long term goals 4-12 weeks  - ONGOING  1  Pt will demonstrate safe ambulation on inclines, perturb surfaces, and with head turns/forwared ambulation level surfaces  -NOT MET, ONGOING  2  Pt will perform supervised grocery shopping for 45 minutes   - NOT MET, ONGOING  3  Improve Parks score to safe level 53 or higher (or improve by 4 6 points, MDC for acute CVA, to 51 6/56 )- MET  4  Improve 5XSTS score from 15 41 to 13 1 sec (improve by 2 3 sec MDC per current research)   - MET  5  Pt will tolerate walking around the casino with supervision of family member, without loss of balance  - NOT MET, ONGOING  6  Pt will resume cooking with supervision as needed  -NOT MET, ONGOING  7  Increase gait speed by 0 98 ft/sec MDC for CVA client during 10MWT, per current research  -MET    Plan  Plan details: Re-assess outcome measures once medically cleared  Patient would benefit from: skilled physical therapy  Planned modality interventions: manual electrical stimulation  Planned therapy interventions: manual therapy, neuromuscular re-education, patient education, postural training, strengthening, therapeutic exercise, therapeutic activities, stretching, home exercise program, balance and gait training  Frequency: 2x week  Duration in weeks: 12  Plan of Care beginning date: 2/9/2021  Plan of Care expiration date: 5/4/2021  Treatment plan discussed with: patient        Subjective Evaluation    History of Present Illness  Mechanism of injury: Update 2/9/21:  Client s/p Watchman device placement on 1/7/21 by Dr Dina Kerns in [place]  He needs to go for another procedure on 3/1/21 for a valve repair  Update 12/23: No falls  Pt with no complaints or updates  Feeling good         IE:  68year old male suffered stroke 10/30/20 during the night, 1 am     Pt stood up to go to the bathroom and he collapsed, and noted R sided weakness  Stroke affected his R UE, and LE  May have affected his vision, pt is not sure  Hospitalized at [facility] x 1 5 days, then transferred to the [facility] Nov 1-17, then DC home  Pt was brought in today by his dtr Mary Carmen Shepard [phone]  She did not stay for the appointment stated pt could answer all questions  Pt complains of confusion while trying to talk  Pt has OT eval following PT today  Giorgi Campos  feels his balance is good, R UE strength is "good," but weaker compared to before stroke  Pain  No pain reported    Social Support  Steps to enter house: yes (1 step then platform, twice, no railing )  Stairs in house: no (he lives on one level)   Lives with: spouse, adult children and young children    Treatments  Previous treatment: physical therapy  Discharged from (in last 30 days): inpatient hospitalization  Patient Goals  Patient goal: improve walking speed  Leisure activity: casino/walking  Return grocery shopping and cooking   Objective     Functional Assessment        Comments  Re-eval: 2/9/21  - Postural assessment: WNL, mild forward head posturing  Improved midline  - MMT: WNL (5/5)to BLE  - Coordination; heel to/from shin and alternating toe-taps WNL  - Vitals: BP: 134/72 mmHg  HR ranged from 54 bpm- 72 bpm within 2 minute range O2 on room air: 98%      From IE:    Seated posture:pt's head is rotated slightly left when pt states it is in midline  Coordination:   Finger to nose: intact, slight R intention tremor  Heel to shin:  Intact     ROSARIO:  UE:intact      LE:off rhythm    Strength:  Hip flexion:  R=5/5 L=5/5  Quads:  R = 5 /5  L = 5/5  Ankles: R=5/5 except Inv= 4+/5 with mild ROM restriction  L=5/5    Oculomotor:  SP:intact   Saccades:intact     Gait: some inattention to right side, low step height  Gait with HT: not tested          Positionals: deferred               Precautions: Blood thinners  Past Medical History:   Diagnosis Date  Aneurysm ([facility] [address] )     of brain  being monitored    Atrial fibrillation ([facility] [address] )     Essential hypertension, long-standing     Heart murmur, lifelong     heart murmur since birth    Hematuria     intermittent    History of cigarette smoking, chronic     64 year smoker at one half pack per day, quit 17    Hyperlipidemia     Hypertension     Irregular heart beat     Kidney stones     12 episodes of kidney stones    Kidney stones with lithotripsy 2017    [facility]    Pneumonia      X 2    Stroke [facility]) 10/29/2020    right sided  weakness almost full recovery    Vitamin D deficiency     maintained with 1000 units of D    Water retention     Wears glasses     Wears partial dentures     upper         Age norm for 70 yrs per current research:   10M WT: male=4 36 ft/sec  female= 4 16 ft/sec  MDC= 0 59 ft/sec  6MWT: gwss=1255 feet  Female= 1545 feet  MDC= 190 feet  5XSTS: 10 sec  MDC= 2 3sec (vestibular)    Parks: male =54/56  Female= 53/56  FGA:   TU 5 sec for community dwelling adults, 32 2 sec for frail elderly   MDC= 4 14 sec      Outcome measures  IE20 Progress Note  20 Re-eval 21       deferred until medical clearance    5XSTS 15 41 sec   Retropulsed x 1 12 66 sec     TUG  9 38 sec            Parks  47/56 53/56     10MWT 12 71 sec   0 78 m/sec  2 58 ft/sec  8 78 sec  1 14 m/s                   FGA       6MWT  1,010 ft     REO 30 sec  30 sec     REC 30 sec  30 sec     SREO 2 sec   30 sec     SREC NT 18 sec     Foam FTEO 30 sec  30 sec     Foam FTEC 10 sec  30 sec     SLS  R: 1 sec  L: 1 sec

## 2021-02-11 ENCOUNTER — OFFICE VISIT (OUTPATIENT)
Dept: PHYSICAL THERAPY | Facility: CLINIC | Age: 81
End: 2021-02-11
Payer: MEDICARE

## 2021-02-11 DIAGNOSIS — R26.89 BALANCE DISORDER: ICD-10-CM

## 2021-02-11 DIAGNOSIS — I63.00 CEREBROVASCULAR ACCIDENT (CVA) DUE TO THROMBOSIS OF PRECEREBRAL ARTERY (HCC): Primary | ICD-10-CM

## 2021-02-11 PROCEDURE — 97112 NEUROMUSCULAR REEDUCATION: CPT | Performed by: PHYSICAL THERAPIST

## 2021-02-11 PROCEDURE — 97110 THERAPEUTIC EXERCISES: CPT | Performed by: PHYSICAL THERAPIST

## 2021-02-11 NOTE — PROGRESS NOTES
Daily Note  IE: 2020   (POC: 2020)      Today's date: 2021  Patient name: Priyank Mejia  : 1940  MRN: [id number]  Referring provider: Jorge Davis DO  Dx:   Encounter Diagnosis     ICD-10-CM    1  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 ([address] )  I63 00    2  Balance disorder  R26 89                   Subjective: Patient reports "feeling good" today and does not complain of any pain  Objective: See treatment diary below    The following assessments were performed:  - 5XSTS  - TUG  - ELAINE  -10 MWT  - 6 MWT  - REO, REC, SREO, SREC, Foam FTEO, Foam FTEC  - SLS    NMR:  - tandem walking FWD/BWD, 10 feet, parallel bars with 1 UE support  - STS on foam: 20x2  - SLS, 10x 10 sec hold, 1 UE support  - Sidestepping on blue airex- 10 cycles of 10 feet, no UE        Assessment: Patient tolerated treatment well  Patient able to perform testing assessments with verbal clearance from Dr Dina Kerns  Patient required 1 UE support during SLS and tandem walking to maintain balance  Patient will benefit from skilled PT services to improve balance and increase functional mobility  Plan: Continue per plan of care         Precautions: AFIB HTN        Outcome measures  IE20               5XSTS 15 41 sec   Retropulsed x 1 12 66 sec 13 26 sec      TUG  9 38 sec 9 96 sec               Elaine  47/56 53/56 54/56      10MWT 12 71 sec   0 78 m/sec  2 58 ft/sec  8 78 sec  1 14 m/s 9 24 sec                                 6MWT  1010 ft 1010 ft      REO 30 sec  30 sec 30 sec      REC 30 sec  30 sec 30 sec      SREO 2 sec   30 sec 30 sec      SREC NT 18 sec 7 sec      Foam FTEO 30 sec  30 sec 30 sec      Foam FTEC 10 sec  30 sec 30 sec      SLS  1 sec 3 sec

## 2021-02-12 ENCOUNTER — LAB (OUTPATIENT)
Dept: LAB | Facility: HOSPITAL | Age: 81
End: 2021-02-12
Attending: INTERNAL MEDICINE
Payer: MEDICARE

## 2021-02-12 ENCOUNTER — TRANSCRIBE ORDERS (OUTPATIENT)
Dept: ADMINISTRATIVE | Facility: HOSPITAL | Age: 81
End: 2021-02-12

## 2021-02-12 DIAGNOSIS — I63.40 CEREBROVASCULAR ACCIDENT (CVA) DUE TO EMBOLISM OF CEREBRAL ARTERY (HCC): ICD-10-CM

## 2021-02-12 DIAGNOSIS — N18.31 STAGE 3A CHRONIC KIDNEY DISEASE (HCC): ICD-10-CM

## 2021-02-12 DIAGNOSIS — E11.9 DIABETES MELLITUS TYPE 2, NONINSULIN DEPENDENT (HCC): ICD-10-CM

## 2021-02-12 LAB
CHOLEST SERPL-MCNC: 102 MG/DL (ref 50–200)
ERYTHROCYTE [DISTWIDTH] IN BLOOD BY AUTOMATED COUNT: 15.7 % (ref 11.6–15.1)
EST. AVERAGE GLUCOSE BLD GHB EST-MCNC: 123 MG/DL
HBA1C MFR BLD: 5.9 %
HCT VFR BLD AUTO: 36.9 % (ref 36.5–49.3)
HDLC SERPL-MCNC: 38 MG/DL
HGB BLD-MCNC: 11.1 G/DL (ref 12–17)
LDLC SERPL CALC-MCNC: 50 MG/DL (ref 0–100)
MCH RBC QN AUTO: 26.4 PG (ref 26.8–34.3)
MCHC RBC AUTO-ENTMCNC: 30.1 G/DL (ref 31.4–37.4)
MCV RBC AUTO: 88 FL (ref 82–98)
NONHDLC SERPL-MCNC: 64 MG/DL
PLATELET # BLD AUTO: 194 THOUSANDS/UL (ref 149–390)
PMV BLD AUTO: 10.5 FL (ref 8.9–12.7)
RBC # BLD AUTO: 4.2 MILLION/UL (ref 3.88–5.62)
TRIGL SERPL-MCNC: 68 MG/DL
WBC # BLD AUTO: 7.64 THOUSAND/UL (ref 4.31–10.16)

## 2021-02-12 PROCEDURE — 85027 COMPLETE CBC AUTOMATED: CPT

## 2021-02-12 PROCEDURE — 83036 HEMOGLOBIN GLYCOSYLATED A1C: CPT

## 2021-02-12 PROCEDURE — 80061 LIPID PANEL: CPT

## 2021-02-12 PROCEDURE — 36415 COLL VENOUS BLD VENIPUNCTURE: CPT

## 2021-02-14 ENCOUNTER — ANESTHESIA EVENT (OUTPATIENT)
Dept: PERIOP | Facility: HOSPITAL | Age: 81
End: 2021-02-14
Payer: MEDICARE

## 2021-02-14 ENCOUNTER — HOSPITAL ENCOUNTER (OUTPATIENT)
Facility: HOSPITAL | Age: 81
Setting detail: OBSERVATION
Discharge: HOME | End: 2021-02-14
Attending: EMERGENCY MEDICINE | Admitting: INTERNAL MEDICINE
Payer: MEDICARE

## 2021-02-14 ENCOUNTER — APPOINTMENT (EMERGENCY)
Dept: RADIOLOGY | Facility: HOSPITAL | Age: 81
End: 2021-02-14
Payer: MEDICARE

## 2021-02-14 ENCOUNTER — APPOINTMENT (OUTPATIENT)
Dept: RADIOLOGY | Facility: HOSPITAL | Age: 81
End: 2021-02-14
Payer: MEDICARE

## 2021-02-14 ENCOUNTER — ANESTHESIA (OUTPATIENT)
Dept: PERIOP | Facility: HOSPITAL | Age: 81
End: 2021-02-14
Payer: MEDICARE

## 2021-02-14 VITALS
HEIGHT: 70 IN | DIASTOLIC BLOOD PRESSURE: 74 MMHG | BODY MASS INDEX: 27.52 KG/M2 | WEIGHT: 192.24 LBS | TEMPERATURE: 97.8 F | HEART RATE: 80 BPM | OXYGEN SATURATION: 98 % | RESPIRATION RATE: 18 BRPM | SYSTOLIC BLOOD PRESSURE: 161 MMHG

## 2021-02-14 VITALS — HEART RATE: 80 BPM

## 2021-02-14 DIAGNOSIS — N20.0 RENAL CALCULI: ICD-10-CM

## 2021-02-14 DIAGNOSIS — N20.0 KIDNEY STONE: Primary | ICD-10-CM

## 2021-02-14 DIAGNOSIS — N17.9 AKI (ACUTE KIDNEY INJURY) (HCC): ICD-10-CM

## 2021-02-14 PROBLEM — N13.30 HYDRONEPHROSIS OF RIGHT KIDNEY: Status: ACTIVE | Noted: 2020-08-11

## 2021-02-14 LAB
ALBUMIN SERPL BCP-MCNC: 3.2 G/DL (ref 3.5–5)
ALP SERPL-CCNC: 154 U/L (ref 46–116)
ALT SERPL W P-5'-P-CCNC: 14 U/L (ref 12–78)
ANION GAP SERPL CALCULATED.3IONS-SCNC: 6 MMOL/L (ref 4–13)
ANION GAP SERPL CALCULATED.3IONS-SCNC: 8 MMOL/L (ref 4–13)
AST SERPL W P-5'-P-CCNC: 21 U/L (ref 5–45)
BACTERIA UR QL AUTO: ABNORMAL /HPF
BASOPHILS # BLD AUTO: 0.03 THOUSANDS/ΜL (ref 0–0.1)
BASOPHILS NFR BLD AUTO: 0 % (ref 0–1)
BILIRUB SERPL-MCNC: 0.5 MG/DL (ref 0.2–1)
BILIRUB UR QL STRIP: NEGATIVE
BUN SERPL-MCNC: 26 MG/DL (ref 5–25)
BUN SERPL-MCNC: 29 MG/DL (ref 5–25)
CALCIUM ALBUM COR SERPL-MCNC: 10.5 MG/DL (ref 8.3–10.1)
CALCIUM SERPL-MCNC: 8.5 MG/DL (ref 8.3–10.1)
CALCIUM SERPL-MCNC: 9.9 MG/DL (ref 8.3–10.1)
CHLORIDE SERPL-SCNC: 108 MMOL/L (ref 100–108)
CHLORIDE SERPL-SCNC: 108 MMOL/L (ref 100–108)
CLARITY UR: ABNORMAL
CO2 SERPL-SCNC: 28 MMOL/L (ref 21–32)
CO2 SERPL-SCNC: 28 MMOL/L (ref 21–32)
COLOR UR: YELLOW
CREAT SERPL-MCNC: 1.99 MG/DL (ref 0.6–1.3)
CREAT SERPL-MCNC: 2.24 MG/DL (ref 0.6–1.3)
EOSINOPHIL # BLD AUTO: 0.22 THOUSAND/ΜL (ref 0–0.61)
EOSINOPHIL NFR BLD AUTO: 2 % (ref 0–6)
ERYTHROCYTE [DISTWIDTH] IN BLOOD BY AUTOMATED COUNT: 14.2 % (ref 11.6–15.1)
FLUAV RNA RESP QL NAA+PROBE: NEGATIVE
FLUBV RNA RESP QL NAA+PROBE: NEGATIVE
GFR SERPL CREATININE-BSD FRML MDRD: 27 ML/MIN/1.73SQ M
GFR SERPL CREATININE-BSD FRML MDRD: 31 ML/MIN/1.73SQ M
GLUCOSE SERPL-MCNC: 127 MG/DL (ref 65–140)
GLUCOSE SERPL-MCNC: 139 MG/DL (ref 65–140)
GLUCOSE UR STRIP-MCNC: NEGATIVE MG/DL
HCT VFR BLD AUTO: 38.2 % (ref 36.5–49.3)
HGB BLD-MCNC: 11.2 G/DL (ref 12–17)
HGB UR QL STRIP.AUTO: ABNORMAL
IMM GRANULOCYTES # BLD AUTO: 0.02 THOUSAND/UL (ref 0–0.2)
IMM GRANULOCYTES NFR BLD AUTO: 0 % (ref 0–2)
KETONES UR STRIP-MCNC: NEGATIVE MG/DL
LEUKOCYTE ESTERASE UR QL STRIP: ABNORMAL
LIPASE SERPL-CCNC: 93 U/L (ref 73–393)
LYMPHOCYTES # BLD AUTO: 1.61 THOUSANDS/ΜL (ref 0.6–4.47)
LYMPHOCYTES NFR BLD AUTO: 18 % (ref 14–44)
MCH RBC QN AUTO: 24.7 PG (ref 26.8–34.3)
MCHC RBC AUTO-ENTMCNC: 29.3 G/DL (ref 31.4–37.4)
MCV RBC AUTO: 84 FL (ref 82–98)
MONOCYTES # BLD AUTO: 0.98 THOUSAND/ΜL (ref 0.17–1.22)
MONOCYTES NFR BLD AUTO: 11 % (ref 4–12)
NEUTROPHILS # BLD AUTO: 6.16 THOUSANDS/ΜL (ref 1.85–7.62)
NEUTS SEG NFR BLD AUTO: 69 % (ref 43–75)
NITRITE UR QL STRIP: NEGATIVE
NON-SQ EPI CELLS URNS QL MICRO: ABNORMAL /HPF
NRBC BLD AUTO-RTO: 0 /100 WBCS
PH UR STRIP.AUTO: 6 [PH]
PLATELET # BLD AUTO: 202 THOUSANDS/UL (ref 149–390)
PMV BLD AUTO: 9.6 FL (ref 8.9–12.7)
POTASSIUM SERPL-SCNC: 4.6 MMOL/L (ref 3.5–5.3)
POTASSIUM SERPL-SCNC: 5 MMOL/L (ref 3.5–5.3)
PROT SERPL-MCNC: 7.1 G/DL (ref 6.4–8.2)
PROT UR STRIP-MCNC: ABNORMAL MG/DL
RBC # BLD AUTO: 4.53 MILLION/UL (ref 3.88–5.62)
RBC #/AREA URNS AUTO: ABNORMAL /HPF
RSV RNA RESP QL NAA+PROBE: NEGATIVE
SARS-COV-2 RNA RESP QL NAA+PROBE: NEGATIVE
SODIUM SERPL-SCNC: 142 MMOL/L (ref 136–145)
SODIUM SERPL-SCNC: 144 MMOL/L (ref 136–145)
SP GR UR STRIP.AUTO: 1.02 (ref 1–1.03)
UROBILINOGEN UR QL STRIP.AUTO: 0.2 E.U./DL
WBC # BLD AUTO: 9.02 THOUSAND/UL (ref 4.31–10.16)
WBC #/AREA URNS AUTO: ABNORMAL /HPF

## 2021-02-14 PROCEDURE — 99236 HOSP IP/OBS SAME DATE HI 85: CPT | Performed by: FAMILY MEDICINE

## 2021-02-14 PROCEDURE — 96375 TX/PRO/DX INJ NEW DRUG ADDON: CPT

## 2021-02-14 PROCEDURE — 99285 EMERGENCY DEPT VISIT HI MDM: CPT

## 2021-02-14 PROCEDURE — 96376 TX/PRO/DX INJ SAME DRUG ADON: CPT

## 2021-02-14 PROCEDURE — 74176 CT ABD & PELVIS W/O CONTRAST: CPT

## 2021-02-14 PROCEDURE — 36415 COLL VENOUS BLD VENIPUNCTURE: CPT | Performed by: EMERGENCY MEDICINE

## 2021-02-14 PROCEDURE — 87086 URINE CULTURE/COLONY COUNT: CPT | Performed by: NURSE PRACTITIONER

## 2021-02-14 PROCEDURE — 99285 EMERGENCY DEPT VISIT HI MDM: CPT | Performed by: EMERGENCY MEDICINE

## 2021-02-14 PROCEDURE — G1004 CDSM NDSC: HCPCS

## 2021-02-14 PROCEDURE — 0241U HB NFCT DS VIR RESP RNA 4 TRGT: CPT | Performed by: EMERGENCY MEDICINE

## 2021-02-14 PROCEDURE — 81001 URINALYSIS AUTO W/SCOPE: CPT | Performed by: EMERGENCY MEDICINE

## 2021-02-14 PROCEDURE — 85025 COMPLETE CBC W/AUTO DIFF WBC: CPT | Performed by: EMERGENCY MEDICINE

## 2021-02-14 PROCEDURE — 80053 COMPREHEN METABOLIC PANEL: CPT | Performed by: EMERGENCY MEDICINE

## 2021-02-14 PROCEDURE — 96361 HYDRATE IV INFUSION ADD-ON: CPT

## 2021-02-14 PROCEDURE — 80048 BASIC METABOLIC PNL TOTAL CA: CPT | Performed by: NURSE PRACTITIONER

## 2021-02-14 PROCEDURE — 83690 ASSAY OF LIPASE: CPT | Performed by: EMERGENCY MEDICINE

## 2021-02-14 PROCEDURE — 74420 UROGRAPHY RTRGR +-KUB: CPT

## 2021-02-14 PROCEDURE — C2617 STENT, NON-COR, TEM W/O DEL: HCPCS | Performed by: SPECIALIST

## 2021-02-14 PROCEDURE — 96374 THER/PROPH/DIAG INJ IV PUSH: CPT

## 2021-02-14 DEVICE — INLAY URETERAL STENT W/O GUIDEWIRE
Type: IMPLANTABLE DEVICE | Status: NON-FUNCTIONAL
Brand: BARD® INLAY® URETERAL STENT
Removed: 2022-03-11

## 2021-02-14 RX ORDER — HYDROMORPHONE HCL/PF 1 MG/ML
0.5 SYRINGE (ML) INJECTION ONCE
Status: COMPLETED | OUTPATIENT
Start: 2021-02-14 | End: 2021-02-14

## 2021-02-14 RX ORDER — MELATONIN
1000
Status: DISCONTINUED | OUTPATIENT
Start: 2021-02-14 | End: 2021-02-14 | Stop reason: HOSPADM

## 2021-02-14 RX ORDER — PROPOFOL 10 MG/ML
INJECTION, EMULSION INTRAVENOUS AS NEEDED
Status: DISCONTINUED | OUTPATIENT
Start: 2021-02-14 | End: 2021-02-14

## 2021-02-14 RX ORDER — ONDANSETRON 2 MG/ML
4 INJECTION INTRAMUSCULAR; INTRAVENOUS ONCE
Status: COMPLETED | OUTPATIENT
Start: 2021-02-14 | End: 2021-02-14

## 2021-02-14 RX ORDER — FENTANYL CITRATE/PF 50 MCG/ML
25 SYRINGE (ML) INJECTION
Status: DISCONTINUED | OUTPATIENT
Start: 2021-02-14 | End: 2021-02-14 | Stop reason: HOSPADM

## 2021-02-14 RX ORDER — FENTANYL CITRATE 50 UG/ML
INJECTION, SOLUTION INTRAMUSCULAR; INTRAVENOUS AS NEEDED
Status: DISCONTINUED | OUTPATIENT
Start: 2021-02-14 | End: 2021-02-14

## 2021-02-14 RX ORDER — ACETAMINOPHEN 325 MG/1
650 TABLET ORAL EVERY 8 HOURS PRN
Status: DISCONTINUED | OUTPATIENT
Start: 2021-02-14 | End: 2021-02-14 | Stop reason: HOSPADM

## 2021-02-14 RX ORDER — HYDROMORPHONE HCL/PF 1 MG/ML
0.5 SYRINGE (ML) INJECTION EVERY 4 HOURS PRN
Status: DISCONTINUED | OUTPATIENT
Start: 2021-02-14 | End: 2021-02-14 | Stop reason: HOSPADM

## 2021-02-14 RX ORDER — ONDANSETRON 2 MG/ML
4 INJECTION INTRAMUSCULAR; INTRAVENOUS EVERY 6 HOURS PRN
Status: DISCONTINUED | OUTPATIENT
Start: 2021-02-14 | End: 2021-02-14 | Stop reason: HOSPADM

## 2021-02-14 RX ORDER — ONDANSETRON 2 MG/ML
INJECTION INTRAMUSCULAR; INTRAVENOUS AS NEEDED
Status: DISCONTINUED | OUTPATIENT
Start: 2021-02-14 | End: 2021-02-14

## 2021-02-14 RX ORDER — SODIUM CHLORIDE, SODIUM LACTATE, POTASSIUM CHLORIDE, CALCIUM CHLORIDE 600; 310; 30; 20 MG/100ML; MG/100ML; MG/100ML; MG/100ML
50 INJECTION, SOLUTION INTRAVENOUS CONTINUOUS
Status: DISCONTINUED | OUTPATIENT
Start: 2021-02-14 | End: 2021-02-14 | Stop reason: HOSPADM

## 2021-02-14 RX ORDER — CALCIUM CARBONATE 200(500)MG
1000 TABLET,CHEWABLE ORAL DAILY PRN
Status: DISCONTINUED | OUTPATIENT
Start: 2021-02-14 | End: 2021-02-14 | Stop reason: HOSPADM

## 2021-02-14 RX ORDER — LIDOCAINE HYDROCHLORIDE 10 MG/ML
INJECTION, SOLUTION EPIDURAL; INFILTRATION; INTRACAUDAL; PERINEURAL AS NEEDED
Status: DISCONTINUED | OUTPATIENT
Start: 2021-02-14 | End: 2021-02-14

## 2021-02-14 RX ORDER — ATORVASTATIN CALCIUM 40 MG/1
40 TABLET, FILM COATED ORAL
Status: DISCONTINUED | OUTPATIENT
Start: 2021-02-14 | End: 2021-02-14 | Stop reason: HOSPADM

## 2021-02-14 RX ORDER — FINASTERIDE 5 MG/1
5 TABLET, FILM COATED ORAL DAILY
Status: DISCONTINUED | OUTPATIENT
Start: 2021-02-14 | End: 2021-02-14 | Stop reason: HOSPADM

## 2021-02-14 RX ORDER — DOCUSATE SODIUM 100 MG/1
100 CAPSULE, LIQUID FILLED ORAL 2 TIMES DAILY
Status: DISCONTINUED | OUTPATIENT
Start: 2021-02-14 | End: 2021-02-14 | Stop reason: HOSPADM

## 2021-02-14 RX ORDER — LEVOFLOXACIN 5 MG/ML
500 INJECTION, SOLUTION INTRAVENOUS ONCE
Status: COMPLETED | OUTPATIENT
Start: 2021-02-14 | End: 2021-02-14

## 2021-02-14 RX ORDER — TAMSULOSIN HYDROCHLORIDE 0.4 MG/1
0.4 CAPSULE ORAL
Status: DISCONTINUED | OUTPATIENT
Start: 2021-02-14 | End: 2021-02-14 | Stop reason: HOSPADM

## 2021-02-14 RX ORDER — ONDANSETRON 2 MG/ML
4 INJECTION INTRAMUSCULAR; INTRAVENOUS ONCE AS NEEDED
Status: DISCONTINUED | OUTPATIENT
Start: 2021-02-14 | End: 2021-02-14 | Stop reason: HOSPADM

## 2021-02-14 RX ORDER — MAGNESIUM HYDROXIDE 1200 MG/15ML
LIQUID ORAL AS NEEDED
Status: DISCONTINUED | OUTPATIENT
Start: 2021-02-14 | End: 2021-02-14 | Stop reason: HOSPADM

## 2021-02-14 RX ORDER — SENNOSIDES 8.6 MG
1 TABLET ORAL
Status: DISCONTINUED | OUTPATIENT
Start: 2021-02-14 | End: 2021-02-14 | Stop reason: HOSPADM

## 2021-02-14 RX ORDER — SODIUM CHLORIDE 9 MG/ML
75 INJECTION, SOLUTION INTRAVENOUS CONTINUOUS
Status: DISCONTINUED | OUTPATIENT
Start: 2021-02-14 | End: 2021-02-14 | Stop reason: HOSPADM

## 2021-02-14 RX ADMIN — PROPOFOL 100 MG: 10 INJECTION, EMULSION INTRAVENOUS at 11:45

## 2021-02-14 RX ADMIN — TAMSULOSIN HYDROCHLORIDE 0.4 MG: 0.4 CAPSULE ORAL at 16:50

## 2021-02-14 RX ADMIN — METOPROLOL TARTRATE 25 MG: 25 TABLET, FILM COATED ORAL at 10:23

## 2021-02-14 RX ADMIN — FENTANYL CITRATE 50 MCG: 50 INJECTION, SOLUTION INTRAMUSCULAR; INTRAVENOUS at 11:45

## 2021-02-14 RX ADMIN — SODIUM CHLORIDE, SODIUM LACTATE, POTASSIUM CHLORIDE, AND CALCIUM CHLORIDE 50 ML/HR: .6; .31; .03; .02 INJECTION, SOLUTION INTRAVENOUS at 14:30

## 2021-02-14 RX ADMIN — HYDROMORPHONE HYDROCHLORIDE 0.5 MG: 1 INJECTION, SOLUTION INTRAMUSCULAR; INTRAVENOUS; SUBCUTANEOUS at 04:47

## 2021-02-14 RX ADMIN — SODIUM CHLORIDE 1000 ML: 0.9 INJECTION, SOLUTION INTRAVENOUS at 03:52

## 2021-02-14 RX ADMIN — FENTANYL CITRATE 25 MCG: 50 INJECTION, SOLUTION INTRAMUSCULAR; INTRAVENOUS at 11:56

## 2021-02-14 RX ADMIN — LIDOCAINE HYDROCHLORIDE 50 MG: 10 INJECTION, SOLUTION EPIDURAL; INFILTRATION; INTRACAUDAL; PERINEURAL at 11:45

## 2021-02-14 RX ADMIN — ONDANSETRON 4 MG: 2 INJECTION INTRAMUSCULAR; INTRAVENOUS at 03:57

## 2021-02-14 RX ADMIN — ONDANSETRON 4 MG: 2 INJECTION INTRAMUSCULAR; INTRAVENOUS at 11:54

## 2021-02-14 RX ADMIN — HYDROMORPHONE HYDROCHLORIDE 0.5 MG: 1 INJECTION, SOLUTION INTRAMUSCULAR; INTRAVENOUS; SUBCUTANEOUS at 03:58

## 2021-02-14 RX ADMIN — Medication 1000 UNITS: at 14:27

## 2021-02-14 RX ADMIN — ATORVASTATIN CALCIUM 40 MG: 40 TABLET, FILM COATED ORAL at 16:49

## 2021-02-14 RX ADMIN — METOPROLOL TARTRATE 25 MG: 25 TABLET, FILM COATED ORAL at 16:49

## 2021-02-14 RX ADMIN — LEVOFLOXACIN: 5 INJECTION, SOLUTION INTRAVENOUS at 11:42

## 2021-02-14 RX ADMIN — SODIUM CHLORIDE 75 ML/HR: 0.9 INJECTION, SOLUTION INTRAVENOUS at 10:23

## 2021-02-14 RX ADMIN — FENTANYL CITRATE 25 MCG: 50 INJECTION, SOLUTION INTRAMUSCULAR; INTRAVENOUS at 12:06

## 2021-02-14 NOTE — ANESTHESIA PREPROCEDURE EVALUATION
Procedure:  CYSTOSCOPY URETEROSCOPY WITH LITHOTRIPSY HOLMIUM LASER, RETROGRADE PYELOGRAM AND INSERTION STENT URETERAL (Right Bladder)    Relevant Problems   CARDIO   (+) Aortic stenosis (JOCELINE 1 0 cm2)   (+) Benign essential hypertension   (+) Chronic atrial fibrillation (HCC)      /RENAL   (+) Hydronephrosis of right kidney   (+) Renal calculi   (+) Stage 3 chronic kidney disease      NEURO/PSYCH   (+) CVA (cerebral vascular accident) ([address] )        Physical Exam    Airway    Mallampati score: II  TM Distance: >3 FB  Neck ROM: full     Dental   Comment: Removable upper partial,     Cardiovascular      Pulmonary      Other Findings        10/29/20 TTE:  LEFT VENTRICLE:  Systolic function was at the lower limits of normal  Ejection fraction was estimated in the range of 50 % to 55 % to be 50 %  Although no diagnostic regional wall motion abnormality was identified, this possibility cannot be completely excluded on the basis of this study  Wall thickness was mildly increased  There was mild concentric hypertrophy      LEFT ATRIUM:  The atrium was moderately dilated      RIGHT ATRIUM:  The atrium was mildly dilated      MITRAL VALVE:  There was mild to moderate annular calcification  There was mild regurgitation      AORTIC VALVE:  The valve was trileaflet  Leaflets exhibited moderately increased thickness, moderate calcification, and moderately reduced cuspal separation  Transaortic velocity was increased due to valvular stenosis  There was moderate to severe stenosis  Joceline 1 0 cm2, peak gradient 40, mean 26 mm of hg  There was mild regurgitation      TRICUSPID VALVE:  There was mild regurgitation  Estimated peak PA pressure was 30 mmHg      PULMONIC VALVE:  There was mild regurgitation  Anesthesia Plan  ASA Score- 3     Anesthesia Type- general with ASA Monitors  Additional Monitors:   Airway Plan: LMA  Comment: Discussed increased risk of complications secondary to aortic stenosis          Plan Factors-Exercise tolerance (METS): >4 METS  Exercise comment: Able to climb two flights of stairs without cardiopulmonary limitation  Chart reviewed  EKG reviewed  Existing labs reviewed  Patient summary reviewed  Patient is not a current smoker (Quit in 2017)  Induction- intravenous  Postoperative Plan- Plan for postoperative opioid use  Planned trial extubation    Informed Consent- Anesthetic plan and risks discussed with patient

## 2021-02-14 NOTE — OP NOTE
OPERATIVE REPORT  PATIENT NAME: Mamie Sullivan    :  1940  MRN: [id number]  Pt Location: [facility] OR ROOM 04    SURGERY DATE: 2021    Surgeon(s) and Role:     * Megan Bates MD - Primary    Preop Diagnosis:  Renal calculi [N20 0]  Right obstructed stent  Right ureteral stones      Post-Op Diagnosis Codes:     * Renal calculi [N20 0]  Right obstructed stent  Right ureteral renal pelvic stones  Right UPJ obstruction    Procedure(s) (LRB):  CYSTOSCOPY URETEROSCOPY, RETROGRADE PYELOGRAM, STONE MANIPULATION AND EXCHANGE STENT URETERAL (Right) stone removal    Specimen(s):  * No specimens in log *    Estimated Blood Loss:   Minimal    Drains:  Ureteral Drain/Stent Right ureter 6 Fr  (Active)   Number of days: 0       Anesthesia Type:   General/LMA    Operative Indications:  Renal calculi [N20 0]  This 81-year-old male presented to [address] [place] [address] early this morning with severe right flank pain well known to Dr Hood Whittaker undergoing 3 month right stent exchanges for right stones last exchanged  found on CT scan to have severe hydronephrosis right ureteral stones and apparently obstructed stent seen at the bedside with severe pain now to undergo emergency cysto right ureteroscopy laser stent exchange    Operative Findings:  1-2 mm stones falling into the bladder as the right stent removed with alligator  Right rigid ureteroscopy confirmed UPJ stenosis with migration of stone back into renal pelvis attempt at flexible ureteroscopy aborted due to UPJ stenosis requiring laser endopyelotomy pyelotomy stent exchange 26 cm 6 Panamanian    Complications:   None    Procedure and Technique:  After the patient identified in holding area right side marked consent signed he was placed in the op suite  After anesthesia induced he was placed in lithotomy position draped prep standard fashion  Time-out performed  A 22 Panamanian cystoscope was passed through through the bladder  Stent easily noted    1 mm stone fragments in the bladder  A point 025 wire was passed up the orifice doubling on itself in the mid to proximal ureter  Attempts at passing the wire by the stent up into the renal pelvis were unsuccessful  A 5 Somali open-ended catheter was then placed over the wire and again attempts at passing the wire with a 5 Somali catheter in unsuccessful  With the catheter still in the alligator was used to remove the double-J stent with a immediate efflux of 1-2 mm stones  Once the stent was out the 5 Western Dyan catheter was then passed upward with the wire passed into the right renal pelvis the CT further removed and wire left a safety  The semi rigid ureteral scope was then passed into the orifice with no remaining stones in the distal mid ureter as the stone was passed up into the proximal ureter there was a stone large to at the stenotic UPJ as the scope was inserted upward the stone migrated back into the renal pelvis flexible ureteroscopy in my opinion would require laser endo pyelotomy which was aborted at this time  The scope was then removed over the safety wire a 26 cm 6 Somali stent was passed the wire removed scope removed postop procedure well was awakened taken to recovery room stable condition    Will follow up with Dr Harry Hernandez after discharge   I was present for the entire procedure    Patient Disposition:  PACU     SIGNATURE: Rosanna Donnelly MD  DATE: February 14, 2021  TIME: 12:21 PM

## 2021-02-14 NOTE — DISCHARGE INSTRUCTIONS
#1 no heavy straining or lifting above 10 pounds for 2 weeks    #2 call office fevers, chills, or worsening blood in the urine  #3 Patient to follow-up with Dr Fidel Fontanez for next cysto stent exchange      Portia DOMINGO  [address] [place] [address] office  [address] [place] [address]  [phone]  8:30 AM to 4:30 PM  Monday through Friday    [facility] [place] office  [phone] [address]  [address] [facility] [place], [address] [place] [address]  [phone]  1:00 to 5:00 PM  Wednesday

## 2021-02-14 NOTE — ANESTHESIA POSTPROCEDURE EVALUATION
Post-Op Assessment Note    CV Status:  Stable  Pain Score: 0    Pain management: adequate     Mental Status:  Alert and awake   Hydration Status:  Stable   PONV Controlled:  Controlled   Airway Patency:  Patent and adequate      Post Op Vitals Reviewed: Yes      Staff: CRNA         No complications documented      BP (P) 161/77 (02/14/21 1218)    Temp (!) (P) 97 2 °F (36 2 °C) (02/14/21 1218)    Pulse (P) 83 (02/14/21 1218)   Resp (P) 16 (02/14/21 1218)    SpO2 (P) 100 % (02/14/21 1218)

## 2021-02-14 NOTE — ASSESSMENT & PLAN NOTE
Lab Results   Component Value Date    EGFR 31 02/14/2021    EGFR 27 02/14/2021    EGFR 41 01/14/2021    CREATININE 1 99 (H) 02/14/2021    CREATININE 2 24 (H) 02/14/2021    CREATININE 1 59 (H) 01/14/2021   POA, as evidenced by Cr of 2 24 with a baseline Cr of 1 3 - 1 6 likely postobstructive in the setting of renal calculi and hydronephrosis   · Patient underwent cystoscopy with ureteral stent exchange  · Creatinine improved to 1 99 after cystoscopy and gentle IV fluids  · Per Urology, patient is stable for discharge home  · Will provide patient a BMP script to repeat blood work in 1 week to monitor creatinine  · Encourage oral hydration

## 2021-02-14 NOTE — ED PROVIDER NOTES
History  Chief Complaint   Patient presents with    Flank Pain     R sided Kidney olegario for 4 hours  Pt has Hx of stents in R ureter  Patient presents for evaluation of right flank pain started 4 hours ago  Patient has a history of multiple kidney stones on the right and has a right ureteral stent act gets changed frequently  No nausea vomiting diarrhea  Denies any fever chills  Denies any modifying factors for the pain  History provided by:  Patient   used: No    Flank Pain  Associated symptoms: hematuria    Associated symptoms: no chest pain, no chills, no cough, no diarrhea, no dysuria, no fever, no nausea, no shortness of breath, no sore throat and no vomiting        Prior to Admission Medications   Prescriptions Last Dose Informant Patient Reported? Taking?   acetaminophen (TYLENOL) 325 mg tablet More than a month at Unknown time  No No   Sig: Take 2 tablets (650 mg total) by mouth every 8 (eight) hours as needed for mild pain   apixaban (ELIQUIS) 5 mg 2/13/2021 at 1900  No Yes   Sig: Take 1 tablet (5 mg total) by mouth 2 (two) times a day   Patient taking differently: Take 5 mg by mouth 2 (two) times a day Was told he did not need to stop ahead of surgery     atorvastatin (LIPITOR) 40 mg tablet 2/13/2021 at 1900  No Yes   Sig: Take 1 tablet (40 mg total) by mouth daily with dinner   cholecalciferol (VITAMIN D3) 1,000 units tablet 2/13/2021 at 0100 Self Yes Yes   Sig: Take 1,000 Units by mouth daily after lunch     finasteride (PROSCAR) 5 mg tablet 2/13/2021 at 0900  No Yes   Sig: Take 1 tablet (5 mg total) by mouth daily   metoprolol tartrate (LOPRESSOR) 25 mg tablet 2/13/2021 at 0900  No Yes   Sig: take 1 tablet by mouth every 8 hours      Facility-Administered Medications: None       Past Medical History:   Diagnosis Date    Aneurysm ([facility] [address] )     of brain  being monitored    Atrial fibrillation ([facility] [address] )     Essential hypertension, long-standing     Heart murmur, lifelong heart murmur since birth    Hematuria     intermittent    History of cigarette smoking, chronic     62 year smoker at one half pack per day, quit 04/1/17    Hyperlipidemia     Hypertension     Irregular heart beat     Kidney stones     12 episodes of kidney stones    Kidney stones with lithotripsy 03/2017    Done at [facility]    Pneumonia      X 2    Stroke [facility]) 10/29/2020    right sided  weakness almost full recovery    Vitamin D deficiency     maintained with 1000 units of D    Water retention     Wears glasses     Wears partial dentures     upper       Past Surgical History:   Procedure Laterality Date    APPENDECTOMY  1960    CAROTID ENDARTERECTOMY Left 1998    Supposedly no internal stenosis found    CYSTOSCOPY Right 8/15/2017    Procedure: CYSTOSCOPY RIGHT STENT EXCHANGE;  Surgeon: Salazar Jones MD;  Location: [address] [place] [address];  Service: Urology    CYSTOSCOPY     [address] [address]  LITHOTRIPSY  03/2017    For nephrolithiasis at [address]  5/10/2019    FL RETROGRADE PYELOGRAM  7/30/2019    FL RETROGRADE PYELOGRAM  10/22/2019    FL RETROGRADE PYELOGRAM  1/28/2020    FL RETROGRADE PYELOGRAM  8/11/2020    FL RETROGRADE PYELOGRAM  12/15/2020    [place] CYSTO/URETERO W/LITHOTRIPSY &INDWELL STENT INSRT Right 5/2/2017    Procedure: CYSTOSCOPY URETEROSCOPY WITH LITHOTRIPSY HOLMIUM LASER, RETROGRADE PYELOGRAM AND INSERTION STENT URETERAL;  Surgeon: Salazar Jones MD;  Location: [address] [place] [address];  Service: Urology    [place] CYSTO/URETERO W/LITHOTRIPSY &INDWELL STENT INSRT Right 5/16/2017    Procedure: CYSTOSCOPY, RETROGRADE PYELOGRAM,  FLEXIBLE URETEROSCOPY,  HOLMIUM LASER LITHOTRIPSY, STONE BASKET MANIPULATION,  STENT URETERAL EXCHANGE;  Surgeon: Salazar Jones MD;  Location: [address] [place] [address];  Service: Urology    [place] CYSTO/URETERO W/LITHOTRIPSY &INDWELL STENT INSRT Right 6/2/2017    Procedure: CYSTOSCOPY, RETROGRADE, STONE MANIPULATION WITH HOLMIUM LASER, STENT PLACEMENT;  Surgeon: David Patiño MD;  Location: [address] [place] [address];  Service: Urology    [place] CYSTO/URETERO W/LITHOTRIPSY &INDWELL STENT INSRT Right 7/18/2017    Procedure: CYSTOSCOPY, RETROGRADE, URETEROSCOPY HOLMIUM LASER [place],  AND STENT PLACEMENT;  Surgeon: David Patiño MD;  Location: [address] [place] [address];  Service: Urology    [place] CYSTOSCOPY,INSERT URETERAL STENT Right 11/14/2017    Procedure: EXCHANGE STENT URETERAL;  Surgeon: David Patiño MD;  Location: [address] [place] [address];  Service: Urology    [place] CYSTOURETHROSCOPY,URETER CATHETER Right 11/14/2017    Procedure: Oris Mow, STENT EXCHANGE;  Surgeon: David Patiño MD;  Location: [address] [place] [address];  Service: Urology    [place] CYSTOURETHROSCOPY,URETER CATHETER Right 5/8/2018    Procedure: Jose Eduardoie Silver;  Surgeon: David Patiño MD;  Location: [address] [place] [address];  Service: Urology    [place] CYSTOURETHROSCOPY,URETER CATHETER Right 8/14/2018    Procedure: Dennis Silver;  Surgeon: David Patiño MD;  Location: [address] [place] [address];  Service: Urology    [place] CYSTOURETHROSCOPY,URETER CATHETER Right 11/13/2018    Procedure: CYSTOSCOPY, Genetta Goody EXCHANGE, RETROGRADE;  Surgeon: David Patiño MD;  Location: [address] [place] [address];  Service: Urology    [place] CYSTOURETHROSCOPY,URETER CATHETER Right 2/12/2019    Procedure: Oris Mow, STENT REMOVAL AND STENT PLACEMENT;  Surgeon: David Patiño MD;  Location: [address] [place] [address];  Service: Urology    [place] CYSTOURETHROSCOPY,URETER CATHETER Right 5/10/2019    Procedure: Oris Mow, STENT EXCHANGE;  Surgeon: David Patiño MD;  Location: [address] [place] [address];  Service: Urology    [place] CYSTOURETHROSCOPY,URETER CATHETER Right 7/30/2019    Procedure: CYSTOSCOPY, RETROGRADE, STENT REMOVAL AND PLACEMENT OF STENT;  Surgeon: David Patiño MD;  Location: [address] [place] [address];  Service: Urology    [place] CYSTOURETHROSCOPY,URETER CATHETER Right 10/22/2019    Procedure: CYSTOSCOPY, RETROGRADE, STENT EXCHANGE;  Surgeon: Herminia Pringle MD;  Location: [address] [place] [address];  Service: Urology    MD CYSTOURETHROSCOPY,URETER CATHETER Right 1/28/2020    Procedure: Tonnie Crutch, STENT REMOVAL AND STENT PLACEMENT;  Surgeon: Herminia Pringle MD;  Location: [address] [place] [address];  Service: Urology    MD CYSTOURETHROSCOPY,URETER CATHETER Right 5/22/2020    Procedure: CYSTOSCOPY RETROGRADE, STENT EXCHANGE;  Surgeon: Herminia Pringle MD;  Location: [address] [place] [address];  Service: Urology    MD CYSTOURETHROSCOPY,URETER CATHETER Right 8/11/2020    Procedure: CYSTOSCOPY, STENT EXCHANGE, RETROGRADE;  Surgeon: Herminia Pringle MD;  Location: [address] [place] [address];  Service: Urology    MD CYSTOURETHROSCOPY,URETER CATHETER Right 12/15/2020    Procedure: Tonnie Crutch, STENT REMOVAL, AND STENT PLACEMENT;  Surgeon: Herminia Pringle MD;  Location: [address] [place] [address];  Service: Urology    PROSTATE SURGERY  2015    Laser Prostatectomy  by Dr Lillian Franco Right 4/18/2017    Procedure: INSERTION STENT URETERAL, RIGHT URETEROSCOPY,  LASER OF URETERAL STRICTURE AND STONE;  Surgeon: Herminia Pringle MD;  Location: [address] [place] [address];  Service:     URETERAL STENT PLACEMENT Right 2/13/2018    Procedure: Darryle Mcmurray;  Surgeon: Herminia Pringle MD;  Location: [address] [place] [address];  Service: Urology       Family History   Problem Relation Age of Onset    Hypertension Mother     Alcohol abuse Father     Cirrhosis Father     Cancer Son 15        cancer-knee and above-left-amputation    Cancer Sister     Other Brother         head mass    Thyroid disease unspecified Sister     Arthritis Sister     Other Brother         legally blind in one eye     I have reviewed and agree with the history as documented      E-Cigarette/Vaping     E-Cigarette/Vaping Substances     Social History     Tobacco Use    Smoking status: Former Smoker     Packs/day: 0 50     Years: 62 00     Pack years: 31 00     Types: Cigarettes     Quit date: 4/15/2017     Years since quitting: 3 8    Smokeless tobacco: Never Used   Substance Use Topics    Alcohol use: Yes     Drinks per session: 1 or 2     Binge frequency: Less than monthly     Comment: rare    Drug use: No       Review of Systems   Constitutional: Negative for chills and fever  HENT: Negative for congestion and sore throat  Respiratory: Negative for cough and shortness of breath  Cardiovascular: Negative for chest pain  Gastrointestinal: Negative for abdominal pain, diarrhea, nausea and vomiting  Genitourinary: Positive for flank pain and hematuria  Negative for difficulty urinating and dysuria  Musculoskeletal: Negative for back pain and neck pain  Skin: Negative for rash  All other systems reviewed and are negative  Physical Exam  Physical Exam  Vitals signs and nursing note reviewed  Constitutional:       General: He is not in acute distress  Appearance: Normal appearance  HENT:      Head: Atraumatic  Mouth/Throat:      Mouth: Mucous membranes are moist       Pharynx: Oropharynx is clear  No oropharyngeal exudate  Eyes:      Extraocular Movements: Extraocular movements intact  Pupils: Pupils are equal, round, and reactive to light  Neck:      Musculoskeletal: Normal range of motion and neck supple  Cardiovascular:      Rate and Rhythm: Regular rhythm  Tachycardia present  Pulses: Normal pulses  Pulmonary:      Effort: Pulmonary effort is normal  No respiratory distress  Breath sounds: Normal breath sounds  No wheezing, rhonchi or rales  Abdominal:      General: Abdomen is flat  Bowel sounds are normal  There is no distension  Palpations: Abdomen is soft  Tenderness: There is no abdominal tenderness  There is right CVA tenderness  There is no left CVA tenderness, guarding or rebound  Musculoskeletal: Normal range of motion  Right lower leg: No edema  Left lower leg: No edema     Skin:     Capillary Refill: Capillary refill takes less than 2 seconds  Neurological:      General: No focal deficit present  Mental Status: He is alert and oriented to person, place, and time  Vital Signs  ED Triage Vitals   Temperature Pulse Respirations Blood Pressure SpO2   02/14/21 0341 02/14/21 0341 02/14/21 0341 02/14/21 0341 02/14/21 0341   97 5 °F (36 4 °C) (!) 125 (!) 24 (!) 191/104 100 %      Temp Source Heart Rate Source Patient Position - Orthostatic VS BP Location FiO2 (%)   02/14/21 0341 02/14/21 0341 02/14/21 0341 02/14/21 0341 --   Tympanic Monitor Sitting Left arm       Pain Score       02/14/21 0358       6           Vitals:    02/14/21 1218 02/14/21 1230 02/14/21 1245 02/14/21 1300   BP: 161/77 159/74 170/74 161/74   Pulse: 83 80 82 80   Patient Position - Orthostatic VS:    Lying         Visual Acuity      ED Medications  Medications   sodium chloride 0 9 % bolus 1,000 mL (0 mL Intravenous Stopped 2/14/21 0543)   ondansetron (ZOFRAN) injection 4 mg (4 mg Intravenous Given 2/14/21 0357)   HYDROmorphone (DILAUDID) injection 0 5 mg (0 5 mg Intravenous Given 2/14/21 0358)   HYDROmorphone (DILAUDID) injection 0 5 mg (0 5 mg Intravenous Given 2/14/21 0447)   levofloxacin (LEVAQUIN) IVPB (premix in dextrose) 500 mg 100 mL ( Intravenous Stopped 2/14/21 1158)       Diagnostic Studies  Results Reviewed     Procedure Component Value Units Date/Time    Urine culture [[id number]] Collected: 02/14/21 0432    Lab Status: In process Specimen: Urine, Clean Catch Updated: 02/14/21 0845    COVID19, Influenza A/B, RSV PCR, [facility] [[id number]]  (Normal) Collected: 02/14/21 0645    Lab Status: Final result Specimen: Nares from Nasopharyngeal Swab Updated: 02/14/21 0738     SARS-CoV-2 Negative     INFLUENZA A PCR Negative     INFLUENZA B PCR Negative     RSV PCR Negative    Narrative: This test has been authorized by FDA under an EUA (Emergency Use Assay) for use by authorized laboratories    Clinical caution and judgement should be used with the interpretation of these results with consideration of the clinical impression and other laboratory testing  Testing reported as "Positive" or "Negative" has been proven to be accurate according to standard laboratory validation requirements  All testing is performed with control materials showing appropriate reactivity at standard intervals      Urine Microscopic [[id number]]  (Abnormal) Collected: 02/14/21 0432    Lab Status: Final result Specimen: Urine, Clean Catch Updated: 02/14/21 0456     RBC, UA Innumerable /hpf      WBC, UA 1-2 /hpf      Epithelial Cells Occasional /hpf      Bacteria, UA Occasional /hpf     UA (URINE) with reflex to Scope [[id number]]  (Abnormal) Collected: 02/14/21 0432    Lab Status: Final result Specimen: Urine, Clean Catch Updated: 02/14/21 0442     Color, UA Yellow     Clarity, UA Cloudy     Specific [place], UA 1 020     pH, UA 6 0     Leukocytes, UA Trace     Nitrite, UA Negative     Protein, UA Trace mg/dl      Glucose, UA Negative mg/dl      Ketones, UA Negative mg/dl      Urobilinogen, UA 0 2 E U /dl      Bilirubin, UA Negative     Blood, UA Large    Comprehensive metabolic panel [[id number]]  (Abnormal) Collected: 02/14/21 0350    Lab Status: Final result Specimen: Blood from Arm, Right Updated: 02/14/21 0415     Sodium 144 mmol/L      Potassium 4 6 mmol/L      Chloride 108 mmol/L      CO2 28 mmol/L      ANION GAP 8 mmol/L      BUN 29 mg/dL      Creatinine 2 24 mg/dL      Glucose 139 mg/dL      Calcium 9 9 mg/dL      Corrected Calcium 10 5 mg/dL      AST 21 U/L      ALT 14 U/L      Alkaline Phosphatase 154 U/L      Total Protein 7 1 g/dL      Albumin 3 2 g/dL      Total Bilirubin 0 50 mg/dL      eGFR 27 ml/min/1 73sq m     Narrative:      [place] guidelines for Chronic Kidney Disease (CKD):     Stage 1 with normal or high GFR (GFR > 90 mL/min/1 73 square meters)    Stage 2 Mild CKD (GFR = 60-89 mL/min/1 73 square meters)    Stage 3A Moderate CKD (GFR = 45-59 mL/min/1 73 square meters)    Stage 3B Moderate CKD (GFR = 30-44 mL/min/1 73 square meters)    Stage 4 Severe CKD (GFR = 15-29 mL/min/1 73 square meters)    Stage 5 End Stage CKD (GFR <15 mL/min/1 73 square meters)  Note: GFR calculation is accurate only with a steady state creatinine    Lipase [[id number]]  (Normal) Collected: 02/14/21 0350    Lab Status: Final result Specimen: Blood from Arm, Right Updated: 02/14/21 0415     Lipase 93 u/L     CBC and differential [[id number]]  (Abnormal) Collected: 02/14/21 0350    Lab Status: Final result Specimen: Blood from Arm, Right Updated: 02/14/21 0358     WBC 9 02 Thousand/uL      RBC 4 53 Million/uL      Hemoglobin 11 2 g/dL      Hematocrit 38 2 %      MCV 84 fL      MCH 24 7 pg      MCHC 29 3 g/dL      RDW 14 2 %      MPV 9 6 fL      Platelets 181 Thousands/uL      nRBC 0 /100 WBCs      Neutrophils Relative 69 %      Immat GRANS % 0 %      Lymphocytes Relative 18 %      Monocytes Relative 11 %      Eosinophils Relative 2 %      Basophils Relative 0 %      Neutrophils Absolute 6 16 Thousands/µL      Immature Grans Absolute 0 02 Thousand/uL      Lymphocytes Absolute 1 61 Thousands/µL      Monocytes Absolute 0 98 Thousand/µL      Eosinophils Absolute 0 22 Thousand/µL      Basophils Absolute 0 03 Thousands/µL                  CT renal stone study abdomen pelvis without contrast   Final Result by Nga Henley MD (02/14 5959)      A right ureteral stent is in place  There are several calculi adjacent to the proximal to midportion of the stent, the largest of which measures 4 mm  There is moderate to severe right hydronephrosis and right hydroureter to the level of the calculi  Please see discussion  Left renal cyst   No left hydronephrosis  The prostate is prominent and causes some indentation upon the base of the urinary bladder  Clinical and laboratory correlation is recommended        Other nonemergent findings as described above, please see discussion  Workstation performed: [id number]         FL retrograde pyelogram    (Results Pending)              Procedures  Procedures         ED Course  ED Course as of Feb 14 2135   Yuliya Ornelas Feb 14, 2021   Jurgen Stands Dr Jorge Menjivar left a voicemail  1503 No call back from Dr Jorge Menjivar spoke with Dr Prabhu Johnson urology on-call advised the patient be admitted kept NPO for stent exchange  MDM  Number of Diagnoses or Management Options  JUNIOR (acute kidney injury) [facility]):   Kidney stone:   Diagnosis management comments: Pulse ox 100% room air indicating adequate oxygenation  Amount and/or Complexity of Data Reviewed  Clinical lab tests: ordered and reviewed  Tests in the radiology section of CPT®: ordered and reviewed  Decide to obtain previous medical records or to obtain history from someone other than the patient: yes  Review and summarize past medical records: yes    Patient Progress  Patient progress: stable      Disposition  Final diagnoses:   Kidney stone   JUNIOR (acute kidney injury) ([facility] [address] )     Time reflects when diagnosis was documented in both MDM as applicable and the Disposition within this note     Time User Action Codes Description Comment    2/14/2021  6:36 AM Emogene Salt E Add [N20 0] Kidney stone     2/14/2021  6:36 AM Felisha Quiroz Add [N17 9] JUNIOR (acute kidney injury) ([facility] [address] )     2/14/2021 10:23 AM Brown Dodson Add [N20 0] Renal calculi       ED Disposition     ED Disposition Condition Date/Time Comment    Admit Stable Sun Feb 14, 2021  6:36 AM Case was discussed with Gary Branham and the patient's admission status was agreed to be Admission Status: observation status to the service of Dr Savannah Vo          Follow-up Information     Follow up With Specialties Details Why Kan Hayes MD Urology Follow up Patient to follow-up with Dr Jorge Menjivar for next cysto right stent exchange [address] [facility] [address], [place]-C Physician Assistant Schedule an appointment as soon as possible for a visit in 1 week(s)  [address] [id number] [facility]  [phone]            Discharge Medication List as of 2/14/2021  4:56 PM      CONTINUE these medications which have NOT CHANGED    Details   apixaban (ELIQUIS) 5 mg Take 1 tablet (5 mg total) by mouth 2 (two) times a day, Starting Tue 11/17/2020, No Print      atorvastatin (LIPITOR) 40 mg tablet Take 1 tablet (40 mg total) by mouth daily with dinner, Starting Tue 11/17/2020, Normal      cholecalciferol (VITAMIN D3) 1,000 units tablet Take 1,000 Units by mouth daily after lunch  , Historical Med      finasteride (PROSCAR) 5 mg tablet Take 1 tablet (5 mg total) by mouth daily, Starting Wed 1/20/2021, Until Tue 4/20/2021, Normal      metoprolol tartrate (LOPRESSOR) 25 mg tablet take 1 tablet by mouth every 8 hours, Normal      acetaminophen (TYLENOL) 325 mg tablet Take 2 tablets (650 mg total) by mouth every 8 (eight) hours as needed for mild pain, Starting Tue 11/17/2020, No Print           Outpatient Discharge Orders   Basic metabolic panel   Standing Status: Future Standing Exp   Date: 02/14/22     Discharge Diet     Activity as tolerated     Call provider for:  difficulty breathing, headache or visual disturbances     Call provider for:  persistent dizziness or light-headedness     Call provider for: active or persistent bleeding     Call provider for:  severe uncontrolled pain     Call provider for:  persistent nausea or vomiting       PDMP Review     None          ED Provider  Electronically Signed by           Nico Bautista DO  02/14/21 2135       Nico Bautista DO  02/14/21 2135

## 2021-02-14 NOTE — ASSESSMENT & PLAN NOTE
Hst of renal calculi  CT A/P reveals a right ureteral stent  several calculi adjacent to the proximal to midportion of the stent, the largest of which measures 4 mm  There is moderate to severe right hydronephrosis and right hydroureter to the level of the calculi      · Status post cystoscopy today with ureteral stent exchange  · Outpatient Urology follow-up

## 2021-02-14 NOTE — PLAN OF CARE
Problem: PAIN - ADULT  Goal: Verbalizes/displays adequate comfort level or baseline comfort level  Description: Interventions:  - Encourage patient to monitor pain and request assistance  - Assess pain using appropriate pain scale  - Administer analgesics based on type and severity of pain and evaluate response  - Implement non-pharmacological measures as appropriate and evaluate response  - Consider cultural and social influences on pain and pain management  - Notify physician/advanced practitioner if interventions unsuccessful or patient reports new pain  2/14/2021 1323 by Geovani Sheppard RN  Outcome: Progressing  2/14/2021 1155 by Geovani Sheppard RN  Outcome: Progressing     Problem: INFECTION - ADULT  Goal: Absence or prevention of progression during hospitalization  Description: INTERVENTIONS:  - Assess and monitor for signs and symptoms of infection  - Monitor lab/diagnostic results  - Monitor all insertion sites, i e  indwelling lines, tubes, and drains  - [place] appropriate cooling/warming therapies per order  - Administer medications as ordered  - Instruct and encourage patient and family to use good hand hygiene technique  - Identify and instruct in appropriate isolation precautions for identified infection/condition  2/14/2021 1323 by Geovani Sheppard RN  Outcome: Progressing  2/14/2021 1155 by Geovani Sheppard RN  Outcome: Progressing     Problem: INFECTION - ADULT  Goal: Absence of fever/infection during neutropenic period  Description: INTERVENTIONS:  - Monitor WBC    2/14/2021 1323 by Geovani Sheppard RN  Outcome: Progressing  2/14/2021 1155 by Geovani Sheppard RN  Outcome: Progressing     Problem: SAFETY ADULT  Goal: Patient will remain free of falls  Description: INTERVENTIONS:  - Assess patient frequently for physical needs  -  Identify cognitive and physical deficits and behaviors that affect risk of falls    -  [place] fall precautions as indicated by assessment   - Educate patient/family on patient safety including physical limitations  - Instruct patient to call for assistance with activity based on assessment  - Modify environment to reduce risk of injury  - Consider OT/PT consult to assist with strengthening/mobility  2/14/2021 1323 by Tania Orr RN  Outcome: Progressing  2/14/2021 1155 by Tania Orr RN  Outcome: Progressing  Goal: Maintain or return to baseline ADL function  Description: INTERVENTIONS:  -  Assess patient's ability to carry out ADLs; assess patient's baseline for ADL function and identify physical deficits which impact ability to perform ADLs (bathing, care of mouth/teeth, toileting, grooming, dressing, etc )  - Assess/evaluate cause of self-care deficits   - Assess range of motion  - Assess patient's mobility; develop plan if impaired  - Assess patient's need for assistive devices and provide as appropriate  - Encourage maximum independence but intervene and supervise when necessary  - Involve family in performance of ADLs  - Assess for home care needs following discharge   - Consider OT consult to assist with ADL evaluation and planning for discharge  - Provide patient education as appropriate  2/14/2021 1323 by Tania Orr RN  Outcome: Progressing  2/14/2021 1155 by Tania Orr RN  Outcome: Progressing  Goal: Maintain or return mobility status to optimal level  Description: INTERVENTIONS:  - Assess patient's baseline mobility status (ambulation, transfers, stairs, etc )    - Identify cognitive and physical deficits and behaviors that affect mobility  - Identify mobility aids required to assist with transfers and/or ambulation (gait belt, sit-to-stand, lift, walker, cane, etc )  - [place] fall precautions as indicated by assessment  - Record patient progress and toleration of activity level on Mobility SBAR; progress patient to next Phase/Stage  - Instruct patient to call for assistance with activity based on assessment  - Consider rehabilitation consult to assist with strengthening/weightbearing, etc   2/14/2021 1323 by Milan Wong, RN  Outcome: Progressing  2/14/2021 1155 by Milan Wong, RN  Outcome: Progressing

## 2021-02-14 NOTE — ASSESSMENT & PLAN NOTE
· Blood pressure elevated on admission, 173/77, likely due to pain from renal calculi and hydronephrosis   · Continue home medication, Metoprolol 25 mg q8h  · Hydralazine IV PRN  · Monitor

## 2021-02-14 NOTE — ASSESSMENT & PLAN NOTE
Hst of renal calculi  CT A/P reveals a right ureteral stent  several calculi adjacent to the proximal to midportion of the stent, the largest of which measures 4 mm  There is moderate to severe right hydronephrosis and right hydroureter to the level of the calculi      · Urology to perform cystoscopy today  · See plan above

## 2021-02-14 NOTE — ASSESSMENT & PLAN NOTE
Lab Results   Component Value Date    EGFR 27 02/14/2021    EGFR 41 01/14/2021    EGFR 37 11/25/2020    CREATININE 2 24 (H) 02/14/2021    CREATININE 1 59 (H) 01/14/2021    CREATININE 1 72 (H) 11/25/2020   POA as evidenced by Cr of 2 24 with a baseline Cr of 1 3 - 1 6 likely postobstructive in the setting of renal calculi and hydronephrosis   · IVF  · Cysto today  · Avoid nephrotoxic agents  · Trend Cr

## 2021-02-14 NOTE — ASSESSMENT & PLAN NOTE
CT A/P: "A right ureteral stent is in place  There are several calculi adjacent to the proximal to midportion of the stent, the largest of which measures 4 mm    There is moderate to severe right hydronephrosis and right hydroureter to the level of the calculi "  · Cr 2 24 on admission  · Underwent cystoscopy with ureteral stent exchange by Dr Oj Funk   · Patient spontaneously voided after cystoscopy  · Tolerating regular diet without nausea or vomiting  · Pain improved  · Creatinine improved  · Per Urology, patient is stable for discharge home today with outpatient follow-up

## 2021-02-14 NOTE — ASSESSMENT & PLAN NOTE
Echo 10/2020 showed moderate to severe aortic stenosis   · Avoid hypotension   · Monitor volume status closely

## 2021-02-14 NOTE — DISCHARGE SUMMARY
Discharge- Shavon Brock 1940, 68 y o  male MRN: [id number]    Unit/Bed#: [address] [place] [address] Encounter: [id number]    Primary Care Provider: Chip Llanos PA-C   Date and time admitted to hospital: 2/14/2021  3:36 AM        * Hydronephrosis of right kidney  Assessment & Plan  CT A/P: "A right ureteral stent is in place  There are several calculi adjacent to the proximal to midportion of the stent, the largest of which measures 4 mm  There is moderate to severe right hydronephrosis and right hydroureter to the level of the calculi "  · Cr 2 24 on admission  · Underwent cystoscopy with ureteral stent exchange by Dr Benny Chavez   · Patient spontaneously voided after cystoscopy  · Tolerating regular diet without nausea or vomiting  · Pain improved  · Creatinine improved  · Per Urology, patient is stable for discharge home today with outpatient follow-up    JUNIOR (acute kidney injury) [facility])  Assessment & Plan  Lab Results   Component Value Date    EGFR 31 02/14/2021    EGFR 27 02/14/2021    EGFR 41 01/14/2021    CREATININE 1 99 (H) 02/14/2021    CREATININE 2 24 (H) 02/14/2021    CREATININE 1 59 (H) 01/14/2021   POA, as evidenced by Cr of 2 24 with a baseline Cr of 1 3 - 1 6 likely postobstructive in the setting of renal calculi and hydronephrosis   · Patient underwent cystoscopy with ureteral stent exchange  · Creatinine improved to 1 99 after cystoscopy and gentle IV fluids  · Per Urology, patient is stable for discharge home  · Will provide patient a BMP script to repeat blood work in 1 week to monitor creatinine  · Encourage oral hydration    Renal calculi  Assessment & Plan  Hst of renal calculi  CT A/P reveals a right ureteral stent  several calculi adjacent to the proximal to midportion of the stent, the largest of which measures 4 mm  There is moderate to severe right hydronephrosis and right hydroureter to the level of the calculi      · Status post cystoscopy today with ureteral stent exchange  · Outpatient Urology follow-up    Aortic stenosis  Assessment & Plan  Echo 10/2020 showed moderate to severe aortic stenosis   · Avoid hypotension     CVA (cerebral vascular accident) [facility])  Assessment & Plan  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  · Continue Lipitor   · Okay to resume Eliquis on discharge    Benign essential hypertension  Assessment & Plan  · Blood pressure elevated on admission, 173/77, likely due to pain from renal calculi and hydronephrosis   · Most recent blood pressure improved to 161/74  · Continue home medication, Metoprolol 25 mg q8h    Chronic atrial fibrillation ([address] )  Assessment & Plan  · Okay to resume Eliquis on discharge  · Continue Metoprolol 25 mg q8h        Discharging Physician / Practitioner: KELLY Gamble  PCP: Cliff Starr PA-C  Admission Date:   Admission Orders (From admission, onward)     Ordered        02/14/21 0646  Place in Observation  Once                   Discharge Date: 02/14/21    Resolved Problems  Date Reviewed: 2/14/2021    None          Consultations During Hospital Stay:  · Urology     Procedures Performed:   · CT renal study: A right ureteral stent is in place  There are several calculi adjacent to the proximal to midportion of the stent, the largest of which measures 4 mm  There is moderate to severe right hydronephrosis and right hydroureter to the level of the calculi  Left renal cyst   No left hydronephrosis  The prostate is prominent and causes some indentation upon the base of the urinary bladder  Clinical and laboratory correlation is recommended  Significant Findings / Test Results:   · Several calculi and moderate to severe right sided hydronephrosis    Incidental Findings:   · As above     Test Results Pending at Discharge (will require follow up):    · None     Outpatient Tests Requested:  · BMP in 1 week    Complications:  None    Reason for Admission:  Right flank pain, right-sided renal calculi with right-sided hydronephrosis    Hospital Course:     Celia Castellano is a 68 y o  male patient who originally presented to the hospital on 2/14/2021 due to right flank pain  Imaging revealed right-sided renal calculi with moderate to severe hydronephrosis  Patient has known right-sided renal calculi for which she has a ureteral stent in place  Patient was evaluated by Urology and underwent a cystoscopy today  Patient had right ureteral stent exchanged  After cystoscopy patient is eating and drinking well  He has [place] asleep voided  His creatinine improved  Desires discharge home today  Per Urology, patient is stable for discharge home  Patient will need a BMP in 1 week to monitor creatinine with outpatient Urology follow-up  Please see above list of diagnoses and related plan for additional information  Condition at Discharge: stable     Discharge Day Visit / Exam:     Subjective:  Patient found to be resting comfortably after cystoscopy  He is tolerating regular diet  He urinated spontaneously  He reports improvement in right flank pain  He denies chest pain or shortness of breath  He desires discharge home today  Vitals: Blood Pressure: 161/74 (02/14/21 1300)  Pulse: 80 (02/14/21 1300)  Temperature: 97 8 °F (36 6 °C) (02/14/21 1300)  Temp Source: Oral (02/14/21 1300)  Respirations: 18 (02/14/21 1300)  Height: 5' 10" (177 8 cm) (02/14/21 0829)  Weight - Scale: 87 2 kg (192 lb 3 9 oz) (02/14/21 0829)  SpO2: 98 % (02/14/21 1300)  Exam:   Physical Exam  Vitals signs and nursing note reviewed  Constitutional:       General: He is not in acute distress  Appearance: He is well-developed  He is not toxic-appearing or diaphoretic  HENT:      Head: Normocephalic and atraumatic  Eyes:      Conjunctiva/sclera: Conjunctivae normal    Neck:      Musculoskeletal: Neck supple  Cardiovascular:      Rate and Rhythm: Normal rate and regular rhythm  Heart sounds: No murmur     Pulmonary:      Effort: Pulmonary effort is normal  No respiratory distress  Breath sounds: Normal breath sounds  Abdominal:      General: There is no distension  Palpations: Abdomen is soft  Tenderness: There is no abdominal tenderness  Musculoskeletal: Normal range of motion  Right lower leg: No edema  Left lower leg: No edema  Skin:     General: Skin is warm and dry  Neurological:      Mental Status: He is alert and oriented to person, place, and time  Psychiatric:         Mood and Affect: Mood normal          Behavior: Behavior normal          Discussion with Family:  Spoke with daughter via telephone    Discharge instructions/Information to patient and family:   See after visit summary for information provided to patient and family  Provisions for Follow-Up Care:  See after visit summary for information related to follow-up care and any pertinent home health orders  Disposition:     Home    For Discharges to [facility] SNF:   · Not Applicable to this Patient - Not Applicable to this Patient    Planned Readmission:  None     Discharge Statement:  I spent > 30 minutes discharging the patient  This time was spent on the day of discharge  I had direct contact with the patient on the day of discharge  Greater than 50% of the total time was spent examining patient, answering all patient questions, arranging and discussing plan of care with patient as well as directly providing post-discharge instructions  Additional time then spent on discharge activities  Discharge Medications:  See after visit summary for reconciled discharge medications provided to patient and family        ** Please Note: This note has been constructed using a voice recognition system **

## 2021-02-14 NOTE — ED NOTES
No change in patient's pain level at this time   Will make Dr Rojelio Malloy aware     [address] RN  02/14/21 5390

## 2021-02-14 NOTE — PLAN OF CARE
Problem: PAIN - ADULT  Goal: Verbalizes/displays adequate comfort level or baseline comfort level  Description: Interventions:  - Encourage patient to monitor pain and request assistance  - Assess pain using appropriate pain scale  - Administer analgesics based on type and severity of pain and evaluate response  - Implement non-pharmacological measures as appropriate and evaluate response  - Consider cultural and social influences on pain and pain management  - Notify physician/advanced practitioner if interventions unsuccessful or patient reports new pain  Outcome: Progressing     Problem: INFECTION - ADULT  Goal: Absence or prevention of progression during hospitalization  Description: INTERVENTIONS:  - Assess and monitor for signs and symptoms of infection  - Monitor lab/diagnostic results  - Monitor all insertion sites, i e  indwelling lines, tubes, and drains  - [place] appropriate cooling/warming therapies per order  - Administer medications as ordered  - Instruct and encourage patient and family to use good hand hygiene technique  - Identify and instruct in appropriate isolation precautions for identified infection/condition  Outcome: Progressing  Goal: Absence of fever/infection during neutropenic period  Description: INTERVENTIONS:  - Monitor WBC    Outcome: Progressing     Problem: SAFETY ADULT  Goal: Patient will remain free of falls  Description: INTERVENTIONS:  - Assess patient frequently for physical needs  -  Identify cognitive and physical deficits and behaviors that affect risk of falls    -  [place] fall precautions as indicated by assessment   - Educate patient/family on patient safety including physical limitations  - Instruct patient to call for assistance with activity based on assessment  - Modify environment to reduce risk of injury  - Consider OT/PT consult to assist with strengthening/mobility  Outcome: Progressing  Goal: Maintain or return to baseline ADL function  Description: INTERVENTIONS:  -  Assess patient's ability to carry out ADLs; assess patient's baseline for ADL function and identify physical deficits which impact ability to perform ADLs (bathing, care of mouth/teeth, toileting, grooming, dressing, etc )  - Assess/evaluate cause of self-care deficits   - Assess range of motion  - Assess patient's mobility; develop plan if impaired  - Assess patient's need for assistive devices and provide as appropriate  - Encourage maximum independence but intervene and supervise when necessary  - Involve family in performance of ADLs  - Assess for home care needs following discharge   - Consider OT consult to assist with ADL evaluation and planning for discharge  - Provide patient education as appropriate  Outcome: Progressing  Goal: Maintain or return mobility status to optimal level  Description: INTERVENTIONS:  - Assess patient's baseline mobility status (ambulation, transfers, stairs, etc )    - Identify cognitive and physical deficits and behaviors that affect mobility  - Identify mobility aids required to assist with transfers and/or ambulation (gait belt, sit-to-stand, lift, walker, cane, etc )  - [place] fall precautions as indicated by assessment  - Record patient progress and toleration of activity level on Mobility SBAR; progress patient to next Phase/Stage  - Instruct patient to call for assistance with activity based on assessment  - Consider rehabilitation consult to assist with strengthening/weightbearing, etc   Outcome: Progressing

## 2021-02-14 NOTE — ASSESSMENT & PLAN NOTE
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

## 2021-02-14 NOTE — ASSESSMENT & PLAN NOTE
· Blood pressure elevated on admission, 173/77, likely due to pain from renal calculi and hydronephrosis   · Most recent blood pressure improved to 161/74  · Continue home medication, Metoprolol 25 mg q8h

## 2021-02-14 NOTE — H&P
H&P- Nanda Allen 1940, 68 y o  male MRN: [id number]    Unit/Bed#: [address] [place] [address] Encounter: [id number]    Primary Care Provider: Aguila Branham PA-C   Date and time admitted to hospital: 2/14/2021  3:36 AM        * Hydronephrosis of right kidney  Assessment & Plan  CT A/P: "A right ureteral stent is in place  There are several calculi adjacent to the proximal to midportion of the stent, the largest of which measures 4 mm  There is moderate to severe right hydronephrosis and right hydroureter to the level of the calculi "  · Cr 2 24   · Urology planning for cystoscopy   · NPO   · IVF  · Analgesics and antiemetics as needed   · Trend Cr daily     Renal calculi  Assessment & Plan  Hst of renal calculi  CT A/P reveals a right ureteral stent  several calculi adjacent to the proximal to midportion of the stent, the largest of which measures 4 mm  There is moderate to severe right hydronephrosis and right hydroureter to the level of the calculi      · Urology to perform cystoscopy today  · See plan above     Stage 3 chronic kidney disease  Assessment & Plan  Lab Results   Component Value Date    EGFR 27 02/14/2021    EGFR 41 01/14/2021    EGFR 37 11/25/2020    CREATININE 2 24 (H) 02/14/2021    CREATININE 1 59 (H) 01/14/2021    CREATININE 1 72 (H) 11/25/2020   POA as evidenced by Cr of 2 24 with a baseline Cr of 1 3 - 1 6 likely postobstructive in the setting of renal calculi and hydronephrosis   · IVF  · Cysto today  · Avoid nephrotoxic agents  · Trend Cr     Aortic stenosis  Assessment & Plan  Echo 10/2020 showed moderate to severe aortic stenosis   · Avoid hypotension   · Monitor volume status closely     CVA (cerebral vascular accident) [facility])  Assessment & Plan  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  · Continue Lipitor   · Hold Eliquis for cystoscopy today - plan to resume Eliquis or heparin SQ after procedure     Benign essential hypertension  Assessment & Plan  · Blood pressure elevated on admission, 173/77, likely due to pain from renal calculi and hydronephrosis   · Continue home medication, Metoprolol 25 mg q8h  · Hydralazine IV PRN  · Monitor     Chronic atrial fibrillation (HCC)  Assessment & Plan  · Hold Eliquis for cystoscopy today  · Continue Metoprolol 25 mg q8h      VTE Prophylaxis: Pharmacologic VTE Prophylaxis contraindicated due to cysto today  / sequential compression device   Code Status: full code  POLST: POLST form is not discussed and not completed at this time  Discussion with family: none    Anticipated Length of Stay:  Patient will be admitted on an Observation basis with an anticipated length of stay of  Less than 2 midnights  Justification for Hospital Stay: renal calculi, hydronephrosis    Total Time for Visit, including Counseling / Coordination of Care: 1 hour  Greater than 50% of this total time spent on direct patient counseling and coordination of care  Chief Complaint:   Right flank pain     History of Present Illness:    Shena Rodriguez is a 68 y o  male with a past medical history including kidney stones who presents with right flank pain  Workup in the ED included a CT renal stone study of the abdomen and pelvis which revealed a right ureteral stent, several calculi adjacent to the proximal to midportion of the stent, largest of which measures 4 mm, moderate to severe right hydronephrosis, and right hydroureter to level of the calculi  ED provider discussed imaging results with on-call urologist   Urologist recommended NPO, admission, and cystoscopy today  Patient denies any headache, dizziness, chest pain, shortness of breath, nausea, vomiting, or diarrhea  Review of Systems:    Review of Systems   Constitutional: Negative for chills and fever  HENT: Negative for ear pain and sore throat  Eyes: Negative for pain and visual disturbance  Respiratory: Negative for cough and shortness of breath  Cardiovascular: Negative for chest pain and palpitations  Gastrointestinal: Negative for abdominal pain and vomiting  Genitourinary: Positive for flank pain  Negative for dysuria and hematuria  Musculoskeletal: Negative for arthralgias and back pain  Skin: Negative for color change and rash  Neurological: Negative for seizures and syncope  All other systems reviewed and are negative        Past Medical and Surgical History:     Past Medical History:   Diagnosis Date    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 brain  being monitored    Atrial fibrillation ([facility] [address] )     Essential hypertension, long-standing     Heart murmur, lifelong     heart murmur since birth    Hematuria     intermittent    History of cigarette smoking, chronic     62 year smoker at one half pack per day, quit 04/1/17    Hyperlipidemia     Hypertension     Irregular heart beat     Kidney stones     12 episodes of kidney stones    Kidney stones with lithotripsy 03/2017    [facility]    Pneumonia      X 2    Stroke ([facility] [address] ) 10/29/2020    right sided  weakness almost full recovery    Vitamin D deficiency     maintained with 1000 units of D    Water retention     Wears glasses     Wears partial dentures     upper       Past Surgical History:   Procedure Laterality Date    APPENDECTOMY  1960    CAROTID ENDARTERECTOMY Left 1998    Supposedly no internal stenosis found    CYSTOSCOPY Right 8/15/2017    Procedure: CYSTOSCOPY RIGHT STENT EXCHANGE;  Surgeon: David Patiño MD;  Location: [address] [place] [address];  Service: Urology    CYSTOSCOPY     [facility]  LITHOTRIPSY  03/2017    For nephrolithiasis at [address]  5/10/2019    FL RETROGRADE PYELOGRAM  7/30/2019    FL RETROGRADE PYELOGRAM  10/22/2019    FL RETROGRADE PYELOGRAM  1/28/2020    FL RETROGRADE PYELOGRAM  8/11/2020    FL RETROGRADE PYELOGRAM  12/15/2020    [place] CYSTO/URETERO W/LITHOTRIPSY &INDWELL STENT INSRT Right 5/2/2017    Procedure: CYSTOSCOPY URETEROSCOPY WITH LITHOTRIPSY HOLMIUM LASER, RETROGRADE PYELOGRAM AND INSERTION STENT URETERAL;  Surgeon: Ed Abrams MD;  Location: [address] [place] [address];  Service: Urology    IN CYSTO/URETERO W/LITHOTRIPSY &INDWELL STENT INSRT Right 5/16/2017    Procedure: CYSTOSCOPY, RETROGRADE PYELOGRAM,  FLEXIBLE URETEROSCOPY,  HOLMIUM LASER LITHOTRIPSY, STONE BASKET MANIPULATION,  STENT URETERAL EXCHANGE;  Surgeon: Ed Abrams MD;  Location: [address] [place] [address];  Service: Urology    IN CYSTO/URETERO W/LITHOTRIPSY &INDWELL STENT INSRT Right 6/2/2017    Procedure: CYSTOSCOPY, RETROGRADE, STONE MANIPULATION WITH HOLMIUM LASER, STENT PLACEMENT;  Surgeon: Ed Abrams MD;  Location: [address] [place] [address];  Service: Urology    IN CYSTO/URETERO W/LITHOTRIPSY &INDWELL STENT INSRT Right 7/18/2017    Procedure: CYSTOSCOPY, RETROGRADE, URETEROSCOPY HOLMIUM LASER [place],  AND STENT PLACEMENT;  Surgeon: Ed Abrams MD;  Location: [address] [place] [address];  Service: Urology    IN CYSTOSCOPY,INSERT URETERAL STENT Right 11/14/2017    Procedure: EXCHANGE STENT URETERAL;  Surgeon: Ed Abrams MD;  Location: [address] [place] [address];  Service: Urology    IN CYSTOURETHROSCOPY,URETER CATHETER Right 11/14/2017    Procedure: CYSTOSCOPY RETROGRADE, STENT EXCHANGE;  Surgeon: Ed Abrams MD;  Location: [address] [place] [address];  Service: Urology    IN CYSTOURETHROSCOPY,URETER CATHETER Right 5/8/2018    Procedure: [place];  Surgeon: Ed Abrams MD;  Location: [address] [place] [address];  Service: Urology    IN CYSTOURETHROSCOPY,URETER CATHETER Right 8/14/2018    Procedure: Rubye Pae EXCHANGE;  Surgeon: Ed Abrams MD;  Location: [address] [place] [address];  Service: Urology    IN CYSTOURETHROSCOPY,URETER CATHETER Right 11/13/2018    Procedure: CYSTOSCOPY, [place] EXCHANGE, RETROGRADE;  Surgeon: Ed Abrams MD;  Location: [address] [place] [address];  Service: Urology    IN CYSTOURETHROSCOPY,URETER CATHETER Right 2/12/2019    Procedure: CYSTOSCOPY, RETROGRADE, STENT REMOVAL AND STENT PLACEMENT;  Surgeon: Ana Cristina Victor MD;  Location: [address] [place] [address];  Service: Urology    MT CYSTOURETHROSCOPY,URETER CATHETER Right 5/10/2019    Procedure: Amol Awe, STENT EXCHANGE;  Surgeon: Ana Cristina Victor MD;  Location: [address] [place] [address];  Service: Urology    MT CYSTOURETHROSCOPY,URETER CATHETER Right 7/30/2019    Procedure: Amol Awe, STENT REMOVAL AND PLACEMENT OF STENT;  Surgeon: Ana Cristina Victor MD;  Location: [address] [place] [address];  Service: Urology    MT CYSTOURETHROSCOPY,URETER CATHETER Right 10/22/2019    Procedure: Amol Awe, STENT EXCHANGE;  Surgeon: Ana Cristina Victor MD;  Location: [address] [place] [address];  Service: Urology    MT CYSTOURETHROSCOPY,URETER CATHETER Right 1/28/2020    Procedure: Amol Awe, STENT REMOVAL AND STENT PLACEMENT;  Surgeon: Ana Cristina Victor MD;  Location: [address] [place] [address];  Service: Urology    MT CYSTOURETHROSCOPY,URETER CATHETER Right 5/22/2020    Procedure: CYSTOSCOPY RETROGRADE, STENT EXCHANGE;  Surgeon: Ana Cristina Victor MD;  Location: [address] [place] [address];  Service: Urology    MT CYSTOURETHROSCOPY,URETER CATHETER Right 8/11/2020    Procedure: CYSTOSCOPY, STENT EXCHANGE, RETROGRADE;  Surgeon: Ana Cristina Victor MD;  Location: [address] [place] [address];  Service: Urology    MT CYSTOURETHROSCOPY,URETER CATHETER Right 12/15/2020    Procedure: CYSTOSCOPY, RETROGRADE, STENT REMOVAL, AND STENT PLACEMENT;  Surgeon: Ana Cristina Victor MD;  Location: [address] [place] [address];  Service: Urology    PROSTATE SURGERY  2015    Laser Prostatectomy  by Dr Liddie Bamberger Right 4/18/2017    Procedure: INSERTION STENT URETERAL, RIGHT URETEROSCOPY,  LASER OF URETERAL STRICTURE AND STONE;  Surgeon: Ana Cristina Victor MD;  Location: [address] [place] [address];  Service:     URETERAL STENT PLACEMENT Right 2/13/2018    Procedure: [place];  Surgeon: Ana Cristina Victor MD;  Location[facility];  Service: Urology       Meds/Allergies:    Prior to Admission medications Medication Sig Start Date End Date Taking? Authorizing Provider   apixaban (ELIQUIS) 5 mg Take 1 tablet (5 mg total) by mouth 2 (two) times a day  Patient taking differently: Take 5 mg by mouth 2 (two) times a day Was told he did not need to stop ahead of surgery  11/17/20  Yes Nick Mcghee DO   atorvastatin (LIPITOR) 40 mg tablet Take 1 tablet (40 mg total) by mouth daily with dinner 11/17/20  Yes Nick Mcghee DO   cholecalciferol (VITAMIN D3) 1,000 units tablet Take 1,000 Units by mouth daily after lunch     Yes Historical Provider, MD   finasteride (PROSCAR) 5 mg tablet Take 1 tablet (5 mg total) by mouth daily 1/20/21 4/20/21 Yes Jamaal Lopez MD   metoprolol tartrate (LOPRESSOR) 25 mg tablet take 1 tablet by mouth every 8 hours 12/14/20  Yes Jamaal Lopez MD   acetaminophen (TYLENOL) 325 mg tablet Take 2 tablets (650 mg total) by mouth every 8 (eight) hours as needed for mild pain 11/17/20   Nick Mcghee DO   finasteride (PROSCAR) 5 mg tablet Take 1 tablet (5 mg total) by mouth daily  Patient not taking: Reported on 2/14/2021 11/17/20 2/14/21  Nick Mcghee DO     I have reviewed home medications with patient personally      Allergies: No Known Allergies    Social History:     Marital Status: /Civil Union   Patient Pre-hospital Living Situation: home  Patient Pre-hospital Level of Mobility: independent   Patient Pre-hospital Diet Restrictions: none   Substance Use History:   Social History     Substance and Sexual Activity   Alcohol Use Yes    Drinks per session: 1 or 2    Binge frequency: Less than monthly    Comment: rare     Social History     Tobacco Use   Smoking Status Former Smoker    Packs/day: 0 50    Years: 62 00    Pack years: 31 00    Types: Cigarettes    Quit date: 4/15/2017    Years since quitting: 3 8   Smokeless Tobacco Never Used     Social History     Substance and Sexual Activity   Drug Use No       Family History:    Family History   Problem Relation Age of Onset    Hypertension Mother     Alcohol abuse Father     Cirrhosis Father     Cancer Son 15        cancer-knee and above-left-amputation    Cancer Sister     Other Brother         head mass    Thyroid disease unspecified Sister     Arthritis Sister     Other Brother         legally blind in one eye       Physical Exam:     Vitals:   Blood Pressure: 161/74 (02/14/21 1300)  Pulse: 80 (02/14/21 1300)  Temperature: 97 8 °F (36 6 °C) (02/14/21 1300)  Temp Source: Oral (02/14/21 1300)  Respirations: 18 (02/14/21 1300)  Height: 5' 10" (177 8 cm) (02/14/21 0829)  Weight - Scale: 87 2 kg (192 lb 3 9 oz) (02/14/21 0829)  SpO2: 98 % (02/14/21 1300)    Physical Exam  Vitals signs and nursing note reviewed  Constitutional:       General: He is not in acute distress  Appearance: He is well-developed  He is not toxic-appearing or diaphoretic  HENT:      Head: Normocephalic and atraumatic  Eyes:      Conjunctiva/sclera: Conjunctivae normal    Neck:      Musculoskeletal: Neck supple  Cardiovascular:      Rate and Rhythm: Normal rate and regular rhythm  Heart sounds: No murmur  Pulmonary:      Effort: Pulmonary effort is normal  No respiratory distress  Breath sounds: Normal breath sounds  Abdominal:      General: Bowel sounds are normal  There is no distension  Palpations: Abdomen is soft  Tenderness: There is no abdominal tenderness  There is right CVA tenderness  Musculoskeletal: Normal range of motion  Right lower leg: No edema  Left lower leg: No edema  Skin:     General: Skin is warm and dry  Capillary Refill: Capillary refill takes less than 2 seconds  Neurological:      Mental Status: He is alert and oriented to person, place, and time  Psychiatric:         Mood and Affect: Mood normal          Behavior: Behavior normal          Thought Content:  Thought content normal              Additional Data:     Lab Results: I have personally reviewed pertinent reports  Results from last 7 days   Lab Units 02/14/21  0350   WBC Thousand/uL 9 02   HEMOGLOBIN g/dL 11 2*   HEMATOCRIT % 38 2   PLATELETS Thousands/uL 202   NEUTROS PCT % 69   LYMPHS PCT % 18   MONOS PCT % 11   EOS PCT % 2     Results from last 7 days   Lab Units 02/14/21  0350   SODIUM mmol/L 144   POTASSIUM mmol/L 4 6   CHLORIDE mmol/L 108   CO2 mmol/L 28   BUN mg/dL 29*   CREATININE mg/dL 2 24*   ANION GAP mmol/L 8   CALCIUM mg/dL 9 9   ALBUMIN g/dL 3 2*   TOTAL BILIRUBIN mg/dL 0 50   ALK PHOS U/L 154*   ALT U/L 14   AST U/L 21   GLUCOSE RANDOM mg/dL 139             Results from last 7 days   Lab Units 02/12/21  0849   HEMOGLOBIN A1C % 5 9*           Imaging: I have personally reviewed pertinent reports  CT renal stone study abdomen pelvis without contrast   Final Result by Roma Self MD (02/14 1815)      A right ureteral stent is in place  There are several calculi adjacent to the proximal to midportion of the stent, the largest of which measures 4 mm  There is moderate to severe right hydronephrosis and right hydroureter to the level of the calculi  Please see discussion  Left renal cyst   No left hydronephrosis  The prostate is prominent and causes some indentation upon the base of the urinary bladder  Clinical and laboratory correlation is recommended  Other nonemergent findings as described above, please see discussion  Workstation performed: [id number]         FL retrograde pyelogram    (Results Pending)       EKG, Pathology, and Other Studies Reviewed on Admission:   · EKG: None available     [facility] Records Reviewed: Yes     ** Please Note: This note has been constructed using a voice recognition system   **

## 2021-02-14 NOTE — CONSULTS
H&P Exam - Urology       Patient: Awa Wang   : 1940 Sex: male   MRN: [id number]     CSN: [id number]      History of Present Illness   HPI:  Awa Wang is a 68 y o  male who presents with obstructed right stent/stones/pain        Review of Systems:   Constitutional:  Negative for activity change, fever, chills and diaphoresis  HENT: Negative for hearing loss and trouble swallowing  Eyes: Negative for itching and visual disturbance  Respiratory: Negative for chest tightness and shortness of breath  Cardiovascular: Negative for chest pain, edema  Gastrointestinal: Negative for abdominal distention, na abdominal pain, constipation, diarrhea, Nausea and vomiting  Genitourinary: Negative for decreased urine volume, difficulty urinating, dysuria, enuresis, frequency, hematuria and urgency  Musculoskeletal: Negative for gait problem and myalgias  Neurological: Negative for dizziness and headaches  Hematological: Does not bruise/bleed easily         Historical Information   Past Medical History:   Diagnosis Date    Aneurysm ([facility] [facility] )     of brain  being monitored    Atrial fibrillation ([facility] [facility] )     Essential hypertension, long-standing     Heart murmur, lifelong     heart murmur since birth    Hematuria     intermittent    History of cigarette smoking, chronic     64 year smoker at one half pack per day, quit 17    Hyperlipidemia     Hypertension     Irregular heart beat     Kidney stones     12 episodes of kidney stones    Kidney stones with lithotripsy 2017    [facility]    Pneumonia      X 2    Stroke [facility]) 10/29/2020    right sided  weakness almost full recovery    Vitamin D deficiency     maintained with 1000 units of D    Water retention     Wears glasses     Wears partial dentures     upper     Past Surgical History:   Procedure Laterality Date    APPENDECTOMY      CAROTID ENDARTERECTOMY Left     Supposedly no internal stenosis found    CYSTOSCOPY Right 8/15/2017    Procedure: CYSTOSCOPY RIGHT STENT EXCHANGE;  Surgeon: Eduard Swann MD;  Location: [address] [place] [address];  Service: Urology    CYSTOSCOPY     [address]  LITHOTRIPSY  03/2017    For nephrolithiasis at [address]  5/10/2019    FL RETROGRADE PYELOGRAM  7/30/2019    FL RETROGRADE PYELOGRAM  10/22/2019    FL RETROGRADE PYELOGRAM  1/28/2020    FL RETROGRADE PYELOGRAM  8/11/2020    FL RETROGRADE PYELOGRAM  12/15/2020    MI CYSTO/URETERO W/LITHOTRIPSY &INDWELL STENT INSRT Right 5/2/2017    Procedure: CYSTOSCOPY URETEROSCOPY WITH LITHOTRIPSY HOLMIUM LASER, RETROGRADE PYELOGRAM AND INSERTION STENT URETERAL;  Surgeon: Eduard Swann MD;  Location: [address] [place] [address];  Service: Urology    MI CYSTO/URETERO W/LITHOTRIPSY &INDWELL STENT INSRT Right 5/16/2017    Procedure: CYSTOSCOPY, RETROGRADE PYELOGRAM,  FLEXIBLE URETEROSCOPY,  HOLMIUM LASER LITHOTRIPSY, STONE BASKET MANIPULATION,  STENT URETERAL EXCHANGE;  Surgeon: Eduard Swann MD;  Location: [address] [place] [address];  Service: Urology    MI CYSTO/URETERO W/LITHOTRIPSY &INDWELL STENT INSRT Right 6/2/2017    Procedure: CYSTOSCOPY, RETROGRADE, STONE MANIPULATION WITH HOLMIUM LASER, STENT PLACEMENT;  Surgeon: Eduard Swann MD;  Location: [address] [place] [address];  Service: Urology    MI CYSTO/URETERO W/LITHOTRIPSY &INDWELL STENT INSRT Right 7/18/2017    Procedure: CYSTOSCOPY, RETROGRADE, URETEROSCOPY HOLMIUM LASER Krysta Montague;  Surgeon: Eduard Swann MD;  Location: [address] [place] [address];  Service: Urology    MI CYSTOSCOPY,INSERT URETERAL STENT Right 11/14/2017    Procedure: EXCHANGE STENT URETERAL;  Surgeon: Eduard Swann MD;  Location: [address] [place] [address];  Service: Urology    MI CYSTOURETHROSCOPY,URETER CATHETER Right 11/14/2017    Procedure: CYSTOSCOPY RETROGRADE, STENT EXCHANGE;  Surgeon: Eduard Swann MD;  Location: [address] [place] [address];  Service: Urology    MI CYSTOURETHROSCOPY,URETER CATHETER Right 5/8/2018    Procedure: Scot Pastor;  Surgeon: Nelson Ramos MD;  Location: [address] [place] [address];  Service: Urology    NH CYSTOURETHROSCOPY,URETER CATHETER Right 8/14/2018    Procedure: Scot Pastor;  Surgeon: Nelson Ramos MD;  Location: [address] [place] [address];  Service: Urology    NH CYSTOURETHROSCOPY,URETER CATHETER Right 11/13/2018    Procedure: Scot Pastor, RETROGRADE;  Surgeon: Nelson Ramos MD;  Location: [address] [place] [address];  Service: Urology    NH CYSTOURETHROSCOPY,URETER CATHETER Right 2/12/2019    Procedure: Lance Butt, STENT REMOVAL AND STENT PLACEMENT;  Surgeon: Nelson Ramos MD;  Location: [address] [place] [address];  Service: Urology    NH CYSTOURETHROSCOPY,URETER CATHETER Right 5/10/2019    Procedure: Lance Butt, STENT EXCHANGE;  Surgeon: Nelson Ramos MD;  Location: [address] [place] [address];  Service: Urology    NH CYSTOURETHROSCOPY,URETER CATHETER Right 7/30/2019    Procedure: CYSTOSCOPY, RETROGRADE, STENT REMOVAL AND PLACEMENT OF STENT;  Surgeon: Nelson Ramos MD;  Location: [address] [place] [address];  Service: Urology    NH CYSTOURETHROSCOPY,URETER CATHETER Right 10/22/2019    Procedure: Lance Butt, STENT EXCHANGE;  Surgeon: Nelson Ramos MD;  Location: [address] [place] [address];  Service: Urology    NH CYSTOURETHROSCOPY,URETER CATHETER Right 1/28/2020    Procedure: CYSTOSCOPY, RETROGRADE, STENT REMOVAL AND STENT PLACEMENT;  Surgeon: Nelson Ramos MD;  Location: [address] [place] [address];  Service: Urology    NH CYSTOURETHROSCOPY,URETER CATHETER Right 5/22/2020    Procedure: CYSTOSCOPY RETROGRADE, STENT EXCHANGE;  Surgeon: Nelson Ramos MD;  Location: [address] [place] [address];  Service: Urology    NH CYSTOURETHROSCOPY,URETER CATHETER Right 8/11/2020    Procedure: CYSTOSCOPY, Vernelle Shelbi EXCHANGE, RETROGRADE;  Surgeon: Nelson Ramos MD;  Location: [address] [place] [address];  Service: Urology    NH CYSTOURETHROSCOPY,URETER CATHETER Right 12/15/2020 Procedure: CYSTOSCOPY, RETROGRADE, STENT REMOVAL, AND STENT PLACEMENT;  Surgeon: Stephanie Olivarez MD;  Location: [place] [facility];  Service: Urology    PROSTATE SURGERY  2015    Laser Prostatectomy  by Dr Christopher Leggett Right 4/18/2017    Procedure: INSERTION STENT URETERAL, RIGHT URETEROSCOPY,  LASER OF URETERAL STRICTURE AND STONE;  Surgeon: Stephanie Olivarez MD;  Location: [address] [place] [address];  Service:    [address] [place] Right 2/13/2018    Procedure: Hubertona Damir;  Surgeon: Stephanie Olivarez MD;  Location: [place] [facility];  Service: Urology     Social History   Social History     Substance and Sexual Activity   Alcohol Use Yes    Drinks per session: 1 or 2    Binge frequency: Less than monthly    Comment: rare     Social History     Substance and Sexual Activity   Drug Use No     Social History     Tobacco Use   Smoking Status Former Smoker    Packs/day: 0 50    Years: 62 00    Pack years: 31 00    Types: Cigarettes    Quit date: 4/15/2017    Years since quitting: 3 8   Smokeless Tobacco Never Used     Family History:   Family History   Problem Relation Age of Onset    Hypertension Mother     Alcohol abuse Father     Cirrhosis Father     Cancer Son 13        cancer-knee and above-left-amputation    Cancer Sister     Other Brother         head mass    Thyroid disease unspecified Sister     Arthritis Sister     Other Brother         legally blind in one eye       Meds/Allergies   Medications Prior to Admission   Medication    apixaban (ELIQUIS) 5 mg    atorvastatin (LIPITOR) 40 mg tablet    cholecalciferol (VITAMIN D3) 1,000 units tablet    finasteride (PROSCAR) 5 mg tablet    metoprolol tartrate (LOPRESSOR) 25 mg tablet    acetaminophen (TYLENOL) 325 mg tablet     No Known Allergies    Objective   Vitals: BP (!) 173/77 (BP Location: Left arm)   Pulse 80   Temp 97 8 °F (36 6 °C) (Oral)   Resp 20   Ht 5' 10" (1 778 m)   Wt 87 2 kg (192 lb 3 9 oz)   SpO2 97%   BMI 27 58 kg/m²     Physical Exam:  General Alert awake   Normocephalic atraumatic PERRLA  Lungs clear bilaterally  Cardiac normal S1 normal S2  Abdomen soft, flank pain  Extremities no edema    I/O last 24 hours:   In: 1000 [IV Piggyback:1000]  Out: -     Invasive Devices     Peripheral Intravenous Line            Peripheral IV 02/14/21 Right Antecubital less than 1 day          Drain            Ureteral Drain/Stent Right ureter 28 Fr  60 days          Airway            Non-Surgical Airway Oral pharyngeal airway 90 mm 60 days                    Lab Results: CBC:   Lab Results   Component Value Date    WBC 9 02 02/14/2021    HGB 11 2 (L) 02/14/2021    HCT 38 2 02/14/2021    MCV 84 02/14/2021     02/14/2021    ADJUSTEDWBC 11 90 (H) 07/24/2016    MCH 24 7 (L) 02/14/2021    MCHC 29 3 (L) 02/14/2021    RDW 14 2 02/14/2021    MPV 9 6 02/14/2021    NRBC 0 02/14/2021     CMP:   Lab Results   Component Value Date     02/14/2021    CO2 28 02/14/2021    CO2 19 (L) 11/03/2020    BUN 29 (H) 02/14/2021    CREATININE 2 24 (H) 02/14/2021    GLUCOSE 135 11/03/2020    CALCIUM 9 9 02/14/2021    AST 21 02/14/2021    ALT 14 02/14/2021    ALKPHOS 154 (H) 02/14/2021    EGFR 27 02/14/2021     Urinalysis:   Lab Results   Component Value Date    COLORU Yellow 02/14/2021    CLARITYU Cloudy 02/14/2021    SPECGRAV 1 020 02/14/2021    PHUR 6 0 02/14/2021    PHUR 6 5 02/05/2019    LEUKOCYTESUR Trace (A) 02/14/2021    NITRITE Negative 02/14/2021    GLUCOSEU Negative 02/14/2021    KETONESU Negative 02/14/2021    BILIRUBINUR Negative 02/14/2021    BLOODU Large (A) 02/14/2021     Urine Culture:   Lab Results   Component Value Date    URINECX <10,000 cfu/ml  12/08/2020     PSA: No results found for: PSA        Assessment/ Plan:  Npo  Cyto/right ureteroscopy/laser      Mikki Gaytan MD

## 2021-02-14 NOTE — ASSESSMENT & PLAN NOTE
CT A/P: "A right ureteral stent is in place  There are several calculi adjacent to the proximal to midportion of the stent, the largest of which measures 4 mm    There is moderate to severe right hydronephrosis and right hydroureter to the level of the calculi "  · Cr 2 24   · Urology planning for cystoscopy   · NPO   · IVF  · Analgesics and antiemetics as needed   · Trend Cr daily

## 2021-02-15 ENCOUNTER — TELEPHONE (OUTPATIENT)
Dept: NEPHROLOGY | Facility: CLINIC | Age: 81
End: 2021-02-15

## 2021-02-15 ENCOUNTER — APPOINTMENT (OUTPATIENT)
Dept: PHYSICAL THERAPY | Facility: CLINIC | Age: 81
End: 2021-02-15
Payer: MEDICARE

## 2021-02-15 DIAGNOSIS — N18.32 CHRONIC KIDNEY DISEASE (CKD) STAGE G3B/A3, MODERATELY DECREASED GLOMERULAR FILTRATION RATE (GFR) BETWEEN 30-44 ML/MIN/1.73 SQUARE METER AND ALBUMINURIA CREATININE RATIO GREATER THAN 300 MG/G (HCC): Primary | ICD-10-CM

## 2021-02-15 NOTE — TELEPHONE ENCOUNTER
Pt advised to have CBC, BMP done prior to followup appt with Diaferon on 2/26/21  Lab slips mailed  ----- Message from Damien Rosen MD sent at 2/15/2021  8:17 AM EST -----  Hello    Patient normally is followed up by Ms Salvatore Garcia  Pt was just d/c from hosp yest    MA team - can you please ask pt to complete CBC, and BMP prior to appt with Ms JUDIT BANG coming this month       Ms Junior Cee    Thank you    np

## 2021-02-16 LAB — BACTERIA UR CULT: NORMAL

## 2021-02-17 ENCOUNTER — TELEPHONE (OUTPATIENT)
Dept: OCCUPATIONAL THERAPY | Facility: CLINIC | Age: 81
End: 2021-02-17

## 2021-02-18 ENCOUNTER — APPOINTMENT (OUTPATIENT)
Dept: PHYSICAL THERAPY | Facility: CLINIC | Age: 81
End: 2021-02-18
Payer: MEDICARE

## 2021-02-20 ENCOUNTER — TRANSCRIBE ORDERS (OUTPATIENT)
Dept: ADMINISTRATIVE | Facility: HOSPITAL | Age: 81
End: 2021-02-20

## 2021-02-20 ENCOUNTER — LAB (OUTPATIENT)
Dept: LAB | Facility: HOSPITAL | Age: 81
End: 2021-02-20
Payer: MEDICARE

## 2021-02-20 DIAGNOSIS — N17.9 AKI (ACUTE KIDNEY INJURY) (HCC): ICD-10-CM

## 2021-02-20 DIAGNOSIS — N18.32 CHRONIC KIDNEY DISEASE (CKD) STAGE G3B/A3, MODERATELY DECREASED GLOMERULAR FILTRATION RATE (GFR) BETWEEN 30-44 ML/MIN/1.73 SQUARE METER AND ALBUMINURIA CREATININE RATIO GREATER THAN 300 MG/G (HCC): ICD-10-CM

## 2021-02-20 DIAGNOSIS — I10 BENIGN ESSENTIAL HYPERTENSION: ICD-10-CM

## 2021-02-20 DIAGNOSIS — I48.20 CHRONIC ATRIAL FIBRILLATION (HCC): ICD-10-CM

## 2021-02-20 DIAGNOSIS — E13.69 OTHER SPECIFIED DIABETES MELLITUS WITH OTHER SPECIFIED COMPLICATION, UNSPECIFIED WHETHER LONG TERM INSULIN USE (HCC): Primary | ICD-10-CM

## 2021-02-20 DIAGNOSIS — E13.69 OTHER SPECIFIED DIABETES MELLITUS WITH OTHER SPECIFIED COMPLICATION, UNSPECIFIED WHETHER LONG TERM INSULIN USE (HCC): ICD-10-CM

## 2021-02-20 DIAGNOSIS — R31.0 HEMATURIA, GROSS: ICD-10-CM

## 2021-02-20 DIAGNOSIS — N18.31 STAGE 3A CHRONIC KIDNEY DISEASE (HCC): ICD-10-CM

## 2021-02-20 DIAGNOSIS — I50.32 CHRONIC DIASTOLIC HEART FAILURE (HCC): ICD-10-CM

## 2021-02-20 DIAGNOSIS — I63.40 CEREBROVASCULAR ACCIDENT (CVA) DUE TO EMBOLISM OF CEREBRAL ARTERY (HCC): ICD-10-CM

## 2021-02-20 LAB
25(OH)D3 SERPL-MCNC: 41.1 NG/ML (ref 30–100)
ALBUMIN SERPL BCP-MCNC: 2.8 G/DL (ref 3.5–5)
ANION GAP SERPL CALCULATED.3IONS-SCNC: 6 MMOL/L (ref 4–13)
BACTERIA UR QL AUTO: ABNORMAL /HPF
BILIRUB UR QL STRIP: ABNORMAL
BUN SERPL-MCNC: 19 MG/DL (ref 5–25)
CALCIUM ALBUM COR SERPL-MCNC: 9.3 MG/DL (ref 8.3–10.1)
CALCIUM SERPL-MCNC: 8.3 MG/DL (ref 8.3–10.1)
CHLORIDE SERPL-SCNC: 105 MMOL/L (ref 100–108)
CLARITY UR: ABNORMAL
CO2 SERPL-SCNC: 28 MMOL/L (ref 21–32)
COLOR UR: ABNORMAL
CREAT SERPL-MCNC: 1.74 MG/DL (ref 0.6–1.3)
CREAT UR-MCNC: 116 MG/DL
ERYTHROCYTE [DISTWIDTH] IN BLOOD BY AUTOMATED COUNT: 14.3 % (ref 11.6–15.1)
EST. AVERAGE GLUCOSE BLD GHB EST-MCNC: 126 MG/DL
GFR SERPL CREATININE-BSD FRML MDRD: 37 ML/MIN/1.73SQ M
GLUCOSE P FAST SERPL-MCNC: 98 MG/DL (ref 65–99)
GLUCOSE UR STRIP-MCNC: ABNORMAL MG/DL
HBA1C MFR BLD: 6 %
HCT VFR BLD AUTO: 33 % (ref 36.5–49.3)
HGB BLD-MCNC: 9.6 G/DL (ref 12–17)
HGB UR QL STRIP.AUTO: ABNORMAL
KETONES UR STRIP-MCNC: ABNORMAL MG/DL
LEUKOCYTE ESTERASE UR QL STRIP: ABNORMAL
MCH RBC QN AUTO: 24.4 PG (ref 26.8–34.3)
MCHC RBC AUTO-ENTMCNC: 29.1 G/DL (ref 31.4–37.4)
MCV RBC AUTO: 84 FL (ref 82–98)
NITRITE UR QL STRIP: POSITIVE
NON-SQ EPI CELLS URNS QL MICRO: ABNORMAL /HPF
PH UR STRIP.AUTO: 7 [PH]
PHOSPHATE SERPL-MCNC: 2.9 MG/DL (ref 2.3–4.1)
PLATELET # BLD AUTO: 194 THOUSANDS/UL (ref 149–390)
PMV BLD AUTO: 10 FL (ref 8.9–12.7)
POTASSIUM SERPL-SCNC: 4.9 MMOL/L (ref 3.5–5.3)
PROT UR STRIP-MCNC: >=300 MG/DL
PROT UR-MCNC: 236 MG/DL
PROT/CREAT UR: 2.03 MG/G{CREAT} (ref 0–0.1)
PTH-INTACT SERPL-MCNC: 122.5 PG/ML (ref 18.4–80.1)
RBC # BLD AUTO: 3.93 MILLION/UL (ref 3.88–5.62)
RBC #/AREA URNS AUTO: ABNORMAL /HPF
SODIUM SERPL-SCNC: 139 MMOL/L (ref 136–145)
SP GR UR STRIP.AUTO: 1.01 (ref 1–1.03)
UROBILINOGEN UR QL STRIP.AUTO: 4 E.U./DL
WBC # BLD AUTO: 7 THOUSAND/UL (ref 4.31–10.16)
WBC #/AREA URNS AUTO: ABNORMAL /HPF

## 2021-02-20 PROCEDURE — 83970 ASSAY OF PARATHORMONE: CPT

## 2021-02-20 PROCEDURE — 84156 ASSAY OF PROTEIN URINE: CPT

## 2021-02-20 PROCEDURE — 82306 VITAMIN D 25 HYDROXY: CPT

## 2021-02-20 PROCEDURE — 80069 RENAL FUNCTION PANEL: CPT

## 2021-02-20 PROCEDURE — 85027 COMPLETE CBC AUTOMATED: CPT

## 2021-02-20 PROCEDURE — 83036 HEMOGLOBIN GLYCOSYLATED A1C: CPT

## 2021-02-20 PROCEDURE — 36415 COLL VENOUS BLD VENIPUNCTURE: CPT

## 2021-02-20 PROCEDURE — 82570 ASSAY OF URINE CREATININE: CPT

## 2021-02-20 PROCEDURE — 81001 URINALYSIS AUTO W/SCOPE: CPT

## 2021-02-21 ENCOUNTER — TELEPHONE (OUTPATIENT)
Dept: OTHER | Facility: HOSPITAL | Age: 81
End: 2021-02-21

## 2021-02-21 NOTE — TELEPHONE ENCOUNTER
Spoke to the patient  States had hematuria with stent exchange and has been on eliquis  Pt states eliquis was temporarily held already and is now back on it  No longer with hematuria    Pt reluctant to have repeat labs this week which should be ok as pt is now stable   Likely would benefit though from BMP and CBC and UPCR In a couple weeks to f/u current levels    Will notify AP who is seeing pt friday

## 2021-02-21 NOTE — RESULT ENCOUNTER NOTE
Hello    guydaryl - creat stable  But Hb decreased  Pt states had hematuria post procedure stent exchange recently  They held eliquis temporarily  Hematuria resolved     - you are seeing him Friday  - I asked him to repeat CBC this week but he was understandably reluctant as hematuria has resolved and he feels well  - so if he agrees, would consider asking him to repeat CBC, BMP, UPCR in a couple weeks  - recent UPCR higher but also with hematuria so diff to interpret    Thanks    ralph

## 2021-02-22 ENCOUNTER — APPOINTMENT (OUTPATIENT)
Dept: PHYSICAL THERAPY | Facility: CLINIC | Age: 81
End: 2021-02-22
Payer: MEDICARE

## 2021-02-22 NOTE — PROGRESS NOTES
NEPHROLOGY OFFICE VISIT   Arun Roldan 68 y o  male MRN: [id number]  2/26/2021    Reason for Visit:   Follow-up    INTERVAL HISTORY and SUBJECTIVE:    I had the pleasure of seeing  Willistine Severance today in the renal clinic for the continued management of  Chronic kidney disease  He is a 33-year-old male who was recently seen in the emergency room at [address] [place] [address] on 02/14/2021  He presented with right flank pain  Imaging revealed renal calculi with moderate to severe right hydronephrosis  He has a history of nephrolithiasis and renal calculi with prior ureteral stent  He was seen by Urology and had right ureteral stent exchanged  Hematuria has resolved  Eliquis is currently on hold in lieu of heart surgery planned for next week   Patient also has a history of aortic stenosis is scheduled for [facility] on [facility] at [facility]  Dr Booker Ring is doing the surgery  Remain is feeling well and has no acute complaints  No chest pain or shortness of breath  No edema  No hematuria  Last blood work obtained on 02/20/2021  Assessment and plan:     1  Chronic kidney disease, stage III:  · Current creatinine 1 74 mg/dL  Baseline creatinine 1 6-1 8 mg/dL  · Nephrologist:  Dr Talia Eller  · Renal function has been fairly stable since last office visit except for elevation to 2 24 on 02/14 when patient was seen in the emergency room for hematuria and treated for right hydronephrosis in the setting of renal calculus  Stent was inserted/ replaced with improvement in renal function  · Creatinine back to baseline  Renal function stable  Patient ready for surgery next week  · Prior Mag 3 scan:  Split renal function-left kidney 67%, right kidney 33%  · Urine protein creatinine ratio 0 27 September 2020 which had decreased from prior measurement of 0 55  · Renal stone study on 02/14/2021 without contrast:  Double-J stent in place, several tiny calculi adjacent to the proximal ordered midportion of the stent    The right kidney is swollen with moderate to severe right hydronephrosis and hydroureter to the level of the calculi  Renovascular calcifications noted  Other nonobstructing renal calculi also noted lower pole right kidney  · Urine protein creatinine ratio elevated to 2 03 g with prior urine protein creatinine ratio 0 2 January 2021  ? Urinalysis also shows large blood, innumerable RBCs, field obstructed by WBCs and bacteria  ? Repeat urine protein creatinine ratio at steady state  · Etiology:  Solitary functioning kidney, obstructive uropathy with prior hydronephrosis and nephrolithiasis  · Completed Kidney Smart class  · Plan/recommendations:  ? Renal function stable  Following recent acute kidney injury which was related to obstructive uropathy  ? Patient going for TAVR procedure on 03/01/2021  Stable for surgery  Not know medications adjusted today  ? Eliquis on hold for surgery  ? Cardiac surgery requested to consult nephrology for management during surgery for perioperative risk reduction  2  Hypertension:   · Dizziness occurring randomly-  He reports recent dizziness 1 day after stent was replaced  · Blood pressure adequately controlled  Currently on metoprolol  · Euvolemic  · Plan:  ? No change in medications  3  CVA  · Eliquis on hold  · Watchman in place  4  Nephrolithiasis:    · Follows with Dr Jacquie Kumar stone makeup primarily calcium oxalate  · Right ureteral stent with recently placement due to obstructing calculi/hydronephrosis  · Had prior litho link  Low-salt diet endorsed  · Maintains good hydration  5  History of diastolic CHF/mild to moderate pulmonary hypertension/ AS  · No evidence pf decompensation  · TAVR 03/01/2021  6  CKD MBD:  · Phosphorus level normal 2 9  · Parathyroid hormone 122 5  · Vitamin D level 41 1  7  Anemia:    · Decline in hemoglobin in the setting of hematuria  · Follow-up per CT surgery  8  Hematuria:  Resolved    Eliquis on hold at this time for surgery  PATIENT INSTRUCTIONS:    Patient Instructions   1  NO changes to medications at this time  2  Follow up with DR Jaz Pelayo after the surgery in 4-6 weeks  3  Blood work orders for next month/before the appointment  4  Avoid NSAIDS           Orders Placed This Encounter   Procedures    Renal function panel     Standing Status:   Future     Standing Expiration Date:   4/26/2021    CBC     Standing Status:   Future     Standing Expiration Date:   4/26/2021    Magnesium     Standing Status:   Future     Standing Expiration Date:   4/26/2021    Urinalysis with microscopic     Standing Status:   Future     Standing Expiration Date:   4/26/2021    Protein / creatinine ratio, urine     Standing Status:   Future     Standing Expiration Date:   4/26/2021       OBJECTIVE:  Current Weight: Weight - Scale: 90 7 kg (200 lb)  Vitals:    02/26/21 1539 02/26/21 1609   BP:  130/74   BP Location:  Left arm   Patient Position:  Sitting   Cuff Size:  Standard   Pulse:  78   Weight: 90 7 kg (200 lb)    Height: 5' 10" (1 778 m)     Body mass index is 28 7 kg/m²  REVIEW OF SYSTEMS:    Review of Systems   Constitutional: Positive for fatigue  Negative for appetite change, chills and fever  HENT: Negative for congestion  Eyes: Negative for visual disturbance  Respiratory: Negative for cough and shortness of breath  Cardiovascular: Negative for chest pain, palpitations and leg swelling  Gastrointestinal: Negative for abdominal distention, abdominal pain, constipation, diarrhea, nausea and vomiting  Genitourinary: Negative for dysuria and hematuria  Musculoskeletal: Negative for arthralgias and back pain  Skin: Negative for color change, rash and wound  Neurological: Positive for dizziness (Rare occasions of dizziness see HPI)  Negative for syncope, weakness, light-headedness and headaches  All other systems reviewed and are negative        PHYSICAL EXAM:      Physical Exam  Constitutional: General: He is not in acute distress  Appearance: Normal appearance  He is well-developed  He is not ill-appearing, toxic-appearing or diaphoretic  HENT:      Head: Normocephalic and atraumatic  Nose: No congestion  Eyes:      General: No scleral icterus  Extraocular Movements: Extraocular movements intact  Conjunctiva/sclera: Conjunctivae normal    Neck:      Musculoskeletal: Normal range of motion and neck supple  Thyroid: No thyromegaly  Vascular: No carotid bruit or JVD  Trachea: No tracheal deviation  Cardiovascular:      Rate and Rhythm: Normal rate and regular rhythm  Heart sounds: Murmur present  No friction rub  No gallop  Pulmonary:      Effort: Pulmonary effort is normal  No respiratory distress  Breath sounds: Normal breath sounds  No stridor  No wheezing, rhonchi or rales  Abdominal:      General: Bowel sounds are normal  There is no distension  Palpations: Abdomen is soft  There is no mass  Tenderness: There is no abdominal tenderness  There is no guarding or rebound  Musculoskeletal: Normal range of motion  General: No tenderness or signs of injury  Right lower leg: No edema  Left lower leg: No edema  Skin:     General: Skin is warm and dry  Coloration: Skin is not jaundiced or pale  Findings: No erythema or rash  Neurological:      Mental Status: He is alert and oriented to person, place, and time  Psychiatric:         Mood and Affect: Mood normal          Behavior: Behavior normal          Thought Content:  Thought content normal          Judgment: Judgment normal          Medications:    Current Outpatient Medications:     acetaminophen (TYLENOL) 325 mg tablet, Take 2 tablets (650 mg total) by mouth every 8 (eight) hours as needed for mild pain, Disp: , Rfl: 0    apixaban (ELIQUIS) 5 mg, Take 1 tablet (5 mg total) by mouth 2 (two) times a day (Patient taking differently: Take 5 mg by mouth 2 (two) times a day Was told he did not need to stop ahead of surgery ), Disp: 60 tablet, Rfl: 1    atorvastatin (LIPITOR) 40 mg tablet, Take 1 tablet (40 mg total) by mouth daily with dinner, Disp: 30 tablet, Rfl: 1    cholecalciferol (VITAMIN D3) 1,000 units tablet, Take 1,000 Units by mouth daily after lunch  , Disp: , Rfl:     finasteride (PROSCAR) 5 mg tablet, Take 1 tablet (5 mg total) by mouth daily, Disp: 90 tablet, Rfl: 3    metoprolol tartrate (LOPRESSOR) 25 mg tablet, Take 1 tablet (25 mg total) by mouth every 12 (twelve) hours, Disp: 270 tablet, Rfl: 0    Laboratory Results:  Results from last 7 days   Lab Units 02/20/21  0845   WBC Thousand/uL 7 00   HEMOGLOBIN g/dL 9 6*   HEMATOCRIT % 33 0*   PLATELETS Thousands/uL 194   POTASSIUM mmol/L 4 9   CHLORIDE mmol/L 105   CO2 mmol/L 28   BUN mg/dL 19   CREATININE mg/dL 1 74*   CALCIUM mg/dL 8 3   PHOSPHORUS mg/dL 2 9       Results for orders placed or performed in visit on 02/20/21   Renal function panel   Result Value Ref Range    Albumin 2 8 (L) 3 5 - 5 0 g/dL    Calcium 8 3 8 3 - 10 1 mg/dL    Corrected Calcium 9 3 8 3 - 10 1 mg/dL    Phosphorus 2 9 2 3 - 4 1 mg/dL    BUN 19 5 - 25 mg/dL    Creatinine 1 74 (H) 0 60 - 1 30 mg/dL    Sodium 139 136 - 145 mmol/L    Potassium 4 9 3 5 - 5 3 mmol/L    Chloride 105 100 - 108 mmol/L    CO2 28 21 - 32 mmol/L    ANION GAP 6 4 - 13 mmol/L    eGFR 37 ml/min/1 73sq m    Glucose, Fasting 98 65 - 99 mg/dL   PTH, intact   Result Value Ref Range     5 (H) 18 4 - 80 1 pg/mL   Vitamin D 25 hydroxy   Result Value Ref Range    Vit D, 25-Hydroxy 41 1 30 0 - 100 0 ng/mL   Urinalysis with microscopic   Result Value Ref Range    Clarity, UA Cloudy     Color, UA Red     Specific [place], UA 1 015 1 000 - 1 030    pH, UA 7 0 5 0, 5 5, 6 0, 6 5, 7 0, 7 5, 8 0, 8 5, 9 0    Glucose,  (1/10%) (A) Negative mg/dl    Ketones, UA 15 (1+) (A) Negative mg/dl    Blood, UA Large (A) Negative    Protein, UA >=300 (A) Negative mg/dl Nitrite, UA Positive (A) Negative    Bilirubin, UA Interference- unable to analyze (A) Negative    Urobilinogen, UA 4 0 (A) 0 2, 1 0 E U /dl E U /dl    Leukocytes, UA Moderate (A) Negative    WBC, UA Field obscured, unable to enumerate (A) None Seen, 2-4 /hpf    RBC, UA Innumerable (A) None Seen, 2-4 /hpf    Bacteria, UA Field obscured, unable to enumerate (A) None Seen, Occasional /hpf    Epithelial Cells Field obscured, unable to enumerate (A) None Seen, Occasional /hpf   Protein / creatinine ratio, urine   Result Value Ref Range    Creatinine, Ur 116 0 mg/dL    Protein Urine Random 236 mg/dL    Prot/Creat Ratio, Ur 2 03 (H) 0 00 - 0 10   CBC and Platelet   Result Value Ref Range    WBC 7 00 4 31 - 10 16 Thousand/uL    RBC 3 93 3 88 - 5 62 Million/uL    Hemoglobin 9 6 (L) 12 0 - 17 0 g/dL    Hematocrit 33 0 (L) 36 5 - 49 3 %    MCV 84 82 - 98 fL    MCH 24 4 (L) 26 8 - 34 3 pg    MCHC 29 1 (L) 31 4 - 37 4 g/dL    RDW 14 3 11 6 - 15 1 %    Platelets 790 143 - 041 Thousands/uL    MPV 10 0 8 9 - 12 7 fL   Hemoglobin A1C   Result Value Ref Range    Hemoglobin A1C 6 0 (H) Normal 3 8-5 6%; PreDiabetic 5 7-6 4%;  Diabetic >=6 5%; Glycemic control for adults with diabetes <7 0% %     mg/dl

## 2021-02-25 ENCOUNTER — OFFICE VISIT (OUTPATIENT)
Dept: PHYSICAL THERAPY | Facility: CLINIC | Age: 81
End: 2021-02-25
Payer: MEDICARE

## 2021-02-25 DIAGNOSIS — R26.89 BALANCE DISORDER: ICD-10-CM

## 2021-02-25 DIAGNOSIS — I63.00 CEREBROVASCULAR ACCIDENT (CVA) DUE TO THROMBOSIS OF PRECEREBRAL ARTERY (HCC): Primary | ICD-10-CM

## 2021-02-25 PROCEDURE — 97112 NEUROMUSCULAR REEDUCATION: CPT | Performed by: PHYSICAL THERAPIST

## 2021-02-25 NOTE — PROGRESS NOTES
Daily Note  IE: 2020   (POC: 2020)      Today's date: 2021  Patient name: Tigist Page  : 1940  MRN: [id number]  Referring provider: Edna Harper DO  Dx:   Encounter Diagnosis     ICD-10-CM    1  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 ([address] )  I63 00    2  Balance disorder  R26 89                   Subjective: Patient reports "feeling good" today and does not complain of any pain  Objective: See treatment diary below      NMR:  - tandem walking FWD on foam BB, no UE- 5 laps  - tandem walking fwd on foam BB with cone taps- 5 laps  - STS on foam: 20x2  - Step ups - fwd/ lateral- foam on floor and on step- 10 ea LE  - Step taps- 6 inch from foam- 2x10 ea- fwd/ lat  - Sidestepping on blue airex- 10 cycles of 10 feet, no UE        Assessment: Patient tolerated treatment well, seated rest breaks needed at times  PT assist needed to prevent LOB when performing lateral step ups today  Patient reporting he will be having surgery on 3/1/21 and will be placed on hold until MD gives clearance  Patient given business card and instructed to call PT office when given clearance  Plan: Continue per plan of care         Precautions: AFIB HTN        Outcome measures  IE20               5XSTS 15 41 sec   Retropulsed x 1 12 66 sec 13 26 sec      TUG  9 38 sec 9 96 sec               Parks  47/56 53/56 54/56      10MWT 12 71 sec   0 78 m/sec  2 58 ft/sec  8 78 sec  1 14 m/s 9 24 sec                                 6MWT  1010 ft 1010 ft      REO 30 sec  30 sec 30 sec      REC 30 sec  30 sec 30 sec      SREO 2 sec   30 sec 30 sec      SREC NT 18 sec 7 sec      Foam FTEO 30 sec  30 sec 30 sec      Foam FTEC 10 sec  30 sec 30 sec      SLS  1 sec 3 sec

## 2021-02-26 ENCOUNTER — OFFICE VISIT (OUTPATIENT)
Dept: NEPHROLOGY | Facility: CLINIC | Age: 81
End: 2021-02-26
Payer: MEDICARE

## 2021-02-26 VITALS
WEIGHT: 200 LBS | BODY MASS INDEX: 28.63 KG/M2 | HEART RATE: 78 BPM | HEIGHT: 70 IN | DIASTOLIC BLOOD PRESSURE: 74 MMHG | SYSTOLIC BLOOD PRESSURE: 130 MMHG

## 2021-02-26 DIAGNOSIS — N18.32 STAGE 3B CHRONIC KIDNEY DISEASE (HCC): ICD-10-CM

## 2021-02-26 DIAGNOSIS — I10 BENIGN ESSENTIAL HYPERTENSION: Primary | ICD-10-CM

## 2021-02-26 DIAGNOSIS — I35.0 AORTIC VALVE STENOSIS, ETIOLOGY OF CARDIAC VALVE DISEASE UNSPECIFIED: ICD-10-CM

## 2021-02-26 DIAGNOSIS — I48.20 CHRONIC ATRIAL FIBRILLATION (HCC): ICD-10-CM

## 2021-02-26 DIAGNOSIS — I10 ESSENTIAL HYPERTENSION: ICD-10-CM

## 2021-02-26 PROCEDURE — 99213 OFFICE O/P EST LOW 20 MIN: CPT | Performed by: NURSE PRACTITIONER

## 2021-02-26 NOTE — LETTER
February 26, 2021     Petra Us PA-C  [address] [facility] [address]    Patient: Ariel Morton   YOB: 1940   Date of Visit: 2/26/2021       Dear Dr Silvia Jansen:    Thank you for referring Ayush Collins to me for evaluation  Below are my notes for this consultation  If you have questions, please do not hesitate to call me  I look forward to following your patient along with you  Sincerely,        KELLY Larson        CC: MD [place] [address] [facility]  2/26/2021  5:35 PM  Incomplete    NEPHROLOGY OFFICE VISIT   Ariel Morton 68 y o  male MRN: [id number]  2/26/2021    Reason for Visit:   Follow-up    INTERVAL HISTORY and SUBJECTIVE:    I had the pleasure of seeing  Blue Funk today in the renal clinic for the continued management of  Chronic kidney disease  He is a 68-year-old male who was recently seen in the emergency room at [facility] on 02/14/2021  He presented with right flank pain  Imaging revealed renal calculi with moderate to severe right hydronephrosis  He has a history of nephrolithiasis and renal calculi with prior ureteral stent  He was seen by Urology and had right ureteral stent exchanged  Hematuria has resolved  Eliquis is currently on hold in lieu of heart surgery planned for next week   Patient also has a history of aortic stenosis is scheduled for TAVR on 03/01/2021 at [facility]  Dr Covington Seen is doing the surgery  Remain is feeling well and has no acute complaints  No chest pain or shortness of breath  No edema  No hematuria  Last blood work obtained on 02/20/2021  Assessment and plan:     1  Chronic kidney disease, stage III:  · Current creatinine 1 74 mg/dL  Baseline creatinine 1 6-1 8 mg/dL  · Nephrologist:  Dr Michele Hernandez  · Renal function has been fairly stable since last office visit except for elevation to 2 24 on 02/14 when patient was seen in the emergency room for hematuria and treated for right hydronephrosis in the setting of renal calculus  Stent was inserted/ replaced with improvement in renal function  · Creatinine back to baseline  Renal function stable  Patient ready for surgery next week  · Prior Mag 3 scan:  Split renal function-left kidney 67%, right kidney 33%  · Urine protein creatinine ratio 0 27 September 2020 which had decreased from prior measurement of 0 55  · Renal stone study on 02/14/2021 without contrast:  Double-J stent in place, several tiny calculi adjacent to the proximal ordered midportion of the stent  The right kidney is swollen with moderate to severe right hydronephrosis and hydroureter to the level of the calculi  Renovascular calcifications noted  Other nonobstructing renal calculi also noted lower pole right kidney  · Urine protein creatinine ratio elevated to 2 03 g with prior urine protein creatinine ratio 0 2 January 2021  ? Urinalysis also shows large blood, innumerable RBCs, field obstructed by WBCs and bacteria  ? Repeat urine protein creatinine ratio at steady state  · Etiology:  Solitary functioning kidney, obstructive uropathy with prior hydronephrosis and nephrolithiasis  · Completed Kidney Smart class  · Plan/recommendations:  ? Renal function stable  Following recent acute kidney injury which was related to obstructive uropathy  ? Patient going for TAVR procedure on 03/01/2021  Stable for surgery  Not know medications adjusted today  ? Eliquis on hold for surgery  2  Hypertension:   · Dizziness occurring randomly-  He reports recent dizziness 1 day after stent was replaced  · Blood pressure adequately controlled  Currently on metoprolol  · Euvolemic  · Plan:  ? No change in medications  3  CVA  · Eliquis on hold  · Watchman in place  4  Nephrolithiasis:    · Follows with Dr Rebeca Rodriguez stone makeup primarily calcium oxalate  · Right ureteral stent with recently placement due to obstructing calculi/hydronephrosis  · Had prior litho link    Low-salt diet endorsed  · Maintains good hydration  5  History of diastolic CHF/mild to moderate pulmonary hypertension/ AS  · No evidence pf decompensation  · TAVR 03/01/2021  6  CKD MBD:  · Phosphorus level normal 2 9  · Parathyroid hormone 122 5  · Vitamin D level 41 1  7  Anemia:    · Decline in hemoglobin in the setting of hematuria  · Follow-up per CT surgery  8  Hematuria:  Resolved  Eliquis on hold at this time for surgery  PATIENT INSTRUCTIONS:    Patient Instructions   1  NO changes to medications at this time  2  Follow up with DR Jaz Pelayo after the surgery in 4-6 weeks  3  Blood work orders for next month/before the appointment  4  Avoid NSAIDS           Orders Placed This Encounter   Procedures    Renal function panel     Standing Status:   Future     Standing Expiration Date:   4/26/2021    CBC     Standing Status:   Future     Standing Expiration Date:   4/26/2021    Magnesium     Standing Status:   Future     Standing Expiration Date:   4/26/2021    Urinalysis with microscopic     Standing Status:   Future     Standing Expiration Date:   4/26/2021    Protein / creatinine ratio, urine     Standing Status:   Future     Standing Expiration Date:   4/26/2021       OBJECTIVE:  Current Weight: Weight - Scale: 90 7 kg (200 lb)  Vitals:    02/26/21 1539 02/26/21 1609   BP:  130/74   BP Location:  Left arm   Patient Position:  Sitting   Cuff Size:  Standard   Pulse:  78   Weight: 90 7 kg (200 lb)    Height: 5' 10" (1 778 m)     Body mass index is 28 7 kg/m²  REVIEW OF SYSTEMS:    Review of Systems   Constitutional: Positive for fatigue  Negative for appetite change, chills and fever  HENT: Negative for congestion  Eyes: Negative for visual disturbance  Respiratory: Negative for cough and shortness of breath  Cardiovascular: Negative for chest pain, palpitations and leg swelling     Gastrointestinal: Negative for abdominal distention, abdominal pain, constipation, diarrhea, nausea and vomiting  Genitourinary: Negative for dysuria and hematuria  Musculoskeletal: Negative for arthralgias and back pain  Skin: Negative for color change, rash and wound  Neurological: Positive for dizziness (Rare occasions of dizziness see HPI)  Negative for syncope, weakness, light-headedness and headaches  All other systems reviewed and are negative  PHYSICAL EXAM:      Physical Exam  Constitutional:       General: He is not in acute distress  Appearance: Normal appearance  He is well-developed  He is not ill-appearing, toxic-appearing or diaphoretic  HENT:      Head: Normocephalic and atraumatic  Nose: No congestion  Eyes:      General: No scleral icterus  Extraocular Movements: Extraocular movements intact  Conjunctiva/sclera: Conjunctivae normal    Neck:      Musculoskeletal: Normal range of motion and neck supple  Thyroid: No thyromegaly  Vascular: No carotid bruit or JVD  Trachea: No tracheal deviation  Cardiovascular:      Rate and Rhythm: Normal rate and regular rhythm  Heart sounds: Murmur present  No friction rub  No gallop  Pulmonary:      Effort: Pulmonary effort is normal  No respiratory distress  Breath sounds: Normal breath sounds  No stridor  No wheezing, rhonchi or rales  Abdominal:      General: Bowel sounds are normal  There is no distension  Palpations: Abdomen is soft  There is no mass  Tenderness: There is no abdominal tenderness  There is no guarding or rebound  Musculoskeletal: Normal range of motion  General: No tenderness or signs of injury  Right lower leg: No edema  Left lower leg: No edema  Skin:     General: Skin is warm and dry  Coloration: Skin is not jaundiced or pale  Findings: No erythema or rash  Neurological:      Mental Status: He is alert and oriented to person, place, and time     Psychiatric:         Mood and Affect: Mood normal          Behavior: Behavior normal  Thought Content:  Thought content normal          Judgment: Judgment normal          Medications:    Current Outpatient Medications:     acetaminophen (TYLENOL) 325 mg tablet, Take 2 tablets (650 mg total) by mouth every 8 (eight) hours as needed for mild pain, Disp: , Rfl: 0    apixaban (ELIQUIS) 5 mg, Take 1 tablet (5 mg total) by mouth 2 (two) times a day (Patient taking differently: Take 5 mg by mouth 2 (two) times a day Was told he did not need to stop ahead of surgery ), Disp: 60 tablet, Rfl: 1    atorvastatin (LIPITOR) 40 mg tablet, Take 1 tablet (40 mg total) by mouth daily with dinner, Disp: 30 tablet, Rfl: 1    cholecalciferol (VITAMIN D3) 1,000 units tablet, Take 1,000 Units by mouth daily after lunch  , Disp: , Rfl:     finasteride (PROSCAR) 5 mg tablet, Take 1 tablet (5 mg total) by mouth daily, Disp: 90 tablet, Rfl: 3    metoprolol tartrate (LOPRESSOR) 25 mg tablet, Take 1 tablet (25 mg total) by mouth every 12 (twelve) hours, Disp: 270 tablet, Rfl: 0    Laboratory Results:  Results from last 7 days   Lab Units 02/20/21  0845   WBC Thousand/uL 7 00   HEMOGLOBIN g/dL 9 6*   HEMATOCRIT % 33 0*   PLATELETS Thousands/uL 194   POTASSIUM mmol/L 4 9   CHLORIDE mmol/L 105   CO2 mmol/L 28   BUN mg/dL 19   CREATININE mg/dL 1 74*   CALCIUM mg/dL 8 3   PHOSPHORUS mg/dL 2 9       Results for orders placed or performed in visit on 02/20/21   Renal function panel   Result Value Ref Range    Albumin 2 8 (L) 3 5 - 5 0 g/dL    Calcium 8 3 8 3 - 10 1 mg/dL    Corrected Calcium 9 3 8 3 - 10 1 mg/dL    Phosphorus 2 9 2 3 - 4 1 mg/dL    BUN 19 5 - 25 mg/dL    Creatinine 1 74 (H) 0 60 - 1 30 mg/dL    Sodium 139 136 - 145 mmol/L    Potassium 4 9 3 5 - 5 3 mmol/L    Chloride 105 100 - 108 mmol/L    CO2 28 21 - 32 mmol/L    ANION GAP 6 4 - 13 mmol/L    eGFR 37 ml/min/1 73sq m    Glucose, Fasting 98 65 - 99 mg/dL   PTH, intact   Result Value Ref Range     5 (H) 18 4 - 80 1 pg/mL   Vitamin D 25 hydroxy   Result Value Ref Range    Vit D, 25-Hydroxy 41 1 30 0 - 100 0 ng/mL   Urinalysis with microscopic   Result Value Ref Range    Clarity, UA Cloudy     Color, UA Red     Specific [place], UA 1 015 1 000 - 1 030    pH, UA 7 0 5 0, 5 5, 6 0, 6 5, 7 0, 7 5, 8 0, 8 5, 9 0    Glucose,  (1/10%) (A) Negative mg/dl    Ketones, UA 15 (1+) (A) Negative mg/dl    Blood, UA Large (A) Negative    Protein, UA >=300 (A) Negative mg/dl    Nitrite, UA Positive (A) Negative    Bilirubin, UA Interference- unable to analyze (A) Negative    Urobilinogen, UA 4 0 (A) 0 2, 1 0 E U /dl E U /dl    Leukocytes, UA Moderate (A) Negative    WBC, UA Field obscured, unable to enumerate (A) None Seen, 2-4 /hpf    RBC, UA Innumerable (A) None Seen, 2-4 /hpf    Bacteria, UA Field obscured, unable to enumerate (A) None Seen, Occasional /hpf    Epithelial Cells Field obscured, unable to enumerate (A) None Seen, Occasional /hpf   Protein / creatinine ratio, urine   Result Value Ref Range    Creatinine, Ur 116 0 mg/dL    Protein Urine Random 236 mg/dL    Prot/Creat Ratio, Ur 2 03 (H) 0 00 - 0 10   CBC and Platelet   Result Value Ref Range    WBC 7 00 4 31 - 10 16 Thousand/uL    RBC 3 93 3 88 - 5 62 Million/uL    Hemoglobin 9 6 (L) 12 0 - 17 0 g/dL    Hematocrit 33 0 (L) 36 5 - 49 3 %    MCV 84 82 - 98 fL    MCH 24 4 (L) 26 8 - 34 3 pg    MCHC 29 1 (L) 31 4 - 37 4 g/dL    RDW 14 3 11 6 - 15 1 %    Platelets 299 948 - 560 Thousands/uL    MPV 10 0 8 9 - 12 7 fL   Hemoglobin A1C   Result Value Ref Range    Hemoglobin A1C 6 0 (H) Normal 3 8-5 6%; PreDiabetic 5 7-6 4%;  Diabetic >=6 5%; Glycemic control for adults with diabetes <7 0% %     mg/dl            KELLY Moncada  2/26/2021  4:40 PM  Sign when Signing Visit    NEPHROLOGY OFFICE VISIT   Shena Rodriguez 68 y o  male MRN: [id number]  2/26/2021    Reason for Visit:   Follow-up    INTERVAL HISTORY and SUBJECTIVE:    I had the pleasure of seeing  Ravinder Swann today in the renal clinic for the continued management of  Chronic kidney disease  He is a 79-year-old male who was recently seen in the emergency room at [facility] on 02/14/2021  He presented with right flank pain  Imaging revealed renal calculi with moderate to severe right hydronephrosis  He has a history of nephrolithiasis and renal calculi with prior ureteral stent  He was seen by Urology and had right ureteral stent exchanged  Hematuria has resolved  Eliquis is currently on hold in lieu of heart surgery planned for next week   Patient also has a history of aortic stenosis is scheduled for TAVR on 03/01/2021 at [facility]  Dr Amairani Bergman is doing the surgery  Last blood work obtained on 02/20/2021  Assessment and plan:     2  Chronic kidney disease, stage III:  · Current creatinine 1 74 mg/dL  Baseline creatinine 1 6-1 8 mg/dL  · Nephrologist:  Dr Warren Zaragoza  · Renal function has been fairly stable since last office visit except for elevation to 2 24 on 02/14 when patient was seen in the emergency room for hematuria and treated for right hydronephrosis in the setting of renal calculus  Stent was inserted/ replaced with improvement in renal function  · Creatinine back to baseline  Renal function stable  Patient ready for surgery next week  · Prior Mag 3 scan:  Split renal function-left kidney 67%, right kidney 33%  · Urine protein creatinine ratio 0 27 September 2020 which had decreased from prior measurement of 0 55  · Renal stone study on 02/14/2021 without contrast:  Double-J stent in place, several tiny calculi adjacent to the proximal ordered midportion of the stent  The right kidney is swollen with moderate to severe right hydronephrosis and hydroureter to the level of the calculi  Renovascular calcifications noted  Other nonobstructing renal calculi also noted lower pole right kidney  · Urine protein creatinine ratio elevated to 2 03 g with prior urine protein creatinine ratio 0 2 January 2021  ?  Urinalysis also shows large blood, innumerable RBCs, field obstructed by WBCs and bacteria  ? Repeat urine protein creatinine ratio at steady state  · Etiology:  Solitary functioning kidney, obstructive uropathy with prior hydronephrosis and nephrolithiasis  · Completed Kidney Smart class  · Plan/recommendations:  ? Renal function stable  Following recent acute kidney injury which was related to obstructive uropathy  ? Patient going for TAVR procedure on 03/01/2021  Stable for surgery  Not know medications adjusted today  ? Eliquis on hold for surgery  3  Hypertension:   · Dizziness occurring randomly-  He reports recent dizziness 1 day after stent was replaced  · Blood pressure adequately controlled  Currently on metoprolol  · Euvolemic  · Plan:  ? No change in medications  4  CVA  · Eliquis on hold  · Watchman in place  5  Nephrolithiasis:    · Follows with Dr Michael Walls stone makeup primarily calcium oxalate  · Right ureteral stent with recently placement due to obstructing calculi/hydronephrosis  · Had prior litho link  Low-salt diet endorsed  · Maintains good hydration  6  History of diastolic CHF/mild to moderate pulmonary hypertension/ AS  · No evidence pf decompensation  · TAVR 03/01/2021  7  CKD MBD:  · Phosphorus level normal 2 9  · Parathyroid hormone 122 5  · Vitamin D level 41 1  9  Anemia:    · Decline in hemoglobin in the setting of hematuria  · Follow-up per CT surgery  10  Hematuria:  Resolved  Eliquis on hold at this time for surgery  PATIENT INSTRUCTIONS:    There are no Patient Instructions on file for this visit  No orders of the defined types were placed in this encounter  OBJECTIVE:  Current Weight: Weight - Scale: 90 7 kg (200 lb)  Vitals:    02/26/21 1539 02/26/21 1609   BP:  130/74   BP Location:  Left arm   Patient Position:  Sitting   Cuff Size:  Standard   Pulse:  78   Weight: 90 7 kg (200 lb)    Height: 5' 10" (1 778 m)     Body mass index is 28 7 kg/m²        REVIEW OF SYSTEMS:    Review of Systems    PHYSICAL EXAM:      Physical Exam    Medications:    Current Outpatient Medications:     acetaminophen (TYLENOL) 325 mg tablet, Take 2 tablets (650 mg total) by mouth every 8 (eight) hours as needed for mild pain, Disp: , Rfl: 0    apixaban (ELIQUIS) 5 mg, Take 1 tablet (5 mg total) by mouth 2 (two) times a day (Patient taking differently: Take 5 mg by mouth 2 (two) times a day Was told he did not need to stop ahead of surgery ), Disp: 60 tablet, Rfl: 1    atorvastatin (LIPITOR) 40 mg tablet, Take 1 tablet (40 mg total) by mouth daily with dinner, Disp: 30 tablet, Rfl: 1    cholecalciferol (VITAMIN D3) 1,000 units tablet, Take 1,000 Units by mouth daily after lunch  , Disp: , Rfl:     finasteride (PROSCAR) 5 mg tablet, Take 1 tablet (5 mg total) by mouth daily, Disp: 90 tablet, Rfl: 3    metoprolol tartrate (LOPRESSOR) 25 mg tablet, take 1 tablet by mouth every 8 hours, Disp: 270 tablet, Rfl: 0    Laboratory Results:  Results from last 7 days   Lab Units 02/20/21  0845   WBC Thousand/uL 7 00   HEMOGLOBIN g/dL 9 6*   HEMATOCRIT % 33 0*   PLATELETS Thousands/uL 194   POTASSIUM mmol/L 4 9   CHLORIDE mmol/L 105   CO2 mmol/L 28   BUN mg/dL 19   CREATININE mg/dL 1 74*   CALCIUM mg/dL 8 3   PHOSPHORUS mg/dL 2 9       Results for orders placed or performed in visit on 02/20/21   Renal function panel   Result Value Ref Range    Albumin 2 8 (L) 3 5 - 5 0 g/dL    Calcium 8 3 8 3 - 10 1 mg/dL    Corrected Calcium 9 3 8 3 - 10 1 mg/dL    Phosphorus 2 9 2 3 - 4 1 mg/dL    BUN 19 5 - 25 mg/dL    Creatinine 1 74 (H) 0 60 - 1 30 mg/dL    Sodium 139 136 - 145 mmol/L    Potassium 4 9 3 5 - 5 3 mmol/L    Chloride 105 100 - 108 mmol/L    CO2 28 21 - 32 mmol/L    ANION GAP 6 4 - 13 mmol/L    eGFR 37 ml/min/1 73sq m    Glucose, Fasting 98 65 - 99 mg/dL   PTH, intact   Result Value Ref Range     5 (H) 18 4 - 80 1 pg/mL   Vitamin D 25 hydroxy   Result Value Ref Range    Vit D, 25-Hydroxy 41 1 30 0 - 100 0 ng/mL   Urinalysis with microscopic   Result Value Ref Range    Clarity, UA Cloudy     Color, UA Red     Specific [place], UA 1 015 1 000 - 1 030    pH, UA 7 0 5 0, 5 5, 6 0, 6 5, 7 0, 7 5, 8 0, 8 5, 9 0    Glucose,  (1/10%) (A) Negative mg/dl    Ketones, UA 15 (1+) (A) Negative mg/dl    Blood, UA Large (A) Negative    Protein, UA >=300 (A) Negative mg/dl    Nitrite, UA Positive (A) Negative    Bilirubin, UA Interference- unable to analyze (A) Negative    Urobilinogen, UA 4 0 (A) 0 2, 1 0 E U /dl E U /dl    Leukocytes, UA Moderate (A) Negative    WBC, UA Field obscured, unable to enumerate (A) None Seen, 2-4 /hpf    RBC, UA Innumerable (A) None Seen, 2-4 /hpf    Bacteria, UA Field obscured, unable to enumerate (A) None Seen, Occasional /hpf    Epithelial Cells Field obscured, unable to enumerate (A) None Seen, Occasional /hpf   Protein / creatinine ratio, urine   Result Value Ref Range    Creatinine, Ur 116 0 mg/dL    Protein Urine Random 236 mg/dL    Prot/Creat Ratio, Ur 2 03 (H) 0 00 - 0 10   CBC and Platelet   Result Value Ref Range    WBC 7 00 4 31 - 10 16 Thousand/uL    RBC 3 93 3 88 - 5 62 Million/uL    Hemoglobin 9 6 (L) 12 0 - 17 0 g/dL    Hematocrit 33 0 (L) 36 5 - 49 3 %    MCV 84 82 - 98 fL    MCH 24 4 (L) 26 8 - 34 3 pg    MCHC 29 1 (L) 31 4 - 37 4 g/dL    RDW 14 3 11 6 - 15 1 %    Platelets 659 776 - 543 Thousands/uL    MPV 10 0 8 9 - 12 7 fL   Hemoglobin A1C   Result Value Ref Range    Hemoglobin A1C 6 0 (H) Normal 3 8-5 6%; PreDiabetic 5 7-6 4%;  Diabetic >=6 5%; Glycemic control for adults with diabetes <7 0% %     mg/dl

## 2021-02-26 NOTE — LETTER
Consultation - Jakie Soulier 68 y o  male MRN: [id number]    Unit/Bed#:  Encounter: [id number]      [unfilled]    Assessment:  ***  Plan:  ***    [unfilled]  History, ROS and PFSH unobtainable from any source due to ***    HPI: Jakie Soulier is a 68y o  year old male who presents with ***    [unfilled]    [unfilled]    [unfilled]  Past Medical History:   Diagnosis Date    Aneurysm ([address] )     of brain  being monitored    Atrial fibrillation ([address] )     Essential hypertension, long-standing     Heart murmur, lifelong     heart murmur since birth    Hematuria     intermittent    History of cigarette smoking, chronic     64 year smoker at one half pack per day, quit 04/1/17    Hyperlipidemia     Hypertension     Irregular heart beat     Kidney stones     12 episodes of kidney stones    Kidney stones with lithotripsy 03/2017    Done at [facility]    Pneumonia      X 2    Stroke [facility]) 10/29/2020    right sided  weakness almost full recovery    Stroke [facility] [address] [facility]     Vitamin D deficiency     maintained with 1000 units of D    Water retention     Wears glasses     Wears partial dentures     upper     Past Surgical History:   Procedure Laterality Date    APPENDECTOMY  1960    CAROTID ENDARTERECTOMY Left 1998    Supposedly no internal stenosis found    CYSTOSCOPY Right 8/15/2017    Procedure: CYSTOSCOPY RIGHT STENT EXCHANGE;  Surgeon: Rashmi Sprague MD;  Location: [address] [place] [address];  Service: Urology    CYSTOSCOPY     [facility]  LITHOTRIPSY  03/2017    For nephrolithiasis at [address]  5/10/2019    FL RETROGRADE PYELOGRAM  7/30/2019    FL RETROGRADE PYELOGRAM  10/22/2019    FL RETROGRADE PYELOGRAM  1/28/2020    FL RETROGRADE PYELOGRAM  8/11/2020    FL RETROGRADE PYELOGRAM  12/15/2020    FL RETROGRADE PYELOGRAM  2/14/2021    AL CYSTO/URETERO W/LITHOTRIPSY &INDWELL STENT INSRT Right 5/2/2017    Procedure: CYSTOSCOPY URETEROSCOPY WITH LITHOTRIPSY HOLMIUM LASER, RETROGRADE PYELOGRAM AND INSERTION STENT URETERAL;  Surgeon: Milford Hammans, MD;  Location: [address] [place] [address];  Service: Urology    WI CYSTO/URETERO W/LITHOTRIPSY &INDWELL STENT INSRT Right 5/16/2017    Procedure: CYSTOSCOPY, RETROGRADE PYELOGRAM,  FLEXIBLE URETEROSCOPY,  HOLMIUM LASER LITHOTRIPSY, STONE BASKET MANIPULATION,  STENT URETERAL EXCHANGE;  Surgeon: Milford Hammans, MD;  Location: [address] [place] [address];  Service: Urology    WI CYSTO/URETERO W/LITHOTRIPSY &INDWELL STENT INSRT Right 6/2/2017    Procedure: CYSTOSCOPY, RETROGRADE, STONE MANIPULATION WITH HOLMIUM LASER, STENT PLACEMENT;  Surgeon: Milford Hammans, MD;  Location: [address] [place] [address];  Service: Urology    WI CYSTO/URETERO W/LITHOTRIPSY &INDWELL STENT INSRT Right 7/18/2017    Procedure: CYSTOSCOPY, RETROGRADE, URETEROSCOPY HOLMIUM LASER [place],  AND STENT PLACEMENT;  Surgeon: Milford Hammans, MD;  Location: [address] [place] [address];  Service: Urology    WI CYSTO/URETERO W/LITHOTRIPSY &INDWELL STENT INSRT Right 2/14/2021    Procedure: CYSTOSCOPY URETEROSCOPY, RETROGRADE PYELOGRAM, STONE MANIPULATION AND EXCHANGE STENT URETERAL;  Surgeon: Good Velázquez MD;  Location: [address] [place] [address];  Service: Urology    WI CYSTOSCOPY,INSERT URETERAL STENT Right 11/14/2017    Procedure: EXCHANGE STENT URETERAL;  Surgeon: Milford Hammans, MD;  Location: [address] [place] [address];  Service: Urology    WI CYSTOURETHROSCOPY,URETER CATHETER Right 11/14/2017    Procedure: CYSTOSCOPY RETROGRADE, STENT EXCHANGE;  Surgeon: Milford Hammans, MD;  Location: [address] [place] [address];  Service: Urology    WI CYSTOURETHROSCOPY,URETER CATHETER Right 5/8/2018    Procedure: Yessenia Byron;  Surgeon: Milford Hammans, MD;  Location: [address] [place] [address];  Service: Urology    WI CYSTOURETHROSCOPY,URETER CATHETER Right 8/14/2018    Procedure: Yessenia Byron;  Surgeon: Milford Hammans, MD;  Location: [address] [place] [address];  Service: Urology    WI CYSTOURETHROSCOPY,URETER CATHETER Right 11/13/2018    Procedure: CYSTOSCOPY, STENT EXCHANGE, RETROGRADE;  Surgeon: Rashmi Sprague MD;  Location: [address] [place] [address];  Service: Urology    NM CYSTOURETHROSCOPY,URETER CATHETER Right 2/12/2019    Procedure: Glenora Budge, STENT REMOVAL AND STENT PLACEMENT;  Surgeon: Rashmi Sprague MD;  Location: [address] [place] [address];  Service: Urology    NM CYSTOURETHROSCOPY,URETER CATHETER Right 5/10/2019    Procedure: Glenora Budge, STENT EXCHANGE;  Surgeon: Rashmi Sprague MD;  Location: [address] [place] [address];  Service: Urology    NM CYSTOURETHROSCOPY,URETER CATHETER Right 7/30/2019    Procedure: Glenora Budge, STENT REMOVAL AND PLACEMENT OF STENT;  Surgeon: Rashmi Sprague MD;  Location: [address] [place] [address];  Service: Urology    NM CYSTOURETHROSCOPY,URETER CATHETER Right 10/22/2019    Procedure: Glenora Budge, STENT EXCHANGE;  Surgeon: Rashmi Sprague MD;  Location: [address] [place] [address];  Service: Urology    NM CYSTOURETHROSCOPY,URETER CATHETER Right 1/28/2020    Procedure: CYSTOSCOPY, RETROGRADE, STENT REMOVAL AND STENT PLACEMENT;  Surgeon: Rashmi Sprague MD;  Location: [address] [place] [address];  Service: Urology    NM CYSTOURETHROSCOPY,URETER CATHETER Right 5/22/2020    Procedure: CYSTOSCOPY RETROGRADE, STENT EXCHANGE;  Surgeon: Rashmi Sprague MD;  Location: [address] [place] [address];  Service: Urology    NM CYSTOURETHROSCOPY,URETER CATHETER Right 8/11/2020    Procedure: CYSTOSCOPY, STENT EXCHANGE, RETROGRADE;  Surgeon: Rashmi Sprague MD;  Location: [address] [place] [address];  Service: Urology    NM CYSTOURETHROSCOPY,URETER CATHETER Right 12/15/2020    Procedure: CYSTOSCOPY, RETROGRADE, STENT REMOVAL, AND STENT PLACEMENT;  Surgeon: Rashmi Sprague MD;  Location: [address] [place] [address];  Service: Urology    PROSTATE SURGERY  2015    Laser Prostatectomy  by Dr Gabo Manning Right 4/18/2017    Procedure: INSERTION STENT URETERAL, RIGHT URETEROSCOPY,  LASER OF URETERAL STRICTURE AND STONE;  Surgeon: Ying Butler MD;  Location: [address] [place] [address];  Service:    [address] Right 2/13/2018    Procedure: Awanda Flies;  Surgeon: Ying Butler MD;  Location: [facility] OR;  Service: Urology     [unfilled]  Social History     Substance and Sexual Activity   Alcohol Use Yes    Drinks per session: 1 or 2    Binge frequency: Less than monthly    Comment: rare     Social History     Substance and Sexual Activity   Drug Use No     Social History     Tobacco Use   Smoking Status Former Smoker    Packs/day: 0 50    Years: 62 00    Pack years: 31 00    Types: Cigarettes    Quit date: 4/15/2017    Years since quitting: 3 8   Smokeless Tobacco Never Used     E-Cigarette/Vaping     E-Cigarette/Vaping Substances      Family History: { IP FAM HISTORY SMARTLIST:661725983}    [unfilled]  { IP GUKP:952403430}  No Known Allergies    [unfilled]  Vitals: Blood pressure 130/74, pulse 78, height 5' 10" (1 778 m), weight 90 7 kg (200 lb)  [unfilled]  Invasive Devices     Drain            Ureteral Drain/Stent Right ureter 6 Fr  12 days                [unfilled]    Lab Results: {Results Review Statement:29957}  Imaging Studies: {Results Review Statement:67312}  EKG, Pathology, and Other Studies: {Results Review Statement:74041}  VTE Prophylaxis: {DVT/VTE Prophylaxis:610144340}    Code Status: [unfilled]  Advance Directive and Living Will:      Power of :    POLST:      Counseling / Coordination of Care  Total floor / unit time spent today *** minutes  Greater than 50% of total time was spent with the patient and / or family counseling and / or coordination of care   A description of the counseling / coordination of care: ***

## 2021-02-26 NOTE — PATIENT INSTRUCTIONS
1  NO changes to medications at this time  2  Follow up with DR Chula Dean after the surgery in 4-6 weeks  3  Blood work orders for next month/before the appointment    4  Avoid NSAIDS

## 2021-03-16 ENCOUNTER — TRANSCRIBE ORDERS (OUTPATIENT)
Dept: ADMINISTRATIVE | Facility: HOSPITAL | Age: 81
End: 2021-03-16

## 2021-03-16 ENCOUNTER — LAB (OUTPATIENT)
Dept: LAB | Facility: HOSPITAL | Age: 81
End: 2021-03-16
Attending: INTERNAL MEDICINE
Payer: MEDICARE

## 2021-03-16 DIAGNOSIS — I63.00 CEREBROVASCULAR ACCIDENT (CVA) DUE TO THROMBOSIS OF PRECEREBRAL ARTERY (HCC): Primary | ICD-10-CM

## 2021-03-16 DIAGNOSIS — R26.89 BALANCE DISORDER: ICD-10-CM

## 2021-03-16 DIAGNOSIS — I48.20 CHRONIC ATRIAL FIBRILLATION (HCC): ICD-10-CM

## 2021-03-16 DIAGNOSIS — I10 BENIGN ESSENTIAL HYPERTENSION: ICD-10-CM

## 2021-03-16 DIAGNOSIS — N18.32 CHRONIC KIDNEY DISEASE (CKD) STAGE G3B/A3, MODERATELY DECREASED GLOMERULAR FILTRATION RATE (GFR) BETWEEN 30-44 ML/MIN/1.73 SQUARE METER AND ALBUMINURIA CREATININE RATIO GREATER THAN 300 MG/G (HCC): ICD-10-CM

## 2021-03-16 DIAGNOSIS — I35.0 AORTIC VALVE STENOSIS, ETIOLOGY OF CARDIAC VALVE DISEASE UNSPECIFIED: ICD-10-CM

## 2021-03-16 DIAGNOSIS — N18.32 STAGE 3B CHRONIC KIDNEY DISEASE (HCC): ICD-10-CM

## 2021-03-16 LAB
ALBUMIN SERPL BCP-MCNC: 3.1 G/DL (ref 3.5–5)
ANION GAP SERPL CALCULATED.3IONS-SCNC: 4 MMOL/L (ref 4–13)
BACTERIA UR QL AUTO: ABNORMAL /HPF
BILIRUB UR QL STRIP: NEGATIVE
BUN SERPL-MCNC: 26 MG/DL (ref 5–25)
CALCIUM ALBUM COR SERPL-MCNC: 9.8 MG/DL (ref 8.3–10.1)
CALCIUM SERPL-MCNC: 9.1 MG/DL (ref 8.3–10.1)
CHLORIDE SERPL-SCNC: 105 MMOL/L (ref 100–108)
CLARITY UR: CLEAR
CO2 SERPL-SCNC: 28 MMOL/L (ref 21–32)
COLOR UR: YELLOW
CREAT SERPL-MCNC: 1.63 MG/DL (ref 0.6–1.3)
CREAT UR-MCNC: 125 MG/DL
ERYTHROCYTE [DISTWIDTH] IN BLOOD BY AUTOMATED COUNT: 16.5 % (ref 11.6–15.1)
GFR SERPL CREATININE-BSD FRML MDRD: 40 ML/MIN/1.73SQ M
GLUCOSE P FAST SERPL-MCNC: 88 MG/DL (ref 65–99)
GLUCOSE UR STRIP-MCNC: NEGATIVE MG/DL
HCT VFR BLD AUTO: 32.6 % (ref 36.5–49.3)
HGB BLD-MCNC: 9.4 G/DL (ref 12–17)
HGB UR QL STRIP.AUTO: NEGATIVE
KETONES UR STRIP-MCNC: NEGATIVE MG/DL
LEUKOCYTE ESTERASE UR QL STRIP: NEGATIVE
MAGNESIUM SERPL-MCNC: 1.9 MG/DL (ref 1.6–2.6)
MCH RBC QN AUTO: 23.9 PG (ref 26.8–34.3)
MCHC RBC AUTO-ENTMCNC: 28.8 G/DL (ref 31.4–37.4)
MCV RBC AUTO: 83 FL (ref 82–98)
NITRITE UR QL STRIP: NEGATIVE
NON-SQ EPI CELLS URNS QL MICRO: ABNORMAL /HPF
OTHER STN SPEC: ABNORMAL
PH UR STRIP.AUTO: 7 [PH]
PHOSPHATE SERPL-MCNC: 2.7 MG/DL (ref 2.3–4.1)
PLATELET # BLD AUTO: 250 THOUSANDS/UL (ref 149–390)
PMV BLD AUTO: 10.3 FL (ref 8.9–12.7)
POTASSIUM SERPL-SCNC: 5 MMOL/L (ref 3.5–5.3)
PROT UR STRIP-MCNC: NEGATIVE MG/DL
PROT UR-MCNC: 12 MG/DL
PROT/CREAT UR: 0.1 MG/G{CREAT} (ref 0–0.1)
RBC # BLD AUTO: 3.93 MILLION/UL (ref 3.88–5.62)
RBC #/AREA URNS AUTO: ABNORMAL /HPF
SODIUM SERPL-SCNC: 137 MMOL/L (ref 136–145)
SP GR UR STRIP.AUTO: 1.01 (ref 1–1.03)
UROBILINOGEN UR QL STRIP.AUTO: 0.2 E.U./DL
WBC # BLD AUTO: 6.76 THOUSAND/UL (ref 4.31–10.16)
WBC #/AREA URNS AUTO: ABNORMAL /HPF

## 2021-03-16 PROCEDURE — 36415 COLL VENOUS BLD VENIPUNCTURE: CPT

## 2021-03-16 PROCEDURE — 83735 ASSAY OF MAGNESIUM: CPT

## 2021-03-16 PROCEDURE — 80069 RENAL FUNCTION PANEL: CPT

## 2021-03-16 PROCEDURE — 85027 COMPLETE CBC AUTOMATED: CPT

## 2021-03-16 PROCEDURE — 84156 ASSAY OF PROTEIN URINE: CPT

## 2021-03-16 PROCEDURE — 81001 URINALYSIS AUTO W/SCOPE: CPT

## 2021-03-16 PROCEDURE — 82570 ASSAY OF URINE CREATININE: CPT

## 2021-03-22 ENCOUNTER — TELEPHONE (OUTPATIENT)
Dept: NEPHROLOGY | Facility: CLINIC | Age: 81
End: 2021-03-22

## 2021-03-22 NOTE — RESULT ENCOUNTER NOTE
Hello    Patient normally is followed up by Ms Mari Gonzáles  Please let the patient know that the renal function is stable  No changes for now  To follow a lower potassium diet      Thank you    np

## 2021-03-22 NOTE — TELEPHONE ENCOUNTER
----- Message from Beti Andino MD sent at 3/22/2021  1:27 PM EDT -----  Hello    Patient normally is followed up by Ms Ankit Castillo  Please let the patient know that the renal function is stable  No changes for now  To follow a lower potassium diet      Thank you    np

## 2021-03-24 ENCOUNTER — EVALUATION (OUTPATIENT)
Dept: PHYSICAL THERAPY | Facility: CLINIC | Age: 81
End: 2021-03-24
Payer: MEDICARE

## 2021-03-24 DIAGNOSIS — I63.00 CEREBROVASCULAR ACCIDENT (CVA) DUE TO THROMBOSIS OF PRECEREBRAL ARTERY (HCC): Primary | ICD-10-CM

## 2021-03-24 DIAGNOSIS — R26.89 BALANCE DISORDER: ICD-10-CM

## 2021-03-24 PROCEDURE — 97112 NEUROMUSCULAR REEDUCATION: CPT | Performed by: PHYSICAL THERAPIST

## 2021-03-24 NOTE — LETTER
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 ([address] )  I63 00    2  Balance disorder  R26 89        Dear Dr Perez Eze: Thank you for your recent referral of Gabby Max  Please review the attached evaluation summary from Dioni's recent visit  Please verify that you agree with the plan of care by signing the attached order  If you have any questions or concerns, please do not hesitate to call  I sincerely appreciate the opportunity to share in the care of one of your patients and hope to have another opportunity to work with you in the near future  Sincerely,    Roland Marina, PT      Referring Provider:      I certify that I have read the below Plan of Care and certify the need for these services furnished under this plan of treatment while under my care  Juice East DO  [address] [place] [address]  [address] [place]          PT Re-eval    Today's date: 3/24/2021  Patient name: Gabby Max  : 1940  MRN: [id number]  Referring provider: Juliana Gomes DO  Dx:   Encounter Diagnosis     ICD-10-CM    1  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 ([address] )  I63 00    2  Balance disorder  R26 89                   Assessment  Assessment details:   Mr Brandi Rubio is 68year old male s/p stroke late 2020 (L frontal lobe, and small anterior communicating artery aneurysm, with 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 event ) The pt transferred from [address] [place] [address] to [facility], then was discharged home 20  Had valve replacement on 3-1-202, saw MD on 3-8 who cleared for PT post 10 days of 3-8 visit  Patient has demonstrated regression in all values compared to initial evaluation which was conducted on 3-    He was making good progress prior to hold time  when participating in skilled OPPT services  Pt uses no AD at this time  Pt lives with spouse and adult children, grandchildren and pt lives on one level  Continued PT is advised to assist pt with improved gait speed, return to grocery shopping and community ADL's  Pt is in agreement with this plan      Impairments: abnormal coordination, abnormal gait, abnormal movement, impaired balance, lacks appropriate home exercise program and safety issue  Understanding of Dx/Px/POC: excellent  Goals  Goals  Short Term Goals (4 weeks):    - Patient will improve time on TUG by 2 9 seconds from 14 31 seconds to 11 seconds to facilitate improved safety in all ambulation  - Patient will improve scoring on FGA by 3 points from 20/30 to 24/30 to progress safety  - Patient will be independent in basic HEP 2-3 weeks  - Patient will improve 5xSTS score by 2 3 seconds from 15 41 seconds to 12 5 seconds to promote improved LE functional strength needed for ADLs  - Patient will complete components of HiMAT to promote agility necessary for sports related tasks    Long Term Goals (12 weeks):  - Patient will be independent in a comprehensive home exercise program  - Patient will improve gait speed by 0 1 m/sec from 1 09 m/sec to 1 2 m/sec to improve safety with community ambulation  - Patient will improve ELAINE by 6 points from 46/56 to 52/56 to facilitate return to safe independent ambulation  - Patient will be able to demonstrate HT in gait without veering  - Patient will improve 6 Minute Walk Test score by 190 feet from 750 feet to 1100 feet to promote improved cardiovascular endurance  - Patient will report 50% reduction in near falls in order to improve safety with functional tasks and reduce his risk for falls  - Patient will report going on walks at least 3 days per week to promote independence and improved cardiovascular endurance  - Patient will be able to ascend/descend stairs reciprocally without UE assist to promote independence and safety with ADLs  - Patient will report 50% reduction in near falls when ambulating on uneven terrain                Plan  Patient would benefit from: skilled physical therapy  Planned modality interventions: manual electrical stimulation  Planned therapy interventions: manual therapy, neuromuscular re-education, patient education, postural training, strengthening, therapeutic exercise, therapeutic activities, stretching, home exercise program, balance and gait training  Frequency: 2x week  Plan of Care beginning date: 3/24/2021  Plan of Care expiration date: 6/24/2021  Treatment plan discussed with: patient        Subjective Evaluation    History of Present Illness  Mechanism of injury: 3/24/2021  Had valve repair on 3-1-2021  In hospital 2 days  Was cleared for therapy on the 8th of march  With instructs to return to PT 10 days after MD appointment  Pt presents 14 days out with no restrictions  Pt notes being a little more fatigued and tired     IE:  68year old male suffered stroke 10/30/20 during the night, 1 am     Pt stood up to go to the bathroom and he collapsed, and noted R sided weakness  Stroke affected his R UE, and LE  May have affected his vision, pt is not sure  Hospitalized at [facility] x 1 5 days, then [facility] 1-17, then DC home  Pt was brought in today by his dtr Titus Elise [phone]  She did not stay for the appointment stated pt could answer all questions  Pt complains of confusion while trying to talk  Pt has OT eval following PT today  Hussein Tate  feels his balance is good, R UE strength is "good," but weaker compared to before stroke           Pain  No pain reported    Social Support  Steps to enter house: yes (1 step then platform, twice, no railing )  Stairs in house: no (he lives on one level)   Lives with: spouse, adult children and young children    Treatments  Previous treatment: physical therapy  Discharged from (in last 30 days): inpatient hospitalization  Patient Goals  Patient goal: improve walking speed  Leisure activity: casino/walking  Return grocery shopping and cooking   Objective     Functional Assessment        Comments  Re-eval: 3/24/21  - Postural assessment: WNL, mild forward head posturing  Improved midline  - MMT: WNL (5/5)to BLE, except Hip extensors 4-/5,   - Coordination; heel to/from shin and alternating toe-taps WNL  From IE:    Seated posture:pt's head is rotated slightly left when pt states it is in midline  Coordination:   Finger to nose: intact, slight R intention tremor  Heel to shin:  Intact     ROSARIO:  UE:intact      LE:off rhythm    Strength:  Hip flexion:  R=5/5 L=5/5  Quads:  R = 5 /5  L = 5/5  Ankles: R=5/5 except Inv= 4+/5 with mild ROM restriction  L=5/5    Oculomotor:  SP:intact   Saccades:intact     Gait: some inattention to right side, low step height  Gait with HT: not tested  Positionals: deferred               Precautions: Blood thinners  Past Medical History:   Diagnosis Date    Aneurysm [facility] [address] )     of brain  being monitored    Atrial fibrillation ([facility] [address] )     Essential hypertension, long-standing     Heart murmur, lifelong     heart murmur since birth    Hematuria     intermittent    History of cigarette smoking, chronic     64 year smoker at one half pack per day, quit 04/1/17    Hyperlipidemia     Hypertension     Irregular heart beat     Kidney stones     12 episodes of kidney stones    Kidney stones with lithotripsy 03/2017    [facility] at [facility]    Pneumonia      X 2    Stroke [facility]) 10/29/2020    right sided  weakness almost full recovery    Stroke [facility])     Vitamin D deficiency     maintained with 1000 units of D    Water retention     Wears glasses     Wears partial dentures     upper         Age norm for 70 yrs per current research:   10M WT: male=4 36 ft/sec  female= 4 16 ft/sec  MDC= 0 59 ft/sec  6MWT: djmh=6826 feet   Female= 1545 feet  MDC= 190 feet  5XSTS: 10 sec  MDC= 2 3sec (vestibular)    Parks: male =54/56  Female= 53/56  FGA:   TU 5 sec for community dwelling adults, 32 2 sec for frail elderly   MDC= 4 14 sec      Outcome measures  IE20 Progress Note  20 Re-eval 2/9/21 3/24/2021      deferred until medical clearance    5XSTS 15 41 sec   Retropulsed x 1 12 66 sec  14 31 sec 1 UE    TUG  9 38 sec  9 52 sec   Parks  47/56 53/56  46/56   10MWT 12 71 sec   0 78 m/sec  2 58 ft/sec  8 78 sec  1 14 m/s  10 12= 1 09 m/S   FGA  /  20/30   6MWT  1,010 ft  750 ft    REO 30 sec  30 sec  30 sec    REC 30 sec  30 sec  30 sec    SREO 2 sec   30 sec  30 sec    SREC NT 18 sec  30 sec    Foam FTEO 30 sec  30 sec  30 sec    Foam FTEC 10 sec  30 sec  30 sec    SLS  R: 1 sec  L: 1 sec  R: 1 sec   L: 1 sec

## 2021-03-24 NOTE — PROGRESS NOTES
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 ([address] )  I63 00    2  Balance disorder  R26 89                   Assessment  Assessment details:   Mr Gypsy Bynum is 68year old male s/p stroke late 2020 (L frontal lobe, and small anterior communicating artery aneurysm, with 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 event ) The pt transferred from [address] [place] [address] to [facility], then was discharged home 20  Had valve replacement on 3-1-202, saw MD on 3-8 who cleared for PT post 10 days of 3-8 visit  Patient has demonstrated regression in all values compared to initial evaluation which was conducted on 3-  He was making good progress prior to hold time  when participating in skilled OPPT services  Pt uses no AD at this time  Pt lives with spouse and adult children, grandchildren and pt lives on one level  Continued PT is advised to assist pt with improved gait speed, return to grocery shopping and community ADL's  Pt is in agreement with this plan      Impairments: abnormal coordination, abnormal gait, abnormal movement, impaired balance, lacks appropriate home exercise program and safety issue  Understanding of Dx/Px/POC: excellent  Goals  Goals  Short Term Goals (4 weeks):    - Patient will improve time on TUG by 2 9 seconds from 14 31 seconds to 11 seconds to facilitate improved safety in all ambulation  - Patient will improve scoring on FGA by 3 points from  to  to progress safety  - Patient will be independent in basic HEP 2-3 weeks  - Patient will improve 5xSTS score by 2 3 seconds from 15 41 seconds to 12 5 seconds to promote improved LE functional strength needed for ADLs  - Patient will complete components of HiMAT to promote agility necessary for sports related tasks    Long Term Goals (12 weeks):  - Patient will be independent in a comprehensive home exercise program  - Patient will improve gait speed by 0 1 m/sec from 1 09 m/sec to 1 2 m/sec to improve safety with community ambulation  - Patient will improve ELAINE by 6 points from 46/56 to 52/56 to facilitate return to safe independent ambulation  - Patient will be able to demonstrate HT in gait without veering  - Patient will improve 6 Minute Walk Test score by 190 feet from 750 feet to 1100 feet to promote improved cardiovascular endurance  - Patient will report 50% reduction in near falls in order to improve safety with functional tasks and reduce his risk for falls  - Patient will report going on walks at least 3 days per week to promote independence and improved cardiovascular endurance  - Patient will be able to ascend/descend stairs reciprocally without UE assist to promote independence and safety with ADLs  - Patient will report 50% reduction in near falls when ambulating on uneven terrain                Plan  Patient would benefit from: skilled physical therapy  Planned modality interventions: manual electrical stimulation  Planned therapy interventions: manual therapy, neuromuscular re-education, patient education, postural training, strengthening, therapeutic exercise, therapeutic activities, stretching, home exercise program, balance and gait training  Frequency: 2x week  Plan of Care beginning date: 3/24/2021  Plan of Care expiration date: 6/24/2021  Treatment plan discussed with: patient        Subjective Evaluation    History of Present Illness  Mechanism of injury: 3/24/2021  Had valve repair on 3-1-2021  In hospital 2 days  Was cleared for therapy on the 8th of march  With instructs to return to PT 10 days after MD appointment  Pt presents 14 days out with no restrictions   Pt notes being a little more fatigued and tired     IE:  68year old male suffered stroke 10/30/20 during the night, 1 am     Pt stood up to go to the bathroom and he collapsed, and noted R sided weakness  Stroke affected his R UE, and LE  May have affected his vision, pt is not sure  Hospitalized at [facility] x 1 5 days, then transferred to the [facility] Nov 1-17, then DC home  Pt was brought in today by his dtr Nicky Cano [phone]  She did not stay for the appointment stated pt could answer all questions  Pt complains of confusion while trying to talk  Pt has OT eval following PT today  Chris Tobar  feels his balance is good, R UE strength is "good," but weaker compared to before stroke  Pain  No pain reported    Social Support  Steps to enter house: yes (1 step then platform, twice, no railing )  Stairs in house: no (he lives on one level)   Lives with: spouse, adult children and young children    Treatments  Previous treatment: physical therapy  Discharged from (in last 30 days): inpatient hospitalization  Patient Goals  Patient goal: improve walking speed  Leisure activity: casino/walking  Return grocery shopping and cooking   Objective     Functional Assessment        Comments  Re-eval: 3/24/21  - Postural assessment: WNL, mild forward head posturing  Improved midline  - MMT: WNL (5/5)to BLE, except Hip extensors 4-/5,   - Coordination; heel to/from shin and alternating toe-taps WNL  From IE:    Seated posture:pt's head is rotated slightly left when pt states it is in midline  Coordination:   Finger to nose: intact, slight R intention tremor  Heel to shin:  Intact     ROSARIO:  UE:intact      LE:off rhythm    Strength:  Hip flexion:  R=5/5 L=5/5  Quads:  R = 5 /5  L = 5/5  Ankles: R=5/5 except Inv= 4+/5 with mild ROM restriction  L=5/5    Oculomotor:  SP:intact   Saccades:intact     Gait: some inattention to right side, low step height  Gait with HT: not tested          Positionals: deferred               Precautions: Blood thinners  Past Medical History:   Diagnosis Date    Aneurysm ([address] )     of brain  being monitored    Atrial fibrillation ([address] )     Essential hypertension, long-standing     Heart murmur, lifelong     heart murmur since birth    Hematuria     intermittent    History of cigarette smoking, chronic     64 year smoker at one half pack per day, quit 17    Hyperlipidemia     Hypertension     Irregular heart beat     Kidney stones     12 episodes of kidney stones    Kidney stones with lithotripsy 2017    [facility]    Pneumonia      X 2    Stroke [facility]) 10/29/2020    right sided  weakness almost full recovery    Stroke [facility])     Vitamin D deficiency     maintained with 1000 units of D    Water retention     Wears glasses     Wears partial dentures     upper         Age norm for 70 yrs per current research:   10M WT: male=4 36 ft/sec  female= 4 16 ft/sec  MDC= 0 59 ft/sec  6MWT: mcck=1713 feet  Female= 1545 feet  MDC= 190 feet  5XSTS: 10 sec  MDC= 2 3sec (vestibular)    Parks: male =54/56  Female= 53/56  FGA:   TU 5 sec for community dwelling adults, 32 2 sec for frail elderly   MDC= 4 14 sec      Outcome measures  IE20 Progress Note  20 Re-eval 2/9/21 3/24/2021      deferred until medical clearance    5XSTS 15 41 sec   Retropulsed x 1 12 66 sec  14 31 sec 1 UE    TUG  9 38 sec  9 52 sec   Parks  47/56 53/56  46/56   10MWT 12 71 sec   0 78 m/sec  2 58 ft/sec  8 78 sec  1 14 m/s  10/9 12= 1 09 m/S   FGA  25  20/30   6MWT  1,010 ft  750 ft    REO 30 sec  30 sec  30 sec    REC 30 sec  30 sec  30 sec    SREO 2 sec   30 sec  30 sec    SREC NT 18 sec  30 sec    Foam FTEO 30 sec  30 sec  30 sec    Foam FTEC 10 sec  30 sec  30 sec    SLS  R: 1 sec  L: 1 sec  R: 1 sec   L: 1 sec

## 2021-03-30 ENCOUNTER — OFFICE VISIT (OUTPATIENT)
Dept: PHYSICAL THERAPY | Facility: CLINIC | Age: 81
End: 2021-03-30
Payer: MEDICARE

## 2021-03-30 DIAGNOSIS — I63.00 CEREBROVASCULAR ACCIDENT (CVA) DUE TO THROMBOSIS OF PRECEREBRAL ARTERY (HCC): Primary | ICD-10-CM

## 2021-03-30 DIAGNOSIS — R26.89 BALANCE DISORDER: ICD-10-CM

## 2021-03-30 PROCEDURE — 97112 NEUROMUSCULAR REEDUCATION: CPT | Performed by: PHYSICAL THERAPIST

## 2021-03-30 NOTE — PROGRESS NOTES
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 ([address] )  I63 00    2  Balance disorder  R26 89                   Subjective: Patient reports to therapy reporting no changes since evaluation last week        Objective: See treatment diary below      NMR:  - Standing Hip 3-way: 15x  - tandem walking inside parallel bars- 5 laps  - STS on foam: 20x2  - SLS: 60 sec x 2 trials each leg, 1 UE  - Step taps FWD/LAT: 20x, 6"  - Step ups FWD/LAT: 20x, 6"          Assessment: Patient tolerated treatment well  Patient challenged with endurance and required frequent rest breaks during and between exercises to prevent SOB  Patient also challenged with SLS requiring 1 UE to maintain balance  Patient will benefit from skilled PT services to improve his endurance, balance, and maximize his level of function  Plan: Continue per plan of care         Precautions: AFIB HTN        Outcome measures  IE20               5XSTS 15 41 sec   Retropulsed x 1 12 66 sec 13 26 sec      TUG  9 38 sec 9 96 sec               Parks  47/56 53/56 54/56      10MWT 12 71 sec   0 78 m/sec  2 58 ft/sec  8 78 sec  1 14 m/s 9 24 sec                                 6MWT  1010 ft 1010 ft      REO 30 sec  30 sec 30 sec      REC 30 sec  30 sec 30 sec      SREO 2 sec   30 sec 30 sec      SREC NT 18 sec 7 sec      Foam FTEO 30 sec  30 sec 30 sec      Foam FTEC 10 sec  30 sec 30 sec      SLS  1 sec 3 sec

## 2021-03-31 ENCOUNTER — OFFICE VISIT (OUTPATIENT)
Dept: NEPHROLOGY | Facility: CLINIC | Age: 81
End: 2021-03-31
Payer: MEDICARE

## 2021-03-31 VITALS
BODY MASS INDEX: 29.06 KG/M2 | WEIGHT: 203 LBS | DIASTOLIC BLOOD PRESSURE: 72 MMHG | HEIGHT: 70 IN | SYSTOLIC BLOOD PRESSURE: 124 MMHG | HEART RATE: 77 BPM

## 2021-03-31 DIAGNOSIS — N18.31 STAGE 3A CHRONIC KIDNEY DISEASE (HCC): Primary | ICD-10-CM

## 2021-03-31 PROCEDURE — 99214 OFFICE O/P EST MOD 30 MIN: CPT | Performed by: INTERNAL MEDICINE

## 2021-03-31 RX ORDER — CLOPIDOGREL BISULFATE 75 MG/1
75 TABLET ORAL DAILY
COMMUNITY

## 2021-03-31 NOTE — PATIENT INSTRUCTIONS
1) Avoid NSAIDS - (Example - motrin, advil, ibuprofen, aleve, exederin, etc)  2) Always follow a low salt diet  3) labwork in 2-3 months  4) appointment in 3-4 months  5) no changes to your medications today  6) low potassium diet if possible

## 2021-03-31 NOTE — LETTER
March 31, 2021     Mayuri Hanna PA-C  [id number] [address] [facility] [address]    Patient: Tapan Kirkpatrick   YOB: 1940   Date of Visit: 3/31/2021       Dear Dr Bhavna Matias:    Thank you for referring Umang Newell to me for evaluation  Below are my notes for this consultation  If you have questions, please do not hesitate to call me  I look forward to following your patient along with you  Sincerely,        Erasmo Palacios MD        CC: No Recipients  Erasmo Palacios MD  3/31/2021  2:20 PM  Sign when Signing Visit  NEPHROLOGY OFFICE VISIT   Tapan Kirkpatrick 68 y o  male MRN: [id number]  3/31/2021    Reason for Visit: CKD III    ASSESSMENT and PLAN:    I had the pleasure of seeing Mr Mena Byrd today in the renal clinic for the continued management of CKD III  81-year-old male with a past medical history of CKD stage III Baseline Cr 1 6-1 8, Nephrolithiasis,HTN,   CVA, A  fib, D CHF, BPH, 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, s/p AVR at [facility] in 3/1/2020, former smoker quit in April, hospitalized at [facility] in April 2017 for shortness of breath at which time nephrology was consulted due to acute kidney injury with a rising creatinine  Patient was found to have right-sided hydronephrosis with ureteral stricture and stone  Patient underwent ureteral stent and eventually had a lithotripsy  Patient presents for follow up visit for CKD       Patient had stent exchange in august 2020 October of 2020-patient was admitted to hospital due to right-sided weakness  There is concern for CVA  Underwent tPA  Patient was subsequently also started on Eliquis  Hydrochlorothiazide was held  November 2020- patient was discharged from rehab  Admitted in February 2021 with flank pain  Had cystoscopy completed with right ureteral stent in place with several calculi and moderate to severe right hydronephrosis  Underwent stent exchange  Had acute kidney injury    Creatinine peaked to 2 2 mg/dL  Patient also had Watchman device placed  And then on March 1st had what sounds to be a TAVR procedure     1) CKD - baseline Cr approx 1 5-1 7 mg/dL  Etiology of CKD likely solitary functional kidney, prior hydro, nephrolithiasis  Patient had acute kidney injury in  February of 2021 at which time the stent was obstructed      - Follows with Urology regarding stent and nephrolithiasis  -most recent creatinine is at baseline 1 6 mg/dL august 2020  - UA with blood in august (recent stent exchange and heavy lifting)  Repeat for next labs  - split function test in December shows left kidney receives higher flow then the right kidney by approximately 70 vs 30%  - UPCR stable on repeat check in March  - last stent exchange   February 2021     Plan:  - kidney smart class - completed     - repeat labwork in   2-3 months   -Hold on initiating hydrochlorothiazide for now given recent acute issues over the past few months  Blood pressures are stable  Patient is euvolemic  Hydrochlorothiazide has been held since last year  PH is 7  We reviewed the risk and benefits of continuing to hold hydrochlorothiazide with regards to the renal stone  Patient is agreeable with the plan  - appt in 4 months  - monitor BP closely  -no changes to medication regimen today  -  Low-potassium diet     2) Nephrolithiasis - follows with Urology       - renal stone mainly ca oxalate  - 24 hour urine litholink - reviewed  - pt has increased fluid intake to 2 L  - Needs low salt diet - already following  takes in approx 1 8 gm salt daily  at goal  - changed furosemide to HCTZ in jan 2018  - hydrochlorothiazide held during hospitalization in October of 2020  - underwent stent exchange recently with  February 2021     3) HTN - Blood pressures are stable     4) Volume - history of dCHF  history of mild to mod pulm HTN   Euvolemic      - need to monitor fluid intake with HF history  - daily weights      5) CKD MBD -      -  85 in December 2019; 68 in may 2020; 106 7 in September 2020 -->  One hundred twenty-two in February 2021  - Vit D 46 in aug 2019  - last vitamin-D was 39 in February 2021  Appropriate      6) Anemia - stable Hb     7) acid/base - bicarb 28 stable     8) anemia - Hb above goal     9) a fib     - watchman device  - metoprolol 25 mg BID    10)   CVA with also known aneurysm; and also known left ICA 90% stenosis  -  Right frontal lobe infarct   -  No surgical intervention was indicated for the ICA stenosis    11) AVR - completed 3/1/2021     It was a pleasure evaluating Mr  Mena Byrd  Thank you for allowing our team to participate in the care of your patient         No problem-specific Assessment & Plan notes found for this encounter  HPI:     patient denies recent complaints  No fevers, chills, nausea, vomiting, diarrhea  PATIENT INSTRUCTIONS:    Patient Instructions   1) Avoid NSAIDS - (Example - motrin, advil, ibuprofen, aleve, exederin, etc)  2) Always follow a low salt diet  3) labwork in 2-3 months  4) appointment in 3-4 months  5) no changes to your medications today  6) low potassium diet if possible         OBJECTIVE:  Current Weight: Weight - Scale: 92 1 kg (203 lb)  Vitals:    03/31/21 1315   BP: 124/72   BP Location: Left arm   Patient Position: Sitting   Cuff Size: Standard   Pulse: 77   Weight: 92 1 kg (203 lb)   Height: 5' 10" (1 778 m)    Body mass index is 29 13 kg/m²  REVIEW OF SYSTEMS:    Review of Systems   Constitutional: Negative  Negative for appetite change and fatigue  HENT: Negative  Eyes: Negative  Respiratory: Negative  Negative for shortness of breath  Cardiovascular: Negative  Negative for leg swelling  Gastrointestinal: Negative  Endocrine: Negative  Genitourinary: Negative  Negative for difficulty urinating  Musculoskeletal: Negative  Allergic/Immunologic: Negative  Neurological: Negative  Hematological: Negative      Psychiatric/Behavioral: Negative  All other systems reviewed and are negative  PHYSICAL EXAM:      Physical Exam  Vitals signs and nursing note reviewed  Constitutional:       General: He is not in acute distress  Appearance: He is well-developed  He is not diaphoretic  HENT:      Head: Normocephalic and atraumatic  Eyes:      General: No scleral icterus  Right eye: No discharge  Left eye: No discharge  Conjunctiva/sclera: Conjunctivae normal    Neck:      Musculoskeletal: Normal range of motion and neck supple  Vascular: No JVD  Cardiovascular:      Rate and Rhythm: Normal rate and regular rhythm  Heart sounds: Normal heart sounds  No murmur  No friction rub  No gallop  Pulmonary:      Effort: Pulmonary effort is normal  No respiratory distress  Breath sounds: Normal breath sounds  No wheezing or rales  Chest:      Chest wall: No tenderness  Abdominal:      General: Bowel sounds are normal  There is no distension  Palpations: Abdomen is soft  Tenderness: There is no abdominal tenderness  There is no rebound  Musculoskeletal: Normal range of motion  General: No tenderness or deformity  Skin:     General: Skin is warm and dry  Coloration: Skin is not pale  Findings: No erythema or rash  Neurological:      Mental Status: He is alert and oriented to person, place, and time  Cranial Nerves: No cranial nerve deficit  Coordination: Coordination normal       Deep Tendon Reflexes: Reflexes are normal and symmetric  Psychiatric:         Behavior: Behavior normal          Thought Content:  Thought content normal          Judgment: Judgment normal          Medications:    Current Outpatient Medications:     acetaminophen (TYLENOL) 325 mg tablet, Take 2 tablets (650 mg total) by mouth every 8 (eight) hours as needed for mild pain, Disp: , Rfl: 0    atorvastatin (LIPITOR) 40 mg tablet, Take 1 tablet (40 mg total) by mouth daily with dinner, Disp: 30 tablet, Rfl: 1    cholecalciferol (VITAMIN D3) 1,000 units tablet, Take 1,000 Units by mouth daily after lunch  , Disp: , Rfl:     clopidogrel (PLAVIX) 75 mg tablet, Take 75 mg by mouth daily, Disp: , Rfl:     finasteride (PROSCAR) 5 mg tablet, Take 1 tablet (5 mg total) by mouth daily, Disp: 90 tablet, Rfl: 3    metoprolol tartrate (LOPRESSOR) 25 mg tablet, Take 1 tablet (25 mg total) by mouth every 12 (twelve) hours, Disp: 270 tablet, Rfl: 0    Laboratory Results:        Invalid input(s): ALBUMIN    Results for orders placed or performed in visit on 03/16/21   Renal function panel   Result Value Ref Range    Albumin 3 1 (L) 3 5 - 5 0 g/dL    Calcium 9 1 8 3 - 10 1 mg/dL    Corrected Calcium 9 8 8 3 - 10 1 mg/dL    Phosphorus 2 7 2 3 - 4 1 mg/dL    BUN 26 (H) 5 - 25 mg/dL    Creatinine 1 63 (H) 0 60 - 1 30 mg/dL    Sodium 137 136 - 145 mmol/L    Potassium 5 0 3 5 - 5 3 mmol/L    Chloride 105 100 - 108 mmol/L    CO2 28 21 - 32 mmol/L    ANION GAP 4 4 - 13 mmol/L    eGFR 40 ml/min/1 73sq m    Glucose, Fasting 88 65 - 99 mg/dL   CBC   Result Value Ref Range    WBC 6 76 4 31 - 10 16 Thousand/uL    RBC 3 93 3 88 - 5 62 Million/uL    Hemoglobin 9 4 (L) 12 0 - 17 0 g/dL    Hematocrit 32 6 (L) 36 5 - 49 3 %    MCV 83 82 - 98 fL    MCH 23 9 (L) 26 8 - 34 3 pg    MCHC 28 8 (L) 31 4 - 37 4 g/dL    RDW 16 5 (H) 11 6 - 15 1 %    Platelets 494 417 - 968 Thousands/uL    MPV 10 3 8 9 - 12 7 fL   Magnesium   Result Value Ref Range    Magnesium 1 9 1 6 - 2 6 mg/dL   Urinalysis with microscopic   Result Value Ref Range    Clarity, UA Clear     Color, UA Yellow     Specific Gravity, UA 1 015 1 000 - 1 030    pH, UA 7 0 5 0, 5 5, 6 0, 6 5, 7 0, 7 5, 8 0, 8 5, 9 0    Glucose, UA Negative Negative mg/dl    Ketones, UA Negative Negative mg/dl    Blood, UA Negative Negative    Protein, UA Negative Negative mg/dl    Nitrite, UA Negative Negative    Bilirubin, UA Negative Negative    Urobilinogen, UA 0 2 0 2, 1 0 E U /dl E U /dl Leukocytes, UA Negative Negative    WBC, UA 1-2 (A) None Seen, 2-4 /hpf    RBC, UA 1-2 (A) None Seen, 2-4 /hpf    Bacteria, UA Occasional None Seen, Occasional /hpf    OTHER OBSERVATIONS Renal Tubule Epithelial Cells Present     Epithelial Cells Occasional None Seen, Occasional /hpf   Protein / creatinine ratio, urine   Result Value Ref Range    Creatinine, Ur 125 0 mg/dL    Protein Urine Random 12 mg/dL    Prot/Creat Ratio, Ur 0 10 0 00 - 0 10

## 2021-03-31 NOTE — PROGRESS NOTES
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, s/p AVR at [facility] in 3/1/2020, former smoker quit in April, hospitalized at [facility] in April 2017 for shortness of breath at which time nephrology was consulted due to acute kidney injury with a rising creatinine  Patient was found to have right-sided hydronephrosis with ureteral stricture and stone  Patient underwent ureteral stent and eventually had a lithotripsy  Patient presents for follow up visit for CKD       Patient had stent exchange in august 2020 October of 2020-patient was admitted to hospital due to right-sided weakness  There is concern for CVA  Underwent tPA  Patient was subsequently also started on Eliquis  Hydrochlorothiazide was held  November 2020- patient was discharged from rehab  Admitted in February 2021 with flank pain  Had cystoscopy completed with right ureteral stent in place with several calculi and moderate to severe right hydronephrosis  Underwent stent exchange  Had acute kidney injury  Creatinine peaked to 2 2 mg/dL  Patient also had Watchman device placed  And then on March 1st had what sounds to be a TAVR procedure     1) CKD - baseline Cr approx 1 5-1 7 mg/dL  Etiology of CKD likely solitary functional kidney, prior hydro, nephrolithiasis  Patient had acute kidney injury in  February of 2021 at which time the stent was obstructed      - Follows with Urology regarding stent and nephrolithiasis  -most recent creatinine is at baseline 1 6 mg/dL august 2020  - UA with blood in august (recent stent exchange and heavy lifting)   Repeat for next labs  - split function test in December shows left kidney receives higher flow then the right kidney by approximately 70 vs 30%  - UPCR stable on repeat check in March  - last stent exchange   February 2021     Plan:  - kidney smart class - completed     - repeat labwork in   2-3 months   -Hold on initiating hydrochlorothiazide for now given recent acute issues over the past few months  Blood pressures are stable  Patient is euvolemic  Hydrochlorothiazide has been held since last year  PH is 7  We reviewed the risk and benefits of continuing to hold hydrochlorothiazide with regards to the renal stone  Patient is agreeable with the plan  - appt in 4 months  - monitor BP closely  -no changes to medication regimen today  -  Low-potassium diet     2) Nephrolithiasis - follows with Urology       - renal stone mainly ca oxalate  - 24 hour urine litholink - reviewed  - pt has increased fluid intake to 2 L  - Needs low salt diet - already following  takes in approx 1 8 gm salt daily  at goal  - changed furosemide to HCTZ in jan 2018  - hydrochlorothiazide held during hospitalization in October of 2020  - underwent stent exchange recently with  February 2021     3) HTN - Blood pressures are stable     4) Volume - history of dCHF  history of mild to mod pulm HTN  Euvolemic      - need to monitor fluid intake with HF history  - daily weights      5) CKD MBD -      -  85 in December 2019; 68 in may 2020; 106 7 in September 2020 -->  One hundred twenty-two in February 2021  - Vit D 46 in aug 2019  - last vitamin-D was 41 in February 2021  Appropriate      6) Anemia - stable Hb     7) acid/base - bicarb 28 stable     8) anemia - Hb above goal     9) a fib     - watchman device  - metoprolol 25 mg BID    10)   CVA with also known aneurysm; and also known left ICA 90% stenosis      -  Right frontal lobe infarct   -  No surgical intervention was indicated for the ICA stenosis    11) AVR - completed 3/1/2021     It was a pleasure evaluating Mr  Mena Byrd  Thank you for allowing our team to participate in the care of your patient         No problem-specific Assessment & Plan notes found for this encounter  HPI:     patient denies recent complaints  No fevers, chills, nausea, vomiting, diarrhea  PATIENT INSTRUCTIONS:    Patient Instructions   1) Avoid NSAIDS - (Example - motrin, advil, ibuprofen, aleve, exederin, etc)  2) Always follow a low salt diet  3) labwork in 2-3 months  4) appointment in 3-4 months  5) no changes to your medications today  6) low potassium diet if possible         OBJECTIVE:  Current Weight: Weight - Scale: 92 1 kg (203 lb)  Vitals:    03/31/21 1315   BP: 124/72   BP Location: Left arm   Patient Position: Sitting   Cuff Size: Standard   Pulse: 77   Weight: 92 1 kg (203 lb)   Height: 5' 10" (1 778 m)    Body mass index is 29 13 kg/m²  REVIEW OF SYSTEMS:    Review of Systems   Constitutional: Negative  Negative for appetite change and fatigue  HENT: Negative  Eyes: Negative  Respiratory: Negative  Negative for shortness of breath  Cardiovascular: Negative  Negative for leg swelling  Gastrointestinal: Negative  Endocrine: Negative  Genitourinary: Negative  Negative for difficulty urinating  Musculoskeletal: Negative  Allergic/Immunologic: Negative  Neurological: Negative  Hematological: Negative  Psychiatric/Behavioral: Negative  All other systems reviewed and are negative  PHYSICAL EXAM:      Physical Exam  Vitals signs and nursing note reviewed  Constitutional:       General: He is not in acute distress  Appearance: He is well-developed  He is not diaphoretic  HENT:      Head: Normocephalic and atraumatic  Eyes:      General: No scleral icterus  Right eye: No discharge  Left eye: No discharge  Conjunctiva/sclera: Conjunctivae normal    Neck:      Musculoskeletal: Normal range of motion and neck supple  Vascular: No JVD  Cardiovascular:      Rate and Rhythm: Normal rate and regular rhythm  Heart sounds: Normal heart sounds  No murmur  No friction rub  No gallop  Pulmonary:      Effort: Pulmonary effort is normal  No respiratory distress  Breath sounds: Normal breath sounds  No wheezing or rales  Chest:      Chest wall: No tenderness  Abdominal:      General: Bowel sounds are normal  There is no distension  Palpations: Abdomen is soft  Tenderness: There is no abdominal tenderness  There is no rebound  Musculoskeletal: Normal range of motion  General: No tenderness or deformity  Skin:     General: Skin is warm and dry  Coloration: Skin is not pale  Findings: No erythema or rash  Neurological:      Mental Status: He is alert and oriented to person, place, and time  Cranial Nerves: No cranial nerve deficit  Coordination: Coordination normal       Deep Tendon Reflexes: Reflexes are normal and symmetric  Psychiatric:         Behavior: Behavior normal          Thought Content:  Thought content normal          Judgment: Judgment normal          Medications:    Current Outpatient Medications:     acetaminophen (TYLENOL) 325 mg tablet, Take 2 tablets (650 mg total) by mouth every 8 (eight) hours as needed for mild pain, Disp: , Rfl: 0    atorvastatin (LIPITOR) 40 mg tablet, Take 1 tablet (40 mg total) by mouth daily with dinner, Disp: 30 tablet, Rfl: 1    cholecalciferol (VITAMIN D3) 1,000 units tablet, Take 1,000 Units by mouth daily after lunch  , Disp: , Rfl:     clopidogrel (PLAVIX) 75 mg tablet, Take 75 mg by mouth daily, Disp: , Rfl:     finasteride (PROSCAR) 5 mg tablet, Take 1 tablet (5 mg total) by mouth daily, Disp: 90 tablet, Rfl: 3    metoprolol tartrate (LOPRESSOR) 25 mg tablet, Take 1 tablet (25 mg total) by mouth every 12 (twelve) hours, Disp: 270 tablet, Rfl: 0    Laboratory Results:        Invalid input(s): ALBUMIN    Results for orders placed or performed in visit on 03/16/21   Renal function panel   Result Value Ref Range    Albumin 3 1 (L) 3 5 - 5 0 g/dL    Calcium 9 1 8 3 - 10 1 mg/dL    Corrected Calcium 9 8 8 3 - 10 1 mg/dL    Phosphorus 2 7 2 3 - 4 1 mg/dL    BUN 26 (H) 5 - 25 mg/dL    Creatinine 1 63 (H) 0 60 - 1 30 mg/dL    Sodium 137 136 - 145 mmol/L    Potassium 5 0 3 5 - 5 3 mmol/L    Chloride 105 100 - 108 mmol/L    CO2 28 21 - 32 mmol/L    ANION GAP 4 4 - 13 mmol/L    eGFR 40 ml/min/1 73sq m    Glucose, Fasting 88 65 - 99 mg/dL   CBC   Result Value Ref Range    WBC 6 76 4 31 - 10 16 Thousand/uL    RBC 3 93 3 88 - 5 62 Million/uL    Hemoglobin 9 4 (L) 12 0 - 17 0 g/dL    Hematocrit 32 6 (L) 36 5 - 49 3 %    MCV 83 82 - 98 fL    MCH 23 9 (L) 26 8 - 34 3 pg    MCHC 28 8 (L) 31 4 - 37 4 g/dL    RDW 16 5 (H) 11 6 - 15 1 %    Platelets 920 476 - 305 Thousands/uL    MPV 10 3 8 9 - 12 7 fL   Magnesium   Result Value Ref Range    Magnesium 1 9 1 6 - 2 6 mg/dL   Urinalysis with microscopic   Result Value Ref Range    Clarity, UA Clear     Color, UA Yellow     Specific Gravity, UA 1 015 1 000 - 1 030    pH, UA 7 0 5 0, 5 5, 6 0, 6 5, 7 0, 7 5, 8 0, 8 5, 9 0    Glucose, UA Negative Negative mg/dl    Ketones, UA Negative Negative mg/dl    Blood, UA Negative Negative    Protein, UA Negative Negative mg/dl    Nitrite, UA Negative Negative    Bilirubin, UA Negative Negative    Urobilinogen, UA 0 2 0 2, 1 0 E U /dl E U /dl    Leukocytes, UA Negative Negative    WBC, UA 1-2 (A) None Seen, 2-4 /hpf    RBC, UA 1-2 (A) None Seen, 2-4 /hpf    Bacteria, UA Occasional None Seen, Occasional /hpf    OTHER OBSERVATIONS Renal Tubule Epithelial Cells Present     Epithelial Cells Occasional None Seen, Occasional /hpf   Protein / creatinine ratio, urine   Result Value Ref Range    Creatinine, Ur 125 0 mg/dL    Protein Urine Random 12 mg/dL    Prot/Creat Ratio, Ur 0 10 0 00 - 0 10

## 2021-04-01 ENCOUNTER — OFFICE VISIT (OUTPATIENT)
Dept: PHYSICAL THERAPY | Facility: CLINIC | Age: 81
End: 2021-04-01
Payer: MEDICARE

## 2021-04-01 DIAGNOSIS — R26.89 BALANCE DISORDER: ICD-10-CM

## 2021-04-01 DIAGNOSIS — I63.00 CEREBROVASCULAR ACCIDENT (CVA) DUE TO THROMBOSIS OF PRECEREBRAL ARTERY (HCC): Primary | ICD-10-CM

## 2021-04-01 PROCEDURE — 97112 NEUROMUSCULAR REEDUCATION: CPT | Performed by: PHYSICAL THERAPIST

## 2021-04-01 NOTE — PROGRESS NOTES
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 ([address] )  I63 00    2  Balance disorder  R26 89                   Subjective: Patient reports to therapy reporting slight soreness in his hips after last session, but is feeling good today          Objective: See treatment diary below      NMR:  - Standing Hip 3-way: 15x  - tandem walking- 5 laps x 10 ft  - STS on foam: 20x2  - SLS: 60 sec x 2 trials each leg, 1 UE  - Step taps FWD/LAT to/from foam: 20x, 6"  - Step ups FWD to/from foam: 20x, 6"  - Sidestepping w/ foam: 5 laps x 3 beams          Assessment: Patient tolerated treatment well  Patient challenged with endurance and required frequent rest breaks during and between exercises to prevent SOB  Patient able to progress step taps to add foam to focus on improving patient balance  While sidestepping on foam patient balance worsens with increasing laps due to LE muscular fatigue  Patient will benefit from skilled PT services to improve his endurance, balance, and maximize his level of function  Plan: Continue per plan of care         Precautions: AFIB HTN        Outcome measures  IE20               5XSTS 15 41 sec   Retropulsed x 1 12 66 sec 13 26 sec      TUG  9 38 sec 9 96 sec               Parks  47/56 53/56 54/56      10MWT 12 71 sec   0 78 m/sec  2 58 ft/sec  8 78 sec  1 14 m/s 9 24 sec                                 6MWT  1010 ft 1010 ft      REO 30 sec  30 sec 30 sec      REC 30 sec  30 sec 30 sec      SREO 2 sec   30 sec 30 sec      SREC NT 18 sec 7 sec      Foam FTEO 30 sec  30 sec 30 sec      Foam FTEC 10 sec  30 sec 30 sec      SLS  1 sec 3 sec

## 2021-04-06 ENCOUNTER — OFFICE VISIT (OUTPATIENT)
Dept: PHYSICAL THERAPY | Facility: CLINIC | Age: 81
End: 2021-04-06
Payer: MEDICARE

## 2021-04-06 DIAGNOSIS — I63.00 CEREBROVASCULAR ACCIDENT (CVA) DUE TO THROMBOSIS OF PRECEREBRAL ARTERY (HCC): Primary | ICD-10-CM

## 2021-04-06 DIAGNOSIS — R26.89 BALANCE DISORDER: ICD-10-CM

## 2021-04-06 PROCEDURE — 97112 NEUROMUSCULAR REEDUCATION: CPT | Performed by: PHYSICAL THERAPIST

## 2021-04-06 NOTE — PROGRESS NOTES
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 ([address] )  I63 00    2  Balance disorder  R26 89                   Subjective: Patient reports to therapy reporting no changes since previous visit        Objective: See treatment diary below      NMR:  - Standing Hip 3-way: 15x, 3# ankle weights  - tandem walking- 5 laps x 10 ft  - STS on foam: 20x2  - SLS: 60 sec x 2 trials each leg, 1 UE  - Step ups FWD from foam: 20x, 6", 3# ankle weights  - Step up LAT from foam: 10x  - Sidestepping w/ foam: 5 laps x 3 beams, 2 sets          Assessment: Patient tolerated treatment well  He was able to add 3# ankle weights to standing hip 3-way and FWD step ups from foam to continue to challenge strength and balance  He also shows improvement with SLS, standing on LLE for 7 seconds without UE support  He will benefit from skilled PT services to improve his endurance, balance, and maximize his level of function  Plan: Continue per plan of care         Precautions: AFIB HTN        Outcome measures  IE20               5XSTS 15 41 sec   Retropulsed x 1 12 66 sec 13 26 sec      TUG  9 38 sec 9 96 sec               Parks  47/56 53/56 54/56      10MWT 12 71 sec   0 78 m/sec  2 58 ft/sec  8 78 sec  1 14 m/s 9 24 sec                                 6MWT  1010 ft 1010 ft      REO 30 sec  30 sec 30 sec      REC 30 sec  30 sec 30 sec      SREO 2 sec   30 sec 30 sec      SREC NT 18 sec 7 sec      Foam FTEO 30 sec  30 sec 30 sec      Foam FTEC 10 sec  30 sec 30 sec      SLS  1 sec 3 sec

## 2021-04-08 ENCOUNTER — OFFICE VISIT (OUTPATIENT)
Dept: PHYSICAL THERAPY | Facility: CLINIC | Age: 81
End: 2021-04-08
Payer: MEDICARE

## 2021-04-08 DIAGNOSIS — I63.00 CEREBROVASCULAR ACCIDENT (CVA) DUE TO THROMBOSIS OF PRECEREBRAL ARTERY (HCC): Primary | ICD-10-CM

## 2021-04-08 DIAGNOSIS — R26.89 BALANCE DISORDER: ICD-10-CM

## 2021-04-08 PROCEDURE — 97112 NEUROMUSCULAR REEDUCATION: CPT | Performed by: PHYSICAL THERAPIST

## 2021-04-08 NOTE — PROGRESS NOTES
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 ([address] )  I63 00    2  Balance disorder  R26 89                   Subjective: Patient reports to therapy reporting no changes since previous visit        Objective: See treatment diary below      NMR:  - Standing Hip 3-way: 15x, 3# ankle weights  - STS on foam: 10x2  - Tandem stance EC: 30 sec, 2x  - Tandem stand EO w/ foam: 30 sec, 3x  - Tandem stance EC w/ foam: 30 sec, 3x  - SLS: 60 sec x 2 trials each leg, 1 UE  - Sidestepping w/ foam: 5 laps x 3 beams, 2 sets          Assessment: Patient tolerated treatment fairly today  Focus of today's treatment on static balance, which patient performed well with EO  He was challenged when on foam and with EC conditions, requiring occasion 1 UE support  Frequent rest breaks utilized between exercises to prevent patient fatigue  He will benefit from skilled PT services to improve his endurance, balance, and maximize his level of function  Plan: Continue per plan of care         Precautions: AFIB HTN        Outcome measures  IE20               5XSTS 15 41 sec   Retropulsed x 1 12 66 sec 13 26 sec      TUG  9 38 sec 9 96 sec               Parks  47/56 53/56 54/56      10MWT 12 71 sec   0 78 m/sec  2 58 ft/sec  8 78 sec  1 14 m/s 9 24 sec                                 6MWT  1010 ft 1010 ft      REO 30 sec  30 sec 30 sec      REC 30 sec  30 sec 30 sec      SREO 2 sec   30 sec 30 sec      SREC NT 18 sec 7 sec      Foam FTEO 30 sec  30 sec 30 sec      Foam FTEC 10 sec  30 sec 30 sec      SLS  1 sec 3 sec

## 2021-04-13 ENCOUNTER — OFFICE VISIT (OUTPATIENT)
Dept: PHYSICAL THERAPY | Facility: CLINIC | Age: 81
End: 2021-04-13
Payer: MEDICARE

## 2021-04-13 DIAGNOSIS — I63.00 CEREBROVASCULAR ACCIDENT (CVA) DUE TO THROMBOSIS OF PRECEREBRAL ARTERY (HCC): Primary | ICD-10-CM

## 2021-04-13 DIAGNOSIS — R26.89 BALANCE DISORDER: ICD-10-CM

## 2021-04-13 PROCEDURE — 97112 NEUROMUSCULAR REEDUCATION: CPT | Performed by: PHYSICAL THERAPIST

## 2021-04-13 NOTE — PROGRESS NOTES
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 ([address] )  I63 00    2  Balance disorder  R26 89                   Subjective: Patient reports that his legs are feeling a little sore today        Objective: See treatment diary below      NMR:  - Standing Hip 3-way: 15x, 3# ankle weights  - STS on foam: 10x2  - Tandem stance: 30 sec, 4x  - Sidestepping w/ foam: 5 laps x 3 beams, 2 sets  - Tandem ambulation: 10 laps x 10 ft  - 9" Hurdles w/ foam: 2 foam mats, 2 foam beams, 10 laps          Assessment: Patient tolerated treatment fairly today  He reported increased quad and hip fatigue at beginning of session  He remains challenged with his endurance, requiring frequent rest breaks during exercises  During joycelyn exercises, he was able to maintain balance while stepping over hurdles, but was challenged when stopping on foam beams, losing balance posteriorly  He will continue to benefit from skilled PT services to improve his endurance, balance, and maximize his level of function  Plan: Continue per plan of care         Precautions: AFIB HTN        Outcome measures  IE20               5XSTS 15 41 sec   Retropulsed x 1 12 66 sec 13 26 sec      TUG  9 38 sec 9 96 sec               Parks  47/56 53/56 54/56      10MWT 12 71 sec   0 78 m/sec  2 58 ft/sec  8 78 sec  1 14 m/s 9 24 sec                                 6MWT  1010 ft 1010 ft      REO 30 sec  30 sec 30 sec      REC 30 sec  30 sec 30 sec      SREO 2 sec   30 sec 30 sec      SREC NT 18 sec 7 sec      Foam FTEO 30 sec  30 sec 30 sec      Foam FTEC 10 sec  30 sec 30 sec      SLS  1 sec 3 sec

## 2021-04-15 ENCOUNTER — OFFICE VISIT (OUTPATIENT)
Dept: PHYSICAL THERAPY | Facility: CLINIC | Age: 81
End: 2021-04-15
Payer: MEDICARE

## 2021-04-15 DIAGNOSIS — R26.89 BALANCE DISORDER: ICD-10-CM

## 2021-04-15 DIAGNOSIS — I63.00 CEREBROVASCULAR ACCIDENT (CVA) DUE TO THROMBOSIS OF PRECEREBRAL ARTERY (HCC): Primary | ICD-10-CM

## 2021-04-15 PROCEDURE — 97112 NEUROMUSCULAR REEDUCATION: CPT | Performed by: PHYSICAL THERAPIST

## 2021-04-15 NOTE — PROGRESS NOTES
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 ([address] )  I63 00    2  Balance disorder  R26 89        Start Time: 5896  Stop Time: 1530  Total time in clinic (min): 45 minutes    Subjective: Client reports he "threw out his back" yesterday  He currently reports 8/10 back pain but is agreeable to do therapy today  Objective: See treatment diary below      NMR:  - Standing Hip 3-way: 15x, 3# ankle weights (no weight on 4/15 due to back pain)  - STS on foam: 10x2 (no foam on 4/15 due to back pain)  - Tandem stance: 30 sec, 4x  - Sidestepping w/ foam: 5 laps x 3 beams, 2 sets  - Tandem ambulation: 10 laps x 10 ft  - 9" Hurdles w/ foam: 2 foam mats, 2 foam beams, 8 laps (only 8 laps today due to onset of back pain)  - prolonged sitting with towel roll with repeated extension for low back pain  (provided immediate relief)        Assessment: Client with good participation throughout session  Activities modified today due to onset of back pain yesterday  All activities completed within tolerable range  Repeated extension provided immediate relief  He will continue to benefit from skilled PT services to improve his endurance, balance, and maximize his level of function  Plan: Continue per plan of care         Precautions: AFIB HTN        Outcome measures  IE20               5XSTS 15 41 sec   Retropulsed x 1 12 66 sec 13 26 sec      TUG  9 38 sec 9 96 sec               Parks  47/56 53/56 54/56      10MWT 12 71 sec   0 78 m/sec  2 58 ft/sec  8 78 sec  1 14 m/s 9 24 sec                                 6MWT  1010 ft 1010 ft      REO 30 sec  30 sec 30 sec      REC 30 sec  30 sec 30 sec      SREO 2 sec   30 sec 30 sec      SREC NT 18 sec 7 sec      Foam FTEO 30 sec  30 sec 30 sec      Foam FTEC 10 sec  30 sec 30 sec      SLS  1 sec 3 sec

## 2021-04-20 ENCOUNTER — OFFICE VISIT (OUTPATIENT)
Dept: PHYSICAL THERAPY | Facility: CLINIC | Age: 81
End: 2021-04-20
Payer: MEDICARE

## 2021-04-20 DIAGNOSIS — I63.00 CEREBROVASCULAR ACCIDENT (CVA) DUE TO THROMBOSIS OF PRECEREBRAL ARTERY (HCC): Primary | ICD-10-CM

## 2021-04-20 DIAGNOSIS — R26.89 BALANCE DISORDER: ICD-10-CM

## 2021-04-20 PROCEDURE — 97112 NEUROMUSCULAR REEDUCATION: CPT | Performed by: PHYSICAL THERAPIST

## 2021-04-20 NOTE — PROGRESS NOTES
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 ([address] )  I63 00    2  Balance disorder  R26 89                   Subjective: Client reports he is not doing so well today because his legs feel weak and he gets "out of breath" easily         Objective: See treatment diary below      NMR:  - Tandem stance: 30 sec, 4x  - Sidestepping w/ foam: 5 laps x 3 beams  - Tandem ambulation: 10 laps x 10 ft  - 9" Hurdles: 4 laps, 2x  - Braiding: 10 laps x 10 ft  - Ambulation w/ opposite hand to knee: 50 ft, 2x  - FWD/BWD ambulation: 50 ft x 5 laps        Assessment: Patient tolerated treatment fairly today  Patient was challenged with SOB and fatigue throughout session  He was challenged with static and dynamic balance when narrowing BISI, requiring occasional UE assist to prevent fall  He will continue to benefit from skilled PT services to improve his endurance, balance, and maximize his level of function  Plan: Continue per plan of care         Precautions: AFIB HTN        Outcome measures  IE20               5XSTS 15 41 sec   Retropulsed x 1 12 66 sec 13 26 sec      TUG  9 38 sec 9 96 sec               Parks  47/56 53/56 54/56      10MWT 12 71 sec   0 78 m/sec  2 58 ft/sec  8 78 sec  1 14 m/s 9 24 sec                                 6MWT  1010 ft 1010 ft      REO 30 sec  30 sec 30 sec      REC 30 sec  30 sec 30 sec      SREO 2 sec   30 sec 30 sec      SREC NT 18 sec 7 sec      Foam FTEO 30 sec  30 sec 30 sec      Foam FTEC 10 sec  30 sec 30 sec      SLS  1 sec 3 sec

## 2021-04-22 ENCOUNTER — OFFICE VISIT (OUTPATIENT)
Dept: PHYSICAL THERAPY | Facility: CLINIC | Age: 81
End: 2021-04-22
Payer: MEDICARE

## 2021-04-22 DIAGNOSIS — R26.89 BALANCE DISORDER: ICD-10-CM

## 2021-04-22 DIAGNOSIS — I63.00 CEREBROVASCULAR ACCIDENT (CVA) DUE TO THROMBOSIS OF PRECEREBRAL ARTERY (HCC): Primary | ICD-10-CM

## 2021-04-22 PROCEDURE — 97112 NEUROMUSCULAR REEDUCATION: CPT | Performed by: PHYSICAL THERAPIST

## 2021-04-22 NOTE — PROGRESS NOTES
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 ([address] )  I63 00    2  Balance disorder  R26 89        Start Time: 946  Stop Time:   Total time in clinic (min): 40 minutes    Subjective: Client denies any falls or changes to medication at this time  He would like to work on his balance and endurance  Elliot 2-3        Objective: See treatment diary below      NMR:  - Tandem stance: 30 sec, 4x  - Sidestepping w/ foam with alternating cone taps: 5 laps x 3 beams  - Tandem ambulation: 10 laps x 10 ft  - STS 2x10 with 2# medicine ball HR 94 bpm O2 99% Elliot 3  - Carioca on blue airex: 10 laps x 10 ft HR 91 bpm O2 99%  - Ambulation w/ high knees while holding 10lbs medicine ball: 30 ft, 6x  - FWD/BWD ambulation: 50 ft x 5 laps  - Obstacle course x 2 5 min: stepping over hurdles standing on foam pads then side stepping along foam beam  Elliot 3 SpO2 99%    Assessment: Client with good participation throughout  He was challenged with SOB and fatigue throughout session  Trialed use of monitoring HR, O2 and Elliot with ativities with good tolerance  Client with good HR recovery throughout exercises  Progressed carioca from firm to foam surface and side stepping on foam to include alternating cone taps (up to CG) with good tolerance  Initiated and trained client on STS with 10LBS medicine ball with appropriate challenge  Initiated/Attempted to complete obstacle course x 3 minutes, however client requested to rest after 2 5 min due to fatigue in legs and endurance  but quickly recovered back to 84 bpm within 40 seconds  Progress note next session  He will continue to benefit from skilled PT services to improve his endurance, balance, and maximize his level of function               Plan: Continue per plan of care    Progress Note next session     Precautions: AFIB HTN        Outcome measures  IE11/19/20 12/23/20 2/11/21               5XSTS 15 41 sec   Retropulsed x 1 12 66 sec 13 26 sec      TUG  9 38 sec 9 96 sec               Parks  47/56 53/56 54/56      10MWT 12 71 sec   0 78 m/sec  2 58 ft/sec  8 78 sec  1 14 m/s 9 24 sec                                 6MWT  1010 ft 1010 ft      REO 30 sec  30 sec 30 sec      REC 30 sec  30 sec 30 sec      SREO 2 sec   30 sec 30 sec      SREC NT 18 sec 7 sec      Foam FTEO 30 sec  30 sec 30 sec      Foam FTEC 10 sec  30 sec 30 sec      SLS  1 sec 3 sec

## 2021-04-27 ENCOUNTER — OFFICE VISIT (OUTPATIENT)
Dept: PHYSICAL THERAPY | Facility: CLINIC | Age: 81
End: 2021-04-27
Payer: MEDICARE

## 2021-04-27 DIAGNOSIS — I63.00 CEREBROVASCULAR ACCIDENT (CVA) DUE TO THROMBOSIS OF PRECEREBRAL ARTERY (HCC): Primary | ICD-10-CM

## 2021-04-27 DIAGNOSIS — R26.89 BALANCE DISORDER: ICD-10-CM

## 2021-04-27 PROCEDURE — 97530 THERAPEUTIC ACTIVITIES: CPT | Performed by: PHYSICAL THERAPIST

## 2021-04-27 NOTE — PROGRESS NOTES
PT Progress Note    Today's date: 2021  Patient name: Ishan Robbins  : 1940  MRN: [id number]  Referring provider: Raymond Haywood DO  Dx:   Encounter Diagnosis     ICD-10-CM    1  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 ([facility] [address] )  I63 00    2  Balance disorder  R26 89                   Assessment  Assessment details: Patient is a 69 y/o male who presents to PT for progress note from CVA in 2020, and more recently a heart valve replacement in 2021  He reports that he feels like his legs are sore and tired during therapy, and thinks his legs may be out of shape  He shows improvements with his balance, as shown by his improvements with the ELAINE and FGA  He also shows improvement with his 5xSTS and TUG tests displaying improved strength and reduced risk of falls  He remains challenged with his functional endurance, by not being able to complete 6 MWT  He also shows slight regression in his gait speed to 0 97 m/s  Based on his improvements and regressions, he will continue to benefit from skilled PT services to improve his endurance, functional mobility, and improve performance of ADLs         Impairments: abnormal coordination, abnormal gait, abnormal movement, impaired balance, lacks appropriate home exercise program and safety issue  Understanding of Dx/Px/POC: excellent  Goals  Goals  Short Term Goals (4 weeks):    - Patient will improve time on TUG by 2 9 seconds from 14 31 seconds to 11 seconds to facilitate improved safety in all ambulation- MET  - Patient will improve scoring on FGA by 3 points from 20/30 to 24/30 to progress safety- MET  - Patient will be independent in basic HEP 2-3 weeks  - Patient will improve 5xSTS score by 2 3 seconds from 15 41 seconds to 12 5 seconds to promote improved LE functional strength needed for ADLs- MET  - Patient will complete components of HiMAT to promote agility necessary for sports related tasks- NOT MET    Long Term Goals (12 weeks):  - Patient will be independent in a comprehensive home exercise program  - Patient will improve gait speed by 0 1 m/sec from 1 09 m/sec to 1 2 m/sec to improve safety with community ambulation- NOT MET  - Patient will improve ELAINE by 6 points from 46/56 to 52/56 to facilitate return to safe independent ambulation- MET  - Patient will be able to demonstrate HT in gait without veering  - Patient will improve 6 Minute Walk Test score by 190 feet from 750 feet to 1100 feet to promote improved cardiovascular endurance- NOT MET  - Patient will report 50% reduction in near falls in order to improve safety with functional tasks and reduce his risk for falls  - Patient will report going on walks at least 3 days per week to promote independence and improved cardiovascular endurance  - Patient will be able to ascend/descend stairs reciprocally without UE assist to promote independence and safety with ADLs  - Patient will report 50% reduction in near falls when ambulating on uneven terrain                Plan  Patient would benefit from: skilled physical therapy  Planned modality interventions: manual electrical stimulation  Planned therapy interventions: manual therapy, neuromuscular re-education, patient education, postural training, strengthening, therapeutic exercise, therapeutic activities, stretching, home exercise program, balance and gait training  Frequency: 2x week  Plan of Care beginning date: 3/24/2021  Plan of Care expiration date: 6/24/2021  Treatment plan discussed with: patient        Subjective Evaluation    History of Present Illness  Mechanism of injury: Update (4-)  Patient reports that his legs have been feeling tired  He states he saw his cardiologist and everything with his heart is ok, thinks it just may be that his legs are out of shape    3/24/2021  Had valve repair on 3-1-2021  In hospital 2 days  Was cleared for therapy on the 8th of march   With instructs to return to PT 10 days after MD appointment  Pt presents 14 days out with no restrictions  Pt notes being a little more fatigued and tired     IE:  68year old male suffered stroke 10/30/20 during the night, 1 am     Pt stood up to go to the bathroom and he collapsed, and noted R sided weakness  Stroke affected his R UE, and LE  May have affected his vision, pt is not sure  Hospitalized at [facility] x 1 5 days, then transferred to the [facility] Nov 1-17, then DC home  Pt was brought in today by his dtr Rhea Saint Michael [phone]  She did not stay for the appointment stated pt could answer all questions  Pt complains of confusion while trying to talk  Pt has OT eval following PT today  Josephine Ivey  feels his balance is good, R UE strength is "good," but weaker compared to before stroke  Pain  No pain reported    Social Support  Steps to enter house: yes (1 step then platform, twice, no railing )  Stairs in house: no (he lives on one level)   Lives with: spouse, adult children and young children    Treatments  Previous treatment: physical therapy  Discharged from (in last 30 days): inpatient hospitalization  Patient Goals  Patient goal: improve walking speed  Leisure activity: casino/walking  Return grocery shopping and cooking   Objective     Functional Assessment        Comments  Re-eval: 3/24/21  - Postural assessment: WNL, mild forward head posturing  Improved midline  - MMT: WNL (5/5)to BLE, except Hip extensors 4-/5,   - Coordination; heel to/from shin and alternating toe-taps WNL  From IE:    Seated posture:pt's head is rotated slightly left when pt states it is in midline  Coordination:   Finger to nose: intact, slight R intention tremor  Heel to shin:  Intact     ROSARIO:  UE:intact      LE:off rhythm    Strength:  Hip flexion:  R=5/5 L=5/5  Quads:  R = 5 /5  L = 5/5  Ankles: R=5/5 except Inv= 4+/5 with mild ROM restriction   L=5/5    Oculomotor:  SP:intact   Saccades:intact     Gait: some inattention to right side, low step height  Gait with HT: not tested  Positionals: deferred               Precautions: Blood thinners  Past Medical History:   Diagnosis Date    Aneurysm ([facility] [address] )     of brain  being monitored    Atrial fibrillation ([facility] [address] )     Essential hypertension, long-standing     Heart murmur, lifelong     heart murmur since birth    Hematuria     intermittent    History of cigarette smoking, chronic     64 year smoker at one half pack per day, quit 17    Hyperlipidemia     Hypertension     Irregular heart beat     Kidney stones     12 episodes of kidney stones    Kidney stones with lithotripsy 2017    Done at [facility]    Pneumonia      X 2    Stroke [facility]) 10/29/2020    right sided  weakness almost full recovery    Stroke [facility])     Vitamin D deficiency     maintained with 1000 units of D    Water retention     Wears glasses     Wears partial dentures     upper         Age norm for 70 yrs per current research:   10M WT: male=4 36 ft/sec  female= 4 16 ft/sec  MDC= 0 59 ft/sec  6MWT: boiv=1406 feet  Female= 1545 feet  MDC= 190 feet  5XSTS: 10 sec  MDC= 2 3sec (vestibular)    Parks: male =54/56  Female= 53/56  FGA:   TU 5 sec for community dwelling adults, 32 2 sec for frail elderly   MDC= 4 14 sec      Outcome measures  IE20 Progress Note  20 Re-eval 21 Re-Eval  3/24/2021 PN: 2021       deferred until medical clearance      5XSTS 15 41 sec   Retropulsed x 1 12 66 sec  14 31 sec 1 UE 13 47 sec     TUG  9 38 sec  9 52 sec 9 16 sec    Parks  47/56 53/56  46/56 53/56    10MWT 12 71 sec   0 78 m/sec  2 58 ft/sec  8 78 sec  1 14 m/s  10/9 12= 1 09 m/S 10 31 sec= 0 97 m/s    FGA  25/30  20/30 25/30    6MWT  1,010 ft  750 ft 750 ft in 4:12    REO 30 sec  30 sec  30 sec 30 sec    REC 30 sec  30 sec  30 sec 30 sec    SREO 2 sec   30 sec  30 sec   30 sec    SREC NT 18 sec  30 sec 6 sec    Foam FTEO 30 sec  30 sec  30 sec 30 sec    Foam FTEC 10 sec  30 sec  30 sec 30 sec     SLS  R: 1 sec  L: 1 sec   R: 3 sec  L: 5 sec

## 2021-04-29 ENCOUNTER — OFFICE VISIT (OUTPATIENT)
Dept: PHYSICAL THERAPY | Facility: CLINIC | Age: 81
End: 2021-04-29
Payer: MEDICARE

## 2021-04-29 DIAGNOSIS — R26.89 BALANCE DISORDER: ICD-10-CM

## 2021-04-29 DIAGNOSIS — I63.00 CEREBROVASCULAR ACCIDENT (CVA) DUE TO THROMBOSIS OF PRECEREBRAL ARTERY (HCC): Primary | ICD-10-CM

## 2021-04-29 PROCEDURE — 97112 NEUROMUSCULAR REEDUCATION: CPT

## 2021-04-29 NOTE — PROGRESS NOTES
Daily Note  IE: 3-   PN 21  POC 21        Today's date: 2021  Patient name: Kathya Rojas  : 1940  MRN: [id number]  Referring provider: Korey Chao DO  Dx:   Encounter Diagnosis     ICD-10-CM    1  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 ([address] )  I63 00    2  Balance disorder  R26 89        Start Time: 3383  Stop Time: 1530  Total time in clinic (min): 45 minutes    Subjective: Client denies any falls or changes to medication at this time  He would like to work on his balance and endurance  Elliot 2-3  Pt states his last cardiac echo "came out great "  "My doctor thinks it's just because I have been inactive and in the hospital "       Objective: See treatment diary below  HR = 75 bpm  SPO2 97%    NMR:  --NEW 3 minutes of lap walks, x 2 sets  550'  3 min rest   550' with c/o pain  98% SPO2   bpm    - Np Tandem stance: 30 sec, 4x  - *Sidestepping w/ foam with alternating cone taps: 6 times back/forth across beam  - NP Tandem ambulation: 10 laps x 10 ft  - STS 2x10 with 10# medicine ball HR 94 bpm O2 99%  126 BPM HR  - NP Carioca on blue airex: 10 laps x 10 ft HR 91 bpm O2 99%  - Ambulation w/ high knees while holding 10lbs medicine ball: 30 ft, 6x  bpm   97% SPO2  - NP FWD/BWD ambulation: 50 ft x 5 laps  - NP Obstacle course x 2 5 min: stepping over hurdles standing on foam pads then side stepping along foam beam  Elliot 3 SpO2 99%    Assessment: Complaints and posterior hip pain consistent with neurogenic claudication (during walking ) Pt has Neurology follow up 21  Addition of lap walking (multiple reps of 3' walking) today, due to pt's inability to finish 6MWT and slowing gait speed  Pt it limited by deep posterior hip fatigue/pain that is not tender to palpation, only mildly by SOB  Client with good HR recovery throughout exercises    He will continue to benefit from skilled PT services to improve his endurance, balance, and maximize his level of function  Plan: Continue per plan of care  Consider PPT to be held while pt is walking to see if it reduces possible neurogenic claudication sx         Precautions: AFIB HTN        Outcome measures  IE11/19/20 12/23/20 2/11/21               5XSTS 15 41 sec   Retropulsed x 1 12 66 sec 13 26 sec      TUG  9 38 sec 9 96 sec               Parks  47/56 53/56 54/56      10MWT 12 71 sec   0 78 m/sec  2 58 ft/sec  8 78 sec  1 14 m/s 9 24 sec                                 6MWT  1010 ft 1010 ft      REO 30 sec  30 sec 30 sec      REC 30 sec  30 sec 30 sec      SREO 2 sec   30 sec 30 sec      SREC NT 18 sec 7 sec      Foam FTEO 30 sec  30 sec 30 sec      Foam FTEC 10 sec  30 sec 30 sec      SLS  1 sec 3 sec

## 2021-05-03 RX ORDER — ASPIRIN 81 MG/1
81 TABLET ORAL DAILY
COMMUNITY

## 2021-05-03 NOTE — PRE-PROCEDURE INSTRUCTIONS
My Surgical Experience    The following information was developed to assist you to prepare for your operation  What do I need to do before coming to the hospital?   Arrange for a responsible person to drive you to and from the hospital    Arrange care for your children at home  Children are not allowed in the recovery areas of the hospital   Plan to wear clothing that is easy to put on and take off  If you are having shoulder surgery, wear a shirt that buttons or zippers in the front  Bathing  o Shower the evening before and the morning of your surgery with an antibacterial soap  Please refer to the Pre Op Showering Instructions for Surgery Patients Sheet   o Remove nail polish and all body piercing jewelry  o Do not shave any body part for at least 24 hours before surgery-this includes face, arms, legs and upper body  Food  o Nothing to eat or drink after midnight the night before your surgery  This includes candy and chewing gum  o Exception: If your surgery is after 12:00pm (noon), you may have clear liquids such as 7-Up®, ginger ale, apple or cranberry juice, Jell-O®, water, or clear broth until 8:00 am  o Do not drink milk or juice with pulp on the morning before surgery  o Do not drink alcohol 24 hours before surgery  Medicine  o Follow instructions you received from your surgeon about which medicines you may take on the day of surgery  o If instructed to take medicine on the morning of surgery, take pills with just a small sip of water  Call your prescribing doctor for specific infroamtion on what to do if you take insulin    What should I bring to the hospital?    Bring:  [place] or a walker, if you have them, for foot or knee surgery   A list of the daily medicines, vitamins, minerals, herbals and nutritional supplements you take   Include the dosages of medicines and the time you take them each day   Glasses, dentures or hearing aids   Minimal clothing; you will be wearing hospital sleepwear   Photo ID; required to verify your identity   If you have a Living Will or Power of , bring a copy of the documents   If you have an ostomy, bring an extra pouch and any supplies you use    Do not bring   Medicines or inhalers   Money, valuables or jewelry    What other information should I know about the day of surgery?  Notify your surgeons if you develop a cold, sore throat, cough, fever, rash or any other illness   Report to the Ambulatory Surgical/Same Day Surgery Unit   You will be instructed to stop at Registration only if you have not been pre-registered   Inform your  fi they do not stay that they will be asked by the staff to leave a phone number where they can be reached   Be available to be reached before surgery  In the event the operating room schedule changes, you may be asked to come in earlier or later than expected    *It is important to tell your doctor and others involved in your health care if you are taking or have been taking any non-prescription drugs, vitamins, minerals, herbals or other nutritional supplements  Any of these may interact with some food or medicines and cause a reaction      Pre-Surgery Instructions:   Medication Instructions    acetaminophen (TYLENOL) 325 mg tablet Instructed patient per Anesthesia Guidelines   aspirin (ECOTRIN LOW STRENGTH) 81 mg EC tablet Instructed patient per Anesthesia Guidelines   atorvastatin (LIPITOR) 40 mg tablet Instructed patient per Anesthesia Guidelines   cholecalciferol (VITAMIN D3) 1,000 units tablet Instructed patient per Anesthesia Guidelines   clopidogrel (PLAVIX) 75 mg tablet Instructed patient per Anesthesia Guidelines   finasteride (PROSCAR) 5 mg tablet Instructed patient per Anesthesia Guidelines   metoprolol tartrate (LOPRESSOR) 25 mg tablet Instructed patient per Anesthesia Guidelines      To check with surgeon if needed to hold ASA or Plavix pre-op; to take metoprolol a m  of surgery; will come for [place] 5/4/21 and covid test on 5/5/21

## 2021-05-04 ENCOUNTER — TRANSCRIBE ORDERS (OUTPATIENT)
Dept: ADMINISTRATIVE | Facility: HOSPITAL | Age: 81
End: 2021-05-04

## 2021-05-04 ENCOUNTER — OFFICE VISIT (OUTPATIENT)
Dept: PHYSICAL THERAPY | Facility: CLINIC | Age: 81
End: 2021-05-04
Payer: MEDICARE

## 2021-05-04 ENCOUNTER — APPOINTMENT (OUTPATIENT)
Dept: LAB | Facility: HOSPITAL | Age: 81
End: 2021-05-04
Attending: UROLOGY
Payer: MEDICARE

## 2021-05-04 ENCOUNTER — LAB (OUTPATIENT)
Dept: LAB | Facility: HOSPITAL | Age: 81
End: 2021-05-04
Attending: UROLOGY
Payer: MEDICARE

## 2021-05-04 DIAGNOSIS — Z01.818 PREOP TESTING: ICD-10-CM

## 2021-05-04 DIAGNOSIS — R26.89 BALANCE DISORDER: ICD-10-CM

## 2021-05-04 DIAGNOSIS — I63.00 CEREBROVASCULAR ACCIDENT (CVA) DUE TO THROMBOSIS OF PRECEREBRAL ARTERY (HCC): Primary | ICD-10-CM

## 2021-05-04 DIAGNOSIS — Z01.818 PREOP TESTING: Primary | ICD-10-CM

## 2021-05-04 LAB
ANION GAP SERPL CALCULATED.3IONS-SCNC: 9 MMOL/L (ref 4–13)
BACTERIA UR QL AUTO: ABNORMAL /HPF
BASOPHILS # BLD AUTO: 0.04 THOUSANDS/ΜL (ref 0–0.1)
BASOPHILS NFR BLD AUTO: 1 % (ref 0–1)
BILIRUB UR QL STRIP: NEGATIVE
BUN SERPL-MCNC: 25 MG/DL (ref 5–25)
CALCIUM SERPL-MCNC: 8.7 MG/DL (ref 8.3–10.1)
CHLORIDE SERPL-SCNC: 106 MMOL/L (ref 100–108)
CLARITY UR: CLEAR
CO2 SERPL-SCNC: 25 MMOL/L (ref 21–32)
COLOR UR: YELLOW
CREAT SERPL-MCNC: 1.67 MG/DL (ref 0.6–1.3)
EOSINOPHIL # BLD AUTO: 0.51 THOUSAND/ΜL (ref 0–0.61)
EOSINOPHIL NFR BLD AUTO: 8 % (ref 0–6)
ERYTHROCYTE [DISTWIDTH] IN BLOOD BY AUTOMATED COUNT: 19.6 % (ref 11.6–15.1)
GFR SERPL CREATININE-BSD FRML MDRD: 39 ML/MIN/1.73SQ M
GLUCOSE P FAST SERPL-MCNC: 94 MG/DL (ref 65–99)
GLUCOSE UR STRIP-MCNC: NEGATIVE MG/DL
HCT VFR BLD AUTO: 34.2 % (ref 36.5–49.3)
HGB BLD-MCNC: 9.5 G/DL (ref 12–17)
HGB UR QL STRIP.AUTO: ABNORMAL
IMM GRANULOCYTES # BLD AUTO: 0.03 THOUSAND/UL (ref 0–0.2)
IMM GRANULOCYTES NFR BLD AUTO: 1 % (ref 0–2)
KETONES UR STRIP-MCNC: NEGATIVE MG/DL
LEUKOCYTE ESTERASE UR QL STRIP: ABNORMAL
LYMPHOCYTES # BLD AUTO: 1.49 THOUSANDS/ΜL (ref 0.6–4.47)
LYMPHOCYTES NFR BLD AUTO: 23 % (ref 14–44)
MCH RBC QN AUTO: 21 PG (ref 26.8–34.3)
MCHC RBC AUTO-ENTMCNC: 27.8 G/DL (ref 31.4–37.4)
MCV RBC AUTO: 76 FL (ref 82–98)
MONOCYTES # BLD AUTO: 0.69 THOUSAND/ΜL (ref 0.17–1.22)
MONOCYTES NFR BLD AUTO: 11 % (ref 4–12)
NEUTROPHILS # BLD AUTO: 3.78 THOUSANDS/ΜL (ref 1.85–7.62)
NEUTS SEG NFR BLD AUTO: 56 % (ref 43–75)
NITRITE UR QL STRIP: NEGATIVE
NON-SQ EPI CELLS URNS QL MICRO: ABNORMAL /HPF
NRBC BLD AUTO-RTO: 0 /100 WBCS
PH UR STRIP.AUTO: 7 [PH]
PLATELET # BLD AUTO: 196 THOUSANDS/UL (ref 149–390)
PMV BLD AUTO: 10 FL (ref 8.9–12.7)
POTASSIUM SERPL-SCNC: 4.9 MMOL/L (ref 3.5–5.3)
PROT UR STRIP-MCNC: NEGATIVE MG/DL
RBC # BLD AUTO: 4.53 MILLION/UL (ref 3.88–5.62)
RBC #/AREA URNS AUTO: ABNORMAL /HPF
SODIUM SERPL-SCNC: 140 MMOL/L (ref 136–145)
SP GR UR STRIP.AUTO: 1.02 (ref 1–1.03)
UROBILINOGEN UR QL STRIP.AUTO: 0.2 E.U./DL
WBC # BLD AUTO: 6.54 THOUSAND/UL (ref 4.31–10.16)
WBC #/AREA URNS AUTO: ABNORMAL /HPF

## 2021-05-04 PROCEDURE — 36415 COLL VENOUS BLD VENIPUNCTURE: CPT | Performed by: UROLOGY

## 2021-05-04 PROCEDURE — 97112 NEUROMUSCULAR REEDUCATION: CPT

## 2021-05-04 PROCEDURE — 87086 URINE CULTURE/COLONY COUNT: CPT

## 2021-05-04 PROCEDURE — 80048 BASIC METABOLIC PNL TOTAL CA: CPT

## 2021-05-04 PROCEDURE — 81001 URINALYSIS AUTO W/SCOPE: CPT | Performed by: UROLOGY

## 2021-05-04 PROCEDURE — 85025 COMPLETE CBC W/AUTO DIFF WBC: CPT | Performed by: UROLOGY

## 2021-05-04 NOTE — PROGRESS NOTES
Daily Note  IE: 3-   PN 21  POC 21        Today's date: 2021  Patient name: Vincent Harrell  : 1940  MRN: [id number]  Referring provider: Lynne Rios DO  Dx:   Encounter Diagnosis     ICD-10-CM    1  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 ([address] )  I63 00    2  Balance disorder  R26 89        Start Time: 25  Stop Time:   Total time in clinic (min): 42 minutes    Subjective: Client denies any falls or changes to medication at this time  He would like to work on his balance and endurance  Elliot 2-3  Pt states his last cardiac echo "came out great "  "My doctor thinks it's just because I have been inactive and in the hospital "       Objective: See treatment diary below  HR = 81 bpm  SPO2 99%    NMR:    -seated lumbar flexion stretch 20s x 5   - 3 minutes of lap walks, x 2 sets  500' with c/o pain bilateral upper buttocks starting at 350'  After stopping and peforming lumbar seated flexion stretch, the pain did not decrease  Decreased with hip ext/bridge  PT provided hip flexor stretching sidelying bilat 30 sec x 3  Pt repeated 3' lap walking  525' with onset of pain at 400'  Passive hip flexor stretch by PT nearly immediately reduced pain  pt completed 10 bridges without pain  Attempted half kneeling hip flexor stretch, but pt reported mild knee pain while weight bearing        Below not performed today:  -  Tandem stance: 30 sec, 4x  - *Sidestepping w/ foam with alternating cone taps: 6 times back/forth across beam  - NP Tandem ambulation: 10 laps x 10 ft  - STS 2x10 with 10# medicine ball HR 94 bpm O2 99%  126 BPM HR  - NP Carioca on blue airex: 10 laps x 10 ft HR 91 bpm O2 99%  - Ambulation w/ high knees while holding 10lbs medicine ball: 30 ft, 6x  bpm   97% SPO2  - NP FWD/BWD ambulation: 50 ft x 5 laps  - NP Obstacle course x 2 5 min: stepping over hurdles standing on foam pads then side stepping along foam beam  Elliot 3 SpO2 99%    Assessment: Passive hip flexor stretching found to reduce the posterior hip pain provoked with 3'/500' of walking  After stretching pt was able to walk farther before onse of pain  NV will trial warm up, passive hip flexor stretching by PT, then 3' walks, noting the onset of pain  Pt has been struggling to complete 6MWT due to this pain   Plan: Continue per plan of care  Continue hip flexor stretches, attempting to have pt be able to complete 6 min continuous walking        Precautions: AFIB HTN        Outcome measures  IE11/19/20 12/23/20 2/11/21               5XSTS 15 41 sec   Retropulsed x 1 12 66 sec 13 26 sec      TUG  9 38 sec 9 96 sec               Parks  47/56 53/56 54/56      10MWT 12 71 sec   0 78 m/sec  2 58 ft/sec  8 78 sec  1 14 m/s 9 24 sec                                 6MWT  1010 ft 1010 ft      REO 30 sec  30 sec 30 sec      REC 30 sec  30 sec 30 sec      SREO 2 sec   30 sec 30 sec      SREC NT 18 sec 7 sec      Foam FTEO 30 sec  30 sec 30 sec      Foam FTEC 10 sec  30 sec 30 sec      SLS  1 sec 3 sec

## 2021-05-04 NOTE — RESULT ENCOUNTER NOTE
Hello    Patient normally is followed up by Ms Kali Beasley  please let the patient know that the creatinine is stable 1 6  Electrolytes are stable  Urine with very small amount of red cells  No changes for now as long as the patient is feeling well      Thank you    np

## 2021-05-05 ENCOUNTER — TELEPHONE (OUTPATIENT)
Dept: NEPHROLOGY | Facility: CLINIC | Age: 81
End: 2021-05-05

## 2021-05-05 DIAGNOSIS — Z20.822 COVID-19 RULED OUT BY LABORATORY TESTING: ICD-10-CM

## 2021-05-05 LAB — BACTERIA UR CULT: NORMAL

## 2021-05-05 PROCEDURE — U0005 INFEC AGEN DETEC AMPLI PROBE: HCPCS | Performed by: UROLOGY

## 2021-05-05 PROCEDURE — U0003 INFECTIOUS AGENT DETECTION BY NUCLEIC ACID (DNA OR RNA); SEVERE ACUTE RESPIRATORY SYNDROME CORONAVIRUS 2 (SARS-COV-2) (CORONAVIRUS DISEASE [COVID-19]), AMPLIFIED PROBE TECHNIQUE, MAKING USE OF HIGH THROUGHPUT TECHNOLOGIES AS DESCRIBED BY CMS-2020-01-R: HCPCS | Performed by: UROLOGY

## 2021-05-05 NOTE — TELEPHONE ENCOUNTER
----- Message from Thu Alvarez MD sent at 5/4/2021  5:13 PM EDT -----  Hello    Patient normally is followed up by Ms Sherrill Senior  please let the patient know that the creatinine is stable 1 6  Electrolytes are stable  Urine with very small amount of red cells  No changes for now as long as the patient is feeling well      Thank you    np

## 2021-05-06 ENCOUNTER — OFFICE VISIT (OUTPATIENT)
Dept: PHYSICAL THERAPY | Facility: CLINIC | Age: 81
End: 2021-05-06
Payer: MEDICARE

## 2021-05-06 DIAGNOSIS — R26.89 BALANCE DISORDER: ICD-10-CM

## 2021-05-06 DIAGNOSIS — I63.00 CEREBROVASCULAR ACCIDENT (CVA) DUE TO THROMBOSIS OF PRECEREBRAL ARTERY (HCC): Primary | ICD-10-CM

## 2021-05-06 LAB — SARS-COV-2 RNA RESP QL NAA+PROBE: NEGATIVE

## 2021-05-06 PROCEDURE — 97112 NEUROMUSCULAR REEDUCATION: CPT

## 2021-05-06 NOTE — PROGRESS NOTES
Daily Note  IE: 3-   PN 21  POC 21        Today's date: 2021  Patient name: Keke Thomas  : 1940  MRN: [id number]  Referring provider: Austyn Smith DO  Dx:   Encounter Diagnosis     ICD-10-CM    1  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 ([address] )  I63 00    2  Balance disorder  R26 89        Start Time: 4589  Stop Time: 1615  Total time in clinic (min): 39 minutes    Subjective: Client denies any falls or changes to medication at this time  He would like to work on his balance and endurance  Elliot 2-3  Pt states his last cardiac echo "came out great "  "My doctor thinks it's just because I have been inactive and in the hospital "       Objective: See treatment diary below  HR = 81 bpm  SPO2 99%    NMR:  Warm up stationary bike level 1 x 5 min  -Passive hip flexor stretch by PT nearly immediately reduced pain  - 3 minutes of lap walks, x 2 sets  500' with c/o pain bilateral upper buttocks starting at 400  PT provided hip flexor stretching sidelying bilat 30 sec x 3   -Passive hip flexor stretch by PT nearly immediately reduced pain     - prone passive PKF by PT 30s x 3 bilat  -standing quad hip flexor stretch foot on mat table, supported by chair 30 sec x 2 bilat   - Pt repeated 3' lap walking  550' onset of pain at 450' on left upper glute only (not right side)   - Stretch of left hip flexor (standing 2 chair method) immediate elimination of pain L glute       Below not performed today:  -  Tandem stance: 30 sec, 4x  - *Sidestepping w/ foam with alternating cone taps: 6 times back/forth across beam  - NP Tandem ambulation: 10 laps x 10 ft  - STS 2x10 with 10# medicine ball HR 94 bpm O2 99%  126 BPM HR  - NP Carioca on blue airex: 10 laps x 10 ft HR 91 bpm O2 99%  - Ambulation w/ high knees while holding 10lbs medicine ball: 30 ft, 6x  bpm   97% SPO2  - NP FWD/BWD ambulation: 50 ft x 5 laps  - NP Obstacle course x 2 5 min: stepping over hurdles standing on foam pads then side stepping along foam beam  Elliot 3 SpO2 99%    HEP: 5/6/21: standing quad stretch with 2 chairs  Assessment: Passive hip flexor stretching found to reduce the posterior hip pain provoked with 3'/500' of walking  Pt is gradually making progress with distance before onset of pain, and only had pain L glute while walking 3 min/550' today, second trial              Plan: Continue per plan of care  Continue hip flexor stretches, attempting to have pt be able to complete 6 min continuous walking        Precautions: AFIB HTN        Outcome measures  IE11/19/20 12/23/20 2/11/21               5XSTS 15 41 sec   Retropulsed x 1 12 66 sec 13 26 sec      TUG  9 38 sec 9 96 sec               Parks  47/56 53/56 54/56      10MWT 12 71 sec   0 78 m/sec  2 58 ft/sec  8 78 sec  1 14 m/s 9 24 sec                                 6MWT  1010 ft 1010 ft      REO 30 sec  30 sec 30 sec      REC 30 sec  30 sec 30 sec      SREO 2 sec   30 sec 30 sec      SREC NT 18 sec 7 sec      Foam FTEO 30 sec  30 sec 30 sec      Foam FTEC 10 sec  30 sec 30 sec      SLS  1 sec 3 sec

## 2021-05-07 NOTE — H&P
History & Physical - [facility]  Ally Nguyen 68 y o  male MRN: [id number]  Unit/Bed#:  Encounter: [id number]    ASSESSMENT/PLAN:  Right hydronephrosis  Right ureteral stones and dense stricture in the proximal to mid right ureter  Refuses surgical repair or nephrectomy until stent can not be exchanged and he requires nephrostomy tubes  Stent placement and holmium laser of stone and stricture 4/18/17  Holmium laser of stone and stent exchange 5/2/17  Renal stones treated elsewhere in [place] in past year (7 procedures)  Right laser lithotripsy, laser infundibulotomy, and stone basket extraction 6/2/17  Right CRUSH and laser incision of proximal ureter 7/18/17  Stent changed 05/10/19-- stone causing near complete obstruction of the right proximal ureter  Required stent to be removed in order for a guidewire to pass  Stent exchange 10/22/19 found it impossible to advance the wire without removing the stent first    Stent exchange 01/28/20 found it once again impossible to advance the wire without removing the stent first    Stent due for change in 12 wks  Stent exchange by Dr Greg Herrera 02/14/21 right stent change and ureteroscopy  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  · Right ureteral stent exchange  · The risks, benefits, alternatives,and probabilities of success were discussed in detail with no guarantee made as to outcome  All questions were answered to the patient's satisfaction        BPH/Gross hematuria  Had prior gross hematuria and seen by [facility] [place] 10/31/20  Eliquis started prior due to an acute left frontal lobe CVA     Finasteride started and tamsulosin subsequently stopped due to syncope  · Continue the eliquis and monitor for gross hematuria    A-fib, CHF, HTN, CKD stage III, Moderate MR, TR, and aortic regurgitation     Covid 19  Currently in a pandemic  · Covid 19 testing requested prior to surgery     HISTORY OF PRESENT ILLNESS:  67 y/o male with hx of renal stones and right ureteral stricture disease presents for right ureteral stent exchange  Pt does not desire surgical treatment of his stones or stricture until he is at the point of requiring a nephrostomy tube  Desires to continue with routine right ureteral stent exchanges  Pt had recent CVA and gross hematuria  Placed on finasteride by [facility]  Presents for routine stent exchange and evaluation of his hematuria       PAST MEDICAL HISTORY:  Past Medical History:   Diagnosis Date    Aneurysm ([facility] )     of brain  being monitored    Atrial fibrillation ([facility] )     Essential hypertension, long-standing     Heart murmur, lifelong     heart murmur since birth; 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    Hematuria     intermittent    History of cigarette smoking, chronic     62 year smoker at one half pack per day, quit 04/1/17    Hyperlipidemia     Hypertension     Irregular heart beat     Kidney stones     12 episodes of kidney stones    Kidney stones with lithotripsy 03/2017    [facility] [address] [facility] Pneumonia      X 2    Stroke [facility]) 10/29/2020    right sided  weakness almost full recovery    Stroke ([facility] [facility] )     Vitamin D deficiency     maintained with 1000 units of D    Water retention     Wears glasses     Wears partial dentures     upper       PAST SURGICAL HISTORY:  Past Surgical History:   Procedure Laterality Date   [address] procedure; 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    CAROTID ENDARTERECTOMY Left 1998    Supposedly no internal stenosis found    CYSTOSCOPY Right 8/15/2017    Procedure: CYSTOSCOPY RIGHT STENT EXCHANGE;  Surgeon: Addis Llanes MD;  Location: [address] [place] [address];  Service: Urology    [facility]STOSCOPY     [facility]  LITHOTRIPSY  03/2017    For nephrolithiasis at [address]  5/10/2019    FL RETROGRADE PYELOGRAM  7/30/2019    FL RETROGRADE PYELOGRAM  10/22/2019    FL RETROGRADE PYELOGRAM  1/28/2020  FL RETROGRADE PYELOGRAM  8/11/2020    FL RETROGRADE PYELOGRAM  12/15/2020    FL RETROGRADE PYELOGRAM  2/14/2021    SD CYSTO/URETERO W/LITHOTRIPSY &INDWELL STENT INSRT Right 5/2/2017    Procedure: CYSTOSCOPY URETEROSCOPY WITH LITHOTRIPSY HOLMIUM LASER, RETROGRADE PYELOGRAM AND INSERTION STENT URETERAL;  Surgeon: Lizzy Ireland MD;  Location: [address] [place] [address];  Service: Urology    SD CYSTO/URETERO W/LITHOTRIPSY &INDWELL STENT INSRT Right 5/16/2017    Procedure: CYSTOSCOPY, RETROGRADE PYELOGRAM,  FLEXIBLE URETEROSCOPY,  HOLMIUM LASER LITHOTRIPSY, STONE BASKET MANIPULATION,  STENT URETERAL EXCHANGE;  Surgeon: Lizzy Ireland MD;  Location: [address] [place] [address];  Service: Urology    SD CYSTO/URETERO W/LITHOTRIPSY &INDWELL STENT INSRT Right 6/2/2017    Procedure: CYSTOSCOPY, RETROGRADE, STONE MANIPULATION WITH HOLMIUM LASER, STENT PLACEMENT;  Surgeon: Lizzy Ireland MD;  Location: [address] [place] [address];  Service: Urology    SD CYSTO/URETERO W/LITHOTRIPSY &INDWELL STENT INSRT Right 7/18/2017    Procedure: CYSTOSCOPY, RETROGRADE, URETEROSCOPY HOLMIUM LASER [place],  AND STENT PLACEMENT;  Surgeon: Lizzy Ireland MD;  Location: [address] [place] [address];  Service: Urology    SD CYSTO/URETERO W/LITHOTRIPSY &INDWELL STENT INSRT Right 2/14/2021    Procedure: CYSTOSCOPY URETEROSCOPY, RETROGRADE PYELOGRAM, STONE MANIPULATION AND EXCHANGE STENT URETERAL;  Surgeon: Sunshine Rucker MD;  Location: [address] [place] [address];  Service: Urology    SD CYSTOSCOPY,INSERT URETERAL STENT Right 11/14/2017    Procedure: EXCHANGE STENT URETERAL;  Surgeon: Lizzy Ireland MD;  Location: [address] [place] [address];  Service: Urology    SD CYSTOURETHROSCOPY,URETER CATHETER Right 11/14/2017    Procedure: CYSTOSCOPY RETROGRADE, STENT EXCHANGE;  Surgeon: Lizzy Ireland MD;  Location: [address] [place] [address];  Service: Urology    SD CYSTOURETHROSCOPY,URETER CATHETER Right 5/8/2018    Procedure: Mirta Travis;  Surgeon: Lizzy Ireland MD;  Location: [facility] OR;  Service: Urology    VA CYSTOURETHROSCOPY,URETER CATHETER Right 8/14/2018    Procedure: Monet Jose Francisco;  Surgeon: Jose Enrique Dubon MD;  Location: [address] [place] [address];  Service: Urology    VA CYSTOURETHROSCOPY,URETER CATHETER Right 11/13/2018    Procedure: Monet Jose Francisco, RETROGRADE;  Surgeon: Jose Enrique Dubon MD;  Location: [address] [place] [address];  Service: Urology    VA CYSTOURETHROSCOPY,URETER CATHETER Right 2/12/2019    Procedure: Chauncey Grooms, STENT REMOVAL AND STENT PLACEMENT;  Surgeon: Jose Enrique Dubon MD;  Location: [address] [place] [address];  Service: Urology    VA CYSTOURETHROSCOPY,URETER CATHETER Right 5/10/2019    Procedure: Chauncey Grooms, STENT EXCHANGE;  Surgeon: Jose Enrique Dubon MD;  Location: [address] [place] [address];  Service: Urology    VA CYSTOURETHROSCOPY,URETER CATHETER Right 7/30/2019    Procedure: CYSTOSCOPY, RETROGRADE, STENT REMOVAL AND PLACEMENT OF STENT;  Surgeon: Jose Enrique Dubon MD;  Location: [address] [place] [address];  Service: Urology    VA CYSTOURETHROSCOPY,URETER CATHETER Right 10/22/2019    Procedure: Chauncey Grooms, STENT EXCHANGE;  Surgeon: Jose Enrique Dubon MD;  Location: [address] [place] [address];  Service: Urology    VA CYSTOURETHROSCOPY,URETER CATHETER Right 1/28/2020    Procedure: CYSTOSCOPY, RETROGRADE, STENT REMOVAL AND STENT PLACEMENT;  Surgeon: Jose Enrique Dubon MD;  Location: [address] [place] [address];  Service: Urology    VA CYSTOURETHROSCOPY,URETER CATHETER Right 5/22/2020    Procedure: CYSTOSCOPY RETROGRADE, STENT EXCHANGE;  Surgeon: Jose Enrique Dubon MD;  Location: [address] [place] [address];  Service: Urology    VA CYSTOURETHROSCOPY,URETER CATHETER Right 8/11/2020    Procedure: CYSTOSCOPY, Sanchez Pardon EXCHANGE, RETROGRADE;  Surgeon: Jose Enrique Dubon MD;  Location: [address] [place] [address];  Service: Urology    VA CYSTOURETHROSCOPY,URETER CATHETER Right 12/15/2020    Procedure: CYSTOSCOPY, RETROGRADE, STENT REMOVAL, AND STENT PLACEMENT;  Surgeon: Jose Enrique Dubon MD;  Location: [address] [place] [address];  Service: Urology    PROSTATE SURGERY  2015    Laser Prostatectomy  by Dr Edmar Roy Right 2017    Procedure: INSERTION STENT URETERAL, RIGHT URETEROSCOPY,  LASER OF URETERAL STRICTURE AND STONE;  Surgeon: Gina Jarrett MD;  Location: [address] [place] [address];  Service:    [address] Right 2018    Procedure: Keshia Fuse;  Surgeon: Gina Jarrett MD;  Location: [facility];  Service: Urology       ALLERGIES:  No Known Allergies    SOCIAL HISTORY:  Social History     Substance and Sexual Activity   Alcohol Use Yes    Drinks per session: 1 or 2    Binge frequency: Less than monthly    Comment: rare     Social History     Substance and Sexual Activity   Drug Use No     Social History     Tobacco Use   Smoking Status Former Smoker    Packs/day: 0 50    Years: 62 00    Pack years: 31 00    Types: Cigarettes    Quit date: 4/15/2017    Years since quittin 0   Smokeless Tobacco Never Used       FAMILY HISTORY:  Family History   Problem Relation Age of Onset    Hypertension Mother     Alcohol abuse Father     Cirrhosis Father     Cancer Son 13        cancer-knee and above-left-amputation    Cancer Sister     Other Brother         head mass    Thyroid disease unspecified Sister     Arthritis Sister     Other Brother         legally blind in one eye       MEDICATIONS:  No current facility-administered medications for this encounter       Current Outpatient Medications:     acetaminophen (TYLENOL) 325 mg tablet, Take 2 tablets (650 mg total) by mouth every 8 (eight) hours as needed for mild pain, Disp: , Rfl: 0    aspirin (ECOTRIN LOW STRENGTH) 81 mg EC tablet, Take 81 mg by mouth daily Pt to check with surgeon if needed to hold RX , Disp: , Rfl:     atorvastatin (LIPITOR) 40 mg tablet, Take 1 tablet (40 mg total) by mouth daily with dinner, Disp: 30 tablet, Rfl: 1    cholecalciferol (VITAMIN D3) 1,000 units tablet, Take 1,000 Units by mouth daily after lunch  , Disp: , Rfl:     clopidogrel (PLAVIX) 75 mg tablet, Take 75 mg by mouth daily Pt  To check with surgeon if needed to hold RX , Disp: , Rfl:     finasteride (PROSCAR) 5 mg tablet, Take 1 tablet (5 mg total) by mouth daily, Disp: 90 tablet, Rfl: 3    metoprolol tartrate (LOPRESSOR) 25 mg tablet, Take 1 tablet (25 mg total) by mouth every 12 (twelve) hours, Disp: 270 tablet, Rfl: 0    Review of Systems    PHYSICAL EXAM:  Physical Exam    LAB RESULTS:  Lab Results   Component Value Date    WBC 6 54 05/04/2021    HGB 9 5 (L) 05/04/2021    HCT 34 2 (L) 05/04/2021    MCV 76 (L) 05/04/2021     05/04/2021     Lab Results   Component Value Date    GLUCOSE 135 11/03/2020    CALCIUM 8 7 05/04/2021    K 4 9 05/04/2021    CO2 25 05/04/2021     05/04/2021    BUN 25 05/04/2021    CREATININE 1 67 (H) 05/04/2021     Lab Results   Component Value Date    CALCIUM 8 7 05/04/2021    PHOS 2 7 03/16/2021         Fiorella Overton PA-C  05/07/21    Portions of the record may have been created with voice recognition software   Occasional wrong word or "sound alike" substitutions may have occurred due to the inherent limitations of voice recognition software   Read the chart carefully and recognize, using context, where substitutions have occurred

## 2021-05-10 ENCOUNTER — ANESTHESIA EVENT (OUTPATIENT)
Dept: PERIOP | Facility: HOSPITAL | Age: 81
End: 2021-05-10
Payer: MEDICARE

## 2021-05-11 ENCOUNTER — APPOINTMENT (OUTPATIENT)
Dept: PHYSICAL THERAPY | Facility: CLINIC | Age: 81
End: 2021-05-11
Payer: MEDICARE

## 2021-05-11 ENCOUNTER — HOSPITAL ENCOUNTER (OUTPATIENT)
Facility: HOSPITAL | Age: 81
Setting detail: OUTPATIENT SURGERY
Discharge: HOME | End: 2021-05-11
Attending: UROLOGY | Admitting: UROLOGY
Payer: MEDICARE

## 2021-05-11 ENCOUNTER — APPOINTMENT (OUTPATIENT)
Dept: RADIOLOGY | Facility: HOSPITAL | Age: 81
End: 2021-05-11
Payer: MEDICARE

## 2021-05-11 ENCOUNTER — ANESTHESIA (OUTPATIENT)
Dept: PERIOP | Facility: HOSPITAL | Age: 81
End: 2021-05-11
Payer: MEDICARE

## 2021-05-11 VITALS
DIASTOLIC BLOOD PRESSURE: 89 MMHG | OXYGEN SATURATION: 97 % | BODY MASS INDEX: 29.34 KG/M2 | WEIGHT: 204.5 LBS | RESPIRATION RATE: 18 BRPM | HEART RATE: 69 BPM | SYSTOLIC BLOOD PRESSURE: 144 MMHG | TEMPERATURE: 97 F

## 2021-05-11 DIAGNOSIS — Z20.822 COVID-19 RULED OUT BY LABORATORY TESTING: Primary | ICD-10-CM

## 2021-05-11 DIAGNOSIS — N13.1 HYDRONEPHROSIS WITH URETERAL STRICTURE: ICD-10-CM

## 2021-05-11 PROCEDURE — C2617 STENT, NON-COR, TEM W/O DEL: HCPCS | Performed by: UROLOGY

## 2021-05-11 PROCEDURE — 74420 UROGRAPHY RTRGR +-KUB: CPT

## 2021-05-11 PROCEDURE — C1769 GUIDE WIRE: HCPCS | Performed by: UROLOGY

## 2021-05-11 DEVICE — INLAY URETERAL STENT W/O GUIDEWIRE
Type: IMPLANTABLE DEVICE | Site: URETER | Status: NON-FUNCTIONAL
Brand: BARD® INLAY® URETERAL STENT
Removed: 2021-10-19

## 2021-05-11 RX ORDER — SODIUM CHLORIDE, SODIUM LACTATE, POTASSIUM CHLORIDE, CALCIUM CHLORIDE 600; 310; 30; 20 MG/100ML; MG/100ML; MG/100ML; MG/100ML
100 INJECTION, SOLUTION INTRAVENOUS CONTINUOUS
Status: CANCELLED | OUTPATIENT
Start: 2021-05-11

## 2021-05-11 RX ORDER — PROPOFOL 10 MG/ML
INJECTION, EMULSION INTRAVENOUS CONTINUOUS PRN
Status: DISCONTINUED | OUTPATIENT
Start: 2021-05-11 | End: 2021-05-11

## 2021-05-11 RX ORDER — ONDANSETRON 2 MG/ML
4 INJECTION INTRAMUSCULAR; INTRAVENOUS ONCE AS NEEDED
Status: DISCONTINUED | OUTPATIENT
Start: 2021-05-11 | End: 2021-05-11 | Stop reason: HOSPADM

## 2021-05-11 RX ORDER — CEFAZOLIN SODIUM 1 G/50ML
1000 SOLUTION INTRAVENOUS ONCE
Status: COMPLETED | OUTPATIENT
Start: 2021-05-11 | End: 2021-05-11

## 2021-05-11 RX ORDER — SODIUM CHLORIDE, SODIUM LACTATE, POTASSIUM CHLORIDE, CALCIUM CHLORIDE 600; 310; 30; 20 MG/100ML; MG/100ML; MG/100ML; MG/100ML
100 INJECTION, SOLUTION INTRAVENOUS CONTINUOUS
Status: DISCONTINUED | OUTPATIENT
Start: 2021-05-11 | End: 2021-05-11 | Stop reason: HOSPADM

## 2021-05-11 RX ORDER — PROPOFOL 10 MG/ML
INJECTION, EMULSION INTRAVENOUS AS NEEDED
Status: DISCONTINUED | OUTPATIENT
Start: 2021-05-11 | End: 2021-05-11

## 2021-05-11 RX ORDER — MAGNESIUM HYDROXIDE 1200 MG/15ML
LIQUID ORAL AS NEEDED
Status: DISCONTINUED | OUTPATIENT
Start: 2021-05-11 | End: 2021-05-11 | Stop reason: HOSPADM

## 2021-05-11 RX ORDER — DEXAMETHASONE SODIUM PHOSPHATE 4 MG/ML
INJECTION, SOLUTION INTRA-ARTICULAR; INTRALESIONAL; INTRAMUSCULAR; INTRAVENOUS; SOFT TISSUE AS NEEDED
Status: DISCONTINUED | OUTPATIENT
Start: 2021-05-11 | End: 2021-05-11

## 2021-05-11 RX ORDER — FENTANYL CITRATE/PF 50 MCG/ML
50 SYRINGE (ML) INJECTION
Status: DISCONTINUED | OUTPATIENT
Start: 2021-05-11 | End: 2021-05-11 | Stop reason: HOSPADM

## 2021-05-11 RX ORDER — ONDANSETRON 2 MG/ML
INJECTION INTRAMUSCULAR; INTRAVENOUS AS NEEDED
Status: DISCONTINUED | OUTPATIENT
Start: 2021-05-11 | End: 2021-05-11

## 2021-05-11 RX ORDER — FENTANYL CITRATE 50 UG/ML
INJECTION, SOLUTION INTRAMUSCULAR; INTRAVENOUS AS NEEDED
Status: DISCONTINUED | OUTPATIENT
Start: 2021-05-11 | End: 2021-05-11

## 2021-05-11 RX ADMIN — FENTANYL CITRATE 50 MCG: 50 INJECTION, SOLUTION INTRAMUSCULAR; INTRAVENOUS at 08:30

## 2021-05-11 RX ADMIN — FENTANYL CITRATE 50 MCG: 50 INJECTION, SOLUTION INTRAMUSCULAR; INTRAVENOUS at 08:19

## 2021-05-11 RX ADMIN — ONDANSETRON 4 MG: 2 INJECTION INTRAMUSCULAR; INTRAVENOUS at 08:24

## 2021-05-11 RX ADMIN — PROPOFOL 60 MG: 10 INJECTION, EMULSION INTRAVENOUS at 08:21

## 2021-05-11 RX ADMIN — SODIUM CHLORIDE, SODIUM LACTATE, POTASSIUM CHLORIDE, AND CALCIUM CHLORIDE 100 ML/HR: .6; .31; .03; .02 INJECTION, SOLUTION INTRAVENOUS at 07:38

## 2021-05-11 RX ADMIN — PROPOFOL 80 MCG/KG/MIN: 10 INJECTION, EMULSION INTRAVENOUS at 08:21

## 2021-05-11 RX ADMIN — CEFAZOLIN SODIUM 1000 MG: 1 SOLUTION INTRAVENOUS at 08:11

## 2021-05-11 RX ADMIN — SODIUM CHLORIDE, SODIUM LACTATE, POTASSIUM CHLORIDE, AND CALCIUM CHLORIDE: .6; .31; .03; .02 INJECTION, SOLUTION INTRAVENOUS at 08:07

## 2021-05-11 RX ADMIN — DEXAMETHASONE SODIUM PHOSPHATE 4 MG: 4 INJECTION, SOLUTION INTRA-ARTICULAR; INTRALESIONAL; INTRAMUSCULAR; INTRAVENOUS; SOFT TISSUE at 08:24

## 2021-05-11 NOTE — ANESTHESIA POSTPROCEDURE EVALUATION
Post-Op Assessment Note    CV Status:  Stable    Pain management: adequate     Mental Status:  Alert and awake   Hydration Status:  Euvolemic   PONV Controlled:  Controlled   Airway Patency:  Patent      Post Op Vitals Reviewed: Yes      Staff: CRNA         No complications documented      BP  105/57   Temp 36 0C   Pulse 70   Resp 20   SpO2 99%6lfm

## 2021-05-11 NOTE — PERIOPERATIVE NURSING NOTE
OOB to bathroom, voided moderate amount of pink urine without clots  No pain reported  Tolerating po fluids  Awaiting daughter for transport home

## 2021-05-11 NOTE — ANESTHESIA PREPROCEDURE EVALUATION
Review of Systems/Medical History  Patient summary reviewed    No history of anesthetic complications     Cardiovascular  EKG reviewed, Exercise tolerance (METS): >4,  Hypertension controlled, Dysrhythmias , atrial fibrillation, No angina ,   Comment: 04/2017  SUMMARY:  -  Stress results: Duration of exercise was 4 min and 19 sec  Target heart rate was achieved  There was no chest pain during stress  -  ECG conclusions: Arrhythmia during stress: atrial fibrillation   -  Perfusion imaging: There were no perfusion defects   -  Gated SPECT: The calculated left ventricular ejection fraction was 43 %  Left ventricular ejection fraction was mildly decreased by visual estimate  There was no left ventricular regional abnormality      IMPRESSIONS: Normal study after maximal exercise  Myocardial perfusion imaging was normal at rest and with stress  Left ventricular systolic function was reduced, without distinct regional wall motion abnormalities,  Pulmonary  Smoker ex-smoker  ,   Comment: H/o pneumonia  GI/Hepatic  Negative GI/hepatic ROS          Kidney stones,        Endo/Other  Negative endo/other ROS      GYN       Hematology  Negative hematology ROS      Musculoskeletal  Negative musculoskeletal ROS        Neurology  Negative neurology ROS      Psychology   Negative psychology ROS              Physical Exam    Airway    Mallampati score: II  TM Distance: >3 FB  Neck ROM: full     Dental   Comment: Upper partial denture ,     Cardiovascular  Rhythm: irregular, Rate: normal, Murmur,     Pulmonary  Breath sounds clear to auscultation,     Other Findings        Anesthesia Plan  ASA Score- 3     Anesthesia Type- IV sedation with anesthesia with ASA Monitors  Additional Monitors:   Airway Plan:         Plan Factors-  Patient did not smoke on day of surgery  Induction- intravenous  Postoperative Plan-     Informed Consent- Anesthetic plan and risks discussed with patient    I personally reviewed this patient with the CRNA  Discussed and agreed on the Anesthesia Plan with the CHRISTINA Ramirez Review of Systems/Medical History  Patient summary reviewed  No history of anesthetic complications     Cardiovascular  Exercise tolerance (METS): >4 ,  Hypertension , No dysrhythmias , No angina ,    Pulmonary  Pneumonia,        GI/Hepatic  Negative GI/hepatic ROS          Kidney stones,        Endo/Other  Negative endo/other ROS      GYN       Hematology  Negative hematology ROS      Musculoskeletal  Negative musculoskeletal ROS        Neurology    No TIA, No CVA ,    Psychology           Physical Exam    Airway    Mallampati score: II  TM Distance: >3 FB  Neck ROM: full     Dental   Comment: Upper partial denture,     Cardiovascular  Rhythm: regular, Rate: normal,     Pulmonary  Breath sounds clear to auscultation,     Other Findings        Anesthesia Plan  ASA Score- 2     Anesthesia Type- general and IV sedation with anesthesia with ASA Monitors  Additional Monitors:   Airway Plan: LMA  Plan Factors-Exercise tolerance (METS): >4     Patient summary reviewed  Patient is not a current smoker  Induction- intravenous  Postoperative Plan- Plan for postoperative opioid use  Informed Consent- Anesthetic plan and risks discussed with patient  I personally reviewed this patient with the CRNA  Discussed and agreed on the Anesthesia Plan with the CHRISTINA Ramirez

## 2021-05-11 NOTE — PERIOPERATIVE NURSING NOTE
Per Dr Seb Weinstein, pt may continue his Plavix today  I called the patient's daughter Elier Chapman to inform her of this

## 2021-05-11 NOTE — OP NOTE
OPERATIVE REPORT  PATIENT NAME: Jesus Dennison    :  1940  MRN: [id number]  Pt Location: [facility] OR ROOM 04    SURGERY DATE: 2021    Surgeon(s) and Role:     * Mauro Moreland MD - Primary    Preop Diagnosis:  Right Hydronephrosis with ureteral stricture, not elsewhere classified [N13 1]  Crossing vessel and stricture of ureter without hydronephrosis [N13 5]  Right ureteral stones    Post-Op Diagnosis Codes: * Hydronephrosis with ureteral stricture, not elsewhere classified [N13 1]     * Crossing vessel and stricture of ureter without hydronephrosis [N13 5]    Procedure(s) (LRB):  CYSTOSCOPY RETROGRADE PYELOGRAM WITH STENT EXCHANGE (Right)    Specimen(s):  * No specimens in log *    Estimated Blood Loss:   Minimal    Drains:  Ureteral Drain/Stent Right ureter 7 Fr  (Active)   Number of days: 0       [REMOVED] Ureteral Drain/Stent Right ureter 6 Fr  (Removed)   Number of days: 86       Anesthesia Type:   IV Sedation with Anesthesia    Operative Indications:  Hydronephrosis with ureteral stricture, not elsewhere classified [N13 1]  Crossing vessel and stricture of ureter without hydronephrosis [N13 5]  Right ureteral stones    Operative Findings:  Difficulty getting wire past the obstruction without removing    Complications:   None    Procedure and Technique:  After obtaining consent, he was identified brought to the operating room  He was sedated and placed in lithotomy position  Cystoscopy was undertaken  A guidewire was passed up to the level of obstruction  The stent was grasped and pulled down and the wire was then passed the rest of the way up to the kidney  An open-ended catheter was placed over the wire  The wire was removed and a retrograde was performed  The wire was reintroduced  The catheter was removed  The wire was used to guide the stent into position first and then the pusher  The wire was removed when the stent was in good position    There was a good coil proximally and distally  The bladder was drained and he was awakened and transferred to the recovery room in satisfactory condition     I was present for the entire procedure    Patient Disposition:  PACU     SIGNATURE: Kiel Simental MD  DATE: May 11, 2021  TIME: 8:57 AM

## 2021-05-11 NOTE — PERIOPERATIVE NURSING NOTE
Received patient to [facility] from PACU, alert, VSS  No urge to void at this time  No pain at this time  Tolerating po fluids

## 2021-05-11 NOTE — DISCHARGE INSTRUCTIONS

## 2021-05-13 ENCOUNTER — OFFICE VISIT (OUTPATIENT)
Dept: PHYSICAL THERAPY | Facility: CLINIC | Age: 81
End: 2021-05-13
Payer: MEDICARE

## 2021-05-13 DIAGNOSIS — I63.00 CEREBROVASCULAR ACCIDENT (CVA) DUE TO THROMBOSIS OF PRECEREBRAL ARTERY (HCC): Primary | ICD-10-CM

## 2021-05-13 DIAGNOSIS — R26.89 BALANCE DISORDER: ICD-10-CM

## 2021-05-13 PROCEDURE — 97112 NEUROMUSCULAR REEDUCATION: CPT

## 2021-05-13 NOTE — PROGRESS NOTES
Daily Note  IE: 3-   PN 21  POC 21        Today's date: 2021  Patient name: Ermias Knapp  : 1940  MRN: [id number]  Referring provider: Salma Ugalde DO  Dx:   Encounter Diagnosis     ICD-10-CM    1  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 ([address] )  I63 00    2  Balance disorder  R26 89        Start Time:   Stop Time: 1450  Total time in clinic (min): 45 minutes    Subjective: Pt noted left leg/knee pain walking in to PT today, while walking in the parking lot  Client denies any falls or changes to medication at this time  He would like to work on his balance and endurance  Elliot 2-3  Pt states his last cardiac echo "came out great "  "My doctor thinks it's just because I have been inactive and in the hospital "       Objective: See treatment diary below      NMR:  -calf stretches 30s x 3 bilat   -standing quad hip flexor stretch foot on mat table, supported by chair 30 sec x 2 bilat   - 3 minutes of lap walks, x 2 sets  -510' with no pain bilat upper buttocks     -530' no pain    -4' time and 550' and no pain  Below not performed today:  -  Tandem stance: 30 sec, 4x  - *Sidestepping w/ foam with alternating cone taps: 6 times back/forth across beam  - NP Tandem ambulation: 10 laps x 10 ft  - STS 2x10 with 10# medicine ball HR 94 bpm O2 99%  126 BPM HR  - NP [place]oca on blue airex: 10 laps x 10 ft HR 91 bpm O2 99%  - Ambulation w/ high knees while holding 10lbs medicine ball: 30 ft, 6x  bpm   97% SPO2  - NP FWD/BWD ambulation: 50 ft x 5 laps  - NP Obstacle course x 2 5 min: stepping over hurdles standing on foam pads then side stepping along foam beam  Elliot 3 SpO2 99%    HEP: 21: standing quad stretch with 2 chairs  Assessment: Pt is making good progress with walking longer without onset of low back/upper glute pain bilaterally  Able to progress to 4' today  Plan: Continue per plan of care    Continue hip flexor stretches, attempting to have pt be able to complete 6 min continuous walking        Precautions: AFIB HTN        Outcome measures  IE11/19/20 12/23/20 2/11/21               5XSTS 15 41 sec   Retropulsed x 1 12 66 sec 13 26 sec      TUG  9 38 sec 9 96 sec               Parks  47/56 53/56 54/56      10MWT 12 71 sec   0 78 m/sec  2 58 ft/sec  8 78 sec  1 14 m/s 9 24 sec                                 6MWT  1010 ft 1010 ft      REO 30 sec  30 sec 30 sec      REC 30 sec  30 sec 30 sec      SREO 2 sec   30 sec 30 sec      SREC NT 18 sec 7 sec      Foam FTEO 30 sec  30 sec 30 sec      Foam FTEC 10 sec  30 sec 30 sec      SLS  1 sec 3 sec

## 2021-05-18 ENCOUNTER — OFFICE VISIT (OUTPATIENT)
Dept: PHYSICAL THERAPY | Facility: CLINIC | Age: 81
End: 2021-05-18
Payer: MEDICARE

## 2021-05-18 DIAGNOSIS — R26.89 BALANCE DISORDER: ICD-10-CM

## 2021-05-18 DIAGNOSIS — I63.00 CEREBROVASCULAR ACCIDENT (CVA) DUE TO THROMBOSIS OF PRECEREBRAL ARTERY (HCC): Primary | ICD-10-CM

## 2021-05-18 PROCEDURE — 97112 NEUROMUSCULAR REEDUCATION: CPT

## 2021-05-18 NOTE — PROGRESS NOTES
Daily Note  IE: 3-   PN 21  POC 21        Today's date: 2021  Patient name: Yahaira Martinez  : 1940  MRN: [id number]  Referring provider: Samuel Curtis DO  Dx:   Encounter Diagnosis     ICD-10-CM    1  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 ([address] )  I63 00    2  Balance disorder  R26 89        Start Time: 5817  Stop Time: 1529  Total time in clinic (min): 44 minutes    Subjective: no new complaints  Objective: See treatment diary below     warm up stationary bike x 5 min L 1    NMR:  -NP calf stretches 30s x 3 bilat   - PT passive hip flexor stretch 30s x 3 bilat  -PT passive quad stretch 30s x 3 bilat   - NP standing quad hip flexor stretch foot on mat table, supported by chair 30 sec x 2 bilat   - 4 minutes of lap walks, x 2 sets       -710' with  pain bilat upper buttocks ust starting the last few seconds of the walk     -600' pain upper left buttock/glute, pt stopped at 3:30 due to pain  -4' time and 675' and provocation of pain last 30 sec    -PPT: 10 reps hooklying    HEP: 21: standing quad stretch with 2 chairs  21 PPT added  Assessment: Pt is making good progress with walking longer without onset of low back/upper glute pain bilaterally  Able to progress to 4' today for all walks  Some provocation of upper gluteal pain  Plan: Continue per plan of care  Continue hip flexor/quad/calf  stretches, attempting to have pt be able to complete 6 min continuous walking        Precautions: AFIB HTN        Outcome measures  IE20               5XSTS 15 41 sec   Retropulsed x 1 12 66 sec 13 26 sec      TUG  9 38 sec 9 96 sec               Parks  47/56 53/56 54/56      10MWT 12 71 sec   0 78 m/sec  2 58 ft/sec  8 78 sec  1 14 m/s 9 24 sec                                 6MWT  1010 ft 1010 ft      REO 30 sec  30 sec 30 sec      REC 30 sec  30 sec 30 sec      SREO 2 sec   30 sec 30 sec      SREC NT 18 sec 7 sec      Foam FTEO 30 sec  30 sec 30 sec      Foam FTEC 10 sec  30 sec 30 sec      SLS  1 sec 3 sec

## 2021-05-20 ENCOUNTER — OFFICE VISIT (OUTPATIENT)
Dept: PHYSICAL THERAPY | Facility: CLINIC | Age: 81
End: 2021-05-20
Payer: MEDICARE

## 2021-05-20 DIAGNOSIS — I63.00 CEREBROVASCULAR ACCIDENT (CVA) DUE TO THROMBOSIS OF PRECEREBRAL ARTERY (HCC): Primary | ICD-10-CM

## 2021-05-20 DIAGNOSIS — R26.89 BALANCE DISORDER: ICD-10-CM

## 2021-05-20 PROCEDURE — 97112 NEUROMUSCULAR REEDUCATION: CPT

## 2021-05-20 NOTE — PROGRESS NOTES
Daily Note  IE: 3-   PN 21  POC 21        Today's date: 2021  Patient name: Saurav Padgett  : 1940  MRN: [id number]  Referring provider: Jose Sharp DO  Dx:   Encounter Diagnosis     ICD-10-CM    1  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 ([address] )  I63 00    2  Balance disorder  R26 89        Start Time: 1700  Stop Time: 1744  Total time in clinic (min): 44 minutes    Subjective: no new complaints  Objective: See treatment diary below     warm up stationary bike x 5 min L 1    NMR:  - calf stretches 30s x 3 bilat   - PT passive hip flexor stretch 30s x 3 bilat  - PT passive quad stretch 30s x 3 bilat  sidelying and prone  - HS stretch seated R foot on stool  - NP standing quad hip flexor stretch foot on mat table, supported by chair 30 sec x 2 bilat     - 4 minutes of lap walks,      -700' with  Pain right HS, no upper glute pain  NO HIP STRETCH AFTER THIS WALK   - 700' pain upper left buttock/glute just starting  PT passive hip flexor and quad stretches after this walk  -  700' and provocation of pain last 15 sec    -PPT: 10 reps hooklying    HEP: 21: standing quad stretch with 2 chairs  21 PPT added  Assessment: Pt is making good progress with walking longer without onset of low back/upper glute pain bilaterally  Able to progress to 4' today for all walks  Some provocation of upper gluteal pain, but also some HS pain today, relieved by stretching  Plan: Continue per plan of care  NV add PPT standing, progressing to walking  Continue hip flexor/quad/calf  stretches, attempting to have pt be able to complete 6 min continuous walking        Precautions: AFIB HTN        Outcome measures  IE20               5XSTS 15 41 sec   Retropulsed x 1 12 66 sec 13 26 sec      TUG  9 38 sec 9 96 sec               Parks  47/56 53/56 54/56      10MWT 12 71 sec   0 78 m/sec  2 58 ft/sec  8 78 sec  1 14 m/s 9 24 sec                                 6MWT  1010 ft 1010 ft      REO 30 sec  30 sec 30 sec      REC 30 sec  30 sec 30 sec      SREO 2 sec   30 sec 30 sec      SREC NT 18 sec 7 sec      Foam FTEO 30 sec  30 sec 30 sec      Foam FTEC 10 sec  30 sec 30 sec      SLS  1 sec 3 sec

## 2021-05-21 ENCOUNTER — TELEPHONE (OUTPATIENT)
Dept: NEUROSURGERY | Facility: CLINIC | Age: 81
End: 2021-05-21

## 2021-05-21 NOTE — TELEPHONE ENCOUNTER
Received a call from Shannan Thapa wanting to report changes to his medical state  He has recently had 2 implants 1 a 13 [address] [employer] device [id number] placed 3/1/2021 and a mechanical heart valve [id number] placed 1/7/2021  Contacted central scheduling to let them know  She stated that if there is an issue with these devices the patient will be contacted to cancel, or reschedule  Notified patient of this who was appreciative

## 2021-05-25 ENCOUNTER — OFFICE VISIT (OUTPATIENT)
Dept: PHYSICAL THERAPY | Facility: CLINIC | Age: 81
End: 2021-05-25
Payer: MEDICARE

## 2021-05-25 ENCOUNTER — APPOINTMENT (OUTPATIENT)
Dept: PHYSICAL THERAPY | Facility: CLINIC | Age: 81
End: 2021-05-25
Payer: MEDICARE

## 2021-05-25 DIAGNOSIS — I63.00 CEREBROVASCULAR ACCIDENT (CVA) DUE TO THROMBOSIS OF PRECEREBRAL ARTERY (HCC): Primary | ICD-10-CM

## 2021-05-25 DIAGNOSIS — R26.89 BALANCE DISORDER: ICD-10-CM

## 2021-05-25 PROCEDURE — 97112 NEUROMUSCULAR REEDUCATION: CPT

## 2021-05-25 NOTE — PROGRESS NOTES
Daily Note  IE: 3-   PN 21  POC 21        Today's date: 2021  Patient name: Kaylene Salomon  : 1940  MRN: [id number]  Referring provider: Catherine Guo DO  Dx:   Encounter Diagnosis     ICD-10-CM    1  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 ([address] )  I63 00    2  Balance disorder  R26 89                   Subjective: Pt reports glute pain has been "pretty good"     Objective: See treatment diary below     post stationary bike x 5 min L 1    NMR:  - calf stretches 30s x 3 bilat   - PT passive hip flexor stretch 30s x 3 bilat  - PT passive quad stretch 30s x 3 bilat  sidelying and prone  - HS stretch seated R foot on stool  -Glute str sitting figure 4 5x10s  - NP standing quad hip flexor stretch foot on mat table, supported by chair 30 sec x 2 bilat     - 5 minutes of lap walks,      -pain in B glutes started right before 5' anamaria         -PPT: 10 reps hooklying    HEP: 21: standing quad stretch with 2 chairs  21 PPT added  Assessment: Pt is making good progress with walking longer without onset of low back/upper glute pain bilaterally  Able to progress to 5' today for gait training  Some provocation of upper gluteal pain right before 5' anamaria, given seated glute str for HEP  Plan: Continue per plan of care  NV add PPT standing, progressing to walking  Continue hip flexor/quad/calf  stretches, attempting to have pt be able to complete 6 min continuous walking        Precautions: AFIB HTN        Outcome measures  IE20               5XSTS 15 41 sec   Retropulsed x 1 12 66 sec 13 26 sec      TUG  9 38 sec 9 96 sec               Parks  47/56 53/56 54/56      10MWT 12 71 sec   0 78 m/sec  2 58 ft/sec  8 78 sec  1 14 m/s 9 24 sec                                 6MWT  1010 ft 1010 ft      REO 30 sec  30 sec 30 sec      REC 30 sec  30 sec 30 sec      SREO 2 sec   30 sec 30 sec      SREC NT 18 sec 7 sec      Foam FTEO 30 sec  30 sec 30 sec      Foam FTEC 10 sec  30 sec 30 sec      SLS  1 sec 3 sec

## 2021-05-27 ENCOUNTER — EVALUATION (OUTPATIENT)
Dept: PHYSICAL THERAPY | Facility: CLINIC | Age: 81
End: 2021-05-27
Payer: MEDICARE

## 2021-05-27 DIAGNOSIS — I63.00 CEREBROVASCULAR ACCIDENT (CVA) DUE TO THROMBOSIS OF PRECEREBRAL ARTERY (HCC): Primary | ICD-10-CM

## 2021-05-27 DIAGNOSIS — R26.89 BALANCE DISORDER: ICD-10-CM

## 2021-05-27 PROCEDURE — 97530 THERAPEUTIC ACTIVITIES: CPT | Performed by: PHYSICAL THERAPIST

## 2021-05-27 NOTE — PROGRESS NOTES
PT Progress Note    Today's date: 2021  Patient name: Caitie Chaves  : 1940  MRN: [id number]  Referring provider: Jo-Ann Tobar DO  Dx:   Encounter Diagnosis     ICD-10-CM    1  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 ([facility] [address] )  I63 00    2  Balance disorder  R26 89                   Assessment  Assessment details: Patient is a 69 y/o male who presents to PT for progress note from CVA in 2020, and more recently a heart valve replacement in 2021  He reports that he has good days and bad days, but overall believes his walking and balance have improved  He maintained values for 5xSTS, TUG, and 10 MWT with improvement/regression not to [facility] level  Before 6 MWT, stretches were performed to help improve patient mobility  He did show the most improvement with his 6 MWT, by completing the full 6 minutes without needing to stop  He also showed improvement with his distance, increasing form 750 ft to 1050 ft  Further testing was not performed due to time constraints  Based on his improvements, will continue with therapy for 1 more month to continue to work toward his goals, improve his balance, functional mobility, and endurance           Impairments: abnormal coordination, abnormal gait, abnormal movement, impaired balance, lacks appropriate home exercise program and safety issue  Understanding of Dx/Px/POC: excellent  Goals  Goals  Short Term Goals (4 weeks):    - Patient will improve time on TUG by 2 9 seconds from 14 31 seconds to 11 seconds to facilitate improved safety in all ambulation- MET  - Patient will improve scoring on FGA by 3 points from 20/30 to 24/30 to progress safety- MET  - Patient will be independent in basic HEP 2-3 weeks  - Patient will improve 5xSTS score by 2 3 seconds from 15 41 seconds to 12 5 seconds to promote improved LE functional strength needed for ADLs- MET  - Patient will complete components of HiMAT to promote agility necessary for sports related tasks- NOT MET    Long Term Goals (12 weeks):  - Patient will be independent in a comprehensive home exercise program  - Patient will improve gait speed by 0 1 m/sec from 1 09 m/sec to 1 2 m/sec to improve safety with community ambulation- NOT MET  - Patient will improve ELAINE by 6 points from 46/56 to 52/56 to facilitate return to safe independent ambulation- MET  - Patient will be able to demonstrate HT in gait without veering  - Patient will improve 6 Minute Walk Test score by 190 feet from 750 feet to 1100 feet to promote improved cardiovascular endurance- Progressing  - Patient will report 50% reduction in near falls in order to improve safety with functional tasks and reduce his risk for falls  - Patient will report going on walks at least 3 days per week to promote independence and improved cardiovascular endurance  - Patient will be able to ascend/descend stairs reciprocally without UE assist to promote independence and safety with ADLs  - Patient will report 50% reduction in near falls when ambulating on uneven terrain                Plan  Patient would benefit from: skilled physical therapy  Planned modality interventions: manual electrical stimulation  Planned therapy interventions: manual therapy, neuromuscular re-education, patient education, postural training, strengthening, therapeutic exercise, therapeutic activities, stretching, home exercise program, balance and gait training  Frequency: 2x week  Plan of Care beginning date: 3/24/2021  Plan of Care expiration date: 6/24/2021  Treatment plan discussed with: patient        Subjective Evaluation    History of Present Illness  Mechanism of injury: Update (5-)  Patient reports that he has good days and bad days  States he believes his balance and walking have improved a lot    Update (4-)  Patient reports that his legs have been feeling tired   He states he saw his cardiologist and everything with his heart is ok, thinks it just may be that his legs are out of shape    3/24/2021  Had valve repair on 3-1-2021  In hospital 2 days  Was cleared for therapy on the 8th of march  With instructs to return to PT 10 days after MD appointment  Pt presents 14 days out with no restrictions  Pt notes being a little more fatigued and tired     IE:  68year old male suffered stroke 10/30/20 during the night, 1 am     Pt stood up to go to the bathroom and he collapsed, and noted R sided weakness  Stroke affected his R UE, and LE  May have affected his vision, pt is not sure  Hospitalized at [facility] x 1 5 days, then transferred to the [facility] Nov 1-17, then DC home  Pt was brought in today by his dtr Nicole Cuello [phone]  She did not stay for the appointment stated pt could answer all questions  Pt complains of confusion while trying to talk  Pt has OT eval following PT today  Melva Kowalski  feels his balance is good, R UE strength is "good," but weaker compared to before stroke  Pain  No pain reported    Social Support  Steps to enter house: yes (1 step then platform, twice, no railing )  Stairs in house: no (he lives on one level)   Lives with: spouse, adult children and young children    Treatments  Previous treatment: physical therapy  Discharged from (in last 30 days): inpatient hospitalization  Patient Goals  Patient goal: improve walking speed  Leisure activity: casino/walking  Return grocery shopping and cooking   Objective     Functional Assessment        Comments  Re-eval: 3/24/21  - Postural assessment: WNL, mild forward head posturing  Improved midline  - MMT: WNL (5/5)to BLE, except Hip extensors 4-/5,   - Coordination; heel to/from shin and alternating toe-taps WNL  From IE:    Seated posture:pt's head is rotated slightly left when pt states it is in midline  Coordination:   Finger to nose: intact, slight R intention tremor  Heel to shin:  Intact     ROSARIO:  UE:intact      LE:off rhythm    Strength:   Hip flexion:  R=5/5 L=5/5  Quads:  R = 5 /5  L = 5/5  Ankles: R=5/5 except Inv= 4+/5 with mild ROM restriction  L=5/5    Oculomotor:  SP:intact   Saccades:intact     Gait: some inattention to right side, low step height  Gait with HT: not tested  Positionals: deferred      Pre-6MWT Stretching:  -Sidelying quad stretch (30 sec, 3x ea)  -Seated piriformis stretch (30 sec, 3x ea)  -Standing calf stretch (30 sec, 3x ea)       Precautions: Blood thinners  Past Medical History:   Diagnosis Date    Aneurysm ([address] )     of brain  being monitored    Atrial fibrillation ([address] )     Essential hypertension, long-standing     Heart murmur, lifelong     heart murmur since birth; 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    Hematuria     intermittent    History of cigarette smoking, chronic     64 year smoker at one half pack per day, quit 17    Hyperlipidemia     Hypertension     Irregular heart beat     Kidney stones     12 episodes of kidney stones    Kidney stones with lithotripsy 2017    Done at [facility]    Pneumonia      X 2    Stroke [facility]) 10/29/2020    right sided  weakness almost full recovery    Stroke [address] [address]     Vitamin D deficiency     maintained with 1000 units of D    Water retention     Wears glasses     Wears partial dentures     upper         Age norm for 70 yrs per current research:   10M WT: male=4 36 ft/sec  female= 4 16 ft/sec  MDC= 0 59 ft/sec  6MWT: ttrr=2543 feet  Female= 1545 feet  MDC= 190 feet  5XSTS: 10 sec  MDC= 2 3sec (vestibular)    Parks: male =54/56  Female= 53/56  FGA:   TU 5 sec for community dwelling adults, 32 2 sec for frail elderly   MDC= 4 14 sec      Outcome measures  IE20 Progress Note  20 Re-eval 21 Re-Eval  3/24/2021 PN: 2021 PN  2021      deferred until medical clearance      5XSTS 15 41 sec   Retropulsed x 1 12 66 sec  14 31 sec 1 UE 13 47 sec  12 84 sec   TUG  9 38 sec  9 52 sec 9 16 sec 9 47   Parks  47/56 53/56  46/56 53/56 10MWT 12 71 sec   0 78 m/sec  2 58 ft/sec  8 78 sec  1 14 m/s  10/9 12= 1 09 m/S 10 31 sec= 0 97 m/s 10 53 sec= 0 95 m/s   FGA  25/30  20/30 25/30    6MWT  1,010 ft  750 ft 750 ft in 4:12 1050 ft   REO 30 sec  30 sec  30 sec 30 sec    REC 30 sec  30 sec  30 sec 30 sec    SREO 2 sec   30 sec  30 sec   30 sec    SREC NT 18 sec  30 sec 6 sec    Foam FTEO 30 sec  30 sec  30 sec 30 sec    Foam FTEC 10 sec  30 sec  30 sec 30 sec     SLS  R: 1 sec  L: 1 sec   R: 3 sec  L: 5 sec

## 2021-05-28 ENCOUNTER — LAB (OUTPATIENT)
Dept: LAB | Facility: HOSPITAL | Age: 81
End: 2021-05-28
Attending: INTERNAL MEDICINE
Payer: MEDICARE

## 2021-05-28 DIAGNOSIS — N18.31 STAGE 3A CHRONIC KIDNEY DISEASE (HCC): ICD-10-CM

## 2021-05-28 LAB
ANION GAP SERPL CALCULATED.3IONS-SCNC: 8 MMOL/L (ref 4–13)
BACTERIA UR QL AUTO: ABNORMAL /HPF
BILIRUB UR QL STRIP: NEGATIVE
BUN SERPL-MCNC: 27 MG/DL (ref 5–25)
CALCIUM SERPL-MCNC: 9 MG/DL (ref 8.3–10.1)
CHLORIDE SERPL-SCNC: 106 MMOL/L (ref 100–108)
CLARITY UR: ABNORMAL
CO2 SERPL-SCNC: 27 MMOL/L (ref 21–32)
COLOR UR: YELLOW
CREAT SERPL-MCNC: 1.9 MG/DL (ref 0.6–1.3)
CREAT UR-MCNC: 138 MG/DL
ERYTHROCYTE [DISTWIDTH] IN BLOOD BY AUTOMATED COUNT: 20.3 % (ref 11.6–15.1)
GFR SERPL CREATININE-BSD FRML MDRD: 33 ML/MIN/1.73SQ M
GLUCOSE P FAST SERPL-MCNC: 104 MG/DL (ref 65–99)
GLUCOSE UR STRIP-MCNC: NEGATIVE MG/DL
HCT VFR BLD AUTO: 39.2 % (ref 36.5–49.3)
HGB BLD-MCNC: 10.9 G/DL (ref 12–17)
HGB UR QL STRIP.AUTO: ABNORMAL
KETONES UR STRIP-MCNC: NEGATIVE MG/DL
LEUKOCYTE ESTERASE UR QL STRIP: ABNORMAL
MAGNESIUM SERPL-MCNC: 2 MG/DL (ref 1.6–2.6)
MCH RBC QN AUTO: 20.2 PG (ref 26.8–34.3)
MCHC RBC AUTO-ENTMCNC: 27.8 G/DL (ref 31.4–37.4)
MCV RBC AUTO: 73 FL (ref 82–98)
NITRITE UR QL STRIP: NEGATIVE
NON-SQ EPI CELLS URNS QL MICRO: ABNORMAL /HPF
PH UR STRIP.AUTO: 6.5 [PH]
PHOSPHATE SERPL-MCNC: 3.1 MG/DL (ref 2.3–4.1)
PLATELET # BLD AUTO: 182 THOUSANDS/UL (ref 149–390)
PMV BLD AUTO: 9.3 FL (ref 8.9–12.7)
POTASSIUM SERPL-SCNC: 5 MMOL/L (ref 3.5–5.3)
PROT UR STRIP-MCNC: ABNORMAL MG/DL
PROT UR-MCNC: 32 MG/DL
PROT/CREAT UR: 0.23 MG/G{CREAT} (ref 0–0.1)
PTH-INTACT SERPL-MCNC: 133.8 PG/ML (ref 18.4–80.1)
RBC # BLD AUTO: 5.4 MILLION/UL (ref 3.88–5.62)
RBC #/AREA URNS AUTO: ABNORMAL /HPF
SODIUM SERPL-SCNC: 141 MMOL/L (ref 136–145)
SP GR UR STRIP.AUTO: 1.02 (ref 1–1.03)
UROBILINOGEN UR QL STRIP.AUTO: 1 E.U./DL
WBC # BLD AUTO: 7.07 THOUSAND/UL (ref 4.31–10.16)
WBC #/AREA URNS AUTO: ABNORMAL /HPF

## 2021-05-28 PROCEDURE — 83735 ASSAY OF MAGNESIUM: CPT

## 2021-05-28 PROCEDURE — 83970 ASSAY OF PARATHORMONE: CPT

## 2021-05-28 PROCEDURE — 36415 COLL VENOUS BLD VENIPUNCTURE: CPT

## 2021-05-28 PROCEDURE — 85027 COMPLETE CBC AUTOMATED: CPT

## 2021-05-28 PROCEDURE — 82570 ASSAY OF URINE CREATININE: CPT

## 2021-05-28 PROCEDURE — 84100 ASSAY OF PHOSPHORUS: CPT

## 2021-05-28 PROCEDURE — 80048 BASIC METABOLIC PNL TOTAL CA: CPT

## 2021-05-28 PROCEDURE — 84156 ASSAY OF PROTEIN URINE: CPT

## 2021-05-28 PROCEDURE — 81001 URINALYSIS AUTO W/SCOPE: CPT

## 2021-05-29 DIAGNOSIS — I10 ESSENTIAL HYPERTENSION: ICD-10-CM

## 2021-05-29 NOTE — RESULT ENCOUNTER NOTE
Hello    Patient normally is followed up by Ms Judd Roa       Can you please let pt know urine with hematuria but likely due to recent stent exchanges  - creat upper limit of his baseline 1 9  - K 5  - please follow low K diet  - no changes otherwise for now    Thank you    np

## 2021-06-01 ENCOUNTER — TELEPHONE (OUTPATIENT)
Dept: NEPHROLOGY | Facility: CLINIC | Age: 81
End: 2021-06-01

## 2021-06-01 NOTE — TELEPHONE ENCOUNTER
----- Message from Miranda Paredes MD sent at 5/28/2021  8:50 PM EDT -----  Hello    Patient normally is followed up by Ms Kevin Carvajal       Can you please let pt know urine with hematuria but likely due to recent stent exchanges  - creat upper limit of his baseline 1 9  - K 5  - please follow low K diet  - no changes otherwise for now    Thank you    np

## 2021-06-02 ENCOUNTER — OFFICE VISIT (OUTPATIENT)
Dept: PHYSICAL THERAPY | Facility: CLINIC | Age: 81
End: 2021-06-02
Payer: MEDICARE

## 2021-06-02 DIAGNOSIS — I63.00 CEREBROVASCULAR ACCIDENT (CVA) DUE TO THROMBOSIS OF PRECEREBRAL ARTERY (HCC): Primary | ICD-10-CM

## 2021-06-02 DIAGNOSIS — R26.89 BALANCE DISORDER: ICD-10-CM

## 2021-06-02 PROCEDURE — 97110 THERAPEUTIC EXERCISES: CPT

## 2021-06-02 PROCEDURE — 97112 NEUROMUSCULAR REEDUCATION: CPT

## 2021-06-02 NOTE — PROGRESS NOTES
Daily Note  IE: 3-   PN 21  POC 21        Today's date: 2021  Patient name: Fredo Christie  : 1940  MRN: [id number]  Referring provider: Estefany Cortes DO  Dx:   Encounter Diagnosis     ICD-10-CM    1  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 ([address] )  I63 00    2  Balance disorder  R26 89        Start Time: 1530  Stop Time: 1610  Total time in clinic (min): 40 minutes    Subjective: Patient denies pain today in gluteal region  He reports lumbar spine pain when he was doing the laundry today  He reports R knee clicking at times  This clicking isn't painful  Objective: See treatment diary below     post stationary bike x 5 min L 1    NMR:  - Calf stretch 10x10s B with pro stretch  - Hip flexor stretch 30s x 3 bilat at step  -Forward, lateral walking over hurdles, alternating foam 4x10' ea   -Romberg stance on foam EC 5x10s   -Repeated lumbar flexion with pball to the left ONLY 2s hold x10   - HS stretch seated R foot on stool  -Ball toss on foam romberg and half tandem stance 3x10 each   -For, retro walking with head turns 4x25'' each     NOT TODAY  - 5 minutes of lap walks,      -pain in B glutes started right before 5' anamaria    -PPT: 10 reps hooklying  - NP standing quad hip flexor stretch foot on mat table, supported by chair 30 sec x 2 bilat   - PT passive quad stretch 30s x 3 bilat  sidelying and prone  - HS stretch seated R foot on stool  -Glute str sitting figure 4 5x10s    HEP: 21: standing quad stretch with 2 chairs  21 PPT added  Assessment: Patient provided with seated lumbar flexion stretch to decrease lumbar spine pain  Able to seek relief of symptoms with L sided bias  Minimal loss of balance in tandem and romberg stance  Close supervision used during today's session  Progress as per primary PT  Patient would benefit from formal PT intervention to maximize balance for independence in community               Plan: Continue per plan of care  NV add PPT standing, progressing to walking  Continue hip flexor/quad/calf  stretches, attempting to have pt be able to complete 6 min continuous walking        Precautions: AFIB HTN        Outcome measures  IE11/19/20 12/23/20 2/11/21               5XSTS 15 41 sec   Retropulsed x 1 12 66 sec 13 26 sec      TUG  9 38 sec 9 96 sec               Parks  47/56 53/56 54/56      10MWT 12 71 sec   0 78 m/sec  2 58 ft/sec  8 78 sec  1 14 m/s 9 24 sec                                 6MWT  1010 ft 1010 ft      REO 30 sec  30 sec 30 sec      REC 30 sec  30 sec 30 sec      SREO 2 sec   30 sec 30 sec      SREC NT 18 sec 7 sec      Foam FTEO 30 sec  30 sec 30 sec      Foam FTEC 10 sec  30 sec 30 sec      SLS  1 sec 3 sec

## 2021-06-04 ENCOUNTER — OFFICE VISIT (OUTPATIENT)
Dept: PHYSICAL THERAPY | Facility: CLINIC | Age: 81
End: 2021-06-04
Payer: MEDICARE

## 2021-06-04 DIAGNOSIS — R26.89 BALANCE DISORDER: ICD-10-CM

## 2021-06-04 DIAGNOSIS — I63.00 CEREBROVASCULAR ACCIDENT (CVA) DUE TO THROMBOSIS OF PRECEREBRAL ARTERY (HCC): Primary | ICD-10-CM

## 2021-06-04 PROCEDURE — 97112 NEUROMUSCULAR REEDUCATION: CPT | Performed by: PHYSICAL THERAPIST

## 2021-06-04 PROCEDURE — 97140 MANUAL THERAPY 1/> REGIONS: CPT | Performed by: PHYSICAL THERAPIST

## 2021-06-04 NOTE — PROGRESS NOTES
Daily Note  IE: 3-   PN 21  POC 21      Today's date: 2021  Patient name: Kristina Ballard  : 1940  MRN: [id number]  Referring provider: Pernell Naraayn DO  Dx:   Encounter Diagnosis     ICD-10-CM    1  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 ([address] )  I63 00    2  Balance disorder  R26 89        Start Time: 1008  Stop Time: 1056  Total time in clinic (min): 48 minutes    Subjective: Patient reports to skilled PT with no reports of fall or LOB  He feels his back is feeling better however notes calf pain arising after completion of his exercises yesterday  Objective: See treatment diary below     post stationary bike x 5 min L 1    NMR:  - Calf stretch 10x10s B with pro stretch  - Hip flexor stretch 30s x 3 bilat at step  - Romberg stance on foam EC 30 seconds x3   - Forward/Lateral joycelyn negotiation, 8" with foam between 2, 12' x6 cycles each direction; forward holding 6 lb med ball   - Ball toss on foam romberg and half tandem stance, 6 minutes total     Manual:   - Passive HS stretch, supine, 30s x3 each side   - Passive quad/hip flexor, prone, 30s x3 each side     NOT TODAY:   - Repeated lumbar flexion with pball to the left ONLY 2s hold x10   - 5 minutes of lap walks,      -pain in B glutes started right before 5' anamaria    -PPT: 10 reps hooklying  - NP standing quad hip flexor stretch foot on mat table, supported by chair 30 sec x 2 bilat   - PT passive quad stretch 30s x 3 bilat  sidelying and prone  - HS stretch seated R foot on stool  -Glute str sitting figure 4 5x10s    HEP: 21: standing quad stretch with 2 chairs  21 PPT added  Assessment: Patient tolerated his treatment session well this date with minimal rest breaks needed  Completed passive HS and hip flexor stretch as patient noted increased restrictions on R side this date with improvement post manual interventions   Patient had complaints of hip soreness after lateral joycelyn negotiation requiring short seated rest break  Able to progress forward joycelyn negotiation with weighted med ball today with cues for pacing throughout  Able to progress to weighted med ball with ball toss on foam as well today with excellent stability and dynamic balance noted  Patient would benefit from formal PT intervention to maximize balance for independence in community  Plan: Continue per plan of care  NV add PPT standing, progressing to walking  Continue hip flexor/quad/calf  stretches, attempting to have pt be able to complete 6 min continuous walking        Precautions: AFIB HTN        Outcome measures  IE11/19/20 12/23/20 2/11/21               5XSTS 15 41 sec   Retropulsed x 1 12 66 sec 13 26 sec      TUG  9 38 sec 9 96 sec               Parks  47/56 53/56 54/56      10MWT 12 71 sec   0 78 m/sec  2 58 ft/sec  8 78 sec  1 14 m/s 9 24 sec                                 6MWT  1010 ft 1010 ft      REO 30 sec  30 sec 30 sec      REC 30 sec  30 sec 30 sec      SREO 2 sec   30 sec 30 sec      SREC NT 18 sec 7 sec      Foam FTEO 30 sec  30 sec 30 sec      Foam FTEC 10 sec  30 sec 30 sec      SLS  1 sec 3 sec

## 2021-06-07 ENCOUNTER — HOSPITAL ENCOUNTER (OUTPATIENT)
Dept: RADIOLOGY | Facility: HOSPITAL | Age: 81
Discharge: HOME | End: 2021-06-07
Attending: RADIOLOGY
Payer: MEDICARE

## 2021-06-07 ENCOUNTER — OFFICE VISIT (OUTPATIENT)
Dept: PHYSICAL THERAPY | Facility: CLINIC | Age: 81
End: 2021-06-07
Payer: MEDICARE

## 2021-06-07 DIAGNOSIS — I63.00 CEREBROVASCULAR ACCIDENT (CVA) DUE TO THROMBOSIS OF PRECEREBRAL ARTERY (HCC): ICD-10-CM

## 2021-06-07 DIAGNOSIS — I67.1 ANEURYSM OF ANTERIOR COMMUNICATING ARTERY: ICD-10-CM

## 2021-06-07 DIAGNOSIS — R26.89 BALANCE DISORDER: Primary | ICD-10-CM

## 2021-06-07 PROCEDURE — 97112 NEUROMUSCULAR REEDUCATION: CPT

## 2021-06-07 PROCEDURE — G1004 CDSM NDSC: HCPCS

## 2021-06-07 PROCEDURE — 70544 MR ANGIOGRAPHY HEAD W/O DYE: CPT

## 2021-06-07 NOTE — PROGRESS NOTES
Daily Note   PN: 21 (POC: 21)      Today's date: 2021  Patient name: Brianna Montano  : 1940  MRN: [id number]  Referring provider: Meghan Rodriguez DO  Dx:   Encounter Diagnosis     ICD-10-CM    1  Balance disorder  R26 89    2  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 ([address] )  I63 00                   Subjective: Patient reports to skilled PT with no reports of fall or LOB  He reports that his legs feel tired  He states that his back is feeling good today  Objective: See treatment diary below      NMR:  - STS: 2 x 12 reps, no UE assist  - Side stepping on foam beam: 10 ft, 5 cycles  - Tandem walking on foam beam: 10 ft, 6 cycles   - Feet together on foam: 6# med ball out of BISI (up/down/laterally) 2 min x 3    - Hurdles with 2 DD: 6 cycles  - Hurdles: lateral stepovers, 4 cycles  - Seated hamstring stretch: 2 x 30 sec each side  - Standing quad stretch: 2 x 30 sec each side, green strap       HEP: 21: standing quad stretch with 2 chairs  21 PPT added  Assessment: Patient tolerated his treatment session well, but required frequent rest breaks today due to LE fatigue  He demonstrated good dynamic stability during tandem and side stepping on foam beam, with ability to self-correct without PT assistance  He also demonstrated improved ability to perform joycelyn stepovers progressing from step-to pattern to a reciprocal pattern  Patient would benefit from formal PT intervention to maximize balance for independence in community  Plan: Continue per plan of care  NV add PPT standing, progressing to walking  Continue hip flexor/quad/calf  stretches, attempting to have pt be able to complete 6 min continuous walking        Precautions: AFIB HTN        Outcome measures  IE20               5XSTS 15 41 sec   Retropulsed x 1 12 66 sec 13 26 sec      TUG  9 38 sec 9 96 sec               Parks  47/56 53/56 54/56      10MWT 12 71 sec 0 78 m/sec  2 58 ft/sec  8 78 sec  1 14 m/s 9 24 sec                                 6MWT  1010 ft 1010 ft      REO 30 sec  30 sec 30 sec      REC 30 sec  30 sec 30 sec      SREO 2 sec   30 sec 30 sec      SREC NT 18 sec 7 sec      Foam FTEO 30 sec  30 sec 30 sec      Foam FTEC 10 sec  30 sec 30 sec      SLS  1 sec 3 sec

## 2021-06-10 ENCOUNTER — OFFICE VISIT (OUTPATIENT)
Dept: PHYSICAL THERAPY | Facility: CLINIC | Age: 81
End: 2021-06-10
Payer: MEDICARE

## 2021-06-10 DIAGNOSIS — R26.89 BALANCE DISORDER: Primary | ICD-10-CM

## 2021-06-10 DIAGNOSIS — I63.00 CEREBROVASCULAR ACCIDENT (CVA) DUE TO THROMBOSIS OF PRECEREBRAL ARTERY (HCC): ICD-10-CM

## 2021-06-10 PROCEDURE — 97112 NEUROMUSCULAR REEDUCATION: CPT

## 2021-06-10 NOTE — PROGRESS NOTES
Daily Note   PN: 21 (POC: 21)      Today's date: 6/10/2021  Patient name: Nhi Mata  : 1940  MRN: [id number]  Referring provider: Willard Campbell DO  Dx:   Encounter Diagnosis     ICD-10-CM    1  Balance disorder  R26 89    2  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 ([address] )  I63 00        Start Time: 0800  Stop Time: 0844  Total time in clinic (min): 44 minutes    Subjective: Patient reports to skilled PT with no reports of fall or LOB  He reports that his legs feel tired  He states that his back is feeling good today  Objective: See treatment diary below  Pt was able to complete full 6MWT at last reeval 21    NMR:  - STS: 2 x 10 reps, no UE assist  - PPT standing one foot on 4" step   - Side stepping on foam beam: 10 ft, 1 cycles  -side stepping foam beam with cone taps   - Tandem walking on foam beam: 10 ft, 6 cycles   - Feet together on foam: 4# dumbell out of BISI (up/down/laterally) 2 min x 3    - Hurdles 12" four square 4 cycles each direction  - Seated hamstring stretch: 2 x 30 sec each side  - piriformis stretch seated 30 s x3 each leg  - Standing quad stretch: 2 x 30 sec each side with chair behind pt, pt holding parallel bars  HEP: 21: standing quad stretch with 2 chairs  21 PPT added  Piriformis stretch has been added  Assessment: Patient tolerated his treatment session well, but required frequent rest breaks today due to LE fatigue  He demonstrated good dynamic stability during tandem and side stepping on foam beam, with ability to self-correct without PT assistance  Hurdles progressed to four square  Patient would benefit from formal PT intervention to maximize balance for independence in community  Plan: Continue per plan of care  NV progress PPT to during walking      Precautions: AFIB HTN

## 2021-06-15 ENCOUNTER — OFFICE VISIT (OUTPATIENT)
Dept: PHYSICAL THERAPY | Facility: CLINIC | Age: 81
End: 2021-06-15
Payer: MEDICARE

## 2021-06-15 DIAGNOSIS — I63.00 CEREBROVASCULAR ACCIDENT (CVA) DUE TO THROMBOSIS OF PRECEREBRAL ARTERY (HCC): ICD-10-CM

## 2021-06-15 DIAGNOSIS — R26.89 BALANCE DISORDER: Primary | ICD-10-CM

## 2021-06-15 PROCEDURE — 97112 NEUROMUSCULAR REEDUCATION: CPT

## 2021-06-15 NOTE — PROGRESS NOTES
Daily Note   PN: 21 (POC: 21)      Today's date: 6/15/2021  Patient name: aCitie Chaves  : 1940  MRN: [id number]  Referring provider: Jo-Ann Tobar DO  Dx:   Encounter Diagnosis     ICD-10-CM    1  Balance disorder  R26 89    2  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 ([address] )  I63 00        Start Time: 1533  Stop Time: 1614  Total time in clinic (min): 41 minutes    Subjective: Patient reports to skilled PT with no reports of fall or LOB  He reports that his legs feel tired  He states that his back is feeling good today  Back is much better overall  Worst issue is tired legs  Objective: See treatment diary below  Pt was able to complete full 6MWT at last reeval 21    NMR:  - STS 12# weighted vest : 2 x 10 reps, no UE assist  - step ups 8" weighted vest 12# 10 x2 Fwd   10 x 2 LAT with several seated rests   (L Lat x 5 reps only) no UE    - piriformis stretch seated 30 s x3 each leg  - quad stretches by PT prone 30 sec x 3 bilat  - SLS 60 sec each LE intermittent UE support  -wall sit/squat x 30 s x 1    Below not performed today:  - PPT standing one foot on 4" step   - Side stepping on foam beam: 10 ft, 1 cycles  -side stepping foam beam with cone taps   - Tandem walking on foam beam: 10 ft, 6 cycles   - Feet together on foam: 4# dumbell out of BIIS (up/down/laterally) 2 min x 3    - Hurdles 12" four square 4 cycles each direction  - Seated hamstring stretch: 2 x 30 sec each side  - Standing quad stretch: 2 x 30 sec each side with chair behind pt, pt holding parallel bars  HEP: 21: standing quad stretch with 2 chairs  21 PPT added  Piriformis stretch has been added  Assessment: Patient tolerated his treatment session well, but required frequent rest breaks today due to LE fatigue  Weighted vest introduced  Pt demonstrated fatigue with step ups     Patient would benefit from formal PT intervention to maximize balance for independence in community  Pt was advised to continue quad stretch to help decrease DOMS  Plan: Continue per plan of care  NV progress PPT to during walking  Add leg press     Precautions: AFIB HTN

## 2021-06-17 ENCOUNTER — OFFICE VISIT (OUTPATIENT)
Dept: PHYSICAL THERAPY | Facility: CLINIC | Age: 81
End: 2021-06-17
Payer: MEDICARE

## 2021-06-17 DIAGNOSIS — R26.89 BALANCE DISORDER: Primary | ICD-10-CM

## 2021-06-17 DIAGNOSIS — I63.00 CEREBROVASCULAR ACCIDENT (CVA) DUE TO THROMBOSIS OF PRECEREBRAL ARTERY (HCC): ICD-10-CM

## 2021-06-17 PROCEDURE — 97112 NEUROMUSCULAR REEDUCATION: CPT | Performed by: PHYSICAL THERAPIST

## 2021-06-17 NOTE — PROGRESS NOTES
Daily Note   PN: 21 (POC: 21)      Today's date: 2021  Patient name: Josh Rios  : 1940  MRN: [id number]  Referring provider: Shaq Lewis DO  Dx:   Encounter Diagnosis     ICD-10-CM    1  Balance disorder  R26 89    2  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 ([address] )  I63 00                   Subjective: Patient reports to skilled PT with no reports of fall or LOB  Low back and legs are doing well today  Objective: See treatment diary below  Pt was able to complete full 6MWT at last reeval 21    NMR:  - STS 12# weighted vest : 2 x 10 reps, no UE assist  - step ups 8" weighted vest 12# 10 x2 Fwd   10 x 2 LAT with several seated rests   (L Lat x 5 reps only) no UE    - piriformis stretch seated 30 s x2 each leg  - quad release with roll out stick  - SLS 60 sec each LE intermittent UE support  - wall sit/squat x 30 s x 1  - Side stepping on foam beam: 10 ft, 4 cycles  - tandem walking firm surface 10 ft, 3 cycles down and back   - Leg press; 35 lb 30 reps   - Feet together on foam: 4# dumbell out of BISI (up/down/laterally) 2 min x 3      HEP: 21: standing quad stretch with 2 chairs  21 PPT added  Piriformis stretch has been added  Assessment: Patient tolerated his treatment session well, but required frequent rest breaks today due to LE fatigue  Education on active rest break of 3 to 5 minutes with a few activities to allow properly recovery per current research standards with conducting exercise to technical failure   Pt was advised to continue quad stretch to help decrease DOMS effect      Plan: Continue per plan of care  NV progress PPT to during walking    Precautions: AFIB HTN

## 2021-06-21 ENCOUNTER — OFFICE VISIT (OUTPATIENT)
Dept: PHYSICAL THERAPY | Facility: CLINIC | Age: 81
End: 2021-06-21
Payer: MEDICARE

## 2021-06-21 DIAGNOSIS — I63.00 CEREBROVASCULAR ACCIDENT (CVA) DUE TO THROMBOSIS OF PRECEREBRAL ARTERY (HCC): ICD-10-CM

## 2021-06-21 DIAGNOSIS — R26.89 BALANCE DISORDER: Primary | ICD-10-CM

## 2021-06-21 PROCEDURE — 97112 NEUROMUSCULAR REEDUCATION: CPT | Performed by: PHYSICAL THERAPIST

## 2021-06-21 NOTE — PROGRESS NOTES
Daily Note   PN: 21 (POC: 21)      Today's date: 2021  Patient name: Yo Kowalski  : 1940  MRN: [id number]  Referring provider: Ajit Mckeon DO  Dx:   Encounter Diagnosis     ICD-10-CM    1  Balance disorder  R26 89    2  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 ([address] )  I63 00                   Subjective: Patient reports to skilled PT stating no new falls or changes from previous session      Objective: See treatment diary below  Pt was able to complete full 6MWT at last reeval 21      NMR:  - STS 12# weighted vest : 2 x 10 reps, no UE assist  - step ups 8" weighted vest 12# 15 x2 Fwd   15 x 2 LAT with several seated rests, no UE    - SLS 60 sec each LE intermittent UE support  - Side stepping on foam beam: 10 ft, 4 cycles  - tandem walking firm surface 10 ft, 5 cycles down and back   - Leg press; 35 lb 30 reps         HEP: 21: standing quad stretch with 2 chairs  21 PPT added  Piriformis stretch has been added  Assessment: Patient tolerated treatment well  Frequent rest breaks utilized during exercises due to LE fatigue  He was able to increase to 15 reps for each of step up exercises, but requires seated rest break immediately after  Patient displays min sway laterally with occasional UE support during tandem walking in // bars  He will benefit from skilled PT services to improve his activity tolerance and endurance, and overall mobility  Plan: Continue per plan of care  NV progress PPT to during walking    Precautions: AFIB HTN

## 2021-06-23 ENCOUNTER — EVALUATION (OUTPATIENT)
Dept: PHYSICAL THERAPY | Facility: CLINIC | Age: 81
End: 2021-06-23
Payer: MEDICARE

## 2021-06-23 DIAGNOSIS — R26.89 BALANCE DISORDER: Primary | ICD-10-CM

## 2021-06-23 DIAGNOSIS — I63.00 CEREBROVASCULAR ACCIDENT (CVA) DUE TO THROMBOSIS OF PRECEREBRAL ARTERY (HCC): ICD-10-CM

## 2021-06-23 PROCEDURE — 97530 THERAPEUTIC ACTIVITIES: CPT

## 2021-06-23 NOTE — PROGRESS NOTES
PT Re-Evaluation  RE: 2021    Today's date: 2021  Patient name: Billy Dubin  : 1940  MRN: [id number]  Referring provider: Niraj Ramirez DO  Dx:   Encounter Diagnosis     ICD-10-CM    1  Balance disorder  R26 89    2  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 [facility])  I63 00                   Assessment  Assessment details: Patient is a 69 y/o male who has been presenting to skilled outpatient physical therapy from a CVA in 2020, and more recently a heart valve replacement in 2021  All outcome measures demonstrated improvements upon reassessment  5XSTS, TUG, FGA, ELAINE, and 10 meter walk test scores indicate that he is at a LOW risk for falls  6 minute walk distance displayed minimal improvement from previous assessment  mCTSIB maintained, illustrating good somatosensory awareness  SREC sustained for full 30 seconds, demonstrating improved static balance and further improvements in somatosensory awareness  At this time he has met all but one of his short term goals, 2/3 of his long term goals, and progressing towards remaining goals  He continues to note that his walking and balance have progressed  Based on his improvements, will continue with therapy for additional week with focus on instructing patient and ensure proficiency in HEP to prepare for discharge in order to maintain improvements his balance, functional mobility, and endurance           Impairments: abnormal coordination, abnormal gait, abnormal movement, impaired balance, lacks appropriate home exercise program and safety issue  Understanding of Dx/Px/POC: excellent  Goals  Goals  Short Term Goals (4 weeks):    - Patient will improve time on TUG by 2 9 seconds from 14 31 seconds to 11 seconds to facilitate improved safety in all ambulation - MET  - Patient will improve scoring on FGA by 3 points from 2030 to 2430 to progress safety - MET  - Patient will be independent in basic HEP 2-3 weeks - MET  - Patient will improve 5xSTS score by 2 3 seconds from 15 41 seconds to 12 5 seconds to promote improved LE functional strength needed for ADLs - MET  - Patient will complete components of HiMAT to promote agility necessary for sports related tasks - NOT MET    Long Term Goals (12 weeks):  - Patient will be independent in a comprehensive home exercise program - NOT MET  - Patient will improve gait speed by 0 1 m/sec from 1 09 m/sec to 1 2 m/sec to improve safety with community ambulation - MET  - Patient will improve ELAINE by 6 points from 46/56 to 52/56 to facilitate return to safe independent ambulation - MET  - Patient will be able to demonstrate HT in gait without veering - MET  - Patient will improve 6 Minute Walk Test score by 190 feet from 750 feet to 1100 feet to promote improved cardiovascular endurance - NOT MET, Progressing  - Patient will report 50% reduction in near falls in order to improve safety with functional tasks and reduce his risk for falls - MET  - Patient will report going on walks at least 3 days per week to promote independence and improved cardiovascular endurance - NOT MET  - Patient will be able to ascend/descend stairs reciprocally without UE assist to promote independence and safety with ADLs - MET  - Patient will report 50% reduction in near falls when ambulating on uneven terrain - MET                Plan  Patient would benefit from: skilled physical therapy  Planned modality interventions: manual electrical stimulation  Planned therapy interventions: manual therapy, neuromuscular re-education, patient education, postural training, strengthening, therapeutic exercise, therapeutic activities, stretching, home exercise program, balance and gait training  Frequency: 2x week  Duration in weeks: 4  Plan of Care beginning date: 6/23/2021  Plan of Care expiration date: 7/21/2021  Treatment plan discussed with: patient        Subjective Evaluation    History of Present Illness  Mechanism of injury: Update (6-)  Patient reports that he feels that his balance and his walking have both improved  He states no change in ease of home responsibilities, they continue to be minimally difficult  He explains that his house hold responsibilities include cooking for 7 people, which he has no problems doing  Update (5-)  Patient reports that he has good days and bad days  States he believes his balance and walking have improved a lot    Update (4-)  Patient reports that his legs have been feeling tired  He states he saw his cardiologist and everything with his heart is ok, thinks it just may be that his legs are out of shape    3/24/2021  Had valve repair on 3-1-2021  In hospital 2 days  Was cleared for therapy on the 8th of march  With instructs to return to PT 10 days after MD appointment  Pt presents 14 days out with no restrictions  Pt notes being a little more fatigued and tired     IE:  68year old male suffered stroke 10/30/20 during the night, 1 am     Pt stood up to go to the bathroom and he collapsed, and noted R sided weakness  Stroke affected his R UE, and LE  May have affected his vision, pt is not sure  Hospitalized at [facility] x 1 5 days, then transferred to [facility]  Pt was brought in today by his dtr Josie Bowling [phone]  She did not stay for the appointment stated pt could answer all questions  Pt complains of confusion while trying to talk  Pt has OT eval following PT today  Venu Isaac  feels his balance is good, R UE strength is "good," but weaker compared to before stroke           Pain  No pain reported    Social Support  Steps to enter house: yes (1 step then platform, twice, no railing )  Stairs in house: no (he lives on one level)   Lives with: spouse, adult children and young children    Treatments  Previous treatment: physical therapy  Discharged from (in last 30 days): inpatient hospitalization  Patient Goals  Patient goal: improve walking speed  Leisure activity: casino/walking  Return grocery shopping and cooking   Objective     Functional Assessment        Comments  Re-eval: 3/24/21  - Postural assessment: WNL, mild forward head posturing  Improved midline  - MMT: WNL (5/5)to BLE, except Hip extensors 4-/5,   - Coordination; heel to/from shin and alternating toe-taps WNL  From IE:    Seated posture:pt's head is rotated slightly left when pt states it is in midline  Coordination:   Finger to nose: intact, slight R intention tremor  Heel to shin:  Intact     ROSARIO:  UE:intact      LE:off rhythm    Strength:  Hip flexion:  R=5/5 L=5/5  Quads:  R = 5 /5  L = 5/5  Ankles: R=5/5 except Inv= 4+/5 with mild ROM restriction  L=5/5    Oculomotor:  SP:intact   Saccades:intact     Gait: some inattention to right side, low step height  Gait with HT: not tested          Positionals: deferred      Pre-6MWT Stretching:  -Sidelying quad stretch (30 sec, 3x ea)  -Seated piriformis stretch (30 sec, 3x ea)  -Standing calf stretch (30 sec, 3x ea)       Precautions: Blood thinners  Past Medical History:   Diagnosis Date    Aneurysm ([address] )     of brain  being monitored    Atrial fibrillation ([address] )     Essential hypertension, long-standing     Heart murmur, lifelong     heart murmur since birth; 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    Hematuria     intermittent    History of cigarette smoking, chronic     64 year smoker at one half pack per day, quit 04/1/17    Hyperlipidemia     Hypertension     Irregular heart beat     Kidney stones     12 episodes of kidney stones    Kidney stones with lithotripsy 03/2017    [facility]    Pneumonia      X 2    Stroke [facility] 10/29/2020    right sided  weakness almost full recovery    Stroke [facility])     Vitamin D deficiency     maintained with 1000 units of D    Water retention     Wears glasses     Wears partial dentures     upper         Age norm for 70 yrs per current research:   10M WT: male=4 36 ft/sec  female= 4 16 ft/sec  MDC= 0 59 ft/sec  6MWT: hiaw=6586 feet  Female= 1545 feet  MDC= 190 feet  5XSTS: 10 sec  MDC= 2 3sec (vestibular)    Parks: male =54/56  Female= 53/56  FGA:   TU 5 sec for community dwelling adults, 32 2 sec for frail elderly   MDC= 4 14 sec      Outcome measures  IE20 Progress Note  20 Re-eval 21 Re-Eval  3/24/2021 PN: 2021 PN  2021 Re-eval  2021      deferred until medical clearance       5XSTS 15 41 sec   Retropulsed x 1 12 66 sec  14 31 sec 1 UE 13 47 sec  12 84 sec 11 55 sec   TUG  9 38 sec  9 52 sec 9 16 sec 9 47 8 99 sec   Parks  47/56 53/56  46/56 53/56  55/56   10MWT 12 71 sec   0 78 m/sec  2 58 ft/sec  8 78 sec  1 14 m/s  10/9 12= 1 09 m/S 10 31 sec= 0 97 m/s 10 53 sec= 0 95 m/s 7 71 sec= 1 30 m/s   FGA  25/30  20/30 25/30  29/30   6MWT  1,010 ft  750 ft 750 ft in 4:12 1050 ft 1060 ft   REO 30 sec  30 sec  30 sec 30 sec 30 sec 30 sec   REC 30 sec  30 sec  30 sec 30 sec 30 sec 30 sec   SREO 2 sec   30 sec  30 sec   30 sec   30 sec    30 sec   SREC NT 18 sec  30 sec 6 sec   30 sec   Foam FTEO 30 sec  30 sec  30 sec 30 sec  30 sec   Foam FTEC 10 sec  30 sec  30 sec 30 sec   30 sec   SLS  R: 1 sec  L: 1 sec   R: 3 sec  L: 5 sec  R: 6 sec  L: 5 sec

## 2021-06-28 ENCOUNTER — OFFICE VISIT (OUTPATIENT)
Dept: PHYSICAL THERAPY | Facility: CLINIC | Age: 81
End: 2021-06-28
Payer: MEDICARE

## 2021-06-28 DIAGNOSIS — R26.89 BALANCE DISORDER: ICD-10-CM

## 2021-06-28 DIAGNOSIS — I63.00 CEREBROVASCULAR ACCIDENT (CVA) DUE TO THROMBOSIS OF PRECEREBRAL ARTERY (HCC): Primary | ICD-10-CM

## 2021-06-28 PROCEDURE — 97112 NEUROMUSCULAR REEDUCATION: CPT

## 2021-06-28 NOTE — PROGRESS NOTES
Daily Note   PN: 21 (POC: 21)      Today's date: 2021  Patient name: Ally Nguyen  : 1940  MRN: [id number]  Referring provider: Bhavani Young DO  Dx:   Encounter Diagnosis     ICD-10-CM    1  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 ([address] )  I63 00    2  Balance disorder  R26 89        Start Time: 7590  Stop Time: 1615  Total time in clinic (min): 45 minutes    Subjective: Patient reports to skilled PT stating no new falls or changes from previous session, patient stated that he shopped before this session and noted increased LE fatigue bilaterally  Objective: See treatment diary below  Pt was able to complete full 6MWT at last reeval 21      NMR:  - STS 12# weighted vest : 2 x 10 reps, no UE assist  - step ups 8" weighted vest 12# 15 x2 Fwd   15 x 2 LAT with several seated rests, no UE    - SLS 60 sec each LE intermittent UE support  - Side stepping on foam beam: 10 ft, 4 cycles  - tandem walking firm surface 10 ft, 5 cycles down and back   -Foam beam- 10 cycles of 10 feet  -Tandem beam- 10 cycles of 10 feet  -BOSU step ups- forward and laterally- 10 reps each        HEP: 21: standing quad stretch with 2 chairs  21 PPT added  Piriformis stretch has been added  Assessment: Patient tolerated treatment well  Frequent rest breaks utilized during exercises due to LE fatigue, which may be attributed to overall exercise interventions today  Patient challenged with compliant surfaces, improving with motion but overall challenges noted with his recovery  He will benefit from skilled PT services to improve his activity tolerance and endurance, and overall mobility  Plan: Continue per plan of care  NV progress PPT to during walking    Precautions: AFIB HTN

## 2021-07-01 ENCOUNTER — OFFICE VISIT (OUTPATIENT)
Dept: PHYSICAL THERAPY | Facility: CLINIC | Age: 81
End: 2021-07-01
Payer: MEDICARE

## 2021-07-01 DIAGNOSIS — R26.89 BALANCE DISORDER: ICD-10-CM

## 2021-07-01 DIAGNOSIS — I63.00 CEREBROVASCULAR ACCIDENT (CVA) DUE TO THROMBOSIS OF PRECEREBRAL ARTERY (HCC): Primary | ICD-10-CM

## 2021-07-01 PROCEDURE — 97112 NEUROMUSCULAR REEDUCATION: CPT

## 2021-07-01 NOTE — PROGRESS NOTES
Daily Note and Discharge Note   See PN: 21 reevaluation for goal status      Today's date: 2021  Patient name: Winsome Mirza  : 1940  MRN: [id number]  Referring provider: Lv Jones DO  Dx:   Encounter Diagnosis     ICD-10-CM    1  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 ([address] )  I63 00    2  Balance disorder  R26 89                   Subjective: Pt is agreeable to DC of PT today  He is able to use standing quad/hip flexor stretches and seated piriformis stretch to alleviate his LBP at home  He is able to walk quite a bit, but not quite as much as he wants, maybe "3/4's of what I want "  Occasionally he needs to sit and rest a few minutes, then he can walk more  Objective: see reeval 21  See treatment diary below  Pt was able to complete full 6MWT at last reeval 21      NMR:  - STS 16# weighted vest : 3 x 10 reps, no UE assist  - NP step ups 8" weighted vest 12# 15 x2 Fwd   15 x 2 LAT with several seated rests, no UE    - Np SLS 60 sec each LE intermittent UE support  -Np  Side stepping on foam beam: 10 ft, 4 cycles  - tandem walking firm surface 10 ft, 5 cycles down and back   -Np Foam beam- 10 cycles of 10 feet  -NP Tandem beam- 10 cycles of 10 feet  -BOSU step ups- forward and laterally- 10 reps each no UE, close supervision  HEP:  standing quad stretch with 2 chairs  PPT added  Piriformis stretch sitting  Progressive walking  STS and tandem walking added 21    Assessment: 21: Pt has met most goals and was just reevaluated last week  HEP was progressed and  reviewed and pt is independent performing them  Pt understands he may need to return to PT periodically if balance worsens or back pain increases, and he should not wait until sx are very bad before returning  DC from PT to HEP         Assessment last PN 21  Assessment details: Patient is a 67 y/o male who has been presenting to skilled outpatient physical therapy from a CVA in October 2020, and more recently a heart valve replacement in March 2021  All outcome measures demonstrated improvements upon reassessment  5XSTS, TUG, FGA, ELAINE, and 10 meter walk test scores indicate that he is at a LOW risk for falls  6 minute walk distance displayed minimal improvement from previous assessment  mCTSIB maintained, illustrating good somatosensory awareness  SREC sustained for full 30 seconds, demonstrating improved static balance and further improvements in somatosensory awareness  At this time he has met all but one of his short term goals, 2/3 of his long term goals, and progressing towards remaining goals  He continues to note that his walking and balance have progressed  Based on his improvements, will continue with therapy for additional week with focus on instructing patient and ensure proficiency in HEP to prepare for discharge in order to maintain improvements his balance, functional mobility, and endurance           Impairments: abnormal coordination, abnormal gait, abnormal movement, impaired balance, lacks appropriate home exercise program and safety issue  Understanding of Dx/Px/POC: excellent  Goals  Goals  Short Term Goals (4 weeks):    - Patient will improve time on TUG by 2 9 seconds from 14 31 seconds to 11 seconds to facilitate improved safety in all ambulation - MET  - Patient will improve scoring on FGA by 3 points from 20/30 to 24/30 to progress safety - MET  - Patient will be independent in basic HEP 2-3 weeks - MET  - Patient will improve 5xSTS score by 2 3 seconds from 15 41 seconds to 12 5 seconds to promote improved LE functional strength needed for ADLs - MET  - Patient will complete components of HiMAT to promote agility necessary for sports related tasks - NOT MET    Long Term Goals (12 weeks):  - Patient will be independent in a comprehensive home exercise program -  MET  - Patient will improve gait speed by 0 1 m/sec from 1 09 m/sec to 1 2 m/sec to improve safety with community ambulation - MET  - Patient will improve ELAINE by 6 points from 46/56 to 52/56 to facilitate return to safe independent ambulation - MET  - Patient will be able to demonstrate HT in gait without veering - MET  - Patient will improve 6 Minute Walk Test score by 190 feet from 750 feet to 1100 feet to promote improved cardiovascular endurance - NOT MET, Progressing  - Patient will report 50% reduction in near falls in order to improve safety with functional tasks and reduce his risk for falls - MET  - Patient will report going on walks at least 3 days per week to promote independence and improved cardiovascular endurance - NOT MET  - Patient will be able to ascend/descend stairs reciprocally without UE assist to promote independence and safety with ADLs - MET  - Patient will report 50% reduction in near falls when ambulating on uneven terrain - MET     Plan: Continue per plan of care  DC from PT 7/1/21     Precautions: AFIB HTN

## 2021-07-02 ENCOUNTER — OFFICE VISIT (OUTPATIENT)
Dept: NEUROSURGERY | Facility: CLINIC | Age: 81
End: 2021-07-02
Payer: MEDICARE

## 2021-07-02 VITALS
WEIGHT: 207 LBS | HEIGHT: 70 IN | TEMPERATURE: 97 F | DIASTOLIC BLOOD PRESSURE: 80 MMHG | RESPIRATION RATE: 18 BRPM | SYSTOLIC BLOOD PRESSURE: 130 MMHG | HEART RATE: 66 BPM | BODY MASS INDEX: 29.63 KG/M2

## 2021-07-02 DIAGNOSIS — I67.1 ANEURYSM OF ANTERIOR COMMUNICATING ARTERY: Primary | ICD-10-CM

## 2021-07-02 PROCEDURE — 99214 OFFICE O/P EST MOD 30 MIN: CPT | Performed by: RADIOLOGY

## 2021-07-02 NOTE — PROGRESS NOTES
Assessment/Plan:     Diagnoses and all orders for this visit:    Aneurysm of anterior communicating artery  -     MRA and or MRV head wo contrast; Future        Discussion Summary:   Mary Sparks has a stable ACOM aneurysm  He will return in one year with a repeat MRA  We will likely stop surveillance at that time if stable  Chief Complaint: Follow-up      Patient ID: Bobbi Miramontes is a 68 y o  male    HPI     Mr Samy Paz returns for follow-up of his incidental cerebral aneurysm  He is doing well  No headaches  No new stroke-like symptoms  Review of Systems   Constitutional: Negative  HENT: Negative  Eyes: Negative  Respiratory: Negative  Cardiovascular: Negative  Gastrointestinal: Negative  Endocrine: Negative  Genitourinary: Negative  Musculoskeletal: Negative  Skin: Negative  Allergic/Immunologic: Negative  Neurological: Negative  Negative for syncope  Hematological: Bruises/bleeds easily (medication)  Psychiatric/Behavioral: Negative  I have personally reviewed the MA's review of systems and made changes as necessary      The following portions of the patient's history were reviewed and updated as appropriate: allergies, current medications, past family history, past medical history, past social history, past surgical history and problem list       Active Ambulatory Problems     Diagnosis Date Noted    Chronic atrial fibrillation ([facility] ) 04/15/2017    Benign essential hypertension 11/22/2016    Renal calculi 11/22/2016    JUNIOR (acute kidney injury) ([facility] ) 04/15/2017    Calculus of ureter 05/02/2017    Crossing vessel and stricture of ureter without hydronephrosis 05/02/2017    Hematuria, gross 05/10/2019    Hydronephrosis with ureteral stricture, not elsewhere classified 08/11/2020    CVA (cerebral vascular accident) ([facility] ) 07/43/3061    Diastolic heart failure ([facility] ) 10/28/2020    Aortic stenosis 10/28/2020    Bilateral carotid artery disease ([facility] ) 10/28/2020    Aneurysm of anterior communicating artery 10/29/2020    Tooth pain 10/29/2020    Onychomycosis 10/31/2020    Syncope vs seizure 11/03/2020    Vasovagal near syncope 11/03/2020    Acute kidney injury superimposed on chronic kidney disease [facility])      Resolved Ambulatory Problems     Diagnosis Date Noted    Shortness of breath 04/15/2017    Congestive heart failure ([facility] ) 04/15/2017     Past Medical History:   Diagnosis Date    Aneurysm ([facility] )     [facility]     Essential hypertension, long-standing     Heart murmur, lifelong     Hematuria     History of cigarette smoking, chronic     Hyperlipidemia     Hypertension     Irregular heart beat     Kidney stones     Kidney stones with lithotripsy 03/2017    Pneumonia     Stroke ([facility] ) 10/29/2020    Stroke ([facility] )     Vitamin D deficiency     Water retention     Wears glasses     Wears partial dentures        Past Surgical History:   Procedure Laterality Date    APPENDECTOMY  1960    CARDIAC SURGERY      watchman procedure; 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    CAROTID ENDARTERECTOMY Left 1998    Supposedly no internal stenosis found    CYSTOSCOPY Right 8/15/2017    Procedure: CYSTOSCOPY RIGHT STENT EXCHANGE;  Surgeon: Rafa Starkey MD;  Location: [address] [place] [address];  Service: Urology    CYSTOSCOPY     [facility]  LITHOTRIPSY  03/2017    For nephrolithiasis at [address]  5/10/2019    FL RETROGRADE PYELOGRAM  7/30/2019    FL RETROGRADE PYELOGRAM  10/22/2019    FL RETROGRADE PYELOGRAM  1/28/2020    FL RETROGRADE PYELOGRAM  8/11/2020    FL RETROGRADE PYELOGRAM  12/15/2020    FL RETROGRADE PYELOGRAM  2/14/2021    FL RETROGRADE PYELOGRAM  5/11/2021    AK CYSTO/URETERO W/LITHOTRIPSY &INDWELL STENT INSRT Right 5/2/2017    Procedure: CYSTOSCOPY URETEROSCOPY WITH LITHOTRIPSY HOLMIUM LASER, RETROGRADE PYELOGRAM AND INSERTION STENT URETERAL;  Surgeon: Rafa Starkey MD; Location: [facility];  Service: Urology    [place] CYSTO/URETERO W/LITHOTRIPSY &INDWELL STENT INSRT Right 5/16/2017    Procedure: CYSTOSCOPY, RETROGRADE PYELOGRAM,  FLEXIBLE URETEROSCOPY,  HOLMIUM LASER LITHOTRIPSY, STONE BASKET MANIPULATION,  STENT URETERAL EXCHANGE;  Surgeon: Prakash Vaughn MD;  Location: [address] [place] [address];  Service: Urology    [place] CYSTO/URETERO W/LITHOTRIPSY &INDWELL STENT INSRT Right 6/2/2017    Procedure: CYSTOSCOPY, RETROGRADE, STONE MANIPULATION WITH HOLMIUM LASER, STENT PLACEMENT;  Surgeon: Prakash Vaughn MD;  Location: [address] [place] [address];  Service: Urology    [place] CYSTO/URETERO W/LITHOTRIPSY &INDWELL STENT INSRT Right 7/18/2017    Procedure: CYSTOSCOPY, RETROGRADE, URETEROSCOPY HOLMIUM LASER [place],  AND STENT PLACEMENT;  Surgeon: Prakash Vaughn MD;  Location: [address] [place] [address];  Service: Urology    OH CYSTO/URETERO W/LITHOTRIPSY &INDWELL STENT INSRT Right 2/14/2021    Procedure: CYSTOSCOPY URETEROSCOPY, RETROGRADE PYELOGRAM, STONE MANIPULATION AND EXCHANGE STENT URETERAL;  Surgeon: Soo Osullivan MD;  Location: [address] [place] [address];  Service: Urology    OH CYSTOSCOPY,INSERT URETERAL STENT Right 11/14/2017    Procedure: EXCHANGE STENT URETERAL;  Surgeon: Prakash Vaughn MD;  Location: [address] [place] [address];  Service: Urology    OH CYSTOURETHROSCOPY,URETER CATHETER Right 11/14/2017    Procedure: CYSTOSCOPY RETROGRADE, STENT EXCHANGE;  Surgeon: Prakash Vaughn MD;  Location: [address] [place] [address];  Service: Urology    OH CYSTOURETHROSCOPY,URETER CATHETER Right 5/8/2018    Procedure: Chris Boateng;  Surgeon: Prakash Vaughn MD;  Location: [address] [place] [address];  Service: Urology    OH CYSTOURETHROSCOPY,URETER CATHETER Right 8/14/2018    Procedure: Chris Boateng;  Surgeon: Prakash Vaughn MD;  Location: [address] [place] [address];  Service: Urology    OH CYSTOURETHROSCOPY,URETER CATHETER Right 11/13/2018    Procedure: Chris Boateng, RETROGRADE;  Surgeon: Prakash Vaughn MD; Location: [address] [place] [address];  Service: Urology    ND CYSTOURETHROSCOPY,URETER CATHETER Right 2/12/2019    Procedure: Kristie Garzaw, STENT REMOVAL AND STENT PLACEMENT;  Surgeon: Jordan Kirby MD;  Location: [address] [place] [address];  Service: Urology    ND CYSTOURETHROSCOPY,URETER CATHETER Right 5/10/2019    Procedure: Kristie Garzaw, STENT EXCHANGE;  Surgeon: Jordan Kirby MD;  Location: [address] [place] [address];  Service: Urology    ND CYSTOURETHROSCOPY,URETER CATHETER Right 7/30/2019    Procedure: Kristie Helena, STENT REMOVAL AND PLACEMENT OF STENT;  Surgeon: Jordan Kirby MD;  Location: [address] [place] [address];  Service: Urology    ND CYSTOURETHROSCOPY,URETER CATHETER Right 10/22/2019    Procedure: Kristie Garzaw, STENT EXCHANGE;  Surgeon: Jordan Kirby MD;  Location: [address] [place] [address];  Service: Urology    ND CYSTOURETHROSCOPY,URETER CATHETER Right 1/28/2020    Procedure: CYSTOSCOPY, RETROGRADE, STENT REMOVAL AND STENT PLACEMENT;  Surgeon: Jordan Kirby MD;  Location: [address] [place] [address];  Service: Urology    ND CYSTOURETHROSCOPY,URETER CATHETER Right 5/22/2020    Procedure: CYSTOSCOPY RETROGRADE, STENT EXCHANGE;  Surgeon: Jordan Kirby MD;  Location: [address] [place] [address];  Service: Urology    ND CYSTOURETHROSCOPY,URETER CATHETER Right 8/11/2020    Procedure: CYSTOSCOPY, STENT EXCHANGE, RETROGRADE;  Surgeon: Jordan Kirby MD;  Location: [address] [place] [address];  Service: Urology    ND CYSTOURETHROSCOPY,URETER CATHETER Right 12/15/2020    Procedure: CYSTOSCOPY, RETROGRADE, STENT REMOVAL, AND STENT PLACEMENT;  Surgeon: Jordan Kirby MD;  Location: [address] [place] [address];  Service: Urology    ND CYSTOURETHROSCOPY,URETER CATHETER Right 5/11/2021    Procedure: CYSTOSCOPY RETROGRADE PYELOGRAM WITH STENT EXCHANGE;  Surgeon: Jordan Kirby MD;  Location: [address] [place] [address];  Service: Urology    PROSTATE SURGERY  2015    Laser Prostatectomy  by Dr Clayton Hansen Right 4/18/2017    Procedure: INSERTION STENT URETERAL, RIGHT URETEROSCOPY,  LASER OF URETERAL STRICTURE AND STONE;  Surgeon: Indiana Mo MD;  Location: [address] [place] [address];  Service:     URETERAL STENT PLACEMENT Right 2/13/2018    Procedure: Evaline Backers;  Surgeon: Indiana Mo MD;  Location: [address] [place] [address];  Service: Urology       Current Outpatient Medications   Medication Sig Dispense Refill    acetaminophen (TYLENOL) 325 mg tablet Take 2 tablets (650 mg total) by mouth every 8 (eight) hours as needed for mild pain  0    aspirin (ECOTRIN LOW STRENGTH) 81 mg EC tablet Take 81 mg by mouth daily Pt to check with surgeon if needed to hold RX   atorvastatin (LIPITOR) 40 mg tablet Take 1 tablet (40 mg total) by mouth daily with dinner 30 tablet 1    cholecalciferol (VITAMIN D3) 1,000 units tablet Take 1,000 Units by mouth daily after lunch        clopidogrel (PLAVIX) 75 mg tablet Take 75 mg by mouth daily Pt  To check with surgeon if needed to hold RX   finasteride (PROSCAR) 5 mg tablet Take 1 tablet (5 mg total) by mouth daily 90 tablet 3    metoprolol tartrate (LOPRESSOR) 25 mg tablet take 1 tablet by mouth every 8 hours (Patient taking differently: 25 mg every 12 (twelve) hours ) 270 tablet 0     No current facility-administered medications for this visit  Vitals:    07/02/21 1453   BP: 130/80   Pulse: 66   Resp: 18   Temp: (!) 97 °F (36 1 °C)         Objective:    Physical Exam  Neurologic Exam    Results/Data:  I have reviewed the results and images from the MRA in detail with the patient

## 2021-07-04 PROCEDURE — 99284 EMERGENCY DEPT VISIT MOD MDM: CPT

## 2021-07-05 ENCOUNTER — HOSPITAL ENCOUNTER (EMERGENCY)
Facility: HOSPITAL | Age: 81
Discharge: HOME | End: 2021-07-05
Attending: EMERGENCY MEDICINE | Admitting: EMERGENCY MEDICINE
Payer: MEDICARE

## 2021-07-05 ENCOUNTER — APPOINTMENT (EMERGENCY)
Dept: RADIOLOGY | Facility: HOSPITAL | Age: 81
End: 2021-07-05
Payer: MEDICARE

## 2021-07-05 VITALS
DIASTOLIC BLOOD PRESSURE: 86 MMHG | OXYGEN SATURATION: 98 % | WEIGHT: 205 LBS | RESPIRATION RATE: 18 BRPM | TEMPERATURE: 96.7 F | SYSTOLIC BLOOD PRESSURE: 176 MMHG | BODY MASS INDEX: 29.41 KG/M2 | HEART RATE: 82 BPM

## 2021-07-05 DIAGNOSIS — I10 HYPERTENSION: Primary | ICD-10-CM

## 2021-07-05 LAB
ALBUMIN SERPL BCP-MCNC: 3.4 G/DL (ref 3.5–5)
ALP SERPL-CCNC: 131 U/L (ref 46–116)
ALT SERPL W P-5'-P-CCNC: 19 U/L (ref 12–78)
ANION GAP SERPL CALCULATED.3IONS-SCNC: 7 MMOL/L (ref 4–13)
AST SERPL W P-5'-P-CCNC: 24 U/L (ref 5–45)
BASOPHILS # BLD AUTO: 0.03 THOUSANDS/ΜL (ref 0–0.1)
BASOPHILS NFR BLD AUTO: 0 % (ref 0–1)
BILIRUB SERPL-MCNC: 0.54 MG/DL (ref 0.2–1)
BUN SERPL-MCNC: 27 MG/DL (ref 5–25)
CALCIUM ALBUM COR SERPL-MCNC: 9.4 MG/DL (ref 8.3–10.1)
CALCIUM SERPL-MCNC: 8.9 MG/DL (ref 8.3–10.1)
CHLORIDE SERPL-SCNC: 107 MMOL/L (ref 100–108)
CO2 SERPL-SCNC: 29 MMOL/L (ref 21–32)
CREAT SERPL-MCNC: 1.75 MG/DL (ref 0.6–1.3)
EOSINOPHIL # BLD AUTO: 0.38 THOUSAND/ΜL (ref 0–0.61)
EOSINOPHIL NFR BLD AUTO: 5 % (ref 0–6)
ERYTHROCYTE [DISTWIDTH] IN BLOOD BY AUTOMATED COUNT: 22 % (ref 11.6–15.1)
GFR SERPL CREATININE-BSD FRML MDRD: 36 ML/MIN/1.73SQ M
GLUCOSE SERPL-MCNC: 104 MG/DL (ref 65–140)
HCT VFR BLD AUTO: 39 % (ref 36.5–49.3)
HGB BLD-MCNC: 10.9 G/DL (ref 12–17)
IMM GRANULOCYTES # BLD AUTO: 0.03 THOUSAND/UL (ref 0–0.2)
IMM GRANULOCYTES NFR BLD AUTO: 0 % (ref 0–2)
LYMPHOCYTES # BLD AUTO: 1.55 THOUSANDS/ΜL (ref 0.6–4.47)
LYMPHOCYTES NFR BLD AUTO: 21 % (ref 14–44)
MAGNESIUM SERPL-MCNC: 1.9 MG/DL (ref 1.6–2.6)
MCH RBC QN AUTO: 20.8 PG (ref 26.8–34.3)
MCHC RBC AUTO-ENTMCNC: 27.9 G/DL (ref 31.4–37.4)
MCV RBC AUTO: 74 FL (ref 82–98)
MONOCYTES # BLD AUTO: 0.98 THOUSAND/ΜL (ref 0.17–1.22)
MONOCYTES NFR BLD AUTO: 13 % (ref 4–12)
NEUTROPHILS # BLD AUTO: 4.38 THOUSANDS/ΜL (ref 1.85–7.62)
NEUTS SEG NFR BLD AUTO: 61 % (ref 43–75)
NRBC BLD AUTO-RTO: 0 /100 WBCS
NT-PROBNP SERPL-MCNC: 2224 PG/ML
PLATELET # BLD AUTO: 175 THOUSANDS/UL (ref 149–390)
PMV BLD AUTO: 9.9 FL (ref 8.9–12.7)
POTASSIUM SERPL-SCNC: 5.2 MMOL/L (ref 3.5–5.3)
PROT SERPL-MCNC: 7 G/DL (ref 6.4–8.2)
RBC # BLD AUTO: 5.25 MILLION/UL (ref 3.88–5.62)
SODIUM SERPL-SCNC: 143 MMOL/L (ref 136–145)
TROPONIN I SERPL-MCNC: <0.02 NG/ML
WBC # BLD AUTO: 7.35 THOUSAND/UL (ref 4.31–10.16)

## 2021-07-05 PROCEDURE — 96374 THER/PROPH/DIAG INJ IV PUSH: CPT

## 2021-07-05 PROCEDURE — 85025 COMPLETE CBC W/AUTO DIFF WBC: CPT | Performed by: EMERGENCY MEDICINE

## 2021-07-05 PROCEDURE — 93005 ELECTROCARDIOGRAM TRACING: CPT

## 2021-07-05 PROCEDURE — 84484 ASSAY OF TROPONIN QUANT: CPT | Performed by: EMERGENCY MEDICINE

## 2021-07-05 PROCEDURE — 70450 CT HEAD/BRAIN W/O DYE: CPT

## 2021-07-05 PROCEDURE — 83735 ASSAY OF MAGNESIUM: CPT | Performed by: EMERGENCY MEDICINE

## 2021-07-05 PROCEDURE — G1004 CDSM NDSC: HCPCS

## 2021-07-05 PROCEDURE — 83880 ASSAY OF NATRIURETIC PEPTIDE: CPT | Performed by: EMERGENCY MEDICINE

## 2021-07-05 PROCEDURE — 99285 EMERGENCY DEPT VISIT HI MDM: CPT | Performed by: EMERGENCY MEDICINE

## 2021-07-05 PROCEDURE — 96375 TX/PRO/DX INJ NEW DRUG ADDON: CPT

## 2021-07-05 PROCEDURE — 36415 COLL VENOUS BLD VENIPUNCTURE: CPT | Performed by: EMERGENCY MEDICINE

## 2021-07-05 PROCEDURE — 80053 COMPREHEN METABOLIC PANEL: CPT | Performed by: EMERGENCY MEDICINE

## 2021-07-05 RX ORDER — LABETALOL 20 MG/4 ML (5 MG/ML) INTRAVENOUS SYRINGE
10 ONCE
Status: COMPLETED | OUTPATIENT
Start: 2021-07-05 | End: 2021-07-05

## 2021-07-05 RX ORDER — LORAZEPAM 2 MG/ML
0.5 INJECTION INTRAMUSCULAR ONCE
Status: COMPLETED | OUTPATIENT
Start: 2021-07-05 | End: 2021-07-05

## 2021-07-05 RX ADMIN — LABETALOL 20 MG/4 ML (5 MG/ML) INTRAVENOUS SYRINGE 10 MG: at 00:35

## 2021-07-05 RX ADMIN — LORAZEPAM 0.5 MG: 2 INJECTION INTRAMUSCULAR; INTRAVENOUS at 00:37

## 2021-07-05 NOTE — ED PROVIDER NOTES
History  Chief Complaint   Patient presents with    High Blood Pressure     patient was unable to sleep and was pacing and anxious, took bp and was 178/96, 45 minutes prior to arrival     69 y/o male presents with high blood pressure as recorded at home with a systolic in the 762W  He says he is very anxious and has been depressed since his wife passed recently  He couldn't sleep for the past few days  Denies expressive aphasia, no slurred speech, no headache, no nausea,no focal weakness, no numbness tingling, no chest pain,dyspnea, no leg edema no other symptoms  he has been compliant with his medications, and no OTC meds currently  history of HTN, HDL  History provided by:  Patient   used: No        Prior to Admission Medications   Prescriptions Last Dose Informant Patient Reported?  Taking?   acetaminophen (TYLENOL) 325 mg tablet More than a month at Unknown time Self No No   Sig: Take 2 tablets (650 mg total) by mouth every 8 (eight) hours as needed for mild pain   aspirin (ECOTRIN LOW STRENGTH) 81 mg EC tablet 7/4/2021 at Unknown time  Yes Yes   Sig: Take 81 mg by mouth daily Pt to check with surgeon if needed to hold RX    atorvastatin (LIPITOR) 40 mg tablet 7/4/2021 at Unknown time Self No Yes   Sig: Take 1 tablet (40 mg total) by mouth daily with dinner   cholecalciferol (VITAMIN D3) 1,000 units tablet 7/4/2021 at Unknown time Self Yes Yes   Sig: Take 1,000 Units by mouth daily after lunch     clopidogrel (PLAVIX) 75 mg tablet 7/4/2021 at Unknown time Self Yes Yes   Sig: Take 75 mg by mouth daily Pt  To check with surgeon if needed to hold RX    finasteride (PROSCAR) 5 mg tablet 7/4/2021 at Unknown time Self No Yes   Sig: Take 1 tablet (5 mg total) by mouth daily   metoprolol tartrate (LOPRESSOR) 25 mg tablet 7/4/2021 at Unknown time  No Yes   Sig: take 1 tablet by mouth every 8 hours   Patient taking differently: 25 mg every 12 (twelve) hours       Facility-Administered Medications: None       Past Medical History:   Diagnosis Date    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 brain  being monitored    Atrial fibrillation ([facility] [address] )     Essential hypertension, long-standing     Heart murmur, lifelong     heart murmur since birth; 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    Hematuria     intermittent    History of cigarette smoking, chronic     62 year smoker at one half pack per day, quit 04/1/17    Hyperlipidemia     Hypertension     Irregular heart beat     Kidney stones     12 episodes of kidney stones    Kidney stones with lithotripsy 03/2017    Done at [facility]    Pneumonia      X 2    Stroke [facility]) 10/29/2020    right sided  weakness almost full recovery    Stroke ([facility] [address] )     Vitamin D deficiency     maintained with 1000 units of D    Water retention     Wears glasses     Wears partial dentures     upper       Past Surgical History:   Procedure Laterality Date   [address] procedure; 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    CAROTID ENDARTERECTOMY Left 1998    Supposedly no internal stenosis found    CYSTOSCOPY Right 8/15/2017    Procedure: CYSTOSCOPY RIGHT STENT EXCHANGE;  Surgeon: Yuridia Post MD;  Location: [address] [place] [address];  Service: Urology    CYSTOSCOPY     [address]  LITHOTRIPSY  03/2017    For nephrolithiasis at [address]  5/10/2019    FL RETROGRADE PYELOGRAM  7/30/2019    FL RETROGRADE PYELOGRAM  10/22/2019    FL RETROGRADE PYELOGRAM  1/28/2020    FL RETROGRADE PYELOGRAM  8/11/2020    FL RETROGRADE PYELOGRAM  12/15/2020    FL RETROGRADE PYELOGRAM  2/14/2021    FL RETROGRADE PYELOGRAM  5/11/2021    [place] CYSTO/URETERO W/LITHOTRIPSY &INDWELL STENT INSRT Right 5/2/2017    Procedure: CYSTOSCOPY URETEROSCOPY WITH LITHOTRIPSY HOLMIUM LASER, RETROGRADE PYELOGRAM AND INSERTION STENT URETERAL;  Surgeon: Yuridia Post MD;  Location: [address] [place] [address];  Service: Urology    [place] CYSTO/URETERO W/LITHOTRIPSY &INDWELL STENT INSRT Right 5/16/2017    Procedure: CYSTOSCOPY, RETROGRADE PYELOGRAM,  FLEXIBLE URETEROSCOPY,  HOLMIUM LASER LITHOTRIPSY, STONE BASKET MANIPULATION,  STENT URETERAL EXCHANGE;  Surgeon: Crow Washington MD;  Location: [address] [place] [address];  Service: Urology    ID CYSTO/URETERO W/LITHOTRIPSY &INDWELL STENT INSRT Right 6/2/2017    Procedure: CYSTOSCOPY, RETROGRADE, STONE MANIPULATION WITH HOLMIUM LASER, STENT PLACEMENT;  Surgeon: Crow Washington MD;  Location: [address] [place] [address];  Service: Urology    ID CYSTO/URETERO W/LITHOTRIPSY &INDWELL STENT INSRT Right 7/18/2017    Procedure: CYSTOSCOPY, RETROGRADE, URETEROSCOPY HOLMIUM LASER [place],  AND STENT PLACEMENT;  Surgeon: Crow Washington MD;  Location: [address] [place] [address];  Service: Urology    ID CYSTO/URETERO W/LITHOTRIPSY &INDWELL STENT INSRT Right 2/14/2021    Procedure: CYSTOSCOPY URETEROSCOPY, RETROGRADE PYELOGRAM, STONE MANIPULATION AND EXCHANGE STENT URETERAL;  Surgeon: Mena Martines MD;  Location: [address] [place] [address];  Service: Urology    ID CYSTOSCOPY,INSERT URETERAL STENT Right 11/14/2017    Procedure: EXCHANGE STENT URETERAL;  Surgeon: Crow Washington MD;  Location: [address] [place] [address];  Service: Urology    ID CYSTOURETHROSCOPY,URETER CATHETER Right 11/14/2017    Procedure: CYSTOSCOPY RETROGRADE, STENT EXCHANGE;  Surgeon: Crow Washington MD;  Location: [address] [place] [address];  Service: Urology    ID CYSTOURETHROSCOPY,URETER CATHETER Right 5/8/2018    Procedure: Kyle Talavera;  Surgeon: Crow Washington MD;  Location: [address] [place] [address];  Service: Urology    ID CYSTOURETHROSCOPY,URETER CATHETER Right 8/14/2018    Procedure: Michelle Esthela EXCHANGE;  Surgeon: Crow Washington MD;  Location: [address] [place] [address];  Service: Urology    ID CYSTOURETHROSCOPY,URETER CATHETER Right 11/13/2018    Procedure: CYSTOSCOPY, David Green EXCHANGE, RETROGRADE;  Surgeon: Crow Washington MD;  Location: [address] [place] [address];  Service: Urology    ID CYSTOURETHROSCOPY,URETER CATHETER Right 2/12/2019    Procedure: Faby Nickels, STENT REMOVAL AND STENT PLACEMENT;  Surgeon: Jamila Howell MD;  Location: [address] [place] [address];  Service: Urology    SD CYSTOURETHROSCOPY,URETER CATHETER Right 5/10/2019    Procedure: Faby Nickels, STENT EXCHANGE;  Surgeon: Jamila Howell MD;  Location: [address] [place] [address];  Service: Urology    SD CYSTOURETHROSCOPY,URETER CATHETER Right 7/30/2019    Procedure: Faby Nickels, STENT REMOVAL AND PLACEMENT OF STENT;  Surgeon: Jamila Howell MD;  Location: [address] [place] [address];  Service: Urology    SD CYSTOURETHROSCOPY,URETER CATHETER Right 10/22/2019    Procedure: Faby Nickels, STENT EXCHANGE;  Surgeon: Jamila Howell MD;  Location: [address] [place] [address];  Service: Urology    SD CYSTOURETHROSCOPY,URETER CATHETER Right 1/28/2020    Procedure: CYSTOSCOPY, RETROGRADE, STENT REMOVAL AND STENT PLACEMENT;  Surgeon: Jamila Howell MD;  Location: [address] [place] [address];  Service: Urology    SD CYSTOURETHROSCOPY,URETER CATHETER Right 5/22/2020    Procedure: CYSTOSCOPY RETROGRADE, STENT EXCHANGE;  Surgeon: Jamila Howell MD;  Location: [address] [place] [address];  Service: Urology    SD CYSTOURETHROSCOPY,URETER CATHETER Right 8/11/2020    Procedure: CYSTOSCOPY, STENT EXCHANGE, RETROGRADE;  Surgeon: Jamila Howell MD;  Location: [address] [place] [address];  Service: Urology    SD CYSTOURETHROSCOPY,URETER CATHETER Right 12/15/2020    Procedure: CYSTOSCOPY, RETROGRADE, STENT REMOVAL, AND STENT PLACEMENT;  Surgeon: Jamila Howell MD;  Location: [address] [place] [address];  Service: Urology    SD CYSTOURETHROSCOPY,URETER CATHETER Right 5/11/2021    Procedure: CYSTOSCOPY RETROGRADE PYELOGRAM WITH STENT EXCHANGE;  Surgeon: Jamila Howell MD;  Location: [address] [place] [address];  Service: Urology    PROSTATE SURGERY  2015    Laser Prostatectomy  by Dr Liseth Corbin Right 4/18/2017    Procedure: INSERTION STENT URETERAL, RIGHT URETEROSCOPY,  LASER OF URETERAL STRICTURE AND STONE;  Surgeon: Claudia Swain Alberto Armendariz MD;  Location: [address] [place] [address];  Service:     URETERAL STENT PLACEMENT Right 2018    Procedure: Theo Lopez;  Surgeon: Ariella Christianson MD;  Location: [facility];  Service: Urology       Family History   Problem Relation Age of Onset    Hypertension Mother     Alcohol abuse Father     Cirrhosis Father     Cancer Son 15        cancer-knee and above-left-amputation    Cancer Sister     Other Brother         head mass    Thyroid disease unspecified Sister     Arthritis Sister     Other Brother         legally blind in one eye     I have reviewed and agree with the history as documented  E-Cigarette/Vaping    E-Cigarette Use Never User      E-Cigarette/Vaping Substances     Social History     Tobacco Use    Smoking status: Former Smoker     Packs/day: 0 50     Years: 62 00     Pack years: 31 00     Types: Cigarettes     Quit date: 4/15/2017     Years since quittin 2    Smokeless tobacco: Never Used   Vaping Use    Vaping Use: Never used   Substance Use Topics    Alcohol use: Yes     Comment: rare    Drug use: No       Review of Systems   Constitutional: Negative  HENT: Negative  Eyes: Negative  Respiratory: Negative  Cardiovascular: Negative  Gastrointestinal: Negative  Endocrine: Negative  Genitourinary: Negative  Musculoskeletal: Negative  Skin: Negative  Allergic/Immunologic: Negative  Neurological: Negative  Hematological: Negative  Psychiatric/Behavioral: The patient is nervous/anxious  All other systems reviewed and are negative  Physical Exam  Physical Exam  Constitutional:       Appearance: Normal appearance  HENT:      Head: Normocephalic and atraumatic  Nose: Nose normal       Mouth/Throat:      Mouth: Mucous membranes are moist    Eyes:      Extraocular Movements: Extraocular movements intact  Pupils: Pupils are equal, round, and reactive to light     Cardiovascular:      Rate and Rhythm: Normal rate and regular rhythm  Pulmonary:      Effort: Pulmonary effort is normal       Breath sounds: Normal breath sounds  Abdominal:      General: Abdomen is flat  Bowel sounds are normal       Palpations: Abdomen is soft  Musculoskeletal:         General: Normal range of motion  Cervical back: Normal range of motion and neck supple  Skin:     General: Skin is warm  Capillary Refill: Capillary refill takes less than 2 seconds  Neurological:      General: No focal deficit present  Mental Status: He is alert and oriented to person, place, and time  Mental status is at baseline  Cranial Nerves: No cranial nerve deficit  Sensory: No sensory deficit  Motor: No weakness  Coordination: Coordination normal       Gait: Gait normal       Deep Tendon Reflexes: Reflexes normal    Psychiatric:         Mood and Affect: Mood normal          Thought Content:  Thought content normal          Vital Signs  ED Triage Vitals [07/05/21 0002]   Temperature Pulse Respirations Blood Pressure SpO2   (!) 96 6 °F (35 9 °C) 76 16 (!) 180/100 99 %      Temp Source Heart Rate Source Patient Position - Orthostatic VS BP Location FiO2 (%)   Tympanic Monitor Sitting Right arm --      Pain Score       --           Vitals:    07/05/21 0114 07/05/21 0115 07/05/21 0300 07/05/21 0315   BP: (!) 188/86 (!) 188/86 (!) 179/84 (!) 176/86   Pulse: 79 82 78 82   Patient Position - Orthostatic VS: Lying   Lying         Visual Acuity      ED Medications  Medications   Labetalol HCl (NORMODYNE) injection 10 mg (10 mg Intravenous Given 7/5/21 0035)   LORazepam (ATIVAN) injection 0 5 mg (0 5 mg Intravenous Given 7/5/21 0037)       Diagnostic Studies  Results Reviewed     Procedure Component Value Units Date/Time    NT-BNP PRO [[id number]]  (Abnormal) Collected: 07/05/21 0037    Lab Status: Final result Specimen: Blood from Arm, Right Updated: 07/05/21 0109     NT-proBNP 2,224 pg/mL     Magnesium [[id number]]  (Normal) Collected: 07/05/21 0037    Lab Status: Final result Specimen: Blood from Arm, Right Updated: 07/05/21 0109     Magnesium 1 9 mg/dL     Troponin I [[id number]]  (Normal) Collected: 07/05/21 0037    Lab Status: Final result Specimen: Blood from Arm, Right Updated: 07/05/21 0107     Troponin I <0 02 ng/mL     Comprehensive metabolic panel [[id number]]  (Abnormal) Collected: 07/05/21 0037    Lab Status: Final result Specimen: Blood from Arm, Right Updated: 07/05/21 0103     Sodium 143 mmol/L      Potassium 5 2 mmol/L      Chloride 107 mmol/L      CO2 29 mmol/L      ANION GAP 7 mmol/L      BUN 27 mg/dL      Creatinine 1 75 mg/dL      Glucose 104 mg/dL      Calcium 8 9 mg/dL      Corrected Calcium 9 4 mg/dL      AST 24 U/L      ALT 19 U/L      Alkaline Phosphatase 131 U/L      Total Protein 7 0 g/dL      Albumin 3 4 g/dL      Total Bilirubin 0 54 mg/dL      eGFR 36 ml/min/1 73sq m     Narrative:      National Kidney Disease Foundation guidelines for Chronic Kidney Disease (CKD):     Stage 1 with normal or high GFR (GFR > 90 mL/min/1 73 square meters)    Stage 2 Mild CKD (GFR = 60-89 mL/min/1 73 square meters)    Stage 3A Moderate CKD (GFR = 45-59 mL/min/1 73 square meters)    Stage 3B Moderate CKD (GFR = 30-44 mL/min/1 73 square meters)    Stage 4 Severe CKD (GFR = 15-29 mL/min/1 73 square meters)    Stage 5 End Stage CKD (GFR <15 mL/min/1 73 square meters)  Note: GFR calculation is accurate only with a steady state creatinine    CBC and differential [[id number]]  (Abnormal) Collected: 07/05/21 0037    Lab Status: Final result Specimen: Blood from Arm, Right Updated: 07/05/21 0044     WBC 7 35 Thousand/uL      RBC 5 25 Million/uL      Hemoglobin 10 9 g/dL      Hematocrit 39 0 %      MCV 74 fL      MCH 20 8 pg      MCHC 27 9 g/dL      RDW 22 0 %      MPV 9 9 fL      Platelets 966 Thousands/uL      nRBC 0 /100 WBCs      Neutrophils Relative 61 %      Immat GRANS % 0 %      Lymphocytes Relative 21 %      Monocytes Relative 13 % Eosinophils Relative 5 %      Basophils Relative 0 %      Neutrophils Absolute 4 38 Thousands/µL      Immature Grans Absolute 0 03 Thousand/uL      Lymphocytes Absolute 1 55 Thousands/µL      Monocytes Absolute 0 98 Thousand/µL      Eosinophils Absolute 0 38 Thousand/µL      Basophils Absolute 0 03 Thousands/µL                  CT head without contrast   Final Result by Rene Macias DO (07/05 0146)      No acute intracranial abnormality  Workstation performed: [id number]                    Procedures  ECG 12 Lead Documentation Only  Performed by: Keiko Sánchez DO  Authorized by: Keiko Sánchez DO     ECG reviewed by me, the ED Provider: yes    Patient location:  ED  Previous ECG:     Previous ECG:  Unavailable    Comparison to cardiac monitor: Yes    Interpretation:     Interpretation: non-specific    Rate:     ECG rate assessment: normal    Rhythm:     Rhythm: atrial fibrillation    Ectopy:     Ectopy: none    QRS:     QRS axis:  Normal  Conduction:     Conduction: normal    ST segments:     ST segments:  Non-specific  T waves:     T waves: non-specific               ED Course                             SBIRT 22yo+      Most Recent Value   SBIRT (22 yo +)   In order to provide better care to our patients, we are screening all of our patients for alcohol and drug use  Would it be okay to ask you these screening questions? Yes Filed at: 07/05/2021 0059   Initial Alcohol Screen: US AUDIT-C    1  How often do you have a drink containing alcohol?  0 Filed at: 07/05/2021 0059   2  How many drinks containing alcohol do you have on a typical day you are drinking? 0 Filed at: 07/05/2021 0059   3a  Male UNDER 65: How often do you have five or more drinks on one occasion? 0 Filed at: 07/05/2021 0059   3b  FEMALE Any Age, or MALE 65+: How often do you have 4 or more drinks on one occassion?   0 Filed at: 07/05/2021 0059   Audit-C Score  0 Filed at: 07/05/2021 6111   KILEY: How many times in the past year have you  Used an illegal drug or used a prescription medication for non-medical reasons? Never Filed at: 07/05/2021 0059                    MDM  Number of Diagnoses or Management Options     Amount and/or Complexity of Data Reviewed  Clinical lab tests: ordered and reviewed  Tests in the radiology section of CPT®: ordered and reviewed  Tests in the medicine section of CPT®: ordered and reviewed    Patient Progress  Patient progress: stable      Disposition  Final diagnoses:   Hypertension     Time reflects when diagnosis was documented in both MDM as applicable and the Disposition within this note     Time User Action Codes Description Comment    7/5/2021  3:11 AM Dari Carreon Add [I10] Hypertension       ED Disposition     ED Disposition Condition Date/Time Comment    Discharge Stable Mon Jul 5, 2021  3:10 AM David Royal discharge to home/self care              Follow-up Information     Follow up With Specialties Details Why Contact Info Additional Information    Chavo Palacios PA-C Physician Assistant Schedule an appointment as soon as possible for a visit   [place] [address] Emergency Department Emergency Medicine  If symptoms worsen [address] [place] [address] Emergency Department, [place], [place], [address]          Discharge Medication List as of 7/5/2021  3:11 AM      CONTINUE these medications which have NOT CHANGED    Details   acetaminophen (TYLENOL) 325 mg tablet Take 2 tablets (650 mg total) by mouth every 8 (eight) hours as needed for mild pain, Starting Tue 11/17/2020, No Print      aspirin (ECOTRIN LOW STRENGTH) 81 mg EC tablet Take 81 mg by mouth daily Pt to check with surgeon if needed to hold RX , Historical Med      atorvastatin (LIPITOR) 40 mg tablet Take 1 tablet (40 mg total) by mouth daily with dinner, Starting Tue 11/17/2020, Normal cholecalciferol (VITAMIN D3) 1,000 units tablet Take 1,000 Units by mouth daily after lunch  , Historical Med      clopidogrel (PLAVIX) 75 mg tablet Take 75 mg by mouth daily Pt  To check with surgeon if needed to hold RX , Historical Med      finasteride (PROSCAR) 5 mg tablet Take 1 tablet (5 mg total) by mouth daily, Starting Wed 1/20/2021, Until Mon 7/5/2021, Normal      metoprolol tartrate (LOPRESSOR) 25 mg tablet take 1 tablet by mouth every 8 hours, Normal           No discharge procedures on file      PDMP Review     None          ED Provider  Electronically Signed by           Jessica Rodriguez DO  07/07/21 5005

## 2021-07-06 LAB
ATRIAL RATE: 394 BPM
QRS AXIS: -55 DEGREES
QRSD INTERVAL: 98 MS
QT INTERVAL: 382 MS
QTC INTERVAL: 412 MS
T WAVE AXIS: 32 DEGREES
VENTRICULAR RATE: 70 BPM

## 2021-07-06 PROCEDURE — 93010 ELECTROCARDIOGRAM REPORT: CPT | Performed by: INTERNAL MEDICINE

## 2021-07-12 ENCOUNTER — OFFICE VISIT (OUTPATIENT)
Dept: NEPHROLOGY | Facility: CLINIC | Age: 81
End: 2021-07-12
Payer: MEDICARE

## 2021-07-12 VITALS
WEIGHT: 204 LBS | HEIGHT: 70 IN | DIASTOLIC BLOOD PRESSURE: 80 MMHG | BODY MASS INDEX: 29.2 KG/M2 | HEART RATE: 71 BPM | SYSTOLIC BLOOD PRESSURE: 110 MMHG

## 2021-07-12 DIAGNOSIS — E87.5 HYPERKALEMIA: Primary | ICD-10-CM

## 2021-07-12 DIAGNOSIS — N25.81 SECONDARY HYPERPARATHYROIDISM (HCC): ICD-10-CM

## 2021-07-12 DIAGNOSIS — N18.31 STAGE 3A CHRONIC KIDNEY DISEASE (HCC): ICD-10-CM

## 2021-07-12 DIAGNOSIS — I50.32 CHRONIC DIASTOLIC CONGESTIVE HEART FAILURE (HCC): ICD-10-CM

## 2021-07-12 PROCEDURE — 99214 OFFICE O/P EST MOD 30 MIN: CPT | Performed by: INTERNAL MEDICINE

## 2021-07-12 RX ORDER — LORAZEPAM 0.5 MG/1
TABLET ORAL AS NEEDED
COMMUNITY
End: 2022-01-19

## 2021-07-12 NOTE — PATIENT INSTRUCTIONS
1) Avoid NSAIDS - (Example - motrin, advil, ibuprofen, aleve, exederin, etc)  2) Always follow a low salt diet  3) no changes to your medications  4) follow a low potassium diet  5) labwork with BMP (basic metabolic panel) in 2 weeks  6) full labs before appointment in 3 months

## 2021-07-12 NOTE — LETTER
July 12, 2021     Joann Amaya PA-C  [id number] [facility] [id number]    Patient: Nay Louie   YOB: 1940   Date of Visit: 7/12/2021       Dear Dr Dana Neves:    Thank you for referring Jaime Tamayo to me for evaluation  Below are my notes for this consultation  If you have questions, please do not hesitate to call me  I look forward to following your patient along with you  Sincerely,        Abhi Longo MD        CC: No Recipients  Abhi Longo MD  7/12/2021  3:17 PM  Sign when Signing Visit  NEPHROLOGY OFFICE VISIT   Nay Louie 68 y o  male MRN: [id number]  7/12/2021    Reason for Visit: CKD III    ASSESSMENT and PLAN:    I had the pleasure of seeing Mr Karlton Prader today in the renal clinic for the continued management of CKD III  30-year-old male with a past medical history of CKD stage III Baseline Cr 1 6-1 8, Nephrolithiasis,HTN,   CVA, A  fib, D CHF, BPH, 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, s/p AVR at [facility] in 3/1/2020, former smoker quit in April, hospitalized at [facility] in April 2017 for shortness of breath at which time nephrology was consulted due to acute kidney injury with a rising creatinine  Patient was found to have right-sided hydronephrosis with ureteral stricture and stone  Patient underwent ureteral stent and eventually had a lithotripsy  Patient presents for follow up visit for CKD       Patient had stent exchange in august 2020 October of 2020-patient was admitted to hospital due to right-sided weakness  There is concern for CVA  Underwent tPA  Patient was subsequently also started on Eliquis  Hydrochlorothiazide was held       November 2020- patient was discharged from rehab        Admitted in February 2021 with flank pain  Had cystoscopy completed with right ureteral stent in place with several calculi and moderate to severe right hydronephrosis  Underwent stent exchange  Had acute kidney injury    Creatinine peaked to 2 2 mg/dL        Patient also had Watchman device placed  And then on March 1st 2021 had what sounds to be a TAVR procedure    ER visit 7/2021 - with HTN to ER  Anxiety     1) CKD III- baseline Cr approx 1 5-1 7 mg/dL  Etiology of CKD likely solitary functional kidney, prior hydro, nephrolithiasis  Patient had acute kidney injury in  February of 2021 at which time the stent was obstructed      - Follows with Urology regarding stent and nephrolithiasis  -most recent creatinine is at baseline 1 75 mg/dL 7/5/2021    - UA with RBC 5/2021 (after stent exchange)  - UPCR 0 23 5/2021  - split function test prior shows left kidney receives higher flow then the right kidney by approximately 70 vs 30%  - regular stent exchange per Urology team (last in 5/2021)     Plan:  - K borderline in ER on 7/5/2021 5  2  low potassium diet advised   - kidney smart class - completed    - repeat labwork in 2 weeks with BMP and full labs in 3 months  -Hold on initiating hydrochlorothiazide for now given marginal BP Blood pressures are stable  Patient is euvolemic  Hydrochlorothiazide has been held since last year    -no changes to medication regimen today  -  Low-potassium diet     2) Nephrolithiasis - follows with Urology       - renal stone mainly ca oxalate  - 24 hour urine litholink - reviewed  - pt has increased fluid intake to 2 L  - Needs low salt diet - already following  takes in approx 1 8 gm salt daily  at goal  - changed furosemide to HCTZ in jan 2018  - hydrochlorothiazide held during hospitalization in October of 2020  - underwent stent exchange recently with may 2021     3) HTN - Blood pressures are stable     4) Volume - history of dCHF  history of mild to mod pulm HTN   Euvolemic      - need to monitor fluid intake with HF history  - daily weights      5) CKD MBD -      -  85 in December 2019; 68 in may 2020; 106 7 in September 2020 -->  One hundred twenty-two in February 2021 --> 133 5/2021  - last vitamin-D was 41 in February 2021  Appropriate      6) Anemia - stable Hb     7) acid/base - bicarb 29 stable     8) anemia - Hb above goal     9) a fib     - watchman device  - metoprolol 25 mg BID     10)   CVA with also known aneurysm; and also known left ICA 90% stenosis      -  Right frontal lobe infarct   -  No surgical intervention was indicated for the ICA stenosis  - saw Neuro surgery 7/2021  Cont yearly f/u       11) AVR - completed 3/1/2021    12) anxiety - following with PCP  Is now on lorazepam if needed     It was a pleasure evaluating Mr Alex Landon  Thank you for allowing our team to participate in the care of your patient         No problem-specific Assessment & Plan notes found for this encounter  HPI:    Had ER visit due to anxiety recently  No fevers, chills, nausea, vomiting  Eating higher potassium diet with potatoes and peas  Advised low K diet today  PATIENT INSTRUCTIONS:    Patient Instructions   1) Avoid NSAIDS - (Example - motrin, advil, ibuprofen, aleve, exederin, etc)  2) Always follow a low salt diet  3) no changes to your medications  4) follow a low potassium diet  5) labwork with BMP (basic metabolic panel) in 2 weeks  6) full labs before appointment in 3 months        OBJECTIVE:  Current Weight: Weight - Scale: 92 5 kg (204 lb)  Vitals:    07/12/21 1458   BP: 110/80   BP Location: Left arm   Patient Position: Sitting   Cuff Size: Adult   Pulse: 71   Weight: 92 5 kg (204 lb)   Height: 5' 10" (1 778 m)    Body mass index is 29 27 kg/m²  REVIEW OF SYSTEMS:    Review of Systems   Constitutional: Negative  Negative for appetite change and fatigue  HENT: Negative  Eyes: Negative  Respiratory: Negative  Negative for shortness of breath  Cardiovascular: Negative  Negative for leg swelling  Gastrointestinal: Negative  Endocrine: Negative  Genitourinary: Negative  Negative for difficulty urinating  Musculoskeletal: Negative  Allergic/Immunologic: Negative  Neurological: Negative  Hematological: Negative  Psychiatric/Behavioral: Negative  All other systems reviewed and are negative  PHYSICAL EXAM:      Physical Exam  Vitals and nursing note reviewed  Constitutional:       General: He is not in acute distress  Appearance: He is well-developed  He is not diaphoretic  HENT:      Head: Normocephalic and atraumatic  Eyes:      General: No scleral icterus  Right eye: No discharge  Left eye: No discharge  Conjunctiva/sclera: Conjunctivae normal    Neck:      Vascular: No JVD  Cardiovascular:      Rate and Rhythm: Normal rate and regular rhythm  Heart sounds: Normal heart sounds  No murmur heard  No friction rub  No gallop  Pulmonary:      Effort: Pulmonary effort is normal  No respiratory distress  Breath sounds: Normal breath sounds  No wheezing or rales  Chest:      Chest wall: No tenderness  Abdominal:      General: Bowel sounds are normal  There is no distension  Palpations: Abdomen is soft  Tenderness: There is no abdominal tenderness  There is no rebound  Musculoskeletal:         General: No tenderness or deformity  Normal range of motion  Cervical back: Normal range of motion and neck supple  Skin:     General: Skin is warm and dry  Coloration: Skin is not pale  Findings: No erythema or rash  Neurological:      Mental Status: He is alert and oriented to person, place, and time  Cranial Nerves: No cranial nerve deficit  Coordination: Coordination normal       Deep Tendon Reflexes: Reflexes are normal and symmetric  Psychiatric:         Behavior: Behavior normal          Thought Content:  Thought content normal          Judgment: Judgment normal          Medications:    Current Outpatient Medications:     acetaminophen (TYLENOL) 325 mg tablet, Take 2 tablets (650 mg total) by mouth every 8 (eight) hours as needed for mild pain, Disp: , Rfl: 0    aspirin (ECOTRIN LOW STRENGTH) 81 mg EC tablet, Take 81 mg by mouth daily Pt to check with surgeon if needed to hold RX , Disp: , Rfl:     atorvastatin (LIPITOR) 40 mg tablet, Take 1 tablet (40 mg total) by mouth daily with dinner, Disp: 30 tablet, Rfl: 1    clopidogrel (PLAVIX) 75 mg tablet, Take 75 mg by mouth daily Pt  To check with surgeon if needed to hold RX , Disp: , Rfl:     LORazepam (ATIVAN) 0 5 mg tablet, Take by mouth as needed for anxiety, Disp: , Rfl:     metoprolol tartrate (LOPRESSOR) 25 mg tablet, take 1 tablet by mouth every 8 hours (Patient taking differently: 25 mg 3 (three) times a day ), Disp: 270 tablet, Rfl: 0    cholecalciferol (VITAMIN D3) 1,000 units tablet, Take 1,000 Units by mouth daily after lunch  , Disp: , Rfl:     finasteride (PROSCAR) 5 mg tablet, Take 1 tablet (5 mg total) by mouth daily (Patient not taking: Reported on 7/12/2021), Disp: 90 tablet, Rfl: 3    Laboratory Results:        Invalid input(s): ALBUMIN    Results for orders placed or performed during the hospital encounter of 07/05/21   CBC and differential   Result Value Ref Range    WBC 7 35 4 31 - 10 16 Thousand/uL    RBC 5 25 3 88 - 5 62 Million/uL    Hemoglobin 10 9 (L) 12 0 - 17 0 g/dL    Hematocrit 39 0 36 5 - 49 3 %    MCV 74 (L) 82 - 98 fL    MCH 20 8 (L) 26 8 - 34 3 pg    MCHC 27 9 (L) 31 4 - 37 4 g/dL    RDW 22 0 (H) 11 6 - 15 1 %    MPV 9 9 8 9 - 12 7 fL    Platelets 466 848 - 119 Thousands/uL    nRBC 0 /100 WBCs    Neutrophils Relative 61 43 - 75 %    Immat GRANS % 0 0 - 2 %    Lymphocytes Relative 21 14 - 44 %    Monocytes Relative 13 (H) 4 - 12 %    Eosinophils Relative 5 0 - 6 %    Basophils Relative 0 0 - 1 %    Neutrophils Absolute 4 38 1 85 - 7 62 Thousands/µL    Immature Grans Absolute 0 03 0 00 - 0 20 Thousand/uL    Lymphocytes Absolute 1 55 0 60 - 4 47 Thousands/µL    Monocytes Absolute 0 98 0 17 - 1 22 Thousand/µL    Eosinophils Absolute 0 38 0 00 - 0 61 Thousand/µL    Basophils Absolute 0 03 0 00 - 0 10 Thousands/µL   Comprehensive metabolic panel   Result Value Ref Range    Sodium 143 136 - 145 mmol/L    Potassium 5 2 3 5 - 5 3 mmol/L    Chloride 107 100 - 108 mmol/L    CO2 29 21 - 32 mmol/L    ANION GAP 7 4 - 13 mmol/L    BUN 27 (H) 5 - 25 mg/dL    Creatinine 1 75 (H) 0 60 - 1 30 mg/dL    Glucose 104 65 - 140 mg/dL    Calcium 8 9 8 3 - 10 1 mg/dL    Corrected Calcium 9 4 8 3 - 10 1 mg/dL    AST 24 5 - 45 U/L    ALT 19 12 - 78 U/L    Alkaline Phosphatase 131 (H) 46 - 116 U/L    Total Protein 7 0 6 4 - 8 2 g/dL    Albumin 3 4 (L) 3 5 - 5 0 g/dL    Total Bilirubin 0 54 0 20 - 1 00 mg/dL    eGFR 36 ml/min/1 73sq m   Magnesium   Result Value Ref Range    Magnesium 1 9 1 6 - 2 6 mg/dL   Troponin I   Result Value Ref Range    Troponin I <0 02 <=0 04 ng/mL   NT-BNP PRO   Result Value Ref Range    NT-proBNP 2,224 (H) <450 pg/mL   ECG 12 lead   Result Value Ref Range    Ventricular Rate 70 BPM    Atrial Rate 394 BPM    AL Interval  ms    QRSD Interval 98 ms    QT Interval 382 ms    QTC Interval 412 ms    P Axis  degrees    QRS Axis -55 degrees    T Wave Axis 32 degrees

## 2021-07-12 NOTE — PROGRESS NOTES
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, s/p AVR at [facility] in 3/1/2020, former smoker quit in April, hospitalized at [facility] in April 2017 for shortness of breath at which time nephrology was consulted due to acute kidney injury with a rising creatinine  Patient was found to have right-sided hydronephrosis with ureteral stricture and stone  Patient underwent ureteral stent and eventually had a lithotripsy  Patient presents for follow up visit for CKD       Patient had stent exchange in august 2020 October of 2020-patient was admitted to hospital due to right-sided weakness  There is concern for CVA  Underwent tPA  Patient was subsequently also started on Eliquis  Hydrochlorothiazide was held       November 2020- patient was discharged from rehab        Admitted in February 2021 with flank pain  Had cystoscopy completed with right ureteral stent in place with several calculi and moderate to severe right hydronephrosis  Underwent stent exchange  Had acute kidney injury  Creatinine peaked to 2 2 mg/dL        Patient also had Watchman device placed  And then on March 1st 2021 had what sounds to be a TAVR procedure    ER visit 7/2021 - with HTN to ER  Anxiety     1) CKD III- baseline Cr approx 1 5-1 7 mg/dL  Etiology of CKD likely solitary functional kidney, prior hydro, nephrolithiasis   Patient had acute kidney injury in  February of 2021 at which time the stent was obstructed      - Follows with Urology regarding stent and nephrolithiasis  -most recent creatinine is at baseline 1 75 mg/dL 7/5/2021    - UA with RBC 5/2021 (after stent exchange)  - UPCR 0 23 5/2021  - split function test prior shows left kidney receives higher flow then the right kidney by approximately 70 vs 30%  - regular stent exchange per Urology team (last in 5/2021)     Plan:  - K borderline in ER on 7/5/2021 5  2  low potassium diet advised   - kidney smart class - completed    - repeat labwork in 2 weeks with BMP and full labs in 3 months  -Hold on initiating hydrochlorothiazide for now given marginal BP Blood pressures are stable  Patient is euvolemic  Hydrochlorothiazide has been held since last year    -no changes to medication regimen today  -  Low-potassium diet     2) Nephrolithiasis - follows with Urology       - renal stone mainly ca oxalate  - 24 hour urine litholink - reviewed  - pt has increased fluid intake to 2 L  - Needs low salt diet - already following  takes in approx 1 8 gm salt daily  at goal  - changed furosemide to HCTZ in jan 2018  - hydrochlorothiazide held during hospitalization in October of 2020  - underwent stent exchange recently with may 2021     3) HTN - Blood pressures are stable     4) Volume - history of dCHF  history of mild to mod pulm HTN  Euvolemic      - need to monitor fluid intake with HF history  - daily weights      5) CKD MBD -      -  85 in December 2019; 68 in may 2020; 106 7 in September 2020 -->  One hundred twenty-two in February 2021 --> 133 5/2021  - last vitamin-D was 41 in February 2021  Appropriate      6) Anemia - stable Hb     7) acid/base - bicarb 29 stable     8) anemia - Hb above goal     9) a fib     - watchman device  - metoprolol 25 mg BID     10)   CVA with also known aneurysm; and also known left ICA 90% stenosis      -  Right frontal lobe infarct   -  No surgical intervention was indicated for the ICA stenosis  - saw Neuro surgery 7/2021  Cont yearly f/u       11) AVR - completed 3/1/2021    12) anxiety - following with PCP  Is now on lorazepam if needed     It was a pleasure evaluating Mr Marry Montero  Thank you for allowing our team to participate in the care of your patient         No problem-specific Assessment & Plan notes found for this encounter  HPI:    Had ER visit due to anxiety recently  No fevers, chills, nausea, vomiting  Eating higher potassium diet with potatoes and peas  Advised low K diet today  PATIENT INSTRUCTIONS:    Patient Instructions   1) Avoid NSAIDS - (Example - motrin, advil, ibuprofen, aleve, exederin, etc)  2) Always follow a low salt diet  3) no changes to your medications  4) follow a low potassium diet  5) labwork with BMP (basic metabolic panel) in 2 weeks  6) full labs before appointment in 3 months        OBJECTIVE:  Current Weight: Weight - Scale: 92 5 kg (204 lb)  Vitals:    07/12/21 1458   BP: 110/80   BP Location: Left arm   Patient Position: Sitting   Cuff Size: Adult   Pulse: 71   Weight: 92 5 kg (204 lb)   Height: 5' 10" (1 778 m)    Body mass index is 29 27 kg/m²  REVIEW OF SYSTEMS:    Review of Systems   Constitutional: Negative  Negative for appetite change and fatigue  HENT: Negative  Eyes: Negative  Respiratory: Negative  Negative for shortness of breath  Cardiovascular: Negative  Negative for leg swelling  Gastrointestinal: Negative  Endocrine: Negative  Genitourinary: Negative  Negative for difficulty urinating  Musculoskeletal: Negative  Allergic/Immunologic: Negative  Neurological: Negative  Hematological: Negative  Psychiatric/Behavioral: Negative  All other systems reviewed and are negative  PHYSICAL EXAM:      Physical Exam  Vitals and nursing note reviewed  Constitutional:       General: He is not in acute distress  Appearance: He is well-developed  He is not diaphoretic  HENT:      Head: Normocephalic and atraumatic  Eyes:      General: No scleral icterus  Right eye: No discharge  Left eye: No discharge        Conjunctiva/sclera: Conjunctivae normal    Neck:      Vascular: No JVD    Cardiovascular:      Rate and Rhythm: Normal rate and regular rhythm  Heart sounds: Normal heart sounds  No murmur heard  No friction rub  No gallop  Pulmonary:      Effort: Pulmonary effort is normal  No respiratory distress  Breath sounds: Normal breath sounds  No wheezing or rales  Chest:      Chest wall: No tenderness  Abdominal:      General: Bowel sounds are normal  There is no distension  Palpations: Abdomen is soft  Tenderness: There is no abdominal tenderness  There is no rebound  Musculoskeletal:         General: No tenderness or deformity  Normal range of motion  Cervical back: Normal range of motion and neck supple  Skin:     General: Skin is warm and dry  Coloration: Skin is not pale  Findings: No erythema or rash  Neurological:      Mental Status: He is alert and oriented to person, place, and time  Cranial Nerves: No cranial nerve deficit  Coordination: Coordination normal       Deep Tendon Reflexes: Reflexes are normal and symmetric  Psychiatric:         Behavior: Behavior normal          Thought Content:  Thought content normal          Judgment: Judgment normal          Medications:    Current Outpatient Medications:     acetaminophen (TYLENOL) 325 mg tablet, Take 2 tablets (650 mg total) by mouth every 8 (eight) hours as needed for mild pain, Disp: , Rfl: 0    aspirin (ECOTRIN LOW STRENGTH) 81 mg EC tablet, Take 81 mg by mouth daily Pt to check with surgeon if needed to hold RX , Disp: , Rfl:     atorvastatin (LIPITOR) 40 mg tablet, Take 1 tablet (40 mg total) by mouth daily with dinner, Disp: 30 tablet, Rfl: 1    clopidogrel (PLAVIX) 75 mg tablet, Take 75 mg by mouth daily Pt  To check with surgeon if needed to hold RX , Disp: , Rfl:     LORazepam (ATIVAN) 0 5 mg tablet, Take by mouth as needed for anxiety, Disp: , Rfl:     metoprolol tartrate (LOPRESSOR) 25 mg tablet, take 1 tablet by mouth every 8 hours (Patient taking differently: 25 mg 3 (three) times a day ), Disp: 270 tablet, Rfl: 0    cholecalciferol (VITAMIN D3) 1,000 units tablet, Take 1,000 Units by mouth daily after lunch  , Disp: , Rfl:     finasteride (PROSCAR) 5 mg tablet, Take 1 tablet (5 mg total) by mouth daily (Patient not taking: Reported on 7/12/2021), Disp: 90 tablet, Rfl: 3    Laboratory Results:        Invalid input(s): ALBUMIN    Results for orders placed or performed during the hospital encounter of 07/05/21   CBC and differential   Result Value Ref Range    WBC 7 35 4 31 - 10 16 Thousand/uL    RBC 5 25 3 88 - 5 62 Million/uL    Hemoglobin 10 9 (L) 12 0 - 17 0 g/dL    Hematocrit 39 0 36 5 - 49 3 %    MCV 74 (L) 82 - 98 fL    MCH 20 8 (L) 26 8 - 34 3 pg    MCHC 27 9 (L) 31 4 - 37 4 g/dL    RDW 22 0 (H) 11 6 - 15 1 %    MPV 9 9 8 9 - 12 7 fL    Platelets 154 179 - 395 Thousands/uL    nRBC 0 /100 WBCs    Neutrophils Relative 61 43 - 75 %    Immat GRANS % 0 0 - 2 %    Lymphocytes Relative 21 14 - 44 %    Monocytes Relative 13 (H) 4 - 12 %    Eosinophils Relative 5 0 - 6 %    Basophils Relative 0 0 - 1 %    Neutrophils Absolute 4 38 1 85 - 7 62 Thousands/µL    Immature Grans Absolute 0 03 0 00 - 0 20 Thousand/uL    Lymphocytes Absolute 1 55 0 60 - 4 47 Thousands/µL    Monocytes Absolute 0 98 0 17 - 1 22 Thousand/µL    Eosinophils Absolute 0 38 0 00 - 0 61 Thousand/µL    Basophils Absolute 0 03 0 00 - 0 10 Thousands/µL   Comprehensive metabolic panel   Result Value Ref Range    Sodium 143 136 - 145 mmol/L    Potassium 5 2 3 5 - 5 3 mmol/L    Chloride 107 100 - 108 mmol/L    CO2 29 21 - 32 mmol/L    ANION GAP 7 4 - 13 mmol/L    BUN 27 (H) 5 - 25 mg/dL    Creatinine 1 75 (H) 0 60 - 1 30 mg/dL    Glucose 104 65 - 140 mg/dL    Calcium 8 9 8 3 - 10 1 mg/dL    Corrected Calcium 9 4 8 3 - 10 1 mg/dL    AST 24 5 - 45 U/L    ALT 19 12 - 78 U/L    Alkaline Phosphatase 131 (H) 46 - 116 U/L    Total Protein 7 0 6 4 - 8 2 g/dL    Albumin 3 4 (L) 3 5 - 5 0 g/dL    Total Bilirubin 0 54 0 20 - 1 00 mg/dL    eGFR 36 ml/min/1 73sq m   Magnesium   Result Value Ref Range    Magnesium 1 9 1 6 - 2 6 mg/dL   Troponin I   Result Value Ref Range    Troponin I <0 02 <=0 04 ng/mL   NT-BNP PRO   Result Value Ref Range    NT-proBNP 2,224 (H) <450 pg/mL   ECG 12 lead   Result Value Ref Range    Ventricular Rate 70 BPM    Atrial Rate 394 BPM    HI Interval  ms    QRSD Interval 98 ms    QT Interval 382 ms    QTC Interval 412 ms    P Axis  degrees    QRS Axis -55 degrees    T Wave Axis 32 degrees

## 2021-07-26 ENCOUNTER — LAB (OUTPATIENT)
Dept: LAB | Facility: HOSPITAL | Age: 81
End: 2021-07-26
Attending: INTERNAL MEDICINE
Payer: MEDICARE

## 2021-07-26 ENCOUNTER — TELEPHONE (OUTPATIENT)
Dept: NEPHROLOGY | Facility: CLINIC | Age: 81
End: 2021-07-26

## 2021-07-26 DIAGNOSIS — E87.5 HYPERKALEMIA: ICD-10-CM

## 2021-07-26 LAB
ANION GAP SERPL CALCULATED.3IONS-SCNC: 8 MMOL/L (ref 4–13)
BUN SERPL-MCNC: 23 MG/DL (ref 5–25)
CALCIUM SERPL-MCNC: 8.8 MG/DL (ref 8.3–10.1)
CHLORIDE SERPL-SCNC: 107 MMOL/L (ref 100–108)
CO2 SERPL-SCNC: 28 MMOL/L (ref 21–32)
CREAT SERPL-MCNC: 1.55 MG/DL (ref 0.6–1.3)
GFR SERPL CREATININE-BSD FRML MDRD: 42 ML/MIN/1.73SQ M
GLUCOSE P FAST SERPL-MCNC: 101 MG/DL (ref 65–99)
POTASSIUM SERPL-SCNC: 4.6 MMOL/L (ref 3.5–5.3)
SODIUM SERPL-SCNC: 143 MMOL/L (ref 136–145)

## 2021-07-26 PROCEDURE — 80048 BASIC METABOLIC PNL TOTAL CA: CPT

## 2021-07-26 PROCEDURE — 36415 COLL VENOUS BLD VENIPUNCTURE: CPT

## 2021-07-26 NOTE — RESULT ENCOUNTER NOTE
Hello    Patient normally is followed up by Ms Ranjith Melendez  Please let pt know that creat stable   K has improved 4 6  no changes for now    Thank you    np

## 2021-07-26 NOTE — TELEPHONE ENCOUNTER
----- Message from Lázaro Aguilar MD sent at 7/26/2021  1:37 PM EDT -----  Hello    Patient normally is followed up by Ms Tony Hardin  Please let pt know that creat stable   K has improved 4 6  no changes for now    Thank you    np

## 2021-07-29 PROBLEM — I50.32 CHRONIC DIASTOLIC CHF (CONGESTIVE HEART FAILURE) (HCC): Status: ACTIVE | Noted: 2017-05-05

## 2021-07-29 PROBLEM — K21.9 GERD (GASTROESOPHAGEAL REFLUX DISEASE): Status: ACTIVE | Noted: 2017-05-15

## 2021-07-29 PROBLEM — E55.9 VITAMIN D DEFICIENCY: Status: ACTIVE | Noted: 2017-07-03

## 2021-07-29 PROBLEM — I34.0 MODERATE MITRAL REGURGITATION: Status: ACTIVE | Noted: 2017-05-15

## 2021-07-29 PROBLEM — I35.1 MODERATE AORTIC REGURGITATION: Status: ACTIVE | Noted: 2017-05-15

## 2021-07-29 PROBLEM — N18.31 CHRONIC KIDNEY DISEASE (CKD) STAGE G3A/A1, MODERATELY DECREASED GLOMERULAR FILTRATION RATE (GFR) BETWEEN 45-59 ML/MIN/1.73 SQUARE METER AND ALBUMINURIA CREATININE RATIO LESS THAN 30 MG/G (HCC): Status: ACTIVE | Noted: 2017-05-05

## 2021-07-29 PROBLEM — I35.0 NONRHEUMATIC AORTIC VALVE STENOSIS: Status: ACTIVE | Noted: 2017-05-15

## 2021-07-29 PROBLEM — I63.9 STROKE (HCC): Status: ACTIVE | Noted: 2020-10-29

## 2021-07-29 PROBLEM — E78.5 DYSLIPIDEMIA: Status: ACTIVE | Noted: 2017-05-15

## 2021-08-01 ENCOUNTER — APPOINTMENT (EMERGENCY)
Dept: RADIOLOGY | Facility: HOSPITAL | Age: 81
End: 2021-08-01
Payer: MEDICARE

## 2021-08-01 ENCOUNTER — HOSPITAL ENCOUNTER (EMERGENCY)
Facility: HOSPITAL | Age: 81
Discharge: HOME | End: 2021-08-01
Attending: EMERGENCY MEDICINE
Payer: MEDICARE

## 2021-08-01 ENCOUNTER — HOSPITAL ENCOUNTER (EMERGENCY)
Facility: HOSPITAL | Age: 81
Discharge: HOME | End: 2021-08-01
Attending: EMERGENCY MEDICINE | Admitting: EMERGENCY MEDICINE
Payer: MEDICARE

## 2021-08-01 VITALS
RESPIRATION RATE: 18 BRPM | HEART RATE: 62 BPM | TEMPERATURE: 96.5 F | OXYGEN SATURATION: 99 % | DIASTOLIC BLOOD PRESSURE: 102 MMHG | SYSTOLIC BLOOD PRESSURE: 225 MMHG

## 2021-08-01 VITALS
HEART RATE: 84 BPM | DIASTOLIC BLOOD PRESSURE: 97 MMHG | TEMPERATURE: 97.3 F | OXYGEN SATURATION: 96 % | BODY MASS INDEX: 28.63 KG/M2 | SYSTOLIC BLOOD PRESSURE: 193 MMHG | WEIGHT: 200 LBS | RESPIRATION RATE: 18 BRPM | HEIGHT: 70 IN

## 2021-08-01 DIAGNOSIS — T14.8XXA BLEEDING FROM WOUND: Primary | ICD-10-CM

## 2021-08-01 DIAGNOSIS — S01.81XA FACIAL LACERATION: ICD-10-CM

## 2021-08-01 DIAGNOSIS — S02.2XXA NASAL BONE FRACTURE: Primary | ICD-10-CM

## 2021-08-01 DIAGNOSIS — S09.90XA INJURY OF HEAD, INITIAL ENCOUNTER: ICD-10-CM

## 2021-08-01 PROCEDURE — 70450 CT HEAD/BRAIN W/O DYE: CPT

## 2021-08-01 PROCEDURE — 99284 EMERGENCY DEPT VISIT MOD MDM: CPT | Performed by: EMERGENCY MEDICINE

## 2021-08-01 PROCEDURE — 99284 EMERGENCY DEPT VISIT MOD MDM: CPT

## 2021-08-01 PROCEDURE — 72125 CT NECK SPINE W/O DYE: CPT

## 2021-08-01 PROCEDURE — 70486 CT MAXILLOFACIAL W/O DYE: CPT

## 2021-08-01 PROCEDURE — 99283 EMERGENCY DEPT VISIT LOW MDM: CPT

## 2021-08-01 PROCEDURE — 99282 EMERGENCY DEPT VISIT SF MDM: CPT | Performed by: EMERGENCY MEDICINE

## 2021-08-01 PROCEDURE — G0168 WOUND CLOSURE BY ADHESIVE: HCPCS | Performed by: EMERGENCY MEDICINE

## 2021-08-01 RX ORDER — BACITRACIN, NEOMYCIN, POLYMYXIN B 400; 3.5; 5 [USP'U]/G; MG/G; [USP'U]/G
1 OINTMENT TOPICAL ONCE
Status: COMPLETED | OUTPATIENT
Start: 2021-08-01 | End: 2021-08-01

## 2021-08-01 RX ORDER — LIDOCAINE HYDROCHLORIDE AND EPINEPHRINE 20; 5 MG/ML; UG/ML
1 INJECTION, SOLUTION EPIDURAL; INFILTRATION; INTRACAUDAL; PERINEURAL ONCE
Status: COMPLETED | OUTPATIENT
Start: 2021-08-01 | End: 2021-08-01

## 2021-08-01 RX ORDER — LIDOCAINE HYDROCHLORIDE 20 MG/ML
5 INJECTION, SOLUTION EPIDURAL; INFILTRATION; INTRACAUDAL; PERINEURAL ONCE
Status: COMPLETED | OUTPATIENT
Start: 2021-08-01 | End: 2021-08-01

## 2021-08-01 RX ADMIN — LIDOCAINE HYDROCHLORIDE 5 ML: 20 INJECTION, SOLUTION EPIDURAL; INFILTRATION; INTRACAUDAL; PERINEURAL at 10:41

## 2021-08-01 RX ADMIN — BACITRACIN, NEOMYCIN, POLYMYXIN B 1 SMALL APPLICATION: 400; 3.5; 5 OINTMENT TOPICAL at 10:41

## 2021-08-01 RX ADMIN — LIDOCAINE HYDROCHLORIDE AND EPINEPHRINE 1 ML: 20; 5 INJECTION, SOLUTION EPIDURAL; INFILTRATION; INTRACAUDAL; PERINEURAL at 13:50

## 2021-08-01 NOTE — ED PROVIDER NOTES
Final Diagnosis:  1  Bleeding from wound        Chief Complaint   Patient presents with    Bleeding/Bruising     Patient bleeding from nose wound since discharge  HPI  Patient who was previously seen after Nondisplaced left nasal bone fracture with assoc laceration upon fall  Has sutures and surgicell placed with relative hemostasis  On arrival home patient has soaked through his dressing with a slow ooze of superficial bleeding  No blood inside nares or posterior pharynx  He's on ASA Liz no other ACAP  Sutures still in place  Easily controlled with pressure  Use lidoepi pledget with control and re dress with hemostat impreg dressing       - No language barrier    - History obtained from patient  - There are no limitations to the history obtained  - Previous charting underwent limited review with attention to labs, ekgs, and prior imaging  PMH:   has a past medical history of Aneurysm ([facility] [address] ), Essential hypertension, long-standing, Hematuria, History of cigarette smoking, chronic, Hyperlipidemia, Hypertension, Irregular heart beat, Pneumonia, Stroke ([facility] [address] ) (10/29/2020), Stroke [facility]), Vitamin D deficiency, Water retention, Wears glasses, and Wears partial dentures  PSH:   has a past surgical history that includes Prostate surgery (2015); Appendectomy (1960); Extracorporeal shock wave lithotripsy (03/2017); Carotid endarterectomy (Left, 1998); Ureteral stent placement (Right, 4/18/2017); pr cysto/uretero w/lithotripsy &indwell stent insrt (Right, 5/2/2017); pr cysto/uretero w/lithotripsy &indwell stent insrt (Right, 5/16/2017); pr cysto/uretero w/lithotripsy &indwell stent insrt (Right, 6/2/2017); pr cysto/uretero w/lithotripsy &indwell stent insrt (Right, 7/18/2017); CYSTOSCOPY (Right, 8/15/2017); Cystoscopy; pr cystourethroscopy,ureter catheter (Right, 11/14/2017); pr cystoscopy,insert ureteral stent (Right, 11/14/2017);  Ureteral stent placement (Right, 2/13/2018); pr cystourethroscopy,ureter catheter (Right, 5/8/2018); pr cystourethroscopy,ureter catheter (Right, 8/14/2018); pr cystourethroscopy,ureter catheter (Right, 11/13/2018); pr cystourethroscopy,ureter catheter (Right, 2/12/2019); FL retrograde pyelogram (5/10/2019); pr cystourethroscopy,ureter catheter (Right, 5/10/2019); pr cystourethroscopy,ureter catheter (Right, 7/30/2019); FL retrograde pyelogram (7/30/2019); pr cystourethroscopy,ureter catheter (Right, 10/22/2019); FL retrograde pyelogram (10/22/2019); pr cystourethroscopy,ureter catheter (Right, 1/28/2020); FL retrograde pyelogram (1/28/2020); pr cystourethroscopy,ureter catheter (Right, 5/22/2020); FL retrograde pyelogram (8/11/2020); pr cystourethroscopy,ureter catheter (Right, 8/11/2020); pr cystourethroscopy,ureter catheter (Right, 12/15/2020); FL retrograde pyelogram (12/15/2020); FL retrograde pyelogram (2/14/2021); pr cysto/uretero w/lithotripsy &indwell stent insrt (Right, 2/14/2021); Cardiac surgery; pr cystourethroscopy,ureter catheter (Right, 5/11/2021); and FL retrograde pyelogram (5/11/2021)  ROS:  Review of Systems   Constitutional: Negative for activity change, chills, diaphoresis, fatigue and fever  HENT: Positive for nosebleeds  Negative for congestion, postnasal drip, rhinorrhea, sneezing, sore throat and trouble swallowing  Eyes: Negative for visual disturbance  Respiratory: Negative for cough, chest tightness, shortness of breath and wheezing  Cardiovascular: Negative for chest pain and leg swelling  Gastrointestinal: Negative for abdominal distention, abdominal pain, blood in stool, constipation, diarrhea, nausea and vomiting  Genitourinary: Negative for decreased urine volume, dysuria, flank pain, frequency, hematuria and urgency  Skin: Negative for color change and rash  Neurological: Negative for syncope, weakness, light-headedness and headaches  Psychiatric/Behavioral: Negative for confusion and sleep disturbance     All other systems reviewed and are negative  PE:       Vitals:    08/01/21 1324   BP: (!) 225/102   BP Location: Right arm   Pulse: 62   Resp: 18   Temp: (!) 96 5 °F (35 8 °C)   TempSrc: Tympanic   SpO2: 99%     Vitals reviewed by me  Nursing note reviewed  Chaperone present for all gynecologic exam   Const: No acute distress  Alert  Nontoxic  Not diaphoretic  HEENT: External ears normal  Sutures and glue  Slow venous oozing  No blood in nares/posterior oropharynx    MMM  Mouth with baseline/symmetric movement  No trismus  Eyes: No squinting  No icterus  Tracks through the room with normal EOM  No tearing/swelling/drainage  Neck: ROM normal  No rigidity  No meningismus  Cards: Rate as per vitals  Compared to monitor sinus unless documented below  Regular  Well perfused  Edema as below  Pulm: speaking full sentences  Effort and excursion normal  No disress  No audible wheezing/ stridor  Normal rate  Abd: No distension beyond baseline  No fluctuant wave  Patient without peritoneal pain with shifting/bumping the bed  MSK: ROM normal and baseline  No deformity  Skin: No new rashes visible  Well perfused  Neuro: Nonfocal  Baseline  CN grossly intact  Moving all four with coordination  Psych: Normal behavior and judgement  No SI HI        A:  - Nursing note reviewed  Ddx  Venous oozing 2/2 therapeutic platelet dysfunction    assessment               No orders to display            No orders to display     No orders of the defined types were placed in this encounter  Labs Reviewed - No data to display    Final Diagnosis:  1  Bleeding from wound        P:  - hospital tx includes hemostat dressing, lido epi pledget  - home tx should include pressure  - patient to follow with PCP suture removal   - patient also incidentally hypertensive  Asymptomatic  Will follow with PCP for BP control  - patient will call their PCP to let them know they were in the emergency department   We discuss return precautions     Medications lidocaine-epinephrine (XYLOCAINE-MPF/EPINEPHRINE) 2 %-1:200,000 injection 1 mL (1 mL Infiltration Given 8/1/21 1350)     Time reflects when diagnosis was documented in both MDM as applicable and the Disposition within this note     Time User Action Codes Description Comment    8/1/2021  1:59 PM Isaacsukumar Awad, [address]  8XX[address]] Bleeding from wound       ED Disposition     ED Disposition Condition Date/Time Comment    Discharge Stable Sun Aug 1, 2021  1:59 PM [place] discharge to home/self care  Follow-up Information     Follow up With Specialties Details Why [id number]  Schedule an appointment as soon as possible for a visit  As needed [address] [facility] [address] [address]        Discharge Medication List as of 8/1/2021  2:00 PM      CONTINUE these medications which have NOT CHANGED    Details   aspirin (ECOTRIN LOW STRENGTH) 81 mg EC tablet Take 81 mg by mouth daily Pt to check with surgeon if needed to hold RX , Historical Med      atorvastatin (LIPITOR) 40 mg tablet Take 1 tablet (40 mg total) by mouth daily with dinner, Starting Tue 11/17/2020, Normal      cholecalciferol (VITAMIN D3) 1,000 units tablet Take 1,000 Units by mouth daily after lunch  , Historical Med      clopidogrel (PLAVIX) 75 mg tablet Take 75 mg by mouth daily Pt  To check with surgeon if needed to hold RX , Historical Med      finasteride (PROSCAR) 5 mg tablet Take 1 tablet (5 mg total) by mouth daily, Starting Wed 1/20/2021, Until Sun 8/1/2021, Normal      LORazepam (ATIVAN) 0 5 mg tablet Take by mouth as needed for anxiety, Historical Med      metoprolol tartrate (LOPRESSOR) 25 mg tablet take 1 tablet by mouth every 8 hours, Normal      sertraline (ZOLOFT) 50 mg tablet Take 50 mg by mouth daily at bedtime , Starting Mon 7/26/2021, Historical Med           No discharge procedures on file    Prior to Admission Medications   Prescriptions Last Dose Informant Patient Reported? Taking? LORazepam (ATIVAN) 0 5 mg tablet   Yes No   Sig: Take by mouth as needed for anxiety   aspirin (ECOTRIN LOW STRENGTH) 81 mg EC tablet   Yes No   Sig: Take 81 mg by mouth daily Pt to check with surgeon if needed to hold RX    atorvastatin (LIPITOR) 40 mg tablet  Self No No   Sig: Take 1 tablet (40 mg total) by mouth daily with dinner   cholecalciferol (VITAMIN D3) 1,000 units tablet  Self Yes No   Sig: Take 1,000 Units by mouth daily after lunch     clopidogrel (PLAVIX) 75 mg tablet  Self Yes No   Sig: Take 75 mg by mouth daily Pt  To check with surgeon if needed to hold RX    finasteride (PROSCAR) 5 mg tablet  Self No No   Sig: Take 1 tablet (5 mg total) by mouth daily   metoprolol tartrate (LOPRESSOR) 25 mg tablet   No No   Sig: take 1 tablet by mouth every 8 hours   Patient taking differently: 25 mg 3 (three) times a day    sertraline (ZOLOFT) 50 mg tablet   Yes No   Sig: Take 50 mg by mouth daily at bedtime       Facility-Administered Medications: None       Portions of the record may have been created with voice recognition software  Occasional wrong word or "sound a like" substitutions may have occurred due to the inherent limitations of voice recognition software  Read the chart carefully and recognize, using context, where substitutions have occurred      Electronically signed by:  MD Jim Stanton MD  08/01/21 1459

## 2021-08-01 NOTE — ED PROVIDER NOTES
History  Chief Complaint   Patient presents with    Head Injury     patient fell while at [employer], face first, obvious nose injury with lac, bleeding control  Patient arrives collared, alert and oriented, speaking in full sentences  Takes asa 81mg and plavix daily  80yoM hx CVA,cerebral aneurysm,AFib on asa/plavix, HTN, anxiety c/o trip and fall outside of [employer], landing face down on the street  No LOC  No HA  No preceeding sxs  Newly on ativan at night for insomnia  Tetanus UTD  Prior to Admission Medications   Prescriptions Last Dose Informant Patient Reported? Taking?    LORazepam (ATIVAN) 0 5 mg tablet 7/31/2021 at Unknown time  Yes Yes   Sig: Take by mouth as needed for anxiety   aspirin (ECOTRIN LOW STRENGTH) 81 mg EC tablet 8/1/2021 at Unknown time  Yes Yes   Sig: Take 81 mg by mouth daily Pt to check with surgeon if needed to hold RX    atorvastatin (LIPITOR) 40 mg tablet 7/31/2021 at Unknown time Self No Yes   Sig: Take 1 tablet (40 mg total) by mouth daily with dinner   cholecalciferol (VITAMIN D3) 1,000 units tablet 8/1/2021 at Unknown time Self Yes Yes   Sig: Take 1,000 Units by mouth daily after lunch     clopidogrel (PLAVIX) 75 mg tablet 8/1/2021 at Unknown time Self Yes Yes   Sig: Take 75 mg by mouth daily Pt  To check with surgeon if needed to hold RX    finasteride (PROSCAR) 5 mg tablet 8/1/2021 at Unknown time Self No Yes   Sig: Take 1 tablet (5 mg total) by mouth daily   metoprolol tartrate (LOPRESSOR) 25 mg tablet 8/1/2021 at Unknown time  No Yes   Sig: take 1 tablet by mouth every 8 hours   Patient taking differently: 25 mg 3 (three) times a day    sertraline (ZOLOFT) 50 mg tablet 7/31/2021 at Unknown time  Yes Yes   Sig: Take 50 mg by mouth daily at bedtime       Facility-Administered Medications: None       Past Medical History:   Diagnosis Date    Aneurysm ([facility] [address] )     of brain  being monitored    Essential hypertension, long-standing     Hematuria     intermittent    History of cigarette smoking, chronic     64 year smoker at one half pack per day, quit 04/1/17    Hyperlipidemia     Hypertension     Irregular heart beat     Pneumonia      X 2    Stroke ([facility] [address] ) 10/29/2020    right sided  weakness almost full recovery    Stroke ([facility] [address] )     Vitamin D deficiency     maintained with 1000 units of D    Water retention     Wears glasses     Wears partial dentures     upper       Past Surgical History:   Procedure Laterality Date    APPENDECTOMY  1960    CARDIAC SURGERY      watchman procedure; 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    CAROTID ENDARTERECTOMY Left 1998    Supposedly no internal stenosis found    CYSTOSCOPY Right 8/15/2017    Procedure: CYSTOSCOPY RIGHT STENT EXCHANGE;  Surgeon: Yamini Corley MD;  Location: [address] [place] [address];  Service: Urology    CYSTOSCOPY     [facility]  LITHOTRIPSY  03/2017    For nephrolithiasis at [address]  5/10/2019    [place] RETROGRADE PYELOGRAM  7/30/2019    [place] RETROGRADE PYELOGRAM  10/22/2019    [place] RETROGRADE PYELOGRAM  1/28/2020    [place] RETROGRADE PYELOGRAM  8/11/2020    FL RETROGRADE PYELOGRAM  12/15/2020    FL RETROGRADE PYELOGRAM  2/14/2021    [place] RETROGRADE PYELOGRAM  5/11/2021    [place] CYSTO/URETERO W/LITHOTRIPSY &INDWELL STENT INSRT Right 5/2/2017    Procedure: CYSTOSCOPY URETEROSCOPY WITH LITHOTRIPSY HOLMIUM LASER, RETROGRADE PYELOGRAM AND INSERTION STENT URETERAL;  Surgeon: Yamini Corley MD;  Location: [address] [place] [address];  Service: Urology    [place] CYSTO/URETERO W/LITHOTRIPSY &INDWELL STENT INSRT Right 5/16/2017    Procedure: CYSTOSCOPY, RETROGRADE PYELOGRAM,  FLEXIBLE URETEROSCOPY,  HOLMIUM LASER LITHOTRIPSY, STONE BASKET MANIPULATION,  STENT URETERAL EXCHANGE;  Surgeon: Yamini Corley MD;  Location: [address] [place] [address];  Service: Urology    [place] CYSTO/URETERO W/LITHOTRIPSY &INDWELL STENT INSRT Right 6/2/2017    Procedure: CYSTOSCOPY, RETROGRADE, STONE MANIPULATION WITH HOLMIUM LASER, STENT PLACEMENT;  Surgeon: Sherry Echeverria MD;  Location: [address] [place] [address];  Service: Urology    [place] CYSTO/URETERO W/LITHOTRIPSY &INDWELL STENT INSRT Right 7/18/2017    Procedure: CYSTOSCOPY, RETROGRADE, URETEROSCOPY HOLMIUM LASER [place],  AND STENT PLACEMENT;  Surgeon: Sherry Echeverria MD;  Location: [address] [place] [address];  Service: Urology    [place] CYSTO/URETERO W/LITHOTRIPSY &INDWELL STENT INSRT Right 2/14/2021    Procedure: CYSTOSCOPY URETEROSCOPY, RETROGRADE PYELOGRAM, STONE MANIPULATION AND EXCHANGE STENT URETERAL;  Surgeon: Sergio Pérez MD;  Location: [address] [place] [address];  Service: Urology    [place] CYSTOSCOPY,INSERT URETERAL STENT Right 11/14/2017    Procedure: EXCHANGE STENT URETERAL;  Surgeon: Sherry Echeverria MD;  Location: [address] [place] [address];  Service: Urology    [place] CYSTOURETHROSCOPY,URETER CATHETER Right 11/14/2017    Procedure: CYSTOSCOPY RETROGRADE, STENT EXCHANGE;  Surgeon: Sherry Echeverria MD;  Location: [address] [place] [address];  Service: Urology    [place] CYSTOURETHROSCOPY,URETER CATHETER Right 5/8/2018    Procedure: Isa Ferro;  Surgeon: Sherry Echeverria MD;  Location: [address] [place] [address];  Service: Urology    [place] CYSTOURETHROSCOPY,URETER CATHETER Right 8/14/2018    Procedure: Sandie Fátima EXCHANGE;  Surgeon: Sherry Echeverria MD;  Location: [address] [place] [address];  Service: Urology    [place] CYSTOURETHROSCOPY,URETER CATHETER Right 11/13/2018    Procedure: CYSTOSCOPY, Hildy Elder EXCHANGE, RETROGRADE;  Surgeon: Sherry Echeverria MD;  Location: [address] [place] [address];  Service: Urology    [place] CYSTOURETHROSCOPY,URETER CATHETER Right 2/12/2019    Procedure: CYSTOSCOPY, RETROGRADE, STENT REMOVAL AND STENT PLACEMENT;  Surgeon: Sherry Echeverria MD;  Location: [address] [place] [address];  Service: Urology    [place] CYSTOURETHROSCOPY,URETER CATHETER Right 5/10/2019    Procedure: CYSTOSCOPY, RETROGRADE, STENT EXCHANGE;  Surgeon: Sherry Echeverria MD;  Location: [address] [place] [address];  Service: Urology    [place] CYSTOURETHROSCOPY,URETER CATHETER Right 7/30/2019 Procedure: CYSTOSCOPY, RETROGRADE, STENT REMOVAL AND PLACEMENT OF STENT;  Surgeon: Magaly Melara MD;  Location: [address] [place] [address];  Service: Urology    [place] CYSTOURETHROSCOPY,URETER CATHETER Right 10/22/2019    Procedure: Sloane Lolling, STENT EXCHANGE;  Surgeon: Magaly Melara MD;  Location: [address] [place] [address];  Service: Urology    [place] CYSTOURETHROSCOPY,URETER CATHETER Right 1/28/2020    Procedure: Sloane Lolling, STENT REMOVAL AND STENT PLACEMENT;  Surgeon: Magaly Melara MD;  Location: [address] [place] [address];  Service: Urology    [place] CYSTOURETHROSCOPY,URETER CATHETER Right 5/22/2020    Procedure: CYSTOSCOPY RETROGRADE, STENT EXCHANGE;  Surgeon: Magaly Melara MD;  Location: [address] [place] [address];  Service: Urology    [place] CYSTOURETHROSCOPY,URETER CATHETER Right 8/11/2020    Procedure: CYSTOSCOPY, STENT EXCHANGE, RETROGRADE;  Surgeon: Magaly Melara MD;  Location: [address] [place] [address];  Service: Urology    [place] CYSTOURETHROSCOPY,URETER CATHETER Right 12/15/2020    Procedure: CYSTOSCOPY, RETROGRADE, STENT REMOVAL, AND STENT PLACEMENT;  Surgeon: Magaly Melara MD;  Location: [address] [place] [address];  Service: Urology    [place] CYSTOURETHROSCOPY,URETER CATHETER Right 5/11/2021    Procedure: CYSTOSCOPY RETROGRADE PYELOGRAM WITH STENT EXCHANGE;  Surgeon: Magaly Melara MD;  Location: [address] [place] [address];  Service: Urology    PROSTATE SURGERY  2015    Laser Prostatectomy  by Dr Jael Davis Right 4/18/2017    Procedure: INSERTION STENT URETERAL, RIGHT URETEROSCOPY,  LASER OF URETERAL STRICTURE AND STONE;  Surgeon: Magaly Melara MD;  Location: [address] [place] [address];  Service:     URETERAL STENT PLACEMENT Right 2/13/2018    Procedure: Nathan Ross;  Surgeon: Magaly Melara MD;  Location: [facility] OR;  Service: Urology       Family History   Problem Relation Age of Onset    Hypertension Mother     Alcohol abuse Father     Cirrhosis Father     Cancer Son 15        cancer-knee and above-left-amputation    Cancer Sister     Other Brother         head mass    Thyroid disease unspecified Sister     Arthritis Sister     Other Brother         legally blind in one eye     I have reviewed and agree with the history as documented  E-Cigarette/Vaping    E-Cigarette Use Never User      E-Cigarette/Vaping Substances    Nicotine No     THC No     CBD No     Flavoring No     Other No     Unknown No      Social History     Tobacco Use    Smoking status: Former Smoker     Packs/day: 0 50     Years: 62 00     Pack years: 31 00     Types: Cigarettes     Quit date: 4/15/2017     Years since quittin 3    Smokeless tobacco: Never Used   Vaping Use    Vaping Use: Never used   Substance Use Topics    Alcohol use: Yes     Comment: rare    Drug use: No       Review of Systems   Constitutional: Negative for fever  Respiratory: Negative for cough  Gastrointestinal: Negative for abdominal pain  Musculoskeletal: Negative for back pain  Neurological: Negative for headaches  All other systems reviewed and are negative  Physical Exam  Physical Exam  Vitals reviewed  Constitutional:       General: He is not in acute distress  Appearance: He is well-developed  HENT:      Right Ear: External ear normal       Left Ear: External ear normal       Nose: No rhinorrhea  Comments: Moderate edema/ecchymosis to bridge of nose with 3cm laceration, no active bleeding     Mouth/Throat:      Mouth: Mucous membranes are moist    Eyes:      Conjunctiva/sclera: Conjunctivae normal       Pupils: Pupils are equal, round, and reactive to light  Cardiovascular:      Rate and Rhythm: Normal rate and regular rhythm  Pulmonary:      Effort: Pulmonary effort is normal       Breath sounds: Normal breath sounds  No wheezing or rales  Abdominal:      Palpations: Abdomen is soft  Tenderness: There is no abdominal tenderness  Musculoskeletal:         General: No tenderness  Normal range of motion        Cervical back: Neck supple  Right lower leg: No edema  Left lower leg: No edema  Skin:     General: Skin is warm and dry  Capillary Refill: Capillary refill takes less than 2 seconds  Neurological:      Mental Status: He is alert and oriented to person, place, and time  Sensory: No sensory deficit  Motor: No weakness  Coordination: Coordination normal    Psychiatric:         Mood and Affect: Mood normal          Vital Signs  ED Triage Vitals [08/01/21 0931]   Temperature Pulse Respirations Blood Pressure SpO2   (!) 97 3 °F (36 3 °C) 68 18 (!) 179/86 98 %      Temp Source Heart Rate Source Patient Position - Orthostatic VS BP Location FiO2 (%)   Temporal Monitor Sitting Left arm --      Pain Score       --           Vitals:    08/01/21 0931 08/01/21 1045   BP: (!) 179/86 (!) 193/97   Pulse: 68 84   Patient Position - Orthostatic VS: Sitting          Visual Acuity      ED Medications  Medications   lidocaine (PF) (XYLOCAINE-MPF) 2 % injection 5 mL (5 mL Infiltration Given 8/1/21 1041)   neomycin-bacitracin-polymyxin b (NEOSPORIN) ointment 1 small application (1 small application Topical Given 8/1/21 1041)       Diagnostic Studies  Results Reviewed     None                 CT head without contrast   Final Result by [facility], DO (08/01 1010)      No acute intracranial abnormality  Chronic infarcts as described  Workstation performed: [id number]         CT facial bones without contrast   Final Result by [facility] (08/01 1059)      Nondisplaced left nasal bone fracture  Workstation performed: [id number]         CT cervical spine without contrast   Final Result by [facility] (08/01 1013)      No cervical spine fracture or traumatic malalignment  Degenerative changes are noted                     Workstation performed: [id number]                    Procedures  Laceration repair    Date/Time: 8/1/2021 10:30 AM  Performed by: Korin Roberts Rodrigo Lynch DO  Authorized by: Gelacio Metcalf DO   Consent: Verbal consent obtained  Consent given by: patient  Body area: head/neck  Location details: nose  Laceration length: 3 cm  Foreign bodies: no foreign bodies    Anesthesia:  Local Anesthetic: lidocaine 1% without epinephrine  Anesthetic total: 5 mL    Wound Dehiscence:  Superficial Wound Dehiscence: simple closure      Procedure Details:  Irrigation solution: saline  Amount of cleaning: standard  Skin closure: 5-0 nylon and glue  Technique: simple  Approximation: close  Approximation difficulty: simple               ED Course                                           MDM  Number of Diagnoses or Management Options  Facial laceration  Injury of head, initial encounter  Nasal bone fracture  Diagnosis management comments: CT neg for IC bleed, pos nasal bone fx  Lac repaired w/ sutures and skin glue  7d sutures out  Pt stable on d/c      Disposition  Final diagnoses:   Nasal bone fracture   Injury of head, initial encounter   Facial laceration     Time reflects when diagnosis was documented in both MDM as applicable and the Disposition within this note     Time User Action Codes Description Comment    8/1/2021 11:59 AM Morna Jael Add [S02  2XXA] Nasal bone fracture     8/1/2021 11:59 AM Morna Jael Add [O58 21JO] Injury of head, initial encounter     8/1/2021 11:59 AM Morna Jael Add [S01 81XA] Facial laceration       ED Disposition     ED Disposition Condition Date/Time Comment    Discharge Stable Sun Aug 1, 2021 11:59 AM Edward Chiang discharge to home/self care              Follow-up Information     Follow up With Specialties Details Why Nino Nam MD Otolaryngology In 1 week  [address] [address]  [phone]            Discharge Medication List as of 8/1/2021 12:01 PM      CONTINUE these medications which have NOT CHANGED    Details   aspirin (ECOTRIN LOW STRENGTH) 81 mg EC tablet Take 81 mg by mouth daily Pt to check with surgeon if needed to hold RX , Historical Med      atorvastatin (LIPITOR) 40 mg tablet Take 1 tablet (40 mg total) by mouth daily with dinner, Starting Tue 11/17/2020, Normal      cholecalciferol (VITAMIN D3) 1,000 units tablet Take 1,000 Units by mouth daily after lunch  , Historical Med      clopidogrel (PLAVIX) 75 mg tablet Take 75 mg by mouth daily Pt  To check with surgeon if needed to hold RX , Historical Med      finasteride (PROSCAR) 5 mg tablet Take 1 tablet (5 mg total) by mouth daily, Starting Wed 1/20/2021, Until Sun 8/1/2021, Normal      LORazepam (ATIVAN) 0 5 mg tablet Take by mouth as needed for anxiety, Historical Med      metoprolol tartrate (LOPRESSOR) 25 mg tablet take 1 tablet by mouth every 8 hours, Normal      sertraline (ZOLOFT) 50 mg tablet Take 50 mg by mouth daily at bedtime , Starting Mon 7/26/2021, Historical Med           No discharge procedures on file      PDMP Review     None          ED Provider  Electronically Signed by           Turner Lang DO  08/19/21 [address] [place] [address],   08/21/21 7251

## 2021-08-09 ENCOUNTER — OFFICE VISIT (OUTPATIENT)
Dept: OTOLARYNGOLOGY | Facility: CLINIC | Age: 81
End: 2021-08-09
Payer: MEDICARE

## 2021-08-09 VITALS — WEIGHT: 199 LBS | BODY MASS INDEX: 28.49 KG/M2 | TEMPERATURE: 97.2 F | HEIGHT: 70 IN

## 2021-08-09 DIAGNOSIS — H90.3 SENSORINEURAL HEARING LOSS (SNHL), BILATERAL: ICD-10-CM

## 2021-08-09 DIAGNOSIS — S01.81XD FACIAL LACERATION, SUBSEQUENT ENCOUNTER: Primary | ICD-10-CM

## 2021-08-09 DIAGNOSIS — S02.2XXD: ICD-10-CM

## 2021-08-09 PROBLEM — S01.81XA FACIAL LACERATION: Status: ACTIVE | Noted: 2021-08-09

## 2021-08-09 PROCEDURE — 99203 OFFICE O/P NEW LOW 30 MIN: CPT | Performed by: NURSE PRACTITIONER

## 2021-08-09 NOTE — ASSESSMENT & PLAN NOTE
Reviewed Ct facial bones from ER indicating nondisplaced nasal fracture  Denies nasal obstruction or concerns with nasal airflow    Pt declines surgical discussion at this time

## 2021-08-09 NOTE — PROGRESS NOTES
Assessment/Plan:    Facial laceration  Wound debridement and removal of 4 sutures across nasal bridge  Removed without difficulty  Apply antibiotic ointment to site twice daily  Follow up in one week      Closed nondisplaced fracture of nasal bone with routine healing  Reviewed Ct facial bones from ER indicating nondisplaced nasal fracture  Denies nasal obstruction or concerns with nasal airflow  Pt declines surgical discussion at this time      Sensorineural hearing loss (SNHL) of both ears  Discussed hearing related concerns  Pending audiogram visit next week  Bilateral eac clear, no cerumen no serous fluid  Audiogram next visit         Diagnoses and all orders for this visit:    Facial laceration, subsequent encounter    Closed nondisplaced fracture of nasal bone with routine healing    Sensorineural hearing loss (SNHL), bilateral  -     Ambulatory referral to Audiology; Future          Subjective:      Patient ID: Alicia Browning is a 68 y o  male  Presents today as a new patient due to facial injury  Fall walking out of [employer] and landed in face  Treated in the ER  Non displaced nasal fracture  Runny nose  Slight discomfort at nasal site  The following portions of the patient's history were reviewed and updated as appropriate: allergies, current medications, past family history, past medical history, past social history, past surgical history and problem list     Review of Systems   Constitutional: Negative  HENT: Positive for congestion, hearing loss and rhinorrhea  Negative for ear discharge, ear pain, nosebleeds, postnasal drip, sinus pressure, sinus pain, sore throat, tinnitus and voice change  Eyes: Negative  Respiratory: Negative for chest tightness and shortness of breath  Cardiovascular: Negative  Gastrointestinal: Negative  Endocrine: Negative  Musculoskeletal: Negative  Skin: Negative for color change     Neurological: Negative for dizziness, numbness and headaches  Psychiatric/Behavioral: Negative  Objective:      Temp (!) 97 2 °F (36 2 °C) (Temporal)   Ht 5' 10" (1 778 m)   Wt 90 3 kg (199 lb)   BMI 28 55 kg/m²          Physical Exam  Constitutional:       Appearance: He is well-developed  HENT:      Head: Normocephalic  Right Ear: Hearing, tympanic membrane, ear canal and external ear normal  No decreased hearing noted  No drainage or tenderness  Tympanic membrane is not perforated or erythematous  Left Ear: Hearing, tympanic membrane, ear canal and external ear normal  No decreased hearing noted  No drainage or tenderness  Tympanic membrane is not perforated or erythematous  Nose: Septal deviation and mucosal edema present  No nasal deformity  Comments: Dried bloody debris along nasal bridge, removed without difficulty, removed 4 sutures       Mouth/Throat:      Mouth: Mucous membranes are not pale and not dry  No oral lesions  Dentition: Normal dentition  Pharynx: Uvula midline  No oropharyngeal exudate  Neck:      Trachea: No tracheal deviation  Cardiovascular:      Rate and Rhythm: Normal rate  Pulmonary:      Effort: Pulmonary effort is normal  No accessory muscle usage or respiratory distress  Musculoskeletal:      Right shoulder: Normal range of motion  Cervical back: Full passive range of motion without pain, normal range of motion and neck supple  Lymphadenopathy:      Cervical: No cervical adenopathy  Skin:     General: Skin is warm and dry  Neurological:      Mental Status: He is alert and oriented to person, place, and time  Cranial Nerves: No cranial nerve deficit  Sensory: No sensory deficit  Psychiatric:         Behavior: Behavior is cooperative

## 2021-08-09 NOTE — ASSESSMENT & PLAN NOTE
Wound debridement and removal of 4 sutures across nasal bridge  Removed without difficulty  Apply antibiotic ointment to site twice daily    Follow up in one week

## 2021-08-09 NOTE — ASSESSMENT & PLAN NOTE
Discussed hearing related concerns  Pending audiogram visit next week  Bilateral eac clear, no cerumen no serous fluid    Audiogram next visit

## 2021-08-18 ENCOUNTER — TELEPHONE (OUTPATIENT)
Dept: HEMATOLOGY ONCOLOGY | Facility: CLINIC | Age: 81
End: 2021-08-18

## 2021-08-18 NOTE — TELEPHONE ENCOUNTER
New Patient Request   Patient Details:     Danica Walker      1940      [id number]      Reason for Appointment   Who is calling to schedule? Patient surgery    If not Patient, what is their name? 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 [facility]   Scheduling Information   Which department are you scheduling with ? Hemo onc    Preferred [address] [facility] Number to call back on? If calling from the [facility], use the Nurse number   [phone]   Miscellaneous Information:     Please advise the patient, a new patient  will be calling them back within 1 business day

## 2021-08-19 ENCOUNTER — TELEPHONE (OUTPATIENT)
Dept: HEMATOLOGY ONCOLOGY | Facility: CLINIC | Age: 81
End: 2021-08-19

## 2021-08-19 NOTE — TELEPHONE ENCOUNTER
New Patient Encounter    New Patient Intake Form   Patient Details:  Benny Land  1940  [id number]    Background Information:  [address] starts by opening a telephone encounter and gathering the following information   Who is calling to schedule? If not self, relationship to patient? 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 Non-Cancer   Is this diagnosis confirmed? Yes   When was the diagnosis? 07/26   Is there a confirmed diagnosis from a biopsy/tissue reviewed by pathology? No   Were outside slides requested? No   Is patient aware of diagnosis? Yes   Is there a personal history and what kind? No   Is there a family history and what kind? No   Reason for visit? New Diagnosis   Have you had any imaging or labs done? If so: when, where? no   Are records in [facility]? yes   Are records needed form an outside facility? No   If yes, name, city, state where facility is located  If patient has a prior history of cancer were old records obtained? No   Was the patient told to bring a disk? No   Does the patient smoke or Vape? No   If yes, how many packs or cartridges per day? Scheduling Information:   Preferred [place]:  [place]     Are there any dates/time the patient cannot be seen? Miscellaneous:   After completing the above information, please route to Financial Counselor and the appropriate Nurse Navigator for review

## 2021-08-25 ENCOUNTER — OFFICE VISIT (OUTPATIENT)
Dept: AUDIOLOGY | Facility: CLINIC | Age: 81
End: 2021-08-25
Payer: MEDICARE

## 2021-08-25 DIAGNOSIS — H90.3 SENSORINEURAL HEARING LOSS (SNHL), BILATERAL: ICD-10-CM

## 2021-08-25 DIAGNOSIS — H90.5 SENSORY HEARING LOSS, UNILATERAL: Primary | ICD-10-CM

## 2021-08-25 PROCEDURE — 92567 TYMPANOMETRY: CPT | Performed by: AUDIOLOGIST

## 2021-08-25 PROCEDURE — 92557 COMPREHENSIVE HEARING TEST: CPT | Performed by: AUDIOLOGIST

## 2021-08-25 NOTE — PROGRESS NOTES
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  Patient reports his last audiogram was in ; was told poor hearing in right ear at that time  Patient denies any recent ear infections  Denies any otalgia or tinnitus  Reports random lightheaded dizziness that can last for seconds at a time  Patient denies any true spinning vertigo  Denies any falls from his dizziness  Patient does have an ENT follow-up with KELLY Mcmahon next week  EVALUATION:    Otoscopic Evaluation:   Right Ear: Clear and healthy ear canal and tympanic membrane   Left Ear: Clear and healthy ear canal and tympanic membrane    Tympanometry:   Right: Type A - normal middle ear pressure and compliance   Left: Type Ad - hypermobile compliance    Audiogram Results:  Pure tone testing revealed a modeartely severe sloping to severe sensorineural hearing loss in the  right ear and a normal sloping to severe sensorineural hearing loss in the  left  ear  SRT and PTA are in agreement indicating good test reliability  Word recognition scores were poor for the right ear and excellent for the left ear  Asymmetry noted in right ear for tonal and speech information obtained today  *see attached audiogram      RECOMMENDATIONS:  Annual hearing eval, Consult ENT, Hearing Aid Evaluation, Medical Clearance and Copy to Patient/Caregiver    PATIENT EDUCATION:   Discussed results and recommendations with the patient at length   At this time I am advising patient return as scheduled with ENT for further medical review of asymmetry in the right ear  Patient was made aware he is a hearing aid candidate  Medical clearance will be warranted should patient wish to pursue this  Will follow up pending ENT findings of asymmetry  Questions were addressed and the patient was encouraged to contact our department should concerns arise        Christelle Diego , CCC-A  Clinical Audiologist

## 2021-09-08 ENCOUNTER — OFFICE VISIT (OUTPATIENT)
Dept: OTOLARYNGOLOGY | Facility: CLINIC | Age: 81
End: 2021-09-08
Payer: MEDICARE

## 2021-09-08 VITALS — HEIGHT: 70 IN | TEMPERATURE: 97 F | WEIGHT: 200 LBS | BODY MASS INDEX: 28.63 KG/M2

## 2021-09-08 DIAGNOSIS — H90.3 SENSORINEURAL HEARING LOSS (SNHL), BILATERAL: Primary | ICD-10-CM

## 2021-09-08 DIAGNOSIS — S02.2XXD: ICD-10-CM

## 2021-09-08 PROCEDURE — 99214 OFFICE O/P EST MOD 30 MIN: CPT | Performed by: NURSE PRACTITIONER

## 2021-09-08 NOTE — ASSESSMENT & PLAN NOTE
Reviewed recent fall and facial injury with nasal bone fracture  External nose is well healed, slight redness and scarring along laceration site  Pt denies nasal obstruction due to nasal injury  Offered discussion with surgeon for repair of septum and nasal fracture, pt declined at this time  Will consider if develops poor nasal airflow

## 2021-09-08 NOTE — ASSESSMENT & PLAN NOTE
Gradual hearing loss, noted on right since 1999 during routine work hearing test   Audiogram 08/25/2021 indicating left mild sloping to severe SNHL, right moderate/severe to profound SNHL  Word discrimination on right is 52 and 92 on left  Tymps type A bilaterally  Reviewed causes of asymmetrical SNHL in gradual nature including noise exposure, autoimmune, Lyme, viral, inflammatory process, vs acoustic neuroma  Reviewed treatment options including lab studies, and imaging  Due to pt cardiac history, he unable to receive contrast dye for an MRI of brain with IAC  Due to longevity of hearing loss known on right since 1999, we agree to monitor hearing loss progression at this time  Offered options for bilateral SNHL including acceptance, lifestyle changes such as moving closer to those who are speaking, or hearing amplification  He would qualify for hearing amplification based on audiogram   Discussed hearing aid options including facilities to obtain a hearing aid from as well as costs and benefits  Discussed that quality of life may improve with hearing amplification and he agreed to consider        Repeat audiogram in one year, sooner if has changes

## 2021-09-08 NOTE — PROGRESS NOTES
Assessment/Plan:    Closed nondisplaced fracture of nasal bone with routine healing  Reviewed recent fall and facial injury with nasal bone fracture  External nose is well healed, slight redness and scarring along laceration site  Pt denies nasal obstruction due to nasal injury  Offered discussion with surgeon for repair of septum and nasal fracture, pt declined at this time  Will consider if develops poor nasal airflow  Sensorineural hearing loss (SNHL) of both ears  Gradual hearing loss, noted on right since 1999 during routine work hearing test   Audiogram 08/25/2021 indicating left mild sloping to severe SNHL, right moderate/severe to profound SNHL  Word discrimination on right is 52 and 92 on left  Tymps type A bilaterally  Reviewed causes of asymmetrical SNHL in gradual nature including noise exposure, autoimmune, Lyme, viral, inflammatory process, vs acoustic neuroma  Reviewed treatment options including lab studies, and imaging  Due to pt cardiac history, he unable to receive contrast dye for an MRI of brain with IAC  Due to longevity of hearing loss known on right since 1999, we agree to monitor hearing loss progression at this time  Offered options for bilateral SNHL including acceptance, lifestyle changes such as moving closer to those who are speaking, or hearing amplification  He would qualify for hearing amplification based on audiogram   Discussed hearing aid options including facilities to obtain a hearing aid from as well as costs and benefits  Discussed that quality of life may improve with hearing amplification and he agreed to consider  Repeat audiogram in one year, sooner if has changes           Diagnoses and all orders for this visit:    Sensorineural hearing loss (SNHL), bilateral    Closed nondisplaced fracture of nasal bone with routine healing          Subjective:      Patient ID: Madhu Meza is a 68 y o  male      Presents today as a follow up due to nasal fracture and hearing loss  Denies nasal obstruction or nasal pain due to recent nasal fracture  Recent hearing test   Right hearing worse than left  Gradually worsening  No otalgia  No prior ear surgery  History stroke on right side 10/2020  Last audiogram for work in 36 and was informed at that time of hearing loss right more than left ear  The following portions of the patient's history were reviewed and updated as appropriate: allergies, current medications, past family history, past medical history, past social history, past surgical history and problem list     Review of Systems   Constitutional: Negative  HENT: Positive for congestion and hearing loss  Negative for ear discharge, ear pain, nosebleeds, postnasal drip, rhinorrhea, sinus pressure, sinus pain, sore throat, tinnitus and voice change  Eyes: Negative  Respiratory: Negative for chest tightness and shortness of breath  Cardiovascular: Negative  Gastrointestinal: Negative  Endocrine: Negative  Musculoskeletal: Negative  Skin: Negative for color change  Neurological: Negative for dizziness, numbness and headaches  Psychiatric/Behavioral: Negative  Objective:      Temp (!) 97 °F (36 1 °C) (Temporal)   Ht 5' 10" (1 778 m)   Wt 90 7 kg (200 lb)   BMI 28 70 kg/m²          Physical Exam  Constitutional:       Appearance: He is well-developed  HENT:      Head: Normocephalic  Right Ear: Hearing, tympanic membrane, ear canal and external ear normal  No decreased hearing noted  No drainage or tenderness  Tympanic membrane is not perforated or erythematous  Left Ear: Hearing, tympanic membrane, ear canal and external ear normal  No decreased hearing noted  No drainage or tenderness  Tympanic membrane is not perforated or erythematous  Nose: Nose normal  No nasal deformity or septal deviation  Mouth/Throat:      Mouth: Mucous membranes are not pale and not dry  No oral lesions  Dentition: Normal dentition  Pharynx: Uvula midline  No oropharyngeal exudate  Neck:      Trachea: No tracheal deviation  Cardiovascular:      Rate and Rhythm: Normal rate  Pulmonary:      Effort: Pulmonary effort is normal  No accessory muscle usage or respiratory distress  Musculoskeletal:      Right shoulder: Normal range of motion  Cervical back: Full passive range of motion without pain, normal range of motion and neck supple  Lymphadenopathy:      Cervical: No cervical adenopathy  Skin:     General: Skin is warm and dry  Neurological:      Mental Status: He is alert and oriented to person, place, and time  Cranial Nerves: No cranial nerve deficit  Sensory: No sensory deficit  Psychiatric:         Behavior: Behavior is cooperative

## 2021-09-16 ENCOUNTER — TELEPHONE (OUTPATIENT)
Dept: HEMATOLOGY ONCOLOGY | Facility: MEDICAL CENTER | Age: 81
End: 2021-09-16

## 2021-09-16 ENCOUNTER — TELEPHONE (OUTPATIENT)
Dept: UROLOGY | Facility: MEDICAL CENTER | Age: 81
End: 2021-09-16

## 2021-09-16 ENCOUNTER — APPOINTMENT (OUTPATIENT)
Dept: LAB | Facility: HOSPITAL | Age: 81
End: 2021-09-16
Payer: MEDICARE

## 2021-09-16 ENCOUNTER — CONSULT (OUTPATIENT)
Dept: HEMATOLOGY ONCOLOGY | Facility: MEDICAL CENTER | Age: 81
End: 2021-09-16
Payer: MEDICARE

## 2021-09-16 VITALS
HEIGHT: 70 IN | SYSTOLIC BLOOD PRESSURE: 142 MMHG | WEIGHT: 199 LBS | RESPIRATION RATE: 16 BRPM | DIASTOLIC BLOOD PRESSURE: 80 MMHG | BODY MASS INDEX: 28.49 KG/M2 | OXYGEN SATURATION: 98 % | HEART RATE: 59 BPM | TEMPERATURE: 97.9 F

## 2021-09-16 DIAGNOSIS — D50.9 MICROCYTIC ANEMIA: ICD-10-CM

## 2021-09-16 DIAGNOSIS — D69.9 BLEEDING DISORDER (HCC): ICD-10-CM

## 2021-09-16 DIAGNOSIS — D69.9 BLEEDING DISORDER (HCC): Primary | ICD-10-CM

## 2021-09-16 DIAGNOSIS — N13.1 HYDRONEPHROSIS WITH URETERAL STRICTURE, NOT ELSEWHERE CLASSIFIED: ICD-10-CM

## 2021-09-16 LAB
APTT PPP: 31 SECONDS (ref 23–37)
BASOPHILS # BLD AUTO: 0.04 THOUSANDS/ΜL (ref 0–0.1)
BASOPHILS NFR BLD AUTO: 1 % (ref 0–1)
EOSINOPHIL # BLD AUTO: 0.31 THOUSAND/ΜL (ref 0–0.61)
EOSINOPHIL NFR BLD AUTO: 4 % (ref 0–6)
ERYTHROCYTE [DISTWIDTH] IN BLOOD BY AUTOMATED COUNT: 20.3 % (ref 11.6–15.1)
HCT VFR BLD AUTO: 37.3 % (ref 36.5–49.3)
HGB BLD-MCNC: 10.8 G/DL (ref 12–17)
IMM GRANULOCYTES # BLD AUTO: 0.02 THOUSAND/UL (ref 0–0.2)
IMM GRANULOCYTES NFR BLD AUTO: 0 % (ref 0–2)
INR PPP: 1.1 (ref 0.84–1.19)
LYMPHOCYTES # BLD AUTO: 1.77 THOUSANDS/ΜL (ref 0.6–4.47)
LYMPHOCYTES NFR BLD AUTO: 24 % (ref 14–44)
MCH RBC QN AUTO: 21.7 PG (ref 26.8–34.3)
MCHC RBC AUTO-ENTMCNC: 29 G/DL (ref 31.4–37.4)
MCV RBC AUTO: 75 FL (ref 82–98)
MONOCYTES # BLD AUTO: 0.99 THOUSAND/ΜL (ref 0.17–1.22)
MONOCYTES NFR BLD AUTO: 14 % (ref 4–12)
NEUTROPHILS # BLD AUTO: 4.14 THOUSANDS/ΜL (ref 1.85–7.62)
NEUTS SEG NFR BLD AUTO: 57 % (ref 43–75)
NRBC BLD AUTO-RTO: 0 /100 WBCS
PLATELET # BLD AUTO: 184 THOUSANDS/UL (ref 149–390)
PMV BLD AUTO: 9 FL (ref 8.9–12.7)
PROTHROMBIN TIME: 13.9 SECONDS (ref 11.6–14.5)
RBC # BLD AUTO: 4.98 MILLION/UL (ref 3.88–5.62)
WBC # BLD AUTO: 7.27 THOUSAND/UL (ref 4.31–10.16)

## 2021-09-16 PROCEDURE — 99214 OFFICE O/P EST MOD 30 MIN: CPT | Performed by: PHYSICIAN ASSISTANT

## 2021-09-16 PROCEDURE — 85025 COMPLETE CBC W/AUTO DIFF WBC: CPT

## 2021-09-16 PROCEDURE — 85730 THROMBOPLASTIN TIME PARTIAL: CPT

## 2021-09-16 PROCEDURE — 36415 COLL VENOUS BLD VENIPUNCTURE: CPT

## 2021-09-16 PROCEDURE — 85245 CLOT FACTOR VIII VW RISTOCTN: CPT

## 2021-09-16 PROCEDURE — 85610 PROTHROMBIN TIME: CPT

## 2021-09-16 NOTE — TELEPHONE ENCOUNTER
He has seen Iris Simmons in the past     [facility] can he see him again  I need more information to comment on the urgency of his appointment

## 2021-09-16 NOTE — TELEPHONE ENCOUNTER
A voice message was left on Dioni's mobile phone  Sydney Farrell PA-C has placed additional lab order in the system, this test can be done next week  Also the office of Dr Gisela Esquivel will be reaching him directly to schedule an appointment at the [address] [place] [address] Dr

## 2021-09-16 NOTE — PROGRESS NOTES
[facility] [address] [id number]  Hematology Ambulatory Consult  Cyndie Spatz, 1940, [id number]  9/16/2021    Assessment/Plan:  1  Bleeding disorder [facility])  Patient presents to Hematology for clinical noted with two recent episodes in the month of August where he had to return to physician's office or the emergency room due to prolonged bleeding  Patient has been on Plavix and aspirin for both of these episodes  Patient's last PT/PTT were completed in October of 2020  I have asked him to go for this lab testing again  We discussed briefly about the presence of an inhibitor  He will go for a baseline testing as mentioned above and if necessary, will undergo mixing studies if PT or PTT is abnormal   If both PT and PTT are normal, patient will undergo von Willebrand's testing  Furthermore I have requested a CBC again to assess quantity of platelets  The patient and his daughter Milton Corado will return to the office in two weeks to discuss findings  - Protime-INR; Future  - Iron Panel (Includes Ferritin, Iron Sat%, Iron, and TIBC); Future  - APTT; Future  - CBC and differential; Future    2  Microcytic anemia; 3  Hydronephrosis with ureteral stricture, not elsewhere classified  Upon chart review was noted that the patient has a microcytic anemia  Patient has blood loss via ureter stent  Moreover, the patient's recent bleeding episodes have not helped keep his hemoglobin robust   I requested iron studies  I briefly discussed with the patient and his daughter the utility of IV iron supplementation over oral supplementation  We will readdress this on follow-up once iron testing has resulted  - Protime-INR; Future  - Iron Panel (Includes Ferritin, Iron Sat%, Iron, and TIBC); Future  - APTT; Future  - CBC and differential; Future  - Ambulatory referral to Urology;  Future      The patient is scheduled for follow-up in approximately 2 weeks  Patient voiced agreement and understanding to the above  Patient knows to call the Hematology/Oncology office with any questions and concerns regarding the above  Barrier(s) to care: None  The patient is able to self care     -------------------------------------------------------------------------------------------------------    Chief Complaint   Patient presents with    Consult     recent blood clotting issues, past few weeks blood pressure low to the point of feeling like fainting        Referring provider:  No referring provider defined for this encounter  History of present illness: This is a 77-year-old male with past medical history of CVA with right-sided lower extremity weakness, hydronephrosis with the ureteral stricture status post stent previously cared for by Dr Beth Begum, aortic valve stenosis status post TVAR in February of 2021 at [facility], GERD, CKD, sensorineural hearing loss and acute diastolic CHF who presents to Hematology due to recent clinical findings of excessive bleeding  Patient notes that on 8/1/21 he tripped over a curb and broke his nose  Patient followed up in the emergency room to have his broken nose six  However, the patient's nose continued to bleed  Patient was discharged by the time he was home, he returned back to the emergency room due to soaking through the nasal tampon  Patient was then injected with epinephrine and bleeding stopped  At the time of the fracture, patient was on aspirin and Plavix  On August 24th, the patient underwent dental procedure for extraction in preparation for dentures  Patient continued to bleed after sutures were placed  Patient went back to the dentist, who injected him with epinephrine  Unfortunately, the patient developed a hematoma on the low right side of his face    That time, patient was on aspirin and Plavix however, clinically, the dentist noted that this is not common for his procedures and recommend that he a follow-up  10/28/20 PT = 13 5, INR = 1 03, PTT = 30  11/25/20, hemoglobin = 12 8, MCV = 94, RDW = 14 6, MCHC = 30 8, platelet = 864  7/04/67 hemoglobin = 11 1, MCV = 88, RDW = high 15 7, MCHC and MCH both low, platelet = 547  3/6/59 hemoglobin = 9 5, MCV 76, RDW = 19 6, other RBC indices all low, platelet = 892  4/5/94  hemoglobin = 10 9, MCV = 74, RDW = 22, platelet = 725      Interval history:   Patient notes bruising on his forearms  Resolving bruise on the base of the neck, residual from previous dental hematoma  Review of Systems   Constitutional: Negative for activity change, appetite change, fatigue and fever  HENT: Negative for nosebleeds  Respiratory: Negative for cough, choking and shortness of breath  Cardiovascular: Negative for chest pain, palpitations and leg swelling  Gastrointestinal: Negative for abdominal distention, abdominal pain, anal bleeding, blood in stool, constipation, diarrhea, nausea and vomiting  Endocrine: Negative for cold intolerance  Genitourinary: Negative for hematuria  Musculoskeletal: Negative for myalgias  Skin: Negative for color change, pallor and rash  Allergic/Immunologic: Negative for immunocompromised state  Neurological: Negative for headaches  Hematological: Negative for adenopathy  Does not bruise/bleed easily  All other systems reviewed and are negative        Patient Active Problem List   Diagnosis    Chronic atrial fibrillation (HCC)    Benign essential hypertension    Renal calculi    JUNIOR (acute kidney injury) ([facility] )    Calculus of ureter    Crossing vessel and stricture of ureter without hydronephrosis    Hematuria, gross    Hydronephrosis with ureteral stricture, not elsewhere classified    CVA (cerebral vascular accident) ([facility] )    Chronic diastolic CHF (congestive heart failure) ([facility] )    Nonrheumatic aortic valve stenosis    Bilateral carotid artery disease ([address] )    Aneurysm of anterior communicating artery    Tooth pain    Onychomycosis    Syncope vs seizure    Vasovagal near syncope    Acute kidney injury superimposed on chronic kidney disease ([facility] )    Secondary hyperparathyroidism ([facility] )    Vitamin D deficiency    Patellofemoral syndrome of left knee    Moderate mitral regurgitation    Moderate aortic regurgitation    GERD (gastroesophageal reflux disease)    Dyslipidemia    Chronic kidney disease (CKD) stage G3a/A1, moderately decreased glomerular filtration rate (GFR) between 45-59 mL/min/1 73 square meter and albuminuria creatinine ratio less than 30 mg/g (HCC)    Aneurysm (HCC)    Stroke ([facility]    Facial laceration    Closed nondisplaced fracture of nasal bone with routine healing    Sensorineural hearing loss (SNHL) of both ears       Past Medical History:   Diagnosis Date    Aneurysm ([facility] )     of brain  being monitored    Essential hypertension, long-standing     Hematuria     intermittent    History of cigarette smoking, chronic     62 year smoker at one half pack per day, quit 04/1/17    Hyperlipidemia     Hypertension     Irregular heart beat     Pneumonia      X 2    Stroke ([facility] ) 10/29/2020    right sided  weakness almost full recovery    Stroke ([facility]     Vitamin D deficiency     maintained with 1000 units of D    Water retention     Wears glasses     Wears partial dentures     upper       Past Surgical History:   Procedure Laterality Date    APPENDECTOMY  1960    CARDIAC SURGERY      watchman procedure; 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    CAROTID ENDARTERECTOMY Left 1998    Supposedly no internal stenosis found    CYSTOSCOPY Right 8/15/2017    Procedure: CYSTOSCOPY RIGHT STENT EXCHANGE;  Surgeon: Iris Simmons MD;  Location: [address] [place] [address];  Service: Urology    CYSTOSCOPY     [facility]  LITHOTRIPSY  03/2017    For nephrolithiasis at [address]  5/10/2019    FL RETROGRADE PYELOGRAM  7/30/2019    FL RETROGRADE PYELOGRAM  10/22/2019    FL RETROGRADE PYELOGRAM  1/28/2020    FL RETROGRADE PYELOGRAM  8/11/2020    FL RETROGRADE PYELOGRAM  12/15/2020    FL RETROGRADE PYELOGRAM  2/14/2021    FL RETROGRADE PYELOGRAM  5/11/2021    SD CYSTO/URETERO W/LITHOTRIPSY &INDWELL STENT INSRT Right 5/2/2017    Procedure: CYSTOSCOPY URETEROSCOPY WITH LITHOTRIPSY HOLMIUM LASER, RETROGRADE PYELOGRAM AND INSERTION STENT URETERAL;  Surgeon: Gina Platt MD;  Location: [address] [place] [address];  Service: Urology    SD CYSTO/URETERO W/LITHOTRIPSY &INDWELL STENT INSRT Right 5/16/2017    Procedure: CYSTOSCOPY, RETROGRADE PYELOGRAM,  FLEXIBLE URETEROSCOPY,  HOLMIUM LASER LITHOTRIPSY, STONE BASKET MANIPULATION,  STENT URETERAL EXCHANGE;  Surgeon: Gina Platt MD;  Location: [address] [place] [address];  Service: Urology    SD CYSTO/URETERO W/LITHOTRIPSY &INDWELL STENT INSRT Right 6/2/2017    Procedure: CYSTOSCOPY, RETROGRADE, STONE MANIPULATION WITH HOLMIUM LASER, STENT PLACEMENT;  Surgeon: Gina Platt MD;  Location: [address] [place] [address];  Service: Urology    SD CYSTO/URETERO W/LITHOTRIPSY &INDWELL STENT INSRT Right 7/18/2017    Procedure: CYSTOSCOPY, RETROGRADE, URETEROSCOPY HOLMIUM LASER [place],  AND STENT PLACEMENT;  Surgeon: Gina Platt MD;  Location: [address] [place] [address];  Service: Urology    SD CYSTO/URETERO W/LITHOTRIPSY &INDWELL STENT INSRT Right 2/14/2021    Procedure: CYSTOSCOPY URETEROSCOPY, RETROGRADE PYELOGRAM, STONE MANIPULATION AND EXCHANGE STENT URETERAL;  Surgeon: Xochitl Nye MD;  Location: [address] [place] [address];  Service: Urology    SD CYSTOSCOPY,INSERT URETERAL STENT Right 11/14/2017    Procedure: EXCHANGE STENT URETERAL;  Surgeon: Gina Platt MD;  Location: [address] [place] [address];  Service: Urology    SD CYSTOURETHROSCOPY,URETER CATHETER Right 11/14/2017    Procedure: CYSTOSCOPY RETROGRADE, STENT EXCHANGE;  Surgeon: Gina Platt MD;  Location: [address] [place] [address];  Service: Urology    SD CYSTOURETHROSCOPY,URETER CATHETER Right 5/8/2018    Procedure: Radhamesrie Fidel;  Surgeon: Salvador Plaza MD;  Location: [address] [place] [address];  Service: Urology    IN CYSTOURETHROSCOPY,URETER CATHETER Right 8/14/2018    Procedure: Radhamesrikenia Parra;  Surgeon: Salvador Plaza MD;  Location: [address] [place] [address];  Service: Urology    IN CYSTOURETHROSCOPY,URETER CATHETER Right 11/13/2018    Procedure: Radhamesrikenia Parra, RETROGRADE;  Surgeon: Salvador Plaza MD;  Location: [address] [place] [address];  Service: Urology    IN CYSTOURETHROSCOPY,URETER CATHETER Right 2/12/2019    Procedure: Kellee Lulas, STENT REMOVAL AND STENT PLACEMENT;  Surgeon: Salvador Plaza MD;  Location: [address] [place] [address];  Service: Urology    IN CYSTOURETHROSCOPY,URETER CATHETER Right 5/10/2019    Procedure: [place] Lulas, STENT EXCHANGE;  Surgeon: Salvador Plaza MD;  Location: [address] [place] [address];  Service: Urology    IN CYSTOURETHROSCOPY,URETER CATHETER Right 7/30/2019    Procedure: CYSTOSCOPY, RETROGRADE, STENT REMOVAL AND PLACEMENT OF STENT;  Surgeon: Salvador Plaza MD;  Location: [address] [place] [address];  Service: Urology    IN CYSTOURETHROSCOPY,URETER CATHETER Right 10/22/2019    Procedure: [place] Lulas, STENT EXCHANGE;  Surgeon: Salvador Plaza MD;  Location: [address] [place] [address];  Service: Urology    IN CYSTOURETHROSCOPY,URETER CATHETER Right 1/28/2020    Procedure: CYSTOSCOPY, RETROGRADE, STENT REMOVAL AND STENT PLACEMENT;  Surgeon: Salvador Plaza MD;  Location: [address] [place] [address];  Service: Urology    IN CYSTOURETHROSCOPY,URETER CATHETER Right 5/22/2020    Procedure: CYSTOSCOPY RETROGRADE, STENT EXCHANGE;  Surgeon: Salvador Plaza MD;  Location: [address] [place] [address];  Service: Urology    IN CYSTOURETHROSCOPY,URETER CATHETER Right 8/11/2020    Procedure: CYSTOSCOPY, Arvie Panning EXCHANGE, RETROGRADE;  Surgeon: Salvador Plaza MD;  Location: [address] [place] [address];  Service: Urology    IN CYSTOURETHROSCOPY,URETER CATHETER Right 12/15/2020 Procedure: CYSTOSCOPY, RETROGRADE, STENT REMOVAL, AND STENT PLACEMENT;  Surgeon: Milli Esquivel MD;  Location: [address] [place] [address];  Service: Urology    TN CYSTOURETHROSCOPY,URETER CATHETER Right 2021    Procedure: CYSTOSCOPY RETROGRADE PYELOGRAM WITH STENT EXCHANGE;  Surgeon: Milli Esquivel MD;  Location: [address] [place] [address];  Service: Urology    PROSTATE SURGERY  2015    Laser Prostatectomy  by Dr Gill Cope Right 2017    Procedure: INSERTION STENT URETERAL, RIGHT URETEROSCOPY,  LASER OF URETERAL STRICTURE AND STONE;  Surgeon: Milli Esquivel MD;  Location: [address] [place] [address];  Service:     URETERAL STENT PLACEMENT Right 2018    Procedure: Olvin Armendariz;  Surgeon: Milli Esquivel MD;  Location: [address] [place] [address];  Service: Urology       Family History   Problem Relation Age of Onset    Hypertension Mother     Alcohol abuse Father     Cirrhosis Father     Cancer Son 15        cancer-knee and above-left-amputation    Cancer Sister     Other Brother         head mass    Thyroid disease unspecified Sister     Arthritis Sister     Other Brother         legally blind in one eye       Social History     Socioeconomic History    Marital status: /Civil Union     Spouse name: None    Number of children: None    Years of education: None    Highest education level: None   Occupational History    Occupation: RETIRED   Tobacco Use    Smoking status: Former Smoker     Packs/day: 0 50     Years: 62 00     Pack years: 31 00     Types: Cigarettes     Quit date: 4/15/2017     Years since quittin 4    Smokeless tobacco: Never Used   Vaping Use    Vaping Use: Never used   Substance and Sexual Activity    Alcohol use: Yes     Comment: rare    Drug use: No    Sexual activity: Not Currently   Other Topics Concern    None   Social History Narrative    None     Social Determinants of Health     Financial Resource Strain:     Difficulty of Paying Living Expenses: Food Insecurity:     Worried About Running Out of Food in the Last Year:     [address] in the Last Year:    Transportation Needs:     Lack of Transportation (Medical):      Lack of Transportation (Non-Medical):    Physical Activity:     Days of Exercise per Week:     Minutes of Exercise per Session:    Stress:     Feeling of Stress :    Social Connections:     Frequency of Communication with Friends and Family:     Frequency of Social Gatherings with Friends and Family:     Attends Protestant Services:     Active Member of Clubs or Organizations:     Attends Club or Organization Meetings:     Marital Status:    Intimate Partner Violence:     Fear of Current or Ex-Partner:     Emotionally Abused:     Physically Abused:     Sexually Abused:          Current Outpatient Medications:     aspirin (ECOTRIN LOW STRENGTH) 81 mg EC tablet, Take 81 mg by mouth daily Pt to check with surgeon if needed to hold RX , Disp: , Rfl:     atorvastatin (LIPITOR) 40 mg tablet, Take 1 tablet (40 mg total) by mouth daily with dinner, Disp: 30 tablet, Rfl: 1    cholecalciferol (VITAMIN D3) 1,000 units tablet, Take 1,000 Units by mouth daily after lunch  , Disp: , Rfl:     clopidogrel (PLAVIX) 75 mg tablet, Take 75 mg by mouth daily Pt  To check with surgeon if needed to hold RX , Disp: , Rfl:     finasteride (PROSCAR) 5 mg tablet, Take 1 tablet (5 mg total) by mouth daily, Disp: 90 tablet, Rfl: 3    LORazepam (ATIVAN) 0 5 mg tablet, Take by mouth as needed for anxiety, Disp: , Rfl:     metoprolol tartrate (LOPRESSOR) 25 mg tablet, take 1 tablet by mouth every 8 hours (Patient taking differently: 25 mg 3 (three) times a day ), Disp: 270 tablet, Rfl: 0    sertraline (ZOLOFT) 50 mg tablet, Take 50 mg by mouth daily at bedtime , Disp: , Rfl:     No Known Allergies    Objective:  /80 (BP Location: Left arm, Patient Position: Sitting, Cuff Size: Adult)   Pulse 59   Temp 97 9 °F (36 6 °C)   Resp 16   Ht 5' 10" (1 778 m)   Wt 90 3 kg (199 lb)   SpO2 98%   BMI 28 55 kg/m²   Physical Exam  Constitutional:       General: He is not in acute distress  Appearance: Normal appearance  He is not ill-appearing  HENT:      Head: Normocephalic and atraumatic  Ears:      Comments: Hard of hearing  Nose: Nose normal    Eyes:      General: No scleral icterus  Extraocular Movements: Extraocular movements intact  Pupils: Pupils are equal, round, and reactive to light  Cardiovascular:      Heart sounds: Murmur heard  Pulmonary:      Effort: Pulmonary effort is normal  No respiratory distress  Breath sounds: Normal breath sounds  Abdominal:      General: Abdomen is flat  Musculoskeletal:      Right lower leg: Edema (trace non pitting   + more varicostities  ) present  Skin:     General: Skin is warm  Findings: Bruising (Areas of elevated bruising bilateral upper extremities no petechiae noted ) present  Neurological:      General: No focal deficit present  Mental Status: He is alert and oriented to person, place, and time     Psychiatric:         Mood and Affect: Mood normal          Result Review  Labs:  Lab Results   Component Value Date    WBC 7 35 07/05/2021    HGB 10 9 (L) 07/05/2021    HCT 39 0 07/05/2021    MCV 74 (L) 07/05/2021     07/05/2021     Lab Results   Component Value Date    SODIUM 143 07/26/2021    K 4 6 07/26/2021     07/26/2021    CO2 28 07/26/2021    AGAP 8 07/26/2021    BUN 23 07/26/2021    CREATININE 1 55 (H) 07/26/2021    GLUC 104 07/05/2021    GLUF 101 (H) 07/26/2021    CALCIUM 8 8 07/26/2021    AST 24 07/05/2021    ALT 19 07/05/2021    ALKPHOS 131 (H) 07/05/2021    TP 7 0 07/05/2021    TBILI 0 54 07/05/2021    EGFR 42 07/26/2021     No results found for: IRON, TIBC, FERRITIN  Lab Results   Component Value Date    PTT 30 10/28/2020     Lab Results   Component Value Date    INR 1 03 10/28/2020    INR 1 08 04/15/2017    PROTIME 13 5 10/28/2020 JUANAIME 11 4 04/15/2017         Imaging:     Please note: This report has been generated by a voice recognition software system  Therefore there may be syntax, spelling, and/or grammatical errors  Please call if you have any questions

## 2021-09-16 NOTE — TELEPHONE ENCOUNTER
Please Triage - [address] Patient-     What is the reason for the patients appointment? SL Gonzalo/Oncology Terence Sandy PA-C office called  to set up appointment for patient   He was referred for  Hydronephrosis with ureteral stricture  Patient can be contacted directly/     Imaging/Lab Results:in epic       Do we accept the patient's insurance or is the patient Self-Pay? Provider & Plan: Medicare   Member ID#: Has the patient had any previous urologist(s)?  Urology   Dr Dwight Florentino     Have patient records been requested?in epic        Has the patient had any outside testing done?in [facility]       Does the patient have a personal history of cancer?no       Patient can be reached at :[phone] (Z)

## 2021-09-20 ENCOUNTER — APPOINTMENT (OUTPATIENT)
Dept: LAB | Facility: HOSPITAL | Age: 81
End: 2021-09-20
Payer: MEDICARE

## 2021-09-20 ENCOUNTER — TELEPHONE (OUTPATIENT)
Dept: HEMATOLOGY ONCOLOGY | Facility: CLINIC | Age: 81
End: 2021-09-20

## 2021-09-20 DIAGNOSIS — D69.9 BLEEDING DISORDER (HCC): ICD-10-CM

## 2021-09-20 DIAGNOSIS — D50.9 MICROCYTIC ANEMIA: ICD-10-CM

## 2021-09-20 LAB — APTT PPP: 33 SECONDS (ref 23–37)

## 2021-09-20 PROCEDURE — 85247 CLOT FACTOR VIII MULTIMETRIC: CPT

## 2021-09-20 PROCEDURE — 85245 CLOT FACTOR VIII VW RISTOCTN: CPT

## 2021-09-20 PROCEDURE — 36415 COLL VENOUS BLD VENIPUNCTURE: CPT

## 2021-09-20 PROCEDURE — 85730 THROMBOPLASTIN TIME PARTIAL: CPT

## 2021-09-20 PROCEDURE — 85246 CLOT FACTOR VIII VW ANTIGEN: CPT

## 2021-09-20 PROCEDURE — 85240 CLOT FACTOR VIII AHG 1 STAGE: CPT

## 2021-09-20 NOTE — TELEPHONE ENCOUNTER
Patient called to make sure lab orders for Nicole Santos were in chart  I informed patient orders are in

## 2021-09-21 LAB — VWF:RCO ACT/NOR PPP PL AGG: 175 % (ref 50–200)

## 2021-09-22 LAB
FACT XIIIA PPP-ACNC: 196 % (ref 56–140)
FACT XIIIA PPP-ACNC: 213 % (ref 56–140)
VWF AG ACT/NOR PPP IA: 191 % (ref 50–200)
VWF:RCO ACT/NOR PPP PL AGG: 176 % (ref 50–200)
VWF:RCO ACT/NOR PPP PL AGG: 182 % (ref 50–200)

## 2021-09-24 ENCOUNTER — APPOINTMENT (OUTPATIENT)
Dept: LAB | Facility: HOSPITAL | Age: 81
End: 2021-09-24
Payer: MEDICARE

## 2021-09-24 DIAGNOSIS — E87.5 HYPERKALEMIA: Primary | ICD-10-CM

## 2021-09-24 DIAGNOSIS — N18.31 STAGE 3A CHRONIC KIDNEY DISEASE (HCC): ICD-10-CM

## 2021-09-24 DIAGNOSIS — I50.32 CHRONIC DIASTOLIC CONGESTIVE HEART FAILURE (HCC): ICD-10-CM

## 2021-09-24 DIAGNOSIS — N25.81 SECONDARY HYPERPARATHYROIDISM (HCC): ICD-10-CM

## 2021-09-24 LAB
ANION GAP SERPL CALCULATED.3IONS-SCNC: 6 MMOL/L (ref 4–13)
BACTERIA UR QL AUTO: ABNORMAL /HPF
BILIRUB UR QL STRIP: NEGATIVE
BUN SERPL-MCNC: 25 MG/DL (ref 5–25)
CALCIUM SERPL-MCNC: 9 MG/DL (ref 8.3–10.1)
CHLORIDE SERPL-SCNC: 107 MMOL/L (ref 100–108)
CLARITY UR: ABNORMAL
CO2 SERPL-SCNC: 31 MMOL/L (ref 21–32)
COLOR UR: YELLOW
CREAT SERPL-MCNC: 1.64 MG/DL (ref 0.6–1.3)
CREAT UR-MCNC: 166 MG/DL
ERYTHROCYTE [DISTWIDTH] IN BLOOD BY AUTOMATED COUNT: 20.2 % (ref 11.6–15.1)
FERRITIN SERPL-MCNC: 13 NG/ML (ref 8–388)
GFR SERPL CREATININE-BSD FRML MDRD: 39 ML/MIN/1.73SQ M
GLUCOSE P FAST SERPL-MCNC: 116 MG/DL (ref 65–99)
GLUCOSE UR STRIP-MCNC: NEGATIVE MG/DL
HCT VFR BLD AUTO: 37.4 % (ref 36.5–49.3)
HGB BLD-MCNC: 11 G/DL (ref 12–17)
HGB UR QL STRIP.AUTO: ABNORMAL
IRON SATN MFR SERPL: 8 % (ref 20–50)
IRON SERPL-MCNC: 29 UG/DL (ref 65–175)
KETONES UR STRIP-MCNC: NEGATIVE MG/DL
LEUKOCYTE ESTERASE UR QL STRIP: ABNORMAL
MAGNESIUM SERPL-MCNC: 1.9 MG/DL (ref 1.6–2.6)
MCH RBC QN AUTO: 22.4 PG (ref 26.8–34.3)
MCHC RBC AUTO-ENTMCNC: 29.4 G/DL (ref 31.4–37.4)
MCV RBC AUTO: 76 FL (ref 82–98)
NITRITE UR QL STRIP: NEGATIVE
NON-SQ EPI CELLS URNS QL MICRO: ABNORMAL /HPF
OTHER STN SPEC: ABNORMAL
PH UR STRIP.AUTO: 6 [PH]
PHOSPHATE SERPL-MCNC: 3.1 MG/DL (ref 2.3–4.1)
PLATELET # BLD AUTO: 174 THOUSANDS/UL (ref 149–390)
PMV BLD AUTO: 9.6 FL (ref 8.9–12.7)
POTASSIUM SERPL-SCNC: 5.4 MMOL/L (ref 3.5–5.3)
PROT UR STRIP-MCNC: ABNORMAL MG/DL
PROT UR-MCNC: 57 MG/DL
PROT/CREAT UR: 0.34 MG/G{CREAT} (ref 0–0.1)
PTH-INTACT SERPL-MCNC: 68.9 PG/ML (ref 18.4–80.1)
RBC # BLD AUTO: 4.92 MILLION/UL (ref 3.88–5.62)
RBC #/AREA URNS AUTO: ABNORMAL /HPF
SODIUM SERPL-SCNC: 144 MMOL/L (ref 136–145)
SP GR UR STRIP.AUTO: 1.02 (ref 1–1.03)
TIBC SERPL-MCNC: 356 UG/DL (ref 250–450)
UROBILINOGEN UR QL STRIP.AUTO: 1 E.U./DL
WBC # BLD AUTO: 7.45 THOUSAND/UL (ref 4.31–10.16)
WBC #/AREA URNS AUTO: ABNORMAL /HPF

## 2021-09-24 PROCEDURE — 36415 COLL VENOUS BLD VENIPUNCTURE: CPT

## 2021-09-24 PROCEDURE — 83735 ASSAY OF MAGNESIUM: CPT

## 2021-09-24 PROCEDURE — 83540 ASSAY OF IRON: CPT

## 2021-09-24 PROCEDURE — 85027 COMPLETE CBC AUTOMATED: CPT

## 2021-09-24 PROCEDURE — 83970 ASSAY OF PARATHORMONE: CPT

## 2021-09-24 PROCEDURE — 84156 ASSAY OF PROTEIN URINE: CPT

## 2021-09-24 PROCEDURE — 82728 ASSAY OF FERRITIN: CPT

## 2021-09-24 PROCEDURE — 81001 URINALYSIS AUTO W/SCOPE: CPT

## 2021-09-24 PROCEDURE — 83550 IRON BINDING TEST: CPT

## 2021-09-24 PROCEDURE — 80048 BASIC METABOLIC PNL TOTAL CA: CPT

## 2021-09-24 PROCEDURE — 84100 ASSAY OF PHOSPHORUS: CPT

## 2021-09-24 PROCEDURE — 82570 ASSAY OF URINE CREATININE: CPT

## 2021-09-24 NOTE — PROGRESS NOTES
Spoke with the patient  Creatinine 1 6  Potassium 5 4  Eating high potassium diet  Recent issues and following with Hematology  Overdue for stent exchange in transferring to a new urologist since prior urologist is leaving      Patient will follow low-potassium diet    I will send a message to the new urologist    BMP in 1 week    Orders placed

## 2021-09-28 ENCOUNTER — TELEPHONE (OUTPATIENT)
Dept: HEMATOLOGY ONCOLOGY | Facility: MEDICAL CENTER | Age: 81
End: 2021-09-28

## 2021-09-28 DIAGNOSIS — D50.9 IRON DEFICIENCY ANEMIA, UNSPECIFIED IRON DEFICIENCY ANEMIA TYPE: Primary | ICD-10-CM

## 2021-09-28 LAB — FACT VIII AG ACT/NOR PPP IA: 344 %

## 2021-09-28 RX ORDER — SODIUM CHLORIDE 9 MG/ML
20 INJECTION, SOLUTION INTRAVENOUS ONCE
Status: CANCELLED | OUTPATIENT
Start: 2021-10-05

## 2021-09-28 NOTE — TELEPHONE ENCOUNTER
Spoke with patient  Per Awilda Santillan, patient to have feraheme weekly times 2  Adah plan entered  Patient verbalizes understanding  Will send to  to arrange and also schedule a f/u in 3 months with repeat iron studies

## 2021-10-01 LAB — VWF MULTIMERS PPP IB: NORMAL

## 2021-10-05 ENCOUNTER — HOSPITAL ENCOUNTER (OUTPATIENT)
Dept: INFUSION CENTER | Facility: HOSPITAL | Age: 81
Discharge: HOME | End: 2021-10-05
Attending: INTERNAL MEDICINE
Payer: MEDICARE

## 2021-10-05 VITALS
RESPIRATION RATE: 18 BRPM | TEMPERATURE: 97.3 F | SYSTOLIC BLOOD PRESSURE: 117 MMHG | HEART RATE: 77 BPM | DIASTOLIC BLOOD PRESSURE: 65 MMHG | OXYGEN SATURATION: 98 %

## 2021-10-05 DIAGNOSIS — D50.9 IRON DEFICIENCY ANEMIA, UNSPECIFIED IRON DEFICIENCY ANEMIA TYPE: Primary | ICD-10-CM

## 2021-10-05 PROCEDURE — 96365 THER/PROPH/DIAG IV INF INIT: CPT

## 2021-10-05 RX ORDER — SODIUM CHLORIDE 9 MG/ML
20 INJECTION, SOLUTION INTRAVENOUS ONCE
Status: COMPLETED | OUTPATIENT
Start: 2021-10-05 | End: 2021-10-05

## 2021-10-05 RX ORDER — SODIUM CHLORIDE 9 MG/ML
20 INJECTION, SOLUTION INTRAVENOUS ONCE
Status: CANCELLED | OUTPATIENT
Start: 2021-10-12

## 2021-10-05 RX ADMIN — SODIUM CHLORIDE 20 ML/HR: 9 INJECTION, SOLUTION INTRAVENOUS at 13:50

## 2021-10-05 RX ADMIN — FERUMOXYTOL 510 MG: 510 INJECTION INTRAVENOUS at 13:50

## 2021-10-06 ENCOUNTER — TELEPHONE (OUTPATIENT)
Dept: NEPHROLOGY | Facility: CLINIC | Age: 81
End: 2021-10-06

## 2021-10-12 ENCOUNTER — HOSPITAL ENCOUNTER (OUTPATIENT)
Dept: INFUSION CENTER | Facility: HOSPITAL | Age: 81
Discharge: HOME | End: 2021-10-12
Attending: INTERNAL MEDICINE
Payer: MEDICARE

## 2021-10-12 VITALS
OXYGEN SATURATION: 95 % | RESPIRATION RATE: 18 BRPM | SYSTOLIC BLOOD PRESSURE: 139 MMHG | HEART RATE: 68 BPM | DIASTOLIC BLOOD PRESSURE: 68 MMHG | TEMPERATURE: 96.8 F

## 2021-10-12 DIAGNOSIS — D50.9 IRON DEFICIENCY ANEMIA, UNSPECIFIED IRON DEFICIENCY ANEMIA TYPE: Primary | ICD-10-CM

## 2021-10-12 PROCEDURE — 96365 THER/PROPH/DIAG IV INF INIT: CPT

## 2021-10-12 RX ORDER — SODIUM CHLORIDE 9 MG/ML
20 INJECTION, SOLUTION INTRAVENOUS ONCE
Status: COMPLETED | OUTPATIENT
Start: 2021-10-12 | End: 2021-10-12

## 2021-10-12 RX ORDER — SODIUM CHLORIDE 9 MG/ML
20 INJECTION, SOLUTION INTRAVENOUS ONCE
Status: CANCELLED | OUTPATIENT
Start: 2021-10-12

## 2021-10-12 RX ADMIN — SODIUM CHLORIDE 20 ML/HR: 9 INJECTION, SOLUTION INTRAVENOUS at 13:22

## 2021-10-12 RX ADMIN — FERUMOXYTOL 510 MG: 510 INJECTION INTRAVENOUS at 13:23

## 2021-10-15 ENCOUNTER — APPOINTMENT (OUTPATIENT)
Dept: LAB | Facility: HOSPITAL | Age: 81
End: 2021-10-15
Attending: UROLOGY
Payer: MEDICARE

## 2021-10-15 DIAGNOSIS — Z01.818 PRE-OP TESTING: ICD-10-CM

## 2021-10-15 DIAGNOSIS — Z01.818 OTHER SPECIFIED PRE-OPERATIVE EXAMINATION: ICD-10-CM

## 2021-10-15 PROCEDURE — 87086 URINE CULTURE/COLONY COUNT: CPT

## 2021-10-16 LAB — BACTERIA UR CULT: NORMAL

## 2021-10-18 ENCOUNTER — ANESTHESIA EVENT (OUTPATIENT)
Dept: PERIOP | Facility: HOSPITAL | Age: 81
End: 2021-10-18
Payer: MEDICARE

## 2021-10-19 ENCOUNTER — APPOINTMENT (OUTPATIENT)
Dept: RADIOLOGY | Facility: HOSPITAL | Age: 81
End: 2021-10-19
Payer: MEDICARE

## 2021-10-19 ENCOUNTER — ANESTHESIA (OUTPATIENT)
Dept: PERIOP | Facility: HOSPITAL | Age: 81
End: 2021-10-19
Payer: MEDICARE

## 2021-10-19 ENCOUNTER — HOSPITAL ENCOUNTER (OUTPATIENT)
Facility: HOSPITAL | Age: 81
Setting detail: OUTPATIENT SURGERY
Discharge: HOME | End: 2021-10-19
Attending: UROLOGY | Admitting: UROLOGY
Payer: MEDICARE

## 2021-10-19 VITALS
RESPIRATION RATE: 16 BRPM | WEIGHT: 202.5 LBS | HEART RATE: 57 BPM | DIASTOLIC BLOOD PRESSURE: 81 MMHG | BODY MASS INDEX: 29.06 KG/M2 | OXYGEN SATURATION: 95 % | SYSTOLIC BLOOD PRESSURE: 160 MMHG | TEMPERATURE: 98.3 F

## 2021-10-19 DIAGNOSIS — N13.5 STRICTURE OR KINKING OF URETER: ICD-10-CM

## 2021-10-19 PROCEDURE — C2617 STENT, NON-COR, TEM W/O DEL: HCPCS | Performed by: UROLOGY

## 2021-10-19 PROCEDURE — 74420 UROGRAPHY RTRGR +-KUB: CPT

## 2021-10-19 PROCEDURE — C1769 GUIDE WIRE: HCPCS | Performed by: UROLOGY

## 2021-10-19 DEVICE — INLAY URETERAL STENT W/O GUIDEWIRE
Type: IMPLANTABLE DEVICE | Site: URETER | Status: NON-FUNCTIONAL
Brand: BARD® INLAY® URETERAL STENT
Removed: 2022-03-11

## 2021-10-19 RX ORDER — LIDOCAINE HYDROCHLORIDE 10 MG/ML
INJECTION, SOLUTION EPIDURAL; INFILTRATION; INTRACAUDAL; PERINEURAL AS NEEDED
Status: DISCONTINUED | OUTPATIENT
Start: 2021-10-19 | End: 2021-10-19

## 2021-10-19 RX ORDER — ONDANSETRON 2 MG/ML
4 INJECTION INTRAMUSCULAR; INTRAVENOUS ONCE AS NEEDED
Status: DISCONTINUED | OUTPATIENT
Start: 2021-10-19 | End: 2021-10-19 | Stop reason: HOSPADM

## 2021-10-19 RX ORDER — CEFAZOLIN SODIUM 2 G/50ML
2000 SOLUTION INTRAVENOUS ONCE
Status: COMPLETED | OUTPATIENT
Start: 2021-10-19 | End: 2021-10-19

## 2021-10-19 RX ORDER — FENTANYL CITRATE/PF 50 MCG/ML
25 SYRINGE (ML) INJECTION
Status: DISCONTINUED | OUTPATIENT
Start: 2021-10-19 | End: 2021-10-19 | Stop reason: HOSPADM

## 2021-10-19 RX ORDER — DEXAMETHASONE SODIUM PHOSPHATE 4 MG/ML
INJECTION, SOLUTION INTRA-ARTICULAR; INTRALESIONAL; INTRAMUSCULAR; INTRAVENOUS; SOFT TISSUE AS NEEDED
Status: DISCONTINUED | OUTPATIENT
Start: 2021-10-19 | End: 2021-10-19

## 2021-10-19 RX ORDER — ONDANSETRON 2 MG/ML
INJECTION INTRAMUSCULAR; INTRAVENOUS AS NEEDED
Status: DISCONTINUED | OUTPATIENT
Start: 2021-10-19 | End: 2021-10-19

## 2021-10-19 RX ORDER — PROPOFOL 10 MG/ML
INJECTION, EMULSION INTRAVENOUS CONTINUOUS PRN
Status: DISCONTINUED | OUTPATIENT
Start: 2021-10-19 | End: 2021-10-19

## 2021-10-19 RX ORDER — PROMETHAZINE HYDROCHLORIDE 25 MG/ML
25 INJECTION, SOLUTION INTRAMUSCULAR; INTRAVENOUS ONCE AS NEEDED
Status: DISCONTINUED | OUTPATIENT
Start: 2021-10-19 | End: 2021-10-19 | Stop reason: HOSPADM

## 2021-10-19 RX ORDER — DIPHENHYDRAMINE HYDROCHLORIDE 50 MG/ML
12.5 INJECTION INTRAMUSCULAR; INTRAVENOUS ONCE AS NEEDED
Status: DISCONTINUED | OUTPATIENT
Start: 2021-10-19 | End: 2021-10-19 | Stop reason: HOSPADM

## 2021-10-19 RX ORDER — FENTANYL CITRATE 50 UG/ML
INJECTION, SOLUTION INTRAMUSCULAR; INTRAVENOUS AS NEEDED
Status: DISCONTINUED | OUTPATIENT
Start: 2021-10-19 | End: 2021-10-19

## 2021-10-19 RX ORDER — SODIUM CHLORIDE, SODIUM LACTATE, POTASSIUM CHLORIDE, CALCIUM CHLORIDE 600; 310; 30; 20 MG/100ML; MG/100ML; MG/100ML; MG/100ML
125 INJECTION, SOLUTION INTRAVENOUS CONTINUOUS
Status: DISCONTINUED | OUTPATIENT
Start: 2021-10-19 | End: 2021-10-19 | Stop reason: HOSPADM

## 2021-10-19 RX ORDER — MAGNESIUM HYDROXIDE 1200 MG/15ML
LIQUID ORAL AS NEEDED
Status: DISCONTINUED | OUTPATIENT
Start: 2021-10-19 | End: 2021-10-19 | Stop reason: HOSPADM

## 2021-10-19 RX ORDER — PROPOFOL 10 MG/ML
INJECTION, EMULSION INTRAVENOUS AS NEEDED
Status: DISCONTINUED | OUTPATIENT
Start: 2021-10-19 | End: 2021-10-19

## 2021-10-19 RX ADMIN — LIDOCAINE HYDROCHLORIDE 50 MG: 10 INJECTION, SOLUTION EPIDURAL; INFILTRATION; INTRACAUDAL; PERINEURAL at 12:34

## 2021-10-19 RX ADMIN — PROPOFOL 120 MCG/KG/MIN: 10 INJECTION, EMULSION INTRAVENOUS at 12:34

## 2021-10-19 RX ADMIN — SODIUM CHLORIDE, SODIUM LACTATE, POTASSIUM CHLORIDE, AND CALCIUM CHLORIDE 125 ML/HR: .6; .31; .03; .02 INJECTION, SOLUTION INTRAVENOUS at 11:53

## 2021-10-19 RX ADMIN — FENTANYL CITRATE 100 MCG: 50 INJECTION, SOLUTION INTRAMUSCULAR; INTRAVENOUS at 12:37

## 2021-10-19 RX ADMIN — PROPOFOL 70 MG: 10 INJECTION, EMULSION INTRAVENOUS at 12:34

## 2021-10-19 RX ADMIN — CEFAZOLIN SODIUM 2000 MG: 2 SOLUTION INTRAVENOUS at 12:29

## 2021-10-19 RX ADMIN — DEXAMETHASONE SODIUM PHOSPHATE 4 MG: 4 INJECTION, SOLUTION INTRA-ARTICULAR; INTRALESIONAL; INTRAMUSCULAR; INTRAVENOUS; SOFT TISSUE at 12:41

## 2021-10-19 RX ADMIN — ONDANSETRON 4 MG: 2 INJECTION INTRAMUSCULAR; INTRAVENOUS at 12:41

## 2021-10-21 ENCOUNTER — OFFICE VISIT (OUTPATIENT)
Dept: UROLOGY | Facility: CLINIC | Age: 81
End: 2021-10-21
Payer: MEDICARE

## 2021-10-21 VITALS
HEART RATE: 61 BPM | DIASTOLIC BLOOD PRESSURE: 82 MMHG | HEIGHT: 70 IN | BODY MASS INDEX: 29.35 KG/M2 | WEIGHT: 205 LBS | SYSTOLIC BLOOD PRESSURE: 130 MMHG

## 2021-10-21 DIAGNOSIS — N13.30 HYDRONEPHROSIS, UNSPECIFIED HYDRONEPHROSIS TYPE: Primary | ICD-10-CM

## 2021-10-21 DIAGNOSIS — N13.1 HYDRONEPHROSIS WITH URETERAL STRICTURE, NOT ELSEWHERE CLASSIFIED: ICD-10-CM

## 2021-10-21 LAB
POST-VOID RESIDUAL VOLUME, ML POC: 30 ML
SL AMB  POCT GLUCOSE, UA: NORMAL
SL AMB LEUKOCYTE ESTERASE,UA: NORMAL
SL AMB POCT BILIRUBIN,UA: NORMAL
SL AMB POCT BLOOD,UA: NORMAL
SL AMB POCT CLARITY,UA: CLEAR
SL AMB POCT COLOR,UA: YELLOW
SL AMB POCT KETONES,UA: NORMAL
SL AMB POCT NITRITE,UA: NORMAL
SL AMB POCT PH,UA: 5
SL AMB POCT SPECIFIC GRAVITY,UA: 1.03
SL AMB POCT URINE PROTEIN: NORMAL
SL AMB POCT UROBILINOGEN: 0.2

## 2021-10-21 PROCEDURE — 99214 OFFICE O/P EST MOD 30 MIN: CPT | Performed by: UROLOGY

## 2021-10-21 PROCEDURE — 51798 US URINE CAPACITY MEASURE: CPT | Performed by: UROLOGY

## 2021-10-21 PROCEDURE — 1124F ACP DISCUSS-NO DSCNMKR DOCD: CPT | Performed by: UROLOGY

## 2021-10-21 PROCEDURE — 81002 URINALYSIS NONAUTO W/O SCOPE: CPT | Performed by: UROLOGY

## 2021-10-21 RX ORDER — GABAPENTIN 100 MG/1
300 CAPSULE ORAL ONCE
Status: CANCELLED | OUTPATIENT
Start: 2021-10-21 | End: 2021-10-21

## 2021-10-21 RX ORDER — HEPARIN SODIUM 5000 [USP'U]/ML
5000 INJECTION, SOLUTION INTRAVENOUS; SUBCUTANEOUS ONCE
Status: CANCELLED | OUTPATIENT
Start: 2021-10-21 | End: 2021-10-21

## 2021-10-21 RX ORDER — ACETAMINOPHEN 325 MG/1
975 TABLET ORAL ONCE
Status: CANCELLED | OUTPATIENT
Start: 2021-10-21 | End: 2021-10-21

## 2021-10-25 ENCOUNTER — TELEPHONE (OUTPATIENT)
Dept: UROLOGY | Facility: MEDICAL CENTER | Age: 81
End: 2021-10-25

## 2021-10-29 ENCOUNTER — HOSPITAL ENCOUNTER (OUTPATIENT)
Dept: RADIOLOGY | Facility: HOSPITAL | Age: 81
Discharge: HOME | End: 2021-10-29
Payer: MEDICARE

## 2021-10-29 DIAGNOSIS — N13.30 HYDRONEPHROSIS, UNSPECIFIED HYDRONEPHROSIS TYPE: ICD-10-CM

## 2021-10-29 PROCEDURE — 74176 CT ABD & PELVIS W/O CONTRAST: CPT

## 2021-10-29 PROCEDURE — G1004 CDSM NDSC: HCPCS

## 2021-12-15 ENCOUNTER — TELEPHONE (OUTPATIENT)
Dept: HEMATOLOGY ONCOLOGY | Facility: MEDICAL CENTER | Age: 81
End: 2021-12-15

## 2021-12-15 ENCOUNTER — APPOINTMENT (OUTPATIENT)
Dept: LAB | Facility: HOSPITAL | Age: 81
End: 2021-12-15
Payer: MEDICARE

## 2021-12-15 DIAGNOSIS — D50.9 IRON DEFICIENCY ANEMIA, UNSPECIFIED IRON DEFICIENCY ANEMIA TYPE: Primary | ICD-10-CM

## 2021-12-15 DIAGNOSIS — D50.9 IRON DEFICIENCY ANEMIA, UNSPECIFIED IRON DEFICIENCY ANEMIA TYPE: ICD-10-CM

## 2021-12-15 DIAGNOSIS — D50.9 MICROCYTIC ANEMIA: ICD-10-CM

## 2021-12-15 LAB
BASOPHILS # BLD AUTO: 0.04 THOUSANDS/ΜL (ref 0–0.1)
BASOPHILS NFR BLD AUTO: 1 % (ref 0–1)
EOSINOPHIL # BLD AUTO: 0.33 THOUSAND/ΜL (ref 0–0.61)
EOSINOPHIL NFR BLD AUTO: 4 % (ref 0–6)
ERYTHROCYTE [DISTWIDTH] IN BLOOD BY AUTOMATED COUNT: 22.2 % (ref 11.6–15.1)
FERRITIN SERPL-MCNC: 42 NG/ML (ref 8–388)
HCT VFR BLD AUTO: 46.5 % (ref 36.5–49.3)
HGB BLD-MCNC: 14.3 G/DL (ref 12–17)
IMM GRANULOCYTES # BLD AUTO: 0.03 THOUSAND/UL (ref 0–0.2)
IMM GRANULOCYTES NFR BLD AUTO: 0 % (ref 0–2)
IRON SATN MFR SERPL: 26 % (ref 20–50)
IRON SERPL-MCNC: 72 UG/DL (ref 65–175)
LYMPHOCYTES # BLD AUTO: 1.88 THOUSANDS/ΜL (ref 0.6–4.47)
LYMPHOCYTES NFR BLD AUTO: 25 % (ref 14–44)
MCH RBC QN AUTO: 25.9 PG (ref 26.8–34.3)
MCHC RBC AUTO-ENTMCNC: 30.8 G/DL (ref 31.4–37.4)
MCV RBC AUTO: 84 FL (ref 82–98)
MONOCYTES # BLD AUTO: 0.9 THOUSAND/ΜL (ref 0.17–1.22)
MONOCYTES NFR BLD AUTO: 12 % (ref 4–12)
NEUTROPHILS # BLD AUTO: 4.25 THOUSANDS/ΜL (ref 1.85–7.62)
NEUTS SEG NFR BLD AUTO: 58 % (ref 43–75)
NRBC BLD AUTO-RTO: 0 /100 WBCS
PLATELET # BLD AUTO: 143 THOUSANDS/UL (ref 149–390)
PMV BLD AUTO: 9.4 FL (ref 8.9–12.7)
RBC # BLD AUTO: 5.53 MILLION/UL (ref 3.88–5.62)
TIBC SERPL-MCNC: 278 UG/DL (ref 250–450)
WBC # BLD AUTO: 7.43 THOUSAND/UL (ref 4.31–10.16)

## 2021-12-15 PROCEDURE — 36415 COLL VENOUS BLD VENIPUNCTURE: CPT

## 2021-12-15 PROCEDURE — 83540 ASSAY OF IRON: CPT

## 2021-12-15 PROCEDURE — 82728 ASSAY OF FERRITIN: CPT

## 2021-12-15 PROCEDURE — 83550 IRON BINDING TEST: CPT

## 2021-12-15 PROCEDURE — 85025 COMPLETE CBC W/AUTO DIFF WBC: CPT

## 2021-12-16 ENCOUNTER — OFFICE VISIT (OUTPATIENT)
Dept: HEMATOLOGY ONCOLOGY | Facility: MEDICAL CENTER | Age: 81
End: 2021-12-16
Payer: MEDICARE

## 2021-12-16 VITALS
WEIGHT: 204 LBS | TEMPERATURE: 97.6 F | HEART RATE: 67 BPM | BODY MASS INDEX: 29.2 KG/M2 | DIASTOLIC BLOOD PRESSURE: 58 MMHG | RESPIRATION RATE: 16 BRPM | OXYGEN SATURATION: 96 % | HEIGHT: 70 IN | SYSTOLIC BLOOD PRESSURE: 116 MMHG

## 2021-12-16 DIAGNOSIS — D50.9 IRON DEFICIENCY ANEMIA, UNSPECIFIED IRON DEFICIENCY ANEMIA TYPE: Primary | ICD-10-CM

## 2021-12-16 PROCEDURE — 99215 OFFICE O/P EST HI 40 MIN: CPT | Performed by: PHYSICIAN ASSISTANT

## 2021-12-22 ENCOUNTER — LAB (OUTPATIENT)
Dept: LAB | Facility: HOSPITAL | Age: 81
End: 2021-12-22
Attending: INTERNAL MEDICINE
Payer: MEDICARE

## 2021-12-22 DIAGNOSIS — E87.5 HYPERKALEMIA: ICD-10-CM

## 2021-12-22 LAB
ANION GAP SERPL CALCULATED.3IONS-SCNC: 6 MMOL/L (ref 4–13)
BUN SERPL-MCNC: 26 MG/DL (ref 5–25)
CALCIUM SERPL-MCNC: 8.8 MG/DL (ref 8.3–10.1)
CHLORIDE SERPL-SCNC: 104 MMOL/L (ref 100–108)
CO2 SERPL-SCNC: 28 MMOL/L (ref 21–32)
CREAT SERPL-MCNC: 1.7 MG/DL (ref 0.6–1.3)
GFR SERPL CREATININE-BSD FRML MDRD: 37 ML/MIN/1.73SQ M
GLUCOSE P FAST SERPL-MCNC: 103 MG/DL (ref 65–99)
POTASSIUM SERPL-SCNC: 4.8 MMOL/L (ref 3.5–5.3)
SODIUM SERPL-SCNC: 138 MMOL/L (ref 136–145)

## 2021-12-22 PROCEDURE — 80048 BASIC METABOLIC PNL TOTAL CA: CPT

## 2021-12-22 PROCEDURE — 36415 COLL VENOUS BLD VENIPUNCTURE: CPT

## 2022-01-07 ENCOUNTER — ANESTHESIA EVENT (OUTPATIENT)
Dept: PERIOP | Facility: HOSPITAL | Age: 82
End: 2022-01-07
Payer: MEDICARE

## 2022-01-10 ENCOUNTER — APPOINTMENT (OUTPATIENT)
Dept: LAB | Facility: HOSPITAL | Age: 82
End: 2022-01-10
Payer: MEDICARE

## 2022-01-10 DIAGNOSIS — R39.89 SUSPECTED UTI: ICD-10-CM

## 2022-01-10 PROCEDURE — 87086 URINE CULTURE/COLONY COUNT: CPT

## 2022-01-11 DIAGNOSIS — N40.1 BPH WITH OBSTRUCTION/LOWER URINARY TRACT SYMPTOMS: ICD-10-CM

## 2022-01-11 DIAGNOSIS — N13.8 BPH WITH OBSTRUCTION/LOWER URINARY TRACT SYMPTOMS: ICD-10-CM

## 2022-01-11 DIAGNOSIS — R31.0 HEMATURIA, GROSS: ICD-10-CM

## 2022-01-11 LAB — BACTERIA UR CULT: NORMAL

## 2022-01-11 RX ORDER — FINASTERIDE 5 MG/1
TABLET, FILM COATED ORAL
Qty: 90 TABLET | Refills: 2 | Status: SHIPPED | OUTPATIENT
Start: 2022-01-11 | End: 2022-08-01

## 2022-01-17 ENCOUNTER — NURSE TRIAGE (OUTPATIENT)
Dept: OTHER | Facility: OTHER | Age: 82
End: 2022-01-17

## 2022-01-17 DIAGNOSIS — Z20.828 SARS-ASSOCIATED CORONAVIRUS EXPOSURE: Primary | ICD-10-CM

## 2022-01-17 NOTE — TELEPHONE ENCOUNTER
Reason for Disposition   [1] COVID-19 infection suspected by caller or triager AND [2] mild symptoms (cough, fever, or others) AND [3] has not gotten tested yet    Answer Assessment - Initial Assessment Questions  Were you within 6 feet or less, for up to 15 minutes or more with a person that has a confirmed COVID-19 test? unsure  What was the date of your exposure? unsure  Are you experiencing any symptoms attributed to the virus?  (Assess for SOB, cough, fever, difficulty breathing) congestion, bodyaches, fever  HIGH RISK: Do you have any history heart or lung conditions, weakened immune system, diabetes, Asthma, CHF, HIV, COPD, Chemo, renal failure, sickle cell, etc? no  PREGNANCY: Are you pregnant or did you recently give birth? no    Protocols used: CORONAVIRUS (COVID-19) DIAGNOSED OR SUSPECTED-ADULT-

## 2022-01-19 ENCOUNTER — HOSPITAL ENCOUNTER (EMERGENCY)
Facility: HOSPITAL | Age: 82
Discharge: HOME | End: 2022-01-19
Attending: EMERGENCY MEDICINE
Payer: MEDICARE

## 2022-01-19 ENCOUNTER — APPOINTMENT (EMERGENCY)
Dept: RADIOLOGY | Facility: HOSPITAL | Age: 82
End: 2022-01-19
Payer: MEDICARE

## 2022-01-19 VITALS
OXYGEN SATURATION: 98 % | BODY MASS INDEX: 29.27 KG/M2 | TEMPERATURE: 97.6 F | RESPIRATION RATE: 18 BRPM | SYSTOLIC BLOOD PRESSURE: 142 MMHG | DIASTOLIC BLOOD PRESSURE: 93 MMHG | HEART RATE: 70 BPM | WEIGHT: 204 LBS

## 2022-01-19 DIAGNOSIS — J06.9 URI (UPPER RESPIRATORY INFECTION): ICD-10-CM

## 2022-01-19 DIAGNOSIS — M25.522 LEFT ELBOW PAIN: Primary | ICD-10-CM

## 2022-01-19 DIAGNOSIS — Z20.822 ENCOUNTER FOR LABORATORY TESTING FOR COVID-19 VIRUS: ICD-10-CM

## 2022-01-19 LAB
ALBUMIN SERPL BCP-MCNC: 2.9 G/DL (ref 3.5–5)
ALP SERPL-CCNC: 101 U/L (ref 46–116)
ALT SERPL W P-5'-P-CCNC: 18 U/L (ref 12–78)
ANION GAP SERPL CALCULATED.3IONS-SCNC: 9 MMOL/L (ref 4–13)
AST SERPL W P-5'-P-CCNC: 30 U/L (ref 5–45)
BASOPHILS # BLD AUTO: 0.02 THOUSANDS/ΜL (ref 0–0.1)
BASOPHILS NFR BLD AUTO: 0 % (ref 0–1)
BILIRUB SERPL-MCNC: 0.8 MG/DL (ref 0.2–1)
BUN SERPL-MCNC: 24 MG/DL (ref 5–25)
CALCIUM ALBUM COR SERPL-MCNC: 9 MG/DL (ref 8.3–10.1)
CALCIUM SERPL-MCNC: 8.1 MG/DL (ref 8.3–10.1)
CHLORIDE SERPL-SCNC: 101 MMOL/L (ref 100–108)
CO2 SERPL-SCNC: 26 MMOL/L (ref 21–32)
CREAT SERPL-MCNC: 1.77 MG/DL (ref 0.6–1.3)
EOSINOPHIL # BLD AUTO: 0.05 THOUSAND/ΜL (ref 0–0.61)
EOSINOPHIL NFR BLD AUTO: 1 % (ref 0–6)
ERYTHROCYTE [DISTWIDTH] IN BLOOD BY AUTOMATED COUNT: 17.2 % (ref 11.6–15.1)
GFR SERPL CREATININE-BSD FRML MDRD: 35 ML/MIN/1.73SQ M
GLUCOSE SERPL-MCNC: 106 MG/DL (ref 65–140)
HCT VFR BLD AUTO: 47.4 % (ref 36.5–49.3)
HGB BLD-MCNC: 14.8 G/DL (ref 12–17)
IMM GRANULOCYTES # BLD AUTO: 0.02 THOUSAND/UL (ref 0–0.2)
IMM GRANULOCYTES NFR BLD AUTO: 0 % (ref 0–2)
LYMPHOCYTES # BLD AUTO: 1.17 THOUSANDS/ΜL (ref 0.6–4.47)
LYMPHOCYTES NFR BLD AUTO: 16 % (ref 14–44)
MCH RBC QN AUTO: 26.5 PG (ref 26.8–34.3)
MCHC RBC AUTO-ENTMCNC: 31.2 G/DL (ref 31.4–37.4)
MCV RBC AUTO: 85 FL (ref 82–98)
MONOCYTES # BLD AUTO: 1.28 THOUSAND/ΜL (ref 0.17–1.22)
MONOCYTES NFR BLD AUTO: 18 % (ref 4–12)
NEUTROPHILS # BLD AUTO: 4.71 THOUSANDS/ΜL (ref 1.85–7.62)
NEUTS SEG NFR BLD AUTO: 65 % (ref 43–75)
NRBC BLD AUTO-RTO: 0 /100 WBCS
PLATELET # BLD AUTO: 101 THOUSANDS/UL (ref 149–390)
PMV BLD AUTO: 9.8 FL (ref 8.9–12.7)
POTASSIUM SERPL-SCNC: 5 MMOL/L (ref 3.5–5.3)
PROT SERPL-MCNC: 6.5 G/DL (ref 6.4–8.2)
RBC # BLD AUTO: 5.58 MILLION/UL (ref 3.88–5.62)
SODIUM SERPL-SCNC: 136 MMOL/L (ref 136–145)
WBC # BLD AUTO: 7.25 THOUSAND/UL (ref 4.31–10.16)

## 2022-01-19 PROCEDURE — 80053 COMPREHEN METABOLIC PANEL: CPT | Performed by: EMERGENCY MEDICINE

## 2022-01-19 PROCEDURE — 36415 COLL VENOUS BLD VENIPUNCTURE: CPT | Performed by: EMERGENCY MEDICINE

## 2022-01-19 PROCEDURE — 99284 EMERGENCY DEPT VISIT MOD MDM: CPT | Performed by: EMERGENCY MEDICINE

## 2022-01-19 PROCEDURE — 85025 COMPLETE CBC W/AUTO DIFF WBC: CPT | Performed by: EMERGENCY MEDICINE

## 2022-01-19 PROCEDURE — 71045 X-RAY EXAM CHEST 1 VIEW: CPT

## 2022-01-19 PROCEDURE — 99284 EMERGENCY DEPT VISIT MOD MDM: CPT

## 2022-01-19 PROCEDURE — U0003 INFECTIOUS AGENT DETECTION BY NUCLEIC ACID (DNA OR RNA); SEVERE ACUTE RESPIRATORY SYNDROME CORONAVIRUS 2 (SARS-COV-2) (CORONAVIRUS DISEASE [COVID-19]), AMPLIFIED PROBE TECHNIQUE, MAKING USE OF HIGH THROUGHPUT TECHNOLOGIES AS DESCRIBED BY CMS-2020-01-R: HCPCS | Performed by: EMERGENCY MEDICINE

## 2022-01-19 PROCEDURE — U0005 INFEC AGEN DETEC AMPLI PROBE: HCPCS | Performed by: EMERGENCY MEDICINE

## 2022-01-19 PROCEDURE — 93005 ELECTROCARDIOGRAM TRACING: CPT

## 2022-01-19 RX ADMIN — ALUMINA, MAGNESIA, AND SIMETHICONE ORAL SUSPENSION REGULAR STRENGTH 10 ML: 1200; 1200; 120 SUSPENSION ORAL at 10:29

## 2022-01-19 NOTE — ED PROVIDER NOTES
History  Chief Complaint   Patient presents with    Arm Pain     left lower arm pain that is intermittent since monday  c/o a sore throat  and chest congestion     HPI  Patient is a 81-year-old male history hypertension, hyperlipidemia, valvular replacement with porcine valve, CVA presenting for evaluation rhinorrhea, sore throat, chest congestion, left-sided elbow pain  Patient states that he has been having symptoms for the past 3 days  Patient denies fevers , chills  Patient states that his cough is minimally productive  Patient denies any significant dyspnea or dyspnea exertion  Patient denies palpitations, diaphoresis, nausea, vomiting, syncope or near syncope  Patient denies recent travel or sick contacts  Patient is fully vaccinated against COVID-19  Prior to Admission Medications   Prescriptions Last Dose Informant Patient Reported?  Taking?   aspirin (ECOTRIN LOW STRENGTH) 81 mg EC tablet 1/19/2022 at Unknown time Self Yes Yes   Sig: Take 81 mg by mouth daily Pt to check with surgeon if needed to hold RX    atorvastatin (LIPITOR) 40 mg tablet 1/18/2022 at Unknown time Self No Yes   Sig: Take 1 tablet (40 mg total) by mouth daily with dinner   cholecalciferol (VITAMIN D3) 1,000 units tablet 1/19/2022 at Unknown time Self Yes Yes   Sig: Take 1,000 Units by mouth daily after lunch     clopidogrel (PLAVIX) 75 mg tablet 1/19/2022 at Unknown time Self Yes Yes   Sig: Take 75 mg by mouth daily Pt  To check with surgeon if needed to hold RX    finasteride (PROSCAR) 5 mg tablet Not Taking at Unknown time  No No   Sig: TAKE 1 [place]   Patient not taking: Reported on 1/19/2022   metoprolol tartrate (LOPRESSOR) 25 mg tablet 1/19/2022 at Unknown time Self No Yes   Sig: take 1 tablet by mouth every 8 hours   Patient taking differently: 25 mg 3 (three) times a day       Facility-Administered Medications: None       Past Medical History:   Diagnosis Date    Anemia     iron deficiency    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  being monitored    Essential hypertension, long-standing     Hematuria     intermittent    History of cigarette smoking, chronic     62 year smoker at one half pack per day, quit 04/1/17    Hyperlipidemia     Hypertension     Irregular heart beat     Pneumonia      X 2    Stroke ([facility] [address] ) 10/29/2020    right sided  weakness almost full recovery    Stroke ([facility] [address] )     Vitamin D deficiency     maintained with 1000 units of D    Water retention     Wears glasses     Wears partial dentures     upper       Past Surgical History:   Procedure Laterality Date    APPENDECTOMY  1960    CARDIAC SURGERY      watchman procedure; 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    CAROTID ENDARTERECTOMY Left 1998    Supposedly no internal stenosis found    CYSTOSCOPY Right 8/15/2017    Procedure: CYSTOSCOPY RIGHT STENT EXCHANGE;  Surgeon: David Rodriguez MD;  Location: [address] [place] [address];  Service: Urology    CYSTOSCOPY     [address] [facility] [address]  LITHOTRIPSY  03/2017    For nephrolithiasis at [address]  5/10/2019    FL RETROGRADE PYELOGRAM  7/30/2019    FL RETROGRADE PYELOGRAM  10/22/2019    FL RETROGRADE PYELOGRAM  1/28/2020    FL RETROGRADE PYELOGRAM  8/11/2020    FL RETROGRADE PYELOGRAM  12/15/2020    FL RETROGRADE PYELOGRAM  2/14/2021    FL RETROGRADE PYELOGRAM  5/11/2021    FL RETROGRADE PYELOGRAM  10/19/2021    [place] CYSTO/URETERO W/LITHOTRIPSY &INDWELL STENT INSRT Right 5/2/2017    Procedure: CYSTOSCOPY URETEROSCOPY WITH LITHOTRIPSY HOLMIUM LASER, RETROGRADE PYELOGRAM AND INSERTION STENT URETERAL;  Surgeon: David Rodriguez MD;  Location: [address] [place] [address];  Service: Urology    [place] CYSTO/URETERO W/LITHOTRIPSY &INDWELL STENT INSRT Right 5/16/2017    Procedure: CYSTOSCOPY, RETROGRADE PYELOGRAM,  FLEXIBLE URETEROSCOPY,  HOLMIUM LASER LITHOTRIPSY, STONE BASKET MANIPULATION,  STENT URETERAL EXCHANGE;  Surgeon: David Rodriguez MD;  Location: [address] [place] [address];  Service: Urology    [place] CYSTO/URETERO W/LITHOTRIPSY &INDWELL STENT INSRT Right 6/2/2017    Procedure: CYSTOSCOPY, RETROGRADE, STONE MANIPULATION WITH HOLMIUM LASER, STENT PLACEMENT;  Surgeon: Sukhdev Grajeda MD;  Location: [address] [place] [address];  Service: Urology    [place] CYSTO/URETERO W/LITHOTRIPSY &INDWELL STENT INSRT Right 7/18/2017    Procedure: Zaraen Inks, URETEROSCOPY HOLMIUM LASER [place],  AND STENT PLACEMENT;  Surgeon: Sukhdev Grajeda MD;  Location: [address] [place] [address];  Service: Urology    LA CYSTO/URETERO W/LITHOTRIPSY &INDWELL STENT INSRT Right 2/14/2021    Procedure: CYSTOSCOPY URETEROSCOPY, RETROGRADE PYELOGRAM, STONE MANIPULATION AND EXCHANGE STENT URETERAL;  Surgeon: Rashida Granados MD;  Location: [address] [place] [address];  Service: Urology    LA CYSTOSCOPY,INSERT URETERAL STENT Right 11/14/2017    Procedure: EXCHANGE STENT URETERAL;  Surgeon: Sukhdev Grajeda MD;  Location: [address] [place] [address];  Service: Urology    LA CYSTOURETHROSCOPY,URETER CATHETER Right 11/14/2017    Procedure: CYSTOSCOPY RETROGRADE, STENT EXCHANGE;  Surgeon: Sukhdev Grajeda MD;  Location: [address] [place] [address];  Service: Urology    LA CYSTOURETHROSCOPY,URETER CATHETER Right 5/8/2018    Procedure: Abena Rodriguez;  Surgeon: Sukhdev Grajeda MD;  Location: [address] [place] [address];  Service: Urology    LA CYSTOURETHROSCOPY,URETER CATHETER Right 8/14/2018    Procedure: Jaime Para EXCHANGE;  Surgeon: Sukhdev Grajeda MD;  Location: [address] [place] [address];  Service: Urology    LA CYSTOURETHROSCOPY,URETER CATHETER Right 11/13/2018    Procedure: CYSTOSCOPY, Noa Meo EXCHANGE, RETROGRADE;  Surgeon: Sukhdev Grajeda MD;  Location: [address] [place] [address];  Service: Urology    LA CYSTOURETHROSCOPY,URETER CATHETER Right 2/12/2019    Procedure: CYSTOSCOPY, RETROGRADE, STENT REMOVAL AND STENT PLACEMENT;  Surgeon: Sukhdev Grajeda MD;  Location: [address] [place] [address];  Service: Urology    LA CYSTOURETHROSCOPY,URETER CATHETER Right 5/10/2019    Procedure: CYSTOSCOPY, RETROGRADE, STENT EXCHANGE;  Surgeon: Tony Yates MD;  Location: [address] [place] [address];  Service: Urology    MD CYSTOURETHROSCOPY,URETER CATHETER Right 7/30/2019    Procedure: Ana M Baldy, STENT REMOVAL AND PLACEMENT OF STENT;  Surgeon: Tony Yates MD;  Location: [address] [place] [address];  Service: Urology    MD CYSTOURETHROSCOPY,URETER CATHETER Right 10/22/2019    Procedure: Ana M Baldy, STENT EXCHANGE;  Surgeon: Tony Yates MD;  Location: [address] [place] [address];  Service: Urology    MD CYSTOURETHROSCOPY,URETER CATHETER Right 1/28/2020    Procedure: Ana M Baldy, STENT REMOVAL AND STENT PLACEMENT;  Surgeon: Tony Yates MD;  Location: [address] [place] [address];  Service: Urology    MD CYSTOURETHROSCOPY,URETER CATHETER Right 5/22/2020    Procedure: CYSTOSCOPY RETROGRADE, STENT EXCHANGE;  Surgeon: Tony Yates MD;  Location: [address] [place] [address];  Service: Urology    MD CYSTOURETHROSCOPY,URETER CATHETER Right 8/11/2020    Procedure: CYSTOSCOPY, STENT EXCHANGE, RETROGRADE;  Surgeon: Tony Yates MD;  Location: [address] [place] [address];  Service: Urology    MD CYSTOURETHROSCOPY,URETER CATHETER Right 12/15/2020    Procedure: CYSTOSCOPY, RETROGRADE, STENT REMOVAL, AND STENT PLACEMENT;  Surgeon: Tony Yates MD;  Location: [address] [place] [address];  Service: Urology    MD CYSTOURETHROSCOPY,URETER CATHETER Right 5/11/2021    Procedure: CYSTOSCOPY RETROGRADE PYELOGRAM WITH STENT EXCHANGE;  Surgeon: Tony Yates MD;  Location: [address] [place] [address];  Service: Urology    MD CYSTOURETHROSCOPY,URETER CATHETER Right 10/19/2021    Procedure: CYSTOSCOPY WITH RETROGRADE, STENT EXCHANGE;  Surgeon: Tony Yates MD;  Location: [address] [place] [address];  Service: Urology    PROSTATE SURGERY  2015    Laser Prostatectomy  by Dr Ami Grande Right 4/18/2017    Procedure: INSERTION STENT URETERAL, RIGHT URETEROSCOPY,  LASER OF URETERAL STRICTURE AND STONE;  Surgeon: Tony Yates MD;  Location: [address] [place] [address];  Service:    [address] [place] [address] PPS PLACEMENT Right 2018    Procedure: Arthuro Gallop;  Surgeon: Isaías Charles MD;  Location: [facility];  Service: Urology       Family History   Problem Relation Age of Onset    Hypertension Mother     Alcohol abuse Father     Cirrhosis Father     Cancer Son 15        cancer-knee and above-left-amputation    Cancer Sister     Other Brother         head mass    Thyroid disease unspecified Sister     Arthritis Sister     Other Brother         legally blind in one eye     I have reviewed and agree with the history as documented  E-Cigarette/Vaping    E-Cigarette Use Never User      E-Cigarette/Vaping Substances    Nicotine No     THC No     CBD No     Flavoring No     Other No     Unknown No      Social History     Tobacco Use    Smoking status: Former Smoker     Packs/day: 0 50     Years: 62 00     Pack years: 31 00     Types: Cigarettes     Quit date: 4/15/2017     Years since quittin 7    Smokeless tobacco: Never Used   Vaping Use    Vaping Use: Never used   Substance Use Topics    Alcohol use: Yes     Comment: rare    Drug use: No       Review of Systems   Constitutional: Negative for chills, fatigue and fever  HENT: Positive for rhinorrhea and sore throat  Negative for congestion  Eyes: Negative for photophobia and visual disturbance  Respiratory: Positive for chest tightness  Negative for shortness of breath  Cardiovascular: Negative for chest pain, palpitations and leg swelling  Gastrointestinal: Negative for abdominal distention, abdominal pain, diarrhea, nausea and vomiting  Endocrine: Negative for polydipsia and polyuria  Genitourinary: Negative for dysuria and hematuria  Musculoskeletal: Negative for arthralgias and myalgias  Skin: Negative for color change, pallor, rash and wound  Neurological: Negative for weakness, numbness and headaches  Psychiatric/Behavioral: Negative for confusion         Physical Exam  Physical Exam  Vitals and nursing note reviewed  Constitutional:       General: He is not in acute distress  Appearance: He is well-developed  He is not diaphoretic  Comments: Well-appearing, nondistressed   HENT:      Head: Normocephalic and atraumatic  Comments: Moist mucous membranes  Trace david pharyngeal erythema  No tonsillar swelling  Right Ear: External ear normal       Left Ear: External ear normal       Nose: Nose normal       Mouth/Throat:      Pharynx: No oropharyngeal exudate  Eyes:      Conjunctiva/sclera: Conjunctivae normal       Pupils: Pupils are equal, round, and reactive to light  Cardiovascular:      Rate and Rhythm: Normal rate and regular rhythm  Heart sounds: Normal heart sounds  No murmur heard  No friction rub  No gallop  Comments: Rate controlled atrial fibrillation, no murmurs rubs or gallops  Pulmonary:      Effort: Pulmonary effort is normal  No respiratory distress  Breath sounds: Normal breath sounds  No wheezing  Comments: Satting 96% on room air  No increased work of breathing  Speaking complete sentences  Lungs clear to auscultation bilaterally without wheezes, rales, rhonchi  Chest:      Chest wall: No tenderness  Abdominal:      General: Bowel sounds are normal  There is no distension  Palpations: Abdomen is soft  There is no mass  Tenderness: There is no abdominal tenderness  There is no guarding or rebound  Musculoskeletal:         General: No deformity  Skin:     General: Skin is warm and dry  Capillary Refill: Capillary refill takes less than 2 seconds  Neurological:      Mental Status: He is alert and oriented to person, place, and time     Psychiatric:         Behavior: Behavior normal          Vital Signs  ED Triage Vitals [01/19/22 0832]   Temperature Pulse Respirations Blood Pressure SpO2   97 6 °F (36 4 °C) 76 18 133/89 96 %      Temp src Heart Rate Source Patient Position - Orthostatic VS BP Location FiO2 (%)   -- -- -- -- --      Pain Score       9           Vitals:    01/19/22 0832 01/19/22 1100   BP: 133/89 142/93   Pulse: 76 70         Visual Acuity      ED Medications  Medications   al mag oxide-diphenhydramine-lidocaine viscous (MAGIC MOUTHWASH) suspension 10 mL (10 mL Swish & Swallow Given 1/19/22 1029)       Diagnostic Studies  Results Reviewed     Procedure Component Value Units Date/Time    Comprehensive metabolic panel [[id number]]  (Abnormal) Collected: 01/19/22 0958    Lab Status: Final result Specimen: Blood from Arm, Left Updated: 01/19/22 1029     Sodium 136 mmol/L      Potassium 5 0 mmol/L      Chloride 101 mmol/L      CO2 26 mmol/L      ANION GAP 9 mmol/L      BUN 24 mg/dL      Creatinine 1 77 mg/dL      Glucose 106 mg/dL      Calcium 8 1 mg/dL      Corrected Calcium 9 0 mg/dL      AST 30 U/L      ALT 18 U/L      Alkaline Phosphatase 101 U/L      Total Protein 6 5 g/dL      Albumin 2 9 g/dL      Total Bilirubin 0 80 mg/dL      eGFR 35 ml/min/1 73sq m     Narrative:      [place] guidelines for Chronic Kidney Disease (CKD):     Stage 1 with normal or high GFR (GFR > 90 mL/min/1 73 square meters)    Stage 2 Mild CKD (GFR = 60-89 mL/min/1 73 square meters)    Stage 3A Moderate CKD (GFR = 45-59 mL/min/1 73 square meters)    Stage 3B Moderate CKD (GFR = 30-44 mL/min/1 73 square meters)    Stage 4 Severe CKD (GFR = 15-29 mL/min/1 73 square meters)    Stage 5 End Stage CKD (GFR <15 mL/min/1 73 square meters)  Note: GFR calculation is accurate only with a steady state creatinine    CBC and differential [[id number]]  (Abnormal) Collected: 01/19/22 0958    Lab Status: Final result Specimen: Blood from Arm, Left Updated: 01/19/22 1003     WBC 7 25 Thousand/uL      RBC 5 58 Million/uL      Hemoglobin 14 8 g/dL      Hematocrit 47 4 %      MCV 85 fL      MCH 26 5 pg      MCHC 31 2 g/dL      RDW 17 2 %      MPV 9 8 fL      Platelets 497 Thousands/uL      nRBC 0 /100 WBCs      Neutrophils Relative 65 % Immat GRANS % 0 %      Lymphocytes Relative 16 %      Monocytes Relative 18 %      Eosinophils Relative 1 %      Basophils Relative 0 %      Neutrophils Absolute 4 71 Thousands/µL      Immature Grans Absolute 0 02 Thousand/uL      Lymphocytes Absolute 1 17 Thousands/µL      Monocytes Absolute 1 28 Thousand/µL      Eosinophils Absolute 0 05 Thousand/µL      Basophils Absolute 0 02 Thousands/µL     COVID only - 48 hour TAT [[id number]] Collected: 01/19/22 0958    Lab Status: In process Specimen: Nares from Nose Updated: 01/19/22 1000                 XR chest 1 view portable   Final Result by Paulo Lopez MD (01/19 1122)   Cardiomegaly      No acute cardiopulmonary disease  Findings are stable            Workstation performed: [id number]                    Procedures  Procedures         ED Course                                             MDM  Number of Diagnoses or Management Options  Encounter for laboratory testing for COVID-19 virus  Left elbow pain  URI (upper respiratory infection)  Diagnosis management comments: Plan for basic labs including CBC, CMP to evaluate for any significant leukocytosis, chest x-ray, COVID testing, symptomatic treatment sore throat/odynophagia with Magic mouthwash  Grossly unremarkable lab evaluation and nonfocal chest x-ray  Patient discharged with verbal and written return precautions        Disposition  Final diagnoses:   Left elbow pain   URI (upper respiratory infection)   Encounter for laboratory testing for COVID-19 virus     Time reflects when diagnosis was documented in both MDM as applicable and the Disposition within this note     Time User Action Codes Description Comment    1/19/2022 11:10 AM Teddy Bang Add [M25 522] Left elbow pain     1/19/2022 11:10 AM Teddy Bang Add [J06 9] URI (upper respiratory infection)     1/19/2022 11:11 AM Teddy Bang Add [Z20 822] Encounter for laboratory testing for COVID-19 virus       ED Disposition     ED Disposition Condition Date/Time Comment    Discharge Stable Wed Jan 19, 2022 10:35 AM Michelle Lutz discharge to home/self care  Follow-up Information     Follow up With Specialties Details Why Contact Info Additional Information    [address] Emergency Department Emergency Medicine  If symptoms worsen [address] [address]  [address] [address] Emergency Department, [facility], [address] [place] [address], [address] [place] [address], [address] [place] [address]             Discharge Medication List as of 1/19/2022 11:12 AM      CONTINUE these medications which have NOT CHANGED    Details   aspirin (ECOTRIN LOW STRENGTH) 81 mg EC tablet Take 81 mg by mouth daily Pt to check with surgeon if needed to hold RX , Historical Med      atorvastatin (LIPITOR) 40 mg tablet Take 1 tablet (40 mg total) by mouth daily with dinner, Starting Tue 11/17/2020, Normal      cholecalciferol (VITAMIN D3) 1,000 units tablet Take 1,000 Units by mouth daily after lunch  , Historical Med      clopidogrel (PLAVIX) 75 mg tablet Take 75 mg by mouth daily Pt  To check with surgeon if needed to hold RX , Historical Med      finasteride (PROSCAR) 5 mg tablet TAKE 1 TABLET BY MOUTH EVERY DAY, Normal      metoprolol tartrate (LOPRESSOR) 25 mg tablet take 1 tablet by mouth every 8 hours, Normal             No discharge procedures on file      PDMP Review     None          ED Provider  Electronically Signed by           Reece Forte MD  01/19/22 [address] Tiago Noriega MD  01/19/22 5513

## 2022-01-19 NOTE — DISCHARGE INSTRUCTIONS
We have performed COVID testing which should result in the next few days  We will call you with the results  They are also available on the my chart leatha  Use Flonase for runny nose which should help with your sore throat  Continue good oral hydration  If you are unable to tolerate oral fluids, if you have significant worsening chest pain, shortness of breath, return to the emergency department

## 2022-01-20 ENCOUNTER — TELEPHONE (OUTPATIENT)
Dept: FAMILY MEDICINE CLINIC | Facility: CLINIC | Age: 82
End: 2022-01-20

## 2022-01-20 ENCOUNTER — TELEMEDICINE (OUTPATIENT)
Dept: NEUROLOGY | Facility: CLINIC | Age: 82
End: 2022-01-20
Payer: MEDICARE

## 2022-01-20 DIAGNOSIS — I63.40 CEREBROVASCULAR ACCIDENT (CVA) DUE TO EMBOLISM OF CEREBRAL ARTERY (HCC): ICD-10-CM

## 2022-01-20 DIAGNOSIS — I48.20 CHRONIC ATRIAL FIBRILLATION (HCC): Primary | ICD-10-CM

## 2022-01-20 DIAGNOSIS — I10 BENIGN ESSENTIAL HYPERTENSION: ICD-10-CM

## 2022-01-20 LAB
QRS AXIS: -78 DEGREES
QRSD INTERVAL: 100 MS
QT INTERVAL: 390 MS
QTC INTERVAL: 414 MS
SARS-COV-2 RNA RESP QL NAA+PROBE: POSITIVE
T WAVE AXIS: 52 DEGREES
VENTRICULAR RATE: 68 BPM

## 2022-01-20 PROCEDURE — 93010 ELECTROCARDIOGRAM REPORT: CPT | Performed by: INTERNAL MEDICINE

## 2022-01-20 PROCEDURE — 99215 OFFICE O/P EST HI 40 MIN: CPT | Performed by: PSYCHIATRY & NEUROLOGY

## 2022-01-20 NOTE — TELEPHONE ENCOUNTER
Follow up call to patient  He claims symptoms are improving  He will follow up with PCP  Patient has my contact information

## 2022-01-20 NOTE — PROGRESS NOTES
Virtual Regular Visit    Verification of patient location:    Patient is located in the following state in which I hold an active license [place]      Assessment/Plan:    Problem List Items Addressed This Visit        Cardiovascular and Mediastinum    Chronic atrial fibrillation ([facility] [address] ) - Primary    Benign essential hypertension    CVA (cerebral vascular accident) ([facility] [address] )     Kristina Ballard is a 80 y o  male presenting to clinic for stroke follow up, was seen at [facility] from 10/28 to 10/30 and then attended post acute rehab  He has a hx of afib not on AC prior to that stroke as well as numerous other vascular risk factors  He is also a former smoker  He was transitioned off of Eliquis to aspirin and Plavix after Watchman device was implanted  He has been doing well from a stroke standpoint and no other symptoms since last visit  Exam: No pronator drift, no facial droop or strength deficits appreciated    Workup:  · MRI brain 10/28 - acute ischemia in the left parafalcine frontal lobe  Old lacunar infarct within the left caudate head  Minor chronic microangiopathy  Acomm artery aneurysm  L ICA stenosis  · MRA ordered by Neurosurgery completed in 6/21 was stable    Plan:   Continue aspirin and Plavix   Continue atorvastatin 40 mg daily  Return in about 1 year (around 1/20/2023)  Reason for visit is   Chief Complaint   Patient presents with    Virtual Regular Visit        Encounter provider Moon Layton MD    Provider located at [address] [place] [id number]      Recent Visits  No visits were found meeting these conditions    Showing recent visits within past 7 days and meeting all other requirements  Today's Visits  Date Type Provider Dept   01/20/22 Telemedicine Moon Layton MD  [facility] [place]   Showing today's visits and meeting all other requirements  Future Appointments  No visits were found meeting these conditions  Showing future appointments within next 150 days and meeting all other requirements       The patient was identified by name and date of birth  Cristo Bains was informed that this is a telemedicine visit and that the visit is being conducted through [facility] and patient was informed this is a secure, HIPAA-complaint platform  He agrees to proceed     My office door was closed  The patient was notified the following individuals were present in the room Dr Shannon Roberson and Dr Derrick Chang PGY3  He acknowledged consent and understanding of privacy and security of the video platform  The patient has agreed to participate and understands they can discontinue the visit at any time  Patient is aware this is a billable service  Subjective  Cristo Bains is a 80 y o  male presenting for follow-up from cardioembolic stroke  He was found to have AFib and started on Eliquis and then later underwent Watchman device placement in early 2021 which has been successful  He is now maintained on aspirin and Plavix  He continues to do well from a stroke perspective and has had no new stroke-like symptoms or features  He unfortunately has a current COVID-19 infection but is doing okay  He was incidentally found on MRI to have left ICA stenosis and A-comm artery aneurysm  He is since been seen by Neurosurgery and had repeat imaging which was stable  He tolerates his medications well and continues to follow-up with his other providers    Labs have been stable      Past Medical History:   Diagnosis Date    Anemia     iron deficiency    Aneurysm ([address] )     of brain  being monitored    Essential hypertension, long-standing     Hematuria     intermittent    History of cigarette smoking, chronic     62 year smoker at one half pack per day, quit 04/1/17    Hyperlipidemia     Hypertension     Irregular heart beat     Pneumonia      X 2    Stroke ([address] ) 10/29/2020    right sided  weakness almost full recovery    Stroke ([facility] [address] )     Vitamin D deficiency     maintained with 1000 units of D    Water retention     Wears glasses     Wears partial dentures     upper       Past Surgical History:   Procedure Laterality Date    APPENDECTOMY  1960    CARDIAC SURGERY      watchman procedure; 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    CAROTID ENDARTERECTOMY Left 1998    Supposedly no internal stenosis found    CYSTOSCOPY Right 8/15/2017    Procedure: CYSTOSCOPY RIGHT STENT EXCHANGE;  Surgeon: Kiel Simental MD;  Location: [address] [place] [address];  Service: Urology    CYSTOSCOPY     [address]  LITHOTRIPSY  03/2017    For nephrolithiasis at [address]  5/10/2019    FL RETROGRADE PYELOGRAM  7/30/2019    FL RETROGRADE PYELOGRAM  10/22/2019    FL RETROGRADE PYELOGRAM  1/28/2020    FL RETROGRADE PYELOGRAM  8/11/2020    FL RETROGRADE PYELOGRAM  12/15/2020    FL RETROGRADE PYELOGRAM  2/14/2021    FL RETROGRADE PYELOGRAM  5/11/2021    FL RETROGRADE PYELOGRAM  10/19/2021    SC CYSTO/URETERO W/LITHOTRIPSY &INDWELL STENT INSRT Right 5/2/2017    Procedure: CYSTOSCOPY URETEROSCOPY WITH LITHOTRIPSY HOLMIUM LASER, RETROGRADE PYELOGRAM AND INSERTION STENT URETERAL;  Surgeon: Kiel Simental MD;  Location: [address] [place] [address];  Service: Urology    SC CYSTO/URETERO W/LITHOTRIPSY &INDWELL STENT INSRT Right 5/16/2017    Procedure: CYSTOSCOPY, RETROGRADE PYELOGRAM,  FLEXIBLE URETEROSCOPY,  HOLMIUM LASER LITHOTRIPSY, STONE BASKET MANIPULATION,  STENT URETERAL EXCHANGE;  Surgeon: Kiel Simental MD;  Location: [address] [place] [address];  Service: Urology    SC CYSTO/URETERO W/LITHOTRIPSY &INDWELL STENT INSRT Right 6/2/2017    Procedure: CYSTOSCOPY, RETROGRADE, STONE MANIPULATION WITH HOLMIUM LASER, STENT PLACEMENT;  Surgeon: Kiel Simental MD;  Location: [address] [place] [address];  Service: Urology    SC CYSTO/URETERO W/LITHOTRIPSY &INDWELL STENT INSRT Right 7/18/2017    Procedure: CYSTOSCOPY, RETROGRADE, URETEROSCOPY HOLMIUM LASER LITHOTRIPSYWITH STONE MANIPULATION,  AND STENT PLACEMENT;  Surgeon: Kiel Simental MD;  Location: [address] [place] [address];  Service: Urology    ND CYSTO/URETERO W/LITHOTRIPSY &INDWELL STENT INSRT Right 2/14/2021    Procedure: CYSTOSCOPY URETEROSCOPY, RETROGRADE PYELOGRAM, STONE MANIPULATION AND EXCHANGE STENT URETERAL;  Surgeon: Gustavo Paredes MD;  Location: [address] [place] [address];  Service: Urology    ND CYSTOSCOPY,INSERT URETERAL STENT Right 11/14/2017    Procedure: EXCHANGE STENT URETERAL;  Surgeon: Kiel Simental MD;  Location: [address] [place] [address];  Service: Urology    ND CYSTOURETHROSCOPY,URETER CATHETER Right 11/14/2017    Procedure: Rajinder Bijou, STENT EXCHANGE;  Surgeon: Kiel Simental MD;  Location: [address] [place] [address];  Service: Urology    ND CYSTOURETHROSCOPY,URETER CATHETER Right 5/8/2018    Procedure: [place] Pulling;  Surgeon: Kiel Simental MD;  Location: [address] [place] [address];  Service: Urology    ND CYSTOURETHROSCOPY,URETER CATHETER Right 8/14/2018    Procedure: Karissa Churchman EXCHANGE;  Surgeon: Kiel Simental MD;  Location: [address] [place] [address];  Service: Urology    ND CYSTOURETHROSCOPY,URETER CATHETER Right 11/13/2018    Procedure: CYSTOSCOPY, Darin  EXCHANGE, RETROGRADE;  Surgeon: Kiel Simental MD;  Location: [address] [place] [address];  Service: Urology    ND CYSTOURETHROSCOPY,URETER CATHETER Right 2/12/2019    Procedure: CYSTOSCOPY, RETROGRADE, STENT REMOVAL AND STENT PLACEMENT;  Surgeon: Kiel Simental MD;  Location: [address] [place] [address];  Service: Urology    ND CYSTOURETHROSCOPY,URETER CATHETER Right 5/10/2019    Procedure: CYSTOSCOPY, RETROGRADE, STENT EXCHANGE;  Surgeon: Kiel Simental MD;  Location: [address] [place] [address];  Service: Urology    ND CYSTOURETHROSCOPY,URETER CATHETER Right 7/30/2019    Procedure: CYSTOSCOPY, RETROGRADE, STENT REMOVAL AND PLACEMENT OF STENT;  Surgeon: Kiel Simental MD;  Location: [address] [place] [address];  Service: Urology    ND CYSTOURETHROSCOPY,URETER CATHETER Right 10/22/2019    Procedure: CYSTOSCOPY, RETROGRADE, STENT EXCHANGE;  Surgeon: Kiel Simental MD;  Location: [address] [place] [address];  Service: Urology    AL CYSTOURETHROSCOPY,URETER CATHETER Right 1/28/2020    Procedure: Helen Morrissey, STENT REMOVAL AND STENT PLACEMENT;  Surgeon: Kiel Simental MD;  Location: [address] [place] [address];  Service: Urology    AL CYSTOURETHROSCOPY,URETER CATHETER Right 5/22/2020    Procedure: CYSTOSCOPY RETROGRADE, STENT EXCHANGE;  Surgeon: Kiel Simental MD;  Location: [address] [place] [address];  Service: Urology    AL CYSTOURETHROSCOPY,URETER CATHETER Right 8/11/2020    Procedure: CYSTOSCOPY, STENT EXCHANGE, RETROGRADE;  Surgeon: Kiel Simental MD;  Location: [address] [place] [address];  Service: Urology    AL CYSTOURETHROSCOPY,URETER CATHETER Right 12/15/2020    Procedure: CYSTOSCOPY, RETROGRADE, STENT REMOVAL, AND STENT PLACEMENT;  Surgeon: Kiel Simental MD;  Location: [address] [place] [address];  Service: Urology    AL CYSTOURETHROSCOPY,URETER CATHETER Right 5/11/2021    Procedure: CYSTOSCOPY RETROGRADE PYELOGRAM WITH STENT EXCHANGE;  Surgeon: Kiel Simental MD;  Location: [address] [place] [address];  Service: Urology    AL CYSTOURETHROSCOPY,URETER CATHETER Right 10/19/2021    Procedure: CYSTOSCOPY WITH RETROGRADE, STENT EXCHANGE;  Surgeon: Kiel Simental MD;  Location: [address] [place] [address];  Service: Urology    PROSTATE SURGERY  2015    Laser Prostatectomy  by Dr Madeleine Velasco Right 4/18/2017    Procedure: INSERTION STENT URETERAL, RIGHT URETEROSCOPY,  LASER OF URETERAL STRICTURE AND STONE;  Surgeon: Kiel Simental MD;  Location: [address] [place] [address];  Service:     URETERAL STENT PLACEMENT Right 2/13/2018    Procedure: Priscila Pulling;  Surgeon: Kiel Simental MD;  Location: [address] [place] [address];  Service: Urology       Current Outpatient Medications   Medication Sig Dispense Refill    aspirin (ECOTRIN LOW STRENGTH) 81 mg EC tablet Take 81 mg by mouth daily Pt to check with surgeon if needed to hold RX        atorvastatin (LIPITOR) 40 mg tablet Take 1 tablet (40 mg total) by mouth daily with dinner 30 tablet 1    cholecalciferol (VITAMIN D3) 1,000 units tablet Take 1,000 Units by mouth daily after lunch        clopidogrel (PLAVIX) 75 mg tablet Take 75 mg by mouth daily Pt  To check with surgeon if needed to hold RX   finasteride (PROSCAR) 5 mg tablet TAKE 1 TABLET BY MOUTH EVERY DAY 90 tablet 2    metoprolol tartrate (LOPRESSOR) 25 mg tablet take 1 tablet by mouth every 8 hours (Patient taking differently: 25 mg 3 (three) times a day ) 270 tablet 0     No current facility-administered medications for this visit  No Known Allergies    Review of Systems   Constitutional: Positive for fever  Negative for appetite change  HENT: Negative  Negative for hearing loss, tinnitus, trouble swallowing and voice change  Eyes: Negative  Negative for photophobia and pain  Respiratory: Negative  Negative for shortness of breath  Cardiovascular: Negative  Negative for palpitations  Gastrointestinal: Negative  Negative for nausea and vomiting  Endocrine: Negative  Negative for cold intolerance  Genitourinary: Negative  Negative for dysuria, frequency and urgency  Musculoskeletal: Negative  Negative for myalgias and neck pain  Skin: Negative  Negative for rash  Neurological: Positive for light-headedness  Negative for dizziness, tremors, seizures, syncope, facial asymmetry, speech difficulty, weakness, numbness and headaches  Hematological: Bruises/bleeds easily  Psychiatric/Behavioral: Negative  Negative for confusion, hallucinations and sleep disturbance  Video Exam    There were no vitals filed for this visit  Physical Exam  Vitals reviewed  Constitutional:       General: He is not in acute distress  HENT:      Head: Normocephalic and atraumatic  Pulmonary:      Effort: Pulmonary effort is normal  No respiratory distress     Neurological:      Mental Status: He is alert and oriented to person, place, and time  Mental status is at baseline  Cranial Nerves: No cranial nerve deficit, dysarthria or facial asymmetry  Sensory: Sensation is intact  Motor: Motor function is intact  Coordination: Coordination is intact  Gait: Gait is intact  Psychiatric:         Mood and Affect: Mood normal          Behavior: Behavior normal           I spent 10 minutes directly with the patient during this visit    VIRTUAL VISIT Darline Jones verbally agrees to participate in [employer]  Pt is aware that [employer] could be limited without vital signs or the ability to perform a full hands-on physical exam  Dioni Melo understands he or the provider may request at any time to terminate the video visit and request the patient to seek care or treatment in person

## 2022-01-20 NOTE — ASSESSMENT & PLAN NOTE
Fredo Christie is a 80 y o  male presenting to clinic for stroke follow up, was seen at [facility] from 10/28 to 10/30 and then attended post acute rehab  He has a hx of afib not on AC prior to that stroke as well as numerous other vascular risk factors  He is also a former smoker  He was transitioned off of Eliquis to aspirin and Plavix after Watchman device was implanted  He has been doing well from a stroke standpoint and no other symptoms since last visit  Exam: No pronator drift, no facial droop or strength deficits appreciated    Workup:  · MRI brain 10/28 - acute ischemia in the left parafalcine frontal lobe  Old lacunar infarct within the left caudate head  Minor chronic microangiopathy  Acomm artery aneurysm  L ICA stenosis  · MRA ordered by Neurosurgery completed in 6/21 was stable    Plan:   Continue aspirin and Plavix   Continue atorvastatin 40 mg daily  Return in about 1 year (around 1/20/2023)

## 2022-01-21 ENCOUNTER — ANESTHESIA (OUTPATIENT)
Dept: PERIOP | Facility: HOSPITAL | Age: 82
End: 2022-01-21
Payer: MEDICARE

## 2022-01-28 ENCOUNTER — PREP FOR PROCEDURE (OUTPATIENT)
Dept: UROLOGY | Facility: CLINIC | Age: 82
End: 2022-01-28

## 2022-01-28 DIAGNOSIS — N13.30 HYDRONEPHROSIS, UNSPECIFIED HYDRONEPHROSIS TYPE: Primary | ICD-10-CM

## 2022-01-28 DIAGNOSIS — R39.89 SUSPECTED UTI: ICD-10-CM

## 2022-02-01 ENCOUNTER — TELEMEDICINE (OUTPATIENT)
Dept: NEPHROLOGY | Facility: CLINIC | Age: 82
End: 2022-02-01
Payer: MEDICARE

## 2022-02-01 VITALS — DIASTOLIC BLOOD PRESSURE: 87 MMHG | SYSTOLIC BLOOD PRESSURE: 124 MMHG

## 2022-02-01 DIAGNOSIS — M89.9 CHRONIC KIDNEY DISEASE-MINERAL AND BONE DISORDER: ICD-10-CM

## 2022-02-01 DIAGNOSIS — N18.30 BENIGN HYPERTENSION WITH CKD (CHRONIC KIDNEY DISEASE) STAGE III (HCC): ICD-10-CM

## 2022-02-01 DIAGNOSIS — I12.9 BENIGN HYPERTENSION WITH CKD (CHRONIC KIDNEY DISEASE) STAGE III (HCC): ICD-10-CM

## 2022-02-01 DIAGNOSIS — N20.0 RENAL CALCULI: ICD-10-CM

## 2022-02-01 DIAGNOSIS — N18.32 STAGE 3B CHRONIC KIDNEY DISEASE (HCC): Primary | ICD-10-CM

## 2022-02-01 DIAGNOSIS — N18.9 CHRONIC KIDNEY DISEASE-MINERAL AND BONE DISORDER: ICD-10-CM

## 2022-02-01 DIAGNOSIS — E83.9 CHRONIC KIDNEY DISEASE-MINERAL AND BONE DISORDER: ICD-10-CM

## 2022-02-01 PROCEDURE — 99214 OFFICE O/P EST MOD 30 MIN: CPT | Performed by: PHYSICIAN ASSISTANT

## 2022-02-01 NOTE — PROGRESS NOTES
Virtual Regular Visit    Verification of patient location:    Patient is located in the following state in which I hold an active license [place]    Assessment/Plan:    Problem List Items Addressed This Visit        Cardiovascular and Mediastinum    Benign hypertension with CKD (chronic kidney disease) stage III ([facility])       Genitourinary    Renal calculi    Stage 3b chronic kidney disease ([facility] [address] ) - Primary    Relevant Orders    Basic metabolic panel    CBC    Magnesium    Phosphorus    Vitamin D 25 hydroxy    PTH, intact    Protein / creatinine ratio, urine    Chronic kidney disease-mineral and bone disorder           Reason for visit is follow up CKD  Chief Complaint   Patient presents with    Virtual Regular Visit    Chronic Kidney Disease        Encounter provider [place], [place]    Provider located at [address] [facility]  [address] [facility] [id number]      Recent Visits  No visits were found meeting these conditions  Showing recent visits within past 7 days and meeting all other requirements  Today's Visits  Date Type Provider Dept   02/01/22 Telemedicine [facility], [address] [place] [address] [place]   Showing today's visits and meeting all other requirements  Future Appointments  No visits were found meeting these conditions  Showing future appointments within next 150 days and meeting all other requirements       The patient was identified by name and date of birth  Ally Patrick was informed that this is a telemedicine visit and that the visit is being conducted through [address] [place] [address] Now and patient was informed that this is a secure, HIPAA-compliant platform  He agrees to proceed     My office door was closed  No one else was in the room  He acknowledged consent and understanding of privacy and security of the video platform  The patient has agreed to participate and understands they can discontinue the visit at any time      Patient is aware this is a billable service  Subjective  Vincent Harrell is a 80 y o  male who is being evaluated for follow up of chronic kidney disease  He was last seen in the office in July by Dr Farmer Later  He had COVID in January and continues to have a lingering cough and fatigue but is overall improving  His appetite is normal and he is trying to stay well hydrated  He denies recent kidney stone passage but does note that he has bleeding if he carries something heavy or exerts himself too much  He denies SOB, LE edema  He denies N/V/D  He checks his blood pressure twice a day and it is 682 to 038U systolic  He missed his last urology stent exchange due to COVID infection in January and it was rescheduled to March 11th  Assessment/Plan   Chronic Kidney Disease stage IIIb- Baseline creatinine 1 5-1 7  Suspected etiology is due to nephrolithiasis, urinary retention, hydronephrosis in the setting of a solitary kidney  Creatinine at baseline from 1/19/22  Electrolytes within normal limits  Hypertension- He takes metoprolol 25mg three times a day and finasteride 5mg daily  Avoid salt in your diet  Home blood pressure is acceptable  Bone Mineral Disorder- Vitamin D from one year ago at goal   He takes cholecalciferol 1000 units daily  Nephrolithiasis- Continue to follow with urology for stent exchanges (next 3/11/22)  Stone profile showed calcium oxalate mostly  Increase fluid intake to 2L per day  Follow a low sodium diet  Recent COVID 19 infection January 2022  Follow up with Dr Farmer Later in 6 months  Please call the office with any questions or concerns       HPI     Past Medical History:   Diagnosis Date    Anemia     iron deficiency    Aneurysm ([address] )     of brain  being monitored    Essential hypertension, long-standing     Hematuria     intermittent    History of cigarette smoking, chronic     62 year smoker at one half pack per day, quit 04/1/17    Hyperlipidemia     Hypertension     Irregular heart beat     Pneumonia      X 2    Stroke ([facility] [address] ) 10/29/2020    right sided  weakness almost full recovery    Stroke ([facility] [address] )     Vitamin D deficiency     maintained with 1000 units of D    Water retention     Wears glasses     Wears partial dentures     upper       Past Surgical History:   Procedure Laterality Date    APPENDECTOMY  1960    CARDIAC SURGERY      watchman procedure; 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    CAROTID ENDARTERECTOMY Left 1998    Supposedly no internal stenosis found    CYSTOSCOPY Right 8/15/2017    Procedure: CYSTOSCOPY RIGHT STENT EXCHANGE;  Surgeon: Yoly Jenkins MD;  Location: [address] [place] [address];  Service: Urology    CYSTOSCOPY     [facility]  LITHOTRIPSY  03/2017    For nephrolithiasis at [address]  5/10/2019    FL RETROGRADE PYELOGRAM  7/30/2019    FL RETROGRADE PYELOGRAM  10/22/2019    [place] RETROGRADE PYELOGRAM  1/28/2020    FL RETROGRADE PYELOGRAM  8/11/2020    FL RETROGRADE PYELOGRAM  12/15/2020    FL RETROGRADE PYELOGRAM  2/14/2021    FL RETROGRADE PYELOGRAM  5/11/2021    FL RETROGRADE PYELOGRAM  10/19/2021    OR CYSTO/URETERO W/LITHOTRIPSY &INDWELL STENT INSRT Right 5/2/2017    Procedure: CYSTOSCOPY URETEROSCOPY WITH LITHOTRIPSY HOLMIUM LASER, RETROGRADE PYELOGRAM AND INSERTION STENT URETERAL;  Surgeon: Yoly Jenkins MD;  Location: [address] [place] [address];  Service: Urology    OR CYSTO/URETERO W/LITHOTRIPSY &INDWELL STENT INSRT Right 5/16/2017    Procedure: CYSTOSCOPY, RETROGRADE PYELOGRAM,  FLEXIBLE URETEROSCOPY,  HOLMIUM LASER LITHOTRIPSY, STONE BASKET MANIPULATION,  STENT URETERAL EXCHANGE;  Surgeon: Yoly Jenkins MD;  Location: [address] [place] [address];  Service: Urology    OR CYSTO/URETERO W/LITHOTRIPSY &INDWELL STENT INSRT Right 6/2/2017    Procedure: CYSTOSCOPY, RETROGRADE, STONE MANIPULATION WITH HOLMIUM LASER, STENT PLACEMENT;  Surgeon: Yoly Jenkins MD;  Location: [facility];  Service: Urology  [place] CYSTO/URETERO W/LITHOTRIPSY &INDWELL STENT INSRT Right 7/18/2017    Procedure: CYSTOSCOPY, RETROGRADE, URETEROSCOPY HOLMIUM LASER [address] [facility];  Surgeon: Lorrayne Cooks, MD;  Location: [address] [place] [address];  Service: Urology    [place] CYSTO/URETERO W/LITHOTRIPSY &INDWELL STENT INSRT Right 2/14/2021    Procedure: CYSTOSCOPY URETEROSCOPY, RETROGRADE PYELOGRAM, STONE MANIPULATION AND EXCHANGE STENT URETERAL;  Surgeon: Vanessa Walsh MD;  Location: [address] [place] [address];  Service: Urology    [place] CYSTOSCOPY,INSERT URETERAL STENT Right 11/14/2017    Procedure: EXCHANGE STENT URETERAL;  Surgeon: Lorrayne Cooks, MD;  Location: [address] [place] [address];  Service: Urology    [place] CYSTOURETHROSCOPY,URETER CATHETER Right 11/14/2017    Procedure: Sade Fines, STENT EXCHANGE;  Surgeon: Lorrayne Cooks, MD;  Location: [address] [place] [address];  Service: Urology    [place] CYSTOURETHROSCOPY,URETER CATHETER Right 5/8/2018    Procedure: Reji Ripper;  Surgeon: Lorrayne Cooks, MD;  Location: [address] [place] [address];  Service: Urology    [place] CYSTOURETHROSCOPY,URETER CATHETER Right 8/14/2018    Procedure: Milan Lota EXCHANGE;  Surgeon: Lorrayne Cooks, MD;  Location: [address] [place] [address];  Service: Urology    [place] CYSTOURETHROSCOPY,URETER CATHETER Right 11/13/2018    Procedure: CYSTOSCOPY, Jalyn Bougie EXCHANGE, RETROGRADE;  Surgeon: Lorrayne Cooks, MD;  Location: [address] [place] [address];  Service: Urology    [place] CYSTOURETHROSCOPY,URETER CATHETER Right 2/12/2019    Procedure: CYSTOSCOPY, RETROGRADE, STENT REMOVAL AND STENT PLACEMENT;  Surgeon: Lorrayne Cooks, MD;  Location: [address] [place] [address];  Service: Urology    [place] CYSTOURETHROSCOPY,URETER CATHETER Right 5/10/2019    Procedure: CYSTOSCOPY, RETROGRADE, STENT EXCHANGE;  Surgeon: Lorrayne Cooks, MD;  Location: [address] [place] [address];  Service: Urology    [place] CYSTOURETHROSCOPY,URETER CATHETER Right 7/30/2019    Procedure: CYSTOSCOPY, RETROGRADE, STENT REMOVAL AND PLACEMENT OF STENT;  Surgeon: Seb Mccormick Shirin Best MD;  Location: [address] [place] [address];  Service: Urology    [place] CYSTOURETHROSCOPY,URETER CATHETER Right 10/22/2019    Procedure: Vicenta Son, STENT EXCHANGE;  Surgeon: Isaías Charles MD;  Location: [address] [place] [address];  Service: Urology    [place] CYSTOURETHROSCOPY,URETER CATHETER Right 1/28/2020    Procedure: Vicenta Son, STENT REMOVAL AND STENT PLACEMENT;  Surgeon: Isaías Charles MD;  Location: [address] [place] [address];  Service: Urology    [place] CYSTOURETHROSCOPY,URETER CATHETER Right 5/22/2020    Procedure: CYSTOSCOPY RETROGRADE, STENT EXCHANGE;  Surgeon: Isaías Charles MD;  Location: [address] [place] [address];  Service: Urology    [place] CYSTOURETHROSCOPY,URETER CATHETER Right 8/11/2020    Procedure: CYSTOSCOPY, STENT EXCHANGE, RETROGRADE;  Surgeon: Isaías Charles MD;  Location: [address] [place] [address];  Service: Urology    [place] CYSTOURETHROSCOPY,URETER CATHETER Right 12/15/2020    Procedure: CYSTOSCOPY, RETROGRADE, STENT REMOVAL, AND STENT PLACEMENT;  Surgeon: Isaías Charles MD;  Location: [address] [place] [address];  Service: Urology    [place] CYSTOURETHROSCOPY,URETER CATHETER Right 5/11/2021    Procedure: CYSTOSCOPY RETROGRADE PYELOGRAM WITH STENT EXCHANGE;  Surgeon: Isaías Charles MD;  Location: [address] [place] [address];  Service: Urology    [place] CYSTOURETHROSCOPY,URETER CATHETER Right 10/19/2021    Procedure: CYSTOSCOPY WITH RETROGRADE, STENT EXCHANGE;  Surgeon: Isaías Charles MD;  Location: [address] [place] [address];  Service: Urology    PROSTATE SURGERY  2015    Laser Prostatectomy  by Dr Arik Moreau Right 4/18/2017    Procedure: INSERTION STENT URETERAL, RIGHT URETEROSCOPY,  LASER OF URETERAL STRICTURE AND STONE;  Surgeon: Isaías Charles MD;  Location: [address] [place] [address];  Service:     URETERAL STENT PLACEMENT Right 2/13/2018    Procedure: Arthuro Gallop;  Surgeon: Isaías Charles MD;  Location: [address] [place] [address];  Service: Urology       Current Outpatient Medications   Medication Sig Dispense Refill    aspirin (ECOTRIN LOW STRENGTH) 81 mg EC tablet Take 81 mg by mouth daily Pt to check with surgeon if needed to hold RX   atorvastatin (LIPITOR) 40 mg tablet Take 1 tablet (40 mg total) by mouth daily with dinner 30 tablet 1    cholecalciferol (VITAMIN D3) 1,000 units tablet Take 1,000 Units by mouth daily after lunch        clopidogrel (PLAVIX) 75 mg tablet Take 75 mg by mouth daily Pt  To check with surgeon if needed to hold RX   finasteride (PROSCAR) 5 mg tablet TAKE 1 TABLET BY MOUTH EVERY DAY 90 tablet 2    metoprolol tartrate (LOPRESSOR) 25 mg tablet take 1 tablet by mouth every 8 hours (Patient taking differently: 25 mg 3 (three) times a day ) 270 tablet 0     No current facility-administered medications for this visit  No Known Allergies    Review of Systems   Constitutional: Positive for fatigue  Negative for appetite change  Respiratory: Positive for cough  Negative for shortness of breath  Cardiovascular: Negative for leg swelling  Gastrointestinal: Negative for diarrhea, nausea and vomiting  Genitourinary: Negative for difficulty urinating  Skin: Negative for rash  Neurological: Negative for dizziness and light-headedness  Psychiatric/Behavioral: Negative for confusion  Video Exam    Vitals:    02/01/22 1503   BP: 124/87       Physical Exam  Constitutional:       General: He is not in acute distress  HENT:      Head: Normocephalic  Nose: Nose normal    Eyes:      General: No scleral icterus  Pulmonary:      Effort: Pulmonary effort is normal  No respiratory distress  Abdominal:      General: There is no distension  Musculoskeletal:         General: No swelling  Cervical back: Normal range of motion  Skin:     Coloration: Skin is not jaundiced  Neurological:      Mental Status: He is alert and oriented to person, place, and time  Mental status is at baseline  Psychiatric:         Mood and Affect: Mood normal          Thought Content:  Thought content normal         I spent 9 minutes directly with the patient during this visit    VIRTUAL VISIT Melissa Denny verbally agrees to participate in [employer]  Pt is aware that [employer] could be limited without vital signs or the ability to perform a full hands-on physical exam  Dioni Melo understands he or the provider may request at any time to terminate the video visit and request the patient to seek care or treatment in person

## 2022-02-01 NOTE — PATIENT INSTRUCTIONS
Chronic Kidney Disease stage IIIb- Baseline creatinine 1 5-1 7  Suspected etiology is due to nephrolithiasis, urinary retention, hydronephrosis in the setting of a solitary kidney  Creatinine at baseline from 1/19/22  Electrolytes within normal limits  Hypertension- He takes metoprolol 25mg three times a day and finasteride 5mg daily  Avoid salt in your diet  Home blood pressure is acceptable  Bone Mineral Disorder- Vitamin D from one year ago at goal   He takes cholecalciferol 1000 units daily  Nephrolithiasis- Continue to follow with urology for stent exchanges (next 3/11/22)  Stone profile showed calcium oxalate mostly  Increase fluid intake to 2L per day  Follow a low sodium diet  Recent COVID 19 infection January 2022  Follow up with Dr Antonio Junior in 6 months  Please call the office with any questions or concerns

## 2022-02-17 DIAGNOSIS — I10 ESSENTIAL HYPERTENSION: ICD-10-CM

## 2022-02-17 NOTE — TELEPHONE ENCOUNTER
Medication Refills   Person Calling In  Name if not patient Patient    Medication name  metoprolol tartrate   Medication Dosage  Medication Frequency 25 mg 3 times a day   Pharmacy & Location [place] [address] [phone]   Additional Information Patient changed pharmacies

## 2022-02-24 ENCOUNTER — APPOINTMENT (OUTPATIENT)
Dept: LAB | Facility: HOSPITAL | Age: 82
End: 2022-02-24
Payer: MEDICARE

## 2022-02-24 DIAGNOSIS — R39.89 SUSPECTED UTI: ICD-10-CM

## 2022-02-24 DIAGNOSIS — N13.30 HYDRONEPHROSIS, UNSPECIFIED HYDRONEPHROSIS TYPE: ICD-10-CM

## 2022-02-24 PROCEDURE — 87086 URINE CULTURE/COLONY COUNT: CPT

## 2022-02-25 LAB — BACTERIA UR CULT: NORMAL

## 2022-02-28 ENCOUNTER — TELEPHONE (OUTPATIENT)
Dept: UROLOGY | Facility: CLINIC | Age: 82
End: 2022-02-28

## 2022-02-28 NOTE — TELEPHONE ENCOUNTER
Spoke with daughter about moving surgery to this Friday, she will call me back after speaking with her Dad

## 2022-03-02 NOTE — TELEPHONE ENCOUNTER
Voice message left by pt daughter stating pt can not have surgery moved up to 03/04/22 due to him being on blood thinners, she wants to keep the surgery as it already was for next week, she can be reached at [phone]

## 2022-03-08 NOTE — PRE-PROCEDURE INSTRUCTIONS
Pre-Surgery Instructions:   Medication Instructions    aspirin (ECOTRIN LOW STRENGTH) 81 mg EC tablet Patient was instructed to contact Physician for medication instruction  3/8 cardiology office was called by ALEC VERNON to confirm hold instructions3/10 cardio office called and per MD ok for pt to have taken LD 3/4    atorvastatin (LIPITOR) 40 mg tablet Instructed patient per Anesthesia Guidelines   cholecalciferol (VITAMIN D3) 1,000 units tablet Instructed patient per Anesthesia Guidelines   clopidogrel (PLAVIX) 75 mg tablet Patient was instructed to contact Physician for medication instruction  3/8 cardiology office was called by ALEC VERNON to confirm hold instructions3/10cardio office called and ok for pt to have taken LD 3/4    finasteride (PROSCAR) 5 mg tablet Instructed patient per Anesthesia Guidelines   metoprolol tartrate (LOPRESSOR) 25 mg tablet Instructed patient per Anesthesia Guidelines  Patient   instructed *to take*lopressor with a sip of water the morning of surgery  Patient / instructed on use of chlorhexidine soap per hospital protocol    Patient instructed to stop all NSAIDS, vitamins and herbal supplements from now to surgery or per Dr Costa Merino

## 2022-03-11 ENCOUNTER — APPOINTMENT (OUTPATIENT)
Dept: RADIOLOGY | Facility: HOSPITAL | Age: 82
End: 2022-03-11
Payer: MEDICARE

## 2022-03-11 ENCOUNTER — TELEPHONE (OUTPATIENT)
Dept: OTHER | Facility: HOSPITAL | Age: 82
End: 2022-03-11

## 2022-03-11 ENCOUNTER — HOSPITAL ENCOUNTER (OUTPATIENT)
Facility: HOSPITAL | Age: 82
Setting detail: OUTPATIENT SURGERY
Discharge: HOME | End: 2022-03-11
Attending: UROLOGY | Admitting: UROLOGY
Payer: MEDICARE

## 2022-03-11 VITALS
WEIGHT: 210 LBS | HEART RATE: 55 BPM | OXYGEN SATURATION: 97 % | HEIGHT: 70 IN | SYSTOLIC BLOOD PRESSURE: 125 MMHG | BODY MASS INDEX: 30.06 KG/M2 | RESPIRATION RATE: 16 BRPM | TEMPERATURE: 98.2 F | DIASTOLIC BLOOD PRESSURE: 64 MMHG

## 2022-03-11 DIAGNOSIS — N13.1 HYDRONEPHROSIS WITH URETERAL STRICTURE, NOT ELSEWHERE CLASSIFIED: ICD-10-CM

## 2022-03-11 LAB
BASOPHILS # BLD AUTO: 0.04 THOUSANDS/ΜL (ref 0–0.1)
BASOPHILS NFR BLD AUTO: 1 % (ref 0–1)
EOSINOPHIL # BLD AUTO: 0.23 THOUSAND/ΜL (ref 0–0.61)
EOSINOPHIL NFR BLD AUTO: 4 % (ref 0–6)
ERYTHROCYTE [DISTWIDTH] IN BLOOD BY AUTOMATED COUNT: 16 % (ref 11.6–15.1)
HCT VFR BLD AUTO: 44.6 % (ref 36.5–49.3)
HGB BLD-MCNC: 13.8 G/DL (ref 12–17)
IMM GRANULOCYTES # BLD AUTO: 0.03 THOUSAND/UL (ref 0–0.2)
IMM GRANULOCYTES NFR BLD AUTO: 1 % (ref 0–2)
LYMPHOCYTES # BLD AUTO: 1.33 THOUSANDS/ΜL (ref 0.6–4.47)
LYMPHOCYTES NFR BLD AUTO: 20 % (ref 14–44)
MCH RBC QN AUTO: 27.4 PG (ref 26.8–34.3)
MCHC RBC AUTO-ENTMCNC: 30.9 G/DL (ref 31.4–37.4)
MCV RBC AUTO: 89 FL (ref 82–98)
MONOCYTES # BLD AUTO: 1.02 THOUSAND/ΜL (ref 0.17–1.22)
MONOCYTES NFR BLD AUTO: 16 % (ref 4–12)
NEUTROPHILS # BLD AUTO: 3.87 THOUSANDS/ΜL (ref 1.85–7.62)
NEUTS SEG NFR BLD AUTO: 58 % (ref 43–75)
NRBC BLD AUTO-RTO: 0 /100 WBCS
PLATELET # BLD AUTO: 143 THOUSANDS/UL (ref 149–390)
PMV BLD AUTO: 10.2 FL (ref 8.9–12.7)
RBC # BLD AUTO: 5.03 MILLION/UL (ref 3.88–5.62)
WBC # BLD AUTO: 6.52 THOUSAND/UL (ref 4.31–10.16)

## 2022-03-11 PROCEDURE — 74420 UROGRAPHY RTRGR +-KUB: CPT

## 2022-03-11 PROCEDURE — C1758 CATHETER, URETERAL: HCPCS | Performed by: UROLOGY

## 2022-03-11 PROCEDURE — 52332 CYSTOSCOPY AND TREATMENT: CPT | Performed by: UROLOGY

## 2022-03-11 PROCEDURE — C2617 STENT, NON-COR, TEM W/O DEL: HCPCS | Performed by: UROLOGY

## 2022-03-11 PROCEDURE — C1769 GUIDE WIRE: HCPCS | Performed by: UROLOGY

## 2022-03-11 PROCEDURE — NC001 PR NO CHARGE: Performed by: UROLOGY

## 2022-03-11 PROCEDURE — 85025 COMPLETE CBC W/AUTO DIFF WBC: CPT | Performed by: NURSE PRACTITIONER

## 2022-03-11 DEVICE — INLAY OPTIMA URETERAL STENT W/O GUIDEWIRE
Type: IMPLANTABLE DEVICE | Site: URETER | Status: FUNCTIONAL
Brand: BARD® INLAY OPTIMA® URETERAL STENT

## 2022-03-11 RX ORDER — MEPERIDINE HYDROCHLORIDE 25 MG/ML
12.5 INJECTION INTRAMUSCULAR; INTRAVENOUS; SUBCUTANEOUS ONCE
Status: DISCONTINUED | OUTPATIENT
Start: 2022-03-11 | End: 2022-03-11 | Stop reason: HOSPADM

## 2022-03-11 RX ORDER — ONDANSETRON 2 MG/ML
INJECTION INTRAMUSCULAR; INTRAVENOUS AS NEEDED
Status: DISCONTINUED | OUTPATIENT
Start: 2022-03-11 | End: 2022-03-11

## 2022-03-11 RX ORDER — CEFAZOLIN SODIUM 2 G/50ML
SOLUTION INTRAVENOUS AS NEEDED
Status: DISCONTINUED | OUTPATIENT
Start: 2022-03-11 | End: 2022-03-11

## 2022-03-11 RX ORDER — PROMETHAZINE HYDROCHLORIDE 25 MG/ML
12.5 INJECTION, SOLUTION INTRAMUSCULAR; INTRAVENOUS ONCE AS NEEDED
Status: DISCONTINUED | OUTPATIENT
Start: 2022-03-11 | End: 2022-03-11 | Stop reason: HOSPADM

## 2022-03-11 RX ORDER — FENTANYL CITRATE/PF 50 MCG/ML
25 SYRINGE (ML) INJECTION
Status: DISCONTINUED | OUTPATIENT
Start: 2022-03-11 | End: 2022-03-11 | Stop reason: HOSPADM

## 2022-03-11 RX ORDER — ACETAMINOPHEN 325 MG/1
975 TABLET ORAL ONCE
Status: COMPLETED | OUTPATIENT
Start: 2022-03-11 | End: 2022-03-11

## 2022-03-11 RX ORDER — MAGNESIUM HYDROXIDE 1200 MG/15ML
LIQUID ORAL AS NEEDED
Status: DISCONTINUED | OUTPATIENT
Start: 2022-03-11 | End: 2022-03-11 | Stop reason: HOSPADM

## 2022-03-11 RX ORDER — LABETALOL 20 MG/4 ML (5 MG/ML) INTRAVENOUS SYRINGE
5
Status: DISCONTINUED | OUTPATIENT
Start: 2022-03-11 | End: 2022-03-11 | Stop reason: HOSPADM

## 2022-03-11 RX ORDER — CEFAZOLIN SODIUM 2 G/50ML
2000 SOLUTION INTRAVENOUS ONCE
Status: DISCONTINUED | OUTPATIENT
Start: 2022-03-11 | End: 2022-03-11 | Stop reason: HOSPADM

## 2022-03-11 RX ORDER — ONDANSETRON 2 MG/ML
4 INJECTION INTRAMUSCULAR; INTRAVENOUS ONCE AS NEEDED
Status: DISCONTINUED | OUTPATIENT
Start: 2022-03-11 | End: 2022-03-11 | Stop reason: HOSPADM

## 2022-03-11 RX ORDER — PROPOFOL 10 MG/ML
INJECTION, EMULSION INTRAVENOUS AS NEEDED
Status: DISCONTINUED | OUTPATIENT
Start: 2022-03-11 | End: 2022-03-11

## 2022-03-11 RX ORDER — ALBUTEROL SULFATE 2.5 MG/3ML
2.5 SOLUTION RESPIRATORY (INHALATION) ONCE AS NEEDED
Status: DISCONTINUED | OUTPATIENT
Start: 2022-03-11 | End: 2022-03-11 | Stop reason: HOSPADM

## 2022-03-11 RX ORDER — SODIUM CHLORIDE, SODIUM LACTATE, POTASSIUM CHLORIDE, CALCIUM CHLORIDE 600; 310; 30; 20 MG/100ML; MG/100ML; MG/100ML; MG/100ML
125 INJECTION, SOLUTION INTRAVENOUS CONTINUOUS
Status: DISCONTINUED | OUTPATIENT
Start: 2022-03-11 | End: 2022-03-11 | Stop reason: HOSPADM

## 2022-03-11 RX ORDER — LIDOCAINE HYDROCHLORIDE 10 MG/ML
0.5 INJECTION, SOLUTION EPIDURAL; INFILTRATION; INTRACAUDAL; PERINEURAL ONCE AS NEEDED
Status: DISCONTINUED | OUTPATIENT
Start: 2022-03-11 | End: 2022-03-11 | Stop reason: HOSPADM

## 2022-03-11 RX ORDER — HEPARIN SODIUM 5000 [USP'U]/ML
5000 INJECTION, SOLUTION INTRAVENOUS; SUBCUTANEOUS ONCE
Status: COMPLETED | OUTPATIENT
Start: 2022-03-11 | End: 2022-03-11

## 2022-03-11 RX ORDER — FENTANYL CITRATE 50 UG/ML
INJECTION, SOLUTION INTRAMUSCULAR; INTRAVENOUS AS NEEDED
Status: DISCONTINUED | OUTPATIENT
Start: 2022-03-11 | End: 2022-03-11

## 2022-03-11 RX ORDER — HYDROMORPHONE HCL/PF 1 MG/ML
0.5 SYRINGE (ML) INJECTION
Status: DISCONTINUED | OUTPATIENT
Start: 2022-03-11 | End: 2022-03-11 | Stop reason: HOSPADM

## 2022-03-11 RX ORDER — SODIUM CHLORIDE 9 MG/ML
INJECTION, SOLUTION INTRAVENOUS CONTINUOUS PRN
Status: DISCONTINUED | OUTPATIENT
Start: 2022-03-11 | End: 2022-03-11

## 2022-03-11 RX ORDER — GABAPENTIN 300 MG/1
300 CAPSULE ORAL ONCE
Status: DISCONTINUED | OUTPATIENT
Start: 2022-03-11 | End: 2022-03-11 | Stop reason: HOSPADM

## 2022-03-11 RX ORDER — PROPOFOL 10 MG/ML
INJECTION, EMULSION INTRAVENOUS CONTINUOUS PRN
Status: DISCONTINUED | OUTPATIENT
Start: 2022-03-11 | End: 2022-03-11

## 2022-03-11 RX ADMIN — FENTANYL CITRATE 25 MCG: 50 INJECTION INTRAMUSCULAR; INTRAVENOUS at 09:53

## 2022-03-11 RX ADMIN — PROPOFOL 100 MCG/KG/MIN: 10 INJECTION, EMULSION INTRAVENOUS at 09:32

## 2022-03-11 RX ADMIN — FENTANYL CITRATE 25 MCG: 50 INJECTION INTRAMUSCULAR; INTRAVENOUS at 09:36

## 2022-03-11 RX ADMIN — CEFAZOLIN SODIUM 2000 MG: 2 SOLUTION INTRAVENOUS at 09:40

## 2022-03-11 RX ADMIN — ACETAMINOPHEN 325MG 975 MG: 325 TABLET ORAL at 08:31

## 2022-03-11 RX ADMIN — PROPOFOL 80 MG: 10 INJECTION, EMULSION INTRAVENOUS at 09:32

## 2022-03-11 RX ADMIN — HEPARIN SODIUM 5000 UNITS: 5000 INJECTION INTRAVENOUS; SUBCUTANEOUS at 08:31

## 2022-03-11 RX ADMIN — ONDANSETRON 4 MG: 2 INJECTION INTRAMUSCULAR; INTRAVENOUS at 09:39

## 2022-03-11 RX ADMIN — FENTANYL CITRATE 25 MCG: 50 INJECTION INTRAMUSCULAR; INTRAVENOUS at 09:32

## 2022-03-11 RX ADMIN — SODIUM CHLORIDE: 0.9 INJECTION, SOLUTION INTRAVENOUS at 09:29

## 2022-03-11 NOTE — DISCHARGE INSTRUCTIONS
Ureteral Stent Placement   WHAT YOU NEED TO KNOW:   Ureteral stent placement is a procedure to open a blocked or narrow ureter  The ureter is the tube that carries urine from your kidney into your bladder  A stent is a thin hollow plastic tube used to hold your ureter open and allow urine to flow  The stent may stay in for several weeks  Long-term stents will stay in longer and need to be replaced within a certain period of time  DISCHARGE INSTRUCTIONS:   Seek care immediately if:   · You urinate very little or not at all  · You have severe pain in your abdomen, even after you take medicine  · You have heavy bleeding from your urethra  · You see large blood clots in your urine, or your urine is bright red  Contact your healthcare provider or urologist if:   · You have a fever or chills  · You feel like you need to urinate very often  · Your symptoms get worse, or you develop new symptoms  · You have questions or concerns about your condition or care  Medicines: You may  need any of the following:  · Acetaminophen  decreases pain and fever  It is available without a doctor's order  Ask how much to take and how often to take it  Follow directions  Read the labels of all other medicines you are using to see if they also contain acetaminophen, or ask your doctor or pharmacist  Acetaminophen can cause liver damage if not taken correctly  Do not use more than 4 grams (4,000 milligrams) total of acetaminophen in one day  · Prescription pain medicine  may be given  Ask your healthcare provider how to take this medicine safely  Some prescription pain medicines contain acetaminophen  Do not take other medicines that contain acetaminophen without talking to your healthcare provider  Too much acetaminophen may cause liver damage  Prescription pain medicine may cause constipation  Ask your healthcare provider how to prevent or treat constipation       · Antibiotics  help prevent or treat bacterial infections  Your healthcare provider may prescribe these for you while you have a ureteral stent  · Take your medicine as directed  Contact your healthcare provider if you think your medicine is not helping or if you have side effects  Tell him or her if you are allergic to any medicine  Keep a list of the medicines, vitamins, and herbs you take  Include the amounts, and when and why you take them  Bring the list or the pill bottles to follow-up visits  Carry your medicine list with you in case of an emergency  Self-care:   · Drink liquids as directed  Liquids will help flush your urinary tract and prevent infection  Ask your healthcare provider how much liquid to drink each day and which liquids are best for you  · Ask when you can return to daily activities  You may be able to return to normal activities the day after your stent placement  Follow up with your urologist as directed: You will need regular follow-up visits with your urologist as long as you have a stent  He or she will check to make sure the stent is working properly  He or she will remove your temporary stent in several weeks  Your provider may do urine cultures to check for infection  Write down your questions so you remember to ask them during your visits  © Copyright [employer] 2022 Information is for End User's use only and may not be sold, redistributed or otherwise used for commercial purposes  All illustrations and images included in CareNotes® are the copyrighted property of [employer]  or Gisell Diaz  The above information is an  only  It is not intended as medical advice for individual conditions or treatments  Talk to your doctor, nurse or pharmacist before following any medical regimen to see if it is safe and effective for you

## 2022-03-11 NOTE — OP NOTE
OPERATIVE REPORT  PATIENT NAME: Marcello Retana    :  1940  MRN: [id number]  Pt Location: [facility] ROOM 01    SURGERY DATE: 3/11/2022    Surgeon(s) and Role:     * Yuridia Cox MD - Primary    Preop Diagnosis:  Right ureteral stricture    Post-Op Diagnosis Codes:   Right ureteral stricture  Stent with encrustation    Procedure(s) (LRB):  EXCHANGE STENT URETERAL (Right)  CYSTOSCOPY, RIGHT RETROGRADE PYLEOGRAM (N/A)    Specimen(s):  * No specimens in log *    Estimated Blood Loss:   Minimal    Drains:  Ureteral Drain/Stent Right ureter 7 Fr  (Active)   Number of days: 143       Anesthesia Type:   General    Operative Indications:  Hydronephrosis with ureteral stricture, not elsewhere classified [N13 1]      Operative Findings:  Patient with chronic right ureteral stricture with 7 American stent in place with encrustation seen in the bladder and inability to get wire past mid ureter and retrograde showing 4 cm stenotic mid ureter  New 7 x 26 American stent placed (old stent was 7 [id number])    Complications:   None    Procedure and Technique:  After informed consent was obtained including the risks of bleeding, infection, ureteral injury, and need for secondary procedures, the patient was placed supine in the operating room theater  General anesthesia was administered  The patient was transferred to the dorsal lithotomy position and sterilely prepped and draped in the usual fashion  Cystoscopy was performed to 21 American cystoscope with 30° lens  The urethra was unremarkable  The prsotate was long and obstructive appearing  Inspection of the bladder revealed no significant findings  Stent was seen emanating from the right ureteral orifice  There was mild encrustation  With the aid of a 5fr open ended catheter a sensor wire was attempted to be passed into the right ureteral orifice alongside the stent  The wire could not get past mid ureter, kept coiling   The wire was removed and retrograde performed showing no filling of mid ureter and proximal mild hydroneprhosis consitent with 4cm stricture  The wire and 5fr were removed  A grasper was introduced into scope  The distal coild of stent was then grasped and brought down to the meatus  It was somewhat stuck in the ureter so not easy to pull to meatus  A solo wire was used to intubate stent and pass up to the renal pelvis  The stent was removed  A two wire introducer was passed over wire and retrograde performed again showing stenosis of mid ureter for about 4cm with proximal hydronephrosis  A new 7 x 26 double-J stent was then inserted under visual guidance distally and fluoroscopic guidance proximally  Once seen to be in appropriate position the wire was removed and the stent was seen to have an appropriate proximal coil on fluoroscopy and visually in the bladder  The bladder was drained and patient awakened from anesthesia having tolerated the procedure well  A qualified resident physician was not available    Patient Disposition:  PACU     PLAN: fu in clinic in 2 month and plan for next stent exchange in 3 months from now    Consider nuclear medicine study to determine differential fxn of right kidney      SIGNATURE: Majo Horan MD  DATE: March 11, 2022  TIME: 9:59 AM

## 2022-03-11 NOTE — ANESTHESIA PREPROCEDURE EVALUATION
Procedure:  EXCHANGE STENT URETERAL (Right Abdomen)  CYSTOSCOPY (N/A Bladder)    Relevant Problems   CARDIO   (+) Benign essential hypertension   (+) Chronic atrial fibrillation (HCC)   (+) Moderate aortic regurgitation   (+) Moderate mitral regurgitation   (+) Nonrheumatic aortic valve stenosis      ENDO   (+) Secondary hyperparathyroidism (HCC)      GI/HEPATIC   (+) GERD (gastroesophageal reflux disease)      /RENAL   (+) JUNIOR (acute kidney injury) (HCC)   (+) Acute kidney injury superimposed on chronic kidney disease (HCC)   (+) Benign hypertension with CKD (chronic kidney disease) stage III (HCC)   (+) Chronic kidney disease-mineral and bone disorder   (+) Hydronephrosis with ureteral stricture, not elsewhere classified   (+) Renal calculi   (+) Stage 3b chronic kidney disease (HCC)      HEMATOLOGY   (+) Iron deficiency anemia, unspecified      NEURO/PSYCH   (+) CVA (cerebral vascular accident) ([address] )   (+) Stroke ([facility] )        Physical Exam    Airway      TM Distance: >3 FB  Neck ROM: full     Dental   upper dentures and lower dentures,     Cardiovascular      Pulmonary      Other Findings        Anesthesia Plan  ASA Score- 3     Anesthesia Type- IV sedation with anesthesia with ASA Monitors  Additional Monitors:   Airway Plan:     Comment: I have seen the patient and reviewed the history  Patient to receive IV sedation with full ASA monitors  Risks discussed with the patient, consent signed          Plan Factors-Exercise tolerance (METS): <4 METS  Chart reviewed  EKG reviewed  Existing labs reviewed  Patient summary reviewed  Patient is not a current smoker  Induction- intravenous  Postoperative Plan- Plan for postoperative opioid use  Informed Consent- Anesthetic plan and risks discussed with patient  I personally reviewed this patient with the CRNA  Discussed and agreed on the Anesthesia Plan with the CRNA  Carlos Egan

## 2022-03-11 NOTE — TELEPHONE ENCOUNTER
Pt had right stent exchange today  Stent was downsized from 7 x 28-7 x 26  There was evidence of a right mid ureteral stricture that was quite tight and could not get a wire beside the stent and urine had some difficulty pulling the stent as result  New stent placed without difficulty though  Plan for next stent exchange in 3 months  Will bring back to clinic in 2 months for check in before    Will consider getting a nuclear medicine study to determine right kidney differential function

## 2022-03-11 NOTE — ANESTHESIA POSTPROCEDURE EVALUATION
Post-Op Assessment Note    CV Status:  Stable  Pain Score: 0    Pain management: adequate     Mental Status:  Arousable   Hydration Status:  Stable   PONV Controlled:  Controlled   Airway Patency:  Patent      Post Op Vitals Reviewed: Yes      Staff: CRNA         No complications documented      BP 95/53 (03/11/22 1006)    Temp 97 5 °F (36 4 °C) (03/11/22 1006)    Pulse 60 (03/11/22 1006)   Resp (!) 10 (03/11/22 1006)    SpO2 100 % (03/11/22 1006)

## 2022-03-11 NOTE — H&P
H&P Exam - Josh Rios 80 y o  male MRN: [id number]    Unit/Bed#: OR POOL Encounter: [id number]    Assessment:  Chronic right uretearl stricture  CT showed proximal stone but appears outside ureter  Stent exchange delayed because of COVID    Plan:     Stent exchange today  We have discussed the role for a metallic resonance stent  He has some interest in this but I think our next stent exchange should be a regular stent and we can rediscuss metallic stent further in the future      Will consider getting differential functional study in the future as well        History of Present Illness   HPI      80 y/o male with hx of renal stones and right ureteral stricture disease managed with chronic stent   Former patient of Dr Josie Frederick now transferring care      The patient has had numerous (at least 7) prior stone surgeries in [place] and developed a stricture of the right mid and proximal ureter  Hx from Dr Josie Frederick notes:  -Stent placement and holmium laser of stone and stricture 4/18/17  -Holmium laser of stone and stent exchange 5/2/17  -Renal stones treated elsewhere in [place] in past year (7 procedures)  -Right laser lithotripsy, laser infundibulotomy, and stone basket extraction 6/2/17  -Right CRUSH and laser incision of proximal ureter 7/18/17  -Stent changed 05/10/19-- stone causing near complete obstruction of the right proximal ureter   Required stent to be removed in order for a guidewire to pass  -Stent exchange 10/22/19 found it impossible to advance the wire without removing the stent first    -Stent exchange 01/28/20 found it once again impossible to advance the wire without removing the stent first    Stent due for change in 12 wks     -Stent exchange by Dr Antionette Guajardo 02/14/21 right stent change and ureteroscopy   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  - Stent exchange 05/11/21 - Difficulty getting wire past the obstruction without removing  - Stent exchange 10/19/2021- partial encrustation, wire could not pass  mid ureter alongside stent until stent was pulled out  7x26 stent placed  - 10/29/21 - CT showed no hydro with stent in good position and small stone next to stent vs outside ureter     The patient has CKD but creatinine appears stable with GFR of 40 since at least 2018  He follows with Nephrology       Patient denies any complaints      No PSAs in the system  Review of Systems   Constitutional: Negative for chills and fever  HENT: Negative for ear pain and sore throat  Eyes: Negative for pain and visual disturbance  Respiratory: Negative for cough and shortness of breath  Cardiovascular: Negative for chest pain and palpitations  Gastrointestinal: Negative for abdominal pain and vomiting  Genitourinary: Negative for dysuria and hematuria  Musculoskeletal: Negative for arthralgias and back pain  Skin: Negative for color change and rash  Neurological: Negative for seizures and syncope  All other systems reviewed and are negative        Historical Information   Past Medical History:   Diagnosis Date    Anemia     iron deficiency    [facility] )     of brain  being monitored    Essential hypertension, long-standing     Heart murmur     per pt "Aortic Valve replacement 2021 with Watchman device implanted /2021"    Hematuria     intermittent    History of cigarette smoking, chronic     62 year smoker at one half pack per day, quit 04/1/17    History of COVID-19 01/19/2022    recovered at home/chief s/s only sore throat    History of kidney stones     History of pneumonia     Hyperlipidemia     Hypertension     Irregular heart beat     Kidney stone     Muscle weakness     right sided weakness has gotten better/ walks independantly"    Stroke ([facility] [address] ) 10/29/2020    right sided  weakness almost full recovery    Stroke ([facility] )     Teeth missing     Vitamin D deficiency     maintained with 1000 units of D    Water retention     Wears glasses      Past Surgical History: Procedure Laterality Date    APPENDECTOMY  1960    CARDIAC SURGERY      watchman [id number]; aortic valve replaced 2021(pig valve)    CAROTID ENDARTERECTOMY Left 1998    Supposedly no internal stenosis found    CYSTOSCOPY Right 8/15/2017    Procedure: CYSTOSCOPY RIGHT STENT EXCHANGE;  Surgeon: Alexandro Mo MD;  Location: [address] [place] [address];  Service: Urology    CYSTOSCOPY     [address] [facility] [address]  LITHOTRIPSY  03/2017    For nephrolithiasis at [address]  5/10/2019    FL RETROGRADE PYELOGRAM  7/30/2019    FL RETROGRADE PYELOGRAM  10/22/2019    FL RETROGRADE PYELOGRAM  1/28/2020    FL RETROGRADE PYELOGRAM  8/11/2020    FL RETROGRADE PYELOGRAM  12/15/2020    FL RETROGRADE PYELOGRAM  2/14/2021    FL RETROGRADE PYELOGRAM  5/11/2021    FL RETROGRADE PYELOGRAM  10/19/2021    NE CYSTO/URETERO W/LITHOTRIPSY &INDWELL STENT INSRT Right 5/2/2017    Procedure: CYSTOSCOPY URETEROSCOPY WITH LITHOTRIPSY HOLMIUM LASER, RETROGRADE PYELOGRAM AND INSERTION STENT URETERAL;  Surgeon: Alexandro Mo MD;  Location: [address] [place] [address];  Service: Urology    NE CYSTO/URETERO W/LITHOTRIPSY &INDWELL STENT INSRT Right 5/16/2017    Procedure: CYSTOSCOPY, RETROGRADE PYELOGRAM,  FLEXIBLE URETEROSCOPY,  HOLMIUM LASER LITHOTRIPSY, STONE BASKET MANIPULATION,  STENT URETERAL EXCHANGE;  Surgeon: Alexandro Mo MD;  Location: [address] [place] [address];  Service: Urology    NE CYSTO/URETERO W/LITHOTRIPSY &INDWELL STENT INSRT Right 6/2/2017    Procedure: CYSTOSCOPY, RETROGRADE, STONE MANIPULATION WITH HOLMIUM LASER, STENT PLACEMENT;  Surgeon: Alexandro Mo MD;  Location: [address] [place] [address];  Service: Urology    NE CYSTO/URETERO W/LITHOTRIPSY &INDWELL STENT INSRT Right 7/18/2017    Procedure: CYSTOSCOPY, RETROGRADE, URETEROSCOPY HOLMIUM LASER [id number] [facility];  Surgeon: Alexandro Mo MD;  Location: [facility];  Service: Urology    NE CYSTO/URETERO [address] [facility] &INDWELL STENT INSRT Right 2/14/2021    Procedure: CYSTOSCOPY URETEROSCOPY, RETROGRADE PYELOGRAM, STONE MANIPULATION AND EXCHANGE STENT URETERAL;  Surgeon: Cliff Dong MD;  Location: [address] [place] [address];  Service: Urology    UT CYSTOSCOPY,INSERT URETERAL STENT Right 11/14/2017    Procedure: EXCHANGE STENT URETERAL;  Surgeon: Isaías Charles MD;  Location: [address] [place] [address];  Service: Urology    UT CYSTOURETHROSCOPY,URETER CATHETER Right 11/14/2017    Procedure: Reather Dusty, STENT EXCHANGE;  Surgeon: Isaías Charles MD;  Location: [address] [place] [address];  Service: Urology    UT CYSTOURETHROSCOPY,URETER CATHETER Right 5/8/2018    Procedure: Arthuro Gallop;  Surgeon: Isaías Charles MD;  Location: [address] [place] [address];  Service: Urology    UT CYSTOURETHROSCOPY,URETER CATHETER Right 8/14/2018    Procedure: Arthuro Gallop;  Surgeon: Isaías Charles MD;  Location: [address] [place] [address];  Service: Urology    UT CYSTOURETHROSCOPY,URETER CATHETER Right 11/13/2018    Procedure: CYSTOSCOPY, Marella Carry EXCHANGE, RETROGRADE;  Surgeon: Isaías Charles MD;  Location: [address] [place] [address];  Service: Urology    UT CYSTOURETHROSCOPY,URETER CATHETER Right 2/12/2019    Procedure: Reather Dusty, STENT REMOVAL AND STENT PLACEMENT;  Surgeon: Isaías Charles MD;  Location: [address] [place] [address];  Service: Urology    UT CYSTOURETHROSCOPY,URETER CATHETER Right 5/10/2019    Procedure: CYSTOSCOPY, RETROGRADE, STENT EXCHANGE;  Surgeon: Isaías Charles MD;  Location: [address] [place] [address];  Service: Urology    UT CYSTOURETHROSCOPY,URETER CATHETER Right 7/30/2019    Procedure: CYSTOSCOPY, RETROGRADE, STENT REMOVAL AND PLACEMENT OF STENT;  Surgeon: Isaías Charles MD;  Location: [address] [place] [address];  Service: Urology    UT CYSTOURETHROSCOPY,URETER CATHETER Right 10/22/2019    Procedure: CYSTOSCOPY, RETROGRADE, STENT EXCHANGE;  Surgeon: Isaías Charles MD;  Location: [address] [place] [address];  Service: Urology    UT CYSTOURETHROSCOPY,URETER CATHETER Right 1/28/2020 Procedure: CYSTOSCOPY, RETROGRADE, STENT REMOVAL AND STENT PLACEMENT;  Surgeon: Rose Shetty MD;  Location: [address] [place] [address];  Service: Urology    OR CYSTOURETHROSCOPY,URETER CATHETER Right 2020    Procedure: CYSTOSCOPY RETROGRADE, STENT EXCHANGE;  Surgeon: Rose Sehtty MD;  Location: [address] [place] [address];  Service: Urology    OR Misbah Back Right 2020    Procedure: CYSTOSCOPY, STENT EXCHANGE, RETROGRADE;  Surgeon: Rose Shetty MD;  Location: [address] [place] [address];  Service: Urology    OR CYSTOURETHROSCOPY,URETER CATHETER Right 12/15/2020    Procedure: Cruz Lively, STENT REMOVAL, AND STENT PLACEMENT;  Surgeon: Rose Shetty MD;  Location: [address] [place] [address];  Service: Urology    OR CYSTOURETHROSCOPY,URETER CATHETER Right 2021    Procedure: CYSTOSCOPY RETROGRADE PYELOGRAM WITH STENT EXCHANGE;  Surgeon: Rose Shetty MD;  Location: [address] [place] [address];  Service: Urology    OR CYSTOURETHROSCOPY,URETER CATHETER Right 10/19/2021    Procedure: CYSTOSCOPY WITH RETROGRADE, STENT EXCHANGE;  Surgeon: Rose Shetty MD;  Location: [address] [place] [address];  Service: Urology    PROSTATE SURGERY  2015    Laser Prostatectomy  by Dr Nikki Chris Right 2017    Procedure: INSERTION STENT URETERAL, RIGHT URETEROSCOPY,  LASER OF URETERAL STRICTURE AND STONE;  Surgeon: Rose Shetty MD;  Location: [address] [place] [address];  Service:     URETERAL STENT PLACEMENT Right 2018    Procedure: Meribeth Soles;  Surgeon: Rose Shetty MD;  Location: [facility];  Service: Urology     Social History   Social History     Substance and Sexual Activity   Alcohol Use Not Currently    Comment: rare     Social History     Substance and Sexual Activity   Drug Use Never     Social History     Tobacco Use   Smoking Status Former Smoker    Packs/day: 0 50    Years: 62 00    Pack years: 31 00    Types: Cigarettes    Quit date: 4/15/2017    Years since quittin 9   Smokeless Tobacco Never Used     E-Cigarette Use: Never User     E-Cigarette/Vaping Substances    Nicotine No     THC No     CBD No     Flavoring No     Other No     Unknown No        Family History: non-contributory    Meds/Allergies   all medications and allergies reviewed  No Known Allergies    Objective   First Vitals:   Blood Pressure: 146/83 (03/11/22 0746)  Pulse: 77 (03/11/22 0746)  Temperature: (!) 96 9 °F (36 1 °C) (03/11/22 0746)  Temp Source: Temporal (03/11/22 0746)  Respirations: 17 (03/11/22 0746)  Height: 5' 10" (177 8 cm) (03/08/22 1107)  Weight - Scale: 92 5 kg (204 lb) (03/08/22 1107)  SpO2: 100 % (03/11/22 0746)    Current Vitals:   Blood Pressure: 146/83 (03/11/22 0746)  Pulse: 77 (03/11/22 0746)  Temperature: (!) 96 9 °F (36 1 °C) (03/11/22 0746)  Temp Source: Temporal (03/11/22 0746)  Respirations: 17 (03/11/22 0746)  Height: 5' 10" (177 8 cm) (03/11/22 0820)  Weight - Scale: 95 3 kg (210 lb) (03/11/22 0820)  SpO2: 100 % (03/11/22 0746)    No intake or output data in the 24 hours ending 03/11/22 0916    Invasive Devices  Report    Drain            Ureteral Drain/Stent Right ureter 7 Fr  142 days                Physical Exam  Vitals and nursing note reviewed  Constitutional:       Appearance: He is well-developed  HENT:      Head: Normocephalic and atraumatic  Eyes:      Conjunctiva/sclera: Conjunctivae normal    Cardiovascular:      Rate and Rhythm: Normal rate and regular rhythm  Heart sounds: No murmur heard  Pulmonary:      Effort: Pulmonary effort is normal  No respiratory distress  Breath sounds: Normal breath sounds  Abdominal:      Palpations: Abdomen is soft  Tenderness: There is no abdominal tenderness  Genitourinary:     Penis: Normal        Testes: Normal    Musculoskeletal:      Cervical back: Neck supple  Skin:     General: Skin is warm and dry  Neurological:      Mental Status: He is alert           Lab Results: ucx negative  Imaging: CT from Oct 2021 reviewed

## 2022-03-15 ENCOUNTER — APPOINTMENT (OUTPATIENT)
Dept: LAB | Facility: HOSPITAL | Age: 82
End: 2022-03-15
Payer: MEDICARE

## 2022-03-15 DIAGNOSIS — D50.9 IRON DEFICIENCY ANEMIA, UNSPECIFIED IRON DEFICIENCY ANEMIA TYPE: ICD-10-CM

## 2022-03-15 LAB
BASOPHILS # BLD AUTO: 0.04 THOUSANDS/ΜL (ref 0–0.1)
BASOPHILS NFR BLD AUTO: 1 % (ref 0–1)
EOSINOPHIL # BLD AUTO: 0.25 THOUSAND/ΜL (ref 0–0.61)
EOSINOPHIL NFR BLD AUTO: 4 % (ref 0–6)
ERYTHROCYTE [DISTWIDTH] IN BLOOD BY AUTOMATED COUNT: 15.7 % (ref 11.6–15.1)
FERRITIN SERPL-MCNC: 32 NG/ML (ref 8–388)
HCT VFR BLD AUTO: 41.8 % (ref 36.5–49.3)
HGB BLD-MCNC: 12.9 G/DL (ref 12–17)
IMM GRANULOCYTES # BLD AUTO: 0.02 THOUSAND/UL (ref 0–0.2)
IMM GRANULOCYTES NFR BLD AUTO: 0 % (ref 0–2)
IRON SATN MFR SERPL: 17 % (ref 20–50)
IRON SERPL-MCNC: 52 UG/DL (ref 65–175)
LYMPHOCYTES # BLD AUTO: 1.39 THOUSANDS/ΜL (ref 0.6–4.47)
LYMPHOCYTES NFR BLD AUTO: 20 % (ref 14–44)
MCH RBC QN AUTO: 27.3 PG (ref 26.8–34.3)
MCHC RBC AUTO-ENTMCNC: 30.9 G/DL (ref 31.4–37.4)
MCV RBC AUTO: 89 FL (ref 82–98)
MONOCYTES # BLD AUTO: 0.78 THOUSAND/ΜL (ref 0.17–1.22)
MONOCYTES NFR BLD AUTO: 11 % (ref 4–12)
NEUTROPHILS # BLD AUTO: 4.35 THOUSANDS/ΜL (ref 1.85–7.62)
NEUTS SEG NFR BLD AUTO: 64 % (ref 43–75)
NRBC BLD AUTO-RTO: 0 /100 WBCS
PLATELET # BLD AUTO: 150 THOUSANDS/UL (ref 149–390)
PMV BLD AUTO: 10.2 FL (ref 8.9–12.7)
RBC # BLD AUTO: 4.72 MILLION/UL (ref 3.88–5.62)
TIBC SERPL-MCNC: 308 UG/DL (ref 250–450)
WBC # BLD AUTO: 6.83 THOUSAND/UL (ref 4.31–10.16)

## 2022-03-15 PROCEDURE — 82728 ASSAY OF FERRITIN: CPT

## 2022-03-15 PROCEDURE — 36415 COLL VENOUS BLD VENIPUNCTURE: CPT

## 2022-03-15 PROCEDURE — 83550 IRON BINDING TEST: CPT

## 2022-03-15 PROCEDURE — 85025 COMPLETE CBC W/AUTO DIFF WBC: CPT

## 2022-03-15 PROCEDURE — 83540 ASSAY OF IRON: CPT

## 2022-03-17 ENCOUNTER — OFFICE VISIT (OUTPATIENT)
Dept: HEMATOLOGY ONCOLOGY | Facility: MEDICAL CENTER | Age: 82
End: 2022-03-17
Payer: MEDICARE

## 2022-03-17 VITALS
WEIGHT: 206.2 LBS | DIASTOLIC BLOOD PRESSURE: 86 MMHG | SYSTOLIC BLOOD PRESSURE: 124 MMHG | BODY MASS INDEX: 29.52 KG/M2 | OXYGEN SATURATION: 97 % | HEART RATE: 55 BPM | TEMPERATURE: 98.2 F | HEIGHT: 70 IN | RESPIRATION RATE: 17 BRPM

## 2022-03-17 DIAGNOSIS — D50.9 IRON DEFICIENCY ANEMIA, UNSPECIFIED IRON DEFICIENCY ANEMIA TYPE: Primary | ICD-10-CM

## 2022-03-17 PROCEDURE — 99215 OFFICE O/P EST HI 40 MIN: CPT | Performed by: PHYSICIAN ASSISTANT

## 2022-03-17 NOTE — PROGRESS NOTES
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  Patient has retained ureter stent  Patient requires exchange every 12 weeks  When patient's exchange is delayed, patient has more  bleeding  At present, ferritin levels are borderline at 32 ng/dL  Patient's hemoglobin is stable  I requested that the patient undergo blood testing six weeks followed by iron and CBC testing in three months  Due to COVID 19 infection, recent stent exchange was delayed  Patient does not have gross hematuria at this time  Moreover, the patient feels well and is asymptomatic of anemia or iron deficiency  - CBC and differential; Future  - Iron Panel (Includes Ferritin, Iron Sat%, Iron, and TIBC); Future  - CBC and differential; Future      The patient is scheduled for follow-up in approximately 3 months with Dr Jerry Chakraborty  Patient voiced agreement and understanding to the above  Patient advised to call the Hematology/Oncology office with any questions and concerns regarding the above  Barrier(s) to care: None  The patient is able to self care  Shannon San PA-C  Medical Oncology/Hematology  [address]    Subjective     Chief Complaint   Patient presents with    Follow-up     Iron deficiency anemia, unspecified iron deficiency anemia type       History of present illness:    This is a 70-year-old male with past medical history of CVA with right-sided lower extremity weakness, hydronephrosis with the ureteral stricture status post stent previously cared for by Dr Chivo Craig valve stenosis status post TVAR in February of 2021 at [facility], GERD, CKD, sensorineural hearing loss and acute diastolic CHF who presents to Hematology due to recent clinical findings of excessive bleeding      Patient notes that on 8/1/21 he tripped over a curb and broke his nose   Patient followed up in the emergency room to have his broken nose six   However, the patient's nose continued to bleed   Patient was discharged by the time he was home, he returned back to the emergency room due to soaking through the nasal tampon   Patient was then injected with epinephrine and bleeding stopped   At the time of the fracture, patient was on aspirin and Plavix      On August 24th, the patient underwent dental procedure for extraction in preparation for dentures   Patient continued to bleed after sutures were placed   Patient went back to the dentist, who injected him with epinephrine   Unfortunately, the patient developed a hematoma on the low right side of his face   That time, patient was on aspirin and Plavix however, clinically, the dentist noted that this is not common for his procedures and recommend that he a follow-up      10/28/20 PT = 13 5, INR = 1 03, PTT = 30  11/25/20, hemoglobin = 12 8, MCV = 94, RDW = 14 6, MCHC = 30 8, platelet = 039  0/30/45 hemoglobin = 11 1, MCV = 88, RDW = high 15 7, MCHC and MCH both low, platelet = 459  0/3/38 hemoglobin = 9 5, MCV 76, RDW = 19 6, other RBC indices all low, platelet = 559  6/9/20  hemoglobin = 10 9, MCV = 74, RDW = 22, platelet = 149  2/62/98 PTT and PT INR within normal limits  9/24/21 von Willebrand's assessment negative    9/24/21 hemoglobin = 11 0, MCV 76, RDW 20 2, platelet 162, iron saturation = 8%, ferritin 13  10/5 and 10/12: IV Feraheme, no side effects      12/15/2021:  Hemoglobin = 14 3, MCV 84, iron saturation 26%, ferritin 42    3/15/2022:  Hemoglobin = 12 9, MCV = 89, iron saturation = 17, ferritin = 32       03/17/22 :  Lab Results   Component Value Date    IRON 52 (L) 03/15/2022    TIBC 308 03/15/2022    FERRITIN 32 03/15/2022     Lab Results   Component Value Date    WBC 6 83 03/15/2022    HGB 12 9 03/15/2022    HCT 41 8 03/15/2022    MCV 89 03/15/2022     03/15/2022       Interval history: feelign well  Had COVID and stent exchange was delayed  Patient fared well not requiring any hospitalizations or emergency room visits  However his wife is still recovering and is in acute rehab learning how to walk again  Review of Systems   Constitutional: Negative for activity change, appetite change, fatigue and fever  HENT: Negative for nosebleeds  Respiratory: Negative for cough, choking and shortness of breath  Cardiovascular: Negative for chest pain, palpitations and leg swelling  Gastrointestinal: Negative for abdominal distention, abdominal pain, anal bleeding, blood in stool, constipation, diarrhea, nausea and vomiting  Endocrine: Negative for cold intolerance  Genitourinary: Negative for hematuria  Musculoskeletal: Negative for myalgias  Skin: Negative for color change, pallor and rash  Allergic/Immunologic: Negative for immunocompromised state  Neurological: Negative for headaches  Hematological: Negative for adenopathy  Does not bruise/bleed easily  All other systems reviewed and are negative        Patient Active Problem List   Diagnosis    Chronic atrial fibrillation (HCC)    Benign essential hypertension    Renal calculi    JUNIOR (acute kidney injury) ([address] )    Calculus of ureter    Crossing vessel and stricture of ureter without hydronephrosis    Hematuria, gross    Hydronephrosis with ureteral stricture, not elsewhere classified    CVA (cerebral vascular accident) ([address] )    Chronic diastolic CHF (congestive heart failure) ([address] )    Nonrheumatic aortic valve stenosis    Bilateral carotid artery disease ([address] )    Aneurysm of anterior communicating artery    Tooth pain    Onychomycosis    Syncope vs seizure    Vasovagal near syncope    Acute kidney injury superimposed on chronic kidney disease ([address] )    Secondary hyperparathyroidism ([address] )    Vitamin D deficiency    Patellofemoral syndrome of left knee    Moderate mitral regurgitation    Moderate aortic regurgitation    GERD (gastroesophageal reflux disease)    Dyslipidemia    Stage 3b chronic kidney disease (HCC)    Aneurysm ([address]    Stroke ([facility]    Facial laceration    Closed nondisplaced fracture of nasal bone with routine healing    Sensorineural hearing loss (SNHL) of both ears    Iron deficiency anemia, unspecified    Benign hypertension with CKD (chronic kidney disease) stage III (HCC)    Chronic kidney disease-mineral and bone disorder     Past Medical History:   Diagnosis Date    Anemia     iron deficiency    Aneurysm ([address] )     of brain  being monitored    Essential hypertension, long-standing     Heart murmur     per pt "Aortic Valve replacement 2021 with Watchman device implanted /2021"    Hematuria     intermittent    History of cigarette smoking, chronic     62 year smoker at one half pack per day, quit 04/1/17    History of COVID-19 01/19/2022    recovered at home/chief s/s only sore throat    History of kidney stones     History of pneumonia     Hyperlipidemia     Hypertension     Irregular heart beat     Kidney stone     Muscle weakness     right sided weakness has gotten better/ walks independantly"    Stroke ([facility] ) 10/29/2020    right sided  weakness almost full recovery    Stroke ([facility] )     Teeth missing     Vitamin D deficiency     maintained with 1000 units of D    Water retention     Wears glasses      Past Surgical History:   Procedure Laterality Date    APPENDECTOMY  1960    CARDIAC SURGERY      watchman [id number]; aortic valve replaced 2021(pig valve)    CAROTID ENDARTERECTOMY Left 1998    Supposedly no internal stenosis found    CYSTOSCOPY Right 8/15/2017    Procedure: CYSTOSCOPY RIGHT STENT EXCHANGE;  Surgeon: Aden Espinoza MD; Location: [facility];  Service: Urology    CYSTOSCOPY      CYSTOSCOPY N/A 3/11/2022    Procedure: CYSTOSCOPY, RIGHT RETROGRADE PYLEOGRAM;  Surgeon: Robbie Cabot, MD;  Location:  MAIN OR;  Service: Urology    EXTRACORPOREAL SHOCK WAVE LITHOTRIPSY  03/2017    For nephrolithiasis at [address]  5/10/2019    FL RETROGRADE PYELOGRAM  7/30/2019    FL RETROGRADE PYELOGRAM  10/22/2019    FL RETROGRADE PYELOGRAM  1/28/2020    FL RETROGRADE PYELOGRAM  8/11/2020    FL RETROGRADE PYELOGRAM  12/15/2020    FL RETROGRADE PYELOGRAM  2/14/2021    FL RETROGRADE PYELOGRAM  5/11/2021    FL RETROGRADE PYELOGRAM  10/19/2021    FL RETROGRADE PYELOGRAM  3/11/2022    DE CYSTO/URETERO W/LITHOTRIPSY &INDWELL STENT INSRT Right 5/2/2017    Procedure: CYSTOSCOPY URETEROSCOPY WITH LITHOTRIPSY HOLMIUM LASER, RETROGRADE PYELOGRAM AND INSERTION STENT URETERAL;  Surgeon: Sukhdev Grajeda MD;  Location: [address] [place] [address];  Service: Urology    DE CYSTO/URETERO W/LITHOTRIPSY &INDWELL STENT INSRT Right 5/16/2017    Procedure: CYSTOSCOPY, RETROGRADE PYELOGRAM,  FLEXIBLE URETEROSCOPY,  HOLMIUM LASER LITHOTRIPSY, STONE BASKET MANIPULATION,  STENT URETERAL EXCHANGE;  Surgeon: Sukhdev Grajeda MD;  Location: [address] [place] [address];  Service: Urology    DE CYSTO/URETERO W/LITHOTRIPSY &INDWELL STENT INSRT Right 6/2/2017    Procedure: CYSTOSCOPY, RETROGRADE, STONE MANIPULATION WITH HOLMIUM LASER, STENT PLACEMENT;  Surgeon: Sukhdev Garjeda MD;  Location: [address] [place] [address];  Service: Urology    DE CYSTO/URETERO W/LITHOTRIPSY &INDWELL STENT INSRT Right 7/18/2017    Procedure: CYSTOSCOPY, RETROGRADE, URETEROSCOPY HOLMIUM LASER [address];  Surgeon: Sukhdev Grajeda MD;  Location: [address] [place] [address];  Service: Urology    DE CYSTO/URETERO W/LITHOTRIPSY &INDWELL STENT INSRT Right 2/14/2021    Procedure: CYSTOSCOPY URETEROSCOPY, RETROGRADE PYELOGRAM, STONE MANIPULATION AND EXCHANGE STENT URETERAL;  Surgeon: Rodri Stubbs MD;  Location[facility];  Service: Urology    AZ CYSTOSCOPY,INSERT URETERAL STENT Right 11/14/2017    Procedure: EXCHANGE STENT URETERAL;  Surgeon: James Nye MD;  Location: [address] [place] [address] Road;  Service: Urology    AZ CYSTOSCOPY,INSERT URETERAL STENT Right 3/11/2022    Procedure: EXCHANGE STENT URETERAL;  Surgeon: Belinda Salter MD;  [facility];  Service: Urology    AZ CYSTOURETHROSCOPY,URETER CATHETER Right 11/14/2017    Procedure: CYSTOSCOPY RETROGRADE, STENT EXCHANGE;  Surgeon: James Nye MD;  Location: [facility];  Service: Urology    AZ CYSTOURETHROSCOPY,URETER CATHETER Right 5/8/2018    Procedure: Marita Ear;  Surgeon: James Nye MD;  Location: [address] [place] [address];  Service: Urology    AZ CYSTOURETHROSCOPY,URETER CATHETER Right 8/14/2018    Procedure: Marita Ear;  Surgeon: James Nye MD;  Location: [address] [place] [address];  Service: Urology    AZ CYSTOURETHROSCOPY,URETER CATHETER Right 11/13/2018    Procedure: Marita Carlin, RETROGRADE;  Surgeon: James Nye MD;  Location: [address] [place] [address];  Service: Urology    AZ CYSTOURETHROSCOPY,URETER CATHETER Right 2/12/2019    Procedure: Marylen Groves, STENT REMOVAL AND STENT PLACEMENT;  Surgeon: James Nye MD;  Location: [address] [place] [address];  Service: Urology    AZ CYSTOURETHROSCOPY,URETER CATHETER Right 5/10/2019    Procedure: Marylen Groves, STENT EXCHANGE;  Surgeon: James Nye MD;  Location: [address] [place] [address];  Service: Urology    AZ CYSTOURETHROSCOPY,URETER CATHETER Right 7/30/2019    Procedure: CYSTOSCOPY, RETROGRADE, STENT REMOVAL AND PLACEMENT OF STENT;  Surgeon: James Nye MD;  Location: [address] [place] [address];  Service: Urology    AZ CYSTOURETHROSCOPY,URETER CATHETER Right 10/22/2019    Procedure: CYSTOSCOPY, RETROGRADE, STENT EXCHANGE;  Surgeon: James Nye MD;  Location: [address] [place] [address];  Service: Urology    AZ Sherrill Headley CATHETER Right 1/28/2020    Procedure: CYSTOSCOPY, RETROGRADE, STENT REMOVAL AND STENT PLACEMENT;  Surgeon: Aminah Mcmillan MD;  Location: [address] [place] [address];  Service: Urology    DE CYSTOURETHROSCOPY,URETER CATHETER Right 5/22/2020    Procedure: CYSTOSCOPY RETROGRADE, STENT EXCHANGE;  Surgeon: Aminah Mcmillan MD;  Location: [address] [place] [address];  Service: Urology    DE CYSTOURETHROSCOPY,URETER CATHETER Right 8/11/2020    Procedure: Jahaira Bush, RETROGRADE;  Surgeon: Aminah Mcmillan MD;  Location: [address] [place] [address];  Service: Urology    DE CYSTOURETHROSCOPY,URETER CATHETER Right 12/15/2020    Procedure: CYSTOSCOPY, RETROGRADE, STENT REMOVAL, AND STENT PLACEMENT;  Surgeon: Aminah Mcmillan MD;  Location: [address] [place] [address];  Service: Urology    DE CYSTOURETHROSCOPY,URETER CATHETER Right 5/11/2021    Procedure: CYSTOSCOPY RETROGRADE PYELOGRAM WITH STENT EXCHANGE;  Surgeon: Aminah Mcmillan MD;  Location: [address] [place] [address];  Service: Urology    DE CYSTOURETHROSCOPY,URETER CATHETER Right 10/19/2021    Procedure: CYSTOSCOPY WITH RETROGRADE, STENT EXCHANGE;  Surgeon: Aminah Mcmillan MD;  Location: [address] [place] [address];  Service: Urology    PROSTATE SURGERY  2015    Laser Prostatectomy  by Dr Sharmaine Bains Right 4/18/2017    Procedure: INSERTION STENT URETERAL, RIGHT URETEROSCOPY,  LASER OF URETERAL STRICTURE AND STONE;  Surgeon: Aminah Mcmillan MD;  Location: [address] [place] [address];  Service:     URETERAL STENT PLACEMENT Right 2/13/2018    Procedure: Jahaira Bush;  Surgeon: Aminah Mcmillan MD;  Location: [facility];  Service: Urology     Family History   Problem Relation Age of Onset    Hypertension Mother     Alcohol abuse Father     Cirrhosis Father     Cancer Son 15        cancer-knee and above-left-amputation    Cancer Sister     Other Brother         head mass    Thyroid disease unspecified Sister     Arthritis Sister     Other Brother         legally blind in one eye     Social History Socioeconomic History    Marital status: /Civil Union     Spouse name: None    Number of children: None    Years of education: None    Highest education level: None   Occupational History    Occupation: RETIRED   Tobacco Use    Smoking status: Former Smoker     Packs/day: 0 50     Years: 62 00     Pack years: 31 00     Types: Cigarettes     Quit date: 4/15/2017     Years since quittin 9    Smokeless tobacco: Never Used   Vaping Use    Vaping Use: Never used   Substance and Sexual Activity    Alcohol use: Not Currently     Comment: rare    Drug use: Never    Sexual activity: None     Comment: defer   Other Topics Concern    None   Social History Narrative    None     Social Determinants of Health     Financial Resource Strain: Not on file   Food Insecurity: Not on file   Transportation Needs: Not on file   Physical Activity: Not on file   Stress: Not on file   Social Connections: Not on file   Intimate Partner Violence: Not on file   Housing Stability: Not on file       Current Outpatient Medications:     aspirin (ECOTRIN LOW STRENGTH) 81 mg EC tablet, Take 81 mg by mouth daily Pt to check with surgeon if needed to hold RX , Disp: , Rfl:     atorvastatin (LIPITOR) 40 mg tablet, Take 1 tablet (40 mg total) by mouth daily with dinner, Disp: 30 tablet, Rfl: 1    cholecalciferol (VITAMIN D3) 1,000 units tablet, Take 1,000 Units by mouth daily after lunch  , Disp: , Rfl:     clopidogrel (PLAVIX) 75 mg tablet, Take 75 mg by mouth daily Pt  To check with surgeon if needed to hold RX , Disp: , Rfl:     finasteride (PROSCAR) 5 mg tablet, TAKE 1 TABLET BY MOUTH EVERY DAY, Disp: 90 tablet, Rfl: 2    metoprolol tartrate (LOPRESSOR) 25 mg tablet, Take 1 tablet (25 mg total) by mouth every 8 (eight) hours, Disp: 270 tablet, Rfl: 3  No Known Allergies    Objective   /86 (BP Location: Left arm, Patient Position: Sitting, Cuff Size: Standard)   Pulse 55   Temp 98 2 °F (36 8 °C) (Temporal) Resp 17   Ht 5' 10" (1 778 m)   Wt 93 5 kg (206 lb 3 2 oz)   SpO2 97%   BMI 29 59 kg/m²    Physical Exam  Constitutional:       General: He is not in acute distress  Appearance: He is well-developed  HENT:      Head: Normocephalic and atraumatic  Mouth/Throat:      Pharynx: No oropharyngeal exudate  Eyes:      General: No scleral icterus  Conjunctiva/sclera: Conjunctivae normal       Pupils: Pupils are equal, round, and reactive to light  Cardiovascular:      Rate and Rhythm: Normal rate and regular rhythm  Heart sounds: No murmur heard  Pulmonary:      Effort: Pulmonary effort is normal  No respiratory distress  Breath sounds: Normal breath sounds  Abdominal:      General: Bowel sounds are normal  There is no distension  Palpations: Abdomen is soft  Tenderness: There is no abdominal tenderness  Musculoskeletal:         General: No tenderness  Cervical back: Normal range of motion  Lymphadenopathy:      Cervical: No cervical adenopathy  Skin:     Coloration: Skin is not pale  Findings: No erythema or rash  Neurological:      Mental Status: He is alert and oriented to person, place, and time  Cranial Nerves: No cranial nerve deficit           Result Review  Labs:  Appointment on 03/15/2022   Component Date Value Ref Range Status    WBC 03/15/2022 6 83  4 31 - 10 16 Thousand/uL Final    RBC 03/15/2022 4 72  3 88 - 5 62 Million/uL Final    Hemoglobin 03/15/2022 12 9  12 0 - 17 0 g/dL Final    Hematocrit 03/15/2022 41 8  36 5 - 49 3 % Final    MCV 03/15/2022 89  82 - 98 fL Final    MCH 03/15/2022 27 3  26 8 - 34 3 pg Final    MCHC 03/15/2022 30 9* 31 4 - 37 4 g/dL Final    RDW 03/15/2022 15 7* 11 6 - 15 1 % Final    MPV 03/15/2022 10 2  8 9 - 12 7 fL Final    Platelets 53/53/8216 150  149 - 390 Thousands/uL Final    nRBC 03/15/2022 0  /100 WBCs Final    Neutrophils Relative 03/15/2022 64  43 - 75 % Final    Immat GRANS % 03/15/2022 0  0 - 2 % Final    Lymphocytes Relative 03/15/2022 20  14 - 44 % Final    Monocytes Relative 03/15/2022 11  4 - 12 % Final    Eosinophils Relative 03/15/2022 4  0 - 6 % Final    Basophils Relative 03/15/2022 1  0 - 1 % Final    Neutrophils Absolute 03/15/2022 4 35  1 85 - 7 62 Thousands/µL Final    Immature Grans Absolute 03/15/2022 0 02  0 00 - 0 20 Thousand/uL Final    Lymphocytes Absolute 03/15/2022 1 39  0 60 - 4 47 Thousands/µL Final    Monocytes Absolute 03/15/2022 0 78  0 17 - 1 22 Thousand/µL Final    Eosinophils Absolute 03/15/2022 0 25  0 00 - 0 61 Thousand/µL Final    Basophils Absolute 03/15/2022 0 04  0 00 - 0 10 Thousands/µL Final    Iron Saturation 03/15/2022 17* 20 - 50 % Final    TIBC 03/15/2022 308  250 - 450 ug/dL Final    Iron 03/15/2022 52* 65 - 175 ug/dL Final    Patients treated with metal-binding drugs (ie  Deferoxamine) may have depressed iron values      Ferritin 03/15/2022 32  8 - 388 ng/mL Final   Admission on 03/11/2022, Discharged on 03/11/2022   Component Date Value Ref Range Status    WBC 03/11/2022 6 52  4 31 - 10 16 Thousand/uL Final    RBC 03/11/2022 5 03  3 88 - 5 62 Million/uL Final    Hemoglobin 03/11/2022 13 8  12 0 - 17 0 g/dL Final    Hematocrit 03/11/2022 44 6  36 5 - 49 3 % Final    MCV 03/11/2022 89  82 - 98 fL Final    MCH 03/11/2022 27 4  26 8 - 34 3 pg Final    MCHC 03/11/2022 30 9* 31 4 - 37 4 g/dL Final    RDW 03/11/2022 16 0* 11 6 - 15 1 % Final    MPV 03/11/2022 10 2  8 9 - 12 7 fL Final    Platelets 49/62/7862 143* 149 - 390 Thousands/uL Final    nRBC 03/11/2022 0  /100 WBCs Final    Neutrophils Relative 03/11/2022 58  43 - 75 % Final    Immat GRANS % 03/11/2022 1  0 - 2 % Final    Lymphocytes Relative 03/11/2022 20  14 - 44 % Final    Monocytes Relative 03/11/2022 16* 4 - 12 % Final    Eosinophils Relative 03/11/2022 4  0 - 6 % Final    Basophils Relative 03/11/2022 1  0 - 1 % Final    Neutrophils Absolute 03/11/2022 3 87  1 85 - 7 62 Thousands/µL Final    Immature Grans Absolute 03/11/2022 0 03  0 00 - 0 20 Thousand/uL Final    Lymphocytes Absolute 03/11/2022 1 33  0 60 - 4 47 Thousands/µL Final    Monocytes Absolute 03/11/2022 1 02  0 17 - 1 22 Thousand/µL Final    Eosinophils Absolute 03/11/2022 0 23  0 00 - 0 61 Thousand/µL Final    Basophils Absolute 03/11/2022 0 04  0 00 - 0 10 Thousands/µL Final       Please note: This report has been generated by a voice recognition software system  Therefore there may be syntax, spelling, and/or grammatical errors  Please call if you have any questions

## 2022-04-14 ENCOUNTER — APPOINTMENT (OUTPATIENT)
Dept: LAB | Facility: HOSPITAL | Age: 82
End: 2022-04-14
Payer: MEDICARE

## 2022-04-14 DIAGNOSIS — D50.9 IRON DEFICIENCY ANEMIA, UNSPECIFIED IRON DEFICIENCY ANEMIA TYPE: ICD-10-CM

## 2022-04-14 LAB
BASOPHILS # BLD AUTO: 0.05 THOUSANDS/ΜL (ref 0–0.1)
BASOPHILS NFR BLD AUTO: 1 % (ref 0–1)
EOSINOPHIL # BLD AUTO: 0.31 THOUSAND/ΜL (ref 0–0.61)
EOSINOPHIL NFR BLD AUTO: 4 % (ref 0–6)
ERYTHROCYTE [DISTWIDTH] IN BLOOD BY AUTOMATED COUNT: 15.2 % (ref 11.6–15.1)
HCT VFR BLD AUTO: 45.7 % (ref 36.5–49.3)
HGB BLD-MCNC: 13.9 G/DL (ref 12–17)
IMM GRANULOCYTES # BLD AUTO: 0.02 THOUSAND/UL (ref 0–0.2)
IMM GRANULOCYTES NFR BLD AUTO: 0 % (ref 0–2)
LYMPHOCYTES # BLD AUTO: 2.08 THOUSANDS/ΜL (ref 0.6–4.47)
LYMPHOCYTES NFR BLD AUTO: 27 % (ref 14–44)
MCH RBC QN AUTO: 26.7 PG (ref 26.8–34.3)
MCHC RBC AUTO-ENTMCNC: 30.4 G/DL (ref 31.4–37.4)
MCV RBC AUTO: 88 FL (ref 82–98)
MONOCYTES # BLD AUTO: 1.14 THOUSAND/ΜL (ref 0.17–1.22)
MONOCYTES NFR BLD AUTO: 15 % (ref 4–12)
NEUTROPHILS # BLD AUTO: 4.1 THOUSANDS/ΜL (ref 1.85–7.62)
NEUTS SEG NFR BLD AUTO: 53 % (ref 43–75)
NRBC BLD AUTO-RTO: 0 /100 WBCS
PLATELET # BLD AUTO: 154 THOUSANDS/UL (ref 149–390)
PMV BLD AUTO: 10.6 FL (ref 8.9–12.7)
RBC # BLD AUTO: 5.2 MILLION/UL (ref 3.88–5.62)
WBC # BLD AUTO: 7.7 THOUSAND/UL (ref 4.31–10.16)

## 2022-04-14 PROCEDURE — 85025 COMPLETE CBC W/AUTO DIFF WBC: CPT

## 2022-04-14 PROCEDURE — 36415 COLL VENOUS BLD VENIPUNCTURE: CPT

## 2022-05-11 ENCOUNTER — OFFICE VISIT (OUTPATIENT)
Dept: UROLOGY | Facility: CLINIC | Age: 82
End: 2022-05-11
Payer: MEDICARE

## 2022-05-11 VITALS
DIASTOLIC BLOOD PRESSURE: 82 MMHG | HEIGHT: 70 IN | WEIGHT: 208.4 LBS | HEART RATE: 64 BPM | BODY MASS INDEX: 29.84 KG/M2 | SYSTOLIC BLOOD PRESSURE: 132 MMHG

## 2022-05-11 DIAGNOSIS — N39.0 URINARY TRACT INFECTION WITHOUT HEMATURIA, SITE UNSPECIFIED: Primary | ICD-10-CM

## 2022-05-11 DIAGNOSIS — H90.3 SENSORINEURAL HEARING LOSS (SNHL) OF BOTH EARS: ICD-10-CM

## 2022-05-11 DIAGNOSIS — N13.5 URETERAL STRICTURE: ICD-10-CM

## 2022-05-11 DIAGNOSIS — N13.30 HYDRONEPHROSIS, UNSPECIFIED HYDRONEPHROSIS TYPE: ICD-10-CM

## 2022-05-11 LAB
SL AMB  POCT GLUCOSE, UA: NORMAL
SL AMB LEUKOCYTE ESTERASE,UA: NORMAL
SL AMB POCT BILIRUBIN,UA: NORMAL
SL AMB POCT BLOOD,UA: NORMAL
SL AMB POCT CLARITY,UA: CLEAR
SL AMB POCT COLOR,UA: YELLOW
SL AMB POCT KETONES,UA: NORMAL
SL AMB POCT NITRITE,UA: NORMAL
SL AMB POCT PH,UA: 5
SL AMB POCT SPECIFIC GRAVITY,UA: 1.02
SL AMB POCT URINE PROTEIN: NORMAL
SL AMB POCT UROBILINOGEN: 0.2

## 2022-05-11 PROCEDURE — 99213 OFFICE O/P EST LOW 20 MIN: CPT | Performed by: PHYSICIAN ASSISTANT

## 2022-05-11 PROCEDURE — 81002 URINALYSIS NONAUTO W/O SCOPE: CPT | Performed by: PHYSICIAN ASSISTANT

## 2022-05-11 PROCEDURE — 87086 URINE CULTURE/COLONY COUNT: CPT | Performed by: PHYSICIAN ASSISTANT

## 2022-05-11 RX ORDER — MULTIVIT WITH MINERALS/LUTEIN
500 TABLET ORAL DAILY
COMMUNITY

## 2022-05-11 NOTE — PROGRESS NOTES
1  Urinary tract infection without hematuria, site unspecified  Urine culture   2  Hydronephrosis, unspecified hydronephrosis type  POCT urine dip    NM kidney w flow and function w rx   3  Sensorineural hearing loss (SNHL) of both ears     4  Ureteral stricture  Case request operating room: [address] [place] [address] WITH INSERTION STENT URETERAL    Case request operating room: CYSTOSCOPY RETROGRADE PYELOGRAM WITH INSERTION STENT URETERAL       Assessment and plan:       1  Nephrolithiasis  2  Right ureteral stricture   -patient will be coordinated for his next cystoscopy, retrograde pyelogram, and right ureteral stent exchange  Risks of the procedure reviewed including but not limited to cardiopulmonary complication, bleeding, infection, VTE, need for additional procedures   -will plan for renal Lasix scan as per previous documentation      Rin Rubalcava PA-C      Chief Complaint     Chief Complaint   Patient presents with    Hydronephrosis         History of Present Illness     Karlee Colunga is a 80 y o  male patient of Dr Kenny Bowles presenting for follow up  The patient has had numerous (at least 7) prior stone surgeries in [place] and developed a stricture of the right mid and proximal ureter  Hx from Dr Brian Trevino notes:  -Stent placement and holmium laser of stone and stricture 4/18/17  -Holmium laser of stone and stent exchange 5/2/17  -Renal stones treated elsewhere in [place] in past year (7 procedures)  -Right laser lithotripsy, laser infundibulotomy, and stone basket extraction 6/2/17  -Right CRUSH and laser incision of proximal ureter 7/18/17  -Stent changed 05/10/19-- stone causing near complete obstruction of the right proximal ureter   Required stent to be removed in order for a guidewire to pass     -Stent exchange 10/22/19 found it impossible to advance the wire without removing the stent first    -Stent exchange 01/28/20 found it once again impossible to advance the wire without removing the stent first    Stent due for change in 12 wks  -Stent exchange by Dr Fredi Mathias 02/14/21 right stent change and ureteroscopy   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  - Stent exchange 05/11/21 - Difficulty getting wire past the obstruction without removing  - Stent exchange 10/19/2021- partial encrustation, wire could not pass  mid ureter alongside stent until stent was pulled out  7x26 stent placed      The patient has CKD but creatinine appears stable with GFR of 40 since at least 2018  He follows with Nephrology  Urine dip leukocyte and nitrite negative, large blood  Clear yellow in color  Laboratory     Lab Results   Component Value Date    CREATININE 1 77 (H) 01/19/2022       Review of Systems     Review of Systems   Constitutional: Negative for activity change, appetite change, chills, diaphoresis, fatigue, fever and unexpected weight change  Respiratory: Negative for chest tightness and shortness of breath  Cardiovascular: Negative for chest pain, palpitations and leg swelling  Gastrointestinal: Negative for abdominal distention, abdominal pain, constipation, diarrhea, nausea and vomiting  Genitourinary: Negative for decreased urine volume, difficulty urinating, dysuria, enuresis, flank pain, frequency, genital sores, hematuria and urgency  Musculoskeletal: Negative for back pain, gait problem and myalgias  Skin: Negative for color change, pallor, rash and wound  Psychiatric/Behavioral: Negative for behavioral problems  The patient is not nervous/anxious  Allergies     No Known Allergies    Physical Exam     Physical Exam  Constitutional:       General: He is not in acute distress  Appearance: Normal appearance  He is normal weight  He is not ill-appearing, toxic-appearing or diaphoretic  HENT:      Head: Normocephalic and atraumatic  Eyes:      General:         Right eye: No discharge  Left eye: No discharge        Conjunctiva/sclera: Conjunctivae normal    Cardiovascular:      Rate and Rhythm: Normal rate and regular rhythm  Heart sounds: Normal heart sounds  No murmur heard  No friction rub  Pulmonary:      Effort: Pulmonary effort is normal  No respiratory distress  Breath sounds: Normal breath sounds  No stridor  Musculoskeletal:         General: No swelling or tenderness  Normal range of motion  Skin:     General: Skin is warm and dry  Coloration: Skin is not jaundiced or pale  Neurological:      General: No focal deficit present  Mental Status: He is alert and oriented to person, place, and time  Psychiatric:         Mood and Affect: Mood normal          Behavior: Behavior normal          Thought Content: Thought content normal            Vital Signs     Vitals:    05/11/22 1259   BP: 132/82   BP Location: Left arm   Patient Position: Sitting   Cuff Size: Adult   Pulse: 64   Weight: 94 5 kg (208 lb 6 4 oz)   Height: 5' 10" (1 778 m)         Current Medications       Current Outpatient Medications:     ascorbic acid (VITAMIN C) 250 mg tablet, Take 250 mg by mouth in the morning  2 tablets  , Disp: , Rfl:     aspirin (ECOTRIN LOW STRENGTH) 81 mg EC tablet, Take 81 mg by mouth daily Pt to check with surgeon if needed to hold RX , Disp: , Rfl:     atorvastatin (LIPITOR) 40 mg tablet, Take 1 tablet (40 mg total) by mouth daily with dinner, Disp: 30 tablet, Rfl: 1    cholecalciferol (VITAMIN D3) 1,000 units tablet, Take 1,000 Units by mouth daily after lunch  , Disp: , Rfl:     clopidogrel (PLAVIX) 75 mg tablet, Take 75 mg by mouth daily Pt  To check with surgeon if needed to hold RX , Disp: , Rfl:     finasteride (PROSCAR) 5 mg tablet, TAKE 1 TABLET BY MOUTH EVERY DAY, Disp: 90 tablet, Rfl: 2    metoprolol tartrate (LOPRESSOR) 25 mg tablet, Take 1 tablet (25 mg total) by mouth every 8 (eight) hours, Disp: 270 tablet, Rfl: 3      Active Problems     Patient Active Problem List   Diagnosis    Chronic atrial fibrillation [facility]    Benign essential hypertension    Renal calculi    [facility]    Calculus of ureter    Crossing vessel and stricture of ureter without hydronephrosis    Hematuria, gross    Hydronephrosis with ureteral stricture, not elsewhere classified    CVA (cerebral vascular accident) ([facility]    Chronic diastolic CHF [facility]congestive heart failure) ([facility]    Nonrheumatic aortic valve stenosis    Bilateral carotid artery disease ([facility])    Aneurysm of anterior communicating artery    Tooth pain    Onychomycosis    Syncope vs seizure    Vasovagal near syncope    Acute kidney injury superimposed on chronic kidney disease ([facility]    Secondary hyperparathyroidism ([facility]    Vitamin D deficiency    Patellofemoral syndrome of left knee    Moderate mitral regurgitation    Moderate aortic regurgitation    GERD (gastroesophageal reflux disease)    Dyslipidemia    Stage 3b chronic kidney disease ([facility]    Aneurysm ([facility]    Stroke [facility]    Facial laceration    Closed nondisplaced fracture of nasal bone with routine healing    Sensorineural hearing loss (SNHL) of both ears    Iron deficiency anemia, unspecified    Benign hypertension with CKD (chronic kidney disease) stage III ([facility])    Chronic kidney disease-mineral and bone disorder         Past Medical History     Past Medical History:   Diagnosis Date    Anemia     iron deficiency    Aneurysm ([facility]     of brain  being monitored    Essential hypertension, long-standing     Heart murmur     per pt "Aortic Valve replacement 2021 with Watchman device implanted /2021"    Hematuria     intermittent    History of cigarette smoking, chronic     64 year smoker at one half pack per day, quit 04/1/17    History of COVID-19 01/19/2022    recovered at home/chief s/s only sore throat    History of kidney stones     History of pneumonia     Hyperlipidemia     Hypertension     Irregular heart beat     Kidney stone     Muscle weakness     right sided weakness has gotten better/ walks independantly"    Stroke ([facility] 10/29/2020    right sided  weakness almost full recovery    Stroke ([facility]     Teeth missing     Vitamin D deficiency     maintained with 1000 units of D    Water retention     Wears glasses          Surgical History     Past Surgical History:   Procedure Laterality Date    APPENDECTOMY  1960    CARDIAC SURGERY      watchman [id number]; aortic valve replaced 2021(pig valve)    CAROTID ENDARTERECTOMY Left 1998    Supposedly no internal stenosis found    CYSTOSCOPY Right 8/15/2017    Procedure: CYSTOSCOPY RIGHT STENT EXCHANGE;  Surgeon: Rashmi Sprague MD;  Location: [address] [place] [address];  Service: Urology    CYSTOSCOPY      CYSTOSCOPY N/A 3/11/2022    Procedure: CYSTOSCOPY, RIGHT RETROGRADE PYLEOGRAM;  Surgeon: Timothy Delcid MD;  Location: [facility];  Service: Urology   [address] [place] [address]  LITHOTRIPSY  03/2017    For nephrolithiasis at [address]  5/10/2019    FL RETROGRADE PYELOGRAM  7/30/2019    FL RETROGRADE PYELOGRAM  10/22/2019    FL RETROGRADE PYELOGRAM  1/28/2020    FL RETROGRADE PYELOGRAM  8/11/2020    FL RETROGRADE PYELOGRAM  12/15/2020    FL RETROGRADE PYELOGRAM  2/14/2021    FL RETROGRADE PYELOGRAM  5/11/2021    FL RETROGRADE PYELOGRAM  10/19/2021    FL RETROGRADE PYELOGRAM  3/11/2022    MO CYSTO/URETERO W/LITHOTRIPSY &INDWELL STENT INSRT Right 5/2/2017    Procedure: CYSTOSCOPY URETEROSCOPY WITH LITHOTRIPSY HOLMIUM LASER, RETROGRADE PYELOGRAM AND INSERTION STENT URETERAL;  Surgeon: Rashmi Sprague MD;  Location: [address] [place] [address];  Service: Urology    MO CYSTO/URETERO W/LITHOTRIPSY &INDWELL STENT INSRT Right 5/16/2017    Procedure: CYSTOSCOPY, RETROGRADE PYELOGRAM,  FLEXIBLE URETEROSCOPY,  HOLMIUM LASER LITHOTRIPSY, STONE BASKET MANIPULATION,  STENT URETERAL EXCHANGE;  Surgeon: Rashmi Sprague MD;  [facility];  Service: Urology    MO CYSTO/URETERO W/LITHOTRIPSY &INDWELL STENT INSRT Right 6/2/2017    Procedure: CYSTOSCOPY, RETROGRADE, STONE MANIPULATION WITH HOLMIUM LASER, STENT PLACEMENT;  Surgeon: Chi Mares MD;  Location: [address] [place] [address];  Service: Urology    NM CYSTO/URETERO W/LITHOTRIPSY &INDWELL STENT INSRT Right 7/18/2017    Procedure: Petra Boys, URETEROSCOPY [facility]OL[facility]UM LASER [address] [facility];  Surgeon: Chi Mares MD;  Location: [address] [place] [address];  Service: Urology    NM CYSTO/URETERO W/LITHOTRIPSY &INDWELL STENT INSRT Right 2/14/2021    Procedure: CYSTOSCOPY URETEROSCOPY, RETROGRADE PYELOGRAM, STONE MANIPULATION AND EXCHANGE STENT URETERAL;  Surgeon: Karlee Melvin MD;  Location: [address] [place] [address];  Service: Urology    NM CYSTOSCOPY,INSERT URETERAL STENT Right 11/14/2017    Procedure: EXCHANGE STENT URETERAL;  Surgeon: Chi Mares MD;  Location: [address] [place] [address];  Service: Urology    NM CYSTOSCOPY,INSERT URETERAL STENT Right 3/11/2022    Procedure: EXCHANGE STENT URETERAL;  Surgeon: Dillan Carvajal MD;  [facility];  Service: Urology    NM CYSTOURETHROSCOPY,URETER CATHETER Right 11/14/2017    Procedure: CYSTOSCOPY RETROGRADE, STENT EXCHANGE;  Surgeon: Chi Mares MD;  Location: [address] [place] [address];  Service: Urology    NM CYSTOURETHROSCOPY,URETER CATHETER Right 5/8/2018    Procedure: Isabella Hinkle;  Surgeon: Chi Mares MD;  Location: [address] [place] [address];  Service: Urology    NM CYSTOURETHROSCOPY,URETER CATHETER Right 8/14/2018    Procedure: Isabella Hinkle;  Surgeon: Chi Mares MD;  Location: [address] [place] [address];  Service: Urology    NM CYSTOURETHROSCOPY,URETER CATHETER Right 11/13/2018    Procedure: CYSTOSCOPY, Gala Peace EXCHANGE, RETROGRADE;  Surgeon: Chi Mares MD;  Location: [address] [place] [address];  Service: Urology    NM CYSTOURETHROSCOPY,URETER CATHETER Right 2/12/2019    Procedure: CYSTOSCOPY, RETROGRADE, STENT REMOVAL AND STENT PLACEMENT;  Surgeon: Haritha Irvin MD;  Location: [address] [place] [address];  Service: Urology    MT CYSTOURETHROSCOPY,URETER CATHETER Right 5/10/2019    Procedure: Cano Plaster, STENT EXCHANGE;  Surgeon: Haritha Irvin MD;  Location: [address] [place] [address];  Service: Urology    MT CYSTOURETHROSCOPY,URETER CATHETER Right 7/30/2019    Procedure: Cano Plaster, STENT REMOVAL AND PLACEMENT OF STENT;  Surgeon: Haritha Irvin MD;  Location: [address] [place] [address];  Service: Urology    MT CYSTOURETHROSCOPY,URETER CATHETER Right 10/22/2019    Procedure: Cano Plaster, STENT EXCHANGE;  Surgeon: Haritha Irvin MD;  Location: [address] [place] [address];  Service: Urology    MT CYSTOURETHROSCOPY,URETER CATHETER Right 1/28/2020    Procedure: Cano Plaster, STENT REMOVAL AND STENT PLACEMENT;  Surgeon: Haritha Irvin MD;  Location: [address] [place] [address];  Service: Urology    MT CYSTOURETHROSCOPY,URETER CATHETER Right 5/22/2020    Procedure: CYSTOSCOPY RETROGRADE, STENT EXCHANGE;  Surgeon: Haritha Irvin MD;  Location: [address] [place] [address];  Service: Urology    MT CYSTOURETHROSCOPY,URETER CATHETER Right 8/11/2020    Procedure: CYSTOSCOPY, STENT EXCHANGE, RETROGRADE;  Surgeon: Haritha Irvin MD;  Location: [address] [place] [address];  Service: Urology    MT CYSTOURETHROSCOPY,URETER CATHETER Right 12/15/2020    Procedure: CYSTOSCOPY, RETROGRADE, STENT REMOVAL, AND STENT PLACEMENT;  Surgeon: Haritha Irvin MD;  Location: [address] [place] [address];  Service: Urology    MT CYSTOURETHROSCOPY,URETER CATHETER Right 5/11/2021    Procedure: CYSTOSCOPY RETROGRADE PYELOGRAM WITH STENT EXCHANGE;  Surgeon: Haritha Irvin MD;  Location: [address] [place] [address];  Service: Urology    MT CYSTOURETHROSCOPY,URETER CATHETER Right 10/19/2021    Procedure: CYSTOSCOPY WITH RETROGRADE, STENT EXCHANGE;  Surgeon: Haritha Irvin MD;  Location: [address] [place] [address];  Service: Urology    PROSTATE SURGERY  2015    Laser Prostatectomy  by Dr Loya Friday Right 4/18/2017    Procedure: Verito Locke STENT URETERAL, RIGHT URETEROSCOPY,  LASER OF URETERAL STRICTURE AND STONE;  Surgeon: Keo Westfall MD;  Location: [address] [place] [address];  Service:     URETERAL STENT PLACEMENT Right 2/13/2018    Procedure: Chantal Orlando;  Surgeon: Keo Westfall MD;  Location[facility];  Service: Urology         Family History     Family History   Problem Relation Age of Onset    Hypertension Mother     Alcohol abuse Father     Cirrhosis Father     Cancer Son 15        cancer-knee and above-left-amputation    Cancer Sister     Other Brother         head mass    Thyroid disease unspecified Sister     Arthritis Sister     Other Brother         legally blind in one eye         Social History     Social History       Radiology

## 2022-05-12 LAB — BACTERIA UR CULT: NORMAL

## 2022-05-17 ENCOUNTER — PREP FOR PROCEDURE (OUTPATIENT)
Dept: UROLOGY | Facility: CLINIC | Age: 82
End: 2022-05-17

## 2022-05-17 ENCOUNTER — TELEPHONE (OUTPATIENT)
Dept: UROLOGY | Facility: CLINIC | Age: 82
End: 2022-05-17

## 2022-05-17 DIAGNOSIS — R39.89 SUSPECTED UTI: ICD-10-CM

## 2022-05-17 DIAGNOSIS — N13.30 HYDRONEPHROSIS, UNSPECIFIED HYDRONEPHROSIS TYPE: Primary | ICD-10-CM

## 2022-05-17 NOTE — TELEPHONE ENCOUNTER
LM for daughter in regards to scheduling next stent exchange  Asked daughter to call me back to schedule

## 2022-05-19 ENCOUNTER — HOSPITAL ENCOUNTER (OUTPATIENT)
Dept: RADIOLOGY | Facility: HOSPITAL | Age: 82
Discharge: HOME | End: 2022-05-19
Payer: MEDICARE

## 2022-05-19 DIAGNOSIS — N13.30 HYDRONEPHROSIS, UNSPECIFIED HYDRONEPHROSIS TYPE: ICD-10-CM

## 2022-05-19 PROCEDURE — 78708 K FLOW/FUNCT IMAGE W/DRUG: CPT

## 2022-05-19 PROCEDURE — A9562 TC99M MERTIATIDE: HCPCS

## 2022-05-19 RX ORDER — FUROSEMIDE 10 MG/ML
40 INJECTION INTRAMUSCULAR; INTRAVENOUS
Status: DISCONTINUED | OUTPATIENT
Start: 2022-05-19 | End: 2022-05-20 | Stop reason: HOSPADM

## 2022-05-23 NOTE — TELEPHONE ENCOUNTER
LM x2 for daughter in regards to scheduling next stent exchange  Asked daughter to call me back to schedule

## 2022-05-23 NOTE — TELEPHONE ENCOUNTER
Patients daughter Jero Pean called returning your phone call  She asks if you can please try calling her again today at [phone]

## 2022-06-01 NOTE — TELEPHONE ENCOUNTER
ARIA Fabian Cera letting her know that I was going to place patient on the schedule for 7/15 at [facility] [place]  I will mail pkt with instructions for surgery  I asked that she call back and confirm this date

## 2022-06-16 ENCOUNTER — APPOINTMENT (OUTPATIENT)
Dept: LAB | Facility: HOSPITAL | Age: 82
End: 2022-06-16
Payer: MEDICARE

## 2022-06-16 DIAGNOSIS — D50.9 IRON DEFICIENCY ANEMIA, UNSPECIFIED IRON DEFICIENCY ANEMIA TYPE: ICD-10-CM

## 2022-06-16 LAB
BASOPHILS # BLD AUTO: 0.04 THOUSANDS/ΜL (ref 0–0.1)
BASOPHILS NFR BLD AUTO: 1 % (ref 0–1)
EOSINOPHIL # BLD AUTO: 0.32 THOUSAND/ΜL (ref 0–0.61)
EOSINOPHIL NFR BLD AUTO: 4 % (ref 0–6)
ERYTHROCYTE [DISTWIDTH] IN BLOOD BY AUTOMATED COUNT: 15.7 % (ref 11.6–15.1)
FERRITIN SERPL-MCNC: 26 NG/ML (ref 8–388)
HCT VFR BLD AUTO: 46.6 % (ref 36.5–49.3)
HGB BLD-MCNC: 14.6 G/DL (ref 12–17)
IMM GRANULOCYTES # BLD AUTO: 0.03 THOUSAND/UL (ref 0–0.2)
IMM GRANULOCYTES NFR BLD AUTO: 0 % (ref 0–2)
IRON SATN MFR SERPL: 26 % (ref 20–50)
IRON SERPL-MCNC: 93 UG/DL (ref 65–175)
LYMPHOCYTES # BLD AUTO: 2.23 THOUSANDS/ΜL (ref 0.6–4.47)
LYMPHOCYTES NFR BLD AUTO: 29 % (ref 14–44)
MCH RBC QN AUTO: 26.4 PG (ref 26.8–34.3)
MCHC RBC AUTO-ENTMCNC: 31.3 G/DL (ref 31.4–37.4)
MCV RBC AUTO: 84 FL (ref 82–98)
MONOCYTES # BLD AUTO: 0.89 THOUSAND/ΜL (ref 0.17–1.22)
MONOCYTES NFR BLD AUTO: 12 % (ref 4–12)
NEUTROPHILS # BLD AUTO: 4.12 THOUSANDS/ΜL (ref 1.85–7.62)
NEUTS SEG NFR BLD AUTO: 54 % (ref 43–75)
NRBC BLD AUTO-RTO: 0 /100 WBCS
PLATELET # BLD AUTO: 137 THOUSANDS/UL (ref 149–390)
PMV BLD AUTO: 9.5 FL (ref 8.9–12.7)
RBC # BLD AUTO: 5.53 MILLION/UL (ref 3.88–5.62)
TIBC SERPL-MCNC: 356 UG/DL (ref 250–450)
WBC # BLD AUTO: 7.63 THOUSAND/UL (ref 4.31–10.16)

## 2022-06-16 PROCEDURE — 83540 ASSAY OF IRON: CPT

## 2022-06-16 PROCEDURE — 36415 COLL VENOUS BLD VENIPUNCTURE: CPT

## 2022-06-16 PROCEDURE — 82728 ASSAY OF FERRITIN: CPT

## 2022-06-16 PROCEDURE — 83550 IRON BINDING TEST: CPT

## 2022-06-16 PROCEDURE — 85025 COMPLETE CBC W/AUTO DIFF WBC: CPT

## 2022-06-23 ENCOUNTER — OFFICE VISIT (OUTPATIENT)
Dept: HEMATOLOGY ONCOLOGY | Facility: MEDICAL CENTER | Age: 82
End: 2022-06-23
Payer: MEDICARE

## 2022-06-23 VITALS
HEIGHT: 70 IN | OXYGEN SATURATION: 97 % | BODY MASS INDEX: 29.98 KG/M2 | DIASTOLIC BLOOD PRESSURE: 82 MMHG | TEMPERATURE: 98.7 F | HEART RATE: 74 BPM | RESPIRATION RATE: 16 BRPM | WEIGHT: 209.4 LBS | SYSTOLIC BLOOD PRESSURE: 124 MMHG

## 2022-06-23 DIAGNOSIS — R31.9 HEMATURIA, UNSPECIFIED TYPE: ICD-10-CM

## 2022-06-23 DIAGNOSIS — R58 BLOOD LOSS: Primary | ICD-10-CM

## 2022-06-23 DIAGNOSIS — E61.1 IRON DEFICIENCY: ICD-10-CM

## 2022-06-23 PROCEDURE — 99215 OFFICE O/P EST HI 40 MIN: CPT | Performed by: PHYSICIAN ASSISTANT

## 2022-06-23 RX ORDER — SODIUM CHLORIDE 9 MG/ML
20 INJECTION, SOLUTION INTRAVENOUS ONCE
OUTPATIENT
Start: 2022-07-07

## 2022-06-23 NOTE — PROGRESS NOTES
[facility] [address] [address]  Hematology Ambulatory Follow-Up  Tatiana Levy, 1940, [id number]  6/23/2022      Assessment and Plan   1  Iron deficiency; 2  Blood loss; 3  Hematuria, unspecified type  Ferritin = 26,MCV = 84, low MCH and MCHC with high RDW at 15 7  Patient continues with blood loss anemia from urologic sites  Fecal occult blood testing has been requested however the patient was counseled that if there is any contamination with urine, he is not to undergo fecal testing  Patient is due for stent exchange in July  At this time, with ferritin being low and indices dropping, patient was advised to undergo IV iron supplementation  Patient was in agreement  We discussed that since this is 2020 in the last time he was treated was in 2021 there is a chance that the insurance might not cover the Feraheme and that Venofer would be prescribed in its stead  Patient understands that if this occurs that he will have to go for more infusions, Venofer 200 mg x 4 doses  This office will arrange a non Urgent follow up in the infusion center for infusions  Patient will follow up in approximately four months with blood work prior  Patient understands to contact the office if he experiences symptoms of iron deficiency or anemia in the interim  Urologic workup is ongoing     - Occult Blood, Fecal Immunochemical; Future  - CBC and differential; Future  - Iron Panel (Includes Ferritin, Iron Sat%, Iron, and TIBC); Future    The patient is scheduled for follow-up in approximately 4 months with Dr Melissa Henry  Patient voiced agreement and understanding to the above  Patient advised to call the Hematology/Oncology office with any questions and concerns regarding the above  Barrier(s) to care: None  The patient is able to self care  Shannon San PA-C  Medical Oncology/Hematology  [address] [facility]    Subjective     Chief Complaint   Patient presents with    Follow-up     Iron deficiency anemia, unspecified iron deficiency anemia type       History of present illness:    This is a 79-year-old male with past medical history of CVA with right-sided lower extremity weakness, hydronephrosis with the ureteral stricture status post stent previously cared for by Dr Liyah Borja valve stenosis status post TVAR in February of 2021 at [facility], GERD, CKD, sensorineural hearing loss and acute diastolic CHF who presents to Hematology due to recent clinical findings of excessive bleeding      Patient notes that on 8/1/21 he tripped over a curb and broke his nose   Patient followed up in the emergency room to have his broken nose six   However, the patient's nose continued to bleed   Patient was discharged by the time he was home, he returned back to the emergency room due to soaking through the nasal tampon   Patient was then injected with epinephrine and bleeding stopped   At the time of the fracture, patient was on aspirin and Plavix      On August 24th, the patient underwent dental procedure for extraction in preparation for dentures   Patient continued to bleed after sutures were placed   Patient went back to the dentist, who injected him with epinephrine   Unfortunately, the patient developed a hematoma on the low right side of his face   That time, patient was on aspirin and Plavix however, clinically, the dentist noted that this is not common for his procedures and recommend that he a follow-up      10/28/20 PT = 13 5, INR = 1 03, PTT = 30  11/25/20, hemoglobin = 12 8, MCV = 94, RDW = 14 6, MCHC = 30 8, platelet = 652  3/93/73 hemoglobin = 11 1, MCV = 88, RDW = high 15 7, MCHC and MCH both low, platelet = 560  6/9/18 hemoglobin = 9 5, MCV 76, RDW = 19 6, other RBC indices all low, platelet = 353  0/8/36  hemoglobin = 10 9, MCV = 74, RDW = 22, platelet = 697  8/79/96 PTT and PT INR within normal limits  9/24/21 von Willebrand's assessment negative  9/24/21 hemoglobin = 11 0, MCV 76, RDW 20 2, platelet 415, [place] saturation = 8%, ferritin 13  10/5 and 10/12: IV Feraheme, no side effects      12/15/2021:  Hemoglobin = 14 3, MCV 84, iron saturation 26%, ferritin 42     3/15/2022:  Hemoglobin = 12 9, MCV = 89, iron saturation = 17, ferritin = 32    5/11/22 large amount of blood on urine dipstick  6/16/2022:  Hemoglobin = 14 6, MCV = 84, iron saturation = 26, TIBC elevated at 356, ferritin = 26    Interval history:  Patient notes that he is going for another ureter stent replacement as well as a cystoscopy due to ongoing issues with hematuria  Otherwise, patient states that he is asymptomatic from ferritin decrease  Review of Systems   Constitutional: Positive for fatigue  Negative for activity change, appetite change and fever  HENT: Negative for nosebleeds  Respiratory: Negative for cough, choking and shortness of breath  Cardiovascular: Negative for chest pain, palpitations and leg swelling  Gastrointestinal: Negative for abdominal distention, abdominal pain, anal bleeding, blood in stool, constipation, diarrhea, nausea and vomiting  Endocrine: Negative for cold intolerance  Genitourinary: Positive for hematuria  Musculoskeletal: Negative for myalgias  Skin: Negative for color change, pallor and rash  Allergic/Immunologic: Negative for immunocompromised state  Neurological: Negative for headaches  Hematological: Negative for adenopathy  Does not bruise/bleed easily  All other systems reviewed and are negative        Patient Active Problem List   Diagnosis    Chronic atrial fibrillation (HCC)    Benign essential hypertension    Renal calculi    JUNIOR (acute kidney injury) ([facility] )    Calculus of ureter    Crossing vessel and stricture of ureter without hydronephrosis    Hematuria, gross    Hydronephrosis with ureteral stricture, not elsewhere classified    CVA (cerebral vascular accident) ([facility] )    Chronic diastolic CHF (congestive heart failure) ([facility]    Nonrheumatic aortic valve stenosis    Bilateral carotid artery disease (HCC)    Aneurysm of anterior communicating artery    Tooth pain    Onychomycosis    Syncope vs seizure    Vasovagal near syncope    Acute kidney injury superimposed on chronic kidney disease ([facility] )    Secondary hyperparathyroidism ([facility]    Vitamin D deficiency    Patellofemoral syndrome of left knee    Moderate mitral regurgitation    Moderate aortic regurgitation    GERD (gastroesophageal reflux disease)    Dyslipidemia    Stage 3b chronic kidney disease ([facility]    Aneurysm ([facility] )    Stroke ([facility] )    Facial laceration    Closed nondisplaced fracture of nasal bone with routine healing    Sensorineural hearing loss (SNHL) of both ears    Iron deficiency anemia, unspecified    Benign hypertension with CKD (chronic kidney disease) stage III ([facility])    Chronic kidney disease-mineral and bone disorder     Past Medical History:   Diagnosis Date    Anemia     iron deficiency    Aneurysm ([facility] )     of brain  being monitored    Essential hypertension, long-standing     Heart murmur     per pt "Aortic Valve replacement 2021 with Watchman device implanted /2021"    Hematuria     intermittent    History of cigarette smoking, chronic     62 year smoker at one half pack per day, quit 04/1/17    History of COVID-19 01/19/2022    recovered at home/chief s/s only sore throat    History of kidney stones     History of pneumonia     Hyperlipidemia     Hypertension     Irregular heart beat     Kidney stone     Muscle weakness     right sided weakness has gotten better/ walks independantly"    Stroke ([facility] ) 10/29/2020    right sided  weakness almost full recovery    Stroke ([facility] )     Teeth missing     Vitamin D deficiency     maintained with 1000 units of D    Water retention     Wears glasses      Past Surgical History:   Procedure Laterality Date    APPENDECTOMY  1960    CARDIAC SURGERY      watchman [id number]; aortic valve replaced 2021(pig valve)    CAROTID ENDARTERECTOMY Left 1998    Supposedly no internal stenosis found    CYSTOSCOPY Right 8/15/2017    Procedure: CYSTOSCOPY RIGHT STENT EXCHANGE;  Surgeon: Ed Abrams MD;  Location: [address] [place] [address];  Service: Urology    CYSTOSCOPY      CYSTOSCOPY N/A 3/11/2022    Procedure: CYSTOSCOPY, RIGHT RETROGRADE PYLEOGRAM;  Surgeon: Clayton Howard MD;  [facility];  Service: Urology    EXTRACORPOREAL SHOCK WAVE LITHOTRIPSY  03/2017    For nephrolithiasis at [address]  5/10/2019    FL RETROGRADE PYELOGRAM  7/30/2019    FL RETROGRADE PYELOGRAM  10/22/2019    FL RETROGRADE PYELOGRAM  1/28/2020    FL RETROGRADE PYELOGRAM  8/11/2020    FL RETROGRADE PYELOGRAM  12/15/2020    FL RETROGRADE PYELOGRAM  2/14/2021    FL RETROGRADE PYELOGRAM  5/11/2021    FL RETROGRADE PYELOGRAM  10/19/2021    FL RETROGRADE PYELOGRAM  3/11/2022    MD CYSTO/URETERO W/LITHOTRIPSY &INDWELL STENT INSRT Right 5/2/2017    Procedure: CYSTOSCOPY URETEROSCOPY WITH LITHOTRIPSY HOLMIUM LASER, RETROGRADE PYELOGRAM AND INSERTION STENT URETERAL;  Surgeon: Ed Abrams MD;  Location: [address] [place] [address];  Service: Urology    MD CYSTO/URETERO W/LITHOTRIPSY &INDWELL STENT INSRT Right 5/16/2017    Procedure: CYSTOSCOPY, RETROGRADE PYELOGRAM,  FLEXIBLE URETEROSCOPY,  HOLMIUM LASER LITHOTRIPSY, STONE BASKET MANIPULATION,  STENT URETERAL EXCHANGE;  Surgeon: Ed Abrams MD;  Location: [address] [place] [address];  Service: Urology    MD CYSTO/URETERO W/LITHOTRIPSY &INDWELL STENT INSRT Right 6/2/2017    Procedure: CYSTOSCOPY, RETROGRADE, STONE MANIPULATION WITH HOLMIUM LASER, STENT PLACEMENT;  Surgeon: Ed Abrams MD;  Location: [address] [place] [address];  Service: Urology    MD CYSTO/URETERO W/LITHOTRIPSY &INDWELL STENT INSRT Right 7/18/2017    Procedure: CYSTOSCOPY, RETROGRADE, URETEROSCOPY HOLMIUM LASER LITHOTRIPSYWITH STONE MANIPULATION,  AND STENT PLACEMENT;  Surgeon: Awilda Darden MD;  Location: [address] [place] [address];  Service: Urology    MO CYSTO/URETERO W/LITHOTRIPSY &INDWELL STENT INSRT Right 2/14/2021    Procedure: CYSTOSCOPY URETEROSCOPY, RETROGRADE PYELOGRAM, STONE MANIPULATION AND EXCHANGE STENT URETERAL;  Surgeon: Jett Nogueira MD;  Location: [address] [place] [address];  Service: Urology    MO CYSTOSCOPY,INSERT URETERAL STENT Right 11/14/2017    Procedure: EXCHANGE STENT URETERAL;  Surgeon: Awilda Darden MD;  Location: [address] [place] [address];  Service: Urology    MO CYSTOSCOPY,INSERT URETERAL STENT Right 3/11/2022    Procedure: EXCHANGE STENT URETERAL;  Surgeon: Pepe Burgos MD;  Location:  MAIN OR;  Service: Urology    MO CYSTOURETHROSCOPY,URETER CATHETER Right 11/14/2017    Procedure: CYSTOSCOPY RETROGRADE, STENT EXCHANGE;  Surgeon: Awilda Darden MD;  Location: [address] [place] [address];  Service: Urology    MO CYSTOURETHROSCOPY,URETER CATHETER Right 5/8/2018    Procedure: Felix Collado;  Surgeon: Awilda Darden MD;  Location: [address] [place] [address];  Service: Urology    MO CYSTOURETHROSCOPY,URETER CATHETER Right 8/14/2018    Procedure: Felix Collado;  Surgeon: Awilda Darden MD;  Location: [address] [place] [address];  Service: Urology    MO CYSTOURETHROSCOPY,URETER CATHETER Right 11/13/2018    Procedure: CYSTOSCOPY, Janeth Manger EXCHANGE, RETROGRADE;  Surgeon: Awilda Darden MD;  Location: [address] [place] [address];  Service: Urology    MO CYSTOURETHROSCOPY,URETER CATHETER Right 2/12/2019    Procedure: CYSTOSCOPY, RETROGRADE, STENT REMOVAL AND STENT PLACEMENT;  Surgeon: Awilda Darden MD;  Location: [address] [place] [address];  Service: Urology    MO CYSTOURETHROSCOPY,URETER CATHETER Right 5/10/2019    Procedure: Michael Go, STENT EXCHANGE;  Surgeon: Awilda Darden MD;  Location: [address] [place] [address];  Service: Urology    MO CYSTOURETHROSCOPY,URETER CATHETER Right 7/30/2019    Procedure: CYSTOSCOPY, RETROGRADE, STENT REMOVAL AND PLACEMENT OF STENT;  Surgeon: Morales Turcios MD;  Location: [address] [place] [address];  Service: Urology    NM CYSTOURETHROSCOPY,URETER CATHETER Right 10/22/2019    Procedure: Narendra Cash, STENT EXCHANGE;  Surgeon: Morales Turcios MD;  Location: [address] [place] [address];  Service: Urology    NM CYSTOURETHROSCOPY,URETER CATHETER Right 1/28/2020    Procedure: Narendra Cash, STENT REMOVAL AND STENT PLACEMENT;  Surgeon: Morales Turcios MD;  Location: [address] [place] [address];  Service: Urology    NM CYSTOURETHROSCOPY,URETER CATHETER Right 5/22/2020    Procedure: CYSTOSCOPY RETROGRADE, STENT EXCHANGE;  Surgeon: Morales Turcios MD;  Location: [address] [place] [address];  Service: Urology    NM CYSTOURETHROSCOPY,URETER CATHETER Right 8/11/2020    Procedure: CYSTOSCOPY, STENT EXCHANGE, RETROGRADE;  Surgeon: Morales Turcios MD;  Location: [address] [place] [address];  Service: Urology    NM CYSTOURETHROSCOPY,URETER CATHETER Right 12/15/2020    Procedure: CYSTOSCOPY, RETROGRADE, STENT REMOVAL, AND STENT PLACEMENT;  Surgeon: Morales Turcios MD;  Location: [address] [place] [address];  Service: Urology    NM CYSTOURETHROSCOPY,URETER CATHETER Right 5/11/2021    Procedure: CYSTOSCOPY RETROGRADE PYELOGRAM WITH STENT EXCHANGE;  Surgeon: Morales Turcios MD;  Location: [address] [place] [address];  Service: Urology    NM CYSTOURETHROSCOPY,URETER CATHETER Right 10/19/2021    Procedure: CYSTOSCOPY WITH RETROGRADE, STENT EXCHANGE;  Surgeon: Morales Turcios MD;  Location: [address] [place] [address];  Service: Urology    PROSTATE SURGERY  2015    Laser Prostatectomy  by Dr Meme Wright Right 4/18/2017    Procedure: INSERTION STENT URETERAL, RIGHT URETEROSCOPY,  LASER OF URETERAL STRICTURE AND STONE;  Surgeon: Morales Turcios MD;  Location: [address] [place] [address];  Service:     URETERAL STENT PLACEMENT Right 2/13/2018    Procedure: Adrian Patricia;  Surgeon: Morales Turcios MD;  Location: [facility];  Service: Urology     Family History   Problem Relation Age of Onset    Hypertension Mother     Alcohol abuse Father     Cirrhosis Father     Cancer Son 15        cancer-knee and above-left-amputation    Cancer Sister     Other Brother         head mass    Thyroid disease unspecified Sister     Arthritis Sister     Other Brother         legally blind in one eye     Social History     Socioeconomic History    Marital status: /Civil Union     Spouse name: None    Number of children: None    Years of education: None    Highest education level: None   Occupational History    Occupation: RETIRED   Tobacco Use    Smoking status: Former Smoker     Packs/day: 0 50     Years: 62 00     Pack years: 31 00     Types: Cigarettes     Quit date: 4/15/2017     Years since quittin 1    Smokeless tobacco: Never Used   Vaping Use    Vaping Use: Never used   Substance and Sexual Activity    Alcohol use: Not Currently     Comment: rare    Drug use: Never    Sexual activity: None     Comment: defer   Other Topics Concern    None   Social History Narrative    None     Social Determinants of Health     Financial Resource Strain: Not on file   Food Insecurity: Not on file   Transportation Needs: Not on file   Physical Activity: Not on file   Stress: Not on file   Social Connections: Not on file   Intimate Partner Violence: Not on file   Housing Stability: Not on file       Current Outpatient Medications:     ascorbic acid (VITAMIN C) 250 mg tablet, Take 250 mg by mouth in the morning  2 tablets  , Disp: , Rfl:     aspirin (ECOTRIN LOW STRENGTH) 81 mg EC tablet, Take 81 mg by mouth daily Pt to check with surgeon if needed to hold RX , Disp: , Rfl:     atorvastatin (LIPITOR) 40 mg tablet, Take 1 tablet (40 mg total) by mouth daily with dinner, Disp: 30 tablet, Rfl: 1    cholecalciferol (VITAMIN D3) 1,000 units tablet, Take 1,000 Units by mouth daily after lunch  , Disp: , Rfl:     clopidogrel (PLAVIX) 75 mg tablet, Take 75 mg by mouth daily Pt  To check with surgeon if needed to hold RX , Disp: , Rfl:     finasteride (PROSCAR) 5 mg tablet, TAKE 1 TABLET BY MOUTH EVERY DAY, Disp: 90 tablet, Rfl: 2    metoprolol tartrate (LOPRESSOR) 25 mg tablet, Take 1 tablet (25 mg total) by mouth every 8 (eight) hours, Disp: 270 tablet, Rfl: 3  No Known Allergies    Objective   /82 (BP Location: Left arm, Patient Position: Sitting, Cuff Size: Standard)   Pulse 74   Temp 98 7 °F (37 1 °C) (Temporal)   Resp 16   Ht 5' 10" (1 778 m)   Wt 95 kg (209 lb 6 4 oz)   SpO2 97%   BMI 30 05 kg/m²    Physical Exam  Constitutional:       General: He is not in acute distress  Appearance: He is well-developed  HENT:      Head: Normocephalic and atraumatic  Right Ear: External ear normal       Left Ear: External ear normal       Nose: Nose normal    Eyes:      General: No scleral icterus  Conjunctiva/sclera: Conjunctivae normal    Cardiovascular:      Rate and Rhythm: Normal rate  Heart sounds: No murmur heard  Pulmonary:      Effort: No respiratory distress  Abdominal:      General: There is no distension  Palpations: Abdomen is soft  Skin:     Findings: No rash (on exposed skin  )  Neurological:      Mental Status: He is alert and oriented to person, place, and time  Psychiatric:         Thought Content:  Thought content normal          Result Review  Labs:  Appointment on 06/16/2022   Component Date Value Ref Range Status    WBC 06/16/2022 7 63  4 31 - 10 16 Thousand/uL Final    RBC 06/16/2022 5 53  3 88 - 5 62 Million/uL Final    Hemoglobin 06/16/2022 14 6  12 0 - 17 0 g/dL Final    Hematocrit 06/16/2022 46 6  36 5 - 49 3 % Final    MCV 06/16/2022 84  82 - 98 fL Final    MCH 06/16/2022 26 4 (A) 26 8 - 34 3 pg Final    MCHC 06/16/2022 31 3 (A) 31 4 - 37 4 g/dL Final    RDW 06/16/2022 15 7 (A) 11 6 - 15 1 % Final    MPV 06/16/2022 9 5  8 9 - 12 7 fL Final    Platelets 67/78/1299 137 (A) 149 - 390 Thousands/uL Final    nRBC 06/16/2022 0  /100 WBCs Final    Neutrophils Relative 06/16/2022 54  43 - 75 % Final    Immat GRANS % 06/16/2022 0  0 - 2 % Final    Lymphocytes Relative 06/16/2022 29  14 - 44 % Final    Monocytes Relative 06/16/2022 12  4 - 12 % Final    Eosinophils Relative 06/16/2022 4  0 - 6 % Final    Basophils Relative 06/16/2022 1  0 - 1 % Final    Neutrophils Absolute 06/16/2022 4 12  1 85 - 7 62 Thousands/µL Final    Immature Grans Absolute 06/16/2022 0 03  0 00 - 0 20 Thousand/uL Final    Lymphocytes Absolute 06/16/2022 2 23  0 60 - 4 47 Thousands/µL Final    Monocytes Absolute 06/16/2022 0 89  0 17 - 1 22 Thousand/µL Final    Eosinophils Absolute 06/16/2022 0 32  0 00 - 0 61 Thousand/µL Final    Basophils Absolute 06/16/2022 0 04  0 00 - 0 10 Thousands/µL Final    Iron Saturation 06/16/2022 26  20 - 50 % Final    TIBC 06/16/2022 356  250 - 450 ug/dL Final    Iron 06/16/2022 93  65 - 175 ug/dL Final    Patients treated with metal-binding drugs (ie  Deferoxamine) may have depressed iron values   Ferritin 06/16/2022 26  8 - 388 ng/mL Final       Please note: This report has been generated by a voice recognition software system  Therefore there may be syntax, spelling, and/or grammatical errors  Please call if you have any questions

## 2022-06-27 ENCOUNTER — HOSPITAL ENCOUNTER (OUTPATIENT)
Dept: RADIOLOGY | Facility: HOSPITAL | Age: 82
Discharge: HOME | End: 2022-06-27
Attending: RADIOLOGY
Payer: MEDICARE

## 2022-06-27 ENCOUNTER — APPOINTMENT (OUTPATIENT)
Dept: LAB | Facility: HOSPITAL | Age: 82
End: 2022-06-27
Payer: MEDICARE

## 2022-06-27 DIAGNOSIS — I67.1 ANEURYSM OF ANTERIOR COMMUNICATING ARTERY: ICD-10-CM

## 2022-06-27 DIAGNOSIS — E61.1 IRON DEFICIENCY: ICD-10-CM

## 2022-06-27 LAB — HEMOCCULT STL QL IA: POSITIVE

## 2022-06-27 PROCEDURE — 70544 MR ANGIOGRAPHY HEAD W/O DYE: CPT

## 2022-06-27 PROCEDURE — G0328 FECAL BLOOD SCRN IMMUNOASSAY: HCPCS

## 2022-06-27 PROCEDURE — G1004 CDSM NDSC: HCPCS

## 2022-06-28 ENCOUNTER — TELEPHONE (OUTPATIENT)
Dept: HEMATOLOGY ONCOLOGY | Facility: MEDICAL CENTER | Age: 82
End: 2022-06-28

## 2022-06-28 NOTE — TELEPHONE ENCOUNTER
Spoke with patient and informed him that Pita Justice PA-C reviewed his fecal occult blood results and states the patient needs to see GI  Patient said he will find his own GI doctor and make his own appointment

## 2022-07-07 ENCOUNTER — TELEPHONE (OUTPATIENT)
Dept: NEPHROLOGY | Facility: CLINIC | Age: 82
End: 2022-07-07

## 2022-07-07 ENCOUNTER — APPOINTMENT (OUTPATIENT)
Dept: LAB | Facility: HOSPITAL | Age: 82
End: 2022-07-07
Payer: MEDICARE

## 2022-07-07 DIAGNOSIS — N13.30 HYDRONEPHROSIS, UNSPECIFIED HYDRONEPHROSIS TYPE: ICD-10-CM

## 2022-07-07 DIAGNOSIS — R39.89 SUSPECTED UTI: ICD-10-CM

## 2022-07-07 DIAGNOSIS — N18.32 STAGE 3B CHRONIC KIDNEY DISEASE (HCC): ICD-10-CM

## 2022-07-07 LAB
25(OH)D3 SERPL-MCNC: 47.5 NG/ML (ref 30–100)
ANION GAP SERPL CALCULATED.3IONS-SCNC: 7 MMOL/L (ref 4–13)
BUN SERPL-MCNC: 25 MG/DL (ref 5–25)
CALCIUM SERPL-MCNC: 9.1 MG/DL (ref 8.3–10.1)
CHLORIDE SERPL-SCNC: 104 MMOL/L (ref 100–108)
CO2 SERPL-SCNC: 29 MMOL/L (ref 21–32)
CREAT SERPL-MCNC: 1.61 MG/DL (ref 0.6–1.3)
CREAT UR-MCNC: 126 MG/DL
ERYTHROCYTE [DISTWIDTH] IN BLOOD BY AUTOMATED COUNT: 16.7 % (ref 11.6–15.1)
GFR SERPL CREATININE-BSD FRML MDRD: 40 ML/MIN/1.73SQ M
GLUCOSE P FAST SERPL-MCNC: 103 MG/DL (ref 65–99)
HCT VFR BLD AUTO: 43.4 % (ref 36.5–49.3)
HGB BLD-MCNC: 13.2 G/DL (ref 12–17)
MAGNESIUM SERPL-MCNC: 1.9 MG/DL (ref 1.6–2.6)
MCH RBC QN AUTO: 25.7 PG (ref 26.8–34.3)
MCHC RBC AUTO-ENTMCNC: 30.4 G/DL (ref 31.4–37.4)
MCV RBC AUTO: 84 FL (ref 82–98)
PHOSPHATE SERPL-MCNC: 3 MG/DL (ref 2.3–4.1)
PLATELET # BLD AUTO: 139 THOUSANDS/UL (ref 149–390)
PMV BLD AUTO: 9.7 FL (ref 8.9–12.7)
POTASSIUM SERPL-SCNC: 5.4 MMOL/L (ref 3.5–5.3)
PROT UR-MCNC: 80 MG/DL
PROT/CREAT UR: 0.63 MG/G{CREAT} (ref 0–0.1)
PTH-INTACT SERPL-MCNC: 78.2 PG/ML (ref 18.4–80.1)
RBC # BLD AUTO: 5.14 MILLION/UL (ref 3.88–5.62)
SODIUM SERPL-SCNC: 140 MMOL/L (ref 136–145)
WBC # BLD AUTO: 6.95 THOUSAND/UL (ref 4.31–10.16)

## 2022-07-07 PROCEDURE — 83735 ASSAY OF MAGNESIUM: CPT

## 2022-07-07 PROCEDURE — 83970 ASSAY OF PARATHORMONE: CPT

## 2022-07-07 PROCEDURE — 84100 ASSAY OF PHOSPHORUS: CPT

## 2022-07-07 PROCEDURE — 36415 COLL VENOUS BLD VENIPUNCTURE: CPT

## 2022-07-07 PROCEDURE — 84156 ASSAY OF PROTEIN URINE: CPT

## 2022-07-07 PROCEDURE — 85027 COMPLETE CBC AUTOMATED: CPT

## 2022-07-07 PROCEDURE — 82570 ASSAY OF URINE CREATININE: CPT

## 2022-07-07 PROCEDURE — 82306 VITAMIN D 25 HYDROXY: CPT

## 2022-07-07 PROCEDURE — 80048 BASIC METABOLIC PNL TOTAL CA: CPT

## 2022-07-07 PROCEDURE — 87086 URINE CULTURE/COLONY COUNT: CPT

## 2022-07-08 ENCOUNTER — OFFICE VISIT (OUTPATIENT)
Dept: NEUROSURGERY | Facility: CLINIC | Age: 82
End: 2022-07-08
Payer: MEDICARE

## 2022-07-08 VITALS
SYSTOLIC BLOOD PRESSURE: 130 MMHG | BODY MASS INDEX: 30.35 KG/M2 | DIASTOLIC BLOOD PRESSURE: 78 MMHG | TEMPERATURE: 96.1 F | RESPIRATION RATE: 16 BRPM | HEIGHT: 70 IN | WEIGHT: 212 LBS

## 2022-07-08 DIAGNOSIS — I67.1 ANEURYSM OF ANTERIOR COMMUNICATING ARTERY: Primary | ICD-10-CM

## 2022-07-08 LAB — BACTERIA UR CULT: NORMAL

## 2022-07-08 PROCEDURE — 99213 OFFICE O/P EST LOW 20 MIN: CPT | Performed by: RADIOLOGY

## 2022-07-08 NOTE — PRE-PROCEDURE INSTRUCTIONS
Pre-Surgery Instructions:   Medication Instructions    ascorbic acid (VITAMIN C) 250 mg tablet Stop taking 7 days prior to surgery   atorvastatin (LIPITOR) 40 mg tablet Take night before surgery    cholecalciferol (VITAMIN D3) 1,000 units tablet Stop taking 7 days prior to surgery   clopidogrel (PLAVIX) 75 mg tablet Instructions provided by MD    finasteride (PROSCAR) 5 mg tablet Take day of surgery   metoprolol tartrate (LOPRESSOR) 25 mg tablet Take day of surgery  Pre procedure instructions given, verbalizes understanding NPO after MN  Bathing reviewed   Morning meds with water No NSAIDS

## 2022-07-08 NOTE — PROGRESS NOTES
Assessment/Plan:     Diagnoses and all orders for this visit:    Aneurysm of anterior communicating artery        Discussion Summary:   Frank Akers has stable cerebral aneurysm  At his age risks of treatment outweigh the benefits  I have recommended against further imaging surveillance  He will follow up as needed  Chief Complaint: Follow-up      Patient ID: Mario Mendez is a 80 y o  right handed male    HPI  Frank Akers returns for follow-up of an incidental cerebral aneurysms  He denies any progressive headaches  No new stroke-like symptoms  Review of Systems   Constitutional: Negative  HENT: Negative  Eyes: Negative  Respiratory: Negative  Cardiovascular: Negative  Gastrointestinal: Negative  Endocrine: Negative  Genitourinary: Negative  Musculoskeletal: Negative  Skin: Negative  Allergic/Immunologic: Negative  Neurological: Negative  Hematological: Bruises/bleeds easily (bruise - medication )  Psychiatric/Behavioral: Negative  I have personally reviewed the MA's review of systems and made changes as necessary      The following portions of the patient's history were reviewed and updated as appropriate: allergies, current medications, past family history, past medical history, past social history, past surgical history and problem list       Active Ambulatory Problems     Diagnosis Date Noted    Chronic atrial fibrillation [facility] ) 04/15/2017    Benign essential hypertension 11/22/2016    Renal calculi 11/22/2016    JUNIOR (acute kidney injury) ([facility] ) 04/15/2017    Calculus of ureter 05/02/2017    Crossing vessel and stricture of ureter without hydronephrosis 05/02/2017    Hematuria, gross 05/10/2019    Hydronephrosis with ureteral stricture, not elsewhere classified 08/11/2020    CVA (cerebral vascular accident) ([facility] ) 10/28/2020    Chronic diastolic CHF (congestive heart failure) ([facility] ) 05/05/2017    Nonrheumatic aortic valve stenosis 05/15/2017    Bilateral carotid artery disease ([facility] ) 10/28/2020    Aneurysm of anterior communicating artery 10/29/2020    Tooth pain 10/29/2020    Onychomycosis 10/31/2020    Syncope vs seizure 11/03/2020    Vasovagal near syncope 11/03/2020    Acute kidney injury superimposed on chronic kidney disease ([facility] )     Secondary hyperparathyroidism ([facility] ) 07/12/2021    Vitamin D deficiency 07/03/2017    Patellofemoral syndrome of left knee 06/08/2016    Moderate mitral regurgitation 05/15/2017    Moderate aortic regurgitation 05/15/2017    GERD (gastroesophageal reflux disease) 05/15/2017    Dyslipidemia 05/15/2017    Stage 3b chronic kidney disease ([place] [address] ) 05/05/2017    Aneurysm ([place] [address] )     Stroke ([place] [address] ) 10/29/2020    Facial laceration 08/09/2021    Closed nondisplaced fracture of nasal bone with routine healing 08/09/2021    Sensorineural hearing loss (SNHL) of both ears 08/09/2021    Iron deficiency anemia, unspecified 09/28/2021    Benign hypertension with CKD (chronic kidney disease) stage III ([place] [address] ) 02/01/2022    Chronic kidney disease-mineral and bone disorder 02/01/2022    Iron deficiency 06/23/2022    Hematuria 06/23/2022     Resolved Ambulatory Problems     Diagnosis Date Noted    Shortness of breath 04/15/2017     Past Medical History:   Diagnosis Date    Anemia     Essential hypertension, long-standing     Heart murmur     History of cigarette smoking, chronic     History of COVID-19 01/19/2022    History of kidney stones     History of pneumonia     Hyperlipidemia     Hypertension     Irregular heart beat     Kidney stone     Muscle weakness     Teeth missing     Water retention     Wears glasses        Past Surgical History:   Procedure Laterality Date    APPENDECTOMY  1960    CARDIAC SURGERY      watchman [id number]; aortic valve replaced 2021(pig valve)    CAROTID ENDARTERECTOMY Left 1998    Supposedly no internal stenosis found    CYSTOSCOPY Right 8/15/2017    Procedure: CYSTOSCOPY RIGHT STENT EXCHANGE;  Surgeon: Jero Eden MD;  Location: [address] [place] [address];  Service: Urology    CYSTOSCOPY      CYSTOSCOPY N/A 3/11/2022    Procedure: CYSTOSCOPY, RIGHT RETROGRADE PYLEOGRAM;  Surgeon: Tony Ramírez MD;  Location:  MAIN OR;  Service: Urology    EXTRACORPOREAL SHOCK WAVE LITHOTRIPSY  03/2017    For nephrolithiasis at [address]  5/10/2019    FL RETROGRADE PYELOGRAM  7/30/2019    FL RETROGRADE PYELOGRAM  10/22/2019    FL RETROGRADE PYELOGRAM  1/28/2020    FL RETROGRADE PYELOGRAM  8/11/2020    FL RETROGRADE PYELOGRAM  12/15/2020    FL RETROGRADE PYELOGRAM  2/14/2021    FL RETROGRADE PYELOGRAM  5/11/2021    FL RETROGRADE PYELOGRAM  10/19/2021    FL RETROGRADE PYELOGRAM  3/11/2022    CO CYSTO/URETERO W/LITHOTRIPSY &INDWELL STENT INSRT Right 5/2/2017    Procedure: CYSTOSCOPY URETEROSCOPY WITH LITHOTRIPSY HOLMIUM LASER, RETROGRADE PYELOGRAM AND INSERTION STENT URETERAL;  Surgeon: Jero Eden MD;  Location: [address] [place] [address];  Service: Urology    CO CYSTO/URETERO W/LITHOTRIPSY &INDWELL STENT INSRT Right 5/16/2017    Procedure: CYSTOSCOPY, RETROGRADE PYELOGRAM,  FLEXIBLE URETEROSCOPY,  HOLMIUM LASER LITHOTRIPSY, STONE BASKET MANIPULATION,  STENT URETERAL EXCHANGE;  Surgeon: Jero Eden MD;  Location: [address] [place] [address];  Service: Urology    [place] CYSTO/URETERO W/LITHOTRIPSY &INDWELL STENT INSRT Right 6/2/2017    Procedure: CYSTOSCOPY, RETROGRADE, STONE MANIPULATION WITH HOLMIUM LASER, STENT PLACEMENT;  Surgeon: Jero Eden MD;  Location: [address] [place] [address];  Service: Urology    CO CYSTO/URETERO W/LITHOTRIPSY &INDWELL STENT INSRT Right 7/18/2017    Procedure: CYSTOSCOPY, RETROGRADE, URETEROSCOPY HOLMIUM LASER Deisi Washington;  Surgeon: Jero Eden MD;  Location: [address] [place] [address];  Service: Urology    CO CYSTO/URETERO W/LITHOTRIPSY &INDWELL STENT INSRT Right 2/14/2021    Procedure: CYSTOSCOPY URETEROSCOPY, RETROGRADE PYELOGRAM, STONE MANIPULATION AND EXCHANGE STENT URETERAL;  Surgeon: Sharad Elias MD;  Location: [address] [place] [address];  Service: Urology    AR CYSTOSCOPY,INSERT URETERAL STENT Right 11/14/2017    Procedure: EXCHANGE STENT URETERAL;  Surgeon: Addis Llanes MD;  Location: [address] [place] [address];  Service: Urology    AR CYSTOSCOPY,INSERT URETERAL STENT Right 3/11/2022    Procedure: EXCHANGE STENT URETERAL;  Surgeon: Dawson bIarra MD;  [facility];  Service: Urology    AR CYSTOURETHROSCOPY,URETER CATHETER Right 11/14/2017    Procedure: Robert , STENT EXCHANGE;  Surgeon: Addis Llanes MD;  Location: [address] [place] [address];  Service: Urology    AR CYSTOURETHROSCOPY,URETER CATHETER Right 5/8/2018    Procedure: Erick Soto;  Surgeon: Addis Llanes MD;  Location: [address] [place] [address];  Service: Urology    AR CYSTOURETHROSCOPY,URETER CATHETER Right 8/14/2018    Procedure: Erick Soto;  Surgeon: Addis Llanes MD;  Location: [address] [place] [address];  Service: Urology    AR CYSTOURETHROSCOPY,URETER CATHETER Right 11/13/2018    Procedure: Erick Soto, RETROGRADE;  Surgeon: Addis Llanes MD;  Location: [address] [place] [address];  Service: Urology    AR CYSTOURETHROSCOPY,URETER CATHETER Right 2/12/2019    Procedure: Robert , STENT REMOVAL AND STENT PLACEMENT;  Surgeon: Addis Llanes MD;  Location: [address] [place] [address];  Service: Urology    AR CYSTOURETHROSCOPY,URETER CATHETER Right 5/10/2019    Procedure: [place] , STENT EXCHANGE;  Surgeon: Addis Llanes MD;  Location: [address] [place] [address];  Service: Urology    AR CYSTOURETHROSCOPY,URETER CATHETER Right 7/30/2019    Procedure: CYSTOSCOPY, RETROGRADE, STENT REMOVAL AND PLACEMENT OF STENT;  Surgeon: Addis Llanes MD;  Location: [address] [place] [address];  Service: Urology    AR CYSTOURETHROSCOPY,URETER CATHETER Right 10/22/2019    Procedure: CYSTOSCOPY, RETROGRADE, STENT EXCHANGE;  Surgeon: Addis Llanes MD; Location[facility];  Service: Urology    SD CYSTOURETHROSCOPY,URETER CATHETER Right 1/28/2020    Procedure: Judy Sepulveda, STENT REMOVAL AND STENT PLACEMENT;  Surgeon: Addis Llanes MD;  Location: [address] [place] [address];  Service: Urology    SD CYSTOURETHROSCOPY,URETER CATHETER Right 5/22/2020    Procedure: CYSTOSCOPY RETROGRADE, STENT EXCHANGE;  Surgeon: Addis Llanes MD;  Location: [address] [place] [address];  Service: Urology    SD CYSTOURETHROSCOPY,URETER CATHETER Right 8/11/2020    Procedure: Erick Soto, RETROGRADE;  Surgeon: Addis Llanes MD;  Location: [address] [place] [address];  Service: Urology    SD CYSTOURETHROSCOPY,URETER CATHETER Right 12/15/2020    Procedure: Judy Sepulveda, STENT REMOVAL, AND STENT PLACEMENT;  Surgeon: Addis Llanes MD;  Location: [address] [place] [address];  Service: Urology    SD CYSTOURETHROSCOPY,URETER CATHETER Right 5/11/2021    Procedure: CYSTOSCOPY RETROGRADE PYELOGRAM WITH STENT EXCHANGE;  Surgeon: Addis Llanes MD;  Location: [address] [place] [address];  Service: Urology    SD CYSTOURETHROSCOPY,URETER CATHETER Right 10/19/2021    Procedure: CYSTOSCOPY WITH RETROGRADE, STENT EXCHANGE;  Surgeon: Addis Llanes MD;  Location: [address] [place] [address];  Service: Urology    PROSTATE SURGERY  2015    Laser Prostatectomy  by Dr Marissa Mallory Right 4/18/2017    Procedure: INSERTION STENT URETERAL, RIGHT URETEROSCOPY,  LASER OF URETERAL STRICTURE AND STONE;  Surgeon: Addis Llanes MD;  Location: [address] [place] [address];  Service:    [address] [place] [address] Right 2/13/2018    Procedure: Kerline Betancourt EXCHANGE;  Surgeon: Addis Llanes MD;  Location: [address] [place] [address];  Service: Urology       Current Outpatient Medications   Medication Sig Dispense Refill    ascorbic acid (VITAMIN C) 250 mg tablet Take 500 mg by mouth daily      aspirin (ECOTRIN LOW STRENGTH) 81 mg EC tablet Take 81 mg by mouth daily Pt to check with surgeon if needed to hold RX        atorvastatin (LIPITOR) 40 mg tablet Take 1 tablet (40 mg total) by mouth daily with dinner 30 tablet 1    cholecalciferol (VITAMIN D3) 1,000 units tablet Take 1,000 Units by mouth daily after lunch        clopidogrel (PLAVIX) 75 mg tablet Take 75 mg by mouth daily Pt  To check with surgeon if needed to hold RX   finasteride (PROSCAR) 5 mg tablet TAKE 1 TABLET BY MOUTH EVERY DAY 90 tablet 2    metoprolol tartrate (LOPRESSOR) 25 mg tablet Take 1 tablet (25 mg total) by mouth every 8 (eight) hours 270 tablet 3     No current facility-administered medications for this visit  Vitals:    07/08/22 0943   BP: 130/78   Resp: 16   Temp: (!) 96 1 °F (35 6 °C)         Objective:    Physical Exam  Neurologic Exam    Results/Data:  I have reviewed the results and images from the [facility] in detail with the patient

## 2022-07-15 ENCOUNTER — ANESTHESIA (OUTPATIENT)
Dept: PERIOP | Facility: HOSPITAL | Age: 82
End: 2022-07-15
Payer: MEDICARE

## 2022-07-15 ENCOUNTER — TELEPHONE (OUTPATIENT)
Dept: OTHER | Facility: OTHER | Age: 82
End: 2022-07-15

## 2022-07-15 ENCOUNTER — HOSPITAL ENCOUNTER (OUTPATIENT)
Facility: HOSPITAL | Age: 82
Setting detail: OUTPATIENT SURGERY
Discharge: HOME | End: 2022-07-15
Attending: UROLOGY | Admitting: UROLOGY
Payer: MEDICARE

## 2022-07-15 ENCOUNTER — ANESTHESIA EVENT (OUTPATIENT)
Dept: PERIOP | Facility: HOSPITAL | Age: 82
End: 2022-07-15
Payer: MEDICARE

## 2022-07-15 ENCOUNTER — PREP FOR PROCEDURE (OUTPATIENT)
Dept: UROLOGY | Facility: CLINIC | Age: 82
End: 2022-07-15

## 2022-07-15 ENCOUNTER — TELEPHONE (OUTPATIENT)
Dept: OTHER | Facility: HOSPITAL | Age: 82
End: 2022-07-15

## 2022-07-15 ENCOUNTER — APPOINTMENT (OUTPATIENT)
Dept: RADIOLOGY | Facility: HOSPITAL | Age: 82
End: 2022-07-15
Payer: MEDICARE

## 2022-07-15 VITALS
HEIGHT: 70 IN | BODY MASS INDEX: 30.39 KG/M2 | RESPIRATION RATE: 18 BRPM | WEIGHT: 212.3 LBS | OXYGEN SATURATION: 96 % | TEMPERATURE: 97.4 F | DIASTOLIC BLOOD PRESSURE: 86 MMHG | SYSTOLIC BLOOD PRESSURE: 171 MMHG | HEART RATE: 74 BPM

## 2022-07-15 DIAGNOSIS — N13.5 URETERAL STRICTURE, RIGHT: Primary | ICD-10-CM

## 2022-07-15 PROBLEM — Z95.2 HISTORY OF AORTIC VALVE REPLACEMENT: Chronic | Status: ACTIVE | Noted: 2022-07-15

## 2022-07-15 PROCEDURE — 52332 CYSTOSCOPY AND TREATMENT: CPT | Performed by: UROLOGY

## 2022-07-15 PROCEDURE — NC001 PR NO CHARGE: Performed by: UROLOGY

## 2022-07-15 PROCEDURE — C1769 GUIDE WIRE: HCPCS | Performed by: UROLOGY

## 2022-07-15 PROCEDURE — 74420 UROGRAPHY RTRGR +-KUB: CPT

## 2022-07-15 PROCEDURE — C2617 STENT, NON-COR, TEM W/O DEL: HCPCS | Performed by: UROLOGY

## 2022-07-15 PROCEDURE — C1758 CATHETER, URETERAL: HCPCS | Performed by: UROLOGY

## 2022-07-15 RX ORDER — SODIUM CHLORIDE, SODIUM LACTATE, POTASSIUM CHLORIDE, CALCIUM CHLORIDE 600; 310; 30; 20 MG/100ML; MG/100ML; MG/100ML; MG/100ML
50 INJECTION, SOLUTION INTRAVENOUS CONTINUOUS
Status: DISCONTINUED | OUTPATIENT
Start: 2022-07-15 | End: 2022-07-15 | Stop reason: HOSPADM

## 2022-07-15 RX ORDER — ONDANSETRON 2 MG/ML
INJECTION INTRAMUSCULAR; INTRAVENOUS AS NEEDED
Status: DISCONTINUED | OUTPATIENT
Start: 2022-07-15 | End: 2022-07-15

## 2022-07-15 RX ORDER — MAGNESIUM HYDROXIDE 1200 MG/15ML
LIQUID ORAL AS NEEDED
Status: DISCONTINUED | OUTPATIENT
Start: 2022-07-15 | End: 2022-07-15 | Stop reason: HOSPADM

## 2022-07-15 RX ORDER — FENTANYL CITRATE 50 UG/ML
INJECTION, SOLUTION INTRAMUSCULAR; INTRAVENOUS AS NEEDED
Status: DISCONTINUED | OUTPATIENT
Start: 2022-07-15 | End: 2022-07-15

## 2022-07-15 RX ORDER — ONDANSETRON 2 MG/ML
4 INJECTION INTRAMUSCULAR; INTRAVENOUS ONCE AS NEEDED
Status: DISCONTINUED | OUTPATIENT
Start: 2022-07-15 | End: 2022-07-15 | Stop reason: HOSPADM

## 2022-07-15 RX ORDER — FENTANYL CITRATE/PF 50 MCG/ML
25 SYRINGE (ML) INJECTION
Status: DISCONTINUED | OUTPATIENT
Start: 2022-07-15 | End: 2022-07-15 | Stop reason: HOSPADM

## 2022-07-15 RX ORDER — CEFAZOLIN SODIUM 2 G/50ML
2000 SOLUTION INTRAVENOUS ONCE
Status: COMPLETED | OUTPATIENT
Start: 2022-07-15 | End: 2022-07-15

## 2022-07-15 RX ORDER — GABAPENTIN 100 MG/1
300 CAPSULE ORAL ONCE
OUTPATIENT
Start: 2022-07-15 | End: 2022-07-15

## 2022-07-15 RX ORDER — PROPOFOL 10 MG/ML
INJECTION, EMULSION INTRAVENOUS CONTINUOUS PRN
Status: DISCONTINUED | OUTPATIENT
Start: 2022-07-15 | End: 2022-07-15

## 2022-07-15 RX ORDER — HEPARIN SODIUM 5000 [USP'U]/ML
5000 INJECTION, SOLUTION INTRAVENOUS; SUBCUTANEOUS ONCE
OUTPATIENT
Start: 2022-07-15 | End: 2022-07-15

## 2022-07-15 RX ORDER — SODIUM CHLORIDE, SODIUM LACTATE, POTASSIUM CHLORIDE, CALCIUM CHLORIDE 600; 310; 30; 20 MG/100ML; MG/100ML; MG/100ML; MG/100ML
INJECTION, SOLUTION INTRAVENOUS CONTINUOUS PRN
Status: DISCONTINUED | OUTPATIENT
Start: 2022-07-15 | End: 2022-07-15

## 2022-07-15 RX ORDER — LIDOCAINE HYDROCHLORIDE 10 MG/ML
INJECTION, SOLUTION EPIDURAL; INFILTRATION; INTRACAUDAL; PERINEURAL AS NEEDED
Status: DISCONTINUED | OUTPATIENT
Start: 2022-07-15 | End: 2022-07-15

## 2022-07-15 RX ORDER — ACETAMINOPHEN 325 MG/1
975 TABLET ORAL ONCE
OUTPATIENT
Start: 2022-07-15 | End: 2022-07-15

## 2022-07-15 RX ADMIN — CEFAZOLIN SODIUM 2000 MG: 2 SOLUTION INTRAVENOUS at 14:00

## 2022-07-15 RX ADMIN — LIDOCAINE HYDROCHLORIDE 50 MG: 10 INJECTION, SOLUTION EPIDURAL; INFILTRATION; INTRACAUDAL; PERINEURAL at 14:07

## 2022-07-15 RX ADMIN — FENTANYL CITRATE 50 MCG: 50 INJECTION, SOLUTION INTRAMUSCULAR; INTRAVENOUS at 14:08

## 2022-07-15 RX ADMIN — ONDANSETRON 4 MG: 2 INJECTION INTRAMUSCULAR; INTRAVENOUS at 14:09

## 2022-07-15 RX ADMIN — SODIUM CHLORIDE, SODIUM LACTATE, POTASSIUM CHLORIDE, AND CALCIUM CHLORIDE: .6; .31; .03; .02 INJECTION, SOLUTION INTRAVENOUS at 13:45

## 2022-07-15 RX ADMIN — PROPOFOL 100 MCG/KG/MIN: 10 INJECTION, EMULSION INTRAVENOUS at 14:07

## 2022-07-15 NOTE — H&P
UROLOGY HISTORY AND PHYSICAL     Patient Identifiers: Meenu Zelaya (MRN [id number])      Date of Service: 7/15/2022        ASSESSMENT:     80 y o  old male with  long segment right ureteral stricture with 37% differential function of the right kidney, stable GFR, and adequate stent facilitated drainage based on Mag 3 Lasix renal scan here for stent exchange  Recent ucx negative  PLAN:     Right ureteral stent exchange today  Risks discussed namely the risk of not being able to place a new stent  Consent obtained        History of Present Illness:     Meenu Zelaya is a 80 y o  old with a history of renal stones and right ureteral stricture disease managed with chronic stent   Former patient of Dr Gorge Main      The patient has had numerous (at least 7) prior stone surgeries in [place] and developed a stricture of the right mid and proximal ureter  Hx from Dr Gorge Main notes:  -Stent placement and holmium laser of stone and stricture 4/18/17  -Holmium laser of stone and stent exchange 5/2/17  -Renal stones treated elsewhere in [place] in past year (7 procedures)  -Right laser lithotripsy, laser infundibulotomy, and stone basket extraction 6/2/17  -Right CRUSH and laser incision of proximal ureter 7/18/17  -Stent changed 05/10/19-- stone causing near complete obstruction of the right proximal ureter   Required stent to be removed in order for a guidewire to pass    -Stent exchange 10/22/19 found it impossible to advance the wire without removing the stent first    -Stent exchange 01/28/20 found it once again impossible to advance the wire without removing the stent first    Stent due for change in 12 wks    -Stent exchange by Dr Jazlyn Nicole 02/14/21 right stent change and ureteroscopy   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  - Stent exchange 05/11/21 - Difficulty getting wire past the obstruction without removing  - Stent exchange 10/19/2021- partial encrustation, wire could not pass  mid ureter alongside stent until stent was pulled out  7x26 stent placed  - 10/29/21 - CT showed no hydro with stent in good position and small stone next to stent vs outside ureter  - 3/11/22: stent exchange  4cm stenotic ureter  - 5/2022: MAG-3 LRS 37% differential function of the right kidney (preivusly 33%) without obstruction  - 7/22- GFR stable (40)     The patient has CKD but creatinine appears stable with GFR of 40 since at least 2018   He follows with Nephrology       Patient denies any complaints      Past Medical, Past Surgical History:     Past Medical History:   Diagnosis Date    Anemia     iron deficiency    Aneurysm ([address] )     of brain  being monitored    Essential hypertension, long-standing     Heart murmur     per pt "Aortic Valve replacement 2021 with Watchman device implanted /2021"    Hematuria     intermittent    History of cigarette smoking, chronic     62 year smoker at one half pack per day, quit 04/1/17    History of COVID-19 01/19/2022    recovered at home/chief s/s only sore throat    History of kidney stones     History of pneumonia     Hyperlipidemia     Hypertension     Irregular heart beat     Kidney stone     Muscle weakness     right sided weakness has gotten better/ walks independantly"    Stroke ([address] ) 10/29/2020    right sided  weakness almost full recovery    Stroke ([address] )     Teeth missing     Vitamin D deficiency     maintained with 1000 units of D    Water retention     Wears glasses    :    Past Surgical History:   Procedure Laterality Date    APPENDECTOMY  1960    CARDIAC SURGERY      watchman [id number]; aortic valve replaced 2021(pig valve)    CAROTID ENDARTERECTOMY Left 1998    Supposedly no internal stenosis found    CYSTOSCOPY Right 8/15/2017    Procedure: CYSTOSCOPY RIGHT STENT EXCHANGE;  Surgeon: Joana Guillen MD;  Location: [address] [place] [address];  Service: Urology    CYSTOSCOPY      CYSTOSCOPY N/A 3/11/2022    Procedure: CYSTOSCOPY, RIGHT RETROGRADE PYLEOGRAM;  Surgeon: Yo Onofre MD;  [facility];  Service: Urology    EXTRACORPOREAL SHOCK WAVE LITHOTRIPSY  03/2017    For nephrolithiasis at [address]  5/10/2019    FL RETROGRADE PYELOGRAM  7/30/2019    FL RETROGRADE PYELOGRAM  10/22/2019    FL RETROGRADE PYELOGRAM  1/28/2020    FL RETROGRADE PYELOGRAM  8/11/2020    FL RETROGRADE PYELOGRAM  12/15/2020    FL RETROGRADE PYELOGRAM  2/14/2021    FL RETROGRADE PYELOGRAM  5/11/2021    FL RETROGRADE PYELOGRAM  10/19/2021    FL RETROGRADE PYELOGRAM  3/11/2022    [place] CYSTO/URETERO W/LITHOTRIPSY &INDWELL STENT INSRT Right 5/2/2017    Procedure: CYSTOSCOPY URETEROSCOPY WITH LITHOTRIPSY HOLMIUM LASER, RETROGRADE PYELOGRAM AND INSERTION STENT URETERAL;  Surgeon: Chante Roblero MD;  Location: [address] [place] [address];  Service: Urology    [place] CYSTO/URETERO W/LITHOTRIPSY &INDWELL STENT INSRT Right 5/16/2017    Procedure: CYSTOSCOPY, RETROGRADE PYELOGRAM,  FLEXIBLE URETEROSCOPY,  HOLMIUM LASER LITHOTRIPSY, STONE BASKET MANIPULATION,  STENT URETERAL EXCHANGE;  Surgeon: Chante Roblero MD;  Location: [address] [place] [address];  Service: Urology    AL CYSTO/URETERO W/LITHOTRIPSY &INDWELL STENT INSRT Right 6/2/2017    Procedure: CYSTOSCOPY, RETROGRADE, STONE MANIPULATION WITH HOLMIUM LASER, STENT PLACEMENT;  Surgeon: Chante Roblero MD;  Location: [address] [place] [address];  Service: Urology    [place] CYSTO/URETERO W/LITHOTRIPSY &INDWELL STENT INSRT Right 7/18/2017    Procedure: CYSTOSCOPY, RETROGRADE, URETEROSCOPY HOLMIUM LASER [place],  AND STENT PLACEMENT;  Surgeon: Chante Roblero MD;  Location: [address] [place] [address];  Service: Urology    AL CYSTO/URETERO W/LITHOTRIPSY &INDWELL STENT INSRT Right 2/14/2021    Procedure: CYSTOSCOPY URETEROSCOPY, RETROGRADE PYELOGRAM, STONE MANIPULATION AND EXCHANGE STENT URETERAL;  Surgeon: Rodri Weinberg MD;  Location: [address] [place] [address];  Service: Urology    AL CYSTOSCOPY,INSERT URETERAL STENT Right 11/14/2017    Procedure: EXCHANGE STENT URETERAL;  Surgeon: Remi Velez MD;  Location: [address] [place] [address];  Service: Urology    HI CYSTOSCOPY,INSERT URETERAL STENT Right 3/11/2022    Procedure: EXCHANGE STENT URETERAL;  Surgeon: Majo Horan MD;  [facility];  Service: Urology    HI CYSTOURETHROSCOPY,URETER CATHETER Right 11/14/2017    Procedure: CYSTOSCOPY RETROGRADE, STENT EXCHANGE;  Surgeon: Remi Velez MD;  Location: [address] [place] [address];  Service: Urology    HI CYSTOURETHROSCOPY,URETER CATHETER Right 5/8/2018    Procedure: Offerle Esquivel;  Surgeon: Remi Velez MD;  Location: [address] [place] [address] Road;  Service: Urology    HI CYSTOURETHROSCOPY,URETER CATHETER Right 8/14/2018    Procedure: Eddie Esquivel;  Surgeon: Remi Velez MD;  Location: [address] [place] [address];  Service: Urology    HI CYSTOURETHROSCOPY,URETER CATHETER Right 11/13/2018    Procedure: CYSTOSCOPY, Bruce Hansen EXCHANGE, RETROGRADE;  Surgeon: Remi Velez MD;  Location: [address] [place] [address];  Service: Urology    HI CYSTOURETHROSCOPY,URETER CATHETER Right 2/12/2019    Procedure: Lenice Scale, STENT REMOVAL AND STENT PLACEMENT;  Surgeon: Remi Velez MD;  Location: [address] [place] [address];  Service: Urology    HI CYSTOURETHROSCOPY,URETER CATHETER Right 5/10/2019    Procedure: Lenice Scale, STENT EXCHANGE;  Surgeon: Remi Velez MD;  Location: [address] [place] [address];  Service: Urology    HI CYSTOURETHROSCOPY,URETER CATHETER Right 7/30/2019    Procedure: CYSTOSCOPY, RETROGRADE, STENT REMOVAL AND PLACEMENT OF STENT;  Surgeon: Remi Velez MD;  Location: [address] [place] [address];  Service: Urology    HI CYSTOURETHROSCOPY,URETER CATHETER Right 10/22/2019    Procedure: Lenice Scale, STENT EXCHANGE;  Surgeon: Remi Velez MD;  Location: [address] [place] [address];  Service: Urology    HI CYSTOURETHROSCOPY,URETER CATHETER Right 1/28/2020    Procedure: CYSTOSCOPY, RETROGRADE, STENT REMOVAL AND STENT PLACEMENT;  Surgeon: Remi Velez MD;  Location: [address] [place] [address]; Service: Urology    [place] CYSTOURETHROSCOPY,URETER CATHETER Right 2020    Procedure: CYSTOSCOPY RETROGRADE, STENT EXCHANGE;  Surgeon: Lizzy Ireland MD;  Location: [address] [place] [address];  Service: Urology    [place] CYSTOURETHROSCOPY,URETER CATHETER Right 2020    Procedure: Mirta Robles, RETROGRADE;  Surgeon: Lizzy Ireland MD;  Location: [address] [place] [address];  Service: Urology    [place] CYSTOURETHROSCOPY,URETER CATHETER Right 12/15/2020    Procedure: Fairy Simpers, STENT REMOVAL, AND STENT PLACEMENT;  Surgeon: Lizzy Ireland MD;  Location: [address] [place] [address];  Service: Urology    [place] CYSTOURETHROSCOPY,URETER CATHETER Right 2021    Procedure: CYSTOSCOPY RETROGRADE PYELOGRAM WITH STENT EXCHANGE;  Surgeon: Lizzy Ireland MD;  Location: [address] [place] [address];  Service: Urology    [place] CYSTOURETHROSCOPY,URETER CATHETER Right 10/19/2021    Procedure: CYSTOSCOPY WITH RETROGRADE, STENT EXCHANGE;  Surgeon: Lizzy Ireland MD;  Location: [address] [place] [address];  Service: Urology    PROSTATE SURGERY  2015    Laser Prostatectomy  by Dr Jose C Becker Right 2017    Procedure: INSERTION STENT URETERAL, RIGHT URETEROSCOPY,  LASER OF URETERAL STRICTURE AND STONE;  Surgeon: Lizzy Ireland MD;  Location: [address] [place] [address];  Service:     URETERAL STENT PLACEMENT Right 2018    Procedure: Mirta Travis;  Surgeon: Lzizy Ireland MD;  Location: [facility];  Service: Urology   :    Medications, Allergies:     Current Facility-Administered Medications:     ceFAZolin (ANCEF) IVPB (premix in dextrose) 2,000 mg 50 mL, 2,000 mg, Intravenous, Once, Monica Salomon PA-C    Allergies:  No Known Allergies:    Social and Family History:   Social History:   Social History     Tobacco Use    Smoking status: Former Smoker     Packs/day: 0 50     Years: 62 00     Pack years: 31 00     Types: Cigarettes     Quit date: 4/15/2017     Years since quittin 2    Smokeless tobacco: Never Used   Vaping Use    Vaping Use: Never used   Substance Use Topics    Alcohol use: Not Currently     Comment: rare    Drug use: Never     Social History     Tobacco Use   Smoking Status Former Smoker    Packs/day: 0 50    Years: 62 00    Pack years: 31 00    Types: Cigarettes    Quit date: 4/15/2017    Years since quittin 2   Smokeless Tobacco Never Used       Family History:  Family History   Problem Relation Age of Onset    Hypertension Mother     Alcohol abuse Father     Cirrhosis Father     Cancer Son 13        cancer-knee and above-left-amputation    Cancer Sister     Other Brother         head mass    Thyroid disease unspecified Sister     Arthritis Sister     Other Brother         legally blind in one eye   :     Review of Systems:     General: Fever, chills, or night sweats: negative  Cardiac: Negative for chest pain  Pulmonary: Negative for shortness of breath  Gastrointestinal: Abdominal pain negative  Nausea, vomiting, or diarrhea negative  Genitourinary: See HPI above  Patient does nothave hematuria  All other systems queried were negative  Physical Exam:   General: Patient is pleasant and in NAD  Awake and alert  BP (!) 217/106   Pulse 75   Temp (!) 96 9 °F (36 1 °C) (Temporal)   Resp 18   Ht 5' 10" (1 778 m)   Wt 96 3 kg (212 lb 4 9 oz)   SpO2 99%   BMI 30 46 kg/m²   HEENT:  Normocephalic atraumatic  Cardiac:  Regular rate and rhythm, Peripheral edema: negative  Pulmonary: Non-labored breathing, CTAB  Abdomen: Soft, non-tender, non-distended  No surgical scars  No masses, tenderness, hernias noted  Genitourinary: negative CVA tenderness, neg suprapubic tenderness    Extremities: normal movement in all 4       Labs:     Lab Results   Component Value Date    HGB 13 2 2022    HCT 43 4 2022    WBC 6 95 2022     (L) 2022   ]    Lab Results   Component Value Date    K 5 4 (H) 2022     2022    CO2 29 2022    BUN 25 07/07/2022    CREATININE 1 61 (H) 07/07/2022    CALCIUM 9 1 07/07/2022    GLUCOSE 135 11/03/2020   ]    Imaging:   I personally reviewed the images and report of the following studies, and reviewed them with the patient:    Nuclear medicine study May 2022 showed 37% differential function of the right kidney (preivusly 33%) but with drainage    Thank you for allowing me to participate in this patients care  Please do not hesitate to call with any additional questions    Priscilla Nixon MD

## 2022-07-15 NOTE — OP NOTE
OPERATIVE REPORT  PATIENT NAME: Shavon Denny    :  1940  MRN: [id number]  Pt Location: AN OR ROOM 01    SURGERY DATE: 7/15/2022    Surgeon(s) and Role:     * Sarath Laguerre MD - Primary    Preop Diagnosis:  Ureteral stricture [N13 5]    Post-Op Diagnosis Codes:     * Ureteral stricture [N13 5]    Procedure(s) (LRB):  CYSTOSCOPY, RETROGRADE PYELOGRAM WITH EXCHANGE STENT URETERAL (Right)    Specimen(s):  * No specimens in log *    Estimated Blood Loss:   Minimal    Drains:  Ureteral Drain/Stent Right ureter 7 Fr  (Active)   Number of days: 269       Ureteral Internal Stent Right ureter 7 Fr  (Active)   Number of days: 0       Anesthesia Type:   General    Operative Indications:  Patient with recurrent nephrolithiasis who developed right ureteral stricture disease resulting in stent dependency since  with recent functional imaging showing 37% differential function of the right kidney and adequate drainage alongside stent  Findings:  Procedure performed under sedation  Bladder unremarkable  Could not pass wire alongside stent because of obstruction in the mid ureter  Stent exchange via wire through the old stent  7 x 28 stent used to facilitate future stent exchanges     Complications:   None     Procedure and Technique:  After informed consent was obtained including the risks of bleeding, infection, ureteral injury, and need for secondary procedures, the patient was placed supine in the operating room theater  General anesthesia was administered  The patient was transferred to the dorsal lithotomy position and sterilely prepped and draped in the usual fashion        Cystoscopy was performed to 21 Chinese cystoscope with 30° lens  The urethra was unremarkable  Inspection of the bladder revealed no significant findings other than obstructive changes  Stent was seen emanating from the right ureteral orifice    With the aid of a 5fr open ended catheter a sensor wire was attempted to be passed into the right ureteral orifice alongside the stent but resistance was met in the mid ureter with Sensor wire backing on itself        Therefore the stent was then grasped and brought down to the meatus and a Sensor wire passed up it to the right renal pelvis  The 7 x 26 stent was removed  A 5fr open ended catheter was passed over the wire until reached the proximal ureter, the wire removed and a retrograde ureteropyelogram performed showing moderate to severe  hydronephrosis  The sensor was wire was replaced into the collecting system  A new 7 x 28 double-J stent (longer stent purposely chosen to facilitate future stent exchanges) was then inserted under visual guidance distally and fluoroscopic guidance proximally  Once seen to be in appropriate position the wire was removed and the stent was seen to have an appropriate proximal coil on fluoroscopy and visually in the bladder  The bladder was drained and patient awakened from anesthesia having tolerated the procedure well  The patient's foreskin was reduced       A qualified resident physician was not available     Patient Disposition:  PACU      PLAN: We will arrange stent exchange in 3 months   While his hydronephrosis did appear worse today his renal function is stable and his recent Mag 3 Lasix renal scan suggests that his kidney is draining and maintaining function    SIGNATURE: Rosa Isela Marshall MD  DATE: July 15, 2022  TIME: 2:27 PM

## 2022-07-15 NOTE — TELEPHONE ENCOUNTER
Pt stated has appointment on 8/19/2022 at 9:30am with Dr Latoya Esquivel  I didn't see in appointment schedule   Please call patient back to advise

## 2022-07-15 NOTE — DISCHARGE INSTRUCTIONS
Mr Alanis Rodriguez:    Your right stent was exchanged today  Your kidney did show some signs of moderate swelling on x-ray but since your kidney function is stable on lab work and the recent nuclear medicine study showed the kidney is draining adequately we will plan to continue stent exchanges every 3 months (unless there are changes in your lab work or you began to develop pain on the right side in which case we would need to consider a percutaneous nephrostomy tube)  We will set up a follow-up appointment  Please call with any questions or concerns  [address] [facility]  [phone]            WHAT IS A STENT? At the end of the procedure, your doctor may place a stent into your ureter  A stent is a thin, flexible piece of plastic that will hold open your ureter while the remaining small pieces of stone pass  This allows your kidney to drain easily and prevents you from having to pass these small stone pieces on your own, which could be painful  The stent is about 12 inches long and looks and feels like a thin piece of spaghetti  AFTER THE PROCEDURE  After the procedure you may experience the following symptoms  All of these are normal and should resolve within 1 or 2 days after your stent is removed    Urinary frequency (urinating more often than usual)  Urinary urgency (the sensation that you need to urinate right away)  Painful urination (this can be pain in your bladder or in your back when  you urinate)  Blood in your urine ( a stent can irritate the lining of your bladder causing it to bleed)  Back/Flank pain, especially with urination

## 2022-07-15 NOTE — ANESTHESIA POSTPROCEDURE EVALUATION
Post-Op Assessment Note    CV Status:  Stable  Pain Score: 0    Pain management: adequate     Mental Status:  Alert and awake   Hydration Status:  Euvolemic   PONV Controlled:  Controlled   Airway Patency:  Patent      Post Op Vitals Reviewed: Yes      Staff: CRNA         No complications documented      BP  119/65   Temp     Pulse  66   Resp   22   SpO2   98 on 3L

## 2022-07-15 NOTE — ANESTHESIA PREPROCEDURE EVALUATION
Procedure:  CYSTOSCOPY RETROGRADE PYELOGRAM WITH INSERTION STENT URETERAL (Right Bladder)    Relevant Problems   CARDIO   (+) Benign essential hypertension   (+) Chronic atrial fibrillation (HCC)   (+) History of aortic valve replacement   (+) Moderate aortic regurgitation   (+) Moderate mitral regurgitation   (+) Nonrheumatic aortic valve stenosis      ENDO   (+) Secondary hyperparathyroidism (HCC)      GI/HEPATIC   (+) GERD (gastroesophageal reflux disease)      /RENAL   (+) JUNIOR (acute kidney injury) (HCC)   (+) Acute kidney injury superimposed on chronic kidney disease (HCC)   (+) Benign hypertension with CKD (chronic kidney disease) stage III (HCC)   (+) Chronic kidney disease-mineral and bone disorder   (+) Hydronephrosis with ureteral stricture, not elsewhere classified   (+) Renal calculi   (+) Stage 3b chronic kidney disease (HCC)      HEMATOLOGY   (+) Iron deficiency anemia, unspecified      NEURO/PSYCH   (+) CVA (cerebral vascular accident) ([address] )   (+) Stroke ([facility] [address] )      Mult cysto w stents under sedation, charts reviewed  LMA4 as well    Recent labs personally reviewed:  Lab Results   Component Value Date    WBC 6 95 07/07/2022    HGB 13 2 07/07/2022     (L) 07/07/2022     Lab Results   Component Value Date    K 5 4 (H) 07/07/2022    BUN 25 07/07/2022    CREATININE 1 61 (H) 07/07/2022    GLUCOSE 135 11/03/2020     Lab Results   Component Value Date    PTT 33 09/20/2021      Lab Results   Component Value Date    INR 1 10 09/16/2021       Lab Results   Component Value Date    HGBA1C 6 0 (H) 02/20/2021       Type and Screen:  O    Per chart review: Patient sp TAVR 2021 [facility]    TTE 2020  LEFT VENTRICLE:  Systolic function was at the lower limits of normal  Ejection fraction was estimated in the range of 50 % to 55 % to be 50 %  Although no diagnostic regional wall motion abnormality was identified, this possibility cannot be completely excluded on the basis of this study    Zachery Copeland thickness was mildly increased  There was mild concentric hypertrophy      LEFT ATRIUM:  The atrium was moderately dilated      RIGHT ATRIUM:  The atrium was mildly dilated      MITRAL VALVE:  There was mild to moderate annular calcification  There was mild regurgitation      AORTIC VALVE:  The valve was trileaflet  Leaflets exhibited moderately increased thickness, moderate calcification, and moderately reduced cuspal separation  Transaortic velocity was increased due to valvular stenosis  There was moderate to severe stenosis  Nish 1 0 cm2, peak gradient 40, mean 26 mm of hg  There was mild regurgitation             Anesthesia Plan  ASA Score- 3     Anesthesia Type- IV sedation with anesthesia with ASA Monitors  Additional Monitors:   Airway Plan:     Comment: Supplemental O2, etco2 monitoring    Plan Factors-Exercise tolerance (METS): <4 METS  Chart reviewed  Existing labs reviewed  Patient summary reviewed  Patient is not a current smoker  Patient not instructed to abstain from smoking on day of procedure  Patient did not smoke on day of surgery  Obstructive sleep apnea risk education given perioperatively  Induction- intravenous  Postoperative Plan-     Informed Consent- Anesthetic plan and risks discussed with patient  I personally reviewed this patient with the CRNA  Discussed and agreed on the Anesthesia Plan with the CRNA  Pedro Luis Marley

## 2022-07-18 ENCOUNTER — TELEPHONE (OUTPATIENT)
Dept: NEPHROLOGY | Facility: CLINIC | Age: 82
End: 2022-07-18

## 2022-07-18 NOTE — TELEPHONE ENCOUNTER
Pt stated has appointment on 8/19/2022 at 9:30am with Dr Rebecca Clay  I didn't see in appointment schedule  Please call patient back to advise          Appt with 8/19 with China Ugalde in our [address]  [facility] office,pt was made aware of date/time & location

## 2022-07-20 ENCOUNTER — TELEPHONE (OUTPATIENT)
Dept: UROLOGY | Facility: CLINIC | Age: 82
End: 2022-07-20

## 2022-07-20 ENCOUNTER — PREP FOR PROCEDURE (OUTPATIENT)
Dept: UROLOGY | Facility: CLINIC | Age: 82
End: 2022-07-20

## 2022-07-20 DIAGNOSIS — R39.89 SUSPECTED UTI: ICD-10-CM

## 2022-07-20 DIAGNOSIS — N13.30 HYDRONEPHROSIS, UNSPECIFIED HYDRONEPHROSIS TYPE: Primary | ICD-10-CM

## 2022-07-21 NOTE — TELEPHONE ENCOUNTER
Spoke with patient and he is scheduled for 11/1 with AS at [facility]  He is aware the hospital will call the day prior with time of arrival, nothing to eat or drink after midnight, he will need a , and to hold blood thinning medications 7 days prior  He will have pre op labs, urine culture, and ekg done 2 weeks prior       Mailed surgical pkt per patient request

## 2022-08-01 ENCOUNTER — TELEPHONE (OUTPATIENT)
Dept: NEPHROLOGY | Facility: CLINIC | Age: 82
End: 2022-08-01

## 2022-08-01 DIAGNOSIS — R31.0 HEMATURIA, GROSS: ICD-10-CM

## 2022-08-01 DIAGNOSIS — N40.1 BPH WITH OBSTRUCTION/LOWER URINARY TRACT SYMPTOMS: ICD-10-CM

## 2022-08-01 DIAGNOSIS — N13.8 BPH WITH OBSTRUCTION/LOWER URINARY TRACT SYMPTOMS: ICD-10-CM

## 2022-08-01 RX ORDER — FINASTERIDE 5 MG/1
TABLET, FILM COATED ORAL
Qty: 90 TABLET | Refills: 2 | Status: SHIPPED | OUTPATIENT
Start: 2022-08-01

## 2022-08-01 NOTE — TELEPHONE ENCOUNTER
Spoke with patient - he asked if he will need more lab work done  I explained that since he just had it done he will not need to go   All questions were answered

## 2022-08-18 NOTE — PROGRESS NOTES
NEPHROLOGY OFFICE VISIT   Josef Shanks 80 y o  male MRN: [id number]  8/19/2022    Reason for Visit:  Follow-up    INTERVAL HISTORY and SUBJECTIVE:    I had the pleasure of seeing Jonathan Bender today in the renal clinic  He is a 74-year-old male who is being followed for management of CKD  He was last seen in the nephrology clinic 02/03/2022  He is followed by Dr Orly Jaimes for management  Since last visit he has been doing quite well  He does have days where he is experiencing joint and muscle aches  He does not use NSAIDs  No chest pain or shortness of breath  No edema  No nausea vomiting diarrhea  ASSESSMENT AND PLAN:  Chronic kidney disease, stage IIIB:  · Baseline creatinine 1 5-1 7 mg/dL  · Current creatinine 1 61  · Urine protein creatinine ratio 0 63 which is higher than prior levels which have been primarily near 0 2-0 3 recently  · Etiology: Likely obstructive uropathy with nephrolithiasis, urinary retention, hydronephrosis, solitary kidney   · Completed kidney education class  · Follow-up appointment in approximately 6 months with Dr Orly Jaimes  · Work and urine studies prior to the appointment    Pressure/volume status:  · Essential hypertension  · Home BP:  Reports rather significant fluctuations in home blood pressure readings  Reviewed method to obtain accurate home readings and suggested he bring his monitor to the next appointment  Told him to monitor blood pressure for 1 week prior to next appointment in bring readings with him  · Medication: Metoprolol 25 mg 3 times a day    CKD MBD:  · On vitamin D3 1000 units daily      · Vitamin-D level 47 5  · Current phosphorus level normal  · PTH 78 2    Nephrolithiasis:  · Stone composition:  Calcium oxalate  · Prior Mag 3 scan:  Split renal function-left kidney 67%, right kidney 33%  · No recent episodes related to stone  · Prior intervention: Stent with exchanges,last exchange in July, next in Novemeber    Hyperkalemia:  · Mild, intermittent    Hyperlipidemia:  On atorvastatin    History of diastolic CHF/mild to moderate pulmonary hypertension/ AS  · No evidence pf decompensation  · [facility] 03/01/[facility] at [facility] 03/01/2021  Surgery performed by  Dr Silvia Fernandez     CVJENNIFER:  · Watchman in place    Other:    · COVID infection January 2022  Vaccinated but not boosted  · Had shingles vaccine  Prior episode of shingles        PATIENT INSTRUCTIONS:    Patient Instructions   Your seen today for evaluation of kidney function  At this time kidney function is stable with last labs showing that the creatinine is at baseline  Plan/recommendations:     Good blood pressure control essential   If blood pressure remains consistently elevated above 140/80 please call the office   Check blood pressure twice a day for 1 week prior to next appointment and bring readings to the next appointment   Low-salt diet   Avoid NSAIDs such as Motrin Aleve ibuprofen and Advil   Blood work and urine studies prior to next appointment     Obtain home blood pressure readings as follows:  · In the morning please take blood pressure reading before medications  Please sit quietly at least 5 minutes before taking blood pressure reading  Make sure your arm is supported at heart level  · In the evening please take blood pressure reading between 7-10 p m  prior to any evening medications and at least an hour after eating dinner  Sit quietly for at least 5 minutes  · Document readings twice a day for 1 week prior to office visit   · Document heart rate along with blood pressure reading   · General instructions:    · Make sure your sitting comfortably with back supported and both feet on the floor  Do not cross your legs  Do not talk during blood pressure measurement  Avoid coffee or caffeine at least 30 minutes prior to measurement  Do not take blood pressure measurement within 30 minutes of exercising  Do not smoke cigarettes    · If you are taking a reading while standing make sure your arm is supported at heart level and not dangling at your side  · Make sure the cuff is properly positioned above the crease at your elbow joint-   Check  guidelines         Orders Placed This Encounter   Procedures    CBC     Standing Status:   Future     Standing Expiration Date:   9/1/2023    Urinalysis with microscopic     Standing Status:   Future     Standing Expiration Date:   9/1/2023    Protein / creatinine ratio, urine     Standing Status:   Future     Standing Expiration Date:   9/1/2023    Renal function panel     Standing Status:   Future     Standing Expiration Date:   9/1/2023    PTH, intact     Standing Status:   Future     Standing Expiration Date:   9/1/2023    Vitamin D 25 hydroxy     Standing Status:   Future     Standing Expiration Date:   9/1/2023    Magnesium     Standing Status:   Future     Standing Expiration Date:   9/1/2023       OBJECTIVE:  Current Weight: Weight - Scale: 94 3 kg (208 lb)  Vitals:    08/19/22 0936   BP: 132/80   BP Location: Left arm   Patient Position: Sitting   Cuff Size: Standard   Pulse: 55   Weight: 94 3 kg (208 lb)   Height: 5' 10" (1 778 m)    Body mass index is 29 84 kg/m²  REVIEW OF SYSTEMS:    Review of Systems   Constitutional: Negative for activity change, appetite change, chills, fever and unexpected weight change  HENT: Negative for congestion, ear pain and sore throat  Eyes: Negative for visual disturbance  Respiratory: Negative for cough and shortness of breath  Cardiovascular: Negative for chest pain, palpitations and leg swelling  Gastrointestinal: Negative for abdominal pain, anal bleeding, constipation, diarrhea, nausea and vomiting  Endocrine: Negative  Genitourinary: Negative for dysuria and hematuria  Musculoskeletal: Positive for arthralgias  Negative for back pain  Skin: Negative for color change and rash  Allergic/Immunologic: Negative      Neurological: Negative for dizziness, seizures, syncope, weakness, light-headedness and headaches  Psychiatric/Behavioral: The patient is not nervous/anxious  All other systems reviewed and are negative  PHYSICAL EXAM:      Physical Exam  Constitutional:       General: He is not in acute distress  Appearance: He is well-developed  He is not ill-appearing, toxic-appearing or diaphoretic  HENT:      Head: Normocephalic and atraumatic  Nose: Nose normal  No congestion  Eyes:      General: No scleral icterus  Right eye: No discharge  Left eye: No discharge  Extraocular Movements: Extraocular movements intact  Conjunctiva/sclera: Conjunctivae normal       Pupils: Pupils are equal, round, and reactive to light  Neck:      Thyroid: No thyromegaly  Vascular: No carotid bruit (Referred sound bilaterally) or JVD  Trachea: No tracheal deviation  Cardiovascular:      Rate and Rhythm: Normal rate and regular rhythm  Heart sounds: No murmur heard  No friction rub  No gallop  Pulmonary:      Effort: Pulmonary effort is normal       Breath sounds: Normal breath sounds  Abdominal:      General: Bowel sounds are normal  There is no distension  Palpations: Abdomen is soft  There is no mass  Tenderness: There is no abdominal tenderness  There is no guarding or rebound  Musculoskeletal:         General: No swelling, tenderness or signs of injury  Normal range of motion  Cervical back: Normal range of motion and neck supple  No rigidity or tenderness  Right lower leg: No edema  Left lower leg: No edema  Skin:     General: Skin is warm and dry  Capillary Refill: Capillary refill takes less than 2 seconds  Coloration: Skin is not jaundiced or pale  Findings: No erythema or rash  Neurological:      General: No focal deficit present  Mental Status: He is alert and oriented to person, place, and time     Psychiatric:         Mood and Affect: Mood normal          Behavior: Behavior normal          Thought Content:  Thought content normal          Judgment: Judgment normal          Medications:    Current Outpatient Medications:     ascorbic acid (VITAMIN C) 250 mg tablet, Take 500 mg by mouth daily, Disp: , Rfl:     aspirin (ECOTRIN LOW STRENGTH) 81 mg EC tablet, Take 81 mg by mouth daily Pt to check with surgeon if needed to hold RX , Disp: , Rfl:     atorvastatin (LIPITOR) 40 mg tablet, Take 1 tablet (40 mg total) by mouth daily with dinner, Disp: 30 tablet, Rfl: 1    cholecalciferol (VITAMIN D3) 1,000 units tablet, Take 1,000 Units by mouth daily after lunch  , Disp: , Rfl:     clopidogrel (PLAVIX) 75 mg tablet, Take 75 mg by mouth daily Pt  To check with surgeon if needed to hold RX , Disp: , Rfl:     finasteride (PROSCAR) 5 mg tablet, TAKE 1 TABLET BY MOUTH EVERY DAY, Disp: 90 tablet, Rfl: 2    metoprolol tartrate (LOPRESSOR) 25 mg tablet, Take 1 tablet (25 mg total) by mouth every 8 (eight) hours, Disp: 270 tablet, Rfl: 3    Laboratory Results:        Invalid input(s): ALBUMIN    Results for orders placed or performed in visit on 07/07/22   Urine culture    Specimen: Urine, Clean Catch   Result Value Ref Range    Urine Culture No Growth <1000 cfu/mL    Basic metabolic panel   Result Value Ref Range    Sodium 140 136 - 145 mmol/L    Potassium 5 4 (H) 3 5 - 5 3 mmol/L    Chloride 104 100 - 108 mmol/L    CO2 29 21 - 32 mmol/L    ANION GAP 7 4 - 13 mmol/L    BUN 25 5 - 25 mg/dL    Creatinine 1 61 (H) 0 60 - 1 30 mg/dL    Glucose, Fasting 103 (H) 65 - 99 mg/dL    Calcium 9 1 8 3 - 10 1 mg/dL    eGFR 40 ml/min/1 73sq m   CBC   Result Value Ref Range    WBC 6 95 4 31 - 10 16 Thousand/uL    RBC 5 14 3 88 - 5 62 Million/uL    Hemoglobin 13 2 12 0 - 17 0 g/dL    Hematocrit 43 4 36 5 - 49 3 %    MCV 84 82 - 98 fL    MCH 25 7 (L) 26 8 - 34 3 pg    MCHC 30 4 (L) 31 4 - 37 4 g/dL    RDW 16 7 (H) 11 6 - 15 1 %    Platelets 544 (L) 618 - 390 Thousands/uL    MPV 9 7 8 9 - 12 7 fL   Magnesium   Result Value Ref Range    Magnesium 1 9 1 6 - 2 6 mg/dL   Phosphorus   Result Value Ref Range    Phosphorus 3 0 2 3 - 4 1 mg/dL   Vitamin D 25 hydroxy   Result Value Ref Range    Vit D, 25-Hydroxy 47 5 30 0 - 100 0 ng/mL   PTH, intact   Result Value Ref Range    PTH 78 2 18 4 - 80 1 pg/mL   Protein / creatinine ratio, urine   Result Value Ref Range    Creatinine, Ur 126 0 mg/dL    Protein Urine Random 80 mg/dL    Prot/Creat Ratio, Ur 0 63 (H) 0 00 - 0 10

## 2022-08-19 ENCOUNTER — OFFICE VISIT (OUTPATIENT)
Dept: NEPHROLOGY | Facility: CLINIC | Age: 82
End: 2022-08-19
Payer: MEDICARE

## 2022-08-19 VITALS
WEIGHT: 208 LBS | SYSTOLIC BLOOD PRESSURE: 132 MMHG | BODY MASS INDEX: 29.78 KG/M2 | HEART RATE: 55 BPM | HEIGHT: 70 IN | DIASTOLIC BLOOD PRESSURE: 80 MMHG

## 2022-08-19 DIAGNOSIS — I50.32 CHRONIC DIASTOLIC CHF (CONGESTIVE HEART FAILURE) (HCC): ICD-10-CM

## 2022-08-19 DIAGNOSIS — I10 BENIGN ESSENTIAL HYPERTENSION: ICD-10-CM

## 2022-08-19 DIAGNOSIS — M89.9 CHRONIC KIDNEY DISEASE-MINERAL AND BONE DISORDER: ICD-10-CM

## 2022-08-19 DIAGNOSIS — E83.9 CHRONIC KIDNEY DISEASE-MINERAL AND BONE DISORDER: ICD-10-CM

## 2022-08-19 DIAGNOSIS — I48.20 CHRONIC ATRIAL FIBRILLATION (HCC): Primary | ICD-10-CM

## 2022-08-19 DIAGNOSIS — N18.30 BENIGN HYPERTENSION WITH CKD (CHRONIC KIDNEY DISEASE) STAGE III (HCC): ICD-10-CM

## 2022-08-19 DIAGNOSIS — E78.5 DYSLIPIDEMIA: ICD-10-CM

## 2022-08-19 DIAGNOSIS — N18.9 CHRONIC KIDNEY DISEASE-MINERAL AND BONE DISORDER: ICD-10-CM

## 2022-08-19 DIAGNOSIS — N18.32 STAGE 3B CHRONIC KIDNEY DISEASE (HCC): ICD-10-CM

## 2022-08-19 DIAGNOSIS — D50.9 IRON DEFICIENCY ANEMIA, UNSPECIFIED IRON DEFICIENCY ANEMIA TYPE: ICD-10-CM

## 2022-08-19 DIAGNOSIS — R31.0 HEMATURIA, GROSS: ICD-10-CM

## 2022-08-19 DIAGNOSIS — E55.9 VITAMIN D DEFICIENCY: ICD-10-CM

## 2022-08-19 DIAGNOSIS — N25.81 SECONDARY HYPERPARATHYROIDISM (HCC): ICD-10-CM

## 2022-08-19 DIAGNOSIS — I12.9 BENIGN HYPERTENSION WITH CKD (CHRONIC KIDNEY DISEASE) STAGE III (HCC): ICD-10-CM

## 2022-08-19 PROCEDURE — 99214 OFFICE O/P EST MOD 30 MIN: CPT | Performed by: NURSE PRACTITIONER

## 2022-08-19 NOTE — PATIENT INSTRUCTIONS
Your seen today for evaluation of kidney function  At this time kidney function is stable with last labs showing that the creatinine is at baseline  Plan/recommendations:    Good blood pressure control essential   If blood pressure remains consistently elevated above 140/80 please call the office  Check blood pressure twice a day for 1 week prior to next appointment and bring readings to the next appointment  Low-salt diet  Avoid NSAIDs such as Motrin Aleve ibuprofen and Advil  Blood work and urine studies prior to next appointment     Obtain home blood pressure readings as follows: In the morning please take blood pressure reading before medications  Please sit quietly at least 5 minutes before taking blood pressure reading  Make sure your arm is supported at heart level  In the evening please take blood pressure reading between 7-10 p m  prior to any evening medications and at least an hour after eating dinner  Sit quietly for at least 5 minutes  Document readings twice a day for 1 week prior to office visit   Document heart rate along with blood pressure reading   General instructions:    Make sure your sitting comfortably with back supported and both feet on the floor  Do not cross your legs  Do not talk during blood pressure measurement  Avoid coffee or caffeine at least 30 minutes prior to measurement  Do not take blood pressure measurement within 30 minutes of exercising  Do not smoke cigarettes  If you are taking a reading while standing make sure your arm is supported at heart level and not dangling at your side    Make sure the cuff is properly positioned above the crease at your elbow joint-   Check  guidelines

## 2022-09-14 ENCOUNTER — OFFICE VISIT (OUTPATIENT)
Dept: OTOLARYNGOLOGY | Facility: CLINIC | Age: 82
End: 2022-09-14
Payer: MEDICARE

## 2022-09-14 VITALS — TEMPERATURE: 97.6 F | HEIGHT: 70 IN | BODY MASS INDEX: 29.63 KG/M2 | WEIGHT: 207 LBS

## 2022-09-14 DIAGNOSIS — H90.3 SENSORINEURAL HEARING LOSS (SNHL), BILATERAL: ICD-10-CM

## 2022-09-14 DIAGNOSIS — H61.23 BILATERAL IMPACTED CERUMEN: Primary | ICD-10-CM

## 2022-09-14 DIAGNOSIS — S02.2XXD: ICD-10-CM

## 2022-09-14 PROCEDURE — 69210 REMOVE IMPACTED EAR WAX UNI: CPT | Performed by: NURSE PRACTITIONER

## 2022-09-14 PROCEDURE — 99214 OFFICE O/P EST MOD 30 MIN: CPT | Performed by: NURSE PRACTITIONER

## 2022-09-14 NOTE — PROGRESS NOTES
Assessment/Plan:    Closed nondisplaced fracture of nasal bone with routine healing  History of fall and facial injury with nasal bone fracture  No nasal concerns voiced during visit today  Bilateral impacted cerumen    On exam noted bilateral cerumen impaction and unable to fully view tympanic membrane  Cerumen impaction removed bilateral eac with alligator forceps and suction, pt tolerated procedure well  Upon removal, improved hearing and decreased clogged sensation of bilateral ears  Discussed routine cerumen care including avoidance of q-tips and cerumen softeners  Encourage ongoing follow up annually to monitor for cerumen and hearing  Sensorineural hearing loss (SNHL) of both ears  Reviewed last Audiogram 08/25/2021 indicating left mild sloping to severe SNHL, right moderate/severe to profound SNHL  Word discrimination on right is 52 and 92 on left  Tymps type A bilaterally  Continue current hearing aids and hearing aid care with [facility]  Repeat audiogram in near future  Diagnoses and all orders for this visit:    Bilateral impacted cerumen  -     Ear cerumen removal    Sensorineural hearing loss (SNHL), bilateral    Closed nondisplaced fracture of nasal bone with routine healing          Subjective:      Patient ID: Fredo Christie is a 80 y o  male  Presents today for follow up due to nose and ear concerns  Hearing stable since last visit  No otalgia or otorrhea  Obtained hearing aids after last visit through our [facility]  No concerns with nose today  The following portions of the patient's history were reviewed and updated as appropriate: allergies, current medications, past family history, past medical history, past social history, past surgical history and problem list     Review of Systems   Constitutional: Negative  HENT: Positive for hearing loss   Negative for congestion, ear discharge, ear pain, nosebleeds, postnasal drip, rhinorrhea, sinus pressure, sinus pain, sore throat, tinnitus and voice change  Eyes: Negative  Respiratory: Negative for chest tightness and shortness of breath  Cardiovascular: Negative  Gastrointestinal: Negative  Endocrine: Negative  Musculoskeletal: Negative  Skin: Negative for color change  Neurological: Negative for dizziness, numbness and headaches  Psychiatric/Behavioral: Negative  Objective:      Temp 97 6 °F (36 4 °C) (Temporal)   Ht 5' 10" (1 778 m)   Wt 93 9 kg (207 lb)   BMI 29 70 kg/m²            Physical Exam  Constitutional:       Appearance: He is well-developed  HENT:      Head: Normocephalic  Right Ear: Hearing, tympanic membrane, ear canal and external ear normal  No decreased hearing noted  No drainage or tenderness  There is impacted cerumen  Tympanic membrane is not perforated or erythematous  Left Ear: Hearing, tympanic membrane, ear canal and external ear normal  No decreased hearing noted  No drainage or tenderness  There is impacted cerumen  Tympanic membrane is not perforated or erythematous  Nose: Nose normal  No nasal deformity or septal deviation  Mouth/Throat:      Mouth: Mucous membranes are not pale and not dry  No oral lesions  Dentition: Normal dentition  Pharynx: Uvula midline  No oropharyngeal exudate  Neck:      Trachea: No tracheal deviation  Cardiovascular:      Rate and Rhythm: Normal rate  Pulmonary:      Effort: Pulmonary effort is normal  No accessory muscle usage or respiratory distress  Musculoskeletal:      Right shoulder: Normal range of motion  Cervical back: Full passive range of motion without pain, normal range of motion and neck supple  Lymphadenopathy:      Cervical: No cervical adenopathy  Skin:     General: Skin is warm and dry  Neurological:      Mental Status: He is alert and oriented to person, place, and time  Cranial Nerves: No cranial nerve deficit  Sensory: No sensory deficit  Psychiatric:         Behavior: Behavior is cooperative  Ear cerumen removal    Date/Time: 9/14/2022 10:20 AM  Performed by: KELLY Mccord  Authorized by: KELLY Mccord   Universal Protocol:  Consent: Verbal consent obtained  Risks and benefits: risks, benefits and alternatives were discussed  Consent given by: patient  Patient understanding: patient states understanding of the procedure being performed      Patient location:  Clinic  Procedure details:     Local anesthetic:  None    Location:  L ear and R ear    Approach:  External  Post-procedure details:     Complication:  None    Hearing quality:  Normal    Patient tolerance of procedure:   Tolerated well, no immediate complications

## 2022-09-14 NOTE — ASSESSMENT & PLAN NOTE
History of fall and facial injury with nasal bone fracture  No nasal concerns voiced during visit today

## 2022-09-14 NOTE — ASSESSMENT & PLAN NOTE
Reviewed last Audiogram 08/25/2021 indicating left mild sloping to severe SNHL, right moderate/severe to profound SNHL  Word discrimination on right is 52 and 92 on left  Tymps type A bilaterally  Continue current hearing aids and hearing aid care with [facility]  Repeat audiogram in near future

## 2022-09-15 ENCOUNTER — OFFICE VISIT (OUTPATIENT)
Dept: UROLOGY | Facility: CLINIC | Age: 82
End: 2022-09-15
Payer: MEDICARE

## 2022-09-15 VITALS
HEART RATE: 68 BPM | SYSTOLIC BLOOD PRESSURE: 124 MMHG | WEIGHT: 206 LBS | BODY MASS INDEX: 29.49 KG/M2 | OXYGEN SATURATION: 98 % | HEIGHT: 70 IN | DIASTOLIC BLOOD PRESSURE: 78 MMHG

## 2022-09-15 DIAGNOSIS — N20.0 NEPHROLITHIASIS: Primary | ICD-10-CM

## 2022-09-15 LAB
SL AMB  POCT GLUCOSE, UA: NORMAL
SL AMB LEUKOCYTE ESTERASE,UA: NORMAL
SL AMB POCT BILIRUBIN,UA: NORMAL
SL AMB POCT BLOOD,UA: NORMAL
SL AMB POCT CLARITY,UA: NORMAL
SL AMB POCT COLOR,UA: YELLOW
SL AMB POCT KETONES,UA: NORMAL
SL AMB POCT NITRITE,UA: NORMAL
SL AMB POCT PH,UA: 6
SL AMB POCT SPECIFIC GRAVITY,UA: 1.02
SL AMB POCT URINE PROTEIN: NORMAL
SL AMB POCT UROBILINOGEN: 0.2

## 2022-09-15 PROCEDURE — 81002 URINALYSIS NONAUTO W/O SCOPE: CPT | Performed by: PHYSICIAN ASSISTANT

## 2022-09-15 PROCEDURE — 99213 OFFICE O/P EST LOW 20 MIN: CPT | Performed by: PHYSICIAN ASSISTANT

## 2022-09-15 NOTE — PROGRESS NOTES
1  Nephrolithiasis  POCT urine dip       Assessment and plan:       1  Nephrolithiasis  2  Right ureteral stricture   -patient will be coordinated for his next cystoscopy, retrograde pyelogram, and right ureteral stent exchange  Risks of the procedure reviewed including but not limited to cardiopulmonary complication, bleeding, infection, VTE, need for additional procedures  - consent signed    Jimena Pedersen PA-C      Chief Complaint     Ureteral stricture     History of Present Illness     Mikie Estrella is a 80 y o  male patient of Dr Amrita Patel presenting for follow up  The patient has had numerous (at least 7) prior stone surgeries in [place] and developed a stricture of the right mid and proximal ureter  Hx from Dr Neeta Gleason notes:  -Stent placement and holmium laser of stone and stricture 4/18/17  -Holmium laser of stone and stent exchange 5/2/17  -Renal stones treated elsewhere in [place] in past year (7 procedures)  -Right laser lithotripsy, laser infundibulotomy, and stone basket extraction 6/2/17  -Right CRUSH and laser incision of proximal ureter 7/18/17  -Stent changed 05/10/19-- stone causing near complete obstruction of the right proximal ureter   Required stent to be removed in order for a guidewire to pass  -Stent exchange 10/22/19 found it impossible to advance the wire without removing the stent first    -Stent exchange 01/28/20 found it once again impossible to advance the wire without removing the stent first    Stent due for change in 12 wks  -Stent exchange by Dr Arroyo Grade 02/14/21 right stent change and ureteroscopy   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  - Stent exchange 05/11/21 - Difficulty getting wire past the obstruction without removing  - Stent exchange 10/19/2021- partial encrustation, wire could not pass  mid ureter alongside stent until stent was pulled out  7x26 stent placed      The patient has CKD but creatinine appears stable with GFR of 40 since at least 2018  He follows with Nephrology  Last stent exchange 7/15/22:  PLAN: We will arrange stent exchange in 3 months  While his hydronephrosis did appear worse today his renal function is stable and his recent Mag 3 Lasix renal scan suggests that his kidney is draining and maintaining function    Next stent exchange 11/1/22 with Dr Suzie Jackson  Urine dip leukocyte and blood positive, nitrite negative  Laboratory     Lab Results   Component Value Date    CREATININE 1 61 (H) 07/07/2022       Review of Systems     Review of Systems   Constitutional: Negative for activity change, appetite change, chills, diaphoresis, fatigue, fever and unexpected weight change  Respiratory: Negative for chest tightness and shortness of breath  Cardiovascular: Negative for chest pain, palpitations and leg swelling  Gastrointestinal: Negative for abdominal distention, abdominal pain, constipation, diarrhea, nausea and vomiting  Genitourinary: Negative for decreased urine volume, difficulty urinating, dysuria, enuresis, flank pain, frequency, genital sores, hematuria and urgency  Musculoskeletal: Negative for back pain, gait problem and myalgias  Skin: Negative for color change, pallor, rash and wound  Psychiatric/Behavioral: Negative for behavioral problems  The patient is not nervous/anxious  Allergies     No Known Allergies    Physical Exam     Physical Exam  Constitutional:       General: He is not in acute distress  Appearance: Normal appearance  He is normal weight  He is not ill-appearing, toxic-appearing or diaphoretic  HENT:      Head: Normocephalic and atraumatic  Eyes:      General:         Right eye: No discharge  Left eye: No discharge  Conjunctiva/sclera: Conjunctivae normal    Cardiovascular:      Rate and Rhythm: Normal rate and regular rhythm  Heart sounds: Normal heart sounds  No murmur heard  No friction rub     Pulmonary:      Effort: Pulmonary effort is normal  No respiratory distress  Breath sounds: Normal breath sounds  No stridor  Musculoskeletal:         General: No swelling or tenderness  Normal range of motion  Skin:     General: Skin is warm and dry  Coloration: Skin is not jaundiced or pale  Neurological:      General: No focal deficit present  Mental Status: He is alert and oriented to person, place, and time  Psychiatric:         Mood and Affect: Mood normal          Behavior: Behavior normal          Thought Content:  Thought content normal          Vital Signs     Vitals:    09/15/22 1041   BP: 124/78   Pulse: 68   SpO2: 98%   Weight: 93 4 kg (206 lb)   Height: 5' 10" (1 778 m)         Current Medications       Current Outpatient Medications:     ascorbic acid (VITAMIN C) 250 mg tablet, Take 500 mg by mouth daily, Disp: , Rfl:     aspirin (ECOTRIN LOW STRENGTH) 81 mg EC tablet, Take 81 mg by mouth daily Pt to check with surgeon if needed to hold RX , Disp: , Rfl:     atorvastatin (LIPITOR) 40 mg tablet, Take 1 tablet (40 mg total) by mouth daily with dinner, Disp: 30 tablet, Rfl: 1    cholecalciferol (VITAMIN D3) 1,000 units tablet, Take 1,000 Units by mouth daily after lunch  , Disp: , Rfl:     clopidogrel (PLAVIX) 75 mg tablet, Take 75 mg by mouth daily Pt  To check with surgeon if needed to hold RX , Disp: , Rfl:     finasteride (PROSCAR) 5 mg tablet, TAKE 1 TABLET BY MOUTH EVERY DAY, Disp: 90 tablet, Rfl: 2    metoprolol tartrate (LOPRESSOR) 25 mg tablet, Take 1 tablet (25 mg total) by mouth every 8 (eight) hours, Disp: 270 tablet, Rfl: 3      Active Problems     Patient Active Problem List   Diagnosis    Chronic atrial fibrillation (HCC)    Benign essential hypertension    Renal calculi    JUNIOR [facility]acute kidney injury) ([facility] [address] )    Calculus of ureter    Crossing vessel and stricture of ureter without hydronephrosis    Hematuria, gross    Hydronephrosis with ureteral stricture, not elsewhere classified    CVA (cere[facility] vascular accident) [facility])    Chronic diastolic CHF (congestive heart failure) ([facility] )    Nonrheumatic aortic valve stenosis    Bilateral carotid artery disease (HCC)    Aneurysm of anterior communicating artery    Tooth pain    Onychomycosis    Syncope vs seizure    Vasovagal near syncope    Acute kidney injury superimposed on chronic kidney disease ([facility] )    Secondary hyperparathyroidism ([facility] )    Vitamin D deficiency    Patellofemoral syndrome of left knee    Moderate mitral regurgitation    Moderate aortic regurgitation    GERD (gastroesophageal reflux disease)    Dyslipidemia    Stage 3b chronic kidney disease ([facility] )    Aneurysm ([facility] )    Stroke ([place] [address] )    Facial laceration    Closed nondisplaced fracture of nasal bone with routine healing    Sensorineural hearing loss (SNHL) of both ears    Iron deficiency anemia, unspecified    Benign hypertension with CKD (chronic kidney disease) stage III (HCC)    Chronic kidney disease-mineral and bone disorder    Iron deficiency    Hematuria    History of aortic valve replacement    Bilateral impacted cerumen         Past Medical History     Past Medical History:   Diagnosis Date    Anemia     iron deficiency    Aneurysm ([place] [address] )     of brain  being monitored    Dental disease     Essential hypertension, long-standing     Heart murmur     per pt "Aortic Valve replacement 2021 with Watchman device implanted /2021"    Hematuria     intermittent    History of cigarette smoking, chronic     64 year smoker at one half pack per day, quit 04/1/17    History of COVID-19 01/19/2022    recovered at home/chief s/s only sore throat    History of kidney stones     History of pneumonia     HL (hearing loss)     Hyperlipidemia     Hypertension     Irregular heart beat     Kidney stone     Muscle weakness     right sided weakness has gotten better/ walks independantly"    Stroke ([place] [address] ) 10/29/2020    right sided  weakness almost full recovery    Stroke ([place] [address] )     Teeth missing     Vitamin D deficiency     maintained with 1000 units of D    Water retention     Wears glasses          Surgical History     Past Surgical History:   Procedure Laterality Date    APPENDECTOMY  1960    CARDIAC SURGERY      watchman [id number]; aortic valve replaced 2021(pig valve)    CAROTID ENDARTERECTOMY Left 1998    Supposedly no internal stenosis found    CYSTOSCOPY Right 8/15/2017    Procedure: CYSTOSCOPY RIGHT STENT EXCHANGE;  Surgeon: Aden Espinoza MD;  Location: [address] [place] [address];  Service: Urology    CYSTOSCOPY      CYSTOSCOPY N/A 3/11/2022    Procedure: CYSTOSCOPY, RIGHT RETROGRADE PYLEOGRAM;  Surgeon: Miguel Huerta MD;  Location:  MAIN OR;  Service: Urology   [address] [facility] [address]  LITHOTRIPSY  03/2017    For nephrolithiasis at [address]  5/10/2019    FL RETROGRADE PYELOGRAM  7/30/2019    FL RETROGRADE PYELOGRAM  10/22/2019    FL RETROGRADE PYELOGRAM  1/28/2020    FL RETROGRADE PYELOGRAM  8/11/2020    FL RETROGRADE PYELOGRAM  12/15/2020    FL RETROGRADE PYELOGRAM  2/14/2021    FL RETROGRADE PYELOGRAM  5/11/2021    FL RETROGRADE PYELOGRAM  10/19/2021    FL RETROGRADE PYELOGRAM  3/11/2022    FL RETROGRADE PYELOGRAM  7/15/2022    AL CYSTO/URETERO W/LITHOTRIPSY &INDWELL STENT INSRT Right 5/2/2017    Procedure: CYSTOSCOPY URETEROSCOPY WITH LITHOTRIPSY HOLMIUM LASER, RETROGRADE PYELOGRAM AND INSERTION STENT URETERAL;  Surgeon: Aden Espinoza MD;  Location: [address] [place] [address];  Service: Urology    AL CYSTO/URETERO W/LITHOTRIPSY &INDWELL STENT INSRT Right 5/16/2017    Procedure: CYSTOSCOPY, RETROGRADE PYELOGRAM,  FLEXIBLE URETEROSCOPY,  HOLMIUM LASER LITHOTRIPSY, STONE BASKET MANIPULATION,  STENT URETERAL EXCHANGE;  Surgeon: Aden Espinoza MD;  Location: [address] [place] [address];  Service: Urology    AL CYSTO/URETERO W/LITHOTRIPSY &INDWELL STENT INSRT Right 6/2/2017    Procedure: CYSTOSCOPY, RETROGRADE, STONE MANIPULATION WITH HOLMIUM LASER, STENT PLACEMENT;  Surgeon: Joana Guillen MD;  Location: [address] [place] [address];  Service: Urology    KS CYSTO/URETERO W/LITHOTRIPSY &INDWELL STENT INSRT Right 7/18/2017    Procedure: CYSTOSCOPY, RETROGRADE, URETEROSCOPY HOLMIUM LASER [place],  AND STENT PLACEMENT;  Surgeon: Joana Guillen MD;  Location: [address] [place] [address];  Service: Urology    KS CYSTO/URETERO W/LITHOTRIPSY &INDWELL STENT INSRT Right 2/14/2021    Procedure: CYSTOSCOPY URETEROSCOPY, RETROGRADE PYELOGRAM, STONE MANIPULATION AND EXCHANGE STENT URETERAL;  Surgeon: Narendra Avila MD;  Location: [address] [place] [address];  Service: Urology    KS CYSTOSCOPY,INSERT URETERAL STENT Right 11/14/2017    Procedure: EXCHANGE STENT URETERAL;  Surgeon: Joana Guillen MD;  Location: [address] [place] [address];  Service: Urology    KS CYSTOSCOPY,INSERT URETERAL STENT Right 3/11/2022    Procedure: EXCHANGE STENT URETERAL;  Surgeon: Soham Morton MD;  [facility];  Service: Urology    KS CYSTOURETHROSCOPY,URETER CATHETER Right 11/14/2017    Procedure: CYSTOSCOPY RETROGRADE, STENT EXCHANGE;  Surgeon: Joana Guillen MD;  Location: [address] [place] [address];  Service: Urology    KS CYSTOURETHROSCOPY,URETER CATHETER Right 5/8/2018    Procedure: Angeline Obey;  Surgeon: Joana Guillen MD;  Location: [address] [place] [address];  Service: Urology    KS CYSTOURETHROSCOPY,URETER CATHETER Right 8/14/2018    Procedure: Andrew Reamer EXCHANGE;  Surgeon: Joana Guillen MD;  Location: [address] [place] [address];  Service: Urology    KS CYSTOURETHROSCOPY,URETER CATHETER Right 11/13/2018    Procedure: CYSTOSCOPY, [place] EXCHANGE, RETROGRADE;  Surgeon: Jaona Guillen MD;  Location: [address] [place] [address];  Service: Urology    KS CYSTOURETHROSCOPY,URETER CATHETER Right 2/12/2019    Procedure: CYSTOSCOPY, RETROGRADE, STENT REMOVAL AND STENT PLACEMENT;  Surgeon: Joana Guillen MD;  Location: [address] [place] [address];  Service: Urology    KS CYSTOURETHROSCOPY,URETER CATHETER Right 5/10/2019    Procedure: CYSTOSCOPY, RETROGRADE, STENT EXCHANGE;  Surgeon: Bijal Claire MD;  Location: [address] [place] [address];  Service: Urology    PA CYSTOURETHROSCOPY,URETER CATHETER Right 7/30/2019    Procedure: Guillen Fu, STENT REMOVAL AND PLACEMENT OF STENT;  Surgeon: Bijal Claire MD;  Location: [address] [place] [address];  Service: Urology    PA CYSTOURETHROSCOPY,URETER CATHETER Right 10/22/2019    Procedure: Guillen Fu, STENT EXCHANGE;  Surgeon: Bijal Claire MD;  Location: [address] [place] [address];  Service: Urology    PA CYSTOURETHROSCOPY,URETER CATHETER Right 1/28/2020    Procedure: Guillen Fu, STENT REMOVAL AND STENT PLACEMENT;  Surgeon: Bijal Claire MD;  Location: [address] [place] [address];  Service: Urology    PA CYSTOURETHROSCOPY,URETER CATHETER Right 5/22/2020    Procedure: CYSTOSCOPY RETROGRADE, STENT EXCHANGE;  Surgeon: Bijal Claire MD;  Location: [address] [place] [address];  Service: Urology    PA CYSTOURETHROSCOPY,URETER CATHETER Right 8/11/2020    Procedure: CYSTOSCOPY, STENT EXCHANGE, RETROGRADE;  Surgeon: Bijal Claire MD;  Location: [address] [place] [address];  Service: Urology    PA CYSTOURETHROSCOPY,URETER CATHETER Right 12/15/2020    Procedure: CYSTOSCOPY, RETROGRADE, STENT REMOVAL, AND STENT PLACEMENT;  Surgeon: Bijal Claire MD;  Location: [address] [place] [address];  Service: Urology    PA CYSTOURETHROSCOPY,URETER CATHETER Right 5/11/2021    Procedure: CYSTOSCOPY RETROGRADE PYELOGRAM WITH STENT EXCHANGE;  Surgeon: Bijal Claire MD;  Location: [address] [place] [address];  Service: Urology    PA CYSTOURETHROSCOPY,URETER CATHETER Right 10/19/2021    Procedure: CYSTOSCOPY WITH RETROGRADE, STENT EXCHANGE;  Surgeon: Bijal Claire MD;  Location: [address] [place] [address];  Service: Urology    PA CYSTOURETHROSCOPY,URETER CATHETER Right 7/15/2022    Procedure: CYSTOSCOPY, RETROGRADE PYELOGRAM WITH EXCHANGE STENT URETERAL;  Surgeon: Yolanda Orozco MD;  [facility];  Service: Urology    PROSTATE SURGERY  2015    Laser Prostatectomy  by Dr Galina Nelson STENT PLACEMENT Right 4/18/2017    Procedure: INSERTION STENT URETERAL, RIGHT URETEROSCOPY,  LASER OF URETERAL STRICTURE AND STONE;  Surgeon: Isaías Charles MD;  [facility]: [facility];  Service:     URETERAL STENT PLACEMENT Right 2/13/2018    Procedure: Arthuro Gallop;  Surgeon: Isaías Charles MD;  [facility];  Service: Urology         Family History     Family History   Problem Relation Age of Onset    Hypertension Mother     Alcohol abuse Father     Cirrhosis Father     Cancer Son 15        cancer-knee and above-left-amputation    Cancer Sister     Other Brother         head mass    Thyroid disease unspecified Sister     Arthritis Sister     Cancer Sister     Other Brother         legally blind in one eye         Social History     Social History       Radiology

## 2022-10-12 PROBLEM — S01.81XA FACIAL LACERATION: Status: RESOLVED | Noted: 2021-08-09 | Resolved: 2022-10-12

## 2022-10-18 ENCOUNTER — APPOINTMENT (OUTPATIENT)
Dept: LAB | Facility: HOSPITAL | Age: 82
End: 2022-10-18
Payer: MEDICARE

## 2022-10-18 ENCOUNTER — HOSPITAL ENCOUNTER (OUTPATIENT)
Dept: RADIOLOGY | Facility: HOSPITAL | Age: 82
Discharge: HOME | End: 2022-10-18
Payer: MEDICARE

## 2022-10-18 DIAGNOSIS — N13.30 HYDRONEPHROSIS, UNSPECIFIED HYDRONEPHROSIS TYPE: ICD-10-CM

## 2022-10-18 DIAGNOSIS — D50.0 IRON DEFICIENCY ANEMIA DUE TO CHRONIC BLOOD LOSS: ICD-10-CM

## 2022-10-18 DIAGNOSIS — E61.1 IRON DEFICIENCY: ICD-10-CM

## 2022-10-18 DIAGNOSIS — R06.02 SHORTNESS OF BREATH: ICD-10-CM

## 2022-10-18 DIAGNOSIS — R39.89 SUSPECTED UTI: ICD-10-CM

## 2022-10-18 DIAGNOSIS — R31.9 HEMATURIA, UNSPECIFIED TYPE: ICD-10-CM

## 2022-10-18 DIAGNOSIS — E78.00 PURE HYPERCHOLESTEROLEMIA: ICD-10-CM

## 2022-10-18 LAB
ALBUMIN SERPL BCP-MCNC: 3.6 G/DL (ref 3.5–5)
ALP SERPL-CCNC: 121 U/L (ref 46–116)
ALT SERPL W P-5'-P-CCNC: 15 U/L (ref 12–78)
ANION GAP SERPL CALCULATED.3IONS-SCNC: 5 MMOL/L (ref 4–13)
AST SERPL W P-5'-P-CCNC: 24 U/L (ref 5–45)
ATRIAL RATE: 81 BPM
BASOPHILS # BLD AUTO: 0.06 THOUSANDS/ÂΜL (ref 0–0.1)
BASOPHILS NFR BLD AUTO: 1 % (ref 0–1)
BILIRUB SERPL-MCNC: 0.97 MG/DL (ref 0.2–1)
BUN SERPL-MCNC: 23 MG/DL (ref 5–25)
CALCIUM SERPL-MCNC: 9.4 MG/DL (ref 8.3–10.1)
CHLORIDE SERPL-SCNC: 105 MMOL/L (ref 96–108)
CHOLEST SERPL-MCNC: 99 MG/DL
CO2 SERPL-SCNC: 29 MMOL/L (ref 21–32)
CREAT SERPL-MCNC: 1.55 MG/DL (ref 0.6–1.3)
EOSINOPHIL # BLD AUTO: 0.27 THOUSAND/ÂΜL (ref 0–0.61)
EOSINOPHIL NFR BLD AUTO: 3 % (ref 0–6)
ERYTHROCYTE [DISTWIDTH] IN BLOOD BY AUTOMATED COUNT: 17.3 % (ref 11.6–15.1)
FERRITIN SERPL-MCNC: 24 NG/ML (ref 8–388)
GFR SERPL CREATININE-BSD FRML MDRD: 41 ML/MIN/1.73SQ M
GLUCOSE P FAST SERPL-MCNC: 91 MG/DL (ref 65–99)
HCT VFR BLD AUTO: 46.6 % (ref 36.5–49.3)
HDLC SERPL-MCNC: 42 MG/DL
HGB BLD-MCNC: 14.6 G/DL (ref 12–17)
IMM GRANULOCYTES # BLD AUTO: 0.04 THOUSAND/UL (ref 0–0.2)
IMM GRANULOCYTES NFR BLD AUTO: 1 % (ref 0–2)
IRON SATN MFR SERPL: 18 % (ref 20–50)
IRON SERPL-MCNC: 63 UG/DL (ref 65–175)
LDLC SERPL CALC-MCNC: 40 MG/DL (ref 0–100)
LYMPHOCYTES # BLD AUTO: 1.76 THOUSANDS/ÂΜL (ref 0.6–4.47)
LYMPHOCYTES NFR BLD AUTO: 22 % (ref 14–44)
MCH RBC QN AUTO: 27.3 PG (ref 26.8–34.3)
MCHC RBC AUTO-ENTMCNC: 31.3 G/DL (ref 31.4–37.4)
MCV RBC AUTO: 87 FL (ref 82–98)
MONOCYTES # BLD AUTO: 0.9 THOUSAND/ÂΜL (ref 0.17–1.22)
MONOCYTES NFR BLD AUTO: 11 % (ref 4–12)
NEUTROPHILS # BLD AUTO: 4.93 THOUSANDS/ÂΜL (ref 1.85–7.62)
NEUTS SEG NFR BLD AUTO: 62 % (ref 43–75)
NONHDLC SERPL-MCNC: 57 MG/DL
NRBC BLD AUTO-RTO: 0 /100 WBCS
PLATELET # BLD AUTO: 156 THOUSANDS/UL (ref 149–390)
PMV BLD AUTO: 9.8 FL (ref 8.9–12.7)
POTASSIUM SERPL-SCNC: 4.7 MMOL/L (ref 3.5–5.3)
PROT SERPL-MCNC: 7.4 G/DL (ref 6.4–8.4)
QRS AXIS: -59 DEGREES
QRSD INTERVAL: 102 MS
QT INTERVAL: 404 MS
QTC INTERVAL: 420 MS
RBC # BLD AUTO: 5.34 MILLION/UL (ref 3.88–5.62)
SODIUM SERPL-SCNC: 139 MMOL/L (ref 135–147)
T WAVE AXIS: 39 DEGREES
TIBC SERPL-MCNC: 358 UG/DL (ref 250–450)
TRIGL SERPL-MCNC: 83 MG/DL
VENTRICULAR RATE: 65 BPM
WBC # BLD AUTO: 7.96 THOUSAND/UL (ref 4.31–10.16)

## 2022-10-18 PROCEDURE — 93880 EXTRACRANIAL BILAT STUDY: CPT

## 2022-10-18 PROCEDURE — 93010 ELECTROCARDIOGRAM REPORT: CPT | Performed by: INTERNAL MEDICINE

## 2022-10-18 PROCEDURE — 83540 ASSAY OF IRON: CPT

## 2022-10-18 PROCEDURE — 85025 COMPLETE CBC W/AUTO DIFF WBC: CPT

## 2022-10-18 PROCEDURE — 80061 LIPID PANEL: CPT

## 2022-10-18 PROCEDURE — 87086 URINE CULTURE/COLONY COUNT: CPT

## 2022-10-18 PROCEDURE — 36415 COLL VENOUS BLD VENIPUNCTURE: CPT

## 2022-10-18 PROCEDURE — 93880 EXTRACRANIAL BILAT STUDY: CPT | Performed by: SURGERY

## 2022-10-18 PROCEDURE — 82728 ASSAY OF FERRITIN: CPT

## 2022-10-18 PROCEDURE — 83550 IRON BINDING TEST: CPT

## 2022-10-18 PROCEDURE — 80053 COMPREHEN METABOLIC PANEL: CPT

## 2022-10-19 LAB — BACTERIA UR CULT: NORMAL

## 2022-10-27 ENCOUNTER — OFFICE VISIT (OUTPATIENT)
Dept: HEMATOLOGY ONCOLOGY | Facility: MEDICAL CENTER | Age: 82
End: 2022-10-27

## 2022-10-27 ENCOUNTER — HOSPITAL ENCOUNTER (OUTPATIENT)
Dept: NON INVASIVE DIAGNOSTICS | Facility: HOSPITAL | Age: 82
Discharge: HOME | End: 2022-10-27
Payer: MEDICARE

## 2022-10-27 VITALS
HEIGHT: 70 IN | BODY MASS INDEX: 29.49 KG/M2 | WEIGHT: 206 LBS | SYSTOLIC BLOOD PRESSURE: 168 MMHG | HEART RATE: 59 BPM | DIASTOLIC BLOOD PRESSURE: 88 MMHG

## 2022-10-27 VITALS
WEIGHT: 212 LBS | HEIGHT: 70 IN | HEART RATE: 67 BPM | BODY MASS INDEX: 30.35 KG/M2 | OXYGEN SATURATION: 99 % | RESPIRATION RATE: 18 BRPM | DIASTOLIC BLOOD PRESSURE: 90 MMHG | SYSTOLIC BLOOD PRESSURE: 130 MMHG

## 2022-10-27 DIAGNOSIS — R58 BLOOD LOSS: ICD-10-CM

## 2022-10-27 DIAGNOSIS — E61.1 IRON DEFICIENCY: Primary | ICD-10-CM

## 2022-10-27 DIAGNOSIS — R19.5 FECAL OCCULT BLOOD TEST POSITIVE: ICD-10-CM

## 2022-10-27 DIAGNOSIS — R31.9 HEMATURIA, UNSPECIFIED TYPE: ICD-10-CM

## 2022-10-27 DIAGNOSIS — K64.9 HEMORRHOIDS, UNSPECIFIED HEMORRHOID TYPE: ICD-10-CM

## 2022-10-27 DIAGNOSIS — R06.02 SHORTNESS OF BREATH: ICD-10-CM

## 2022-10-27 LAB
AORTIC ROOT: 2.7 CM
AORTIC VALVE MEAN VELOCITY: 14 M/S
APICAL FOUR CHAMBER EJECTION FRACTION: 46 %
AV AREA BY CONTINUOUS VTI: 1.4 CM2
AV AREA PEAK VELOCITY: 1.4 CM2
AV LVOT MEAN GRADIENT: 2 MMHG
AV LVOT PEAK GRADIENT: 3 MMHG
AV MEAN GRADIENT: 9 MMHG
AV PEAK GRADIENT: 18 MMHG
AV VALVE AREA: 1.41 CM2
AV VELOCITY RATIO: 0.41
DOP CALC AO PEAK VEL: 2.12 M/S
DOP CALC AO VTI: 42.12 CM
DOP CALC LVOT AREA: 3.46 CM2
DOP CALC LVOT DIAMETER: 2.1 CM
DOP CALC LVOT PEAK VEL VTI: 17.11 CM
DOP CALC LVOT PEAK VEL: 0.87 M/S
DOP CALC LVOT STROKE INDEX: 27 ML/M2
DOP CALC LVOT STROKE VOLUME: 59.23 CM3
DOP CALC MV VTI: 34.13 CM
E WAVE DECELERATION TIME: 206 MS
FRACTIONAL SHORTENING: 33 % (ref 28–44)
INTERVENTRICULAR SEPTUM IN DIASTOLE (PARASTERNAL SHORT AXIS VIEW): 1.2 CM
INTERVENTRICULAR SEPTUM: 1.2 CM (ref 0.6–1.1)
LAAS-AP2: 31.5 CM2
LAAS-AP4: 36.5 CM2
LEFT ATRIUM AREA SYSTOLE SINGLE PLANE A4C: 35.3 CM2
LEFT ATRIUM SIZE: 5.4 CM
LEFT INTERNAL DIMENSION IN SYSTOLE: 3.2 CM (ref 2.1–4)
LEFT VENTRICLE DIASTOLIC VOLUME (MOD BIPLANE): 93 ML
LEFT VENTRICLE SYSTOLIC VOLUME (MOD BIPLANE): 50 ML
LEFT VENTRICULAR INTERNAL DIMENSION IN DIASTOLE: 4.8 CM (ref 3.5–6)
LEFT VENTRICULAR POSTERIOR WALL IN END DIASTOLE: 1.2 CM
LEFT VENTRICULAR STROKE VOLUME: 64 ML
LV EF: 46 %
LVSV (TEICH): 64 ML
MV E'TISSUE VEL-LAT: 11 CM/S
MV E'TISSUE VEL-SEP: 10 CM/S
MV EROA: 0.1 CM2
MV MEAN GRADIENT: 2 MMHG
MV PEAK A VEL: 0.24 M/S
MV PEAK E VEL: 173 CM/S
MV PEAK GRADIENT: 16 MMHG
MV STENOSIS PRESSURE HALF TIME: 60 MS
MV VALVE AREA BY CONTINUITY EQUATION: 1.74 CM2
MV VALVE AREA P 1/2 METHOD: 3.67 CM2
PISA MRMAX VEL: 0.36 M/S
PISA RADIUS: 0.5 CM
PV PEAK GRADIENT: 3 MMHG
RA PRESSURE ESTIMATED: 8 MMHG
RIGHT ATRIUM AREA SYSTOLE A4C: 28.9 CM2
RIGHT VENTRICLE ID DIMENSION: 2.7 CM
RV PSP: 42 MMHG
SL CV LEFT ATRIUM LENGTH A2C: 7.4 CM
SL CV LV EF: 55
SL CV PED ECHO LEFT VENTRICLE DIASTOLIC VOLUME (MOD BIPLANE) 2D: 105 ML
SL CV PED ECHO LEFT VENTRICLE SYSTOLIC VOLUME (MOD BIPLANE) 2D: 42 ML
TR MAX PG: 34 MMHG
TR PEAK VELOCITY: 2.9 M/S
TRICUSPID VALVE PEAK REGURGITATION VELOCITY: 2.91 M/S

## 2022-10-27 PROCEDURE — 93306 TTE W/DOPPLER COMPLETE: CPT

## 2022-10-27 PROCEDURE — 93306 TTE W/DOPPLER COMPLETE: CPT | Performed by: INTERNAL MEDICINE

## 2022-10-27 RX ORDER — FERROUS SULFATE TAB EC 324 MG (65 MG FE EQUIVALENT) 324 (65 FE) MG
324 TABLET DELAYED RESPONSE ORAL EVERY OTHER DAY
Qty: 90 TABLET | Refills: 0 | Status: SHIPPED | OUTPATIENT
Start: 2022-10-27

## 2022-10-27 NOTE — PROGRESS NOTES
[facility] [address] [address]  Hematology Ambulatory Follow-Up  Rome Singer, 1940, [id number]  10/27/2022    Assessment and Plan   1  Iron deficiency;  2  Blood loss; 3  Hematuria, unspecified type  Patient has had chronic hematuria secondary to ureter stent  Patient is to undergoing exchange next week  Most recently patient's ferritin has been found to be subtherapeutic <30  However, the patient states he feels well and is asymptomatic of iron-deficiency  Patient's hemoglobin is acceptable  We discussed options  Patient did not wish to follow-up with IV iron previously  He would prefer to continue observation  Patient is willing to trial oral iron supplement  Patient will start taking one tablet every other day and will increase to daily if tolerated  Patient will follow-up in approximately three months  - ferrous sulfate 324 (65 Fe) mg; Take 1 tablet (324 mg total) by mouth every other day Increase to daily if tolerated  Dispense: 90 tablet; Refill: 0  - Iron Panel (Includes Ferritin, Iron Sat%, Iron, and TIBC); Future  - CBC and differential; Future    4  Fecal occult blood test positive  5  Hemorrhoids, unspecified hemorrhoid type  Patient was contacted at the time of positive fecal occult blood testing  Patient notes that he did not follow up with GI as advised secondary to concern for blood being fairly related to hemorrhoids  Additionally, patient does not have interest in doing a colonoscopy  I told him that I would advise him to follow-up with GI regardless of his belief about the hemorrhoids- colon cancer/AVM assessment  Patient declines at this time  - Iron Panel (Includes Ferritin, Iron Sat%, Iron, and TIBC); Future  - CBC and differential; Future      The patient is scheduled for follow-up in approximately 3 months with Dr Lucius Govea       Patient voiced agreement and understanding to the above  Patient advised to call the Hematology/Oncology office with any questions and concerns regarding the above  Barrier(s) to care: None  The patient is able to self care  Shannon San PA-C  Medical Oncology/Hematology  [address]    Subjective     Chief Complaint   Patient presents with   • Follow-up     Blood loss   History of present illness:    This is a 28-year-old male with past medical history of CVA with right-sided lower extremity weakness, hydronephrosis with the ureteral stricture status post stent previously cared for by Dr Sheeba Ramsey valve stenosis status post TVAR in February of 2021 at [facility], GERD, CKD, sensorineural hearing loss and acute diastolic CHF who presents to Hematology due to recent clinical findings of excessive bleeding      Patient notes that on 8/1/21 he tripped over a curb and broke his nose   Patient followed up in the emergency room to have his broken nose six   However, the patient's nose continued to bleed   Patient was discharged by the time he was home, he returned back to the emergency room due to soaking through the nasal tampon   Patient was then injected with epinephrine and bleeding stopped   At the time of the fracture, patient was on aspirin and Plavix      On August 24th, the patient underwent dental procedure for extraction in preparation for dentures   Patient continued to bleed after sutures were placed   Patient went back to the dentist, who injected him with epinephrine   Unfortunately, the patient developed a hematoma on the low right side of his face   That time, patient was on aspirin and Plavix however, clinically, the dentist noted that this is not common for his procedures and recommend that he a follow-up      10/28/20 PT = 13 5, INR = 1 03, PTT = 30  11/25/20, hemoglobin = 12 8, MCV = 94, RDW = 14 6, MCHC = 30 8, platelet = 314  7/93/98 hemoglobin = 11 1, MCV = 88, RDW = high 15 7, MCHC and MCH both low, platelet = 167  8/2/88 hemoglobin = 9 5, MCV 76, RDW = 19 6, other RBC indices all low, platelet = 916  4/6/79  hemoglobin = 10 9, MCV = 74, RDW = 22, platelet = 554  2/79/37 PTT and PT INR within normal limits  9/24/21 von Willebrand's assessment negative  9/24/21 hemoglobin = 11 0, MCV 76, RDW 20 2, platelet 303, IZRT saturation = 8%, ferritin 13  10/5 and 10/12: IV Feraheme, no side effects      12/15/2021:  Hemoglobin = 14 3, MCV 84, iron saturation 26%, ferritin 42     3/15/2022:  Hemoglobin = 12 9, MCV = 89, iron saturation = 17, ferritin = 32     5/11/22 large amount of blood on urine dipstick      6/16/2022:  Hemoglobin = 14 6, MCV = 84, iron saturation = 26, TIBC elevated at 356, ferritin = 26  Recommended IV iron patient did not follow through with treatment  Recommended follow-up with GI, after positive fecal occult testing, patient did not follow through with consult  10/18/22 hemoglobin = 14 6, MCV 87, platelet count 504, ferritin 24    Interval history:  Patient feels hemorrhoids are to blame for fecal occult positive blood tests  Patient notes that he is not bleeding much from hemorrhoids now that he is off of Plavix and aspirin in preparation for stent exchange  Otherwise he feels good; no concerns  Review of Systems   Constitutional: Negative for activity change, appetite change, fatigue and fever  HENT: Negative for nosebleeds  Respiratory: Negative for cough, choking and shortness of breath  Cardiovascular: Negative for chest pain, palpitations and leg swelling  Gastrointestinal: Negative for abdominal distention, abdominal pain, anal bleeding, blood in stool, constipation, diarrhea, nausea and vomiting  Endocrine: Negative for cold intolerance  Genitourinary: Positive for hematuria (occasional)  Musculoskeletal: Negative for myalgias  Skin: Negative for color change, pallor and rash  Allergic/Immunologic: Negative for immunocompromised state  Neurological: Negative for headaches  Hematological: Negative for adenopathy  Does not bruise/bleed easily  All other systems reviewed and are negative        Patient Active Problem List   Diagnosis   • Chronic atrial fibrillation ([facility])   • Benign essential hypertension   • Renal calculi   • JUNIOR (acute kidney injury) ([facility] )   • Calculus of ureter   • Crossing vessel and stricture of ureter without hydronephrosis   • Hematuria, gross   • Hydronephrosis with ureteral stricture, not elsewhere classified   • CVA (cerebral vascular accident) ([place] [address] )   • Chronic diastolic CHF (congestive heart failure) ([place] [address] )   • Nonrheumatic aortic valve stenosis   • Bilateral carotid artery disease ([facility])   • Aneurysm of anterior communicating artery   • Tooth pain   • Onychomycosis   • Syncope vs seizure   • Vasovagal near syncope   • Acute kidney injury superimposed on chronic kidney disease ([place] [address] )   • Secondary hyperparathyroidism ([place] [address] )   • Vitamin D deficiency   • Patellofemoral syndrome of left knee   • Moderate mitral regurgitation   • Moderate aortic regurgitation   • GERD (gastroesophageal reflux disease)   • Dyslipidemia   • Stage 3b chronic kidney disease ([facility])   • Aneurysm ([facility])   • Stroke ([facility])   • Closed nondisplaced fracture of nasal bone with routine healing   • Sensorineural hearing loss (SNHL) of both ears   • Iron deficiency anemia, unspecified   • Benign hypertension with CKD (chronic kidney disease) stage III ([facility])   • Chronic kidney disease-mineral and bone disorder   • Iron deficiency   • Hematuria   • History of aortic valve replacement   • Bilateral impacted cerumen     Past Medical History:   Diagnosis Date   • Anemia     iron deficiency   • Aneurysm ([place] [address] )     of brain  being monitored   • Dental disease    • Essential hypertension, long-standing    • Heart murmur     per pt "Aortic Valve replacement 2021 with Watchman device implanted /2021"   • Hematuria     intermittent   • History of cigarette smoking, chronic     62 year smoker at one half pack per day, quit 04/1/17   • History of COVID-19 01/19/2022    recovered at home/chief s/s only sore throat   • History of kidney stones    • History of pneumonia    • HL (hearing loss)    • Hyperlipidemia    • Hypertension    • Irregular heart beat    • Kidney stone    • Muscle weakness     right sided weakness has gotten better/ walks independantly"   • Stroke ([facility] ) 10/29/2020    right sided  weakness almost full recovery   • Stroke [facility])    • Teeth missing    • Vitamin D deficiency     maintained with 1000 units of D   • Water retention    • Wears glasses      Past Surgical History:   Procedure Laterality Date   • APPENDECTOMY  1960   • CARDIAC SURGERY      watchman [id number]; aortic valve replaced 2021(pig valve)   • CAROTID ENDARTERECTOMY Left 1998    Supposedly no internal stenosis found   • CYSTOSCOPY Right 8/15/2017    Procedure: CYSTOSCOPY RIGHT STENT EXCHANGE;  Surgeon: Eveline Delgadillo MD;  Location: [address] [place] [address];  Service: Urology   • CYSTOSCOPY     • CYSTOSCOPY N/A 3/11/2022    Procedure: CYSTOSCOPY, RIGHT RETROGRADE PYLEOGRAM;  Surgeon: Nick Abreu MD;  Location: [facility];  Service: Urology   • EXTRACORPOREAL SHOCK WAVE LITHOTRIPSY  03/2017    For nephrolithiasis at [facility]   • [address]  5/10/2019   • FL RETROGRADE PYELOGRAM  7/30/2019   • FL RETROGRADE PYELOGRAM  10/22/2019   • FL RETROGRADE PYELOGRAM  1/28/2020   • FL RETROGRADE PYELOGRAM  8/11/2020   • FL RETROGRADE PYELOGRAM  12/15/2020   • FL RETROGRADE PYELOGRAM  2/14/2021   • FL RETROGRADE PYELOGRAM  5/11/2021   • FL RETROGRADE PYELOGRAM  10/19/2021   • FL RETROGRADE PYELOGRAM  3/11/2022   • FL RETROGRADE PYELOGRAM  7/15/2022   • VT CYSTO/URETERO W/LITHOTRIPSY &INDWELL STENT INSRT Right 5/2/2017    Procedure: CYSTOSCOPY URETEROSCOPY WITH LITHOTRIPSY HOLMIUM LASER, RETROGRADE PYELOGRAM AND INSERTION STENT URETERAL;  Surgeon: Eveline Delgadillo MD;  Location: [facility] OR;  Service: Urology   • PA CYSTO/URETERO W/LITHOTRIPSY &INDWELL STENT INSRT Right 5/16/2017    Procedure: CYSTOSCOPY, RETROGRADE PYELOGRAM,  FLEXIBLE URETEROSCOPY,  HOLMIUM LASER LITHOTRIPSY, STONE BASKET MANIPULATION,  STENT URETERAL EXCHANGE;  Surgeon: Johnathan Runner, MD;  Location: [address] [place] [address];  Service: Urology   • PA CYSTO/URETERO W/LITHOTRIPSY &INDWELL STENT INSRT Right 6/2/2017    Procedure: CYSTOSCOPY, RETROGRADE, STONE MANIPULATION WITH HOLMIUM LASER, STENT PLACEMENT;  Surgeon: Johnathan Runner, MD;  Location: [address] [place] [address];  Service: Urology   • PA CYSTO/URETERO W/LITHOTRIPSY &INDWELL STENT INSRT Right 7/18/2017    Procedure: CYSTOSCOPY, RETROGRADE, URETEROSCOPY HOLMIUM LASER [place],  AND STENT PLACEMENT;  Surgeon: Johnathan Runner, MD;  Location: [address] [place] [address];  Service: Urology   • PA CYSTO/URETERO W/LITHOTRIPSY &INDWELL STENT INSRT Right 2/14/2021    Procedure: CYSTOSCOPY URETEROSCOPY, RETROGRADE PYELOGRAM, STONE MANIPULATION AND EXCHANGE STENT URETERAL;  Surgeon: Jose Banda MD;  Location: [address] [place] [address];  Service: Urology   • PA CYSTOSCOPY,INSERT URETERAL STENT Right 11/14/2017    Procedure: EXCHANGE STENT URETERAL;  Surgeon: Johnathan Runner, MD;  Location: [address] [place] [address];  Service: Urology   • PA CYSTOSCOPY,INSERT URETERAL STENT Right 3/11/2022    Procedure: EXCHANGE STENT URETERAL;  Surgeon: Evie Valdes MD;  Location:  MAIN OR;  Service: Urology   • PA CYSTOURETHROSCOPY,URETER CATHETER Right 11/14/2017    Procedure: CYSTOSCOPY RETROGRADE, STENT EXCHANGE;  Surgeon: Johnathan Runner, MD;  Location: [address] [place] [address];  Service: Urology   • PA CYSTOURETHROSCOPY,URETER CATHETER Right 5/8/2018    Procedure: Helen Harrington;  Surgeon: Johnathan Runner, MD;  Location: [address] [place] [address];  Service: Urology   • PA CYSTOURETHROSCOPY,URETER CATHETER Right 8/14/2018    Procedure: Helen Harrington;  Surgeon: Johnathan Runner, MD;  Location: [address] [place] [address];  Service: Urology   • PA CYSTOURETHROSCOPY,URETER CATHETER Right 11/13/2018    Procedure: Wilmot Clam EXCHANGE, RETROGRADE;  Surgeon: Neris Spangler MD;  Location: [address] [place] [address];  Service: Urology   • PA CYSTOURETHROSCOPY,URETER CATHETER Right 2/12/2019    Procedure: Rahelte Limbo, STENT REMOVAL AND STENT PLACEMENT;  Surgeon: Neris Spangler MD;  Location: [address] [place] [address];  Service: Urology   • PA CYSTOURETHROSCOPY,URETER CATHETER Right 5/10/2019    Procedure: Sharlotte Limbo, STENT EXCHANGE;  Surgeon: Neris Spangler MD;  Location: [address] [place] [address];  Service: Urology   • PA CYSTOURETHROSCOPY,URETER CATHETER Right 7/30/2019    Procedure: Sharlotte Limbo, STENT REMOVAL AND PLACEMENT OF STENT;  Surgeon: Neris Spangler MD;  Location: [address] [place] [address];  Service: Urology   • PA CYSTOURETHROSCOPY,URETER CATHETER Right 10/22/2019    Procedure: Sharlotte Limbo, STENT EXCHANGE;  Surgeon: Neris Spangler MD;  Location: [address] [place] [address];  Service: Urology   • PA CYSTOURETHROSCOPY,URETER CATHETER Right 1/28/2020    Procedure: CYSTOSCOPY, RETROGRADE, STENT REMOVAL AND STENT PLACEMENT;  Surgeon: Neris Spangler MD;  Location: [address] [place] [address];  Service: Urology   • PA CYSTOURETHROSCOPY,URETER CATHETER Right 5/22/2020    Procedure: CYSTOSCOPY RETROGRADE, STENT EXCHANGE;  Surgeon: Neris Spangler MD;  Location: [address] [place] [address];  Service: Urology   • PA CYSTOURETHROSCOPY,URETER CATHETER Right 8/11/2020    Procedure: CYSTOSCOPY, STENT EXCHANGE, RETROGRADE;  Surgeon: Neris Spangler MD;  Location: [address] [place] [address];  Service: Urology   • PA CYSTOURETHROSCOPY,URETER CATHETER Right 12/15/2020    Procedure: CYSTOSCOPY, RETROGRADE, STENT REMOVAL, AND STENT PLACEMENT;  Surgeon: Neris Spangler MD;  Location: [address] [place] [address];  Service: Urology   • PA CYSTOURETHROSCOPY,URETER CATHETER Right 5/11/2021    Procedure: CYSTOSCOPY RETROGRADE PYELOGRAM WITH STENT EXCHANGE;  Surgeon: Neris Spangler MD;  Location: [address] [place] [address];  Service: Urology   • PA CYSTOURETHROSCOPY,URETER CATHETER Right 10/19/2021    Procedure: CYSTOSCOPY WITH RETROGRADE, STENT EXCHANGE;  Surgeon: Johnathan Runner, MD;  Location: [address] [place] [address];  Service: Urology   • GA CYSTOURETHROSCOPY,URETER CATHETER Right 7/15/2022    Procedure: CYSTOSCOPY, RETROGRADE PYELOGRAM WITH EXCHANGE STENT URETERAL;  Surgeon: Evie Valdes MD;  [facility];  Service: Urology   • PROSTATE SURGERY  2015    Laser Prostatectomy  by Dr Rosa Han   • [place] Right 2017    Procedure: INSERTION STENT URETERAL, RIGHT URETEROSCOPY,  LASER OF URETERAL STRICTURE AND STONE;  Surgeon: Johnathan Runner, MD;  Location: [address] [place] [address];  Service:    • URETERAL STENT PLACEMENT Right 2018    Procedure: Helen Harrington;  Surgeon: Johnathan Runner, MD;  Location: [address] [place] [address];  Service: Urology     Family History   Problem Relation Age of Onset   • Hypertension Mother    • Alcohol abuse Father    • Cirrhosis Father    • Cancer Son 15        cancer-knee and above-left-amputation   • Cancer Sister    • Other Brother         head mass   • Thyroid disease unspecified Sister    • Arthritis Sister    • Cancer Sister    • Other Brother         legally blind in one eye     Social History     Socioeconomic History   • Marital status: /Civil Union     Spouse name: None   • Number of children: None   • Years of education: None   • Highest education level: None   Occupational History   • Occupation: RETIRED   Tobacco Use   • Smoking status: Former Smoker     Packs/day: 0 50     Years: 62 00     Pack years: 31 00     Types: Cigarettes     Start date: 6/15/1954     Quit date: 4/15/2017     Years since quittin 5   • Smokeless tobacco: Never Used   Vaping Use   • Vaping Use: Never used   Substance and Sexual Activity   • Alcohol use: Not Currently     Comment: don't drink anymore     • Drug use: No   • Sexual activity: Not Currently     Partners: Female     Comment: defer   Other Topics Concern   • None   Social History Narrative   • None     Social Determinants of Health     Financial Resource Strain: Not on file   Food Insecurity: Not on file   Transportation Needs: Not on file   Physical Activity: Not on file   Stress: Not on file   Social Connections: Not on file   Intimate Partner Violence: Not on file   Housing Stability: Not on file       Current Outpatient Medications:   •  ascorbic acid (VITAMIN C) 250 mg tablet, Take 500 mg by mouth daily, Disp: , Rfl:   •  aspirin (ECOTRIN LOW STRENGTH) 81 mg EC tablet, Take 81 mg by mouth daily Pt to check with surgeon if needed to hold RX , Disp: , Rfl:   •  atorvastatin (LIPITOR) 40 mg tablet, Take 1 tablet (40 mg total) by mouth daily with dinner, Disp: 30 tablet, Rfl: 1  •  cholecalciferol (VITAMIN D3) 1,000 units tablet, Take 1,000 Units by mouth daily after lunch  , Disp: , Rfl:   •  clopidogrel (PLAVIX) 75 mg tablet, Take 75 mg by mouth daily Pt  To check with surgeon if needed to hold RX , Disp: , Rfl:   •  ferrous sulfate 324 (65 Fe) mg, Take 1 tablet (324 mg total) by mouth every other day Increase to daily if tolerated  , Disp: 90 tablet, Rfl: 0  •  finasteride (PROSCAR) 5 mg tablet, TAKE 1 TABLET BY MOUTH EVERY DAY, Disp: 90 tablet, Rfl: 2  •  metoprolol tartrate (LOPRESSOR) 25 mg tablet, Take 1 tablet (25 mg total) by mouth every 8 (eight) hours, Disp: 270 tablet, Rfl: 3  No Known Allergies    Objective   /90 (BP Location: Left arm, Patient Position: Sitting, Cuff Size: Adult)   Pulse 67   Resp 18   Ht 5' 10" (1 778 m)   Wt 96 2 kg (212 lb)   SpO2 99%   BMI 30 42 kg/m²    Physical Exam  Constitutional:       General: He is not in acute distress  Appearance: He is well-developed  HENT:      Head: Normocephalic and atraumatic  Right Ear: External ear normal       Left Ear: External ear normal       Nose: Nose normal    Eyes:      General: No scleral icterus       Conjunctiva/sclera: Conjunctivae normal    Cardiovascular:      Rate and Rhythm: Normal rate  Pulmonary:      Effort: No respiratory distress  Abdominal:      General: There is no distension  Palpations: Abdomen is soft  Skin:     Findings: No rash (on exposed skin  )  Neurological:      Mental Status: He is alert and oriented to person, place, and time  Psychiatric:         Thought Content:  Thought content normal          Result Review  Labs:  Hospital Outpatient Visit on 10/27/2022   Component Date Value Ref Range Status   • AV area peak george 10/27/2022 1 4  cm2 In process   • MV ERO 10/27/2022 0 10  cm2 In process   • LA size 10/27/2022 5 4  cm In process   • Aortic valve mean velocity 10/27/2022 14 00  m/s In process   • Triscuspid Valve Regurgitation Pea* 10/27/2022 34 0  mmHg In process   • Tricuspid valve peak regurgitation* 10/27/2022 2 91  m/s In process   • LVPWd 10/27/2022 1 20  cm In process   • Left Atrium Area-systolic Apical T* 03/44/7805 31 5  cm2 In process   • Left Atrium Area-systolic Four Elis* 14/83/1779 36 5  cm2 In process   • MV E' Tissue Velocity Septal 10/27/2022 10  cm/s In process   • MV E' Tissue Velocity Lateral 10/27/2022 11  cm/s In process   • TR Peak George 10/27/2022 2 9  m/s In process   • IVSd 10/27/2022 1 20  cm In process   • LV DIASTOLIC VOLUME (MOD BIPLANE) * 10/27/2022 105  mL In process   • LEFT VENTRICLE SYSTOLIC VOLUME (MO* 23/46/9881 42  mL In process   • Left ventricular stroke volume (2D) 10/27/2022 64 00  mL In process   • EF 10/27/2022 46  % In process   • A4C EF 10/27/2022 46  % In process   • LA length (A2C) 10/27/2022 7 40  cm In process   • LVIDd 10/27/2022 4 80  cm In process   • IVS 10/27/2022 1 2  cm In process   • LVIDS 10/27/2022 3 20  cm In process   • FS 10/27/2022 33  28 - 44 % In process   • Ao root 10/27/2022 2 70  cm In process   • RVID d 10/27/2022 2 7  cm In process   • LVOT mn grad 10/27/2022 2 0  mmHg In process   • AV area by cont VTI 10/27/2022 1 4  cm2 In process   • AV mean gradient 10/27/2022 9  mmHg In process   • AV LVOT peak gradient 10/27/2022 3  mmHg In process   • MV mean gradient antegrade 10/27/2022 2  mmHg In process   • MV valve area p 1/2 method 10/27/2022 3 67  cm2 In process   • PV peak gradient antegrade 10/27/2022 3  mmHg In process   • E wave deceleration time 10/27/2022 206  ms In process   • LVOT diameter 10/27/2022 2 1  cm In process   • LVOT peak george 10/27/2022 0 87  m/s In process   • LVOT peak VTI 10/27/2022 17 11  cm In process   • Aortic valve peak velocity 10/27/2022 2 12  m/s In process   • Ao VTI 10/27/2022 42 12  cm In process   • Radius 10/27/2022 0 5  cm In process   • Mr max george 10/27/2022 0 36  m/s In process   • LVOT stroke volume 10/27/2022 59 23  cm3 In process   • AV peak gradient 10/27/2022 18  mmHg In process   • MV peak gradient antegrade 10/27/2022 16  mmHg In process   • MV Peak E George 10/27/2022 173  cm/s In process   • MV VTI 10/27/2022 34 13  cm In process   • MV Peak A George 10/27/2022 0 24  m/s In process   • LV Systolic Volume (BP) 69/28/4303 50  mL In process   • LV Diastolic Volume (BP) 07/79/5705 93  mL In process   • CATIE A4C 10/27/2022 35 3  cm2 In process   • RAA A4C 10/27/2022 28 9  cm2 In process   • MV stenosis pressure 1/2 time 10/27/2022 60  ms In process   • LVOT stroke volume index 10/27/2022 27 00  ml/m2 In process   • LVSV, 2D 10/27/2022 64  mL In process   • LVOT area 10/27/2022 3 46  cm2 In process   • Dimensionless velociy index 10/27/2022 0 41   In process   • AV valve area 10/27/2022 1 41  cm2 In process   • MV valve area by continuity eq 10/27/2022 1 74  cm2 In process   Appointment on 10/18/2022   Component Date Value Ref Range Status   • Ventricular Rate 10/18/2022 65  BPM Final   • Atrial Rate 10/18/2022 81  BPM Final   • QRSD Interval 10/18/2022 102  ms Final   • QT Interval 10/18/2022 404  ms Final   • QTC Interval 10/18/2022 420  ms Final   • QRS Axis 10/18/2022 -59  degrees Final   • T Wave Axis 10/18/2022 39  degrees Final   Appointment on 10/18/2022   Component Date Value Ref Range Status   • Urine Culture 10/18/2022 10,000-19,000 cfu/ml    Final    Mixed Contaminants X2   • Sodium 10/18/2022 139  135 - 147 mmol/L Final   • Potassium 10/18/2022 4 7  3 5 - 5 3 mmol/L Final   • Chloride 10/18/2022 105  96 - 108 mmol/L Final   • CO2 10/18/2022 29  21 - 32 mmol/L Final   • ANION GAP 10/18/2022 5  4 - 13 mmol/L Final   • BUN 10/18/2022 23  5 - 25 mg/dL Final   • Creatinine 10/18/2022 1 55 (A) 0 60 - 1 30 mg/dL Final    Standardized to IDMS reference method   • Glucose, Fasting 10/18/2022 91  65 - 99 mg/dL Final    Specimen collection should occur prior to Sulfasalazine administration due to the potential for falsely depressed results  Specimen collection should occur prior to Sulfapyridine administration due to the potential for falsely elevated results  • Calcium 10/18/2022 9 4  8 3 - 10 1 mg/dL Final   • AST 10/18/2022 24  5 - 45 U/L Final    Specimen collection should occur prior to Sulfasalazine administration due to the potential for falsely depressed results  • ALT 10/18/2022 15  12 - 78 U/L Final    Specimen collection should occur prior to Sulfasalazine administration due to the potential for falsely depressed results  • Alkaline Phosphatase 10/18/2022 121 (A) 46 - 116 U/L Final   • Total Protein 10/18/2022 7 4  6 4 - 8 4 g/dL Final   • Albumin 10/18/2022 3 6  3 5 - 5 0 g/dL Final   • Total Bilirubin 10/18/2022 0 97  0 20 - 1 00 mg/dL Final    Use of this assay is not recommended for patients undergoing treatment with eltrombopag due to the potential for falsely elevated results     • eGFR 10/18/2022 41  ml/min/1 73sq m Final   • WBC 10/18/2022 7 96  4 31 - 10 16 Thousand/uL Final   • RBC 10/18/2022 5 34  3 88 - 5 62 Million/uL Final   • Hemoglobin 10/18/2022 14 6  12 0 - 17 0 g/dL Final   • Hematocrit 10/18/2022 46 6  36 5 - 49 3 % Final   • MCV 10/18/2022 87  82 - 98 fL Final   • MCH 10/18/2022 27 3  26 8 - 34 3 pg Final   • MCHC 10/18/2022 31 3 (A) 31 4 - 37 4 g/dL Final   • RDW 10/18/2022 17 3 (A) 11 6 - 15 1 % Final   • MPV 10/18/2022 9 8  8 9 - 12 7 fL Final   • Platelets 96/92/0068 156  149 - 390 Thousands/uL Final   • nRBC 10/18/2022 0  /100 WBCs Final   • Neutrophils Relative 10/18/2022 62  43 - 75 % Final   • Immat GRANS % 10/18/2022 1  0 - 2 % Final   • Lymphocytes Relative 10/18/2022 22  14 - 44 % Final   • Monocytes Relative 10/18/2022 11  4 - 12 % Final   • Eosinophils Relative 10/18/2022 3  0 - 6 % Final   • Basophils Relative 10/18/2022 1  0 - 1 % Final   • Neutrophils Absolute 10/18/2022 4 93  1 85 - 7 62 Thousands/µL Final   • Immature Grans Absolute 10/18/2022 0 04  0 00 - 0 20 Thousand/uL Final   • Lymphocytes Absolute 10/18/2022 1 76  0 60 - 4 47 Thousands/µL Final   • Monocytes Absolute 10/18/2022 0 90  0 17 - 1 22 Thousand/µL Final   • Eosinophils Absolute 10/18/2022 0 27  0 00 - 0 61 Thousand/µL Final   • Basophils Absolute 10/18/2022 0 06  0 00 - 0 10 Thousands/µL Final   • Cholesterol 10/18/2022 99  See Comment mg/dL Final    Cholesterol:         Pediatric <18 Years        Desirable          <170 mg/dL      Borderline High    170-199 mg/dL      High               >=200 mg/dL        Adult >=18 Years            Desirable         <200 mg/dL      Borderline High   200-239 mg/dL      High              >239 mg/dL     • Triglycerides 10/18/2022 83  See Comment mg/dL Final    Triglyceride:     0-9Y            <75mg/dL     10Y-17Y         <90 mg/dL       >=18Y     Normal          <150 mg/dL     Borderline High 150-199 mg/dL     High            200-499 mg/dL        Very High       >499 mg/dL    Specimen collection should occur prior to N-Acetylcysteine or Metamizole administration due to the potential for falsely depressed results  • HDL, Direct 10/18/2022 42  >=40 mg/dL Final    Specimen collection should occur prior to Metamizole administration due to the potential for falsley depressed results     • LDL Calculated 10/18/2022 40  0 - 100 mg/dL Final    LDL Cholesterol:     Optimal           <100 mg/dl     Near Optimal      100-129 mg/dl     Above Optimal       Borderline High 130-159 mg/dl       High            160-189 mg/dl       Very High       >189 mg/dl         This screening LDL is a calculated result  It does not have the accuracy of the Direct Measured LDL in the monitoring of patients with hyperlipidemia and/or statin therapy  Direct Measure LDL (EUJ226) must be ordered separately in these patients  • Non-HDL-Chol (CHOL-HDL) 10/18/2022 57  mg/dl Final   • Iron Saturation 10/18/2022 18 (A) 20 - 50 % Final   • TIBC 10/18/2022 358  250 - 450 ug/dL Final   • Iron 10/18/2022 63 (A) 65 - 175 ug/dL Final    Patients treated with metal-binding drugs (ie  Deferoxamine) may have depressed iron values  • Ferritin 10/18/2022 24  8 - 388 ng/mL Final       Please note: This report has been generated by a voice recognition software system  Therefore there may be syntax, spelling, and/or grammatical errors  Please call if you have any questions

## 2022-10-28 ENCOUNTER — APPOINTMENT (EMERGENCY)
Dept: RADIOLOGY | Facility: HOSPITAL | Age: 82
End: 2022-10-28

## 2022-10-28 ENCOUNTER — HOSPITAL ENCOUNTER (OUTPATIENT)
Facility: HOSPITAL | Age: 82
Setting detail: OBSERVATION
Discharge: HOME | End: 2022-10-29
Attending: EMERGENCY MEDICINE | Admitting: INTERNAL MEDICINE

## 2022-10-28 DIAGNOSIS — H53.2 DIPLOPIA: Primary | ICD-10-CM

## 2022-10-28 DIAGNOSIS — R29.90 STROKE-LIKE SYMPTOMS: ICD-10-CM

## 2022-10-28 DIAGNOSIS — H53.2 TRANSIENT DIPLOPIA: ICD-10-CM

## 2022-10-28 DIAGNOSIS — H49.22 6TH NERVE PALSY, LEFT: ICD-10-CM

## 2022-10-28 DIAGNOSIS — I63.9 CVA (CEREBRAL VASCULAR ACCIDENT) (HCC): ICD-10-CM

## 2022-10-28 DIAGNOSIS — G45.9 TIA (TRANSIENT ISCHEMIC ATTACK): ICD-10-CM

## 2022-10-28 DIAGNOSIS — I10 ESSENTIAL HYPERTENSION: ICD-10-CM

## 2022-10-28 DIAGNOSIS — R73.03 PREDIABETES: ICD-10-CM

## 2022-10-28 LAB
ANION GAP SERPL CALCULATED.3IONS-SCNC: 4 MMOL/L (ref 4–13)
BASOPHILS # BLD AUTO: 0.05 THOUSANDS/ÂΜL (ref 0–0.1)
BASOPHILS NFR BLD AUTO: 1 % (ref 0–1)
BILIRUB UR QL STRIP: NEGATIVE
BUN SERPL-MCNC: 26 MG/DL (ref 5–25)
CALCIUM SERPL-MCNC: 8.9 MG/DL (ref 8.3–10.1)
CHLORIDE SERPL-SCNC: 105 MMOL/L (ref 96–108)
CHOLEST SERPL-MCNC: 88 MG/DL
CLARITY UR: ABNORMAL
CO2 SERPL-SCNC: 30 MMOL/L (ref 21–32)
COLOR UR: YELLOW
CREAT SERPL-MCNC: 1.61 MG/DL (ref 0.6–1.3)
EOSINOPHIL # BLD AUTO: 0.3 THOUSAND/ÂΜL (ref 0–0.61)
EOSINOPHIL NFR BLD AUTO: 4 % (ref 0–6)
ERYTHROCYTE [DISTWIDTH] IN BLOOD BY AUTOMATED COUNT: 15.7 % (ref 11.6–15.1)
FLUAV RNA RESP QL NAA+PROBE: NEGATIVE
FLUBV RNA RESP QL NAA+PROBE: NEGATIVE
GFR SERPL CREATININE-BSD FRML MDRD: 39 ML/MIN/1.73SQ M
GLUCOSE SERPL-MCNC: 123 MG/DL (ref 65–140)
GLUCOSE UR STRIP-MCNC: NEGATIVE MG/DL
HCT VFR BLD AUTO: 42.8 % (ref 36.5–49.3)
HDLC SERPL-MCNC: 34 MG/DL
HGB BLD-MCNC: 13.4 G/DL (ref 12–17)
HGB UR QL STRIP.AUTO: ABNORMAL
IMM GRANULOCYTES # BLD AUTO: 0.04 THOUSAND/UL (ref 0–0.2)
IMM GRANULOCYTES NFR BLD AUTO: 1 % (ref 0–2)
KETONES UR STRIP-MCNC: NEGATIVE MG/DL
LDLC SERPL CALC-MCNC: 32 MG/DL (ref 0–100)
LEUKOCYTE ESTERASE UR QL STRIP: NEGATIVE
LYMPHOCYTES # BLD AUTO: 1.48 THOUSANDS/ÂΜL (ref 0.6–4.47)
LYMPHOCYTES NFR BLD AUTO: 21 % (ref 14–44)
MCH RBC QN AUTO: 26.8 PG (ref 26.8–34.3)
MCHC RBC AUTO-ENTMCNC: 31.3 G/DL (ref 31.4–37.4)
MCV RBC AUTO: 86 FL (ref 82–98)
MONOCYTES # BLD AUTO: 0.9 THOUSAND/ÂΜL (ref 0.17–1.22)
MONOCYTES NFR BLD AUTO: 13 % (ref 4–12)
NEUTROPHILS # BLD AUTO: 4.25 THOUSANDS/ÂΜL (ref 1.85–7.62)
NEUTS SEG NFR BLD AUTO: 60 % (ref 43–75)
NITRITE UR QL STRIP: NEGATIVE
NONHDLC SERPL-MCNC: 54 MG/DL
NRBC BLD AUTO-RTO: 0 /100 WBCS
PH UR STRIP.AUTO: 7 [PH]
PLATELET # BLD AUTO: 127 THOUSANDS/UL (ref 149–390)
PMV BLD AUTO: 9.6 FL (ref 8.9–12.7)
POTASSIUM SERPL-SCNC: 5 MMOL/L (ref 3.5–5.3)
PROT UR STRIP-MCNC: NEGATIVE MG/DL
RBC # BLD AUTO: 5 MILLION/UL (ref 3.88–5.62)
RSV RNA RESP QL NAA+PROBE: NEGATIVE
SARS-COV-2 RNA RESP QL NAA+PROBE: NEGATIVE
SODIUM SERPL-SCNC: 139 MMOL/L (ref 135–147)
SP GR UR STRIP.AUTO: 1.01 (ref 1–1.03)
TRIGL SERPL-MCNC: 109 MG/DL
UROBILINOGEN UR QL STRIP.AUTO: 0.2 E.U./DL
WBC # BLD AUTO: 7.02 THOUSAND/UL (ref 4.31–10.16)

## 2022-10-28 RX ORDER — ASPIRIN 81 MG/1
81 TABLET ORAL DAILY
Status: DISCONTINUED | OUTPATIENT
Start: 2022-10-29 | End: 2022-10-29 | Stop reason: HOSPADM

## 2022-10-28 RX ORDER — ASCORBIC ACID 500 MG
500 TABLET ORAL DAILY
Status: DISCONTINUED | OUTPATIENT
Start: 2022-10-29 | End: 2022-10-29 | Stop reason: HOSPADM

## 2022-10-28 RX ORDER — FERROUS SULFATE 325(65) MG
325 TABLET ORAL 2 TIMES DAILY WITH MEALS
Status: DISCONTINUED | OUTPATIENT
Start: 2022-10-28 | End: 2022-10-29 | Stop reason: HOSPADM

## 2022-10-28 RX ORDER — FINASTERIDE 5 MG/1
5 TABLET, FILM COATED ORAL DAILY
Status: DISCONTINUED | OUTPATIENT
Start: 2022-10-29 | End: 2022-10-29 | Stop reason: HOSPADM

## 2022-10-28 RX ORDER — ACETAMINOPHEN 325 MG/1
650 TABLET ORAL EVERY 4 HOURS PRN
Status: DISCONTINUED | OUTPATIENT
Start: 2022-10-28 | End: 2022-10-29 | Stop reason: HOSPADM

## 2022-10-28 RX ORDER — HYDRALAZINE HYDROCHLORIDE 20 MG/ML
5 INJECTION INTRAMUSCULAR; INTRAVENOUS EVERY 6 HOURS PRN
Status: DISCONTINUED | OUTPATIENT
Start: 2022-10-28 | End: 2022-10-29 | Stop reason: HOSPADM

## 2022-10-28 RX ORDER — ATORVASTATIN CALCIUM 40 MG/1
40 TABLET, FILM COATED ORAL EVERY EVENING
Status: DISCONTINUED | OUTPATIENT
Start: 2022-10-28 | End: 2022-10-28 | Stop reason: SDUPTHER

## 2022-10-28 RX ORDER — SODIUM CHLORIDE 9 MG/ML
75 INJECTION, SOLUTION INTRAVENOUS CONTINUOUS
Status: DISCONTINUED | OUTPATIENT
Start: 2022-10-28 | End: 2022-10-29 | Stop reason: HOSPADM

## 2022-10-28 RX ORDER — ENOXAPARIN SODIUM 100 MG/ML
40 INJECTION SUBCUTANEOUS DAILY
Status: DISCONTINUED | OUTPATIENT
Start: 2022-10-29 | End: 2022-10-29 | Stop reason: HOSPADM

## 2022-10-28 RX ORDER — MELATONIN
1000
Status: DISCONTINUED | OUTPATIENT
Start: 2022-10-28 | End: 2022-10-29 | Stop reason: HOSPADM

## 2022-10-28 RX ORDER — ASPIRIN 81 MG/1
81 TABLET, CHEWABLE ORAL DAILY
Status: DISCONTINUED | OUTPATIENT
Start: 2022-10-29 | End: 2022-10-28 | Stop reason: SDUPTHER

## 2022-10-28 RX ORDER — ATORVASTATIN CALCIUM 40 MG/1
40 TABLET, FILM COATED ORAL
Status: DISCONTINUED | OUTPATIENT
Start: 2022-10-28 | End: 2022-10-29 | Stop reason: HOSPADM

## 2022-10-28 RX ORDER — ONDANSETRON 2 MG/ML
4 INJECTION INTRAMUSCULAR; INTRAVENOUS EVERY 6 HOURS PRN
Status: DISCONTINUED | OUTPATIENT
Start: 2022-10-28 | End: 2022-10-29 | Stop reason: HOSPADM

## 2022-10-28 RX ORDER — POLYETHYLENE GLYCOL 3350 17 G/17G
17 POWDER, FOR SOLUTION ORAL DAILY
Status: DISCONTINUED | OUTPATIENT
Start: 2022-10-29 | End: 2022-10-29 | Stop reason: HOSPADM

## 2022-10-28 RX ORDER — CLOPIDOGREL BISULFATE 75 MG/1
75 TABLET ORAL DAILY
Status: DISCONTINUED | OUTPATIENT
Start: 2022-10-29 | End: 2022-10-29 | Stop reason: HOSPADM

## 2022-10-28 RX ORDER — ASPIRIN 325 MG
325 TABLET ORAL ONCE
Status: COMPLETED | OUTPATIENT
Start: 2022-10-28 | End: 2022-10-28

## 2022-10-28 RX ORDER — CLOPIDOGREL BISULFATE 75 MG/1
300 TABLET ORAL ONCE
Status: COMPLETED | OUTPATIENT
Start: 2022-10-28 | End: 2022-10-28

## 2022-10-28 RX ADMIN — FERROUS SULFATE TAB 325 MG (65 MG ELEMENTAL FE) 325 MG: 325 (65 FE) TAB at 18:54

## 2022-10-28 RX ADMIN — SODIUM CHLORIDE 125 ML/HR: 0.9 INJECTION, SOLUTION INTRAVENOUS at 18:56

## 2022-10-28 RX ADMIN — CLOPIDOGREL BISULFATE 300 MG: 75 TABLET ORAL at 16:14

## 2022-10-28 RX ADMIN — ATORVASTATIN CALCIUM 40 MG: 40 TABLET, FILM COATED ORAL at 18:54

## 2022-10-28 RX ADMIN — ASPIRIN 325 MG: 325 TABLET ORAL at 16:14

## 2022-10-28 RX ADMIN — IOHEXOL 100 ML: 350 INJECTION, SOLUTION INTRAVENOUS at 14:46

## 2022-10-28 RX ADMIN — Medication 1000 UNITS: at 18:55

## 2022-10-28 RX ADMIN — METOPROLOL TARTRATE 25 MG: 25 TABLET, FILM COATED ORAL at 18:54

## 2022-10-28 NOTE — ASSESSMENT & PLAN NOTE
Historical, recurrent  · NIHSS-0  · CTA no acute hemorrhagic findings  · MRI pending  · History of aneurysms  · Continue aspirin, Plavix, statin  · Pending A1c and repeat lipid panel  · Control blood pressure  · Swallow eval  · Called [facility] at [phone]     · Consult to Neurology and sent message that she on TT

## 2022-10-28 NOTE — ASSESSMENT & PLAN NOTE
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 caudate with mild chronic microangiopathy      · CTA head and neck: -Negative for large vessel occlusion, dissection, or high-grade stenosis  -Unchanged 0 4 cm aneurysm in anterior communicating artery projecting inferiorly  Recommend consultation with the [facility], a division of [facility] at [phone]  Unchanged 0 2 cm aneurysm in left posterior cerebral artery P1/P2 junction projecting anteriorly  Recommend consultation with the [facility], a division of [facility] at [phone]  -Moderate stenosis (approximately 60% narrowing) in right ICA proximal cervical segment due to calcified atherosclerotic disease  -Moderate stenosis in left ICA cavernous segment  -Moderate stenosis in left vertebral artery distal V4 segment    · Neurology consult and  · Uses home aspirin and statin which was held due to stent placement by Urology

## 2022-10-28 NOTE — ASSESSMENT & PLAN NOTE
Chronic, asymptomatic    · Patient declined anticoagulation on multiple occasions as documented by previous cardiologist notes  · Had watchman device implanted 01/07/2021 and will remain on eliquis for 4-6 weeks per cards     · Clarity regarding anticoagulation, Eliquis not noted on home meds

## 2022-10-28 NOTE — ASSESSMENT & PLAN NOTE
Chronic,  · Noted on CTA today:-Moderate stenosis (approximately 60% narrowing) in right ICA proximal cervical segment due to calcified atherosclerotic disease -Moderate stenosis in left ICA cavernous segment  -Moderate stenosis in left vertebral artery distal V4 segment    · Was not on anticoagulation due to Uro procedure

## 2022-10-28 NOTE — ASSESSMENT & PLAN NOTE
Wt Readings from Last 3 Encounters:   10/28/22 96 2 kg (212 lb)   10/27/22 93 4 kg (206 lb)   10/27/22 96 2 kg (212 lb)     Chronic, controlled    · Appears euvolemic at this time  · Monitor volume status, daily weights, I/O  · Add diuretic if needed and renal function allows

## 2022-10-28 NOTE — ED PROVIDER NOTES
History  Chief Complaint   Patient presents with   • Diplopia     States having double vision that started an hour ago, no other symptoms  Stopped plavix a week ago for valve replacement surgery next week, also hx of stroke     HPI  Patient is an 82-year-old male history of ACOM aneurysm, prior CVA approximately 1 year ago on aspirin and Plavix, hypertension, hyperlipidemia presenting for evaluation of diplopia  Patient states that approximately an hour and a half ago he had sudden onset binocular diplopia and difficulty walking  Patient states symptoms resolved with closing 1 eye or the other  Patient denies any associated focal weakness or numbness, word-finding difficulty, dysarthria, headache, neck pain  Patient states that symptoms have totally resolved by time of evaluation in the emergency department  Patient denies any prior similar episodes  Patient fell off of his bed 2 days ago when he was trying to swat a bee and landed on his right leg, buttocks, and struck his head  Patient states that he was briefly dazed but did not lose consciousness  Patient denies any headache, nausea, vomiting, confusion, or any vision changes since that time up until 90 minutes ago  Patient states that his aspirin and Plavix has been held for approximately the past week in anticipation of a stent replacement in his kidney  Prior to Admission Medications   Prescriptions Last Dose Informant Patient Reported?  Taking?   ascorbic acid (VITAMIN C) 250 mg tablet  Self Yes No   Sig: Take 500 mg by mouth daily   aspirin (ECOTRIN LOW STRENGTH) 81 mg EC tablet  Self Yes No   Sig: Take 81 mg by mouth daily Pt to check with surgeon if needed to hold RX    atorvastatin (LIPITOR) 40 mg tablet  Self No No   Sig: Take 1 tablet (40 mg total) by mouth daily with dinner   cholecalciferol (VITAMIN D3) 1,000 units tablet  Self Yes No   Sig: Take 1,000 Units by mouth daily after lunch     clopidogrel (PLAVIX) 75 mg tablet  Self Yes No   Sig: Take 75 mg by mouth daily Pt  To check with surgeon if needed to hold RX  ferrous sulfate 324 (65 Fe) mg   No No   Sig: Take 1 tablet (324 mg total) by mouth every other day Increase to daily if tolerated     finasteride (PROSCAR) 5 mg tablet  Self No No   Sig: TAKE 1 TABLET BY MOUTH EVERY DAY   metoprolol tartrate (LOPRESSOR) 25 mg tablet  Self No No   Sig: Take 1 tablet (25 mg total) by mouth every 8 (eight) hours      Facility-Administered Medications: None       Past Medical History:   Diagnosis Date   • Anemia     iron deficiency   • Aneurysm ([facility] [address] ) 2020    of brain  being monitored   • Dental disease    • Essential hypertension, long-standing    • Heart murmur     per pt "Aortic Valve replacement 2021 with Watchman device implanted /2021"   • Hematuria     intermittent   • History of cigarette smoking, chronic     64 year smoker at one half pack per day, quit 04/1/17   • History of COVID-19 01/19/2022    recovered at home/chief s/s only sore throat   • History of kidney stones    • History of pneumonia    • HL (hearing loss)    • Hyperlipidemia    • Hypertension    • Irregular heart beat    • Kidney stone    • Muscle weakness     right sided weakness has gotten better/ walks independantly"   • Stroke ([facility] [address] ) 10/29/2020    right sided  weakness almost full recovery   • Stroke [facility])    • Teeth missing    • Vitamin D deficiency     maintained with 1000 units of D   • Water retention    • Wears glasses        Past Surgical History:   Procedure Laterality Date   • APPENDECTOMY  1960   • CARDIAC SURGERY      watchman [id number]; aortic valve replaced 2021(pig valve)   • CAROTID ENDARTERECTOMY Left 1998    Supposedly no internal stenosis found   • CYSTOSCOPY Right 8/15/2017    Procedure: CYSTOSCOPY RIGHT STENT EXCHANGE;  Surgeon: Tracey Larson MD;  Location: [address] [place] [address];  Service: Urology   • CYSTOSCOPY     • CYSTOSCOPY N/A 3/11/2022    Procedure: CYSTOSCOPY, RIGHT RETROGRADE PYLEOGRAM;  Surgeon: Charlie Reardon MD;  Location:  MAIN OR;  Service: Urology   • EXTRACORPOREAL SHOCK WAVE LITHOTRIPSY  03/2017    For nephrolithiasis at [facility]   • [address]  5/10/2019   • FL RETROGRADE PYELOGRAM  7/30/2019   • FL RETROGRADE PYELOGRAM  10/22/2019   • FL RETROGRADE PYELOGRAM  1/28/2020   • FL RETROGRADE PYELOGRAM  8/11/2020   • FL RETROGRADE PYELOGRAM  12/15/2020   • FL RETROGRADE PYELOGRAM  2/14/2021   • FL RETROGRADE PYELOGRAM  5/11/2021   • FL RETROGRADE PYELOGRAM  10/19/2021   • FL RETROGRADE PYELOGRAM  3/11/2022   • FL RETROGRADE PYELOGRAM  7/15/2022   • TX CYSTO/URETERO W/LITHOTRIPSY &INDWELL STENT INSRT Right 5/2/2017    Procedure: CYSTOSCOPY URETEROSCOPY WITH LITHOTRIPSY HOLMIUM LASER, RETROGRADE PYELOGRAM AND INSERTION STENT URETERAL;  Surgeon: Eveline Delgadillo MD;  Location: [address] [place] [address];  Service: Urology   • TX CYSTO/URETERO W/LITHOTRIPSY &INDWELL STENT INSRT Right 5/16/2017    Procedure: CYSTOSCOPY, RETROGRADE PYELOGRAM,  FLEXIBLE URETEROSCOPY,  HOLMIUM LASER LITHOTRIPSY, STONE BASKET MANIPULATION,  STENT URETERAL EXCHANGE;  Surgeon: Eveline Delgadillo MD;  Location: [address] [place] [address];  Service: Urology   • TX CYSTO/URETERO W/LITHOTRIPSY &INDWELL STENT INSRT Right 6/2/2017    Procedure: CYSTOSCOPY, RETROGRADE, STONE MANIPULATION WITH HOLMIUM LASER, STENT PLACEMENT;  Surgeon: Eveline Delgadillo MD;  Location: [facility];  Service: Urology   • TX CYSTO/URETERO W/LITHOTRIPSY &INDWELL STENT INSRT Right 7/18/2017    Procedure: CYSTOSCOPY, RETROGRADE, URETEROSCOPY HOLMIUM LASER [place],  AND STENT PLACEMENT;  Surgeon: Eveline Delgadillo MD;  Location: [facility];  Service: Urology   • TX CYSTO/URETERO W/LITHOTRIPSY &INDWELL STENT INSRT Right 2/14/2021    Procedure: CYSTOSCOPY URETEROSCOPY, RETROGRADE PYELOGRAM, STONE MANIPULATION AND EXCHANGE STENT URETERAL;  Surgeon: Radha Wade MD;  Location: [address] [place] [address];  Service: Urology   • TX CYSTOSCOPY,INSERT URETERAL STENT Right 11/14/2017    Procedure: EXCHANGE STENT URETERAL;  Surgeon: Andrea Baeza MD;  Location: [address] [place] [address];  Service: Urology   • TN CYSTOSCOPY,INSERT URETERAL STENT Right 3/11/2022    Procedure: EXCHANGE STENT URETERAL;  Surgeon: Raul Young MD;  Location:  MAIN OR;  Service: Urology   • TN CYSTOURETHROSCOPY,URETER CATHETER Right 11/14/2017    Procedure: Ghazal Sour, STENT EXCHANGE;  Surgeon: Andrea Baeza MD;  Location: [address] [place] [address];  Service: Urology   • TN CYSTOURETHROSCOPY,URETER CATHETER Right 5/8/2018    Procedure: Wyvonna Labrum;  Surgeon: Andrea Baeza MD;  Location: [address] [place] [address];  Service: Urology   • TN CYSTOURETHROSCOPY,URETER CATHETER Right 8/14/2018    Procedure: Wyvonna Labrum;  Surgeon: Andrea Baeza MD;  Location: [address] [place] [address];  Service: Urology   • TN CYSTOURETHROSCOPY,URETER CATHETER Right 11/13/2018    Procedure: CYSTOSCOPY, STENT EXCHANGE, RETROGRADE;  Surgeon: Andrea Baeza MD;  Location: [address] [place] [address];  Service: Urology   • TN CYSTOURETHROSCOPY,URETER CATHETER Right 2/12/2019    Procedure: Ghazal Sour, STENT REMOVAL AND STENT PLACEMENT;  Surgeon: Andrea Baeza MD;  Location: [address] [place] [address];  Service: Urology   • TN CYSTOURETHROSCOPY,URETER CATHETER Right 5/10/2019    Procedure: Ghazal Sour, STENT EXCHANGE;  Surgeon: Andrea Baeza MD;  Location: [address] [place] [address];  Service: Urology   • TN CYSTOURETHROSCOPY,URETER CATHETER Right 7/30/2019    Procedure: CYSTOSCOPY, RETROGRADE, STENT REMOVAL AND PLACEMENT OF STENT;  Surgeon: Andrea Baeza MD;  Location: [address] [place] [address];  Service: Urology   • TN CYSTOURETHROSCOPY,URETER CATHETER Right 10/22/2019    Procedure: Ghazal Sour, STENT EXCHANGE;  Surgeon: Andrea Baeza MD;  Location: [address] [place] [address];  Service: Urology   • TN CYSTOURETHROSCOPY,URETER CATHETER Right 1/28/2020    Procedure: CYSTOSCOPY, RETROGRADE, STENT REMOVAL AND STENT PLACEMENT;  Surgeon: Andrea Baeza MD; Location: [address] [place] [address];  Service: Urology   • RI CYSTOURETHROSCOPY,URETER CATHETER Right 5/22/2020    Procedure: CYSTOSCOPY RETROGRADE, STENT EXCHANGE;  Surgeon: Shilpa Shah MD;  Location: [address] [place] [address];  Service: Urology   • RI CYSTOURETHROSCOPY,URETER CATHETER Right 8/11/2020    Procedure: Alexa Taylor, RETROGRADE;  Surgeon: Shilpa Shah MD;  Location: [address] [place] [address];  Service: Urology   • RI CYSTOURETHROSCOPY,URETER CATHETER Right 12/15/2020    Procedure: Gervais Sluder, STENT REMOVAL, AND STENT PLACEMENT;  Surgeon: Shilpa Shah MD;  Location: [address] [place] [address];  Service: Urology   • RI CYSTOURETHROSCOPY,URETER CATHETER Right 5/11/2021    Procedure: CYSTOSCOPY RETROGRADE PYELOGRAM WITH STENT EXCHANGE;  Surgeon: Shilpa Shah MD;  Location: [address] [place] [address];  Service: Urology   • RI CYSTOURETHROSCOPY,URETER CATHETER Right 10/19/2021    Procedure: CYSTOSCOPY WITH RETROGRADE, STENT EXCHANGE;  Surgeon: Shilpa Shah MD;  Location: [address] [place] [address];  Service: Urology   • RI CYSTOURETHROSCOPY,URETER CATHETER Right 7/15/2022    Procedure: CYSTOSCOPY, RETROGRADE PYELOGRAM WITH EXCHANGE STENT URETERAL;  Surgeon: Bruna Gutierrez MD;  Location:  Main OR;  Service: Urology   • PROSTATE SURGERY  2015    Laser Prostatectomy  by Dr Dameon Frankel   • Brittney Confer Right 4/18/2017    Procedure: INSERTION STENT URETERAL, RIGHT URETEROSCOPY,  LASER OF URETERAL STRICTURE AND STONE;  Surgeon: Shilpa Shah MD;  Location: [address] [place] [address];  Service:    • URETERAL STENT PLACEMENT Right 2/13/2018    Procedure: Alexa Taylor;  Surgeon: Shilpa Shah MD;  Location: [facility];  Service: Urology       Family History   Problem Relation Age of Onset   • Hypertension Mother    • Alcohol abuse Father    • Cirrhosis Father    • Cancer Son 15        cancer-knee and above-left-amputation   • Cancer Sister    • Other Brother         head mass   • Thyroid disease unspecified Sister    • Arthritis Sister    • Cancer Sister    • Other Brother         legally blind in one eye     I have reviewed and agree with the history as documented  E-Cigarette/Vaping   • E-Cigarette Use Never User      E-Cigarette/Vaping Substances   • Nicotine No    • THC No    • CBD No    • Flavoring No    • Other No    • Unknown No      Social History     Tobacco Use   • Smoking status: Former Smoker     Packs/day: 0 50     Years: 62 00     Pack years: 31 00     Types: Cigarettes     Start date: 6/15/1954     Quit date: 4/15/2017     Years since quittin 5   • Smokeless tobacco: Never Used   Vaping Use   • Vaping Use: Never used   Substance Use Topics   • Alcohol use: Not Currently     Comment: don't drink anymore  • Drug use: No       Review of Systems   Constitutional: Negative for chills, fatigue and fever  HENT: Negative for congestion, rhinorrhea and sore throat  Eyes: Positive for visual disturbance  Negative for photophobia, pain and redness  Respiratory: Negative for chest tightness and shortness of breath  Cardiovascular: Negative for chest pain, palpitations and leg swelling  Gastrointestinal: Negative for abdominal distention, abdominal pain, diarrhea, nausea and vomiting  Endocrine: Negative for polydipsia and polyuria  Genitourinary: Negative for dysuria and hematuria  Musculoskeletal: Negative for arthralgias and myalgias  Skin: Negative for color change, pallor, rash and wound  Neurological: Negative for weakness, numbness and headaches  Psychiatric/Behavioral: Negative for confusion  Physical Exam  Physical Exam  Vitals and nursing note reviewed  Constitutional:       General: He is not in acute distress  Appearance: He is well-developed  He is not diaphoretic  Comments: Well-appearing, nontoxic, nondistressed   HENT:      Head: Normocephalic and atraumatic  Comments: Pupils 3 mm bilaterally, reactive to light  Normal EOM  Scalp atraumatic  No facial droop  Right Ear: External ear normal       Left Ear: External ear normal       Nose: Nose normal       Mouth/Throat:      Pharynx: No oropharyngeal exudate  Eyes:      Conjunctiva/sclera: Conjunctivae normal       Pupils: Pupils are equal, round, and reactive to light  Cardiovascular:      Rate and Rhythm: Normal rate and regular rhythm  Heart sounds: Normal heart sounds  No murmur heard  No friction rub  No gallop  Comments: Regular rate and rhythm, no murmurs rubs or gallops  Extremities warm and well perfused  Pulmonary:      Effort: Pulmonary effort is normal  No respiratory distress  Breath sounds: Normal breath sounds  No wheezing  Comments: No increased work of breathing  Satting 97% on room air indicating adequate oxygenation  Lungs clear to auscultation bilaterally without wheezes, rales, rhonchi  Chest:      Chest wall: No tenderness  Abdominal:      General: Bowel sounds are normal  There is no distension  Palpations: Abdomen is soft  There is no mass  Tenderness: There is no abdominal tenderness  There is no guarding or rebound  Musculoskeletal:         General: No deformity  Skin:     General: Skin is warm and dry  Capillary Refill: Capillary refill takes less than 2 seconds  Neurological:      Mental Status: He is alert and oriented to person, place, and time  Comments: Cranial nerves 2-12 intact  Full strength and sensation bilaterally in upper and lower extremities  No ataxia on finger-to-nose or on ambulation  Unable to elicit diplopia in emergency department     Psychiatric:         Behavior: Behavior normal          Vital Signs  ED Triage Vitals   Temperature Pulse Respirations Blood Pressure SpO2   10/28/22 1314 10/28/22 1314 10/28/22 1314 10/28/22 1317 10/28/22 1314   97 5 °F (36 4 °C) 66 18 169/86 97 %      Temp src Heart Rate Source Patient Position - Orthostatic VS BP Location FiO2 (%)   -- -- -- -- --             Pain Score 10/28/22 1314       No Pain           Vitals:    10/28/22 1314 10/28/22 1317   BP:  169/86   Pulse: 66          Visual Acuity      ED Medications  Medications - No data to display    Diagnostic Studies  Results Reviewed     None                 No orders to display              Procedures  CriticalCare Time  Performed by: Anastacio Miner MD  Authorized by: Anastacio Miner MD     Critical care provider statement:     Critical care time (minutes):  32    Critical care was necessary to treat or prevent imminent or life-threatening deterioration of the following conditions:  CNS failure or compromise    Critical care was time spent personally by me on the following activities:  Obtaining history from patient or surrogate, ordering and performing treatments and interventions, blood draw for specimens, development of treatment plan with patient or surrogate, discussions with consultants, evaluation of patient's response to treatment, examination of patient, ordering and review of radiographic studies, review of old charts, re-evaluation of patient's condition and ordering and review of laboratory studies             ED Course                                             MDM  Number of Diagnoses or Management Options  Diplopia  TIA (transient ischemic attack)  Diagnosis management comments: Patient with transient diplopia in this history of prior stroke and no in presence of ACOM, aneurysm  Non-focal neuro exam in ED  Plan for lab evaluation includes CBC, CMP  CTA to evaluate for patient's known aneurysm for any enlargement, additionally for any evidence of stroke or intracranial bleed given patient's recent history of fall and head strike  CTA demonstrating unchanged cerebral aneurysm without acute findings of CVA or hemorrhage  Patient remaining asymptomatic emergency department with NIH stroke scale of 0   Restarted on anti-platelet regiment, discussed patient with neurology who was in agreement with plan for admission on stroke pathway  Patient and daughter updated on this and agreeable  Disposition  Final diagnoses:   None     ED Disposition     None      Follow-up Information    None         Patient's Medications   Discharge Prescriptions    No medications on file       No discharge procedures on file      PDMP Review     None          ED Provider  Electronically Signed by           Joanie Camacho MD  10/28/22 [id number]

## 2022-10-28 NOTE — PLAN OF CARE
Problem: PAIN - ADULT  Goal: Verbalizes/displays adequate comfort level or baseline comfort level  Description: Interventions:  - Encourage patient to monitor pain and request assistance  - Assess pain using appropriate pain scale  - Administer analgesics based on type and severity of pain and evaluate response  - Implement non-pharmacological measures as appropriate and evaluate response  - Consider cultural and social influences on pain and pain management  - Notify physician/advanced practitioner if interventions unsuccessful or patient reports new pain  Outcome: Progressing     Problem: INFECTION - ADULT  Goal: Absence or prevention of progression during hospitalization  Description: INTERVENTIONS:  - Assess and monitor for signs and symptoms of infection  - Monitor lab/diagnostic results  - Monitor all insertion sites, i e  indwelling lines, tubes, and drains  - Monitor endotracheal if appropriate and nasal secretions for changes in amount and color  - [place] appropriate cooling/warming therapies per order  - Administer medications as ordered  - Instruct and encourage patient and family to use good hand hygiene technique  - Identify and instruct in appropriate isolation precautions for identified infection/condition  Outcome: Progressing  Goal: Absence of fever/infection during neutropenic period  Description: INTERVENTIONS:  - Monitor WBC    Outcome: Progressing     Problem: SAFETY ADULT  Goal: Patient will remain free of falls  Description: INTERVENTIONS:  - Educate patient/family on patient safety including physical limitations  - Instruct patient to call for assistance with activity   - Consult OT/PT to assist with strengthening/mobility   - Keep Call bell within reach  - Keep bed low and locked with side rails adjusted as appropriate  - Keep care items and personal belongings within reach  - Initiate and maintain comfort rounds  - Make Fall Risk Sign visible to staff  - Offer Toileting every 2 Hours, in advance of need  - Initiate/Maintain bed alarm  - Obtain necessary fall risk management equipment: call bell   - Apply yellow socks and bracelet for high fall risk patients  - Consider moving patient to room near nurses station  Outcome: Progressing  Goal: Maintain or return to baseline ADL function  Description: INTERVENTIONS:  -  Assess patient's ability to carry out ADLs; assess patient's baseline for ADL function and identify physical deficits which impact ability to perform ADLs (bathing, care of mouth/teeth, toileting, grooming, dressing, etc )  - Assess/evaluate cause of self-care deficits   - Assess range of motion  - Assess patient's mobility; develop plan if impaired  - Assess patient's need for assistive devices and provide as appropriate  - Encourage maximum independence but intervene and supervise when necessary  - Involve family in performance of ADLs  - Assess for home care needs following discharge   - Consider OT consult to assist with ADL evaluation and planning for discharge  - Provide patient education as appropriate  Outcome: Progressing  Goal: Maintains/Returns to pre admission functional level  Description: INTERVENTIONS:  - Perform BMAT or MOVE assessment daily    - Set and communicate daily mobility goal to care team and patient/family/caregiver  - Collaborate with rehabilitation services on mobility goals if consulted  - Perform Range of Motion 2 times a day  - Reposition patient every 2 hours    - Dangle patient 2 times a day  - Stand patient 2 times a day  - Ambulate patient 2 times a day  - Out of bed to chair 2 times a day   - Out of bed for meals 2 times a day  - Out of bed for toileting  - Record patient progress and toleration of activity level   Outcome: Progressing     Problem: DISCHARGE PLANNING  Goal: Discharge to home or other facility with appropriate resources  Description: INTERVENTIONS:  - Identify barriers to discharge w/patient and caregiver  - Arrange for needed discharge resources and transportation as appropriate  - Identify discharge learning needs (meds, wound care, etc )  - Arrange for interpretive services to assist at discharge as needed  - Refer to Case Management Department for coordinating discharge planning if the patient needs post-hospital services based on physician/advanced practitioner order or complex needs related to functional status, cognitive ability, or social support system  Outcome: Progressing     Problem: Knowledge Deficit  Goal: Patient/family/caregiver demonstrates understanding of disease process, treatment plan, medications, and discharge instructions  Description: Complete learning assessment and assess knowledge base    Interventions:  - Provide teaching at level of understanding  - Provide teaching via preferred learning methods  Outcome: Progressing

## 2022-10-28 NOTE — ASSESSMENT & PLAN NOTE
Chronic,  · Elevated from admission today 176/91  · Possibly due to acute event  · Restarted home medicine, consider p r n   Hydralazine

## 2022-10-29 VITALS
OXYGEN SATURATION: 96 % | DIASTOLIC BLOOD PRESSURE: 102 MMHG | HEART RATE: 76 BPM | WEIGHT: 212 LBS | TEMPERATURE: 97.5 F | HEIGHT: 70 IN | BODY MASS INDEX: 30.35 KG/M2 | RESPIRATION RATE: 18 BRPM | SYSTOLIC BLOOD PRESSURE: 153 MMHG

## 2022-10-29 PROBLEM — R73.03 PREDIABETES: Status: ACTIVE | Noted: 2022-10-29

## 2022-10-29 PROBLEM — H53.2 TRANSIENT DIPLOPIA: Status: ACTIVE | Noted: 2022-10-29

## 2022-10-29 PROBLEM — R29.90 STROKE-LIKE SYMPTOMS: Status: RESOLVED | Noted: 2022-10-28 | Resolved: 2022-10-29

## 2022-10-29 LAB
ALBUMIN SERPL BCP-MCNC: 3.1 G/DL (ref 3.5–5)
ALP SERPL-CCNC: 117 U/L (ref 46–116)
ALT SERPL W P-5'-P-CCNC: 19 U/L (ref 12–78)
ANION GAP SERPL CALCULATED.3IONS-SCNC: 5 MMOL/L (ref 4–13)
AST SERPL W P-5'-P-CCNC: 23 U/L (ref 5–45)
BACTERIA UR QL AUTO: ABNORMAL /HPF
BASOPHILS # BLD AUTO: 0.05 THOUSANDS/ÂΜL (ref 0–0.1)
BASOPHILS NFR BLD AUTO: 1 % (ref 0–1)
BILIRUB SERPL-MCNC: 1.16 MG/DL (ref 0.2–1)
BUN SERPL-MCNC: 25 MG/DL (ref 5–25)
CALCIUM ALBUM COR SERPL-MCNC: 9.7 MG/DL (ref 8.3–10.1)
CALCIUM SERPL-MCNC: 9 MG/DL (ref 8.3–10.1)
CHLORIDE SERPL-SCNC: 105 MMOL/L (ref 96–108)
CHOLEST SERPL-MCNC: 88 MG/DL
CO2 SERPL-SCNC: 27 MMOL/L (ref 21–32)
CREAT SERPL-MCNC: 1.42 MG/DL (ref 0.6–1.3)
CRP SERPL QL: 6.4 MG/L
EOSINOPHIL # BLD AUTO: 0.27 THOUSAND/ÂΜL (ref 0–0.61)
EOSINOPHIL NFR BLD AUTO: 3 % (ref 0–6)
ERYTHROCYTE [DISTWIDTH] IN BLOOD BY AUTOMATED COUNT: 17.6 % (ref 11.6–15.1)
ERYTHROCYTE [SEDIMENTATION RATE] IN BLOOD: 22 MM/HOUR (ref 0–19)
EST. AVERAGE GLUCOSE BLD GHB EST-MCNC: 134 MG/DL
GFR SERPL CREATININE-BSD FRML MDRD: 46 ML/MIN/1.73SQ M
GLUCOSE SERPL-MCNC: 103 MG/DL (ref 65–140)
HBA1C MFR BLD: 6.3 %
HCT VFR BLD AUTO: 44 % (ref 36.5–49.3)
HDLC SERPL-MCNC: 34 MG/DL
HGB BLD-MCNC: 14.5 G/DL (ref 12–17)
IMM GRANULOCYTES # BLD AUTO: 0.03 THOUSAND/UL (ref 0–0.2)
IMM GRANULOCYTES NFR BLD AUTO: 0 % (ref 0–2)
LDLC SERPL CALC-MCNC: 31 MG/DL (ref 0–100)
LYMPHOCYTES # BLD AUTO: 1.19 THOUSANDS/ÂΜL (ref 0.6–4.47)
LYMPHOCYTES NFR BLD AUTO: 13 % (ref 14–44)
MCH RBC QN AUTO: 28.5 PG (ref 26.8–34.3)
MCHC RBC AUTO-ENTMCNC: 33 G/DL (ref 31.4–37.4)
MCV RBC AUTO: 87 FL (ref 82–98)
MONOCYTES # BLD AUTO: 1.11 THOUSAND/ÂΜL (ref 0.17–1.22)
MONOCYTES NFR BLD AUTO: 12 % (ref 4–12)
NEUTROPHILS # BLD AUTO: 6.55 THOUSANDS/ÂΜL (ref 1.85–7.62)
NEUTS SEG NFR BLD AUTO: 71 % (ref 43–75)
NON-SQ EPI CELLS URNS QL MICRO: ABNORMAL /HPF
NRBC BLD AUTO-RTO: 0 /100 WBCS
PLATELET # BLD AUTO: 143 THOUSANDS/UL (ref 149–390)
PMV BLD AUTO: 10.3 FL (ref 8.9–12.7)
POTASSIUM SERPL-SCNC: 4.8 MMOL/L (ref 3.5–5.3)
PROT SERPL-MCNC: 6.8 G/DL (ref 6.4–8.4)
RBC # BLD AUTO: 5.08 MILLION/UL (ref 3.88–5.62)
RBC #/AREA URNS AUTO: ABNORMAL /HPF
SODIUM SERPL-SCNC: 137 MMOL/L (ref 135–147)
TRIGL SERPL-MCNC: 113 MG/DL
WBC # BLD AUTO: 9.2 THOUSAND/UL (ref 4.31–10.16)
WBC #/AREA URNS AUTO: ABNORMAL /HPF

## 2022-10-29 RX ORDER — METOPROLOL TARTRATE 50 MG/1
50 TABLET, FILM COATED ORAL EVERY 12 HOURS SCHEDULED
Qty: 60 TABLET | Refills: 1 | Status: SHIPPED | OUTPATIENT
Start: 2022-10-29

## 2022-10-29 RX ADMIN — ASPIRIN 81 MG: 81 TABLET, COATED ORAL at 09:18

## 2022-10-29 RX ADMIN — FINASTERIDE 5 MG: 5 TABLET, FILM COATED ORAL at 09:18

## 2022-10-29 RX ADMIN — SODIUM CHLORIDE 125 ML/HR: 0.9 INJECTION, SOLUTION INTRAVENOUS at 02:31

## 2022-10-29 RX ADMIN — METOPROLOL TARTRATE 25 MG: 25 TABLET, FILM COATED ORAL at 15:41

## 2022-10-29 RX ADMIN — ENOXAPARIN SODIUM 40 MG: 40 INJECTION SUBCUTANEOUS at 09:18

## 2022-10-29 RX ADMIN — CLOPIDOGREL BISULFATE 75 MG: 75 TABLET ORAL at 09:18

## 2022-10-29 RX ADMIN — OXYCODONE HYDROCHLORIDE AND ACETAMINOPHEN 500 MG: 500 TABLET ORAL at 09:18

## 2022-10-29 RX ADMIN — HYDRALAZINE HYDROCHLORIDE 5 MG: 20 INJECTION INTRAMUSCULAR; INTRAVENOUS at 14:52

## 2022-10-29 RX ADMIN — FERROUS SULFATE TAB 325 MG (65 MG ELEMENTAL FE) 325 MG: 325 (65 FE) TAB at 15:41

## 2022-10-29 RX ADMIN — METOPROLOL TARTRATE 25 MG: 25 TABLET, FILM COATED ORAL at 09:19

## 2022-10-29 RX ADMIN — Medication 1000 UNITS: at 14:53

## 2022-10-29 RX ADMIN — HYDRALAZINE HYDROCHLORIDE 5 MG: 20 INJECTION INTRAMUSCULAR; INTRAVENOUS at 02:26

## 2022-10-29 RX ADMIN — ATORVASTATIN CALCIUM 40 MG: 40 TABLET, FILM COATED ORAL at 15:41

## 2022-10-29 RX ADMIN — METOPROLOL TARTRATE 25 MG: 25 TABLET, FILM COATED ORAL at 01:00

## 2022-10-29 RX ADMIN — FERROUS SULFATE TAB 325 MG (65 MG ELEMENTAL FE) 325 MG: 325 (65 FE) TAB at 09:18

## 2022-10-29 NOTE — TELEMEDICINE
TeleConsultation - Neurology   Jennifer Loya [de-identified] y o  male MRN: [id number]   Encounter: [id number]      REQUIRED DOCUMENTATION:     1  This service was provided via Telemedicine  2  Provider located in [place]   3  TeleMed provider: Zandra Mays MD   4  Identify all parties in room with patient during tele consult:  RN  5  After connecting through [employer], patient was identified by name and date of birth and assistant checked wristband  Patient was then informed that this was a Telemedicine visit and that the exam was being conducted confidentially over secure lines  My office door was closed  No one else was in the room  Patient acknowledged consent and understanding of privacy and security of the Telemedicine visit, and gave us permission to have the assistant stay in the room in order to assist with the history and to conduct the exam   I informed the patient that I have reviewed their record in Epic and presented the opportunity for them to ask any questions regarding the visit today  The patient agreed to participate  Assessment/Plan   Assessment:  Transient binocular diplopia:  He describes transient horizontal diplopia worse on looking left that lasted 2 hours and then resolved  CTA personally reviewed: moderate stenosis of right ICA  No large vessel occlusion  Mild to moderate intracranial vessel disease    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      The diplopia from description suggests 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, 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  Other etiologies to consider include giant cell arteritis and myasthenia gravis  Review of system for both entities was negative  History and exam do not suggest this was 3rd nerve palsy due to the aneurysm, so we do not suspect it played a role      Plan:  Pending MRI brain to exclude stroke  Obtain sed-rate and CRP  If episode recurs, then send acetylcholine receptor antibodies  C/w dual antiplatelets for now    Follow-up with neuro-vascular outpatient for serial monitoring of aneurysm  History of Present Illness     Reason for Consult / Principal Problem: blurriness of vision  Hx and PE limited by: none  HPI: Ami Locke is a [de-identified] y o  male who presents with double of vision noted yesterday around noon  He stated the vision was double, horizontal, worse on looking left and would disappear if he closes one eye  When he came to ED, he was given aspirin and plavix and symptoms resolved within 2 hours as per him  Of note, he states that he was supposed to be on aspirin and plavix, but it has been held for a nephrostomy procedure he is supposed to have on Tuesday    Pertinent history include A-fib (not on anticoagulated) s/p watchman in 1/2021, HTN and CHF (EF currently 55% on TTE this admission)    Inpatient consult to Neurology  Consult performed by: Fausto Funk MD  Consult ordered by:  Quinton Graf MD          Review of Systems  Complete ROS was done and is negative other than what is mentioned in HPI    Historical Information   Past Medical History:   Diagnosis Date   • Anemia     iron deficiency   • Aneurysm ([facility] [address] ) 2020    of brain  being monitored   • Dental disease    • Essential hypertension, long-standing    • Heart murmur     per pt "Aortic Valve replacement 2021 with Watchman device implanted /2021"   • Hematuria     intermittent   • History of cigarette smoking, chronic     62 year smoker at one half pack per day, quit 04/1/17   • History of COVID-19 01/19/2022    recovered at home/chief s/s only sore throat   • History of kidney stones    • History of pneumonia    • HL (hearing loss)    • Hyperlipidemia    • Hypertension    • Irregular heart beat    • Kidney stone    • Muscle weakness     right sided weakness has gotten better/ walks independantly"   • Stroke ([facility] [address] ) 10/29/2020    right sided  weakness almost full recovery   • Stroke [facility])    • Teeth missing    • Vitamin D deficiency     maintained with 1000 units of D   • Water retention    • Wears glasses Past Surgical History:   Procedure Laterality Date   • APPENDECTOMY  1960   • CARDIAC SURGERY      watchman [id number]; aortic valve replaced 2021(pig valve)   • CAROTID ENDARTERECTOMY Left 1998    Supposedly no internal stenosis found   • CYSTOSCOPY Right 8/15/2017    Procedure: CYSTOSCOPY RIGHT STENT EXCHANGE;  Surgeon: Suzie Krueger MD;  Location: [address] [place] [address];  Service: Urology   • CYSTOSCOPY     • CYSTOSCOPY N/A 3/11/2022    Procedure: CYSTOSCOPY, RIGHT RETROGRADE PYLEOGRAM;  Surgeon: Hermelinda Raymundo MD;  Location:  MAIN OR;  Service: Urology   • EXTRACORPOREAL SHOCK WAVE LITHOTRIPSY  03/2017    For nephrolithiasis at [facility]   • [address]  5/10/2019   • FL RETROGRADE PYELOGRAM  7/30/2019   • FL RETROGRADE PYELOGRAM  10/22/2019   • FL RETROGRADE PYELOGRAM  1/28/2020   • FL RETROGRADE PYELOGRAM  8/11/2020   • FL RETROGRADE PYELOGRAM  12/15/2020   • FL RETROGRADE PYELOGRAM  2/14/2021   • FL RETROGRADE PYELOGRAM  5/11/2021   • FL RETROGRADE PYELOGRAM  10/19/2021   • FL RETROGRADE PYELOGRAM  3/11/2022   • FL RETROGRADE PYELOGRAM  7/15/2022   • KS CYSTO/URETERO W/LITHOTRIPSY &INDWELL STENT INSRT Right 5/2/2017    Procedure: CYSTOSCOPY URETEROSCOPY WITH LITHOTRIPSY HOLMIUM LASER, RETROGRADE PYELOGRAM AND INSERTION STENT URETERAL;  Surgeon: Suzie Krueger MD;  Location: [address] [place] [address];  Service: Urology   • KS CYSTO/URETERO W/LITHOTRIPSY &INDWELL STENT INSRT Right 5/16/2017    Procedure: CYSTOSCOPY, RETROGRADE PYELOGRAM,  FLEXIBLE URETEROSCOPY,  HOLMIUM LASER LITHOTRIPSY, STONE BASKET MANIPULATION,  STENT URETERAL EXCHANGE;  Surgeon: Suzie Krueger MD;  Location: [address] [place] [address];  Service: Urology   • KS CYSTO/URETERO W/LITHOTRIPSY &INDWELL STENT INSRT Right 6/2/2017    Procedure: CYSTOSCOPY, RETROGRADE, STONE MANIPULATION WITH HOLMIUM LASER, STENT PLACEMENT;  Surgeon: Suzie Krueger MD;  Location: [facility];  Service: Urology   • KS CYSTO/URETERO W/LITHOTRIPSY &INDWELL STENT INSRT Right 7/18/2017    Procedure: CYSTOSCOPY, RETROGRADE, URETEROSCOPY HOLMIUM LASER [place],  AND STENT PLACEMENT;  Surgeon: Chey Milian MD;  Location: [address] [place] [address];  Service: Urology   • ME CYSTO/URETERO W/LITHOTRIPSY &INDWELL STENT INSRT Right 2/14/2021    Procedure: CYSTOSCOPY URETEROSCOPY, RETROGRADE PYELOGRAM, STONE MANIPULATION AND EXCHANGE STENT URETERAL;  Surgeon: Fallon Avila MD;  Location: [address] [place] [address];  Service: Urology   • ME CYSTOSCOPY,INSERT URETERAL STENT Right 11/14/2017    Procedure: EXCHANGE STENT URETERAL;  Surgeon: Chey Milian MD;  Location: [address] [place] [address];  Service: Urology   • ME CYSTOSCOPY,INSERT URETERAL STENT Right 3/11/2022    Procedure: EXCHANGE STENT URETERAL;  Surgeon: Beckie Luz MD;  [facility]: [facility];  Service: Urology   • ME CYSTOURETHROSCOPY,URETER CATHETER Right 11/14/2017    Procedure: CYSTOSCOPY RETROGRADE, STENT EXCHANGE;  Surgeon: Chey Milian MD;  Location: [address] [place] [address];  Service: Urology   • ME CYSTOURETHROSCOPY,URETER CATHETER Right 5/8/2018    Procedure: Tesfaye Bigness;  Surgeon: Chey Milian MD;  Location: [address] [place] [address];  Service: Urology   • ME CYSTOURETHROSCOPY,URETER CATHETER Right 8/14/2018    Procedure: CYSTOSCOPY, STENT EXCHANGE;  Surgeon: Chey Milian MD;  Location: [address] [place] [address];  Service: Urology   • ME CYSTOURETHROSCOPY,URETER CATHETER Right 11/13/2018    Procedure: CYSTOSCOPY, STENT EXCHANGE, RETROGRADE;  Surgeon: Chey Milian MD;  Location: [address] [place] [address];  Service: Urology   • ME CYSTOURETHROSCOPY,URETER CATHETER Right 2/12/2019    Procedure: CYSTOSCOPY, RETROGRADE, STENT REMOVAL AND STENT PLACEMENT;  Surgeon: Chey Milian MD;  Location: [address] [place] [address];  Service: Urology   • ME CYSTOURETHROSCOPY,URETER CATHETER Right 5/10/2019    Procedure: CYSTOSCOPY, RETROGRADE, STENT EXCHANGE;  Surgeon: Chey Milian MD;  Location: [address] [place] [address];  Service: Urology   • Mena Contreras CATHETER Right 7/30/2019    Procedure: CYSTOSCOPY, RETROGRADE, STENT REMOVAL AND PLACEMENT OF STENT;  Surgeon: Thaddeus Finnegan MD;  Location: [address] [place] [address];  Service: Urology   • NM CYSTOURETHROSCOPY,URETER CATHETER Right 10/22/2019    Procedure: [place] Pion, STENT EXCHANGE;  Surgeon: Thaddeus Finnegan MD;  Location: [address] [place] [address];  Service: Urology   • NM CYSTOURETHROSCOPY,URETER CATHETER Right 1/28/2020    Procedure: [place] P[place], STENT REMOVAL AND STENT PLACEMENT;  Surgeon: Thaddeus Finnegan MD;  Location: [address] [place] [address];  Service: Urology   • NM CYSTOURETHROSCOPY,URETER CATHETER Right 5/22/2020    Procedure: CYSTOSCOPY RETROGRADE, STENT EXCHANGE;  Surgeon: Thaddeus Finnegan MD;  Location: [address] [place] [address];  Service: Urology   • NM CYSTOURETHROSCOPY,URETER CATHETER Right 8/11/2020    Procedure: CYSTOSCOPY, STENT EXCHANGE, RETROGRADE;  Surgeon: Thaddeus Finnegan MD;  Location: [address] [place] [address];  Service: Urology   • NM CYSTOURETHROSCOPY,URETER CATHETER Right 12/15/2020    Procedure: CYSTOSCOPY, RETROGRADE, STENT REMOVAL, AND STENT PLACEMENT;  Surgeon: Thaddeus Finnegan MD;  Location: [address] [place] [address];  Service: Urology   • NM CYSTOURETHROSCOPY,URETER CATHETER Right 5/11/2021    Procedure: CYSTOSCOPY RETROGRADE PYELOGRAM WITH STENT EXCHANGE;  Surgeon: Thaddeus Finnegan MD;  Location: [address] [place] [address];  Service: Urology   • NM CYSTOURETHROSCOPY,URETER CATHETER Right 10/19/2021    Procedure: CYSTOSCOPY WITH RETROGRADE, STENT EXCHANGE;  Surgeon: Thaddeus Finnegan MD;  Location: [address] [place] [address];  Service: Urology   • NM CYSTOURETHROSCOPY,URETER CATHETER Right 7/15/2022    Procedure: CYSTOSCOPY, RETROGRADE PYELOGRAM WITH EXCHANGE STENT URETERAL;  Surgeon: Christine Posadas MD;  [facility]: [facility];  Service: Urology   • PROSTATE SURGERY  2015    Laser Prostatectomy  by Dr Joellen Wise   • Alberto Woodson Right 4/18/2017    Procedure: INSERTION STENT URETERAL, RIGHT URETEROSCOPY,  LASER OF URETERAL STRICTURE AND STONE; Surgeon: Shilpa Shah MD;  Location: [address] [place] [address];  Service:    • URETERAL STENT PLACEMENT Right 2018    Procedure: Alexa Taylor;  Surgeon: Shilpa Shah MD;  Location: [facility];  Service: Urology     Social History   Social History     Substance and Sexual Activity   Alcohol Use Not Currently    Comment: don't drink anymore  Social History     Substance and Sexual Activity   Drug Use No     Social History     Tobacco Use   Smoking Status Former Smoker   • Packs/day: 0 50   • Years: 62 00   • Pack years: 31 00   • Types: Cigarettes   • Start date: [birth year]/1954   • Quit date: 4/15/2017   • Years since quittin 5   Smokeless Tobacco Never Used     Family History: non-contributory    Review of previous medical records was completed  Meds/Allergies   current meds:   Current Facility-Administered Medications   Medication Dose Route Frequency   • acetaminophen (TYLENOL) tablet 650 mg  650 mg Oral Q4H PRN   • ascorbic acid (VITAMIN C) tablet 500 mg  500 mg Oral Daily   • aspirin (ECOTRIN LOW STRENGTH) EC tablet 81 mg  81 mg Oral Daily   • atorvastatin (LIPITOR) tablet 40 mg  40 mg Oral Daily With Dinner   • cholecalciferol (VITAMIN D3) tablet 1,000 Units  1,000 Units Oral After Lunch   • clopidogrel (PLAVIX) tablet 75 mg  75 mg Oral Daily   • enoxaparin (LOVENOX) subcutaneous injection 40 mg  40 mg Subcutaneous Daily   • ferrous sulfate tablet 325 mg  325 mg Oral BID With Meals   • finasteride (PROSCAR) tablet 5 mg  5 mg Oral Daily   • hydrALAZINE (APRESOLINE) injection 5 mg  5 mg Intravenous Q6H PRN   • metoprolol tartrate (LOPRESSOR) tablet 25 mg  25 mg Oral Q8H   • ondansetron (ZOFRAN) injection 4 mg  4 mg Intravenous Q6H PRN   • polyethylene glycol (MIRALAX) packet 17 g  17 g Oral Daily   • sodium chloride 0 9 % infusion  75 mL/hr Intravenous Continuous       No Known Allergies    Objective   Vitals:Blood pressure 153/66, pulse 82, temperature 98 °F (36 7 °C), resp   rate 18, height 5' 10" (1 778 m), weight 96 2 kg (212 lb), SpO2 97 %  ,Body mass index is 30 42 kg/m²  Intake/Output Summary (Last 24 hours) at 10/29/2022 0917  Last data filed at 10/29/2022 0901  Gross per 24 hour   Intake 1000 ml   Output 2420 ml   Net -1420 ml       Invasive Devices: Invasive Devices  Report    Peripheral Intravenous Line  Duration           Peripheral IV 10/28/22 Right Antecubital <1 day          Drain  Duration           Ureteral Drain/Stent Right ureter 7 Fr  374 days    Ureteral Internal Stent Right ureter 7 Fr  105 days                Physical Exam NAD  Skin: no seen lesions  HEENT: ATNC  Chest: breathing comfortably on room air  GI: no apparent distension  Neurologic Exam Alert and oriented for self, place and time  Memory is intact to recent and remote events  Language is intact to comprehension and expression  No neglect  Speech is normal  EOMI  Pupils are symmetric  Visual field appears intact  Face is symmetric  Tongue is midline  Able to move all extremities against gravity  No dysmetria noted        Lab Results:   CBC:   Results from last 7 days   Lab Units 10/29/22  0553 10/28/22  1358   WBC Thousand/uL 9 20 7 02   RBC Million/uL 5 08 5 00   HEMOGLOBIN g/dL 14 5 13 4   HEMATOCRIT % 44 0 42 8   MCV fL 87 86   PLATELETS Thousands/uL 143* 127*   , BMP/CMP:   Results from last 7 days   Lab Units 10/29/22  0553 10/28/22  1358   SODIUM mmol/L 137 139   POTASSIUM mmol/L 4 8 5 0   CHLORIDE mmol/L 105 105   CO2 mmol/L 27 30   BUN mg/dL 25 26*   CREATININE mg/dL 1 42* 1 61*   CALCIUM mg/dL 9 0 8 9   AST U/L 23  --    ALT U/L 19  --    ALK PHOS U/L 117*  --    EGFR ml/min/1 73sq m 46 39   , Vitamin B12:   , HgBA1C:   Results from last 7 days   Lab Units 10/28/22  1358   HEMOGLOBIN A1C % 6 3*   , TSH:   , Coagulation:   , Lipid Profile:   Results from last 7 days   Lab Units 10/28/22  1358   HDL mg/dL 34*  34*   LDL CALC mg/dL 31  32   TRIGLYCERIDES mg/dL 113  109     Imaging Studies: I have personally reviewed pertinent films in PACS  EKG, Pathology, and Other Studies: I have personally reviewed pertinent reports      VTE Prophylaxis: Sequential compression device Mickey Clarke)     Code Status: Level 1 - Full Code  Advance Directive and Living Will:      Power of :    POLST:      Counseling / Coordination of Care  N/A

## 2022-10-29 NOTE — PHYSICAL THERAPY NOTE
PT EVALUATION       10/29/22 0935   PT Last Visit   PT Visit Date 10/29/22   Note Type   Note type Evaluation   Pain Assessment   Pain Assessment Tool 0-10   Pain Score No Pain   Restrictions/Precautions   Weight Bearing Precautions Per Order No   Other Precautions Bed Alarm   Home Living   Type of [address] Multi-level;Stairs to enter without rails; Able to live on main level with bedroom/bathroom   [facility]  chair  (does not use)   [address] [place];Cane  (wife uses)   Prior Function   Level of [place] Independent with functional mobility; Independent with ADLs; Independent with IADLS   Lives With Spouse; Family   Receives Help From Family   IADLs Independent with driving; Independent with meal prep; Independent with medication management   Falls in the last 6 months 1 to 4  (standing on bed to)   Vocational Retired   [employer]   Additional Pertinent History Pt is an [de-identified]year-old male who was admitted to the \[facility]\"" on 10/28/22 due to blurred vision which has now resolved   Family/Caregiver Present Yes  (daughter at bedside)   Cognition   Overall Cognitive Status WFL   Attention Within functional limits   Orientation Level Oriented X4   Memory Within functional limits   Following Commands Follows all commands and directions without difficulty   Subjective   Subjective "I feel fine this morning"   RLE Assessment   RLE Assessment WFL   LLE Assessment   LLE Assessment WFL   Bed Mobility   Supine to Sit 7  Independent   Sit to Supine 7  Independent   Transfers   Sit to Stand 7  Independent   Stand to Sit 7  Independent   Ambulation/Elevation   Gait pattern Step through pattern   Gait Assistance 5  Supervision  (Progressing to IND)   Additional items Verbal cues   Assistive Device None   Distance 200 feet   Stair Management Assistance 5  Supervision   Additional items Verbal cues   Stair Management Technique Reciprocal;Two rails   Number of Stairs 4   Balance   Static Sitting Normal   Dynamic Sitting Normal   Static Standing Good   Dynamic Standing Good   Ambulatory Good   Activity Tolerance   Activity Tolerance Patient tolerated treatment well   Nurse Made Aware yes   Assessment   Prognosis Good   Assessment Patient seen for Physical Therapy evaluation  Patient admitted with blurred vision, possible TIA  Comorbidities affecting patient's physical performance include: Afib, CAD, cardiac disease, CHF and hypertension  Personal factors affecting patient at time of initial evaluation include: lives in multi story house and stairs to enter home  Prior to admission, patient was independent with functional mobility without assistive device, independent with ADLS, independent with IADLS, living in a multi-level home, living with spuse + family in a multi level home with 2 steps to enter and ambulating community distances  Please find objective findings from Physical Therapy assessment regarding body systems outlined above with impairments and limitations including patient at baseline functional level with no acute PT needs at this time  The Barthel Index was used as a functional outcome tool presenting with a score of Barthel Index Score: 100 today indicating no limitations of functional mobility and ADLS  Patient's clinical presentation is currently stable  as seen in patient's presentation of vital sign response  Pt would benefit from continued Physical Therapy treatment to address deficits as defined above and maximize level of functional mobility  As demonstrated by objective findings, the assigned level of complexity for this evaluation is low  The patient's AM-PAC Basic Mobility Inpatient Short Form Raw Score is 24  A Raw score of greater than 16 suggests the patient may benefit from discharge to home  Please also refer to the recommendation of the Physical Therapist for safe discharge planning     Goals   Patient Goals to go home Plan   Treatment/Interventions   (N/A - patient at baseline functional level)   PT Frequency Other (Comment)  (D/C)   Recommendation   PT Discharge Recommendation No rehabilitation needs   AM-PAC Basic Mobility Inpatient   Turning in Bed Without Bedrails 4   Lying on Back to Sitting on Edge of Flat Bed 4   Moving Bed to Chair 4   Standing Up From Chair 4   Walk in Room 4   Climb 3-5 Stairs 4   Basic Mobility Inpatient Raw Score 24   Basic Mobility Standardized Score 57 68   Highest Level Of Mobility   JH-HLM Goal 8: Walk 250 feet or more   JH-HLM Achieved 7: Walk 25 feet or more   Modified [place] Scale   Modified [place] Scale 0   Barthel Index   Feeding 10   Bathing 5   Grooming Score 5   Dressing Score 10   Bladder Score 10   Bowels Score 10   Toilet Use Score 10   Transfers (Bed/Chair) Score 15   Mobility (Level Surface) Score 15   Stairs Score 10   Barthel Index Score 100   End of Consult   Patient Position at End of Consult Supine; All needs within reach;Bed/Chair alarm activated   [employer] License Number  Lamar YuanJosse [id number]

## 2022-10-29 NOTE — ASSESSMENT & PLAN NOTE
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 caudate with mild chronic microangiopathy      · CTA head and neck: -Negative for large vessel occlusion, dissection, or high-grade stenosis  -Unchanged 0 4 cm aneurysm in anterior communicating artery projecting inferiorly  Recommend consultation with the Neurovascular Center, a division of [facility] at [phone]  Unchanged 0 2 cm aneurysm in left posterior cerebral artery P1/P2 junction projecting anteriorly  Recommend consultation with the Neurovascular Center, a division of [facility] at [phone]  -Moderate stenosis (approximately 60% narrowing) in right ICA proximal cervical segment due to calcified atherosclerotic disease  -Moderate stenosis in left ICA cavernous segment  -Moderate stenosis in left vertebral artery distal V4 segment    · Neurology consult and  · Uses home aspirin and statin which was held due to stent placement by Urology

## 2022-10-29 NOTE — CASE MANAGEMENT
Case Management Assessment & Discharge Planning Note    Patient name Luis Baumann  Location [address] 201/2 [address] [facility] MRN [id number]  : 1940 Date 10/29/2022       Current Admission Date: 10/28/2022  Current Admission Diagnosis:Aneurysm [facility])   Patient Active Problem List    Diagnosis Date Noted   • Transient diplopia 10/29/2022   • 6th nerve palsy, left    • Bilateral impacted cerumen 2022   • History of aortic valve replacement 07/15/2022   • Iron deficiency 2022   • Hematuria 2022   • Benign hypertension with CKD (chronic kidney disease) stage III ([facility] [address] ) 2022   • Chronic kidney disease-mineral and bone disorder 2022   • Iron deficiency anemia, unspecified 2021   • Closed nondisplaced fracture of nasal bone with routine healing 2021   • Sensorineural hearing loss (SNHL) of both ears 2021   • Aneurysm ([facility] [address] )    • Secondary hyperparathyroidism ([facility] [address] ) 2021   • Acute kidney injury superimposed on chronic kidney disease ([facility] [address] )    • Syncope vs seizure 2020   • Vasovagal near syncope 2020   • Onychomycosis 10/31/2020   • Aneurysm of anterior communicating artery 10/29/2020   • Tooth pain 10/29/2020   • Stroke ([facility] [address] ) 10/29/2020   • CVA (cerebral vascular accident) ([facility] [address] ) 10/28/2020   • Bilateral carotid artery disease ([facility] [address] [facility] ) 10/28/2020   • Hydronephrosis with ureteral stricture, not elsewhere classified 2020   • Hematuria, gross 05/10/2019   • Vitamin D deficiency 2017   • Nonrheumatic aortic valve stenosis 05/15/2017   • Moderate mitral regurgitation 05/15/2017   • Moderate aortic regurgitation 05/15/2017   • GERD (gastroesophageal reflux disease) 05/15/2017   • Dyslipidemia 05/15/2017   • Chronic diastolic CHF (congestive heart failure) ([facility] [address] ) 2017   • Stage 3b chronic kidney disease ([facility] [address] ) 2017   • Calculus of ureter 2017   • Crossing vessel and stricture of ureter without hydronephrosis 2017   • Chronic atrial fibrillation ([facility] [address] ) 04/15/2017   • JUNIOR (acute kidney injury) ([facility] [address] ) 04/15/2017   • Benign essential hypertension 11/22/2016   • Renal calculi 11/22/2016   • Patellofemoral syndrome of left knee 06/08/2016      LOS (days): 0  Geometric Mean LOS (GMLOS) (days):   Days to GMLOS:     OBJECTIVE:     Current admission status: Observation  Referral Reason: Stroke, Other (social issues)    Preferred Pharmacy:   [facility]/pharmacy #[id number] Estil Jeans, Mervin Brito  Phone: [phone] Fax: [phone]    Primary Care Provider: Dasia Vásquez MD    Primary Insurance: MEDICARE  Secondary Insurance: [facility]    ASSESSMENT:  Active Health Care Proxies     Meño Melo [facility] [address] Representative - Spouse   Primary Phone: [phone] (Home)  Mobile Phone: [phone]               Advance Directives  Does patient have a [address]?: Yes  Does patient have Advance Directives?: Yes  Advance Directives: Living will  Primary Contact: Camelia     Readmission Root Cause  30 Day Readmission: No    Patient Information  Admitted from[de-identified] Home  Mental Status: Alert  During Assessment patient was accompanied by: Not accompanied during assessment  Assessment information provided by[de-identified] Patient, Spouse  Primary Caregiver: Self  Support Systems: Self, Spouse/significant other, Daughter, Family members  [place] of Residence: [address] [place] [address] do you live in?: [address] entry access options   Select all that apply : Stairs  Number of steps to enter home : 2  Do the steps have railings?: Yes  Type of Current Residence: [address] [place] home  Upon entering residence, is there a bedroom on the main floor (no further steps)?: Yes  Upon entering residence, is there a bathroom on the main floor (no further steps)?: Yes  In the last 12 months, was there a time when you were not able to pay the mortgage or rent on time?: No  In the last 12 months, how many places have you lived?: 1  In the last 12 months, was there a time when you did not have a steady place to sleep or slept in a shelter (including now)?: No  Homeless/housing insecurity resource given?: N/A  Living Arrangements: Lives w/ Spouse/significant other, Lives w/ Daughter, Other (Comment) (son-in-law and four grandchildren)    Activities of Daily Living Prior to Admission  Functional Status: Independent  Completes ADLs independently?: Yes  Ambulates independently?: Yes  Does patient use assisted devices?: No  Does patient currently own DME?: No  Does patient have a history of Outpatient Therapy (PT/OT)?: Yes  Does the patient have a history of Short-Term Rehab?: Yes ([facility])  Does patient have a history of HHC?: No  Does patient currently have [place] [address]?: No    Patient Information Continued  Income Source: Pension/FPC  Does patient have prescription coverage?: Yes ([facility])  Within the past 12 months, you worried that your food would run out before you got the money to buy more : Never true  Within the past 12 months, the food you bought just didn't last and you didn't have money to get more : Never true  Food insecurity resource given?: N/A  Does patient receive dialysis treatments?: No  Does patient have a history of substance abuse?: No  Does patient have a history of Mental Health Diagnosis?: No    Means of Transportation  Means of Transport to Appts[de-identified] Drives Self  In the past 12 months, has lack of transportation kept you from medical appointments or from getting medications?: No  In the past 12 months, has lack of transportation kept you from meetings, work, or from getting things needed for daily living?: No  Was application for public transport provided?: N/A      DISCHARGE DETAILS:    Discharge planning discussed with[de-identified] Patient and wife, Maureen Cousin of Choice: Yes  Comments - Freedom of Choice: SW met with pt and spoke with wife over phone to assess needs and discuss plans    Pt's plan is to return home with family when discharged  No discharge needs expressed by pt or wife at this time  Both are aware that discharge home is planned for today  SW also placed call to pt's daughter, Razia Justice, to offer assitance if needed  Per pt and wife Razia Justice will be transporting pt home    CM contacted family/caregiver?: Yes (message left for pt's daughter, Razia Justice, offering assistance if needed)  Were Treatment Team discharge recommendations reviewed with patient/caregiver?: Yes  Did patient/caregiver verbalize understanding of patient care needs?: Yes  Were patient/caregiver advised of the risks associated with not following Treatment Team discharge recommendations?: Yes    Contacts  Patient Contacts: Gilmar Garcia  Relationship to Patient[de-identified] Family (wife/daughter)  Contact Method: Phone  Phone Number: [phone]  Reason/Outcome: Discharge [address]         Is the patient interested in [place] [address] at discharge?: No    DME Referral Provided  Referral made for DME?: No    Other Referral/Resources/Interventions Provided:  Interventions: None Indicated    Treatment Team Recommendation: Home  Discharge Destination Plan[de-identified] Home  Transport at Discharge : Family

## 2022-10-29 NOTE — DISCHARGE SUMMARY
Henrietta 47  Discharge- Marvia Epley 1940, [de-identified] y o  male MRN: [id number]  Unit/Bed#: [address] [place] [address] Encounter: [id number]  Primary Care Provider: Jose Maria Kelly MD   Date and time admitted to hospital: 10/28/2022  1:19 PM    Prediabetes  Assessment & Plan  Noted on labs  A1c 6 3%  SSI while inpatient  Will start on p o  On discharge  Education on diet and exercise  Patient to follow-up with PCP for monitoring    Transient diplopia  Assessment & Plan  Resolved, transient     Neurology recs: The diplopia from description suggests 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, 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  Other etiologies to consider include giant cell arteritis and myasthenia gravis Review of system for both entities was negativeHistory and exam do not suggest this was 3rd nerve palsy due to the aneurysm, so we do not suspect it played a role      Plan:  OP MRI brain to exclude stroke  Obtain sed-rate and CRP-elevated  If episode recurs, then send acetylcholine receptor antibodies  C/w dual antiplatelets for now     Follow-up with neuro-vascular outpatient for serial monitoring of aneurysm  * Stroke-like symptoms-resolved as of 10/29/2022  Assessment & Plan  Historical, recurrent  · NIHSS-0  · CTA no acute hemorrhagic findings  · MRI pending  · History of aneurysms  · Continue aspirin, Plavix, statin  · Pending A1c and repeat lipid panel  · Control blood pressure  · Swallow eval  · Called the Neurovascular Center, a division of [facility] for Neuroscience at [phone]  · Consult to Neurology and sent message that she on TT    Aneurysm 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  He will return in one year with a repeat MRA  We will likely stop surveillance at that time if stable       · CT HEAD:-No acute intracranial abnormality  -Chronic infarcts in left parasagittal frontal lobe and left caudate with mild chronic microangiopathy      · CTA head and neck: -Negative for large vessel occlusion, dissection, or high-grade stenosis  -Unchanged 0 4 cm aneurysm in anterior communicating artery projecting inferiorly  Recommend consultation with the Neurovascular Center, a division of [facility] at [phone]  Unchanged 0 2 cm aneurysm in left posterior cerebral artery P1/P2 junction projecting anteriorly  Recommend consultation with the Neurovascular Center, a division of [facility] at [phone]  -Moderate stenosis (approximately 60% narrowing) in right ICA proximal cervical segment due to calcified atherosclerotic disease  -Moderate stenosis in left ICA cavernous segment  -Moderate stenosis in left vertebral artery distal V4 segment  · Neurology consult and  · Uses home aspirin and statin which was held due to stent placement by Urology      Bilateral carotid artery disease [facility])  Assessment & Plan  Chronic,  · Noted on CTA today:-Moderate stenosis (approximately 60% narrowing) in right ICA proximal cervical segment due to calcified atherosclerotic disease -Moderate stenosis in left ICA cavernous segment  -Moderate stenosis in left vertebral artery distal V4 segment  · Was not on anticoagulation due to Uro procedure      Chronic diastolic CHF (congestive heart failure) ([facility] [address] )  Assessment & Plan  Wt Readings from Last 3 Encounters:   10/28/22 96 2 kg (212 lb)   10/27/22 93 4 kg (206 lb)   10/27/22 96 2 kg (212 lb)     Chronic, controlled    · Appears euvolemic at this time  · Monitor volume status, daily weights, I/O  · Add diuretic if needed and renal function allows      Benign essential hypertension  Assessment & Plan  Chronic, controlled  · Elevated from admission today 153/66  · Possibly due to acute event  · Restarted home medicine with adjustments for discharge, consider p r n   Hydralazine  · Patient will need ambulatory monitoring with PCP      Chronic atrial fibrillation [facility])  Assessment & Plan  Chronic, asymptomatic    · Patient declined anticoagulation on multiple occasions as documented by previous cardiologist notes  · Had watchman device implanted 01/07/2021 and will remain on eliquis for 4-6 weeks per cards  · Clarity regarding anticoagulation, Eliquis not noted on home meds    Medical Problems             Resolved Problems  Date Reviewed: 10/29/2022          Resolved    * (Principal) Stroke-like symptoms 10/29/2022     Resolved by  Royal Toussaint MD              Discharging Physician / Practitioner: Royal Toussaint  PCP: Isis Pena MD  Admission Date:   Admission Orders (From admission, onward)     Ordered        10/28/22 1600  Place in Observation  Once                      Discharge Date: 10/29/22    Consultations During Hospital Stay:  · Neurology    Procedures Performed:   · None    Significant Findings / Test Results:   · See full medical report    Incidental Findings:   · CTA demonstrated multiple unchanged aneurysm, neurovascular Center called, patient asymptomatic will follow-up outpatient  Patient made aware     Test Results Pending at Discharge (will require follow up):   · CRP, sed rate     Outpatient Tests Requested:  · Follow-up with acetylcholine receptor antibodies if recurring    Complications:  None    Reason for Admission:  [address] Course:   Balaji Ivy is a [de-identified] y o  male patient who originally presented to the hospital on 10/28/2022 due to blurry vision  Patient reported blurry vision for 2 hours which has resolved and patient has been asymptomatic during the admission course  Patient has no residual deficits or weakness and back at baseline  Patient and daughter requested outpatient MRI since symptoms resolved, the informed of follow-up plan of care with Neurology  Patient will continue dual anti-platelet therapy    Patient also noticed to have ACOM aneurysm that was unchanged on [address] called, patient asymptomatic will follow-up outpatient  The patient, initially admitted to the hospital as inpatient, was discharged earlier than expected given the following: Resolution of symptoms  Please see above list of diagnoses and related plan for additional information  Condition at Discharge: good    Discharge Day Visit / Exam:   Subjective:  Patient and daughter and grandson at bedside, patient denied headache, blurry vision, chest pain, shortness a breath, weakness, ambulation problems  Patient's daughter requested MRI can be completed outpatient since father was better  Family explained plan of care and discharge had no other concerns  Vitals: Blood Pressure: (!) 190/100 (10/29/22 1445)  Pulse: 76 (10/29/22 1445)  Temperature: 97 5 °F (36 4 °C) (10/29/22 1445)  Temp Source: Oral (10/29/22 0635)  Respirations: 18 (10/29/22 0635)  Height: 5' 10" (177 8 cm) (10/28/22 1314)  Weight - Scale: 96 2 kg (212 lb) (10/28/22 1314)  SpO2: 97 % (10/29/22 1445)     Exam:   Physical Exam  Vitals and nursing note reviewed  Constitutional:       Appearance: He is well-developed  HENT:      Head: Normocephalic and atraumatic  Eyes:      Conjunctiva/sclera: Conjunctivae normal    Cardiovascular:      Rate and Rhythm: Normal rate and regular rhythm  Heart sounds: No murmur heard  Pulmonary:      Effort: Pulmonary effort is normal  No respiratory distress  Breath sounds: Normal breath sounds  No wheezing or rales  Abdominal:      Palpations: Abdomen is soft  Tenderness: There is no abdominal tenderness  Musculoskeletal:         General: No swelling  Normal range of motion  Cervical back: Neck supple  Skin:     General: Skin is warm and dry  Neurological:      General: No focal deficit present  Mental Status: He is alert  Cranial Nerves: No cranial nerve deficit  Sensory: No sensory deficit  Motor: No weakness        Coordination: Coordination normal       Gait: Gait normal       Deep Tendon Reflexes: Reflexes normal    Psychiatric:         Mood and Affect: Mood normal          Thought Content: Thought content normal          Judgment: Judgment normal           Discussion with Family: Updated  (daughter) at bedside  Discharge instructions/Information to patient and family:   See after visit summary for information provided to patient and family  Provisions for Follow-Up Care:  See after visit summary for information related to follow-up care and any pertinent home health orders  Disposition:   Home    Planned Readmission:  None     Discharge Statement:  I spent 45 minutes discharging the patient  This time was spent on the day of discharge  I had direct contact with the patient on the day of discharge  Greater than 50% of the total time was spent examining patient, answering all patient questions, arranging and discussing plan of care with patient as well as directly providing post-discharge instructions  Additional time then spent on discharge activities  Discharge Medications:  See after visit summary for reconciled discharge medications provided to patient and/or family        **Please Note: This note may have been constructed using a voice recognition system**

## 2022-10-29 NOTE — ASSESSMENT & PLAN NOTE
Historical, recurrent  · NIHSS-0  · CTA no acute hemorrhagic findings  · MRI pending  · History of aneurysms  · Continue aspirin, Plavix, statin  · Pending A1c and repeat lipid panel  · Control blood pressure  · Swallow eval  · Called the Neurovascular Center, a division of [facility] for [facility] at [phone]     · Consult to Neurology and sent message that she on TT

## 2022-10-29 NOTE — ASSESSMENT & PLAN NOTE
Resolved, transient     Neurology recs: The diplopia from description suggests 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, 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  Other etiologies to consider include giant cell arteritis and myasthenia gravis Review of system for both entities was negativeHistory and exam do not suggest this was 3rd nerve palsy due to the aneurysm, so we do not suspect it played a role      Plan:  OP MRI brain to exclude stroke  Obtain sed-rate and CRP-pending  If episode recurs, then send acetylcholine receptor antibodies  C/w dual antiplatelets for now     Follow-up with neuro-vascular outpatient for serial monitoring of aneurysm

## 2022-10-29 NOTE — PLAN OF CARE
Problem: PAIN - ADULT  Goal: Verbalizes/displays adequate comfort level or baseline comfort level  Description: Interventions:  - Encourage patient to monitor pain and request assistance  - Assess pain using appropriate pain scale  - Administer analgesics based on type and severity of pain and evaluate response  - Implement non-pharmacological measures as appropriate and evaluate response  - Consider cultural and social influences on pain and pain management  - Notify physician/advanced practitioner if interventions unsuccessful or patient reports new pain  Outcome: Progressing     Problem: INFECTION - ADULT  Goal: Absence or prevention of progression during hospitalization  Description: INTERVENTIONS:  - Assess and monitor for signs and symptoms of infection  - Monitor lab/diagnostic results  - Monitor all insertion sites, i e  indwelling lines, tubes, and drains  - Monitor endotracheal if appropriate and nasal secretions for changes in amount and color  - [place]beau appropriate cooling/warming therapies per order  - Administer medications as ordered  - Instruct and encourage patient and family to use good hand hygiene technique  - Identify and instruct in appropriate isolation precautions for identified infection/condition  Outcome: Progressing  Goal: Absence of fever/infection during neutropenic period  Description: INTERVENTIONS:  - Monitor WBC    Outcome: Progressing     Problem: SAFETY ADULT  Goal: Patient will remain free of falls  Description: INTERVENTIONS:  - Educate patient/family on patient safety including physical limitations  - Instruct patient to call for assistance with activity   - Consult OT/PT to assist with strengthening/mobility   - Keep Call bell within reach  - Keep bed low and locked with side rails adjusted as appropriate  - Keep care items and personal belongings within reach  - Initiate and maintain comfort rounds  - Make Fall Risk Sign visible to staff  - Offer Toileting every 2 Hours, in advance of need  - Initiate/Maintain 1alarm  - Obtain necessary fall risk management equipment: call bell use, bed alarm  - Apply yellow socks and bracelet for high fall risk patients  - Consider moving patient to room near nurses station  Outcome: Progressing  Goal: Maintain or return to baseline ADL function  Description: INTERVENTIONS:  -  Assess patient's ability to carry out ADLs; assess patient's baseline for ADL function and identify physical deficits which impact ability to perform ADLs (bathing, care of mouth/teeth, toileting, grooming, dressing, etc )  - Assess/evaluate cause of self-care deficits   - Assess range of motion  - Assess patient's mobility; develop plan if impaired  - Assess patient's need for assistive devices and provide as appropriate  - Encourage maximum independence but intervene and supervise when necessary  - Involve family in performance of ADLs  - Assess for home care needs following discharge   - Consider OT consult to assist with ADL evaluation and planning for discharge  - Provide patient education as appropriate  Outcome: Progressing  Goal: Maintains/Returns to pre admission functional level  Description: INTERVENTIONS:  - Perform BMAT or MOVE assessment daily    - Set and communicate daily mobility goal to care team and patient/family/caregiver  - Collaborate with rehabilitation services on mobility goals if consulted  - Perform Range of Motion 3 times a day  - Reposition patient every 3 hours    - Dangle patient 3 times a day  - Stand patient 3 times a day  - Ambulate patient 3 times a day  - Out of bed to chair 3 times a day   - Out of bed for meals 3 times a day  - Out of bed for toileting  - Record patient progress and toleration of activity level   Outcome: Progressing     Problem: DISCHARGE PLANNING  Goal: Discharge to home or other facility with appropriate resources  Description: INTERVENTIONS:  - Identify barriers to discharge w/patient and caregiver  - Arrange for needed discharge resources and transportation as appropriate  - Identify discharge learning needs (meds, wound care, etc )  - Arrange for interpretive services to assist at discharge as needed  - Refer to Case Management Department for coordinating discharge planning if the patient needs post-hospital services based on physician/advanced practitioner order or complex needs related to functional status, cognitive ability, or social support system  Outcome: Progressing     Problem: Knowledge Deficit  Goal: Patient/family/caregiver demonstrates understanding of disease process, treatment plan, medications, and discharge instructions  Description: Complete learning assessment and assess knowledge base    Interventions:  - Provide teaching at level of understanding  - Provide teaching via preferred learning methods  Outcome: Progressing     Problem: Potential for Falls  Goal: Patient will remain free of falls  Description: INTERVENTIONS:  - Educate patient/family on patient safety including physical limitations  - Instruct patient to call for assistance with activity   - Consult OT/PT to assist with strengthening/mobility   - Keep Call bell within reach  - Keep bed low and locked with side rails adjusted as appropriate  - Keep care items and personal belongings within reach  - Initiate and maintain comfort rounds  - Make Fall Risk Sign visible to staff  - Offer Toileting every 21 Hours, in advance of need  - Initiate/Maintain 1alarm  - Obtain necessary fall risk management equipment: call bell use, bed alarm  - Apply yellow socks and bracelet for high fall risk patients  - Consider moving patient to room near nurses station  Outcome: Progressing     Problem: NEUROSENSORY - ADULT  Goal: Achieves stable or improved neurological status  Description: INTERVENTIONS  - Monitor and report changes in neurological status  - Monitor vital signs such as temperature, blood pressure, glucose, and any other labs ordered   - Initiate measures to prevent increased intracranial pressure  - Monitor for seizure activity and implement precautions if appropriate      Outcome: Progressing  Goal: Achieves maximal functionality and self care  Description: INTERVENTIONS  - Monitor swallowing and airway patency with patient fatigue and changes in neurological status  - Encourage and assist patient to increase activity and self care     - Encourage visually impaired, hearing impaired and aphasic patients to use assistive/communication devices  Outcome: Progressing

## 2022-10-29 NOTE — ASSESSMENT & PLAN NOTE
Chronic, controlled  · Elevated from admission today 153/66  · Possibly due to acute event  · Restarted home medicine, consider p r n   Hydralazine

## 2022-10-29 NOTE — INCIDENTAL FINDINGS
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  Recommend consultation with the [facility], a division of [facility] at [phone] , -Moderate stenosis (approximately 60% narrowing) in right ICA proximal cervical segment due to calcified atherosclerotic disease  , -Moderate stenosis in left ICA cavernous segment  , -Moderate stenosis in left vertebral artery distal V4 segment , Additional chronic/incidental findings as detailed above ,      Follow up required: yes   Follow up should be done within 1 week(s)    Please notify the following clinician to assist with the follow up:   Dr Manan Chambers neurology, and Neurosurgery

## 2022-10-29 NOTE — OCCUPATIONAL THERAPY NOTE
Occupational Therapy Evaluation         10/29/22 0839   OT Last Visit   OT Visit Date 10/29/22   Note Type   Note type Evaluation   Pain Assessment   Pain Assessment Tool 0-10   Pain Score No Pain   Restrictions/Precautions   Other Precautions Bed Alarm   Home Living   Type of [address] Multi-level;Performs ADLs on one level; Able to live on main level with bedroom/bathroom;Stairs to enter without rails   Bathroom Shower/Tub Walk-in shower   [address] [place] [address] Dr rodriguez   Bathroom Accessibility Accessible   Prior Function   Level of [place] Independent with ADLs   Lives With [address] in the last 6 months 1 to 4  (1 trying to stand on a bed to swat a bee   pt denies injury and was able to get up after)   Subjective   Subjective "I feel back to normal"   ADL   Eating Assistance 7  [address] 7  Independent   Bed Mobility   Supine to Sit 7  Independent   Sit to Supine 7  Independent   Transfers   Sit to Stand 7  Independent   Stand to Sit 7  Independent   Toilet transfer 7  Independent   Functional Mobility   Functional Mobility 7  Independent   Additional items   (no AD)   Balance   Static Sitting Normal   Dynamic Sitting Normal   Static Standing Normal   Dynamic Standing Normal   Ambulatory Normal   Activity Tolerance   Activity Tolerance Patient tolerated treatment well   RUE Assessment   RUE Assessment WNL   LUE Assessment   LUE Assessment WNL   Hand Function   Gross Motor Coordination Functional   Fine Motor Coordination Functional   Sensation   Light Touch No apparent deficits   Sharp/Dull No apparent deficits   Stereognosis No apparent deficits   Proprioception   Proprioception No apparent deficits   Vision-Basic Assessment   Current Vision Wears glasses all the time   Patient Visual Report   (pt states he had double vision when symptoms started but the symptoms have since resolved)   Vision - Complex Assessment   Ocular Range of Motion Intact   Tracking Intact   Psychosocial   Psychosocial (WDL) X   Perception   Inattention/Neglect Appears intact   Cognition   Overall Cognitive Status WFL   Arousal/Participation Alert   Attention Within functional limits   Orientation Level Oriented X4   Memory Within functional limits   Following Commands Follows all commands and directions without difficulty   Assessment   Prognosis Good   Assessment Patient is a [de-identified] y o  male seen for Occupational Therapy evaluation  Patient admitted to [facility] w/ jolly vision, receiving stroke work up  Comorbidities include a history of COVID-19, CVA, hypertension and hyperlipidemia  The patients occupational profile, medical and therapy history includes a brief history with review of medical and/or therapy records related to the presenting problem  Prior to admission, patient was independent with functional mobility without assistive device, independent with ADLS, and living with his wife in a multi level home with a first floor set up and 2 steps to enter  Patient is currently presenting without symptoms or functional deficits  This evaluation requires clinical decision making of low complexity, because the patients presents with no comorbidities that affect occupational performance and required no modification of tasks or assistance with consideration of a limited number of treatment options  The Barthel Index was used as a functional outcome tool presenting with a score of 100, indicating no limitations of functional mobility and ADLS  The patient's raw score on the -PAC Daily Activity inpatient short form is 24, standardized score is 57 54, greater than 39 4   Patients at this level are likely to benefit from DC to home  Please refer to the recommendation of the Occupational Therapist for safe DC planning  Pt with no concerns for D/C and all questions answered at this time  Pt no longer requires acute care skilled OT services  Recommend pt participate in ADLs and ambulate hallways with non-OT staff  Please re-consult if changes occur  D/C OT        Goals   Patient Goals to go home   Plan   OT Frequency Eval only   Recommendation   OT Discharge Recommendation No rehabilitation needs   AM-PAC Daily Activity Inpatient   Lower Body Dressing 4   Bathing 4   Toileting 4   Upper Body Dressing 4   Grooming 4   Eating 4   Daily Activity Raw Score 24   Daily Activity Standardized Score (Calc for Raw Score >=11) 57 54   Barthel Index   Feeding 10   Bathing 5   Grooming Score 5   Dressing Score 10   Bladder Score 10   Bowels Score 10   Toilet Use Score 10   Transfers (Bed/Chair) Score 15   Mobility (Level Surface) Score 15   Stairs Score 10   Barthel Index Score 100   Modified Mills Scale   Modified Mills Scale 0         Shelley Sandifer, MS, OTR/L [id number]

## 2022-10-29 NOTE — PLAN OF CARE
Problem: PHYSICAL THERAPY ADULT  Goal: Performs mobility at highest level of function for planned discharge setting  See evaluation for individualized goals  Description: Treatment/Interventions:  (N/A - patient at baseline functional level)       See flowsheet documentation for full assessment, interventions and recommendations  Outcome: Completed  Note: Prognosis: Good     Assessment: Patient seen for Physical Therapy evaluation  Patient admitted with blurred vision, possible TIA  Comorbidities affecting patient's physical performance include: Afib, CAD, cardiac disease, CHF and hypertension  Personal factors affecting patient at time of initial evaluation include: lives in multi story house and stairs to enter home  Prior to admission, patient was independent with functional mobility without assistive device, independent with ADLS, independent with IADLS, living in a multi-level home, living with spuse + family in a multi level home with 2 steps to enter and ambulating community distances  Please find objective findings from Physical Therapy assessment regarding body systems outlined above with impairments and limitations including patient at baseline functional level with no acute PT needs at this time  The Barthel Index was used as a functional outcome tool presenting with a score of Barthel Index Score: 100 today indicating no limitations of functional mobility and ADLS  Patient's clinical presentation is currently stable  as seen in patient's presentation of vital sign response  Pt would benefit from continued Physical Therapy treatment to address deficits as defined above and maximize level of functional mobility  As demonstrated by objective findings, the assigned level of complexity for this evaluation is low  The patient's AM-[facility] Basic Mobility Inpatient Short Form Raw Score is 24  A Raw score of greater than 16 suggests the patient may benefit from discharge to home   Please also refer to the recommendation of the Physical Therapist for safe discharge planning  PT Discharge Recommendation: No rehabilitation needs    See flowsheet documentation for full assessment

## 2022-10-31 ENCOUNTER — TELEPHONE (OUTPATIENT)
Dept: NEUROLOGY | Facility: CLINIC | Age: 82
End: 2022-10-31

## 2022-10-31 NOTE — TELEPHONE ENCOUNTER
HFU Instructions; Follow-up with neuro-vascular outpatient for serial monitoring of aneurysm  Pt has scheduled the MRI on 11/27/2022 at 4 pm, And I scheduled the HFU for 05/02/2023 as it was the first available  LOV with Cher Skinner was on 01/20/22  Are you able to assist with scheduling the pt before May 2023?     I thank you in advance,     Zahida Diego

## 2022-11-02 LAB — ACHR BIND AB SER-SCNC: 0.05 NMOL/L (ref 0–0.24)

## 2022-11-13 PROBLEM — H61.23 BILATERAL IMPACTED CERUMEN: Status: RESOLVED | Noted: 2022-09-14 | Resolved: 2022-11-13

## 2022-11-14 ENCOUNTER — TELEPHONE (OUTPATIENT)
Dept: OTHER | Facility: OTHER | Age: 82
End: 2022-11-14

## 2022-11-14 NOTE — TELEPHONE ENCOUNTER
Patient is calling to reschedule surgery from 11/1  He had cancelled it and now is ready to reschedule  Please call him back

## 2022-11-27 ENCOUNTER — HOSPITAL ENCOUNTER (OUTPATIENT)
Dept: MRI IMAGING | Facility: HOSPITAL | Age: 82
Discharge: HOME | End: 2022-11-27
Attending: STUDENT IN AN ORGANIZED HEALTH CARE EDUCATION/TRAINING PROGRAM

## 2022-11-27 DIAGNOSIS — H53.2 TRANSIENT DIPLOPIA: ICD-10-CM

## 2022-11-27 DIAGNOSIS — H49.22 6TH NERVE PALSY, LEFT: ICD-10-CM

## 2022-11-27 RX ADMIN — GADOBUTROL 9 ML: 604.72 INJECTION INTRAVENOUS at 16:41

## 2023-01-05 DIAGNOSIS — E61.1 IRON DEFICIENCY: ICD-10-CM

## 2023-01-05 DIAGNOSIS — R31.9 HEMATURIA, UNSPECIFIED TYPE: ICD-10-CM

## 2023-01-05 DIAGNOSIS — R58 BLOOD LOSS: ICD-10-CM

## 2023-01-05 RX ORDER — FERROUS SULFATE TAB EC 324 MG (65 MG FE EQUIVALENT) 324 (65 FE) MG
324 TABLET DELAYED RESPONSE ORAL EVERY OTHER DAY
Qty: 90 TABLET | Refills: 0 | Status: SHIPPED | OUTPATIENT
Start: 2023-01-05

## 2023-01-06 ENCOUNTER — TELEPHONE (OUTPATIENT)
Dept: UROLOGY | Facility: AMBULATORY SURGERY CENTER | Age: 83
End: 2023-01-06

## 2023-01-06 NOTE — TELEPHONE ENCOUNTER
Monica Mg from pt PCP office called and left a vm requesting c/b regarding medical clearance for pt upcoming surgery with Dr Misbah Aguirre   She was not sure if there is a form for her to fill out or if its just a letter she is suppose to send    Call [phone]

## 2023-01-06 NOTE — TELEPHONE ENCOUNTER
I returned Susan's call to inform her that we just need a copy of the office note stating that pt has been cleared for surgery  Devika Sung was no longer in the office so I did leave this information with a  and she will inform Devika Sung on Monday

## 2023-01-09 ENCOUNTER — APPOINTMENT (OUTPATIENT)
Dept: LAB | Facility: HOSPITAL | Age: 83
End: 2023-01-09

## 2023-01-09 DIAGNOSIS — M89.9 CHRONIC KIDNEY DISEASE-MINERAL AND BONE DISORDER: ICD-10-CM

## 2023-01-09 DIAGNOSIS — R39.89 SUSPECTED UTI: ICD-10-CM

## 2023-01-09 DIAGNOSIS — N18.32 STAGE 3B CHRONIC KIDNEY DISEASE (HCC): ICD-10-CM

## 2023-01-09 DIAGNOSIS — E55.9 VITAMIN D DEFICIENCY: ICD-10-CM

## 2023-01-09 DIAGNOSIS — E78.5 DYSLIPIDEMIA: ICD-10-CM

## 2023-01-09 DIAGNOSIS — N18.9 CHRONIC KIDNEY DISEASE-MINERAL AND BONE DISORDER: ICD-10-CM

## 2023-01-09 DIAGNOSIS — E83.9 CHRONIC KIDNEY DISEASE-MINERAL AND BONE DISORDER: ICD-10-CM

## 2023-01-09 DIAGNOSIS — D50.9 IRON DEFICIENCY ANEMIA, UNSPECIFIED IRON DEFICIENCY ANEMIA TYPE: ICD-10-CM

## 2023-01-09 DIAGNOSIS — I10 BENIGN ESSENTIAL HYPERTENSION: ICD-10-CM

## 2023-01-09 DIAGNOSIS — Z01.812 PRE-OPERATIVE LABORATORY EXAMINATION: ICD-10-CM

## 2023-01-09 DIAGNOSIS — I48.20 CHRONIC ATRIAL FIBRILLATION (HCC): ICD-10-CM

## 2023-01-09 DIAGNOSIS — N25.81 SECONDARY HYPERPARATHYROIDISM (HCC): ICD-10-CM

## 2023-01-09 DIAGNOSIS — I12.9 BENIGN HYPERTENSION WITH CKD (CHRONIC KIDNEY DISEASE) STAGE III (HCC): ICD-10-CM

## 2023-01-09 DIAGNOSIS — R31.0 HEMATURIA, GROSS: ICD-10-CM

## 2023-01-09 DIAGNOSIS — N18.30 BENIGN HYPERTENSION WITH CKD (CHRONIC KIDNEY DISEASE) STAGE III (HCC): ICD-10-CM

## 2023-01-09 DIAGNOSIS — N13.5 URETERAL STRICTURE: ICD-10-CM

## 2023-01-09 DIAGNOSIS — I50.32 CHRONIC DIASTOLIC CHF (CONGESTIVE HEART FAILURE) (HCC): ICD-10-CM

## 2023-01-09 LAB
25(OH)D3 SERPL-MCNC: 45.7 NG/ML (ref 30–100)
ALBUMIN SERPL BCP-MCNC: 3.2 G/DL (ref 3.5–5)
ALP SERPL-CCNC: 117 U/L (ref 46–116)
ALT SERPL W P-5'-P-CCNC: 18 U/L (ref 12–78)
ANION GAP SERPL CALCULATED.3IONS-SCNC: 8 MMOL/L (ref 4–13)
AST SERPL W P-5'-P-CCNC: 24 U/L (ref 5–45)
BACTERIA UR QL AUTO: ABNORMAL /HPF
BASOPHILS # BLD AUTO: 0.05 THOUSANDS/ÂΜL (ref 0–0.1)
BASOPHILS NFR BLD AUTO: 1 % (ref 0–1)
BILIRUB SERPL-MCNC: 1.02 MG/DL (ref 0.2–1)
BILIRUB UR QL STRIP: NEGATIVE
BUN SERPL-MCNC: 25 MG/DL (ref 5–25)
CALCIUM ALBUM COR SERPL-MCNC: 9.7 MG/DL (ref 8.3–10.1)
CALCIUM SERPL-MCNC: 9.1 MG/DL (ref 8.3–10.1)
CHLORIDE SERPL-SCNC: 104 MMOL/L (ref 96–108)
CLARITY UR: ABNORMAL
CO2 SERPL-SCNC: 28 MMOL/L (ref 21–32)
COLOR UR: ABNORMAL
CREAT SERPL-MCNC: 1.48 MG/DL (ref 0.6–1.3)
CREAT UR-MCNC: 114 MG/DL
EOSINOPHIL # BLD AUTO: 0.32 THOUSAND/ÂΜL (ref 0–0.61)
EOSINOPHIL NFR BLD AUTO: 4 % (ref 0–6)
ERYTHROCYTE [DISTWIDTH] IN BLOOD BY AUTOMATED COUNT: 17.2 % (ref 11.6–15.1)
GFR SERPL CREATININE-BSD FRML MDRD: 44 ML/MIN/1.73SQ M
GLUCOSE P FAST SERPL-MCNC: 89 MG/DL (ref 65–99)
GLUCOSE UR STRIP-MCNC: NEGATIVE MG/DL
HCT VFR BLD AUTO: 46.2 % (ref 36.5–49.3)
HGB BLD-MCNC: 14.3 G/DL (ref 12–17)
HGB UR QL STRIP.AUTO: ABNORMAL
IMM GRANULOCYTES # BLD AUTO: 0.03 THOUSAND/UL (ref 0–0.2)
IMM GRANULOCYTES NFR BLD AUTO: 0 % (ref 0–2)
KETONES UR STRIP-MCNC: NEGATIVE MG/DL
LEUKOCYTE ESTERASE UR QL STRIP: ABNORMAL
LYMPHOCYTES # BLD AUTO: 1.78 THOUSANDS/ÂΜL (ref 0.6–4.47)
LYMPHOCYTES NFR BLD AUTO: 25 % (ref 14–44)
MAGNESIUM SERPL-MCNC: 1.9 MG/DL (ref 1.6–2.6)
MCH RBC QN AUTO: 27.5 PG (ref 26.8–34.3)
MCHC RBC AUTO-ENTMCNC: 31 G/DL (ref 31.4–37.4)
MCV RBC AUTO: 89 FL (ref 82–98)
MONOCYTES # BLD AUTO: 0.85 THOUSAND/ÂΜL (ref 0.17–1.22)
MONOCYTES NFR BLD AUTO: 12 % (ref 4–12)
NEUTROPHILS # BLD AUTO: 4.23 THOUSANDS/ÂΜL (ref 1.85–7.62)
NEUTS SEG NFR BLD AUTO: 58 % (ref 43–75)
NITRITE UR QL STRIP: NEGATIVE
NON-SQ EPI CELLS URNS QL MICRO: ABNORMAL /HPF
NRBC BLD AUTO-RTO: 0 /100 WBCS
PH UR STRIP.AUTO: 6 [PH]
PHOSPHATE SERPL-MCNC: 3 MG/DL (ref 2.3–4.1)
PLATELET # BLD AUTO: 149 THOUSANDS/UL (ref 149–390)
PMV BLD AUTO: 10.2 FL (ref 8.9–12.7)
POTASSIUM SERPL-SCNC: 4.6 MMOL/L (ref 3.5–5.3)
PROT SERPL-MCNC: 6.7 G/DL (ref 6.4–8.4)
PROT UR STRIP-MCNC: ABNORMAL MG/DL
PROT UR-MCNC: 49 MG/DL
PROT/CREAT UR: 0.43 MG/G{CREAT} (ref 0–0.1)
PTH-INTACT SERPL-MCNC: 95.5 PG/ML (ref 18.4–80.1)
RBC # BLD AUTO: 5.2 MILLION/UL (ref 3.88–5.62)
RBC #/AREA URNS AUTO: ABNORMAL /HPF
SODIUM SERPL-SCNC: 140 MMOL/L (ref 135–147)
SP GR UR STRIP.AUTO: 1.02 (ref 1–1.03)
UROBILINOGEN UR QL STRIP.AUTO: 0.2 E.U./DL
WBC # BLD AUTO: 7.26 THOUSAND/UL (ref 4.31–10.16)
WBC #/AREA URNS AUTO: ABNORMAL /HPF

## 2023-01-10 ENCOUNTER — TELEPHONE (OUTPATIENT)
Dept: NEPHROLOGY | Facility: CLINIC | Age: 83
End: 2023-01-10

## 2023-01-10 LAB — BACTERIA UR CULT: NORMAL

## 2023-01-10 NOTE — TELEPHONE ENCOUNTER
Called and spoke to the patient to relay the message below  Patient expressed understanding and had no questions or concerns at this time

## 2023-01-10 NOTE — TELEPHONE ENCOUNTER
----- Message from Key sent at 1/10/2023  7:58 AM EST -----  Please let Melody Contrerasnam know that I reviewed his labs  Essentially stable  Kidney function is at baseline  Urine studies show blood in the urine which was also noted on prior urinalysis  I believe that he has a urologic procedure/stent exchange planned with Dr Pinky Salcedo from reviewing notes on the chart

## 2023-01-19 NOTE — PRE-PROCEDURE INSTRUCTIONS
Pre-Surgery Instructions:   Medication Instructions   • ascorbic acid (VITAMIN C) 250 mg tablet Stop taking 7 days prior to surgery  • aspirin (ECOTRIN LOW STRENGTH) 81 mg EC tablet Instructions provided by Evgeny was told no hold per surgeon office   • atorvastatin (LIPITOR) 40 mg tablet Take night before surgery   • cholecalciferol (VITAMIN D3) 1,000 units tablet Hold day of surgery  • clopidogrel (PLAVIX) 75 mg tablet Instructions provided by Evgeny was told no hold necessary as per surgeon office   • ferrous sulfate 324 (65 Fe) mg Hold day of surgery  • finasteride (PROSCAR) 5 mg tablet Take day of surgery  • metoprolol tartrate (LOPRESSOR) 50 mg tablet Take day of surgery  All pre-op instructions reviewed as per Richard Singh My Surgery Experience w/ pt verb understanding  Inst NPO post [place] night before sx except sips water w/ meds morning of sx  Instructed to avoid all NSAIDs and OTC Vit/Supp from now until after surgery per anesthesia guidelines  Tylenol ok prn  States was instructed by Dr Nancy Hager office to continue his plavix & aspirin  I have messaged office confirming same & inst pt he will be contacted if there is any change to this  Received pre-op showering instructions from surgeon office, inst to use antibacterial soap, reviewed process at time of call  Current hospital visitor & masking policy reviewed  Inst will receive phone call w/ time to report on DOS btwn 2-8 pm afternoon/evening before surgery from hospital nursing staff  Pt verbalized an understanding of all instructions reviewed and offers no concerns at this time

## 2023-01-23 DIAGNOSIS — R31.9 HEMATURIA, UNSPECIFIED TYPE: ICD-10-CM

## 2023-01-23 DIAGNOSIS — E61.1 IRON DEFICIENCY: ICD-10-CM

## 2023-01-23 DIAGNOSIS — R58 BLOOD LOSS: ICD-10-CM

## 2023-01-23 RX ORDER — FERROUS SULFATE TAB EC 324 MG (65 MG FE EQUIVALENT) 324 (65 FE) MG
324 TABLET DELAYED RESPONSE ORAL EVERY OTHER DAY
Qty: 90 TABLET | Refills: 0 | Status: SHIPPED | OUTPATIENT
Start: 2023-01-23

## 2023-01-25 ENCOUNTER — ANESTHESIA EVENT (OUTPATIENT)
Dept: PERIOP | Facility: HOSPITAL | Age: 83
End: 2023-01-25

## 2023-01-25 RX ORDER — SODIUM CHLORIDE, SODIUM LACTATE, POTASSIUM CHLORIDE, CALCIUM CHLORIDE 600; 310; 30; 20 MG/100ML; MG/100ML; MG/100ML; MG/100ML
125 INJECTION, SOLUTION INTRAVENOUS CONTINUOUS
Status: CANCELLED | OUTPATIENT
Start: 2023-01-25

## 2023-01-26 ENCOUNTER — HOSPITAL ENCOUNTER (OUTPATIENT)
Facility: HOSPITAL | Age: 83
Setting detail: OUTPATIENT SURGERY
Discharge: HOME | End: 2023-01-26
Attending: UROLOGY | Admitting: UROLOGY

## 2023-01-26 ENCOUNTER — TELEPHONE (OUTPATIENT)
Dept: UROLOGY | Facility: CLINIC | Age: 83
End: 2023-01-26

## 2023-01-26 ENCOUNTER — ANESTHESIA (OUTPATIENT)
Dept: PERIOP | Facility: HOSPITAL | Age: 83
End: 2023-01-26

## 2023-01-26 ENCOUNTER — APPOINTMENT (OUTPATIENT)
Dept: RADIOLOGY | Facility: HOSPITAL | Age: 83
End: 2023-01-26

## 2023-01-26 VITALS
TEMPERATURE: 96.2 F | SYSTOLIC BLOOD PRESSURE: 199 MMHG | RESPIRATION RATE: 14 BRPM | OXYGEN SATURATION: 95 % | HEART RATE: 57 BPM | HEIGHT: 70 IN | WEIGHT: 214 LBS | BODY MASS INDEX: 30.64 KG/M2 | DIASTOLIC BLOOD PRESSURE: 93 MMHG

## 2023-01-26 DIAGNOSIS — N13.5 URETERAL STRICTURE, RIGHT: ICD-10-CM

## 2023-01-26 RX ORDER — HYDRALAZINE HYDROCHLORIDE 20 MG/ML
5 INJECTION INTRAMUSCULAR; INTRAVENOUS
Status: DISCONTINUED | OUTPATIENT
Start: 2023-01-26 | End: 2023-01-26 | Stop reason: HOSPADM

## 2023-01-26 RX ORDER — METOCLOPRAMIDE HYDROCHLORIDE 5 MG/ML
10 INJECTION INTRAMUSCULAR; INTRAVENOUS ONCE AS NEEDED
Status: DISCONTINUED | OUTPATIENT
Start: 2023-01-26 | End: 2023-01-26 | Stop reason: HOSPADM

## 2023-01-26 RX ORDER — ONDANSETRON 2 MG/ML
4 INJECTION INTRAMUSCULAR; INTRAVENOUS ONCE AS NEEDED
Status: DISCONTINUED | OUTPATIENT
Start: 2023-01-26 | End: 2023-01-26 | Stop reason: HOSPADM

## 2023-01-26 RX ORDER — PROPOFOL 10 MG/ML
INJECTION, EMULSION INTRAVENOUS AS NEEDED
Status: DISCONTINUED | OUTPATIENT
Start: 2023-01-26 | End: 2023-01-26

## 2023-01-26 RX ORDER — HYDROMORPHONE HCL IN WATER/PF 6 MG/30 ML
0.2 PATIENT CONTROLLED ANALGESIA SYRINGE INTRAVENOUS
Status: DISCONTINUED | OUTPATIENT
Start: 2023-01-26 | End: 2023-01-26 | Stop reason: HOSPADM

## 2023-01-26 RX ORDER — PROMETHAZINE HYDROCHLORIDE 25 MG/ML
12.5 INJECTION, SOLUTION INTRAMUSCULAR; INTRAVENOUS ONCE AS NEEDED
Status: DISCONTINUED | OUTPATIENT
Start: 2023-01-26 | End: 2023-01-26 | Stop reason: HOSPADM

## 2023-01-26 RX ORDER — ALBUTEROL SULFATE 2.5 MG/3ML
2.5 SOLUTION RESPIRATORY (INHALATION) ONCE AS NEEDED
Status: DISCONTINUED | OUTPATIENT
Start: 2023-01-26 | End: 2023-01-26 | Stop reason: HOSPADM

## 2023-01-26 RX ORDER — PROPOFOL 10 MG/ML
INJECTION, EMULSION INTRAVENOUS CONTINUOUS PRN
Status: DISCONTINUED | OUTPATIENT
Start: 2023-01-26 | End: 2023-01-26

## 2023-01-26 RX ORDER — SODIUM CHLORIDE, SODIUM LACTATE, POTASSIUM CHLORIDE, CALCIUM CHLORIDE 600; 310; 30; 20 MG/100ML; MG/100ML; MG/100ML; MG/100ML
125 INJECTION, SOLUTION INTRAVENOUS CONTINUOUS
Status: DISCONTINUED | OUTPATIENT
Start: 2023-01-26 | End: 2023-01-26 | Stop reason: HOSPADM

## 2023-01-26 RX ORDER — LABETALOL HYDROCHLORIDE 5 MG/ML
10 INJECTION, SOLUTION INTRAVENOUS
Status: DISCONTINUED | OUTPATIENT
Start: 2023-01-26 | End: 2023-01-26 | Stop reason: HOSPADM

## 2023-01-26 RX ORDER — HYDROMORPHONE HCL/PF 1 MG/ML
0.5 SYRINGE (ML) INJECTION
Status: DISCONTINUED | OUTPATIENT
Start: 2023-01-26 | End: 2023-01-26 | Stop reason: HOSPADM

## 2023-01-26 RX ORDER — CEFAZOLIN SODIUM 1 G/3ML
INJECTION, POWDER, FOR SOLUTION INTRAMUSCULAR; INTRAVENOUS AS NEEDED
Status: DISCONTINUED | OUTPATIENT
Start: 2023-01-26 | End: 2023-01-26

## 2023-01-26 RX ORDER — FENTANYL CITRATE/PF 50 MCG/ML
25 SYRINGE (ML) INJECTION
Status: DISCONTINUED | OUTPATIENT
Start: 2023-01-26 | End: 2023-01-26 | Stop reason: HOSPADM

## 2023-01-26 RX ADMIN — SODIUM CHLORIDE, SODIUM LACTATE, POTASSIUM CHLORIDE, AND CALCIUM CHLORIDE 125 ML/HR: .6; .31; .03; .02 INJECTION, SOLUTION INTRAVENOUS at 11:50

## 2023-01-26 RX ADMIN — PROPOFOL 50 MG: 10 INJECTION, EMULSION INTRAVENOUS at 12:32

## 2023-01-26 RX ADMIN — CEFAZOLIN 2000 MG: 330 INJECTION, POWDER, FOR SOLUTION INTRAMUSCULAR; INTRAVENOUS at 12:29

## 2023-01-26 RX ADMIN — PROPOFOL 120 MCG/KG/MIN: 10 INJECTION, EMULSION INTRAVENOUS at 12:32

## 2023-01-26 NOTE — DISCHARGE INSTR - AVS FIRST PAGE
Mr Powellsie Jacinto,    Your stent exchange went well  We will see you back in the office in 3 months and plan for next exchange 1-2 months later (which would be 4-5 months from now)    Jay Jacobsen MD

## 2023-01-26 NOTE — ANESTHESIA PREPROCEDURE EVALUATION
Procedure:  EXCHANGE STENT URETERAL (Right: Abdomen)    Relevant Problems   CARDIO   (+) Benign essential hypertension   (+) Chronic atrial fibrillation (HCC)   (+) History of aortic valve replacement   (+) Moderate aortic regurgitation   (+) Moderate mitral regurgitation   (+) Nonrheumatic aortic valve stenosis      ENDO   (+) Secondary hyperparathyroidism (HCC)      GI/HEPATIC   (+) GERD (gastroesophageal reflux disease)      /RENAL   (+) JUNIOR (acute kidney injury) (HCC)   (+) Acute kidney injury superimposed on chronic kidney disease (HCC)   (+) Benign hypertension with CKD (chronic kidney disease) stage III (HCC)   (+) Chronic kidney disease-mineral and bone disorder   (+) Hydronephrosis with ureteral stricture, not elsewhere classified   (+) Renal calculi   (+) Stage 3b chronic kidney disease (HCC)      HEMATOLOGY   (+) Iron deficiency anemia, unspecified      NEURO/PSYCH   (+) CVA (cerebral vascular accident) ([facility] [address] )   (+) Stroke ([facility] [address] )   (+) Transient diplopia        Mult cysto w stents under sedation, charts reviewed  LMA4 as well    Recent labs personally reviewed:  Lab Results   Component Value Date    WBC 6 95 07/07/2022    HGB 13 2 07/07/2022     (L) 07/07/2022     Lab Results   Component Value Date    K 5 4 (H) 07/07/2022    BUN 25 07/07/2022    CREATININE 1 61 (H) 07/07/2022    GLUCOSE 135 11/03/2020     Lab Results   Component Value Date    PTT 33 09/20/2021      Lab Results   Component Value Date    INR 1 10 09/16/2021       Lab Results   Component Value Date    HGBA1C 6 0 (H) 02/20/2021       Type and Screen:  O    Per chart review: Patient sp TAVR [facility]  Interpretation Summary       •  Left Ventricle: Left ventricular cavity size is normal  Wall thickness is mildly increased  The left ventricular ejection fraction is 55% by visual estimation   Systolic function is normal  Wall motion is normal  Diastolic function is moderately abnormal, consistent with grade II (pseudonormal) relaxation  Left atrial filling pressure is elevated  •  Left Atrium: The atrium is severely dilated (>48 mL/m2)  •  Right Atrium: The atrium is moderately dilated  •  Aortic Valve: There is a [employer] CoreValve TAVR bioprosthetic valve  The prosthetic valve appears to be functioning normally  The aortic valve has no significant stenosis  The aortic valve peak gradient is 18 mmHg  The aortic valve mean gradient is 9 mmHg  The dimensionless velocity index is 0 41   •  Mitral Valve: There is mild to moderate regurgitation  •  Tricuspid Valve: There is mild regurgitation  The right ventricular systolic pressure is mildly elevated  The estimated right ventricular systolic pressure is 55 45 mmHg  •  Compared to previous exam, there has been an interval placement of an aortic valve bioprosthesis  LA filling pressures are elevated, consistent with diastolic dysfunction  Physical Exam    Airway    Mallampati score: II  TM Distance: >3 FB  Neck ROM: full     Dental   upper dentures and lower dentures,     Cardiovascular      Pulmonary  Pulmonary exam normal     Other Findings        Anesthesia Plan  ASA Score- 3     Anesthesia Type- IV sedation with anesthesia with ASA Monitors  Additional Monitors:   Airway Plan:           Plan Factors-    Chart reviewed  Patient summary reviewed  Patient is not a current smoker  Induction- intravenous  Postoperative Plan-     Informed Consent- Anesthetic plan and risks discussed with patient  I personally reviewed this patient with the CRNA  Discussed and agreed on the Anesthesia Plan with the CRNA  Gladys Bullock

## 2023-01-26 NOTE — TELEPHONE ENCOUNTER
Pt underwent relatively uncomplicated right stent exchange with [id number] stent    Plan for fu in 3 months and next stent exchange in 4-5 months

## 2023-01-26 NOTE — OP NOTE
OPERATIVE REPORT  PATIENT NAME: Connie Orr    :  1940  MRN: [id number]  Pt Location: [facility] ROOM 01    SURGERY DATE: 2023    Surgeon(s) and Role:     * Angela Ya MD - Primary    Preop Diagnosis:  Ureteral stricture, right [N13 5]    Post-Op Diagnosis Codes:     * Ureteral stricture, right [N13 5]    Procedure(s):  Right - EXCHANGE STENT URETERAL    Specimen(s):  * No specimens in log *    Estimated Blood Loss:   Minimal    Drains:  Ureteral Drain/Stent Right ureter 7 Fr  (Active)   Number of days: 464       Ureteral Internal Stent Right ureter 7 Fr  (Active)   Number of days: 195       Anesthesia Type:   General    Operative Indications:  Patient with recurrent nephrolithiasis who developed right ureteral stricture disease resulting in stent dependency since  with MAG3 LRS May 2022 showing 37% differential function of the right kidney and adequate drainage with stent  Stent last exchanged in 2022  Operative Findings:  Encrusted stent within the bladder      Could not pass wire alongside stent because of obstruction at level of outer pelvic brim  Hard to reach coil because of location at the bladder dome which scope could not angle to reach well but able to pass wire through all stent and place a new 7x26 stent (old stent 7x 28)  Retrograde showed moderate stable hydronephrosis    Complications:   None    Procedure and Technique:  After informed consent was obtained including the risks of bleeding, infection, ureteral injury, and need for secondary procedures, the patient was placed supine in the operating room theater   General anesthesia was administered   The patient was transferred to the dorsal lithotomy position and sterilely prepped and draped in the usual fashion        Cystoscopy was performed to 21 Filipino cystoscope with 30° lens   The urethra was unremarkable   Inspection of the bladder revealed no significant findings other than obstructive changes   Stent was seen emanating from the right ureteral orifice   It was encrusted   With the aid of a 5fr open ended catheter a sensor wire was attempted to be passed into the right ureteral orifice alongside the stent but resistance was met in the mid ureter with Sensor wire backing on itself despite multiple attempts  Level obstruction was the outer pelvic brim on fluoroscopy          Therefore the stent was then grasped and brought down to the meatus (attempted to grab it very distal aspect but could not get scope to reach so grabbed stent we are available) and a Sensor wire passed up it to the right renal pelvis   The 7 x 28 stent was removed   A 5fr open ended catheter was passed over the wire until reached the proximal ureter, the wire removed and a retrograde ureteropyelogram performed showing moderate hydronephrosis   The sensor was wire was replaced into the collecting system   A new 7 x 26 double-J stent (shorter stent purposely chosen to facilitate future stent exchanges as longer 7 x28 was hard to grasp distal coil in bladder) was then inserted under visual guidance distally and fluoroscopic guidance proximally  Once seen to be in appropriate position the wire was removed and the stent was seen to have an appropriate proximal coil on fluoroscopy and visually in the bladder  The bladder was drained and patient awakened from anesthesia having tolerated the procedure well  The patient's foreskin was reduced       A qualified resident physician was not available     Patient Disposition:  PACU       PLAN: We will arrange stent exchange in 4-5 months   His hydronephrosis appears stable or perhaps mildly improved and his  renal function is stable with cr of 1 5 (and his Mag 3 Lasix renal scan in May 2022 suggested that his kidney is draining and maintaining function with stent)      SIGNATURE: Christiano Berkowitz MD  DATE: January 26, 2023  TIME: 1:03 PM

## 2023-01-26 NOTE — H&P
UROLOGY HISTORY AND PHYSICAL     Patient Identifiers: Sam George (MRN [id number])      Date of Service: 1/26/2023        ASSESSMENT:     [de-identified] y o  old male with right ureteral stricture managed with stent  PLAN:     Right ureteral stent exchange  Risks discussed including ability to place new stent  History of Present Illness:     Sam George is a [de-identified] y o  old with a history of right ureteral stricutre managed with stent      The patient has had numerous (at least 7) prior stone surgeries in [place] and developed a stricture of the right mid and proximal ureter  Hx from Dr Tamara Jo notes:  -Stent placement and holmium laser of stone and stricture 4/18/17  -Holmium laser of stone and stent exchange 5/2/17  -Renal stones treated elsewhere in [place] in past year (7 procedures)  -Right laser lithotripsy, laser infundibulotomy, and stone basket extraction 6/2/17  -Right CRUSH and laser incision of proximal ureter 7/18/17  -Stent changed 05/10/19-- stone causing near complete obstruction of the right proximal ureter   Required stent to be removed in order for a guidewire to pass    -Stent exchange 10/22/19 found it impossible to advance the wire without removing the stent first    -Stent exchange 01/28/20 found it once again impossible to advance the wire without removing the stent first    Stent due for change in 12 wks    -Stent exchange by Dr Luis M Enriquez 02/14/21 right stent change and ureteroscopy   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  - Stent exchange 05/11/21 - Difficulty getting wire past the obstruction without removing  - Stent exchange 10/19/2021- partial encrustation, wire could not pass  mid ureter alongside stent until stent was pulled out  7x26 stent placed      The patient has CKD but creatinine appears stable with GFR of 40 since at least 2018   He follows with Nephrology      Last stent exchange 7/15/22 at which time the plan was for stent exchange in 3 months   While his hydronephrosis did appear worse today his renal function is stable and his recent Mag 3 Lasix renal scan suggests that his kidney is draining and maintaining function    Past Medical, Past Surgical History:     Past Medical History:   Diagnosis Date   • Anemia     iron deficiency   • 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 brain  being monitored   • Dental disease    • Essential hypertension, long-standing    • Heart murmur     per pt "Aortic Valve replacement 2021 with Watchman device implanted /2021"   • Hematuria     intermittent   • History of cigarette smoking, chronic     62 year smoker at one half pack per day, quit 04/1/17   • History of COVID-19 01/19/2022    recovered at home/chief s/s only sore throat   • History of kidney stones    • History of pneumonia    • HL (hearing loss)    • Hyperlipidemia    • Hypertension    • Irregular heart beat    • Kidney stone    • Muscle weakness     right sided weakness has gotten better/ walks independantly"   • Stroke ([facility] [address] ) 10/29/2020    right sided  weakness almost full recovery   • Stroke [facility])    • Teeth missing    • Vitamin D deficiency     maintained with 1000 units of D   • Water retention    • Wears glasses    :    Past Surgical History:   Procedure Laterality Date   • APPENDECTOMY  1960   • CARDIAC SURGERY      watchman [id number]; aortic valve replaced 2021(pig valve)   • CAROTID ENDARTERECTOMY Left 1998    Supposedly no internal stenosis found   • CYSTOSCOPY Right 8/15/2017    Procedure: CYSTOSCOPY RIGHT STENT EXCHANGE;  Surgeon: Starlene Osler, MD;  Location: [address] [place] [address];  Service: Urology   • CYSTOSCOPY     • CYSTOSCOPY N/A 3/11/2022    Procedure: CYSTOSCOPY, RIGHT RETROGRADE PYLEOGRAM;  Surgeon: Sydnee St MD;  Location: [facility];  Service: Urology   • EXTRACORPOREAL SHOCK WAVE LITHOTRIPSY  03/2017    For nephrolithiasis at [facility]   • [address]  5/10/2019   • FL RETROGRADE PYELOGRAM  7/30/2019   • FL RETROGRADE PYELOGRAM  10/22/2019   • FL RETROGRADE PYELOGRAM  1/28/2020   • FL RETROGRADE PYELOGRAM  8/11/2020   • FL RETROGRADE PYELOGRAM  12/15/2020   • FL RETROGRADE PYELOGRAM  2/14/2021   • FL RETROGRADE PYELOGRAM  5/11/2021   • FL RETROGRADE PYELOGRAM  10/19/2021   • FL RETROGRADE PYELOGRAM  3/11/2022   • FL RETROGRADE PYELOGRAM  7/15/2022   • FL CYSTO BLADDER W/URETERAL CATHETERIZATION Right 11/14/2017    Procedure: CYSTOSCOPY RETROGRADE, STENT EXCHANGE;  Surgeon: Emelia Brandt MD;  Location: [address] [place] [address];  Service: Urology   • FL CYSTO BLADDER W/URETERAL CATHETERIZATION Right 5/8/2018    Procedure: [place];  Surgeon: Emelia Brandt MD;  Location: [address] [place] [address];  Service: Urology   • FL CYSTO BLADDER W/URETERAL CATHETERIZATION Right 8/14/2018    Procedure: CYSTOSCOPY, STENT EXCHANGE;  Surgeon: Emelia Brandt MD;  Location: [address] [place] [address];  Service: Urology   • FL CYSTO BLADDER W/URETERAL CATHETERIZATION Right 11/13/2018    Procedure: CYSTOSCOPY, STENT EXCHANGE, RETROGRADE;  Surgeon: Emelia Brandt MD;  Location: [address] [place] [address];  Service: Urology   • FL CYSTO BLADDER W/URETERAL CATHETERIZATION Right 2/12/2019    Procedure: CYSTOSCOPY, RETROGRADE, STENT REMOVAL AND STENT PLACEMENT;  Surgeon: Emelia Brandt MD;  Location: [address] [place] [address];  Service: Urology   • FL CYSTO BLADDER W/URETERAL CATHETERIZATION Right 5/10/2019    Procedure: CYSTOSCOPY, RETROGRADE, STENT EXCHANGE;  Surgeon: Emelia Brandt MD;  Location: [address] [place] [address];  Service: Urology   • FL CYSTO BLADDER W/URETERAL CATHETERIZATION Right 7/30/2019    Procedure: CYSTOSCOPY, RETROGRADE, STENT REMOVAL AND PLACEMENT OF STENT;  Surgeon: Emelia Brandt MD;  Location: [address] [place] [address];  Service: Urology   • FL CYSTO BLADDER W/URETERAL CATHETERIZATION Right 10/22/2019    Procedure: CYSTOSCOPY, RETROGRADE, STENT EXCHANGE;  Surgeon: Emelia Brandt MD;  Location: [address] [place] [address];  Service: Urology   • FL CYSTO BLADDER W/URETERAL CATHETERIZATION Right 1/28/2020    Procedure: CYSTOSCOPY, RETROGRADE, STENT REMOVAL AND STENT PLACEMENT;  Surgeon: Franc Owen MD;  Location: [address] [place] [address];  Service: Urology   • MO CYSTO BLADDER W/URETERAL CATHETERIZATION Right 5/22/2020    Procedure: CYSTOSCOPY RETROGRADE, STENT EXCHANGE;  Surgeon: Franc Owen MD;  Location: [address] [place] [address];  Service: Urology   • MO CYSTO BLADDER W/URETERAL CATHETERIZATION Right 8/11/2020    Procedure: [place] EXCHANGE, RETROGRADE;  Surgeon: Franc Owen MD;  Location: [address] [place] [address];  Service: Urology   • MO CYSTO BLADDER W/URETERAL CATHETERIZATION Right 12/15/2020    Procedure: CYSTOSCOPY, RETROGRADE, STENT REMOVAL, AND STENT PLACEMENT;  Surgeon: Franc Owen MD;  Location: [address] [place] [address];  Service: Urology   • MO CYSTO BLADDER W/URETERAL CATHETERIZATION Right 5/11/2021    Procedure: CYSTOSCOPY RETROGRADE PYELOGRAM WITH STENT EXCHANGE;  Surgeon: Franc Owen MD;  Location: [address] [place] [address];  Service: Urology   • MO CYSTO BLADDER W/URETERAL CATHETERIZATION Right 10/19/2021    Procedure: CYSTOSCOPY WITH RETROGRADE, STENT EXCHANGE;  Surgeon: Franc Owen MD;  Location: [address] [place] [address];  Service: Urology   • MO CYSTO BLADDER W/URETERAL CATHETERIZATION Right 7/15/2022    Procedure: CYSTOSCOPY, RETROGRADE PYELOGRAM WITH EXCHANGE STENT URETERAL;  Surgeon: Carmita Us MD;  [facility];  Service: Urology   • MO CYSTO W/INSERT URETERAL STENT Right 11/14/2017    Procedure: EXCHANGE STENT URETERAL;  Surgeon: Franc Owen MD;  Location: [address] [place] [address];  Service: Urology   • MO CYSTO W/INSERT URETERAL STENT Right 3/11/2022    Procedure: EXCHANGE STENT URETERAL;  Surgeon: Carmita Us MD;  [facility];  Service: Urology   • MO CYSTO/URETERO W/LITHOTRIPSY &INDWELL STENT INSRT Right 5/2/2017    Procedure: CYSTOSCOPY URETEROSCOPY WITH LITHOTRIPSY HOLMIUM LASER, RETROGRADE PYELOGRAM AND INSERTION STENT URETERAL;  Surgeon: Franc Owen MD;  Location: [address] [place] [address];  Service: Urology   • MO CYSTO/URETERO W/LITHOTRIPSY &INDWELL STENT INSRT Right 5/16/2017    Procedure: CYSTOSCOPY, RETROGRADE PYELOGRAM,  FLEXIBLE URETEROSCOPY,  HOLMIUM LASER LITHOTRIPSY, STONE BASKET MANIPULATION,  STENT URETERAL EXCHANGE;  Surgeon: Starlene Osler, MD;  Location: [address] [place] [address];  Service: Urology   • [place] CYSTO/URETERO W/LITHOTRIPSY &INDWELL STENT INSRT Right 6/2/2017    Procedure: CYSTOSCOPY, RETROGRADE, STONE MANIPULATION WITH HOLMIUM LASER, STENT PLACEMENT;  Surgeon: Starlene Osler, MD;  Location: [address] [place] [address];  Service: Urology   • [place] CYSTO/URETERO W/LITHOTRIPSY &INDWELL STENT INSRT Right 7/18/2017    Procedure: CYSTOSCOPY, RETROGRADE, URETEROSCOPY HOLMIUM LASER Ivan Cayden;  Surgeon: Starlene Osler, MD;  Location: [address] [place] [address];  Service: Urology   • [place] CYSTO/URETERO W/LITHOTRIPSY &INDWELL STENT INSRT Right 2/14/2021    Procedure: CYSTOSCOPY URETEROSCOPY, RETROGRADE PYELOGRAM, STONE MANIPULATION AND EXCHANGE STENT URETERAL;  Surgeon: Leonard Salmon MD;  Location: [address] [place] [address];  Service: Urology   • PROSTATE SURGERY  2015    Laser Prostatectomy  by Dr Najma Velez   • Ramosangélica Barger Right 4/18/2017    Procedure: INSERTION STENT URETERAL, RIGHT URETEROSCOPY,  LASER OF URETERAL STRICTURE AND STONE;  Surgeon: Starlene Osler, MD;  Location: [address] [place] [address];  Service:    • URETERAL STENT PLACEMENT Right 2/13/2018    Procedure: CYSTOSCOPY, [place] EXCHANGE;  Surgeon: Starlene Osler, MD;  Location: [address] [place] [address];  Service: Urology   :    Medications, Allergies:     Current Facility-Administered Medications:   •  lactated ringers infusion, 125 mL/hr, Intravenous, Continuous, Sydnee St MD, Last Rate: 125 mL/hr at 01/26/23 1202, Continue from Pre-op at 01/26/23 1202    Allergies:  No Known Allergies:    Social and Family History:   Social History:   Social History     Tobacco Use   • Smoking status: Former     Packs/day: 0 50     Years: 62 00     Pack years: 31 00     Types: Cigarettes Start date: [birth year]/15/1954     Quit date: 4/15/2017     Years since quittin 7   • Smokeless tobacco: Never   Vaping Use   • Vaping Use: Never used   Substance Use Topics   • Alcohol use: Not Currently     Comment: don't drink anymore  • Drug use: No        Social History     Tobacco Use   Smoking Status Former   • Packs/day: 0 50   • Years: 62 00   • Pack years: 31 00   • Types: Cigarettes   • Start date: 6/15/1954   • Quit date: 4/15/2017   • Years since quittin 7   Smokeless Tobacco Never       Family History:  Family History   Problem Relation Age of Onset   • Hypertension Mother    • Alcohol abuse Father    • Cirrhosis Father    • Cancer Son 15        cancer-knee and above-left-amputation   • Cancer Sister    • Other Brother         head mass   • Thyroid disease unspecified Sister    • Arthritis Sister    • Cancer Sister    • Other Brother         legally blind in one eye   :     Review of Systems:     General: Fever, chills, or night sweats: negative  Cardiac: Negative for chest pain  Pulmonary: Negative for shortness of breath  Gastrointestinal: Abdominal pain negative  Nausea, vomiting, or diarrhea negative  Genitourinary: See HPI above  Patient does nothave hematuria  All other systems queried were negative  Physical Exam:   General: Patient is pleasant and in NAD  Awake and alert  /94   Pulse 56   Temp (!) 97 4 °F (36 3 °C)   Resp 20   Ht 5' 10" (1 778 m)   Wt 97 1 kg (214 lb)   SpO2 100%   BMI 30 71 kg/m²   HEENT:  Normocephalic atraumatic  Cardiac:  Regular rate and rhythm, Peripheral edema: negative  Pulmonary: Non-labored breathing, CTAB  Abdomen: Soft, non-tender, non-distended  No surgical scars  No masses, tenderness, hernias noted  Genitourinary: negative CVA tenderness, neg suprapubic tenderness    Extremities: normal movement in all 4       Labs:     Lab Results   Component Value Date    HGB 14 3 2023    HCT 46 2 2023    WBC 7 26 2023     01/09/2023   ]    Lab Results   Component Value Date    K 4 6 01/09/2023     01/09/2023    CO2 28 01/09/2023    BUN 25 01/09/2023    CREATININE 1 48 (H) 01/09/2023    CALCIUM 9 1 01/09/2023    GLUCOSE 135 11/03/2020   ]    Imaging:   I personally reviewed the images and report of the following studies, and reviewed them with the patient:    No new imaging since [facility] May 2022    Thank you for allowing me to participate in this patients’ care  Please do not hesitate to call with any additional questions    Michelle Corley MD

## 2023-01-27 NOTE — ANESTHESIA POSTPROCEDURE EVALUATION
Post-Op Assessment Note    CV Status:  Stable    Pain management: adequate     Mental Status:  Alert and awake   Hydration Status:  Euvolemic   PONV Controlled:  Controlled   Airway Patency:  Patent      Post Op Vitals Reviewed: Yes      Staff: Anesthesiologist         No notable events documented      BP (!) 199/93 Comment: Melva Bañuelos MD, Anesthesiologist AWARE  Pulse 57   Temp (!) 96 2 °F (35 7 °C) (Temporal)   Resp 14   Ht 5' 10" (1 778 m)   Wt 97 1 kg (214 lb)   SpO2 95%   BMI 30 71 kg/m²

## 2023-01-27 NOTE — TELEPHONE ENCOUNTER
Spoke with patient and he is doing well  He states he does have blood in urine but knows that is expected with stent  He has had stents previously and knows what to expect  Informed him to contact the office if he should develop fever  Patient aware of appt on 4/27/23 @ [facility] office  No further questions at this time

## 2023-02-14 ENCOUNTER — OFFICE VISIT (OUTPATIENT)
Dept: NEPHROLOGY | Facility: CLINIC | Age: 83
End: 2023-02-14

## 2023-02-14 VITALS
WEIGHT: 209 LBS | BODY MASS INDEX: 29.92 KG/M2 | SYSTOLIC BLOOD PRESSURE: 122 MMHG | HEIGHT: 70 IN | DIASTOLIC BLOOD PRESSURE: 68 MMHG | HEART RATE: 89 BPM

## 2023-02-14 DIAGNOSIS — N25.81 SECONDARY HYPERPARATHYROIDISM (HCC): ICD-10-CM

## 2023-02-14 DIAGNOSIS — I50.9 HEART FAILURE, UNSPECIFIED (HCC): ICD-10-CM

## 2023-02-14 DIAGNOSIS — N18.31 STAGE 3A CHRONIC KIDNEY DISEASE (HCC): Primary | ICD-10-CM

## 2023-02-14 NOTE — PROGRESS NOTES
NEPHROLOGY OFFICE VISIT   Brenda Mayo [de-identified] y o  male MRN: [id number]  2/14/2023    Reason for Visit: CKD III    ASSESSMENT and PLAN:    I had the pleasure of seeing Mr Aby Pinto today in the renal clinic for the continued management of CKD III  53-year-old male with a past medical history of CKD stage III Baseline Cr 1 6-1 8, Nephrolithiasis,HTN,   CVA, A  fib, D CHF, BPH, 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, s/p AVR at [facility] in 3/1/2020, former smoker quit in April, hospitalized at [facility] in April 2017 for shortness of breath at which time nephrology was consulted due to acute kidney injury with a rising creatinine  Patient was found to have right-sided hydronephrosis with ureteral stricture and stone  Patient underwent ureteral stent and eventually had a lithotripsy  Patient presents for follow up visit for CKD       Patient had stent exchange in august 2020 October of 2020-patient was admitted to hospital due to right-sided weakness  Seth Bee is concern for CVA   Underwent tPA  Patient was subsequently also started on Eliquis   Hydrochlorothiazide was held   [facility] 2020- patient was discharged from rehab        Admitted in February 2021 with flank pain   Had cystoscopy completed with right ureteral stent in place with several calculi and moderate to severe right hydronephrosis   Underwent stent exchange   Had acute kidney injury   Creatinine peaked to 2 2 mg/dL       Patient also had Watchman device placed   And then on March 1st 2021 had what sounds to be a TAVR procedure    ER visit 7/2021 - with HTN to ER  Anxiety    1/2022 - COVID19 infection    10/2022 - admitted to hospital - diplopia transient; was to have MRI as outpt  completed 11/2022 - no evidence of ischemia or enhancing lesions; old prior KALYANI distribution and caudate infarcts     1) CKD III- baseline Cr approx 1 5-1 7 mg/dL   Etiology of CKD likely solitary functional kidney, prior hydro, nephrolithiasis  Patient had acute kidney injury in [place] of 2021 at which time the stent was obstructed      - Follows with Urology regarding stent and nephrolithiasis  - split function test prior shows left kidney receives higher flow then the right kidney by approximately 70 vs 30%  - regular stent exchange per Urology team  - January 2023-lab work creatinine stable and at baseline 1 48 mg/dL  Electrolytes are appropriate  - UPCR 0 43  - stent replacement 1/2023     Plan:  - labwork and appt in 6 months  - no changes to your medications     2) Nephrolithiasis - follows with Urology       - renal stone mainly ca oxalate  - 24 hour urine litholink - reviewed  - pt has increased fluid intake to 2 L  - Needs low salt diet - already following  takes in approx 1 8 gm salt daily  at goal  - changed furosemide to HCTZ in jan 2018  - hydrochlorothiazide held during hospitalization in October of 2020  - underwent stent exchange recently     3) HTN - Blood pressures are stable  On metoprolol     4) Volume - history of dCHF  history of mild to mod pulm HTN  Euvolemic      - need to monitor fluid intake with HF history  - daily weights      5) CKD MBD -      - Vitamin D level January 2023 is 45 7-appropriate  - PTH 95-appropriate     6) Anemia - stable Hb     7) acid/base - bicarb stable     8) anemia - Hb above goal     9) a fib     - watchman device  - metoprolol 25 mg BID     10)   CVA with also known aneurysm; and also known left ICA 90% stenosis     -  Right frontal lobe infarct   -Sent MRI as outlined above  - Follows vascular surgery team     11) AVR - completed 3/1/2021     12) anxiety - following with PCP  Is now on lorazepam if needed     It was a pleasure evaluating Mr Josselyn Coronado   Thank you for allowing our team to participate in the care of your patient      Secondary hyperparathyroidism ([address] )  PTH and Vit D at goal    Chronic diastolic CHF (congestive heart failure) ([address] )  Wt Readings from Last 3 Encounters:   02/14/23 94 8 kg (209 lb) 01/26/23 97 1 kg (214 lb)   10/28/22 96 2 kg (212 lb)     Stable  Euvolemic  On beta blocker  HPI:    No fevers, chills, nausea, vomiting  Sore throat  Took ampicillin last week and resolved (dosed by PCP)  PATIENT INSTRUCTIONS:    Patient Instructions   1) Avoid NSAIDS - (Example - motrin, advil, ibuprofen, aleve, exederin, etc)  2) Always follow a low salt diet  3) labwork in 6 months before your next appt  4) no changes to your medications today  5) your renal function remains stable and at baseline CKD III        OBJECTIVE:  Current Weight: Weight - Scale: 94 8 kg (209 lb)  Vitals:    02/14/23 1245   BP: 122/68   BP Location: Right arm   Patient Position: Sitting   Cuff Size: Standard   Pulse: 89   Weight: 94 8 kg (209 lb)   Height: 5' 10" (1 778 m)    Body mass index is 29 99 kg/m²  REVIEW OF SYSTEMS:    Review of Systems   Constitutional: Negative  Negative for appetite change and fatigue  HENT: Negative  Eyes: Negative  Respiratory: Negative  Negative for shortness of breath  Cardiovascular: Negative  Negative for leg swelling  Gastrointestinal: Negative  Endocrine: Negative  Genitourinary: Negative  Negative for difficulty urinating  Musculoskeletal: Negative  Allergic/Immunologic: Negative  Neurological: Negative  Hematological: Negative  Psychiatric/Behavioral: Negative  All other systems reviewed and are negative  PHYSICAL EXAM:      Physical Exam  Vitals and nursing note reviewed  Constitutional:       General: He is not in acute distress  Appearance: He is well-developed  He is not diaphoretic  HENT:      Head: Normocephalic and atraumatic  Eyes:      General: No scleral icterus  Right eye: No discharge  Left eye: No discharge  Conjunctiva/sclera: Conjunctivae normal    Neck:      Vascular: No JVD  Cardiovascular:      Rate and Rhythm: Normal rate and regular rhythm  Heart sounds: Murmur heard  No friction rub  No gallop  Pulmonary:      Effort: Pulmonary effort is normal  No respiratory distress  Breath sounds: Normal breath sounds  No wheezing or rales  Chest:      Chest wall: No tenderness  Abdominal:      General: Bowel sounds are normal  There is no distension  Palpations: Abdomen is soft  Tenderness: There is no abdominal tenderness  There is no rebound  Musculoskeletal:         General: No tenderness or deformity  Normal range of motion  Cervical back: Normal range of motion and neck supple  Skin:     General: Skin is warm and dry  Coloration: Skin is not pale  Findings: No erythema or rash  Neurological:      Mental Status: He is alert and oriented to person, place, and time  Cranial Nerves: No cranial nerve deficit  Coordination: Coordination normal       Deep Tendon Reflexes: Reflexes are normal and symmetric  Psychiatric:         Behavior: Behavior normal          Thought Content:  Thought content normal          Judgment: Judgment normal          Medications:    Current Outpatient Medications:   •  ascorbic acid (VITAMIN C) 250 mg tablet, Take 500 mg by mouth daily, Disp: , Rfl:   •  aspirin (ECOTRIN LOW STRENGTH) 81 mg EC tablet, Take 81 mg by mouth daily Pt to check with surgeon if needed to hold RX , Disp: , Rfl:   •  atorvastatin (LIPITOR) 40 mg tablet, Take 1 tablet (40 mg total) by mouth daily with dinner, Disp: 30 tablet, Rfl: 1  •  cholecalciferol (VITAMIN D3) 1,000 units tablet, Take 1,000 Units by mouth daily after lunch  , Disp: , Rfl:   •  clopidogrel (PLAVIX) 75 mg tablet, Take 75 mg by mouth daily Pt  To check with surgeon if needed to hold RX , Disp: , Rfl:   •  ferrous sulfate 324 (65 Fe) mg, TAKE 1 TABLET (324 MG TOTAL) BY MOUTH EVERY OTHER DAY INCREASE TO DAILY IF TOLERATED , Disp: 90 tablet, Rfl: 0  •  finasteride (PROSCAR) 5 mg tablet, TAKE 1 TABLET BY MOUTH EVERY DAY, Disp: 90 tablet, Rfl: 2  •  metoprolol tartrate (LOPRESSOR) 50 mg tablet, Take 1 tablet (50 mg total) by mouth every 12 (twelve) hours, Disp: 60 tablet, Rfl: 1    Laboratory Results:        Invalid input(s): ALBUMIN    Results for orders placed or performed in visit on 01/09/23   Urine culture    Specimen: Urine, Clean Catch   Result Value Ref Range    Urine Culture <10,000 cfu/ml    Urinalysis with microscopic   Result Value Ref Range    Color, UA Chelsie     Clarity, UA Slightly Cloudy     Specific [place], UA 1 025 1 000 - 1 030    pH, UA 6 0 5 0, 5 5, 6 0, 6 5, 7 0, 7 5, 8 0, 8 5, 9 0    Leukocytes, UA Trace (A) Negative    Nitrite, UA Negative Negative    Protein, UA 30 (1+) (A) Negative mg/dl    Glucose, UA Negative Negative mg/dl    Ketones, UA Negative Negative mg/dl    Urobilinogen, UA 0 2 0 2, 1 0 E U /dl E U /dl    Bilirubin, UA Negative Negative    Occult Blood, UA Large (A) Negative    RBC, UA Innumerable (A) None Seen, 2-4 /hpf    WBC, UA 1-2 (A) None Seen, 2-4, 5-60 /hpf    Epithelial Cells Moderate (A) None Seen, Occasional /hpf    Bacteria, UA Occasional None Seen, Occasional /hpf   Protein / creatinine ratio, urine   Result Value Ref Range    Creatinine, Ur 114 0 mg/dL    Protein Urine Random 49 mg/dL    Prot/Creat Ratio, Ur 0 43 (H) 0 00 - 0 10   PTH, intact   Result Value Ref Range    PTH 95 5 (H) 18 4 - 80 1 pg/mL   Vitamin D 25 hydroxy   Result Value Ref Range    Vit D, 25-Hydroxy 45 7 30 0 - 100 0 ng/mL   Magnesium   Result Value Ref Range    Magnesium 1 9 1 6 - 2 6 mg/dL   Comprehensive metabolic panel   Result Value Ref Range    Sodium 140 135 - 147 mmol/L    Potassium 4 6 3 5 - 5 3 mmol/L    Chloride 104 96 - 108 mmol/L    CO2 28 21 - 32 mmol/L    ANION GAP 8 4 - 13 mmol/L    BUN 25 5 - 25 mg/dL    Creatinine 1 48 (H) 0 60 - 1 30 mg/dL    Glucose, Fasting 89 65 - 99 mg/dL    Calcium 9 1 8 3 - 10 1 mg/dL    Corrected Calcium 9 7 8 3 - 10 1 mg/dL    AST 24 5 - 45 U/L    ALT 18 12 - 78 U/L    Alkaline Phosphatase 117 (H) 46 - 116 U/L    Total Protein 6 7 6 4 - 8 4 g/dL    Albumin 3 2 (L) 3 5 - 5 0 g/dL    Total Bilirubin 1 02 (H) 0 20 - 1 00 mg/dL    eGFR 44 ml/min/1 73sq m   CBC and differential   Result Value Ref Range    WBC 7 26 4 31 - 10 16 Thousand/uL    RBC 5 20 3 88 - 5 62 Million/uL    Hemoglobin 14 3 12 0 - 17 0 g/dL    Hematocrit 46 2 36 5 - 49 3 %    MCV 89 82 - 98 fL    MCH 27 5 26 8 - 34 3 pg    MCHC 31 0 (L) 31 4 - 37 4 g/dL    RDW 17 2 (H) 11 6 - 15 1 %    MPV 10 2 8 9 - 12 7 fL    Platelets 723 612 - 942 Thousands/uL    nRBC 0 /100 WBCs    Neutrophils Relative 58 43 - 75 %    Immat GRANS % 0 0 - 2 %    Lymphocytes Relative 25 14 - 44 %    Monocytes Relative 12 4 - 12 %    Eosinophils Relative 4 0 - 6 %    Basophils Relative 1 0 - 1 %    Neutrophils Absolute 4 23 1 85 - 7 62 Thousands/µL    Immature Grans Absolute 0 03 0 00 - 0 20 Thousand/uL    Lymphocytes Absolute 1 78 0 60 - 4 47 Thousands/µL    Monocytes Absolute 0 85 0 17 - 1 22 Thousand/µL    Eosinophils Absolute 0 32 0 00 - 0 61 Thousand/µL    Basophils Absolute 0 05 0 00 - 0 10 Thousands/µL   Phosphorus   Result Value Ref Range    Phosphorus 3 0 2 3 - 4 1 mg/dL

## 2023-02-14 NOTE — PATIENT INSTRUCTIONS
1) Avoid NSAIDS - (Example - motrin, advil, ibuprofen, aleve, exederin, etc)  2) Always follow a low salt diet  3) labwork in 6 months before your next appt  4) no changes to your medications today  5) your renal function remains stable and at baseline CKD III

## 2023-02-14 NOTE — LETTER
February 14, 2023     Brianda Phillips, [place] [address]    Patient: Alec Curry   YOB: 1940   Date of Visit: 2/14/2023       Dear Dr Gibbs Plant:    Thank you for referring Chloe Hoff to me for evaluation  Below are my notes for this consultation  If you have questions, please do not hesitate to call me  I look forward to following your patient along with you  Sincerely,        Suzette Hamman, MD        CC: No Recipients  Suzette Hamman, MD  2/14/2023  1:10 PM  Sign when Signing Visit  NEPHROLOGY OFFICE VISIT   Alec Curry [de-identified] y o  male MRN: [id number]  2/14/2023    Reason for Visit: CKD III    ASSESSMENT and PLAN:    I had the pleasure of seeing Mr Josselyn Coronado today in the renal clinic for the continued management of CKD III  79-year-old male with a past medical history of CKD stage III Baseline Cr 1 6-1 8, Nephrolithiasis,HTN,   CVA, A  fib, D CHF, BPH, 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, s/p AVR at [facility] in 3/1/2020, former smoker quit in April, hospitalized at [facility] in April 2017 for shortness of breath at which time nephrology was consulted due to acute kidney injury with a rising creatinine  Patient was found to have right-sided hydronephrosis with ureteral stricture and stone  Patient underwent ureteral stent and eventually had a lithotripsy  Patient presents for follow up visit for CKD       Patient had stent exchange in august 2020 October of 2020-patient was admitted to hospital due to right-sided weakness  Andrzej Shelton is concern for CVA   Underwent tPA   Patient was subsequently also started on Eliquis   Hydrochlorothiazide was held   [facility] 2020- patient was discharged from rehab        Admitted in February 2021 with flank pain   Had cystoscopy completed with right ureteral stent in place with several calculi and moderate to severe right hydronephrosis   Underwent stent exchange   Had acute kidney injury   Creatinine peaked to 2 2 mg/dL       Patient also had Watchman device placed   And then on March 1st 2021 had what sounds to be a TAVR procedure    ER visit 7/2021 - with HTN to ER  Anxiety    1/2022 - COVID19 infection    10/2022 - admitted to hospital - diplopia transient; was to have MRI as outpt  completed 11/2022 - no evidence of ischemia or enhancing lesions; old prior KALYANI distribution and caudate infarcts     1) CKD III- baseline Cr approx 1 5-1 7 mg/dL  Etiology of CKD likely solitary functional kidney, prior hydro, nephrolithiasis  Patient had acute kidney injury in  February of 2021 at which time the stent was obstructed      - Follows with Urology regarding stent and nephrolithiasis  - split function test prior shows left kidney receives higher flow then the right kidney by approximately 70 vs 30%  - regular stent exchange per Urology team  - January 2023-lab work creatinine stable and at baseline 1 48 mg/dL  Electrolytes are appropriate  - UPCR 0 43  - stent replacement 1/2023     Plan:  - labwork and appt in 6 months  - no changes to your medications     2) Nephrolithiasis - follows with Urology       - renal stone mainly ca oxalate  - 24 hour urine litholink - reviewed  - pt has increased fluid intake to 2 L  - Needs low salt diet - already following  takes in approx 1 8 gm salt daily  at goal  - changed furosemide to HCTZ in jan 2018  - hydrochlorothiazide held during hospitalization in October of 2020  - underwent stent exchange recently     3) HTN - Blood pressures are stable  On metoprolol     4) Volume - history of dCHF  history of mild to mod pulm HTN   Euvolemic      - need to monitor fluid intake with HF history  - daily weights      5) CKD MBD -      - Vitamin D level January 2023 is 45 7-appropriate  - PTH 95-appropriate     6) Anemia - stable Hb     7) acid/base - bicarb stable     8) anemia - Hb above goal     9) a fib     - watchman device  - metoprolol 25 mg BID     10)   CVA with also known aneurysm; and also known left ICA 90% stenosis     -  Right frontal lobe infarct   -Sent MRI as outlined above  - Follows vascular surgery team     11) AVR - completed 3/1/2021     12) anxiety - following with PCP  Is now on lorazepam if needed     It was a pleasure evaluating Mr  Xochilt Olivas  Thank you for allowing our team to participate in the care of your patient      Secondary hyperparathyroidism ([facility] [address] )  PTH and Vit D at goal    Chronic diastolic CHF (congestive heart failure) ([facility] [address] )  Wt Readings from Last 3 Encounters:   02/14/23 94 8 kg (209 lb)   01/26/23 97 1 kg (214 lb)   10/28/22 96 2 kg (212 lb)     Stable  Euvolemic  On beta blocker  HPI:    No fevers, chills, nausea, vomiting  Sore throat  Took ampicillin last week and resolved (dosed by PCP)  PATIENT INSTRUCTIONS:    Patient Instructions   1) Avoid NSAIDS - (Example - motrin, advil, ibuprofen, aleve, exederin, etc)  2) Always follow a low salt diet  3) labwork in 6 months before your next appt  4) no changes to your medications today  5) your renal function remains stable and at baseline CKD III        OBJECTIVE:  Current Weight: Weight - Scale: 94 8 kg (209 lb)  Vitals:    02/14/23 1245   BP: 122/68   BP Location: Right arm   Patient Position: Sitting   Cuff Size: Standard   Pulse: 89   Weight: 94 8 kg (209 lb)   Height: 5' 10" (1 778 m)    Body mass index is 29 99 kg/m²  REVIEW OF SYSTEMS:    Review of Systems   Constitutional: Negative  Negative for appetite change and fatigue  HENT: Negative  Eyes: Negative  Respiratory: Negative  Negative for shortness of breath  Cardiovascular: Negative  Negative for leg swelling  Gastrointestinal: Negative  Endocrine: Negative  Genitourinary: Negative  Negative for difficulty urinating  Musculoskeletal: Negative  Allergic/Immunologic: Negative  Neurological: Negative  Hematological: Negative  Psychiatric/Behavioral: Negative      All other systems reviewed and are negative  PHYSICAL EXAM:      Physical Exam  Vitals and nursing note reviewed  Constitutional:       General: He is not in acute distress  Appearance: He is well-developed  He is not diaphoretic  HENT:      Head: Normocephalic and atraumatic  Eyes:      General: No scleral icterus  Right eye: No discharge  Left eye: No discharge  Conjunctiva/sclera: Conjunctivae normal    Neck:      Vascular: No JVD  Cardiovascular:      Rate and Rhythm: Normal rate and regular rhythm  Heart sounds: Murmur heard  No friction rub  No gallop  Pulmonary:      Effort: Pulmonary effort is normal  No respiratory distress  Breath sounds: Normal breath sounds  No wheezing or rales  Chest:      Chest wall: No tenderness  Abdominal:      General: Bowel sounds are normal  There is no distension  Palpations: Abdomen is soft  Tenderness: There is no abdominal tenderness  There is no rebound  Musculoskeletal:         General: No tenderness or deformity  Normal range of motion  Cervical back: Normal range of motion and neck supple  Skin:     General: Skin is warm and dry  Coloration: Skin is not pale  Findings: No erythema or rash  Neurological:      Mental Status: He is alert and oriented to person, place, and time  Cranial Nerves: No cranial nerve deficit  Coordination: Coordination normal       Deep Tendon Reflexes: Reflexes are normal and symmetric  Psychiatric:         Behavior: Behavior normal          Thought Content:  Thought content normal          Judgment: Judgment normal          Medications:    Current Outpatient Medications:   •  ascorbic acid (VITAMIN C) 250 mg tablet, Take 500 mg by mouth daily, Disp: , Rfl:   •  aspirin (ECOTRIN LOW STRENGTH) 81 mg EC tablet, Take 81 mg by mouth daily Pt to check with surgeon if needed to hold RX , Disp: , Rfl:   •  atorvastatin (LIPITOR) 40 mg tablet, Take 1 tablet (40 mg total) by mouth daily with dinner, Disp: 30 tablet, Rfl: 1  •  cholecalciferol (VITAMIN D3) 1,000 units tablet, Take 1,000 Units by mouth daily after lunch  , Disp: , Rfl:   •  clopidogrel (PLAVIX) 75 mg tablet, Take 75 mg by mouth daily Pt  To check with surgeon if needed to hold RX , Disp: , Rfl:   •  ferrous sulfate 324 (65 Fe) mg, TAKE 1 TABLET (324 MG TOTAL) BY MOUTH EVERY OTHER DAY INCREASE TO DAILY IF TOLERATED , Disp: 90 tablet, Rfl: 0  •  finasteride (PROSCAR) 5 mg tablet, TAKE 1 TABLET BY MOUTH EVERY DAY, Disp: 90 tablet, Rfl: 2  •  metoprolol tartrate (LOPRESSOR) 50 mg tablet, Take 1 tablet (50 mg total) by mouth every 12 (twelve) hours, Disp: 60 tablet, Rfl: 1    Laboratory Results:        Invalid input(s): ALBUMIN    Results for orders placed or performed in visit on 01/09/23   Urine culture    Specimen: Urine, Clean Catch   Result Value Ref Range    Urine Culture <10,000 cfu/ml    Urinalysis with microscopic   Result Value Ref Range    Color, UA Chelsie     Clarity, UA Slightly Cloudy     Specific [place], UA 1 025 1 000 - 1 030    pH, UA 6 0 5 0, 5 5, 6 0, 6 5, 7 0, 7 5, 8 0, 8 5, 9 0    Leukocytes, UA Trace (A) Negative    Nitrite, UA Negative Negative    Protein, UA 30 (1+) (A) Negative mg/dl    Glucose, UA Negative Negative mg/dl    Ketones, UA Negative Negative mg/dl    Urobilinogen, UA 0 2 0 2, 1 0 E U /dl E U /dl    Bilirubin, UA Negative Negative    Occult Blood, UA Large (A) Negative    RBC, UA Innumerable (A) None Seen, 2-4 /hpf    WBC, UA 1-2 (A) None Seen, 2-4, 5-60 /hpf    Epithelial Cells Moderate (A) None Seen, Occasional /hpf    Bacteria, UA Occasional None Seen, Occasional /hpf   Protein / creatinine ratio, urine   Result Value Ref Range    Creatinine, Ur 114 0 mg/dL    Protein Urine Random 49 mg/dL    Prot/Creat Ratio, Ur 0 43 (H) 0 00 - 0 10   PTH, intact   Result Value Ref Range    PTH 95 5 (H) 18 4 - 80 1 pg/mL   Vitamin D 25 hydroxy   Result Value Ref Range    Vit D, 25-Hydroxy 45 7 30 0 - 100 0 ng/mL   Magnesium   Result Value Ref Range    Magnesium 1 9 1 6 - 2 6 mg/dL   Comprehensive metabolic panel   Result Value Ref Range    Sodium 140 135 - 147 mmol/L    Potassium 4 6 3 5 - 5 3 mmol/L    Chloride 104 96 - 108 mmol/L    CO2 28 21 - 32 mmol/L    ANION GAP 8 4 - 13 mmol/L    BUN 25 5 - 25 mg/dL    Creatinine 1 48 (H) 0 60 - 1 30 mg/dL    Glucose, Fasting 89 65 - 99 mg/dL    Calcium 9 1 8 3 - 10 1 mg/dL    Corrected Calcium 9 7 8 3 - 10 1 mg/dL    AST 24 5 - 45 U/L    ALT 18 12 - 78 U/L    Alkaline Phosphatase 117 (H) 46 - 116 U/L    Total Protein 6 7 6 4 - 8 4 g/dL    Albumin 3 2 (L) 3 5 - 5 0 g/dL    Total Bilirubin 1 02 (H) 0 20 - 1 00 mg/dL    eGFR 44 ml/min/1 73sq m   CBC and differential   Result Value Ref Range    WBC 7 26 4 31 - 10 16 Thousand/uL    RBC 5 20 3 88 - 5 62 Million/uL    Hemoglobin 14 3 12 0 - 17 0 g/dL    Hematocrit 46 2 36 5 - 49 3 %    MCV 89 82 - 98 fL    MCH 27 5 26 8 - 34 3 pg    MCHC 31 0 (L) 31 4 - 37 4 g/dL    RDW 17 2 (H) 11 6 - 15 1 %    MPV 10 2 8 9 - 12 7 fL    Platelets 078 971 - 474 Thousands/uL    nRBC 0 /100 WBCs    Neutrophils Relative 58 43 - 75 %    Immat GRANS % 0 0 - 2 %    Lymphocytes Relative 25 14 - 44 %    Monocytes Relative 12 4 - 12 %    Eosinophils Relative 4 0 - 6 %    Basophils Relative 1 0 - 1 %    Neutrophils Absolute 4 23 1 85 - 7 62 Thousands/µL    Immature Grans Absolute 0 03 0 00 - 0 20 Thousand/uL    Lymphocytes Absolute 1 78 0 60 - 4 47 Thousands/µL    Monocytes Absolute 0 85 0 17 - 1 22 Thousand/µL    Eosinophils Absolute 0 32 0 00 - 0 61 Thousand/µL    Basophils Absolute 0 05 0 00 - 0 10 Thousands/µL   Phosphorus   Result Value Ref Range    Phosphorus 3 0 2 3 - 4 1 mg/dL

## 2023-02-14 NOTE — ASSESSMENT & PLAN NOTE
Wt Readings from Last 3 Encounters:   02/14/23 94 8 kg (209 lb)   01/26/23 97 1 kg (214 lb)   10/28/22 96 2 kg (212 lb)     Stable  Euvolemic  On beta blocker

## 2023-02-27 ENCOUNTER — APPOINTMENT (OUTPATIENT)
Dept: LAB | Facility: HOSPITAL | Age: 83
End: 2023-02-27

## 2023-02-27 DIAGNOSIS — R31.9 HEMATURIA, UNSPECIFIED TYPE: ICD-10-CM

## 2023-02-27 DIAGNOSIS — R19.5 FECAL OCCULT BLOOD TEST POSITIVE: ICD-10-CM

## 2023-02-27 DIAGNOSIS — K64.9 HEMORRHOIDS, UNSPECIFIED HEMORRHOID TYPE: ICD-10-CM

## 2023-02-27 DIAGNOSIS — R58 BLOOD LOSS: ICD-10-CM

## 2023-02-27 DIAGNOSIS — E61.1 IRON DEFICIENCY: ICD-10-CM

## 2023-02-27 LAB
BASOPHILS # BLD AUTO: 0.06 THOUSANDS/ÂΜL (ref 0–0.1)
BASOPHILS NFR BLD AUTO: 1 % (ref 0–1)
EOSINOPHIL # BLD AUTO: 0.4 THOUSAND/ÂΜL (ref 0–0.61)
EOSINOPHIL NFR BLD AUTO: 5 % (ref 0–6)
ERYTHROCYTE [DISTWIDTH] IN BLOOD BY AUTOMATED COUNT: 15.9 % (ref 11.6–15.1)
FERRITIN SERPL-MCNC: 43 NG/ML (ref 8–388)
HCT VFR BLD AUTO: 47.4 % (ref 36.5–49.3)
HGB BLD-MCNC: 15.2 G/DL (ref 12–17)
IMM GRANULOCYTES # BLD AUTO: 0.04 THOUSAND/UL (ref 0–0.2)
IMM GRANULOCYTES NFR BLD AUTO: 1 % (ref 0–2)
IRON SATN MFR SERPL: 36 % (ref 20–50)
IRON SERPL-MCNC: 113 UG/DL (ref 65–175)
LYMPHOCYTES # BLD AUTO: 2.33 THOUSANDS/ÂΜL (ref 0.6–4.47)
LYMPHOCYTES NFR BLD AUTO: 27 % (ref 14–44)
MCH RBC QN AUTO: 28.5 PG (ref 26.8–34.3)
MCHC RBC AUTO-ENTMCNC: 32.1 G/DL (ref 31.4–37.4)
MCV RBC AUTO: 89 FL (ref 82–98)
MONOCYTES # BLD AUTO: 1.07 THOUSAND/ÂΜL (ref 0.17–1.22)
MONOCYTES NFR BLD AUTO: 12 % (ref 4–12)
NEUTROPHILS # BLD AUTO: 4.81 THOUSANDS/ÂΜL (ref 1.85–7.62)
NEUTS SEG NFR BLD AUTO: 54 % (ref 43–75)
NRBC BLD AUTO-RTO: 0 /100 WBCS
PLATELET # BLD AUTO: 119 THOUSANDS/UL (ref 149–390)
PMV BLD AUTO: 9.8 FL (ref 8.9–12.7)
RBC # BLD AUTO: 5.34 MILLION/UL (ref 3.88–5.62)
TIBC SERPL-MCNC: 313 UG/DL (ref 250–450)
WBC # BLD AUTO: 8.71 THOUSAND/UL (ref 4.31–10.16)

## 2023-02-28 ENCOUNTER — TELEPHONE (OUTPATIENT)
Dept: NEUROLOGY | Facility: CLINIC | Age: 83
End: 2023-02-28

## 2023-02-28 NOTE — TELEPHONE ENCOUNTER
Spoke with patient and confirmed his 3/2/2023 appointment with Dr Dharmesh Sepulveda at the [facility]

## 2023-03-02 ENCOUNTER — OFFICE VISIT (OUTPATIENT)
Dept: HEMATOLOGY ONCOLOGY | Facility: MEDICAL CENTER | Age: 83
End: 2023-03-02

## 2023-03-02 ENCOUNTER — OFFICE VISIT (OUTPATIENT)
Dept: NEUROLOGY | Facility: CLINIC | Age: 83
End: 2023-03-02

## 2023-03-02 VITALS
BODY MASS INDEX: 30.18 KG/M2 | WEIGHT: 210.8 LBS | OXYGEN SATURATION: 97 % | TEMPERATURE: 96.7 F | HEART RATE: 76 BPM | HEIGHT: 70 IN | DIASTOLIC BLOOD PRESSURE: 68 MMHG | SYSTOLIC BLOOD PRESSURE: 116 MMHG | RESPIRATION RATE: 18 BRPM

## 2023-03-02 VITALS
HEIGHT: 70 IN | SYSTOLIC BLOOD PRESSURE: 110 MMHG | DIASTOLIC BLOOD PRESSURE: 80 MMHG | HEART RATE: 62 BPM | BODY MASS INDEX: 29.78 KG/M2 | WEIGHT: 208 LBS

## 2023-03-02 DIAGNOSIS — R31.9 HEMATURIA, UNSPECIFIED TYPE: ICD-10-CM

## 2023-03-02 DIAGNOSIS — H53.2 TRANSIENT DIPLOPIA: Primary | ICD-10-CM

## 2023-03-02 DIAGNOSIS — E61.1 IRON DEFICIENCY: Primary | ICD-10-CM

## 2023-03-02 NOTE — PROGRESS NOTES
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  Patient continues to undergo stent exchanges  Patient has not required treatment with IV iron since 2021  Patient's iron levels have increased from dietary sources of iron only  Patient is not taking oral supplements  Patient was advised to follow-up in 6 months  Iron levels remain stable, he can be discharged back to his PCP vs yearly follow ups since stent exchange is ongoing       - CBC and differential; Future  - Iron Panel (Includes Ferritin, Iron Sat%, Iron, and TIBC); Future      The patient is scheduled for follow-up in approximately 6 months with Dr Veronica Casper  Patient voiced agreement and understanding to the above  Patient advised to call the Hematology/Oncology office with any questions and concerns regarding the above  Barrier(s) to care: None  The patient is able to self care  Shannon San PA-C  Medical Oncology/Hematology  [address]    Subjective     Chief Complaint   Patient presents with   • Follow-up     Iron deficiency        History of present illness:    This is a 82-year-old male with past medical history of CVA with right-sided lower extremity weakness, hydronephrosis with the ureteral stricture status post stent previously cared for by Dr Nickie Marcos valve stenosis status post TVAR in February of 2021 at [facility], GERD, CKD, sensorineural hearing loss and acute diastolic CHF who presents to Hematology due to recent clinical findings of excessive bleeding      Patient notes that on 8/1/21 he tripped over a curb and broke his nose   Patient followed up in the emergency room to have his broken nose six   However, the patient's nose continued to bleed   Patient was discharged by the time he was home, he returned back to the emergency room due to soaking through the nasal tampon   Patient was then injected with epinephrine and bleeding stopped   At the time of the fracture, patient was on aspirin and Plavix      On August 24th, the patient underwent dental procedure for extraction in preparation for dentures   Patient continued to bleed after sutures were placed   Patient went back to the dentist, who injected him with epinephrine   Unfortunately, the patient developed a hematoma on the low right side of his face   That time, patient was on aspirin and Plavix however, clinically, the dentist noted that this is not common for his procedures and recommend that he a follow-up      10/28/20 PT = 13 5, INR = 1 03, PTT = 30  11/25/20, hemoglobin = 12 8, MCV = 94, RDW = 14 6, MCHC = 30 8, platelet = 557  9/98/95 hemoglobin = 11 1, MCV = 88, RDW = high 15 7, MCHC and MCH both low, platelet = 780  0/2/45 hemoglobin = 9 5, MCV 76, RDW = 19 6, other RBC indices all low, platelet = 235  4/6/28  hemoglobin = 10 9, MCV = 74, RDW = 22, platelet = 471  1/50/61 PTT and PT INR within normal limits  9/24/21 von Willebrand's assessment negative  9/24/21 hemoglobin = 11 0, MCV 76, RDW 20 2, platelet 359, BFWP saturation = 8%, ferritin 13  10/5 and 10/12: IV Feraheme, no side effects      12/15/2021:  Hemoglobin = 14 3, MCV 84, iron saturation 26%, ferritin 42     3/15/2022:  Hemoglobin = 12 9, MCV = 89, iron saturation = 17, ferritin = 32     5/11/22 large amount of blood on urine dipstick      6/16/2022:  Hemoglobin = 14 6, MCV = 84, iron saturation = 26, TIBC elevated at 356, ferritin = 26  Recommended IV iron patient did not follow through with treatment     Recommended follow-up with GI, after positive fecal occult testing, patient did not follow through with consult      10/18/22 hemoglobin = 14 6, MCV 87, platelet count 120, ferritin 24    2/27/2023 hemoglobin = 15 2, MCV 89, platelet count 358, ferritin 43, iron saturation = 31%    03/02/23 :  Lab Results   Component Value Date    IRON 113 02/27/2023    TIBC 313 02/27/2023    FERRITIN 43 02/27/2023     Lab Results   Component Value Date    WBC 8 71 02/27/2023    HGB 15 2 02/27/2023    HCT 47 4 02/27/2023    MCV 89 02/27/2023     (L) 02/27/2023       Interval history: Recent stent exchange  Patient is not taking oral iron supplements  Ferritin has gone up  Patient feels well  No additional intervention needed at this time  Patient's source of iron deficiency is related to make changes for pyelonephritis  Review of Systems   Constitutional: Negative for activity change, appetite change, fatigue and fever  HENT: Negative for nosebleeds  Respiratory: Negative for cough, choking and shortness of breath  Cardiovascular: Negative for chest pain, palpitations and leg swelling  Gastrointestinal: Negative for abdominal distention, abdominal pain, anal bleeding, blood in stool, constipation, diarrhea, nausea and vomiting  Endocrine: Negative for cold intolerance  Genitourinary: Negative for hematuria  Musculoskeletal: Negative for myalgias  Skin: Negative for color change, pallor and rash  Allergic/Immunologic: Negative for immunocompromised state  Neurological: Negative for headaches  Hematological: Negative for adenopathy  Does not bruise/bleed easily  All other systems reviewed and are negative        Patient Active Problem List   Diagnosis   • Chronic atrial fibrillation [facility])   • Benign essential hypertension   • Renal calculi   • JUNIOR (acute kidney injury) ([facility] [address] )   • Calculus of ureter   • Crossing vessel and stricture of ureter without hydronephrosis   • Hematuria, gross   • Hydronephrosis with ureteral stricture, not elsewhere classified   • CVA (cerebral vascular accident) [facility])   • Chronic diastolic CHF (congestive heart failure) ([facility])   • Nonrheumatic aortic valve stenosis   • Bilateral carotid artery disease ([facility])   • Aneurysm of anterior communicating artery   • Tooth pain   • Onychomycosis   • Syncope vs seizure   • Vasovagal near syncope   • Acute kidney injury superimposed on chronic kidney disease ([place] [address] )   • Secondary hyperparathyroidism ([place] [address] )   • Vitamin D deficiency   • Patellofemoral syndrome of left knee   • Moderate mitral regurgitation   • Moderate aortic regurgitation   • GERD (gastroesophageal reflux disease)   • Dyslipidemia   • Stage 3b chronic kidney disease ([facility])   • Aneurysm ([facility])   • Stroke ([facility])   • Closed nondisplaced fracture of nasal bone with routine healing   • Sensorineural hearing loss (SNHL) of both ears   • Iron deficiency anemia, unspecified   • Benign hypertension with CKD (chronic kidney disease) stage III ([facility])   • Chronic kidney disease-mineral and bone disorder   • Iron deficiency   • Hematuria   • History of aortic valve replacement   • 6th nerve palsy, left   • Transient diplopia   • Prediabetes     Past Medical History:   Diagnosis Date   • Anemia     iron deficiency   • Aneurysm ([place] [address] ) 2020    of brain  being monitored   • Dental disease    • Essential hypertension, long-standing    • Heart murmur     per pt "Aortic Valve replacement 2021 with Watchman device implanted /2021"   • Hematuria     intermittent   • History of cigarette smoking, chronic     62 year smoker at one half pack per day, quit 04/1/17   • History of COVID-19 01/19/2022    recovered at home/chief s/s only sore throat   • History of kidney stones    • History of pneumonia    • HL (hearing loss)    • Hyperlipidemia    • Hypertension    • Irregular heart beat    • Kidney stone    • Muscle weakness     right sided weakness has gotten better/ walks independantly"   • Stroke ([place] [address] ) 10/29/2020    right sided  weakness almost full recovery   • Stroke [facility])    • Teeth missing    • Vitamin D deficiency     maintained with 1000 units of D   • Water retention    • Wears glasses      Past Surgical History:   Procedure Laterality Date   • APPENDECTOMY  1960   • CARDIAC SURGERY      watchman [id number]; aortic valve replaced 2021(pig valve)   • CAROTID ENDARTERECTOMY Left 1998    Supposedly no internal stenosis found   • CYSTOSCOPY Right 8/15/2017    Procedure: CYSTOSCOPY RIGHT STENT EXCHANGE;  Surgeon: Devika Bonds MD;  Location: [address] [place] [address];  Service: Urology   • CYSTOSCOPY     • CYSTOSCOPY N/A 3/11/2022    Procedure: Keely Ordonez, RIGHT RETROGRADE PYLEOGRAM;  Surgeon: Angela Ya MD;  Location:  MAIN OR;  Service: Urology   • EXTRACORPOREAL SHOCK WAVE LITHOTRIPSY  03/2017    For nephrolithiasis at [facility]   • [address]  5/10/2019   • FL RETROGRADE PYELOGRAM  7/30/2019   • FL RETROGRADE PYELOGRAM  10/22/2019   • FL RETROGRADE PYELOGRAM  1/28/2020   • FL RETROGRADE PYELOGRAM  8/11/2020   • FL RETROGRADE PYELOGRAM  12/15/2020   • FL RETROGRADE PYELOGRAM  2/14/2021   • FL RETROGRADE PYELOGRAM  5/11/2021   • FL RETROGRADE PYELOGRAM  10/19/2021   • FL RETROGRADE PYELOGRAM  3/11/2022   • FL RETROGRADE PYELOGRAM  7/15/2022   • FL RETROGRADE PYELOGRAM  1/26/2023   • LA CYSTO BLADDER W/URETERAL CATHETERIZATION Right 11/14/2017    Procedure: CYSTOSCOPY RETROGRADE, STENT EXCHANGE;  Surgeon: Devika Bonds MD;  Location: [address] [place] [address];  Service: Urology   • LA CYSTO BLADDER W/URETERAL CATHETERIZATION Right 5/8/2018    Procedure: CYSTOSCOPY, STENT EXCHANGE;  Surgeon: Devika Bonds MD;  Location: [address] [place] [address];  Service: Urology   • LA CYSTO BLADDER W/URETERAL CATHETERIZATION Right 8/14/2018    Procedure: Karlee Dear EXCHANGE;  Surgeon: Devika Bonds MD;  Location: [address] [place] [address];  Service: Urology   • LA CYSTO BLADDER W/URETERAL CATHETERIZATION Right 11/13/2018    Procedure: CYSTOSCOPY, STENT EXCHANGE, RETROGRADE;  Surgeon: Devika Bonds MD;  Location: [facility] MAIN OR;  Service: Urology   • AZ CYSTO BLADDER W/URETERAL CATHETERIZATION Right 2/12/2019    Procedure: CYSTOSCOPY, RETROGRADE, STENT REMOVAL AND STENT PLACEMENT;  Surgeon: Ana Cantu MD;  Location: [address] [place] [address];  Service: Urology   • AZ CYSTO BLADDER W/URETERAL CATHETERIZATION Right 5/10/2019    Procedure: Nai Rice, STENT EXCHANGE;  Surgeon: Ana Cantu MD;  Location: [address] [place] [address];  Service: Urology   • AZ CYSTO BLADDER W/URETERAL CATHETERIZATION Right 7/30/2019    Procedure: CYSTOSCOPY, RETROGRADE, STENT REMOVAL AND PLACEMENT OF STENT;  Surgeon: Ana Cantu MD;  Location: [address] [place] [address];  Service: Urology   • AZ CYSTO BLADDER W/URETERAL CATHETERIZATION Right 10/22/2019    Procedure: Nai Rice, STENT EXCHANGE;  Surgeon: Ana Cantu MD;  Location: [address] [place] [address];  Service: Urology   • AZ CYSTO BLADDER W/URETERAL CATHETERIZATION Right 1/28/2020    Procedure: CYSTOSCOPY, RETROGRADE, STENT REMOVAL AND STENT PLACEMENT;  Surgeon: Ana Cantu MD;  Location: [address] [place] [address];  Service: Urology   • AZ CYSTO BLADDER W/URETERAL CATHETERIZATION Right 5/22/2020    Procedure: CYSTOSCOPY RETROGRADE, STENT EXCHANGE;  Surgeon: Ana Cantu MD;  Location: [address] [place] [address];  Service: Urology   • AZ CYSTO BLADDER W/URETERAL CATHETERIZATION Right 8/11/2020    Procedure: CYSTOSCOPY, STENT EXCHANGE, RETROGRADE;  Surgeon: Ana Cantu MD;  Location: [address] [place] [address];  Service: Urology   • AZ CYSTO BLADDER W/URETERAL CATHETERIZATION Right 12/15/2020    Procedure: CYSTOSCOPY, RETROGRADE, STENT REMOVAL, AND STENT PLACEMENT;  Surgeon: Ana Cantu MD;  Location: [address] [place] [address];  Service: Urology   • AZ CYSTO BLADDER W/URETERAL CATHETERIZATION Right 5/11/2021    Procedure: CYSTOSCOPY RETROGRADE PYELOGRAM WITH STENT EXCHANGE;  Surgeon: Ana Cantu MD;  Location: [address] [place] [address];  Service: Urology   • AZ CYSTO BLADDER W/URETERAL CATHETERIZATION Right 10/19/2021    Procedure: CYSTOSCOPY WITH RETROGRADE, STENT EXCHANGE;  Surgeon: Cali Keita MD;  Location: [address] [place] [address];  Service: Urology   • DC CYSTO BLADDER W/URETERAL CATHETERIZATION Right 7/15/2022    Procedure: CYSTOSCOPY, RETROGRADE PYELOGRAM WITH EXCHANGE STENT URETERAL;  Surgeon: Moise Centeno MD;  [facility];  Service: Urology   • DC CYSTO W/INSERT URETERAL STENT Right 11/14/2017    Procedure: EXCHANGE STENT URETERAL;  Surgeon: Cali Keita MD;  Location: [address] [place] [address];  Service: Urology   • DC CYSTO W/INSERT URETERAL STENT Right 3/11/2022    Procedure: EXCHANGE STENT URETERAL;  Surgeon: Moise Centeno MD;  [facility]: [facility];  Service: Urology   • DC CYSTO W/INSERT URETERAL STENT Right 1/26/2023    Procedure: EXCHANGE STENT URETERAL;  Surgeon: Moise Centeno MD;  [facility];  Service: Urology   • DC CYSTO/URETERO W/LITHOTRIPSY &INDWELL STENT INSRT Right 5/2/2017    Procedure: CYSTOSCOPY URETEROSCOPY WITH LITHOTRIPSY HOLMIUM LASER, RETROGRADE PYELOGRAM AND INSERTION STENT URETERAL;  Surgeon: Cali Keita MD;  Location: [address] [place] [address];  Service: Urology   • DC CYSTO/URETERO W/LITHOTRIPSY &INDWELL STENT INSRT Right 5/16/2017    Procedure: CYSTOSCOPY, RETROGRADE PYELOGRAM,  FLEXIBLE URETEROSCOPY,  HOLMIUM LASER LITHOTRIPSY, STONE BASKET MANIPULATION,  STENT URETERAL EXCHANGE;  Surgeon: Cali Keita MD;  Location: [address] [place] [address];  Service: Urology   • DC CYSTO/URETERO W/LITHOTRIPSY &INDWELL STENT INSRT Right 6/2/2017    Procedure: CYSTOSCOPY, RETROGRADE, STONE MANIPULATION WITH HOLMIUM LASER, STENT PLACEMENT;  Surgeon: Cali Keita MD;  Location: [address] [place] [address];  Service: Urology   • DC CYSTO/URETERO W/LITHOTRIPSY &INDWELL STENT INSRT Right 7/18/2017    Procedure: CYSTOSCOPY, RETROGRADE, URETEROSCOPY HOLMIUM LASER [address] [facility];  Surgeon: Cali Keita MD;  Location: [address] [place] [address];  Service: Urology   • DC CYSTO/URETERO W/LITHOTRIPSY &INDWELL STENT INSRT Right 2/14/2021 Procedure: CYSTOSCOPY URETEROSCOPY, RETROGRADE PYELOGRAM, STONE MANIPULATION AND EXCHANGE STENT URETERAL;  Surgeon: Leonard Salmon MD;  Location: [address] [place] [address];  Service: Urology   • PROSTATE SURGERY  2015    Laser Prostatectomy  by Dr Najma Velez   • Griffin Barger Right 2017    Procedure: INSERTION STENT URETERAL, RIGHT URETEROSCOPY,  LASER OF URETERAL STRICTURE AND STONE;  Surgeon: Starlene Osler, MD;  Location: [address] [place] [address];  Service:    • URETERAL STENT PLACEMENT Right 2018    Procedure: Tran Hernández;  Surgeon: Starlene Osler, MD;  Location: [address] [place] [address];  Service: Urology     Family History   Problem Relation Age of Onset   • Hypertension Mother    • Alcohol abuse Father    • Cirrhosis Father    • Cancer Son 15        cancer-knee and above-left-amputation   • Cancer Sister    • Other Brother         head mass   • Thyroid disease unspecified Sister    • Arthritis Sister    • Cancer Sister    • Other Brother         legally blind in one eye     Social History     Socioeconomic History   • Marital status: /Civil Union     Spouse name: None   • Number of children: None   • Years of education: None   • Highest education level: None   Occupational History   • Occupation: RETIRED   Tobacco Use   • Smoking status: Former     Packs/day: 0 50     Years: 62 00     Pack years: 31 00     Types: Cigarettes     Start date: [birth year]/1954     Quit date: 4/15/2017     Years since quittin 8   • Smokeless tobacco: Never   Vaping Use   • Vaping Use: Never used   Substance and Sexual Activity   • Alcohol use: Not Currently     Comment: don't drink anymore     • Drug use: No   • Sexual activity: Not Currently     Partners: Female     Comment: defer   Other Topics Concern   • None   Social History Narrative   • None     Social Determinants of Health     Financial Resource Strain: Not on file   Food Insecurity: No Food Insecurity   • Worried About [address] in the Last Year: Never true   • Ran Out of Food in the Last Year: Never true   Transportation Needs: No Transportation Needs   • Lack of Transportation (Medical): No   • Lack of Transportation (Non-Medical): No   Physical Activity: Not on file   Stress: Not on file   Social Connections: Not on file   Intimate Partner Violence: Not on file   Housing Stability: Low Risk    • Unable to Pay for Housing in the Last Year: No   • Number of Places Lived in the Last Year: 1   • Unstable Housing in the Last Year: No       Current Outpatient Medications:   •  ascorbic acid (VITAMIN C) 250 mg tablet, Take 500 mg by mouth daily, Disp: , Rfl:   •  aspirin (ECOTRIN LOW STRENGTH) 81 mg EC tablet, Take 81 mg by mouth daily Pt to check with surgeon if needed to hold RX , Disp: , Rfl:   •  atorvastatin (LIPITOR) 40 mg tablet, Take 1 tablet (40 mg total) by mouth daily with dinner, Disp: 30 tablet, Rfl: 1  •  cholecalciferol (VITAMIN D3) 1,000 units tablet, Take 1,000 Units by mouth daily after lunch  , Disp: , Rfl:   •  clopidogrel (PLAVIX) 75 mg tablet, Take 75 mg by mouth daily Pt  To check with surgeon if needed to hold RX , Disp: , Rfl:   •  ferrous sulfate 324 (65 Fe) mg, TAKE 1 TABLET (324 MG TOTAL) BY MOUTH EVERY OTHER DAY INCREASE TO DAILY IF TOLERATED , Disp: 90 tablet, Rfl: 0  •  finasteride (PROSCAR) 5 mg tablet, TAKE 1 TABLET BY MOUTH EVERY DAY, Disp: 90 tablet, Rfl: 2  •  metoprolol tartrate (LOPRESSOR) 50 mg tablet, Take 1 tablet (50 mg total) by mouth every 12 (twelve) hours, Disp: 60 tablet, Rfl: 1  No Known Allergies    Objective   /68 (BP Location: Left arm, Patient Position: Sitting, Cuff Size: Adult)   Pulse 76   Temp (!) 96 7 °F (35 9 °C) (Tympanic)   Resp 18   Ht 5' 10" (1 778 m)   Wt 95 6 kg (210 lb 12 8 oz)   SpO2 97%   BMI 30 25 kg/m²    Physical Exam  Constitutional:       General: He is not in acute distress  Appearance: He is well-developed  HENT:      Head: Normocephalic and atraumatic        Mouth/Throat: Pharynx: No oropharyngeal exudate  Eyes:      General: No scleral icterus  Conjunctiva/sclera: Conjunctivae normal       Pupils: Pupils are equal, round, and reactive to light  Cardiovascular:      Rate and Rhythm: Normal rate and regular rhythm  Heart sounds: No murmur heard  Pulmonary:      Effort: Pulmonary effort is normal  No respiratory distress  Breath sounds: Normal breath sounds  Abdominal:      General: Bowel sounds are normal  There is no distension  Palpations: Abdomen is soft  Tenderness: There is no abdominal tenderness  Musculoskeletal:         General: No tenderness  Cervical back: Normal range of motion  Lymphadenopathy:      Cervical: No cervical adenopathy  Skin:     Coloration: Skin is not pale  Findings: No erythema or rash  Neurological:      Mental Status: He is alert and oriented to person, place, and time  Cranial Nerves: No cranial nerve deficit           Result Review  Labs:  Appointment on 02/27/2023   Component Date Value Ref Range Status   • WBC 02/27/2023 8 71  4 31 - 10 16 Thousand/uL Final   • RBC 02/27/2023 5 34  3 88 - 5 62 Million/uL Final   • Hemoglobin 02/27/2023 15 2  12 0 - 17 0 g/dL Final   • Hematocrit 02/27/2023 47 4  36 5 - 49 3 % Final   • MCV 02/27/2023 89  82 - 98 fL Final   • MCH 02/27/2023 28 5  26 8 - 34 3 pg Final   • MCHC 02/27/2023 32 1  31 4 - 37 4 g/dL Final   • RDW 02/27/2023 15 9 (H)  11 6 - 15 1 % Final   • MPV 02/27/2023 9 8  8 9 - 12 7 fL Final   • Platelets 37/71/0303 119 (L)  149 - 390 Thousands/uL Final   • nRBC 02/27/2023 0  /100 WBCs Final   • Neutrophils Relative 02/27/2023 54  43 - 75 % Final   • Immat GRANS % 02/27/2023 1  0 - 2 % Final   • Lymphocytes Relative 02/27/2023 27  14 - 44 % Final   • Monocytes Relative 02/27/2023 12  4 - 12 % Final   • Eosinophils Relative 02/27/2023 5  0 - 6 % Final   • Basophils Relative 02/27/2023 1  0 - 1 % Final   • Neutrophils Absolute 02/27/2023 4 81  1 85 - 7 62 Thousands/µL Final   • Immature Grans Absolute 02/27/2023 0 04  0 00 - 0 20 Thousand/uL Final   • Lymphocytes Absolute 02/27/2023 2 33  0 60 - 4 47 Thousands/µL Final   • Monocytes Absolute 02/27/2023 1 07  0 17 - 1 22 Thousand/µL Final   • Eosinophils Absolute 02/27/2023 0 40  0 00 - 0 61 Thousand/µL Final   • Basophils Absolute 02/27/2023 0 06  0 00 - 0 10 Thousands/µL Final   • Iron Saturation 02/27/2023 36  20 - 50 % Final   • TIBC 02/27/2023 313  250 - 450 ug/dL Final   • Iron 02/27/2023 113  65 - 175 ug/dL Final    Patients treated with metal-binding drugs (ie  Deferoxamine) may have depressed iron values  • Ferritin 02/27/2023 43  8 - 388 ng/mL Final       Please note: This report has been generated by a voice recognition software system  Therefore there may be syntax, spelling, and/or grammatical errors  Please call if you have any questions

## 2023-03-02 NOTE — ASSESSMENT & PLAN NOTE
[de-identified] yo male with pmh significant for atrial fibrillation s/p watchman in 2021 and CVA with no residual symptoms on ASA/Plavix for cardiac reasons presenting for hospital follow up for transient episode of diplopia in the setting of head injury in October 2022  He reported mild transient diplopia without vision loss or other stroke like symptoms lasting less than a day resolving prior to presentation at the hospital  There was some concern that as he was currently holding his plavix for routine procedure symptoms could reflect TIA  Ultimately, given presentation, less likely vascular and more likely transient post traumatic exotropia in the setting of head injury the day before  No further follow up needed at this time, can follow PRN  He gets his ASA/Plavix/statin from other providers

## 2023-03-02 NOTE — PROGRESS NOTES
Patient ID: Manan Fregoso is a [de-identified] y o  male  Assessment/Plan:    Transient diplopia  [de-identified] yo male with pmh significant for atrial fibrillation s/p watchman in 2021 and CVA with no residual symptoms on ASA/Plavix for cardiac reasons presenting for hospital follow up for transient episode of diplopia in the setting of head injury in October 2022  He reported mild transient diplopia without vision loss or other stroke like symptoms lasting less than a day resolving prior to presentation at the hospital  There was some concern that as he was currently holding his plavix for routine procedure symptoms could reflect TIA  Ultimately, given presentation, less likely vascular and more likely transient post traumatic exotropia in the setting of head injury the day before  No further follow up needed at this time, can follow PRN  He gets his ASA/Plavix/statin from other providers  Diagnoses and all orders for this visit:    Transient diplopia           Subjective:    Manan Fregoso is a [de-identified] y o  male who presents to clinic as a hospital follow up for TIA/transient diplopia  Male has a past medical history significant for previous CVA  To review, Latonya Kowalski reports he was in his normal state of health until late October 2022 he was at home and attempted to swat a wasp in his granddaughters bedroom  He ended up standing on the bed to swat the wasp and unfortunately fell off the bed on to his head  He did not have any immediate symptoms but about a day later developed transient diplopia which lasted less than a day  By the time he got the ED the symptoms had resolved  He was observed for a day and released home  Subsequent MRI was negative for acute findings  There was concern at the time since he had stopped his plavix for a routine procedure that it could have been a TIA however over all, symptoms sounded like transient post traumatic exotropia  He reports no new or recurrent symptoms since      The following portions of the patient's history were reviewed and updated as appropriate: allergies, current medications, past family history, past medical history, past social history, past surgical history, and problem list          Objective:    Blood pressure 110/80, pulse 62, height 5' 10" (1 778 m), weight 94 3 kg (208 lb)  Neurological Exam  Mental Status  Awake, alert and oriented to person, place and time  Recent and remote memory are intact  Speech is normal  Language is fluent with no aphasia  Attention and concentration are normal  Fund of knowledge is appropriate for level of education  Cranial Nerves  CN II: Visual acuity is normal  Visual fields full to confrontation  CN III, IV, VI: Extraocular movements intact bilaterally  Normal lids and orbits bilaterally  Pupils equal round and reactive to light bilaterally  CN V: Facial sensation is normal   CN VII: Full and symmetric facial movement  CN VIII: Hearing is normal   CN IX, X: Palate elevates symmetrically  Normal gag reflex  CN XI: Shoulder shrug strength is normal   CN XII: Tongue midline without atrophy or fasciculations  Motor  Normal muscle bulk throughout  No fasciculations present  Normal muscle tone  No abnormal involuntary movements  Strength is 5/5 throughout all four extremities  Sensory  Light touch is normal in upper and lower extremities  Reflexes  Deep tendon reflexes are 2+ and symmetric in all four extremities  Coordination    Finger-to-nose, rapid alternating movements and heel-to-shin normal bilaterally without dysmetria  Gait  Normal casual, toe, heel and tandem gait  ROS:    Review of Systems   Constitutional: Negative for activity change and fever  HENT: Negative for trouble swallowing  Eyes: Negative for visual disturbance  Respiratory: Negative for shortness of breath  Cardiovascular: Negative for chest pain, palpitations and leg swelling  Gastrointestinal: Negative for abdominal pain  Genitourinary: Negative for difficulty urinating  Musculoskeletal: Negative for arthralgias and back pain  Neurological: Negative for dizziness, weakness, numbness and headaches  Psychiatric/Behavioral: Negative for agitation  The patient is not nervous/anxious  Review of systems as documented by the MA was reviewed in full by myself, Marielos Whitlock MD      More than 50% of this time spent with the patient was devoted to counseling and coordination of care  Issues addressed during this clinic visit included overall management, medication counseling or monitoring (including adverse effects, side effects and risks of medications)

## 2023-04-24 ENCOUNTER — TELEPHONE (OUTPATIENT)
Dept: HEMATOLOGY ONCOLOGY | Facility: CLINIC | Age: 83
End: 2023-04-24

## 2023-04-24 NOTE — TELEPHONE ENCOUNTER
Appointment Change  Cancel, Reschedule, Change to Virtual      Who are you speaking with? Patient   If it is not the patient, are they listed on an active communication consent form? N/A   Which provider is the appointment scheduled with? Dr Hope Cantor   When is the appointment scheduled? Please list date and time 9/6/23   At which location is the appointment scheduled to take place? [facility]   Was the appointment rescheduled or changed from an in person visit to a virtual visit? If so, please list the details of the change  9/27/23   What is the reason for the appointment change? Provider on rounds   Was STAR transport scheduled for this visit? No   Does STAR transport need to be scheduled for the new visit (if applicable) No   Does the patient need an infusion appointment rescheduled? No   Does the patient have an infusion appointment scheduled? If so, when? No   Is the patient undergoing chemotherapy? No   Was the no-show policy reviewed for appointments being changed with less then 24 hours of notice?  Yes

## 2023-04-27 ENCOUNTER — OFFICE VISIT (OUTPATIENT)
Dept: UROLOGY | Facility: CLINIC | Age: 83
End: 2023-04-27

## 2023-04-27 VITALS
HEIGHT: 70 IN | DIASTOLIC BLOOD PRESSURE: 80 MMHG | SYSTOLIC BLOOD PRESSURE: 130 MMHG | WEIGHT: 205 LBS | BODY MASS INDEX: 29.35 KG/M2

## 2023-04-27 DIAGNOSIS — N13.5 URETERAL STRICTURE: ICD-10-CM

## 2023-04-27 DIAGNOSIS — R31.0 GROSS HEMATURIA: Primary | ICD-10-CM

## 2023-04-27 LAB
SL AMB  POCT GLUCOSE, UA: ABNORMAL
SL AMB LEUKOCYTE ESTERASE,UA: ABNORMAL
SL AMB POCT BILIRUBIN,UA: ABNORMAL
SL AMB POCT BLOOD,UA: ABNORMAL
SL AMB POCT CLARITY,UA: ABNORMAL
SL AMB POCT COLOR,UA: ABNORMAL
SL AMB POCT KETONES,UA: ABNORMAL
SL AMB POCT NITRITE,UA: ABNORMAL
SL AMB POCT PH,UA: 5
SL AMB POCT SPECIFIC GRAVITY,UA: 1.01
SL AMB POCT URINE PROTEIN: 300
SL AMB POCT UROBILINOGEN: 0.2

## 2023-04-27 NOTE — PROGRESS NOTES
1  Gross hematuria  POCT urine dip    Urine culture      2  Ureteral stricture  Case request operating room: [address] [place] [address] WITH INSERTION STENT URETERAL    Case request operating room: CYSTOSCOPY RETROGRADE PYELOGRAM WITH INSERTION STENT URETERAL          Assessment and plan:       1  Nephrolithiasis  2  Right ureteral stricture   -patient will be coordinated for his next cystoscopy, retrograde pyelogram, and right ureteral stent exchange  Risks of the procedure reviewed including but not limited to cardiopulmonary complication, bleeding, infection, VTE, need for additional procedures  - consent signed    Elia Du      Chief Complaint     Ureteral stricture     History of Present Illness     Noe Llanos is a [de-identified] y o  male patient of Dr Ranjana Zapata presenting for follow up  The patient has had numerous (at least 7) prior stone surgeries in [place] and developed a stricture of the right mid and proximal ureter  Hx from Dr Antonio Healy notes:  -Stent placement and holmium laser of stone and stricture 4/18/17  -Holmium laser of stone and stent exchange 5/2/17  -Renal stones treated elsewhere in [place] in past year (7 procedures)  -Right laser lithotripsy, laser infundibulotomy, and stone basket extraction 6/2/17  -Right CRUSH and laser incision of proximal ureter 7/18/17  -Stent changed 05/10/19-- stone causing near complete obstruction of the right proximal ureter   Required stent to be removed in order for a guidewire to pass  -Stent exchange 10/22/19 found it impossible to advance the wire without removing the stent first    -Stent exchange 01/28/20 found it once again impossible to advance the wire without removing the stent first    Stent due for change in 12 wks     -Stent exchange by Dr Carol Norton 02/14/21 right stent change and ureteroscopy   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  - Stent exchange 05/11/21 - Difficulty getting wire past the obstruction without removing  - Stent exchange 10/19/2021- partial encrustation, wire could not pass  mid ureter alongside stent until stent was pulled out  7x26 stent placed      The patient has CKD but creatinine appears stable with GFR of 40 since at least 2018  He follows with Nephrology  Last stent exchange 7/15/22:  PLAN: We will arrange stent exchange in 3 months  While his hydronephrosis did appear worse today his renal function is stable and his recent Mag 3 Lasix renal scan suggests that his kidney is draining and maintaining function    Last stent exchange 1/26/23 with Dr Joanna Roldan  PLAN: We will arrange stent exchange in 4-5 months  His hydronephrosis appears stable or perhaps mildly improved and his  renal function is stable with cr of 1 5 (and his Mag 3 Lasix renal scan in May 2022 suggested that his kidney is draining and maintaining function with stent)       Laboratory     Lab Results   Component Value Date    CREATININE 1 48 (H) 01/09/2023       Review of Systems     Review of Systems   Constitutional: Negative for activity change, appetite change, chills, diaphoresis, fatigue, fever and unexpected weight change  Respiratory: Negative for chest tightness and shortness of breath  Cardiovascular: Negative for chest pain, palpitations and leg swelling  Gastrointestinal: Negative for abdominal distention, abdominal pain, constipation, diarrhea, nausea and vomiting  Genitourinary: Negative for decreased urine volume, difficulty urinating, dysuria, enuresis, flank pain, frequency, genital sores, hematuria and urgency  Musculoskeletal: Negative for back pain, gait problem and myalgias  Skin: Negative for color change, pallor, rash and wound  Psychiatric/Behavioral: Negative for behavioral problems  The patient is not nervous/anxious  Allergies     No Known Allergies    Physical Exam     Physical Exam  Constitutional:       General: He is not in acute distress  Appearance: Normal appearance  He is normal weight   He "is not ill-appearing, toxic-appearing or diaphoretic  HENT:      Head: Normocephalic and atraumatic  Eyes:      General:         Right eye: No discharge  Left eye: No discharge  Conjunctiva/sclera: Conjunctivae normal    Cardiovascular:      Rate and Rhythm: Normal rate and regular rhythm  Heart sounds: Normal heart sounds  No murmur heard  No friction rub  Pulmonary:      Effort: Pulmonary effort is normal  No respiratory distress  Breath sounds: Normal breath sounds  No stridor  Musculoskeletal:         General: No swelling or tenderness  Normal range of motion  Skin:     General: Skin is warm and dry  Coloration: Skin is not jaundiced or pale  Neurological:      General: No focal deficit present  Mental Status: He is alert and oriented to person, place, and time  Psychiatric:         Mood and Affect: Mood normal          Behavior: Behavior normal          Thought Content:  Thought content normal        Vital Signs     Vitals:    04/27/23 1050   BP: 130/80   BP Location: Left arm   Patient Position: Sitting   Cuff Size: Adult   Weight: 93 kg (205 lb)   Height: 5' 10\" (1 778 m)         Current Medications       Current Outpatient Medications:   •  ascorbic acid (VITAMIN C) 250 mg tablet, Take 500 mg by mouth daily, Disp: , Rfl:   •  aspirin (ECOTRIN LOW STRENGTH) 81 mg EC tablet, Take 81 mg by mouth daily Pt to check with surgeon if needed to hold RX , Disp: , Rfl:   •  atorvastatin (LIPITOR) 40 mg tablet, Take 1 tablet (40 mg total) by mouth daily with dinner, Disp: 30 tablet, Rfl: 1  •  cholecalciferol (VITAMIN D3) 1,000 units tablet, Take 1,000 Units by mouth daily after lunch  , Disp: , Rfl:   •  clopidogrel (PLAVIX) 75 mg tablet, Take 75 mg by mouth daily Pt  To check with surgeon if needed to hold RX , Disp: , Rfl:   •  ferrous sulfate 324 (65 Fe) mg, TAKE 1 TABLET (324 MG TOTAL) BY MOUTH EVERY OTHER DAY INCREASE TO DAILY IF TOLERATED , Disp: 90 tablet, Rfl: " 0  •  finasteride (PROSCAR) 5 mg tablet, TAKE 1 TABLET BY MOUTH EVERY DAY, Disp: 90 tablet, Rfl: 2  •  metoprolol tartrate (LOPRESSOR) 50 mg tablet, Take 1 tablet (50 mg total) by mouth every 12 (twelve) hours, Disp: 60 tablet, Rfl: 1      Active Problems     Patient Active Problem List   Diagnosis   • Chronic atrial fibrillation (HCC)   • Benign essential hypertension   • Renal calculi   • JUNIOR (acute kidney injury) ([facility] )   • Calculus of ureter   • Crossing vessel and stricture of ureter without hydronephrosis   • Hematuria, gross   • Hydronephrosis with ureteral stricture, not elsewhere classified   • CVA (cerebral vascular accident) ([facility] )   • Chronic diastolic CHF (congestive heart failure) ([facility] )   • Nonrheumatic aortic valve stenosis   • Bilateral carotid artery disease ([facility])   • Aneurysm of anterior communicating artery   • Tooth pain   • Onychomycosis   • Syncope vs seizure   • Vasovagal near syncope   • Acute kidney injury superimposed on chronic kidney disease ([facility] )   • Secondary hyperparathyroidism ([facility] )   • Vitamin D deficiency   • Patellofemoral syndrome of left knee   • Moderate mitral regurgitation   • Moderate aortic regurgitation   • GERD (gastroesophageal reflux disease)   • Dyslipidemia   • Stage 3b chronic kidney disease ([facility])   • Aneurysm ([facility])   • Stroke ([facility])   • Closed nondisplaced fracture of nasal bone with routine healing   • Sensorineural hearing loss (SNHL) of both ears   • Iron deficiency anemia, unspecified   • Benign hypertension with CKD (chronic kidney disease) stage III ([facility])   • Chronic kidney disease-mineral and bone disorder   • Iron deficiency   • Hematuria   • History of aortic valve replacement   • 6th nerve palsy, left   • Transient diplopia   • Prediabetes         Past Medical History     Past Medical History:   Diagnosis Date   • Anemia     iron deficiency   • Aneurysm ([facility] ) 2020    of brain  being monitored   • Dental disease    • Essential hypertension, long-standing    • "Heart murmur     per pt \"Aortic Valve replacement 2021 with Watchman device implanted /2021\"   • Hematuria     intermittent   • History of cigarette smoking, chronic     62 year smoker at one half pack per day, quit 04/1/17   • History of COVID-19 01/19/2022    recovered at home/chief s/s only sore throat   • History of kidney stones    • History of pneumonia    • HL (hearing loss)    • Hyperlipidemia    • Hypertension    • Irregular heart beat    • Kidney stone    • Muscle weakness     right sided weakness has gotten better/ walks independantly\"   • Stroke ([address] ) 10/29/2020    right sided  weakness almost full recovery   • Stroke [facility])    • Teeth missing    • Vitamin D deficiency     maintained with 1000 units of D   • Water retention    • Wears glasses          Surgical History     Past Surgical History:   Procedure Laterality Date   • APPENDECTOMY  1960   • CARDIAC SURGERY      watchman [id number]; aortic valve replaced 2021(pig valve)   • CAROTID ENDARTERECTOMY Left 1998    Supposedly no internal stenosis found   • CYSTOSCOPY Right 8/15/2017    Procedure: CYSTOSCOPY RIGHT STENT EXCHANGE;  Surgeon: Ramu Day MD;  Location: [address] [place] [address];  Service: Urology   • CYSTOSCOPY     • CYSTOSCOPY N/A 3/11/2022    Procedure: CYSTOSCOPY, RIGHT RETROGRADE PYLEOGRAM;  Surgeon: Marcell Bhat MD;  Location: [facility];  Service: Urology   • EXTRACORPOREAL SHOCK WAVE LITHOTRIPSY  03/2017    For nephrolithiasis at [facility]   • [address]  5/10/2019   • FL RETROGRADE PYELOGRAM  7/30/2019   • FL RETROGRADE PYELOGRAM  10/22/2019   • FL RETROGRADE PYELOGRAM  1/28/2020   • FL RETROGRADE PYELOGRAM  8/11/2020   • FL RETROGRADE PYELOGRAM  12/15/2020   • FL RETROGRADE PYELOGRAM  2/14/2021   • FL RETROGRADE PYELOGRAM  5/11/2021   • FL RETROGRADE PYELOGRAM  10/19/2021   • FL RETROGRADE PYELOGRAM  3/11/2022   • FL RETROGRADE PYELOGRAM  7/15/2022   • FL RETROGRADE PYELOGRAM  1/26/2023   • RI CYSTO BLADDER " W/URETERAL CATHETERIZATION Right 11/14/2017    Procedure: CYSTOSCOPY RETROGRADE, STENT EXCHANGE;  Surgeon: Robert Irene MD;  Location: [address] [place] [address];  Service: Urology   • MI CYSTO BLADDER W/URETERAL CATHETERIZATION Right 5/8/2018    Procedure: Violetta Parish;  Surgeon: Robert Irene MD;  Location: [address] [place] [address];  Service: Urology   • MI CYSTO BLADDER W/URETERAL CATHETERIZATION Right 8/14/2018    Procedure: CYSTOSCOPY, STENT EXCHANGE;  Surgeon: Robert Irene MD;  Location: [address] [place] [address];  Service: Urology   • MI CYSTO BLADDER W/URETERAL CATHETERIZATION Right 11/13/2018    Procedure: CYSTOSCOPY, STENT EXCHANGE, RETROGRADE;  Surgeon: Robert Irene MD;  Location: [address] [place] [address];  Service: Urology   • MI CYSTO BLADDER W/URETERAL CATHETERIZATION Right 2/12/2019    Procedure: Eva Baker, STENT REMOVAL AND STENT PLACEMENT;  Surgeon: Robert Irene MD;  Location: [address] [place] [address];  Service: Urology   • MI CYSTO BLADDER W/URETERAL CATHETERIZATION Right 5/10/2019    Procedure: CYSTOSCOPY, RETROGRADE, STENT EXCHANGE;  Surgeon: Robert Irene MD;  Location: [address] [place] [address];  Service: Urology   • MI CYSTO BLADDER W/URETERAL CATHETERIZATION Right 7/30/2019    Procedure: CYSTOSCOPY, RETROGRADE, STENT REMOVAL AND PLACEMENT OF STENT;  Surgeon: Robert Irene MD;  Location: [address] [place] [address];  Service: Urology   • MI CYSTO BLADDER W/URETERAL CATHETERIZATION Right 10/22/2019    Procedure: CYSTOSCOPY, RETROGRADE, STENT EXCHANGE;  Surgeon: Robert Irene MD;  Location: [address] [place] [address];  Service: Urology   • MI CYSTO BLADDER W/URETERAL CATHETERIZATION Right 1/28/2020    Procedure: CYSTOSCOPY, RETROGRADE, STENT REMOVAL AND STENT PLACEMENT;  Surgeon: Robert Irene MD;  Location: [address] [place] [address];  Service: Urology   • MI CYSTO BLADDER W/URETERAL CATHETERIZATION Right 5/22/2020    Procedure: CYSTOSCOPY RETROGRADE, STENT EXCHANGE;  Surgeon: Robert Irene MD;  Location: [address] [place] [address];  Service: Urology   • MI CYSTO BLADDER W/URETERAL CATHETERIZATION Right 8/11/2020    Procedure: CYSTOSCOPY, STENT EXCHANGE, RETROGRADE;  Surgeon: Corinna Carroll MD;  Location: [address] [place] [address];  Service: Urology   • [place] CYSTO BLADDER W/URETERAL CATHETERIZATION Right 12/15/2020    Procedure: Nicolasa Self, STENT REMOVAL, AND STENT PLACEMENT;  Surgeon: Corinna Carroll MD;  Location: [address] [place] [address];  Service: Urology   • WA CYSTO BLADDER W/URETERAL CATHETERIZATION Right 5/11/2021    Procedure: CYSTOSCOPY RETROGRADE PYELOGRAM WITH STENT EXCHANGE;  Surgeon: Corinna Carroll MD;  Location: [address] [place] [address];  Service: Urology   • WA CYSTO BLADDER W/URETERAL CATHETERIZATION Right 10/19/2021    Procedure: CYSTOSCOPY WITH RETROGRADE, STENT EXCHANGE;  Surgeon: Corinna Carroll MD;  Location: [address] [place] [address];  Service: Urology   • WA CYSTO BLADDER W/URETERAL CATHETERIZATION Right 7/15/2022    Procedure: CYSTOSCOPY, RETROGRADE PYELOGRAM WITH EXCHANGE STENT URETERAL;  Surgeon: Sonia Min MD;  [facility];  Service: Urology   • WA CYSTO W/INSERT URETERAL STENT Right 11/14/2017    Procedure: EXCHANGE STENT URETERAL;  Surgeon: Corinna Carroll MD;  Location: [address] [place] [address];  Service: Urology   • WA CYSTO W/INSERT URETERAL STENT Right 3/11/2022    Procedure: EXCHANGE STENT URETERAL;  Surgeon: Sonia Min MD;  [facility];  Service: Urology   • WA CYSTO W/INSERT URETERAL STENT Right 1/26/2023    Procedure: EXCHANGE STENT URETERAL;  Surgeon: Sonia Min MD;  [facility];  Service: Urology   • WA CYSTO/URETERO W/LITHOTRIPSY &INDWELL STENT INSRT Right 5/2/2017    Procedure: CYSTOSCOPY URETEROSCOPY WITH LITHOTRIPSY HOLMIUM LASER, RETROGRADE PYELOGRAM AND INSERTION STENT URETERAL;  Surgeon: Corinna Carroll MD;  Location: [address] [place] [address];  Service: Urology   • WA CYSTO/URETERO W/LITHOTRIPSY &INDWELL STENT INSRT Right 5/16/2017    Procedure: CYSTOSCOPY, RETROGRADE PYELOGRAM,  FLEXIBLE URETEROSCOPY,  HOLMIUM LASER LITHOTRIPSY, STONE BASKET MANIPULATION,  STENT URETERAL EXCHANGE;  Surgeon: Sara Hahn MD;  Location: [address] [place] [address];  Service: Urology   • SD CYSTO/URETERO W/LITHOTRIPSY &INDWELL STENT INSRT Right 6/2/2017    Procedure: CYSTOSCOPY, RETROGRADE, STONE MANIPULATION WITH HOLMIUM LASER, STENT PLACEMENT;  Surgeon: Sara Hahn MD;  Location: [address] [place] [address];  Service: Urology   • SD CYSTO/URETERO W/LITHOTRIPSY &INDWELL STENT INSRT Right 7/18/2017    Procedure: CYSTOSCOPY, RETROGRADE, URETEROSCOPY HOLMIUM LASER [place],  AND STENT PLACEMENT;  Surgeon: Sara Hahn MD;  Location: [address] [place] [address];  Service: Urology   • SD CYSTO/URETERO W/LITHOTRIPSY &INDWELL STENT INSRT Right 2/14/2021    Procedure: CYSTOSCOPY URETEROSCOPY, RETROGRADE PYELOGRAM, STONE MANIPULATION AND EXCHANGE STENT URETERAL;  Surgeon: Gregoria Garcia MD;  Location: [address] [place] [address];  Service: Urology   • PROSTATE SURGERY  2015    Laser Prostatectomy  by Dr Nelson Graf   • Zhang Mcnamarar Right 4/18/2017    Procedure: INSERTION STENT URETERAL, RIGHT URETEROSCOPY,  LASER OF URETERAL STRICTURE AND STONE;  Surgeon: Sara Hahn MD;  Location: [address] [place] [address];  Service:    • URETERAL STENT PLACEMENT Right 2/13/2018    Procedure: Yasmani Ruano;  Surgeon: Sara Hahn MD;  Location: [address] [place] [address];  Service: Urology         Family History     Family History   Problem Relation Age of Onset   • Hypertension Mother    • Alcohol abuse Father    • Cirrhosis Father    • Cancer Son 15        cancer-knee and above-left-amputation   • Cancer Sister    • Other Brother         head mass   • Thyroid disease unspecified Sister    • Arthritis Sister    • Cancer Sister    • Other Brother         legally blind in one eye         Social History     Social History       Radiology

## 2023-04-28 LAB — BACTERIA UR CULT: NORMAL

## 2023-05-09 ENCOUNTER — TELEPHONE (OUTPATIENT)
Dept: UROLOGY | Facility: MEDICAL CENTER | Age: 83
End: 2023-05-09

## 2023-05-16 NOTE — TELEPHONE ENCOUNTER
Please call patient back, he stated he was told 2 weeks ago that a package with all the information would be mailed to his house regarding this but did not receive anything

## 2023-05-16 NOTE — TELEPHONE ENCOUNTER
"Patient would not receive any \"packages\" in the mail  He will receive a instruction packet once scheduled  I reached out again at provided number and reached his vm    "

## 2023-05-22 ENCOUNTER — TELEPHONE (OUTPATIENT)
Dept: HEMATOLOGY ONCOLOGY | Facility: CLINIC | Age: 83
End: 2023-05-22

## 2023-05-22 DIAGNOSIS — R31.9 HEMATURIA, UNSPECIFIED TYPE: ICD-10-CM

## 2023-05-22 DIAGNOSIS — R58 BLOOD LOSS: ICD-10-CM

## 2023-05-22 DIAGNOSIS — E61.1 IRON DEFICIENCY: ICD-10-CM

## 2023-05-22 RX ORDER — FERROUS SULFATE TAB EC 324 MG (65 MG FE EQUIVALENT) 324 (65 FE) MG
324 TABLET DELAYED RESPONSE ORAL EVERY OTHER DAY
Qty: 90 TABLET | Refills: 0 | Status: SHIPPED | OUTPATIENT
Start: 2023-05-22

## 2023-05-22 NOTE — TELEPHONE ENCOUNTER
Medication Refill Request   Who are you speaking with? Patient   If it is not the patient, are they listed on an active communication consent form? N/A   Which medication is being requested for refill? Please list medication name and dosage  Ferrous Sulfate 324 (65 Fe) mg   How many pills does the patient have left? 0   Preferred Pharmacy / Address  [facility]/pharmacy [address] [place] [address], [address] [place] [address]    Who is the prescribing provider?  Brittani Nobles PA-C   Call back number  [phone]   Relevant Information  N/A

## 2023-06-02 ENCOUNTER — APPOINTMENT (OUTPATIENT)
Dept: LAB | Facility: HOSPITAL | Age: 83
End: 2023-06-02
Payer: MEDICARE

## 2023-06-02 DIAGNOSIS — N13.5 URETERAL STRICTURE: ICD-10-CM

## 2023-06-02 DIAGNOSIS — Z01.810 PRE-OPERATIVE CARDIOVASCULAR EXAMINATION: ICD-10-CM

## 2023-06-02 DIAGNOSIS — R39.89 SUSPECTED UTI: ICD-10-CM

## 2023-06-02 DIAGNOSIS — Z01.812 PRE-OPERATIVE LABORATORY EXAMINATION: ICD-10-CM

## 2023-06-02 LAB
ALBUMIN SERPL BCP-MCNC: 3.6 G/DL (ref 3.5–5)
ALP SERPL-CCNC: 96 U/L (ref 34–104)
ALT SERPL W P-5'-P-CCNC: 11 U/L (ref 7–52)
ANION GAP SERPL CALCULATED.3IONS-SCNC: 3 MMOL/L (ref 4–13)
AST SERPL W P-5'-P-CCNC: 20 U/L (ref 13–39)
BASOPHILS # BLD AUTO: 0.04 THOUSANDS/ÂΜL (ref 0–0.1)
BASOPHILS NFR BLD AUTO: 1 % (ref 0–1)
BILIRUB SERPL-MCNC: 1.04 MG/DL (ref 0.2–1)
BUN SERPL-MCNC: 22 MG/DL (ref 5–25)
CALCIUM SERPL-MCNC: 9.1 MG/DL (ref 8.4–10.2)
CHLORIDE SERPL-SCNC: 107 MMOL/L (ref 96–108)
CO2 SERPL-SCNC: 29 MMOL/L (ref 21–32)
CREAT SERPL-MCNC: 1.48 MG/DL (ref 0.6–1.3)
EOSINOPHIL # BLD AUTO: 0.29 THOUSAND/ÂΜL (ref 0–0.61)
EOSINOPHIL NFR BLD AUTO: 4 % (ref 0–6)
ERYTHROCYTE [DISTWIDTH] IN BLOOD BY AUTOMATED COUNT: 15.1 % (ref 11.6–15.1)
GFR SERPL CREATININE-BSD FRML MDRD: 44 ML/MIN/1.73SQ M
GLUCOSE P FAST SERPL-MCNC: 95 MG/DL (ref 65–99)
HCT VFR BLD AUTO: 45.6 % (ref 36.5–49.3)
HGB BLD-MCNC: 14.8 G/DL (ref 12–17)
IMM GRANULOCYTES # BLD AUTO: 0.04 THOUSAND/UL (ref 0–0.2)
IMM GRANULOCYTES NFR BLD AUTO: 1 % (ref 0–2)
LYMPHOCYTES # BLD AUTO: 1.95 THOUSANDS/ÂΜL (ref 0.6–4.47)
LYMPHOCYTES NFR BLD AUTO: 27 % (ref 14–44)
MCH RBC QN AUTO: 29.5 PG (ref 26.8–34.3)
MCHC RBC AUTO-ENTMCNC: 32.5 G/DL (ref 31.4–37.4)
MCV RBC AUTO: 91 FL (ref 82–98)
MONOCYTES # BLD AUTO: 0.88 THOUSAND/ÂΜL (ref 0.17–1.22)
MONOCYTES NFR BLD AUTO: 12 % (ref 4–12)
NEUTROPHILS # BLD AUTO: 3.92 THOUSANDS/ÂΜL (ref 1.85–7.62)
NEUTS SEG NFR BLD AUTO: 55 % (ref 43–75)
NRBC BLD AUTO-RTO: 0 /100 WBCS
PLATELET # BLD AUTO: 122 THOUSANDS/UL (ref 149–390)
PMV BLD AUTO: 10 FL (ref 8.9–12.7)
POTASSIUM SERPL-SCNC: 5.1 MMOL/L (ref 3.5–5.3)
PROT SERPL-MCNC: 6.4 G/DL (ref 6.4–8.4)
RBC # BLD AUTO: 5.01 MILLION/UL (ref 3.88–5.62)
SODIUM SERPL-SCNC: 139 MMOL/L (ref 135–147)
WBC # BLD AUTO: 7.12 THOUSAND/UL (ref 4.31–10.16)

## 2023-06-02 PROCEDURE — 36415 COLL VENOUS BLD VENIPUNCTURE: CPT

## 2023-06-02 PROCEDURE — 80053 COMPREHEN METABOLIC PANEL: CPT

## 2023-06-02 PROCEDURE — 85025 COMPLETE CBC W/AUTO DIFF WBC: CPT

## 2023-06-02 PROCEDURE — 87086 URINE CULTURE/COLONY COUNT: CPT

## 2023-06-04 LAB — BACTERIA UR CULT: NORMAL

## 2023-06-05 LAB
ATRIAL RATE: 57 BPM
QRS AXIS: -57 DEGREES
QRSD INTERVAL: 110 MS
QT INTERVAL: 396 MS
QTC INTERVAL: 433 MS
T WAVE AXIS: 35 DEGREES
VENTRICULAR RATE: 72 BPM

## 2023-06-05 PROCEDURE — 93010 ELECTROCARDIOGRAM REPORT: CPT | Performed by: INTERNAL MEDICINE

## 2023-06-05 NOTE — PRE-PROCEDURE INSTRUCTIONS
Pre-Surgery Instructions:   Medication Instructions   • ascorbic acid (VITAMIN C) 250 mg tablet Stop taking 7 days prior to surgery  • aspirin (ECOTRIN LOW STRENGTH) 81 mg EC tablet Instructions provided by MD- msg sent to office   • atorvastatin (LIPITOR) 40 mg tablet Take night before surgery   • cholecalciferol (VITAMIN D3) 1,000 units tablet Stop taking 7 days prior to surgery  • clopidogrel (PLAVIX) 75 mg tablet Instructions provided by MD- msg sent to office   • ferrous sulfate 324 (65 Fe) mg Hold day of surgery  • finasteride (PROSCAR) 5 mg tablet Take day of surgery  • metoprolol tartrate (LOPRESSOR) 50 mg tablet Take day of surgery  Medication instructions for day surgery reviewed  Please use only a sip of water to take your instructed medications  Avoid all over the counter vitamins, supplements and NSAIDS for one week prior to surgery per anesthesia guidelines  Tylenol is ok to take as needed  You will receive a call one business day prior to surgery with an arrival time and hospital directions  If your surgery is scheduled on a Monday, the hospital will be calling you on the Friday prior to your surgery  If you have not heard from anyone by 8pm, please call the hospital supervisor through the hospital  at [phone]  Joelle Arboleda [phone])  Do not eat or drink anything after midnight the night before your surgery, including candy, mints, lifesavers, or chewing gum  Do not drink alcohol 24hrs before your surgery  Try not to smoke at least 24hrs before your surgery  Follow the pre surgery showering instructions as listed in the [facility] Surgical Experience Booklet” or otherwise provided by your surgeon's office  Do not shave the surgical area 24 hours before surgery  Do not apply any lotions, creams, including makeup, cologne, deodorant, or perfumes after showering on the day of your surgery  No contact lenses, eye make-up, or artificial eyelashes   Remove nail polish, including gel polish, and any artificial, gel, or acrylic nails if possible  Remove all jewelry including rings and body piercing jewelry  Wear causal clothing that is easy to take on and off  Consider your type of surgery  Keep any valuables, jewelry, piercings at home  Please bring any specially ordered equipment (sling, braces) if indicated  Arrange for a responsible person to drive you to and from the hospital on the day of your surgery  Visitor Guidelines discussed  Call the surgeon's office with any new illnesses, exposures, or additional questions prior to surgery  Please reference your [facility] Surgical Experience Booklet” for additional information to prepare for your upcoming surgery

## 2023-06-06 ENCOUNTER — ANESTHESIA EVENT (OUTPATIENT)
Dept: PERIOP | Facility: HOSPITAL | Age: 83
End: 2023-06-06
Payer: MEDICARE

## 2023-06-12 NOTE — ANESTHESIA PREPROCEDURE EVALUATION
Procedure:  CYSTOSCOPY, RETROGRADE PYELOGRAM,EXCHANGE STENT URETERAL (Right: Bladder)    Relevant Problems   ANESTHESIA (within normal limits)      CARDIO   (+) Aneurysm of anterior communicating artery   (+) Benign essential hypertension   (+) Bilateral carotid artery disease (HCC)   (+) Chronic atrial fibrillation (HCC)   (+) History of aortic valve replacement   (+) Moderate aortic regurgitation   (+) Moderate mitral regurgitation   (+) Nonrheumatic aortic valve stenosis      ENDO   (+) Secondary hyperparathyroidism (HCC)      GI/HEPATIC   (+) GERD (gastroesophageal reflux disease)      /RENAL   (+) JUNIOR (acute kidney injury) (HCC)   (+) Acute kidney injury superimposed on chronic kidney disease (HCC)   (+) Benign hypertension with CKD (chronic kidney disease) stage III (HCC)   (+) Chronic kidney disease-mineral and bone disorder   (+) Hydronephrosis with ureteral stricture, not elsewhere classified   (+) Renal calculi   (+) Stage 3b chronic kidney disease (HCC)      HEMATOLOGY   (+) Iron deficiency anemia, unspecified      NEURO/PSYCH   (+) CVA (cerebral vascular accident) ([facility] [address] )   (+) Stroke (HCC)   (+) Transient diplopia      PULMONARY (within normal limits)      Plavix last taken yesterday  Metoprolol taken this AM     EKG 6/2/2023:  Atrial fibrillation  Left axis deviation  Low voltage QRS  Inferior infarct (cited on or before 19-JAN-2022)  Cannot rule out Anteroseptal infarct (cited on or before 03-NOV-2020)  Abnormal ECG  When compared with ECG of 18-OCT-2022 10:47,  No significant change was found    TTE 10/2022:  •  Left Ventricle: Left ventricular cavity size is normal  Wall thickness is mildly increased  The left ventricular ejection fraction is 55% by visual estimation  Systolic function is normal  Wall motion is normal  Diastolic function is moderately abnormal, consistent with grade II (pseudonormal) relaxation  Left atrial filling pressure is elevated    •  Left Atrium: The atrium is severely dilated (>48 mL/m2)  •  Right Atrium: The atrium is moderately dilated  •  Aortic Valve: There is a [employer] CoreValve TAVR bioprosthetic valve  The prosthetic valve appears to be functioning normally  The aortic valve has no significant stenosis  The aortic valve peak gradient is 18 mmHg  The aortic valve mean gradient is 9 mmHg  The dimensionless velocity index is 0 41   •  Mitral Valve: There is mild to moderate regurgitation  •  Tricuspid Valve: There is mild regurgitation  The right ventricular systolic pressure is mildly elevated  The estimated right ventricular systolic pressure is 88 51 mmHg  •  Compared to previous exam, there has been an interval placement of an aortic valve bioprosthesis  LA filling pressures are elevated, consistent with diastolic dysfunction  Lab Results   Component Value Date    HCT 45 6 06/02/2023    HGB 14 8 06/02/2023    MCV 91 06/02/2023     (L) 06/02/2023    WBC 7 12 06/02/2023     Lab Results   Component Value Date    BUN 22 06/02/2023    CALCIUM 9 1 06/02/2023     06/02/2023    CO2 29 06/02/2023    CREATININE 1 48 (H) 06/02/2023    GLUC 103 10/29/2022    K 5 1 06/02/2023    SODIUM 139 06/02/2023     Lab Results   Component Value Date    INR 1 10 09/16/2021    INR 1 03 10/28/2020    INR 1 08 04/15/2017    PROTIME 13 9 09/16/2021    PROTIME 13 5 10/28/2020    PROTIME 11 4 04/15/2017     Lab Results   Component Value Date    HGBA1C 6 3 (H) 10/28/2022            Physical Exam    Airway    Mallampati score: II  TM Distance: >3 FB  Neck ROM: full     Dental       Cardiovascular  Cardiovascular exam normal    Pulmonary  Pulmonary exam normal     Other Findings        Anesthesia Plan  ASA Score- 3     Anesthesia Type- IV sedation with anesthesia with ASA Monitors  Additional Monitors:   Airway Plan:     Comment: IV sedation, backup GA with LMA  Plan Factors-Exercise tolerance (METS): >4 METS  Chart reviewed  EKG reviewed   Existing labs reviewed  Patient summary reviewed  Induction- intravenous  Postoperative Plan-     Informed Consent- Anesthetic plan and risks discussed with patient  I personally reviewed this patient with the CRNA  Discussed and agreed on the Anesthesia Plan with the CRNA  Yadira De La Torre

## 2023-06-13 ENCOUNTER — APPOINTMENT (OUTPATIENT)
Dept: RADIOLOGY | Facility: HOSPITAL | Age: 83
End: 2023-06-13
Payer: MEDICARE

## 2023-06-13 ENCOUNTER — HOSPITAL ENCOUNTER (OUTPATIENT)
Facility: HOSPITAL | Age: 83
Setting detail: OUTPATIENT SURGERY
Discharge: HOME | End: 2023-06-13
Attending: UROLOGY | Admitting: UROLOGY
Payer: MEDICARE

## 2023-06-13 ENCOUNTER — ANESTHESIA (OUTPATIENT)
Dept: PERIOP | Facility: HOSPITAL | Age: 83
End: 2023-06-13
Payer: MEDICARE

## 2023-06-13 ENCOUNTER — TELEPHONE (OUTPATIENT)
Dept: OTHER | Facility: HOSPITAL | Age: 83
End: 2023-06-13

## 2023-06-13 VITALS
HEIGHT: 70 IN | TEMPERATURE: 97 F | WEIGHT: 210 LBS | OXYGEN SATURATION: 100 % | SYSTOLIC BLOOD PRESSURE: 166 MMHG | HEART RATE: 62 BPM | BODY MASS INDEX: 30.06 KG/M2 | DIASTOLIC BLOOD PRESSURE: 82 MMHG | RESPIRATION RATE: 18 BRPM

## 2023-06-13 DIAGNOSIS — N13.5 URETERAL STRICTURE: ICD-10-CM

## 2023-06-13 PROCEDURE — NC001 PR NO CHARGE: Performed by: UROLOGY

## 2023-06-13 PROCEDURE — 52332 CYSTOSCOPY AND TREATMENT: CPT | Performed by: UROLOGY

## 2023-06-13 PROCEDURE — 74420 UROGRAPHY RTRGR +-KUB: CPT

## 2023-06-13 PROCEDURE — C2617 STENT, NON-COR, TEM W/O DEL: HCPCS | Performed by: UROLOGY

## 2023-06-13 PROCEDURE — C1758 CATHETER, URETERAL: HCPCS | Performed by: UROLOGY

## 2023-06-13 PROCEDURE — C1769 GUIDE WIRE: HCPCS | Performed by: UROLOGY

## 2023-06-13 RX ORDER — PROPOFOL 10 MG/ML
INJECTION, EMULSION INTRAVENOUS AS NEEDED
Status: DISCONTINUED | OUTPATIENT
Start: 2023-06-13 | End: 2023-06-13

## 2023-06-13 RX ORDER — FENTANYL CITRATE 50 UG/ML
INJECTION, SOLUTION INTRAMUSCULAR; INTRAVENOUS AS NEEDED
Status: DISCONTINUED | OUTPATIENT
Start: 2023-06-13 | End: 2023-06-13

## 2023-06-13 RX ORDER — SODIUM CHLORIDE, SODIUM LACTATE, POTASSIUM CHLORIDE, CALCIUM CHLORIDE 600; 310; 30; 20 MG/100ML; MG/100ML; MG/100ML; MG/100ML
INJECTION, SOLUTION INTRAVENOUS CONTINUOUS PRN
Status: DISCONTINUED | OUTPATIENT
Start: 2023-06-13 | End: 2023-06-13

## 2023-06-13 RX ORDER — PROPOFOL 10 MG/ML
INJECTION, EMULSION INTRAVENOUS CONTINUOUS PRN
Status: DISCONTINUED | OUTPATIENT
Start: 2023-06-13 | End: 2023-06-13

## 2023-06-13 RX ORDER — CEFAZOLIN SODIUM 2 G/50ML
2000 SOLUTION INTRAVENOUS ONCE
Status: COMPLETED | OUTPATIENT
Start: 2023-06-13 | End: 2023-06-13

## 2023-06-13 RX ORDER — ONDANSETRON 2 MG/ML
4 INJECTION INTRAMUSCULAR; INTRAVENOUS ONCE AS NEEDED
Status: DISCONTINUED | OUTPATIENT
Start: 2023-06-13 | End: 2023-06-13 | Stop reason: HOSPADM

## 2023-06-13 RX ORDER — FENTANYL CITRATE/PF 50 MCG/ML
25 SYRINGE (ML) INJECTION
Status: DISCONTINUED | OUTPATIENT
Start: 2023-06-13 | End: 2023-06-13 | Stop reason: HOSPADM

## 2023-06-13 RX ADMIN — PROPOFOL 50 MG: 10 INJECTION, EMULSION INTRAVENOUS at 12:33

## 2023-06-13 RX ADMIN — PROPOFOL 30 MG: 10 INJECTION, EMULSION INTRAVENOUS at 12:38

## 2023-06-13 RX ADMIN — CEFAZOLIN SODIUM 2000 MG: 2 SOLUTION INTRAVENOUS at 12:34

## 2023-06-13 RX ADMIN — PROPOFOL 120 MCG/KG/MIN: 10 INJECTION, EMULSION INTRAVENOUS at 12:33

## 2023-06-13 RX ADMIN — SODIUM CHLORIDE, SODIUM LACTATE, POTASSIUM CHLORIDE, AND CALCIUM CHLORIDE: .6; .31; .03; .02 INJECTION, SOLUTION INTRAVENOUS at 12:28

## 2023-06-13 RX ADMIN — FENTANYL CITRATE 25 MCG: 50 INJECTION, SOLUTION INTRAMUSCULAR; INTRAVENOUS at 12:41

## 2023-06-13 NOTE — OP NOTE
OPERATIVE REPORT  PATIENT NAME: Kimberly Mota    :  1940  MRN: [id number]  Pt Location: [facility] ROOM 01    SURGERY DATE: 2023    Surgeon(s) and Role:     * Ember Perez MD - Primary    Preop Diagnosis:  Right Ureteral stricture [N13 5]      Post-Op Diagnosis Codes:     * Ureteral stricture [N13 5] Right    Procedure(s):  Right - CYSTOSCOPY  RETROGRADE PYELOGRAM EXCHANGE STENT URETERAL    Specimen(s):  * No specimens in log *    Estimated Blood Loss:   Minimal    Drains:  Ureteral Internal Stent Right ureter 7 Fr  (Active)   Number of days: 333       Anesthesia Type:   General    Operative Indications:  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  The stricture formed as a result of recurrent stone disease requiring multiple surgeries        Operative Findings:  Stent exchanged without significant difficulty  The stricture remains significant enough that it prevents passage of the wire around the existing stent requiring wire placement due to stent to facilitate new stent placement  Pyelogram shows moderate to severe right-sided hydronephrosis which appears stable  7 x 26 stent placed  Patient could have 7x24 stent by 26 used to facilitate stent exchanges    Complications:   None    Procedure and Technique:  After informed consent was obtained including the risks of bleeding, infection, ureteral injury, and need for secondary procedures, the patient was placed supine in the operating room theater  General anesthesia was administered  The patient was transferred to the dorsal lithotomy position and sterilely prepped and draped in the usual fashion  Cystoscopy was performed to 21 Indian cystoscope with 30° lens  The urethra was unremarkable  Inspection of the bladder revealed no significant findings other than changes consistent with obstructive voiding including trabeculations    Stent was seen emanating from the right ureteral orifice with a mild encrustation of bladder coil  With the aid of a 5fr open ended catheter a sensor wire was attempted to be passed into the right ureteral orifice alongside the stent  However the wire got obstructed in the mid ureter coiling on itself as has occurred in previous stent exchanges  Therefore the stent was grasped at its distal coil which was brought to the urethral meatus and a wire was used to intubate the stent and passed up into the renal pelvis  The stent was then removed  There was encrustation along the bladder coil but otherwise relatively clear  The 5 [place]i opening catheter was passed over the wire wire removed and retrograde pyelogram performed showing moderate to severe hydronephrosis which appears stable  The sensor was wire was replaced into the collecting system  A new 7 x 26 double-J stent was then inserted under visual guidance distally and fluoroscopic guidance proximally  Once seen to be in appropriate position the wire was removed and the stent was seen to have an appropriate proximal coil on fluoroscopy and visually in the bladder  The bladder was drained and patient awakened from anesthesia having tolerated the procedure well  A qualified resident physician was not available  Patient Disposition:  PACU      Plan: Stent exchange in 6 months    We can also discuss metal stent placement        SIGNATURE: Tr Hernandez MD  DATE: June 13, 2023  TIME: 12:51 PM

## 2023-06-13 NOTE — ANESTHESIA POSTPROCEDURE EVALUATION
Post-Op Assessment Note    CV Status:  Stable  Pain Score: 0    Pain management: adequate     Mental Status:  Sleepy   Hydration Status:  Stable   PONV Controlled:  None   Airway Patency:  Patent      Post Op Vitals Reviewed: Yes      Staff: CRNA         No notable events documented      BP  99/56   Temp   97 3   Pulse  68   Resp   14   SpO2   98

## 2023-06-13 NOTE — DISCHARGE INSTR - AVS FIRST PAGE
Mr Davis Hogan,    The stent in your ureter (kidney tube) was exchanged without any issues  Plan to do next exchange in about 5-6 months  We can use the same type of stent we have been using or we can consider placing a metal stent which will last a year so you will need a stent exchange once a year rather than 2-3 times year  We will arrange an outpatient visit prior to your next stent exchange and you can decide which type of stent you want  Please call with any questions or concerns  [address] [facility]  [phone]      WHAT IS A STENT? A stent is a thin, flexible piece of plastic that will hold open your ureter while the remaining small pieces of stone pass  This allows your kidney to drain easily and prevents you from having to “pass” these small stone pieces on your own, which could be painful  The stent is about 12 inches long and looks and feels like a thin piece of spaghetti  How can I relieve ureteral stent pain? Ureteral stents can be uncomfortable  You may have pelvic pain or a pulling sensation when you pee  Over-the-counter pain relievers such as nonsteroidal anti-inflammatory drugs (NSAIDs) can help  Do I need to restrict activities while I have a ureteral stent? You may need to restrict physical activities for the first week after the procedure  If your job doesn’t require lifting heavy objects, you should be able to return to work as usual within 24 hours after the procedure  Your provider may recommend not having sex for the first week after stent placement to reduce the risk of a UTI  What’s the prognosis for someone who has ureteral stents? Ureteral stents are generally safe  They don’t typically cause any long-term problems  Despite the risk of annoying side effects, ureteral stents are helpful  Ureteral stents often allow kidney stones to pass  They also work well to resolve ureteral obstructions   Left untreated, a ureteral obstruction can lead to life-threatening kidney failure and sepsis  WHEN TO CALL THE DOCTOR  When should I call the doctor? You should call your healthcare provider if you experience:    Clots of tissue in urine  Dark, cloudy or foul-smelling urine  Difficulty urinating or extreme pain or burning sensation when peeing  Kidney pain (dull ache deep on the left or right side of the spine)  Nausea and vomiting  Signs of infection, such as fever or chills  ADDITIONAL DETAILS  What is a kidney stent? Rarely, a healthcare provider can’t place a ureteral stent due to scarring or other problems  You may need a nephrostomy (kidney stent) instead  To perform nephrostomy, a radiologist inserts a stent (tube) directly into a kidney  The kidney stent drains urine from the kidney into a bag outside of the body, bypassing the ureters and bladder

## 2023-06-13 NOTE — TELEPHONE ENCOUNTER
Patient with chronic right mid ureteral stricture managed with stent    Patient had stent exchanged today without issues  We will plan for a clinic visit in about 4 months to set up his next stent exchange  At clinic visit we can discuss continue to use regular plastic stents which he is getting exchanged roughly every 5 to 6 months or using a metal stent which only needs to be exchanged once a year

## 2023-06-13 NOTE — H&P
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  PLAN:     Right ureteral stent exchange  Pre op ucx not suggestive of infection      History of Present Illness:     Larisa Roper is a [de-identified] y o  old with a history of right ureteral stricutre managed with stent      The patient has had numerous (at least 7) prior stone surgeries in [place] and developed a stricture of the right mid and proximal ureter  Hx from Dr Narciso Bonilla notes:  -Stent placement and holmium laser of stone and stricture 4/18/17  -Holmium laser of stone and stent exchange 5/2/17  -Renal stones treated elsewhere in [place] in past year (7 procedures)  -Right laser lithotripsy, laser infundibulotomy, and stone basket extraction 6/2/17  -Right CRUSH and laser incision of proximal ureter 7/18/17  -Stent changed 05/10/19-- stone causing near complete obstruction of the right proximal ureter   Required stent to be removed in order for a guidewire to pass    -Stent exchange 10/22/19 found it impossible to advance the wire without removing the stent first    -Stent exchange 01/28/20 found it once again impossible to advance the wire without removing the stent first    Stent due for change in 12 wks    -Stent exchange by Dr Miles Kinney 02/14/21 right stent change and ureteroscopy   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  - Stent exchange 05/11/21 - Difficulty getting wire past the obstruction without removing  - Stent exchange 10/19/2021- partial encrustation, wire could not pass  mid ureter alongside stent until stent was pulled out  7x26 stent placed  - Stent exchange 7/15/22 - could not pass wire along side stent so exchanged via through old stent   7 x 28 placed  - Stent exchange 1/26/23 encrustation of bladder coil, wire could not pass mid ureter alongside stent until stent was pulled "out  7x26 stent placed      The patient has CKD but creatinine appears stable with GFR of 40 since at least 2018   He follows with Nephrology      Past Medical, Past Surgical History:     Past Medical History:   Diagnosis Date   • Anemia     iron deficiency   • Aneurysm ([facility] [address] ) 2020    of brain  being monitored   • Dental disease    • Essential hypertension, long-standing    • Heart murmur     per pt \"Aortic Valve replacement 2021 with Watchman device implanted /2021\"   • Hematuria     intermittent   • History of cigarette smoking, chronic     62 year smoker at one half pack per day, quit 04/1/17   • History of COVID-19 01/19/2022    recovered at home/chief s/s only sore throat   • History of kidney stones    • History of pneumonia    • HL (hearing loss)    • Hyperlipidemia    • Hypertension    • Irregular heart beat    • Kidney stone    • Muscle weakness     right sided weakness has gotten better/ walks independantly\"   • Stroke ([facility] [address] ) 10/29/2020    right sided  weakness almost full recovery   • Stroke [facility])    • Teeth missing    • Vitamin D deficiency     maintained with 1000 units of D   • Water retention    • Wears glasses    :    Past Surgical History:   Procedure Laterality Date   • APPENDECTOMY  1960   • CARDIAC SURGERY      watchman [id number]; aortic valve replaced 2021(pig valve)   • CAROTID ENDARTERECTOMY Left 1998    Supposedly no internal stenosis found   • CYSTOSCOPY Right 8/15/2017    Procedure: CYSTOSCOPY RIGHT STENT EXCHANGE;  Surgeon: Leatha Perez MD;  Location: [address] [place] [address];  Service: Urology   • CYSTOSCOPY     • CYSTOSCOPY N/A 3/11/2022    Procedure: CYSTOSCOPY, RIGHT RETROGRADE PYLEOGRAM;  Surgeon: Kacey Loya MD;  Location: [facility];  Service: Urology   • EXTRACORPOREAL SHOCK WAVE LITHOTRIPSY  03/2017    For nephrolithiasis at [facility]   • [address]  5/10/2019   • FL RETROGRADE PYELOGRAM  7/30/2019   • FL RETROGRADE PYELOGRAM  10/22/2019   • FL RETROGRADE " PYELOGRAM  1/28/2020   • FL RETROGRADE PYELOGRAM  8/11/2020   • FL RETROGRADE PYELOGRAM  12/15/2020   • FL RETROGRADE PYELOGRAM  2/14/2021   • FL RETROGRADE PYELOGRAM  5/11/2021   • FL RETROGRADE PYELOGRAM  10/19/2021   • FL RETROGRADE PYELOGRAM  3/11/2022   • FL RETROGRADE PYELOGRAM  7/15/2022   • FL RETROGRADE PYELOGRAM  1/26/2023   • ME CYSTO BLADDER W/URETERAL CATHETERIZATION Right 11/14/2017    Procedure: CYSTOSCOPY RETROGRADE, STENT EXCHANGE;  Surgeon: Kasandra Vides MD;  Location: [address] [place] [address];  Service: Urology   • ME CYSTO BLADDER W/URETERAL CATHETERIZATION Right 5/8/2018    Procedure: Irasema Forte;  Surgeon: Kasandra Vides MD;  Location: [address] [place] [address];  Service: Urology   • ME CYSTO BLADDER W/URETERAL CATHETERIZATION Right 8/14/2018    Procedure: CYSTOSCOPY, STENT EXCHANGE;  Surgeon: Kasandra Vides MD;  Location: [address] [place] [address];  Service: Urology   • ME CYSTO BLADDER W/URETERAL CATHETERIZATION Right 11/13/2018    Procedure: CYSTOSCOPY, STENT EXCHANGE, RETROGRADE;  Surgeon: Kasandra Vides MD;  Location: [address] [place] [address];  Service: Urology   • ME CYSTO BLADDER W/URETERAL CATHETERIZATION Right 2/12/2019    Procedure: CYSTOSCOPY, RETROGRADE, STENT REMOVAL AND STENT PLACEMENT;  Surgeon: Kasandra Vides MD;  Location: [address] [place] [address];  Service: Urology   • ME CYSTO BLADDER W/URETERAL CATHETERIZATION Right 5/10/2019    Procedure: CYSTOSCOPY, RETROGRADE, STENT EXCHANGE;  Surgeon: Kasandra Vides MD;  Location: [address] [place] [address];  Service: Urology   • ME CYSTO BLADDER W/URETERAL CATHETERIZATION Right 7/30/2019    Procedure: CYSTOSCOPY, RETROGRADE, STENT REMOVAL AND PLACEMENT OF STENT;  Surgeon: Kasandra Vides MD;  Location: [address] [place] [address];  Service: Urology   • ME CYSTO BLADDER W/URETERAL CATHETERIZATION Right 10/22/2019    Procedure: CYSTOSCOPY, RETROGRADE, STENT EXCHANGE;  Surgeon: Kasandra Vides MD;  Location: [address] [place] [address];  Service: Urology   • ME CYSTO BLADDER W/URETERAL CATHETERIZATION Right 1/28/2020    Procedure: [place], STENT REMOVAL AND STENT PLACEMENT;  Surgeon: Man Jones MD;  Location: [address] [place] [address];  Service: Urology   • [place] CYSTO BLADDER W/URETERAL CATHETERIZATION Right 5/22/2020    Procedure: CYSTOSCOPY RETROGRADE, STENT EXCHANGE;  Surgeon: Man Jones MD;  Location: [address] [place] [address];  Service: Urology   • [place] CYSTO BLADDER W/URETERAL CATHETERIZATION Right 8/11/2020    Procedure: [place] EXCHANGE, RETROGRADE;  Surgeon: Man Jones MD;  Location: [address] [place] [address];  Service: Urology   • [place] CYSTO BLADDER W/URETERAL CATHETERIZATION Right 12/15/2020    Procedure: CYSTOSCOPY, RETROGRADE, STENT REMOVAL, AND STENT PLACEMENT;  Surgeon: Man Jones MD;  Location: [address] [place] [address];  Service: Urology   • [place] CYSTO BLADDER W/URETERAL CATHETERIZATION Right 5/11/2021    Procedure: CYSTOSCOPY RETROGRADE PYELOGRAM WITH STENT EXCHANGE;  Surgeon: Man Jones MD;  Location: [address] [place] [address];  Service: Urology   • [place] CYSTO BLADDER W/URETERAL CATHETERIZATION Right 10/19/2021    Procedure: CYSTOSCOPY WITH RETROGRADE, STENT EXCHANGE;  Surgeon: Man Jones MD;  Location: [address] [place] [address];  Service: Urology   • [place] CYSTO BLADDER W/URETERAL CATHETERIZATION Right 7/15/2022    Procedure: CYSTOSCOPY, RETROGRADE PYELOGRAM WITH EXCHANGE STENT URETERAL;  Surgeon: Medina Mares MD;  [facility];  Service: Urology   • [place] CYSTO W/INSERT URETERAL STENT Right 11/14/2017    Procedure: EXCHANGE STENT URETERAL;  Surgeon: Man Jones MD;  Location: [address] [place] [address];  Service: Urology   • [place] CYSTO W/INSERT URETERAL STENT Right 3/11/2022    Procedure: EXCHANGE STENT URETERAL;  Surgeon: Medina Mares MD;  [facility];  Service: Urology   • [place] CYSTO W/INSERT URETERAL STENT Right 1/26/2023    Procedure: EXCHANGE STENT URETERAL;  Surgeon: Medina Mares MD;  [facility];  Service: Urology   • [place] CYSTO/URETERO W/LITHOTRIPSY &INDWELL STENT INSRT Right 5/2/2017    Procedure: CYSTOSCOPY URETEROSCOPY WITH LITHOTRIPSY HOLMIUM LASER, RETROGRADE PYELOGRAM AND INSERTION STENT URETERAL;  Surgeon: Bev Marie MD;  Location: [address] [place] [address];  Service: Urology   • [place] CYSTO/URETERO W/LITHOTRIPSY &INDWELL STENT INSRT Right 5/16/2017    Procedure: CYSTOSCOPY, RETROGRADE PYELOGRAM,  FLEXIBLE URETEROSCOPY,  HOLMIUM LASER LITHOTRIPSY, STONE BASKET MANIPULATION,  STENT URETERAL EXCHANGE;  Surgeon: Bev Marie MD;  Location: [address] [place] [address];  Service: Urology   • [place] CYSTO/URETERO W/LITHOTRIPSY &INDWELL STENT INSRT Right 6/2/2017    Procedure: CYSTOSCOPY, RETROGRADE, STONE MANIPULATION WITH HOLMIUM LASER, STENT PLACEMENT;  Surgeon: Bev Marie MD;  Location: [facility];  Service: Urology   • [place] CYSTO/URETERO W/LITHOTRIPSY &INDWELL STENT INSRT Right 7/18/2017    Procedure: CYSTOSCOPY, RETROGRADE, URETEROSCOPY HOLMIUM LASER [place],  AND STENT PLACEMENT;  Surgeon: Bev Marie MD;  Location: [facility];  Service: Urology   • [place] CYSTO/URETERO W/LITHOTRIPSY &INDWELL STENT INSRT Right 2/14/2021    Procedure: CYSTOSCOPY URETEROSCOPY, RETROGRADE PYELOGRAM, STONE MANIPULATION AND EXCHANGE STENT URETERAL;  Surgeon: Yareli Paredes MD;  Location: [address] [place] [address];  Service: Urology   • PROSTATE SURGERY  2015    Laser Prostatectomy  by Dr Kelle Garrido   • [place] Right 4/18/2017    Procedure: INSERTION STENT URETERAL, RIGHT URETEROSCOPY,  LASER OF URETERAL STRICTURE AND STONE;  Surgeon: Bev Marie MD;  Location: [address] [place] [address];  Service:    • URETERAL STENT PLACEMENT Right 2/13/2018    Procedure: Delia Joseph;  Surgeon: Bev Marie MD;  Location: [facility];  Service: Urology   :    Medications, Allergies:     Current Facility-Administered Medications:   •  ceFAZolin (ANCEF) IVPB (premix in dextrose) 2,000 mg 50 mL, 2,000 mg, Intravenous, Once, Rsoemary Ware PA-C    Allergies:  No Known Allergies:    Social and Family History: Social History:   Social History     Tobacco Use   • Smoking status: Former     Packs/day: 0 50     Years: 62 00     Total pack years: 31 00     Types: Cigarettes     Start date: 6/15/1952     Quit date: 4/15/2017     Years since quittin 1   • Smokeless tobacco: Never   Vaping Use   • Vaping Use: Never used   Substance Use Topics   • Alcohol use: Not Currently     Comment: don't drink anymore  • Drug use: No        Social History     Tobacco Use   Smoking Status Former   • Packs/day: 0 50   • Years: 62 00   • Total pack years: 31 00   • Types: Cigarettes   • Start date: [birth year]/15/1952   • Quit date: 4/15/2017   • Years since quittin 1   Smokeless Tobacco Never       Family History:  Family History   Problem Relation Age of Onset   • Hypertension Mother    • Alcohol abuse Father    • Cirrhosis Father    • Cancer Son 15        cancer-knee and above-left-amputation   • Cancer Sister    • Other Brother         head mass   • Thyroid disease unspecified Sister    • Arthritis Sister    • Cancer Sister    • Other Brother         legally blind in one eye   :     Review of Systems:     General: Fever, chills, or night sweats: negative  Cardiac: Negative for chest pain  Pulmonary: Negative for shortness of breath  Gastrointestinal: Abdominal pain negative  Nausea, vomiting, or diarrhea negative  Genitourinary: See HPI above  Patient does nothave hematuria  All other systems queried were negative  Physical Exam:   General: Patient is pleasant and in NAD  Awake and alert  Wt 95 3 kg (210 lb)   BMI 30 13 kg/m²   HEENT:  Normocephalic atraumatic  Cardiac:  Regular rate and rhythm, Peripheral edema: negative  Pulmonary: Non-labored breathing, CTAB  Abdomen: Soft, non-tender, non-distended  No surgical scars  No masses, tenderness, hernias noted  Genitourinary: negative CVA tenderness, neg suprapubic tenderness    Extremities: normal movement in all 4       Labs:     Lab Results   Component Value Date    HCT 45 6 06/02/2023    HGB 14 8 06/02/2023     (L) 06/02/2023    WBC 7 12 06/02/2023   ]    Lab Results   Component Value Date    BUN 22 06/02/2023    CALCIUM 9 1 06/02/2023     06/02/2023    CO2 29 06/02/2023    CREATININE 1 48 (H) 06/02/2023    GLUCOSE 135 11/03/2020    K 5 1 06/02/2023   ]    Imaging:   I personally reviewed the images and report of the following studies, and reviewed them with the patient    No interval imaging since last stent exchantge    Thank you for allowing me to participate in this patients’ care  Please do not hesitate to call with any additional questions    Elaine Tyler MD

## 2023-06-16 ENCOUNTER — TELEPHONE (OUTPATIENT)
Dept: NEPHROLOGY | Facility: CLINIC | Age: 83
End: 2023-06-16

## 2023-06-16 ENCOUNTER — LAB (OUTPATIENT)
Dept: LAB | Facility: HOSPITAL | Age: 83
End: 2023-06-16
Attending: INTERNAL MEDICINE
Payer: MEDICARE

## 2023-06-16 DIAGNOSIS — N18.31 STAGE 3A CHRONIC KIDNEY DISEASE (HCC): ICD-10-CM

## 2023-06-16 DIAGNOSIS — N18.31 STAGE 3A CHRONIC KIDNEY DISEASE (HCC): Primary | ICD-10-CM

## 2023-06-16 LAB
ANION GAP SERPL CALCULATED.3IONS-SCNC: 5 MMOL/L (ref 4–13)
BACTERIA UR QL AUTO: ABNORMAL /HPF
BILIRUB UR QL STRIP: NEGATIVE
BUN SERPL-MCNC: 22 MG/DL (ref 5–25)
CALCIUM SERPL-MCNC: 9.5 MG/DL (ref 8.4–10.2)
CHLORIDE SERPL-SCNC: 106 MMOL/L (ref 96–108)
CLARITY UR: ABNORMAL
CO2 SERPL-SCNC: 29 MMOL/L (ref 21–32)
COLOR UR: YELLOW
CREAT SERPL-MCNC: 1.47 MG/DL (ref 0.6–1.3)
CREAT UR-MCNC: 93.5 MG/DL
ERYTHROCYTE [DISTWIDTH] IN BLOOD BY AUTOMATED COUNT: 14.7 % (ref 11.6–15.1)
GFR SERPL CREATININE-BSD FRML MDRD: 44 ML/MIN/1.73SQ M
GLUCOSE P FAST SERPL-MCNC: 97 MG/DL (ref 65–99)
GLUCOSE UR STRIP-MCNC: NEGATIVE MG/DL
HCT VFR BLD AUTO: 46 % (ref 36.5–49.3)
HGB BLD-MCNC: 14.9 G/DL (ref 12–17)
HGB UR QL STRIP.AUTO: ABNORMAL
KETONES UR STRIP-MCNC: NEGATIVE MG/DL
LEUKOCYTE ESTERASE UR QL STRIP: ABNORMAL
MAGNESIUM SERPL-MCNC: 1.8 MG/DL (ref 1.9–2.7)
MCH RBC QN AUTO: 29.4 PG (ref 26.8–34.3)
MCHC RBC AUTO-ENTMCNC: 32.4 G/DL (ref 31.4–37.4)
MCV RBC AUTO: 91 FL (ref 82–98)
NITRITE UR QL STRIP: NEGATIVE
NON-SQ EPI CELLS URNS QL MICRO: ABNORMAL /HPF
PH UR STRIP.AUTO: 6 [PH]
PHOSPHATE SERPL-MCNC: 2.8 MG/DL (ref 2.3–4.1)
PLATELET # BLD AUTO: 125 THOUSANDS/UL (ref 149–390)
PMV BLD AUTO: 10.5 FL (ref 8.9–12.7)
POTASSIUM SERPL-SCNC: 4.9 MMOL/L (ref 3.5–5.3)
PROT UR STRIP-MCNC: ABNORMAL MG/DL
PROT UR-MCNC: 32 MG/DL
PROT/CREAT UR: 0.34 MG/G{CREAT} (ref 0–0.1)
PTH-INTACT SERPL-MCNC: 80.8 PG/ML (ref 12–88)
RBC # BLD AUTO: 5.07 MILLION/UL (ref 3.88–5.62)
RBC #/AREA URNS AUTO: ABNORMAL /HPF
SODIUM SERPL-SCNC: 140 MMOL/L (ref 135–147)
SP GR UR STRIP.AUTO: 1.02 (ref 1–1.03)
UROBILINOGEN UR QL STRIP.AUTO: 0.2 E.U./DL
WBC # BLD AUTO: 7.37 THOUSAND/UL (ref 4.31–10.16)
WBC #/AREA URNS AUTO: ABNORMAL /HPF

## 2023-06-16 PROCEDURE — 81001 URINALYSIS AUTO W/SCOPE: CPT

## 2023-06-16 PROCEDURE — 82570 ASSAY OF URINE CREATININE: CPT

## 2023-06-16 PROCEDURE — 83735 ASSAY OF MAGNESIUM: CPT

## 2023-06-16 PROCEDURE — 36415 COLL VENOUS BLD VENIPUNCTURE: CPT

## 2023-06-16 PROCEDURE — 80048 BASIC METABOLIC PNL TOTAL CA: CPT

## 2023-06-16 PROCEDURE — 84100 ASSAY OF PHOSPHORUS: CPT

## 2023-06-16 PROCEDURE — 85027 COMPLETE CBC AUTOMATED: CPT

## 2023-06-16 PROCEDURE — 84156 ASSAY OF PROTEIN URINE: CPT

## 2023-06-16 PROCEDURE — 83970 ASSAY OF PARATHORMONE: CPT

## 2023-06-16 NOTE — TELEPHONE ENCOUNTER
----- Message from Mindy Reyes MD sent at 6/16/2023 11:55 AM EDT -----  Hello    Patient normally is followed up by Ms Pat Haley  Can you please check in with the patient if he has noticed any blood in the urine  He has innumerable RBC which is not new  He has done second exchange  Due to your recent stent exchange or lift anything heavy  As long as the patient is feeling well and no changes for now    Repeat UA, BMP in August     Thank you    np

## 2023-06-16 NOTE — RESULT ENCOUNTER NOTE
Hello    Patient normally is followed up by Ms Olga Coronel  Can you please check in with the patient if he has noticed any blood in the urine  He has innumerable RBC which is not new  He has done second exchange  Due to your recent stent exchange or lift anything heavy  As long as the patient is feeling well and no changes for now    Repeat UA, BMP in August     Thank you    np

## 2023-07-05 DIAGNOSIS — R31.0 HEMATURIA, GROSS: ICD-10-CM

## 2023-07-05 DIAGNOSIS — N13.8 BPH WITH OBSTRUCTION/LOWER URINARY TRACT SYMPTOMS: ICD-10-CM

## 2023-07-05 DIAGNOSIS — N40.1 BPH WITH OBSTRUCTION/LOWER URINARY TRACT SYMPTOMS: ICD-10-CM

## 2023-07-05 RX ORDER — FINASTERIDE 5 MG/1
TABLET, FILM COATED ORAL
Qty: 90 TABLET | Refills: 2 | Status: SHIPPED | OUTPATIENT
Start: 2023-07-05

## 2023-07-31 ENCOUNTER — APPOINTMENT (OUTPATIENT)
Dept: LAB | Facility: HOSPITAL | Age: 83
End: 2023-07-31
Payer: MEDICARE

## 2023-07-31 DIAGNOSIS — N18.31 STAGE 3A CHRONIC KIDNEY DISEASE (HCC): ICD-10-CM

## 2023-07-31 LAB
ANION GAP SERPL CALCULATED.3IONS-SCNC: 4 MMOL/L
BACTERIA UR QL AUTO: ABNORMAL /HPF
BILIRUB UR QL STRIP: NEGATIVE
BUN SERPL-MCNC: 25 MG/DL (ref 5–25)
CALCIUM SERPL-MCNC: 9.3 MG/DL (ref 8.4–10.2)
CHLORIDE SERPL-SCNC: 106 MMOL/L (ref 96–108)
CLARITY UR: ABNORMAL
CO2 SERPL-SCNC: 29 MMOL/L (ref 21–32)
COLOR UR: YELLOW
CREAT SERPL-MCNC: 1.45 MG/DL (ref 0.6–1.3)
GFR SERPL CREATININE-BSD FRML MDRD: 45 ML/MIN/1.73SQ M
GLUCOSE P FAST SERPL-MCNC: 103 MG/DL (ref 65–99)
GLUCOSE UR STRIP-MCNC: NEGATIVE MG/DL
HGB UR QL STRIP.AUTO: ABNORMAL
KETONES UR STRIP-MCNC: NEGATIVE MG/DL
LEUKOCYTE ESTERASE UR QL STRIP: ABNORMAL
NITRITE UR QL STRIP: NEGATIVE
NON-SQ EPI CELLS URNS QL MICRO: ABNORMAL /HPF
PH UR STRIP.AUTO: 5.5 [PH]
POTASSIUM SERPL-SCNC: 5 MMOL/L (ref 3.5–5.3)
PROT UR STRIP-MCNC: ABNORMAL MG/DL
RBC #/AREA URNS AUTO: ABNORMAL /HPF
SODIUM SERPL-SCNC: 139 MMOL/L (ref 135–147)
SP GR UR STRIP.AUTO: 1.02 (ref 1–1.03)
UROBILINOGEN UR QL STRIP.AUTO: 0.2 E.U./DL
WBC #/AREA URNS AUTO: ABNORMAL /HPF

## 2023-07-31 PROCEDURE — 80048 BASIC METABOLIC PNL TOTAL CA: CPT

## 2023-07-31 PROCEDURE — 36415 COLL VENOUS BLD VENIPUNCTURE: CPT

## 2023-08-05 ENCOUNTER — HOSPITAL ENCOUNTER (EMERGENCY)
Facility: HOSPITAL | Age: 83
Discharge: HOME | DRG: 880 | End: 2023-08-05
Attending: EMERGENCY MEDICINE | Admitting: EMERGENCY MEDICINE
Payer: MEDICARE

## 2023-08-05 VITALS
OXYGEN SATURATION: 97 % | DIASTOLIC BLOOD PRESSURE: 82 MMHG | HEART RATE: 74 BPM | RESPIRATION RATE: 18 BRPM | TEMPERATURE: 97.7 F | SYSTOLIC BLOOD PRESSURE: 179 MMHG

## 2023-08-05 DIAGNOSIS — I10 HYPERTENSION: ICD-10-CM

## 2023-08-05 DIAGNOSIS — R03.0 ELEVATED BLOOD PRESSURE READING: Primary | ICD-10-CM

## 2023-08-05 PROCEDURE — 99284 EMERGENCY DEPT VISIT MOD MDM: CPT | Performed by: EMERGENCY MEDICINE

## 2023-08-05 PROCEDURE — 99284 EMERGENCY DEPT VISIT MOD MDM: CPT

## 2023-08-05 PROCEDURE — 93005 ELECTROCARDIOGRAM TRACING: CPT

## 2023-08-05 NOTE — ED PROVIDER NOTES
History  Chief Complaint   Patient presents with   • Hypertension     Pt. C/o hypertension around 9:30 but blood pressure machine isn't working correctly at home. Has hx of hypertension. Patient c/o being restless at home. 82year old male with a history of afib and hypertension presenting to the ER today for hypertension. Patient states that he woke up an adult from the phone ringing and he got really woozy really suddenly. He checked his blood pressure at home and his blood pressure was elevated in the 190s. He checked his blood pressure again and it was in the 180s. He was concerned and so he called initially EMS but then sent them away. He called them again because he was nervous about his blood pressure. He however is denying any other symptoms. Denying any chest pain or shortness of breath. Denies any fevers or chills. Denies any decreased urination. Prior to Admission Medications   Prescriptions Last Dose Informant Patient Reported? Taking?   ascorbic acid (VITAMIN C) 250 mg tablet  Self Yes No   Sig: Take 500 mg by mouth daily   aspirin (ECOTRIN LOW STRENGTH) 81 mg EC tablet  Self Yes No   Sig: Take 81 mg by mouth daily Pt to check with surgeon if needed to hold RX.   atorvastatin (LIPITOR) 40 mg tablet  Self No No   Sig: Take 1 tablet (40 mg total) by mouth daily with dinner   cholecalciferol (VITAMIN D3) 1,000 units tablet  Self Yes No   Sig: Take 1,000 Units by mouth daily after lunch     clopidogrel (PLAVIX) 75 mg tablet  Self Yes No   Sig: Take 75 mg by mouth daily Pt  To check with surgeon if needed to hold RX. ferrous sulfate 324 (65 Fe) mg   No No   Sig: Take 1 tablet (324 mg total) by mouth every other day Increase to daily if tolerated.    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    of brain  being monitored   • Dental disease    • Essential hypertension, long-standing    • Heart murmur     per pt "Aortic Valve replacement 2021 with Watchman device implanted /2021"   • Hematuria     intermittent   • History of cigarette smoking, chronic     62 year smoker at one half pack per day, quit 04/1/17   • History of COVID-19 01/19/2022    recovered at home/chief s/s only sore throat   • History of kidney stones    • History of pneumonia    • HL (hearing loss)    • Hyperlipidemia    • Hypertension    • Irregular heart beat    • Kidney stone    • Muscle weakness     right sided weakness has gotten better/ walks independantly"   • Stroke ([address]) 10/29/2020    right sided  weakness almost full recovery   • Stroke [facility])    • Teeth missing    • Vitamin D deficiency     maintained with 1000 units of D   • Water retention    • Wears glasses        Past Surgical History:   Procedure Laterality Date   • APPENDECTOMY  1960   • CARDIAC SURGERY      watchman [id number]; aortic valve replaced 2021(pig valve)   • CAROTID ENDARTERECTOMY Left 1998    Supposedly no internal stenosis found   • CYSTOSCOPY Right 8/15/2017    Procedure: CYSTOSCOPY RIGHT STENT EXCHANGE;  Surgeon: Alexandr Miranda MD;  Location: [facility];  Service: Urology   • CYSTOSCOPY     • CYSTOSCOPY N/A 3/11/2022    Procedure: CYSTOSCOPY, RIGHT RETROGRADE PYLEOGRAM;  Surgeon: Lisandro Gupta MD;  [facility];  Service: Urology   • CYSTOSCOPY W/ URETERAL STENT PLACEMENT Right 6/13/2023    Procedure: CYSTOSCOPY, RETROGRADE PYELOGRAM,EXCHANGE STENT URETERAL;  Surgeon: Lisandro Gupta MD;  [facility];  Service: Urology   • EXTRACORPOREAL SHOCK WAVE LITHOTRIPSY  03/2017    For nephrolithiasis at [facility]   • [address]  5/10/2019   • FL RETROGRADE PYELOGRAM  7/30/2019   • FL RETROGRADE PYELOGRAM  10/22/2019   • FL RETROGRADE PYELOGRAM  1/28/2020   • FL RETROGRADE PYELOGRAM  8/11/2020   • FL RETROGRADE PYELOGRAM  12/15/2020   • FL RETROGRADE PYELOGRAM  2/14/2021   • FL RETROGRADE PYELOGRAM  5/11/2021   • FL RETROGRADE PYELOGRAM  10/19/2021   • FL RETROGRADE PYELOGRAM  3/11/2022   • FL RETROGRADE PYELOGRAM  7/15/2022   • FL RETROGRADE PYELOGRAM  1/26/2023   • FL RETROGRADE PYELOGRAM  6/13/2023   • MD CYSTO BLADDER W/URETERAL CATHETERIZATION Right 11/14/2017    Procedure: CYSTOSCOPY RETROGRADE, STENT EXCHANGE;  Surgeon: Alexandr Miranda MD;  Location: [facility];  Service: Urology   • MD CYSTO BLADDER W/URETERAL CATHETERIZATION Right 5/8/2018    Procedure: Charlie Mena;  Surgeon: Alexandr Miranda MD;  Location: [facility];  Service: Urology   • MD CYSTO BLADDER W/URETERAL CATHETERIZATION Right 8/14/2018    Procedure: CYSTOSCOPY, STENT EXCHANGE;  Surgeon: Alexandr Miranda MD;  Location: [facility];  Service: Urology   • MD CYSTO BLADDER W/URETERAL CATHETERIZATION Right 11/13/2018    Procedure: CYSTOSCOPY, STENT EXCHANGE, RETROGRADE;  Surgeon: Alexandr Miranda MD;  Location: [facility];  Service: Urology   • MD CYSTO BLADDER W/URETERAL CATHETERIZATION Right 2/12/2019    Procedure: CYSTOSCOPY, RETROGRADE, STENT REMOVAL AND STENT PLACEMENT;  Surgeon: Alexandr Miranda MD;  Location: [facility];  Service: Urology   • MD CYSTO BLADDER W/URETERAL CATHETERIZATION Right 5/10/2019    Procedure: CYSTOSCOPY, RETROGRADE, STENT EXCHANGE;  Surgeon: Alexandr Miranda MD;  Location: [facility];  Service: Urology   • MD CYSTO BLADDER W/URETERAL CATHETERIZATION Right 7/30/2019    Procedure: CYSTOSCOPY, RETROGRADE, STENT REMOVAL AND PLACEMENT OF STENT;  Surgeon: Alexandr Miranda MD;  Location: [facility];  Service: Urology   • MD CYSTO BLADDER W/URETERAL CATHETERIZATION Right 10/22/2019    Procedure: CYSTOSCOPY, RETROGRADE, STENT EXCHANGE;  Surgeon: Alexandr Miranda MD;  Location: [facility]; Service: Urology   • SD CYSTO BLADDER W/URETERAL CATHETERIZATION Right 1/28/2020    Procedure: Lenton Pillar, STENT REMOVAL AND STENT PLACEMENT;  Surgeon: Darya Deleon MD;  Location: [facility];  Service: Urology   • SD CYSTO BLADDER W/URETERAL CATHETERIZATION Right 5/22/2020    Procedure: CYSTOSCOPY RETROGRADE, STENT EXCHANGE;  Surgeon: Darya Deleon MD;  Location: [facility];  Service: Urology   • SD CYSTO BLADDER W/URETERAL CATHETERIZATION Right 8/11/2020    Procedure: Faviola Jabs EXCHANGE, RETROGRADE;  Surgeon: Darya Deleon MD;  Location: [facility];  Service: Urology   • SD CYSTO BLADDER W/URETERAL CATHETERIZATION Right 12/15/2020    Procedure: CYSTOSCOPY, RETROGRADE, STENT REMOVAL, AND STENT PLACEMENT;  Surgeon: Darya Deleon MD;  Location: [facility];  Service: Urology   • SD CYSTO BLADDER W/URETERAL CATHETERIZATION Right 5/11/2021    Procedure: CYSTOSCOPY RETROGRADE PYELOGRAM WITH STENT EXCHANGE;  Surgeon: Darya Deleon MD;  Location: [facility];  Service: Urology   • SD CYSTO BLADDER W/URETERAL CATHETERIZATION Right 10/19/2021    Procedure: CYSTOSCOPY WITH RETROGRADE, STENT EXCHANGE;  Surgeon: Darya Deleon MD;  Location: [facility];  Service: Urology   • SD CYSTO BLADDER W/URETERAL CATHETERIZATION Right 7/15/2022    Procedure: CYSTOSCOPY, RETROGRADE PYELOGRAM WITH EXCHANGE STENT URETERAL;  Surgeon: Leon Estrada MD;  [facility];  Service: Urology   • SD CYSTO W/INSERT URETERAL STENT Right 11/14/2017    Procedure: EXCHANGE STENT URETERAL;  Surgeon: Darya Deleon MD;  Location: [facility];  Service: Urology   • SD CYSTO W/INSERT URETERAL STENT Right 3/11/2022    Procedure: EXCHANGE STENT URETERAL;  Surgeon: Leon Estrada MD;  [facility];  Service: Urology   • SD CYSTO W/INSERT URETERAL STENT Right 1/26/2023    Procedure: EXCHANGE STENT URETERAL;  Surgeon: Leon Estrada MD;  [facility];  Service: Urology   • SD CYSTO/URETERO W/LITHOTRIPSY &INDWELL STENT INSRT Right 5/2/2017    Procedure: CYSTOSCOPY URETEROSCOPY WITH LITHOTRIPSY HOLMIUM LASER, RETROGRADE PYELOGRAM AND INSERTION STENT URETERAL;  Surgeon: Marlen Calderon MD;  Location: [facility];  Service: Urology   • GA CYSTO/URETERO W/LITHOTRIPSY &INDWELL STENT INSRT Right 5/16/2017    Procedure: CYSTOSCOPY, RETROGRADE PYELOGRAM,  FLEXIBLE URETEROSCOPY,  HOLMIUM LASER LITHOTRIPSY, STONE BASKET MANIPULATION,  STENT URETERAL EXCHANGE;  Surgeon: Marlen Calderon MD;  Location: [facility];  Service: Urology   • GA CYSTO/URETERO W/LITHOTRIPSY &INDWELL STENT INSRT Right 6/2/2017    Procedure: CYSTOSCOPY, RETROGRADE, STONE MANIPULATION WITH HOLMIUM LASER, STENT PLACEMENT;  Surgeon: Marlen Calderon MD;  Location: [facility];  Service: Urology   • GA CYSTO/URETERO W/LITHOTRIPSY &INDWELL STENT INSRT Right 7/18/2017    Procedure: CYSTOSCOPY, RETROGRADE, URETEROSCOPY HOLMIUM LASER [address] [place] [address],  AND STENT PLACEMENT;  Surgeon: Marlen Calderon MD;  Location: [facility];  Service: Urology   • GA CYSTO/URETERO W/LITHOTRIPSY &INDWELL STENT INSRT Right 2/14/2021    Procedure: CYSTOSCOPY URETEROSCOPY, RETROGRADE PYELOGRAM, STONE MANIPULATION AND EXCHANGE STENT URETERAL;  Surgeon: Valente Gomes MD;  Location: [facility];  Service: Urology   • PROSTATE SURGERY  2015    Laser Prostatectomy  by Dr. Mellisa Brown   • Randol Beer Right 4/18/2017    Procedure: INSERTION STENT URETERAL, RIGHT URETEROSCOPY,  LASER OF URETERAL STRICTURE AND STONE;  Surgeon: Marlen Calderon MD;  Location: [facility];  Service:    • URETERAL STENT PLACEMENT Right 2/13/2018    Procedure: Hollie Mast EXCHANGE;  Surgeon: Marlen Calderon MD;  Location: [facility];  Service: Urology       Family History   Problem Relation Age of Onset   • Hypertension Mother    • Alcohol abuse Father    • Cirrhosis Father    • Cancer Son 15        cancer-knee and above-left-amputation   • Cancer Sister    • Other Brother         head mass   • Thyroid disease unspecified Sister    • Arthritis Sister    • Cancer Sister    • Other Brother         legally blind in one eye     I have reviewed and agree with the history as documented. E-Cigarette/Vaping   • E-Cigarette Use Never User      E-Cigarette/Vaping Substances   • Nicotine No    • THC No    • CBD No    • Flavoring No    • Other No    • Unknown No      Social History     Tobacco Use   • Smoking status: Former     Packs/day: 0.50     Years: 62.00     Total pack years: 31.00     Types: Cigarettes     Start date: [birth year]/15/1954     Quit date: 4/15/2017     Years since quittin.3   • Smokeless tobacco: Never   Vaping Use   • Vaping Use: Never used   Substance Use Topics   • Alcohol use: Not Currently     Comment: don't drink anymore. • Drug use: No       Review of Systems   Constitutional: Negative for chills and fever. HENT: Negative for hearing loss. Eyes: Negative for visual disturbance. Respiratory: Negative for shortness of breath. Cardiovascular: Negative for chest pain. Gastrointestinal: Negative for abdominal pain, constipation, diarrhea, nausea and vomiting. Genitourinary: Negative for difficulty urinating. Musculoskeletal: Negative for myalgias. Skin: Negative for rash. Neurological: Negative for dizziness. Psychiatric/Behavioral: Negative for agitation. All other systems reviewed and are negative. Physical Exam  Physical Exam  Vitals and nursing note reviewed. Constitutional:       General: He is not in acute distress. Appearance: Normal appearance. He is not ill-appearing. HENT:      Head: Normocephalic and atraumatic. Right Ear: External ear normal.      Left Ear: External ear normal.      Nose: Nose normal.      Mouth/Throat:      Mouth: Mucous membranes are moist.      Pharynx: Oropharynx is clear. No oropharyngeal exudate. Eyes:      General:         Right eye: No discharge.          Left eye: No discharge. Extraocular Movements: Extraocular movements intact. Conjunctiva/sclera: Conjunctivae normal.      Pupils: Pupils are equal, round, and reactive to light. Cardiovascular:      Rate and Rhythm: Normal rate and regular rhythm. Pulses: Normal pulses. Heart sounds: Normal heart sounds. No murmur heard. Pulmonary:      Effort: Pulmonary effort is normal. No respiratory distress. Breath sounds: Normal breath sounds. No wheezing. Abdominal:      General: Abdomen is flat. Bowel sounds are normal. There is no distension. Palpations: Abdomen is soft. Tenderness: There is no abdominal tenderness. Musculoskeletal:         General: No swelling. Normal range of motion. Cervical back: Normal range of motion. No rigidity. Skin:     General: Skin is warm and dry. Capillary Refill: Capillary refill takes less than 2 seconds. Neurological:      General: No focal deficit present. Mental Status: He is alert and oriented to person, place, and time. Mental status is at baseline. Cranial Nerves: No cranial nerve deficit. Sensory: No sensory deficit. Motor: No weakness.       Gait: Gait normal.   Psychiatric:         Mood and Affect: Mood normal.         Behavior: Behavior normal.         Vital Signs  ED Triage Vitals [08/05/23 0210]   Temperature Pulse Respirations Blood Pressure SpO2   97.7 °F (36.5 °C) 74 18 (!) 179/82 97 %      Temp Source Heart Rate Source Patient Position - Orthostatic VS BP Location FiO2 (%)   Oral Monitor Sitting Right arm --      Pain Score       --           Vitals:    08/05/23 0210   BP: (!) 179/82   Pulse: 74   Patient Position - Orthostatic VS: Sitting         Visual Acuity      ED Medications  Medications - No data to display    Diagnostic Studies  Results Reviewed     None                 No orders to display              Procedures  Procedures         ED Course  ED Course as of 08/05/23 0329   Sat Aug 05, 2023   0241 Procedure Note: EKG  Date/Time: 08/05/23 2:56 AM   Interpreted by: Sheela Wu   Indications / Diagnosis: HTN  ECG reviewed by me, the ED Provider: yes   The EKG demonstrates:  Rhythm: afib  Intervals: normal intervals  Axis: LEFT axis  QRS/Blocks: normal QRS  ST Changes: No acute ST Changes, no STD/GISELA. SBIRT 20yo+    Flowsheet Row Most Recent Value   Initial Alcohol Screen: US AUDIT-C     1. How often do you have a drink containing alcohol? 0 Filed at: 08/05/2023 0238   2. How many drinks containing alcohol do you have on a typical day you are drinking? 0 Filed at: 08/05/2023 0238   3a. Male UNDER 65: How often do you have five or more drinks on one occasion? 0 Filed at: 08/05/2023 0238   3b. FEMALE Any Age, or MALE 65+: How often do you have 4 or more drinks on one occassion? 0 Filed at: 08/05/2023 0238   Audit-C Score 0 Filed at: 08/05/2023 1353   KILEY: How many times in the past year have you. .. Used an illegal drug or used a prescription medication for non-medical reasons? Never Filed at: 08/05/2023 3100                    Medical Decision Making  82-year-old male past medical history of hypertension presenting to the ER for hypertension. Currently asymptomatic at this time. We will do an EKG to evaluate for arrhythmia secondary to the wooziness. EKG unremarkable. I discussed with patient that he should check his blood pressure at home. He is compliant with his medications but I told him he should call his primary care doctor to discuss this. Strict return to ER precautions discussed and patient was discharged home.         Disposition  Final diagnoses:   Elevated blood pressure reading   Hypertension     Time reflects when diagnosis was documented in both MDM as applicable and the Disposition within this note     Time User Action Codes Description Comment    8/5/2023  2:39 AM Sheela Wu Add [R03.0] Elevated blood pressure reading     8/5/2023  2:39 AM Lelia Glenn Add [I10] Hypertension       ED Disposition     ED Disposition   Discharge    Condition   Stable    Date/Time   Sat Aug 5, 2023  2:39 AM    Comment   Domenico Melo discharge to home/self care. Follow-up Information     Follow up With Specialties Details Why Contact Info Additional Information    Deborah Day MD  Schedule an appointment as soon as possible for a visit in 2 days for follow up [address] [place] [address] [address] [place] [address] Emergency Department Emergency Medicine Go to  If symptoms worsen, As needed [address] [address]  [address] [facility], [address] , [facility], [address]          Discharge Medication List as of 8/5/2023  2:55 AM      CONTINUE these medications which have NOT CHANGED    Details   ascorbic acid (VITAMIN C) 250 mg tablet Take 500 mg by mouth daily, Historical Med      aspirin (ECOTRIN LOW STRENGTH) 81 mg EC tablet Take 81 mg by mouth daily Pt to check with surgeon if needed to hold RX., Historical Med      atorvastatin (LIPITOR) 40 mg tablet Take 1 tablet (40 mg total) by mouth daily with dinner, Starting Tue 11/17/2020, Normal      cholecalciferol (VITAMIN D3) 1,000 units tablet Take 1,000 Units by mouth daily after lunch  , Historical Med      clopidogrel (PLAVIX) 75 mg tablet Take 75 mg by mouth daily Pt  To check with surgeon if needed to hold RX., Historical Med      ferrous sulfate 324 (65 Fe) mg Take 1 tablet (324 mg total) by mouth every other day Increase to daily if tolerated., Starting Mon 5/22/2023, Normal      finasteride (PROSCAR) 5 mg tablet TAKE 1 TABLET BY MOUTH EVERY DAY, Normal      metoprolol tartrate (LOPRESSOR) 50 mg tablet Take 1 tablet (50 mg total) by mouth every 12 (twelve) hours, Starting Sat 10/29/2022, Normal             No discharge procedures on file.     PDMP Review     None          ED Provider  Electronically Signed by           James Gee MD  08/05/23 5524

## 2023-08-07 ENCOUNTER — HOSPITAL ENCOUNTER (INPATIENT)
Facility: HOSPITAL | Age: 83
LOS: 1 days | Discharge: HOME | DRG: 880 | End: 2023-08-09
Attending: EMERGENCY MEDICINE | Admitting: FAMILY MEDICINE
Payer: MEDICARE

## 2023-08-07 ENCOUNTER — APPOINTMENT (EMERGENCY)
Dept: RADIOLOGY | Facility: HOSPITAL | Age: 83
DRG: 880 | End: 2023-08-07
Payer: MEDICARE

## 2023-08-07 ENCOUNTER — APPOINTMENT (OUTPATIENT)
Dept: NON INVASIVE DIAGNOSTICS | Facility: HOSPITAL | Age: 83
DRG: 880 | End: 2023-08-07
Payer: MEDICARE

## 2023-08-07 DIAGNOSIS — I10 HYPERTENSION: ICD-10-CM

## 2023-08-07 DIAGNOSIS — R25.9 ABNORMAL INVOLUNTARY MOVEMENT: ICD-10-CM

## 2023-08-07 DIAGNOSIS — R06.00 DYSPNEA: ICD-10-CM

## 2023-08-07 DIAGNOSIS — I12.9 BENIGN HYPERTENSION WITH CKD (CHRONIC KIDNEY DISEASE) STAGE III (HCC): ICD-10-CM

## 2023-08-07 DIAGNOSIS — N18.9 CHRONIC KIDNEY DISEASE, UNSPECIFIED CKD STAGE: ICD-10-CM

## 2023-08-07 DIAGNOSIS — F41.9 ANXIETY: ICD-10-CM

## 2023-08-07 DIAGNOSIS — N18.30 BENIGN HYPERTENSION WITH CKD (CHRONIC KIDNEY DISEASE) STAGE III (HCC): ICD-10-CM

## 2023-08-07 DIAGNOSIS — I50.9 CHF EXACERBATION (HCC): Primary | ICD-10-CM

## 2023-08-07 LAB
2HR DELTA HS TROPONIN: -1 NG/L
4HR DELTA HS TROPONIN: 0 NG/L
ALBUMIN SERPL BCP-MCNC: 3.6 G/DL (ref 3.5–5)
ALP SERPL-CCNC: 98 U/L (ref 34–104)
ALT SERPL W P-5'-P-CCNC: 12 U/L (ref 7–52)
ANION GAP SERPL CALCULATED.3IONS-SCNC: 3 MMOL/L
ANION GAP SERPL CALCULATED.3IONS-SCNC: 8 MMOL/L
AORTIC ROOT: 3 CM
AORTIC VALVE MEAN VELOCITY: 15.9 M/S
APICAL FOUR CHAMBER EJECTION FRACTION: 35 %
AST SERPL W P-5'-P-CCNC: 20 U/L (ref 13–39)
AV AREA BY CONTINUOUS VTI: 0.9 CM2
AV AREA PEAK VELOCITY: 1 CM2
AV LVOT MEAN GRADIENT: 1 MMHG
AV LVOT PEAK GRADIENT: 2 MMHG
AV MEAN GRADIENT: 11 MMHG
AV PEAK GRADIENT: 21 MMHG
AV VALVE AREA: 0.85 CM2
AV VELOCITY RATIO: 0.28
BASOPHILS # BLD AUTO: 0.05 THOUSANDS/ÂΜL (ref 0–0.1)
BASOPHILS NFR BLD AUTO: 1 % (ref 0–1)
BILIRUB SERPL-MCNC: 0.96 MG/DL (ref 0.2–1)
BNP SERPL-MCNC: 390 PG/ML (ref 0–100)
BUN SERPL-MCNC: 32 MG/DL (ref 5–25)
BUN SERPL-MCNC: 34 MG/DL (ref 5–25)
CALCIUM SERPL-MCNC: 9.2 MG/DL (ref 8.4–10.2)
CALCIUM SERPL-MCNC: 9.6 MG/DL (ref 8.4–10.2)
CARDIAC TROPONIN I PNL SERPL HS: 22 NG/L
CARDIAC TROPONIN I PNL SERPL HS: 23 NG/L
CARDIAC TROPONIN I PNL SERPL HS: 23 NG/L
CHLORIDE SERPL-SCNC: 102 MMOL/L (ref 96–108)
CHLORIDE SERPL-SCNC: 106 MMOL/L (ref 96–108)
CO2 SERPL-SCNC: 28 MMOL/L (ref 21–32)
CO2 SERPL-SCNC: 30 MMOL/L (ref 21–32)
CREAT SERPL-MCNC: 1.62 MG/DL (ref 0.6–1.3)
CREAT SERPL-MCNC: 1.62 MG/DL (ref 0.6–1.3)
DOP CALC AO PEAK VEL: 2.31 M/S
DOP CALC AO VTI: 53.41 CM
DOP CALC LVOT AREA: 3.46 CM2
DOP CALC LVOT CARDIAC INDEX: 1.8 L/MIN/M2
DOP CALC LVOT CARDIAC OUTPUT: 3.83 L/MIN
DOP CALC LVOT DIAMETER: 2.1 CM
DOP CALC LVOT PEAK VEL VTI: 13.15 CM
DOP CALC LVOT PEAK VEL: 0.64 M/S
DOP CALC LVOT STROKE INDEX: 22.1 ML/M2
DOP CALC LVOT STROKE VOLUME: 45.52 CM3
DOP CALC MV VTI: 37.51 CM
EOSINOPHIL # BLD AUTO: 0.25 THOUSAND/ÂΜL (ref 0–0.61)
EOSINOPHIL NFR BLD AUTO: 3 % (ref 0–6)
ERYTHROCYTE [DISTWIDTH] IN BLOOD BY AUTOMATED COUNT: 14.6 % (ref 11.6–15.1)
FLUAV RNA RESP QL NAA+PROBE: NEGATIVE
FLUBV RNA RESP QL NAA+PROBE: NEGATIVE
FRACTIONAL SHORTENING: 26 % (ref 28–44)
GFR SERPL CREATININE-BSD FRML MDRD: 39 ML/MIN/1.73SQ M
GFR SERPL CREATININE-BSD FRML MDRD: 39 ML/MIN/1.73SQ M
GLUCOSE P FAST SERPL-MCNC: 85 MG/DL (ref 65–99)
GLUCOSE SERPL-MCNC: 140 MG/DL (ref 65–140)
GLUCOSE SERPL-MCNC: 85 MG/DL (ref 65–140)
GLUCOSE SERPL-MCNC: 98 MG/DL (ref 65–140)
HCT VFR BLD AUTO: 46.3 % (ref 36.5–49.3)
HGB BLD-MCNC: 15 G/DL (ref 12–17)
IMM GRANULOCYTES # BLD AUTO: 0.05 THOUSAND/UL (ref 0–0.2)
IMM GRANULOCYTES NFR BLD AUTO: 1 % (ref 0–2)
INTERVENTRICULAR SEPTUM IN DIASTOLE (PARASTERNAL SHORT AXIS VIEW): 1.3 CM
INTERVENTRICULAR SEPTUM: 1.3 CM (ref 0.6–1.1)
LAAS-AP2: 25.5 CM2
LAAS-AP4: 25.5 CM2
LEFT ATRIUM AREA SYSTOLE SINGLE PLANE A4C: 25.6 CM2
LEFT ATRIUM SIZE: 5.2 CM
LEFT ATRIUM VOLUME (MOD BIPLANE): 78 ML
LEFT INTERNAL DIMENSION IN SYSTOLE: 3.7 CM (ref 2.1–4)
LEFT VENTRICLE DIASTOLIC VOLUME (MOD BIPLANE): 93 ML
LEFT VENTRICLE SYSTOLIC VOLUME (MOD BIPLANE): 49 ML
LEFT VENTRICULAR INTERNAL DIMENSION IN DIASTOLE: 5 CM (ref 3.5–6)
LEFT VENTRICULAR POSTERIOR WALL IN END DIASTOLE: 1.3 CM
LEFT VENTRICULAR STROKE VOLUME: 61 ML
LV EF: 47 %
LVSV (TEICH): 61 ML
LYMPHOCYTES # BLD AUTO: 1.87 THOUSANDS/ÂΜL (ref 0.6–4.47)
LYMPHOCYTES NFR BLD AUTO: 24 % (ref 14–44)
MCH RBC QN AUTO: 29.2 PG (ref 26.8–34.3)
MCHC RBC AUTO-ENTMCNC: 32.4 G/DL (ref 31.4–37.4)
MCV RBC AUTO: 90 FL (ref 82–98)
MONOCYTES # BLD AUTO: 0.96 THOUSAND/ÂΜL (ref 0.17–1.22)
MONOCYTES NFR BLD AUTO: 12 % (ref 4–12)
MV E'TISSUE VEL-LAT: 14 CM/S
MV E'TISSUE VEL-SEP: 7 CM/S
MV MEAN GRADIENT: 5 MMHG
MV PEAK A VEL: 0.21 M/S
MV PEAK E VEL: 125 CM/S
MV PEAK GRADIENT: 18 MMHG
MV STENOSIS PRESSURE HALF TIME: 0 MS
MV VALVE AREA BY CONTINUITY EQUATION: 1.21 CM2
NEUTROPHILS # BLD AUTO: 4.57 THOUSANDS/ÂΜL (ref 1.85–7.62)
NEUTS SEG NFR BLD AUTO: 59 % (ref 43–75)
NRBC BLD AUTO-RTO: 0 /100 WBCS
PLATELET # BLD AUTO: 104 THOUSANDS/UL (ref 149–390)
PMV BLD AUTO: 9.8 FL (ref 8.9–12.7)
POTASSIUM SERPL-SCNC: 3.6 MMOL/L (ref 3.5–5.3)
POTASSIUM SERPL-SCNC: 4.4 MMOL/L (ref 3.5–5.3)
PROT SERPL-MCNC: 6.3 G/DL (ref 6.4–8.4)
PV PEAK GRADIENT: 3 MMHG
QRS AXIS: -63 DEGREES
QRSD INTERVAL: 106 MS
QT INTERVAL: 410 MS
QTC INTERVAL: 384 MS
RBC # BLD AUTO: 5.13 MILLION/UL (ref 3.88–5.62)
RIGHT ATRIUM AREA SYSTOLE A4C: 25.2 CM2
RIGHT VENTRICLE ID DIMENSION: 3 CM
RSV RNA RESP QL NAA+PROBE: NEGATIVE
SARS-COV-2 RNA RESP QL NAA+PROBE: NEGATIVE
SL CV LEFT ATRIUM LENGTH A2C: 6.6 CM
SL CV PED ECHO LEFT VENTRICLE DIASTOLIC VOLUME (MOD BIPLANE) 2D: 121 ML
SL CV PED ECHO LEFT VENTRICLE SYSTOLIC VOLUME (MOD BIPLANE) 2D: 60 ML
SODIUM SERPL-SCNC: 138 MMOL/L (ref 135–147)
SODIUM SERPL-SCNC: 139 MMOL/L (ref 135–147)
T WAVE AXIS: 46 DEGREES
TR MAX PG: 50 MMHG
TR PEAK VELOCITY: 3.5 M/S
TRICUSPID ANNULAR PLANE SYSTOLIC EXCURSION: 1.3 CM
TRICUSPID VALVE PEAK REGURGITATION VELOCITY: 3.52 M/S
TSH SERPL DL<=0.05 MIU/L-ACNC: 3.08 UIU/ML (ref 0.45–4.5)
VENTRICULAR RATE: 53 BPM
WBC # BLD AUTO: 7.75 THOUSAND/UL (ref 4.31–10.16)

## 2023-08-07 PROCEDURE — 99285 EMERGENCY DEPT VISIT HI MDM: CPT

## 2023-08-07 PROCEDURE — 93306 TTE W/DOPPLER COMPLETE: CPT

## 2023-08-07 PROCEDURE — 36415 COLL VENOUS BLD VENIPUNCTURE: CPT | Performed by: EMERGENCY MEDICINE

## 2023-08-07 PROCEDURE — 84484 ASSAY OF TROPONIN QUANT: CPT | Performed by: EMERGENCY MEDICINE

## 2023-08-07 PROCEDURE — NC001 PR NO CHARGE: Performed by: EMERGENCY MEDICINE

## 2023-08-07 PROCEDURE — 99285 EMERGENCY DEPT VISIT HI MDM: CPT | Performed by: EMERGENCY MEDICINE

## 2023-08-07 PROCEDURE — 0241U HB NFCT DS VIR RESP RNA 4 TRGT: CPT | Performed by: EMERGENCY MEDICINE

## 2023-08-07 PROCEDURE — NC001 PR NO CHARGE: Performed by: NURSE PRACTITIONER

## 2023-08-07 PROCEDURE — 71045 X-RAY EXAM CHEST 1 VIEW: CPT

## 2023-08-07 PROCEDURE — 80048 BASIC METABOLIC PNL TOTAL CA: CPT | Performed by: INTERNAL MEDICINE

## 2023-08-07 PROCEDURE — 85025 COMPLETE CBC W/AUTO DIFF WBC: CPT | Performed by: EMERGENCY MEDICINE

## 2023-08-07 PROCEDURE — 96374 THER/PROPH/DIAG INJ IV PUSH: CPT

## 2023-08-07 PROCEDURE — 83880 ASSAY OF NATRIURETIC PEPTIDE: CPT | Performed by: EMERGENCY MEDICINE

## 2023-08-07 PROCEDURE — 99223 1ST HOSP IP/OBS HIGH 75: CPT | Performed by: INTERNAL MEDICINE

## 2023-08-07 PROCEDURE — 93010 ELECTROCARDIOGRAM REPORT: CPT | Performed by: INTERNAL MEDICINE

## 2023-08-07 PROCEDURE — 82948 REAGENT STRIP/BLOOD GLUCOSE: CPT

## 2023-08-07 PROCEDURE — 99221 1ST HOSP IP/OBS SF/LOW 40: CPT | Performed by: INTERNAL MEDICINE

## 2023-08-07 PROCEDURE — 93306 TTE W/DOPPLER COMPLETE: CPT | Performed by: INTERNAL MEDICINE

## 2023-08-07 PROCEDURE — 93005 ELECTROCARDIOGRAM TRACING: CPT

## 2023-08-07 PROCEDURE — 84443 ASSAY THYROID STIM HORMONE: CPT | Performed by: EMERGENCY MEDICINE

## 2023-08-07 PROCEDURE — 80053 COMPREHEN METABOLIC PANEL: CPT | Performed by: EMERGENCY MEDICINE

## 2023-08-07 RX ORDER — FERROUS SULFATE 325(65) MG
325 TABLET ORAL
Status: DISCONTINUED | OUTPATIENT
Start: 2023-08-08 | End: 2023-08-09 | Stop reason: HOSPADM

## 2023-08-07 RX ORDER — HEPARIN SODIUM 5000 [USP'U]/ML
5000 INJECTION, SOLUTION INTRAVENOUS; SUBCUTANEOUS EVERY 8 HOURS SCHEDULED
Status: DISCONTINUED | OUTPATIENT
Start: 2023-08-07 | End: 2023-08-09 | Stop reason: HOSPADM

## 2023-08-07 RX ORDER — CLOPIDOGREL BISULFATE 75 MG/1
75 TABLET ORAL DAILY
Status: DISCONTINUED | OUTPATIENT
Start: 2023-08-07 | End: 2023-08-09 | Stop reason: HOSPADM

## 2023-08-07 RX ORDER — ATORVASTATIN CALCIUM 40 MG/1
40 TABLET, FILM COATED ORAL
Status: DISCONTINUED | OUTPATIENT
Start: 2023-08-07 | End: 2023-08-09 | Stop reason: HOSPADM

## 2023-08-07 RX ORDER — FINASTERIDE 5 MG/1
5 TABLET, FILM COATED ORAL DAILY
Status: DISCONTINUED | OUTPATIENT
Start: 2023-08-07 | End: 2023-08-09 | Stop reason: HOSPADM

## 2023-08-07 RX ORDER — FUROSEMIDE 10 MG/ML
40 INJECTION INTRAMUSCULAR; INTRAVENOUS DAILY
Status: DISCONTINUED | OUTPATIENT
Start: 2023-08-08 | End: 2023-08-08

## 2023-08-07 RX ORDER — AMLODIPINE BESYLATE 2.5 MG/1
2.5 TABLET ORAL DAILY
Status: DISCONTINUED | OUTPATIENT
Start: 2023-08-07 | End: 2023-08-08

## 2023-08-07 RX ORDER — FUROSEMIDE 10 MG/ML
40 INJECTION INTRAMUSCULAR; INTRAVENOUS ONCE
Status: COMPLETED | OUTPATIENT
Start: 2023-08-07 | End: 2023-08-07

## 2023-08-07 RX ORDER — MAGNESIUM SULFATE 1 G/100ML
1 INJECTION INTRAVENOUS ONCE
Status: COMPLETED | OUTPATIENT
Start: 2023-08-07 | End: 2023-08-07

## 2023-08-07 RX ORDER — METOPROLOL TARTRATE 50 MG/1
50 TABLET, FILM COATED ORAL EVERY 12 HOURS SCHEDULED
Status: DISCONTINUED | OUTPATIENT
Start: 2023-08-07 | End: 2023-08-09 | Stop reason: HOSPADM

## 2023-08-07 RX ADMIN — METOPROLOL TARTRATE 50 MG: 50 TABLET, FILM COATED ORAL at 13:03

## 2023-08-07 RX ADMIN — METOPROLOL TARTRATE 50 MG: 50 TABLET, FILM COATED ORAL at 21:46

## 2023-08-07 RX ADMIN — FUROSEMIDE 40 MG: 10 INJECTION, SOLUTION INTRAVENOUS at 09:45

## 2023-08-07 RX ADMIN — CLOPIDOGREL BISULFATE 75 MG: 75 TABLET ORAL at 13:03

## 2023-08-07 RX ADMIN — MAGNESIUM SULFATE HEPTAHYDRATE 1 G: 1 INJECTION, SOLUTION INTRAVENOUS at 19:37

## 2023-08-07 RX ADMIN — AMLODIPINE BESYLATE 2.5 MG: 2.5 TABLET ORAL at 15:37

## 2023-08-07 RX ADMIN — ATORVASTATIN CALCIUM 40 MG: 40 TABLET, FILM COATED ORAL at 15:37

## 2023-08-07 RX ADMIN — HEPARIN SODIUM 5000 UNITS: 5000 INJECTION INTRAVENOUS; SUBCUTANEOUS at 13:03

## 2023-08-07 RX ADMIN — ASPIRIN 81 MG: 81 TABLET, COATED ORAL at 13:03

## 2023-08-07 RX ADMIN — HEPARIN SODIUM 5000 UNITS: 5000 INJECTION INTRAVENOUS; SUBCUTANEOUS at 21:46

## 2023-08-07 RX ADMIN — FINASTERIDE 5 MG: 5 TABLET, FILM COATED ORAL at 13:03

## 2023-08-07 NOTE — ASSESSMENT & PLAN NOTE
Patient presented with complaints shortness of breath for 2 days. He has had 2 ER visits in that time period. He denies any chest pain. History of TAVR. Symptoms occur in the nighttime while supine. He denies any weight gain, pedal edema. .   Chest x-ray negative  · Admit to telemetry  · Continue lasix  · Echo pending  · Cardiology consultation  · Daily Weight  · Intake and output  · Low sodium diet

## 2023-08-07 NOTE — ASSESSMENT & PLAN NOTE
Reason for Disposition   [1] Probable influenza (fever and respiratory symptoms) AND [2] LOW-RISK patient AND [3] no complications    Additional Information   Negative: Severe difficulty breathing (struggling for each breath, unable to speak or cry, making grunting noises with each breath, severe retractions) (Triage tip: Listen to the child's breathing.)   Negative: [1] Bluish (or gray) lips or face now AND [2] persists when not coughing   Negative: Slow, shallow, weak breathing   Negative: Difficult to awaken or not alert when awake   Negative: Very weak (doesn't move or make eye contact)   Negative: Sounds like a life-threatening emergency to the triager   Negative: [1] Previous diagnosis of asthma (or RAD) or regular use of asthma medicines for wheezing or coughing AND [2] suspected influenza with cough onset in last 48 hours (Reason: possible tamiflu is also covered in that guideline)   Negative: [1] Sounds like a cold AND [2] no fever (Exception: household exposure to known flu)   Negative: [1] Cough is main symptom AND [2] no fever (Exception: household exposure to known flu)   Negative: [1] Throat pain is main symptom AND [2] no fever (Exception: household exposure to known flu)   Negative: Severe leg pain or can't walk   Negative: [1] Diagnosed with influenza within the last 2 weeks by a HCP AND [2] follow-up call   Negative: [1] Influenza exposure AND [2] no symptoms   Negative: [1] Influenza questions (treatment, travel) AND [2] no symptoms (not ill)   Negative: Dayday flu (Bird Flu) exposure   Negative: Influenza vaccine reaction suspected   Negative: [1] Stridor (harsh sound with breathing in confirmed by triager) AND [2] present now OR has occurred 2 or more times   Negative: Ribs are pulling in with each breath (retractions) when not coughing   Negative: [1] Age < 12 weeks AND [2] fever 100.4 F (38.0 C) or higher rectally   Negative: [1] Difficulty breathing (per caller) AND [2]  Continue aspirin, statin not severe AND [3] not relieved by cleaning out the nose (Triage tip: Listen to the child's breathing.)   Negative: Rapid breathing (Breaths/min > 60 if < 2 mo; > 50 if 2-12 mo; > 40 if 1-5 years; > 30 if 6-11 years; > 20 if > 12 years old)   Negative: [1] SEVERE chest pain (excruciating) AND [2] present now   Negative: [1] Dehydration suspected AND [2] age < 1 year (signs: no urine > 8 hours AND very dry mouth, no tears, ill-appearing, etc.)   Negative: [1] Dehydration suspected AND [2] age > 1 year (signs: no urine > 12 hours AND very dry mouth, no tears, ill-appearing, etc.)   Negative: [1] Fever AND [2] > 105 F (40.6 C) by any route OR axillary > 104 F (40 C)   Negative: Child sounds very sick or weak to the triager   Negative: [1] Wheezing present BUT [2] without any difficulty breathing (Exception: known asthmatic or uses asthma medicines)   Negative: [1] MODERATE chest pain (by caller's report) AND [2] can't take a deep breath   Negative: [1] Lips or face have turned bluish BUT [2] only during coughing fits   Negative: [1] Crying continuously AND [2] cannot be comforted AND [3] present > 2 hours   Negative: [1] SEVERE HIGH-RISK patient (e.g., immuno-compromised, serious lung disease, bedridden, etc) AND [2] flu symptoms   Negative: [1] Stridor (harsh sound with breathing in) occurred BUT [2] not present now   Negative: [1] Age < 3 months AND [2] lots of coughing   Negative: [1] Continuous coughing keeps from playing or sleeping AND [2] no improvement using cough treatment per guideline   Negative: Earache or ear discharge also present   Negative: [1] Age < 2 years AND [2] ear infection suspected by triager   Negative: [1] Age > 5 years AND [2] sinus pain around cheekbone or eye (not just congestion) AND [3] fever   Negative: [1] Fever returns after gone for over 24 hours AND [2] symptoms worse or not improved   Negative: Fever present > 3 days (72 hours)   Negative: [1] HIGH-RISK patient  (age under 2 years OR underlying chronic disease, etc.-- See that list) AND [2] flu symptoms WITH fever   Negative: [1] HIGH-RISK patient (see list) AND [2] flu symptoms WITHOUT fever present < 48 hours AND [3] caller insists on antiviral medicine and unresponsive to triager reassurance   Negative: [1] LOW-RISK patient AND [2] flu symptoms WITH fever present < 48 hours AND [3] caller insists on antiviral medicine and unresponsive to triager discussion of limitations   Negative: [1] Age > 6 months AND [2] needs a flu shot   Negative: [1] Using nasal washes and pain medicine > 24 hours AND [2] sinus pain persists AND [3] no fever   Negative: Blocked nose keeps from sleeping after using nasal washes several times   Negative: Yellow scabs around the nasal opening   Negative: [1] Nasal discharge AND [2] present > 14 days   Negative: Cough present > 3 weeks    Protocols used: INFLUENZA (FLU) - SEASONAL-P-AH  pt called re son dx with flu today at [facility]. pharmacy closed for child, on motrin and Tylenol. child sleeping all day, denies pt with diff breathing. yellow RN, cough. T101 max. fever started yest 6/17. Not noted in epic re visit at  or rx on file. rec to contact  to have rx transferred to pharmacy of choice. Offered protocol advice. Call back with questions

## 2023-08-07 NOTE — H&P
[id number] [facility]  H&P  Name: Love Cintron 82 y.o. male I MRN: [id number]  Unit/Bed#: 3 [address] Date of Admission: 8/7/2023   Date of Service: 8/7/2023 I Hospital Day: 0      Assessment/Plan   * Shortness of breath  Assessment & Plan  Patient presented with complaints shortness of breath for 2 days. He has had 2 ER visits in that time period. He denies any chest pain. History of TAVR. Symptoms occur in the nighttime while supine. He denies any weight gain, pedal edema. . Chest x-ray negative  · Admit to telemetry  · Continue lasix  · Echo pending  · Cardiology consultation  · Daily Weight  · Intake and output  · Low sodium diet    History of aortic valve replacement  Assessment & Plan  2021 in [facility]    Stroke [facility])  Assessment & Plan  History of stroke  Continue DAPT     Chronic kidney disease  Assessment & Plan  Lab Results   Component Value Date    EGFR 39 08/07/2023    EGFR 45 07/31/2023    EGFR 44 06/16/2023    CREATININE 1.62 (H) 08/07/2023    CREATININE 1.45 (H) 07/31/2023    CREATININE 1.47 (H) 06/16/2023   Baseline 1.4-1.6, Cr at baseline  Trend    Benign essential hypertension  Assessment & Plan  Continue metoprolol    Chronic atrial fibrillation (HCC)  Assessment & Plan  Continue aspirin, statin          VTE Prophylaxis: Heparin  / sequential compression device   Code Status: Level 1 - Full Code    Anticipated Length of Stay:  Patient will be admitted on an Observation basis with an anticipated length of stay of less than 2 midnights. Justification for Hospital Stay: shortness of breath    Total Time for Visit, including Counseling / Coordination of Care: 20 minutes. Greater than 50% of this total time spent on direct patient counseling and coordination of care.     Chief Complaint:   Shortness of Breath (Pt c/o SOB and hypertension)      History of Present Illness:    Love Cintron is a 82 y.o. male with a PMH of TAVR, who presents with shortness of breath. Patient reports that on Friday he had an episode of shortness of breath and came to the emergency department. He was diagnosed with anxiety. This morning he woke up feeling very short of breath. He denies any chest pain. He came to the ER and was found to be hypertensive. BNP was slightly elevated. Chest x-ray revealed no acute findings. He is admitted for observation. Review of Systems:    Review of Systems   Constitutional: Negative. HENT: Negative. Eyes: Negative. Respiratory: Positive for shortness of breath. Cardiovascular: Negative. Negative for chest pain, palpitations and leg swelling. Gastrointestinal: Negative. Endocrine: Negative. Genitourinary: Negative. Musculoskeletal: Negative. Skin: Negative. Neurological: Negative. Hematological: Negative. Psychiatric/Behavioral: Negative.         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  being monitored   • Dental disease    • Essential hypertension, long-standing    • Heart murmur     per pt "Aortic Valve replacement 2021 with Watchman device implanted /2021"   • Hematuria     intermittent   • History of cigarette smoking, chronic     62 year smoker at one half pack per day, quit 04/1/17   • History of COVID-19 01/19/2022    recovered at home/chief s/s only sore throat   • History of kidney stones    • History of pneumonia    • HL (hearing loss)    • Hyperlipidemia    • Hypertension    • Irregular heart beat    • Kidney stone    • Muscle weakness     right sided weakness has gotten better/ walks independantly"   • Stroke ([address]) 10/29/2020    right sided  weakness almost full recovery   • Stroke [facility])    • Teeth missing    • Vitamin D deficiency     maintained with 1000 units of D   • Water retention    • Wears glasses        Past Surgical History:   Procedure Laterality Date   • APPENDECTOMY  1960 • CARDIAC SURGERY      watchman [id number]; aortic valve replaced 2021(pig valve)   • CAROTID ENDARTERECTOMY Left 1998    Supposedly no internal stenosis found   • CYSTOSCOPY Right 8/15/2017    Procedure: CYSTOSCOPY RIGHT STENT EXCHANGE;  Surgeon: Kevin Anderson MD;  Location: [facility];  Service: Urology   • CYSTOSCOPY     • CYSTOSCOPY N/A 3/11/2022    Procedure: Sammuel Clamp, RIGHT RETROGRADE PYLEOGRAM;  Surgeon: Terrie Angelo MD;  [facility];  Service: Urology   • CYSTOSCOPY W/ URETERAL STENT PLACEMENT Right 6/13/2023    Procedure: Kayla Lincolning STENT URETERAL;  Surgeon: Terrie Angelo MD;  [facility];  Service: Urology   • EXTRACORPOREAL SHOCK WAVE LITHOTRIPSY  03/2017    For nephrolithiasis at [facility]   • [address]   5/10/2019   • FL RETROGRADE PYELOGRAM  7/30/2019   • FL RETROGRADE PYELOGRAM  10/22/2019   • FL RETROGRADE PYELOGRAM  1/28/2020   • FL RETROGRADE PYELOGRAM  8/11/2020   • FL RETROGRADE PYELOGRAM  12/15/2020   • FL RETROGRADE PYELOGRAM  2/14/2021   • FL RETROGRADE PYELOGRAM  5/11/2021   • FL RETROGRADE PYELOGRAM  10/19/2021   • FL RETROGRADE PYELOGRAM  3/11/2022   • FL RETROGRADE PYELOGRAM  7/15/2022   • FL RETROGRADE PYELOGRAM  1/26/2023   • FL RETROGRADE PYELOGRAM  6/13/2023   • MA CYSTO BLADDER W/URETERAL CATHETERIZATION Right 11/14/2017    Procedure: CYSTOSCOPY RETROGRADE, STENT EXCHANGE;  Surgeon: Kevin Anderson MD;  Location: [facility];  Service: Urology   • MA CYSTO BLADDER W/URETERAL CATHETERIZATION Right 5/8/2018    Procedure: CYSTOSCOPY, [place] Dies EXCHANGE;  Surgeon: Kevin Anderson MD;  Location: [facility];  Service: Urology   • MA CYSTO BLADDER W/URETERAL CATHETERIZATION Right 8/14/2018    Procedure: Lestine Brake EXCHANGE;  Surgeon: Kevin Anderson MD;  Location: [facility];  Service: Urology   • MA CYSTO BLADDER W/URETERAL CATHETERIZATION Right 11/13/2018    Procedure: Lestine Brake EXCHANGE, RETROGRADE;  Surgeon: José Miguel Meeks MD;  Location: [facility];  Service: Urology   • CT CYSTO BLADDER W/URETERAL CATHETERIZATION Right 2/12/2019    Procedure: Nuria Caldron, STENT REMOVAL AND STENT PLACEMENT;  Surgeon: José Miguel Meeks MD;  Location: [facility];  Service: Urology   • CT CYSTO BLADDER W/URETERAL CATHETERIZATION Right 5/10/2019    Procedure: Nuria Caldron, STENT EXCHANGE;  Surgeon: José Miguel Meeks MD;  Location: [facility];  Service: Urology   • CT CYSTO BLADDER W/URETERAL CATHETERIZATION Right 7/30/2019    Procedure: CYSTOSCOPY, RETROGRADE, STENT REMOVAL AND PLACEMENT OF STENT;  Surgeon: José Miguel Meeks MD;  Location: [facility];  Service: Urology   • CT CYSTO BLADDER W/URETERAL CATHETERIZATION Right 10/22/2019    Procedure: Nuria Caldron, STENT EXCHANGE;  Surgeon: José Miguel Meeks MD;  Location: [facility];  Service: Urology   • CT CYSTO BLADDER W/URETERAL CATHETERIZATION Right 1/28/2020    Procedure: CYSTOSCOPY, RETROGRADE, STENT REMOVAL AND STENT PLACEMENT;  Surgeon: José Miguel Meeks MD;  Location: [facility];  Service: Urology   • CT CYSTO BLADDER W/URETERAL CATHETERIZATION Right 5/22/2020    Procedure: CYSTOSCOPY RETROGRADE, STENT EXCHANGE;  Surgeon: José Miguel Meeks MD;  Location: [facility];  Service: Urology   • CT CYSTO BLADDER W/URETERAL CATHETERIZATION Right 8/11/2020    Procedure: CYSTOSCOPY, STENT EXCHANGE, RETROGRADE;  Surgeon: José Miguel Meeks MD;  Location: [facility];  Service: Urology   • CT CYSTO BLADDER W/URETERAL CATHETERIZATION Right 12/15/2020    Procedure: CYSTOSCOPY, RETROGRADE, STENT REMOVAL, AND STENT PLACEMENT;  Surgeon: José Miguel Meeks MD;  Location: [facility];  Service: Urology   • CT CYSTO BLADDER W/URETERAL CATHETERIZATION Right 5/11/2021    Procedure: CYSTOSCOPY RETROGRADE PYELOGRAM WITH STENT EXCHANGE;  Surgeon: José Miguel Meeks MD;  Location: [facility];  Service: Urology   • CT CYSTO BLADDER Dufm Grade CATHETERIZATION Right 10/19/2021    Procedure: CYSTOSCOPY WITH RETROGRADE, STENT EXCHANGE;  Surgeon: Idris Toro MD;  Location: [facility];  Service: Urology   • NC CYSTO BLADDER W/URETERAL CATHETERIZATION Right 7/15/2022    Procedure: CYSTOSCOPY, RETROGRADE PYELOGRAM WITH EXCHANGE STENT URETERAL;  Surgeon: Raymond Loya MD;  [facility];  Service: Urology   • NC CYSTO W/INSERT URETERAL STENT Right 11/14/2017    Procedure: EXCHANGE STENT URETERAL;  Surgeon: Idris Toro MD;  Location: [facility];  Service: Urology   • NC CYSTO W/INSERT URETERAL STENT Right 3/11/2022    Procedure: EXCHANGE STENT URETERAL;  Surgeon: Raymond Loya MD;  [facility];  Service: Urology   • NC CYSTO W/INSERT URETERAL STENT Right 1/26/2023    Procedure: EXCHANGE STENT URETERAL;  Surgeon: Raymond Loya MD;  [facility];  Service: Urology   • NC CYSTO/URETERO W/LITHOTRIPSY &INDWELL STENT INSRT Right 5/2/2017    Procedure: CYSTOSCOPY URETEROSCOPY WITH LITHOTRIPSY HOLMIUM LASER, RETROGRADE PYELOGRAM AND INSERTION STENT URETERAL;  Surgeon: Idris Toro MD;  Location: [facility];  Service: Urology   • NC CYSTO/URETERO W/LITHOTRIPSY &INDWELL STENT INSRT Right 5/16/2017    Procedure: CYSTOSCOPY, RETROGRADE PYELOGRAM,  FLEXIBLE URETEROSCOPY,  HOLMIUM LASER LITHOTRIPSY, STONE BASKET MANIPULATION,  STENT URETERAL EXCHANGE;  Surgeon: Idris Toro MD;  Location: [facility];  Service: Urology   • NC CYSTO/URETERO W/LITHOTRIPSY &INDWELL STENT INSRT Right 6/2/2017    Procedure: CYSTOSCOPY, RETROGRADE, STONE MANIPULATION WITH HOLMIUM LASER, STENT PLACEMENT;  Surgeon: Idris Toro MD;  Location: [facility];  Service: Urology   • NC CYSTO/URETERO W/LITHOTRIPSY &INDWELL STENT INSRT Right 7/18/2017    Procedure: CYSTOSCOPY, RETROGRADE, URETEROSCOPY HOLMIUM LASER [address];  Surgeon: Idris Toro MD;  Location: [facility];  Service: Urology   • NC CYSTO/URETERO W/LITHOTRIPSY &INDWELL STENT INSRT Right 2/14/2021    Procedure: CYSTOSCOPY URETEROSCOPY, RETROGRADE PYELOGRAM, STONE MANIPULATION AND EXCHANGE STENT URETERAL;  Surgeon: Amado Krishna MD;  Location: [facility];  Service: Urology   • PROSTATE SURGERY  2015    Laser Prostatectomy  by Dr. Mejia Prior   • Suzie Aleman Right 4/18/2017    Procedure: INSERTION STENT URETERAL, RIGHT URETEROSCOPY,  LASER OF URETERAL STRICTURE AND STONE;  Surgeon: Adele Schwarz MD;  Location: [facility];  Service:    • URETERAL STENT PLACEMENT Right 2/13/2018    Procedure: Yoly Pollock;  Surgeon: Adele Schwarz MD;  Location: [facility];  Service: Urology       Meds/Allergies:    Prior to Admission medications    Medication Sig Start Date End Date Taking? Authorizing Provider   ascorbic acid (VITAMIN C) 250 mg tablet Take 500 mg by mouth daily   Yes Historical Provider, MD   aspirin (ECOTRIN LOW STRENGTH) 81 mg EC tablet Take 81 mg by mouth daily Pt to check with surgeon if needed to hold RX. Yes Historical Provider, MD   atorvastatin (LIPITOR) 40 mg tablet Take 1 tablet (40 mg total) by mouth daily with dinner 11/17/20  Yes Yosi Chisholm DO   cholecalciferol (VITAMIN D3) 1,000 units tablet Take 1,000 Units by mouth daily after lunch     Yes Historical Provider, MD   clopidogrel (PLAVIX) 75 mg tablet Take 75 mg by mouth daily Pt  To check with surgeon if needed to hold RX. Yes Historical Provider, MD   ferrous sulfate 324 (65 Fe) mg Take 1 tablet (324 mg total) by mouth every other day Increase to daily if tolerated.  5/22/23  Yes Sulema Choi MD   finasteride (PROSCAR) 5 mg tablet TAKE 1 TABLET BY MOUTH EVERY DAY 7/5/23  Yes Aniya Gillette MD   metoprolol tartrate (LOPRESSOR) 50 mg tablet Take 1 tablet (50 mg total) by mouth every 12 (twelve) hours 10/29/22  Yes Elizabeth Zapata MD       Allergies: No Known Allergies    Social History:     Marital Status: /[employer] Substance Use History:   Social History     Substance and Sexual Activity   Alcohol Use Not Currently    Comment: don't drink anymore. Social History     Tobacco Use   Smoking Status Former   • Packs/day: 0.50   • Years: 62.00   • Total pack years: 31.00   • Types: Cigarettes   • Start date: [birth year]/15/1954   • Quit date: 4/15/2017   • Years since quittin.3   Smokeless Tobacco Never     Social History     Substance and Sexual Activity   Drug Use No       Family History:    Family History   Problem Relation Age of Onset   • Hypertension Mother    • Alcohol abuse Father    • Cirrhosis Father    • Cancer Son 15        cancer-knee and above-left-amputation   • Cancer Sister    • Other Brother         head mass   • Thyroid disease unspecified Sister    • Arthritis Sister    • Cancer Sister    • Other Brother         legally blind in one eye       Physical Exam:     Vitals:   Blood Pressure: (!) 187/86 (23)  Pulse: 58 (23)  Temperature: 97.5 °F (36.4 °C) (23)  Temp Source: Oral (23)  Respirations: 20 (23)  Height: 5' 10" (177.8 cm) (23)  Weight - Scale: 98 kg (216 lb) (23)  SpO2: 98 % (23)    Physical Exam  Vitals and nursing note reviewed. Constitutional:       Appearance: Normal appearance. HENT:      Head: Normocephalic. Nose: Nose normal.   Cardiovascular:      Rate and Rhythm: Bradycardia present. Rhythm irregular. Heart sounds: Murmur heard. Pulmonary:      Effort: Pulmonary effort is normal. No respiratory distress. Breath sounds: Normal breath sounds. Abdominal:      General: Abdomen is flat. Bowel sounds are normal. There is no distension. Palpations: Abdomen is soft. Tenderness: There is no abdominal tenderness. Musculoskeletal:         General: No swelling. Normal range of motion. Skin:     General: Skin is warm and dry. Neurological:      General: No focal deficit present. Mental Status: He is alert and oriented to person, place, and time. Psychiatric:         Mood and Affect: Mood normal.         Behavior: Behavior normal.           Additional Data:     Lab Results: I have personally reviewed pertinent reports. Results from last 7 days   Lab Units 08/07/23  0743   WBC Thousand/uL 7.75   HEMOGLOBIN g/dL 15.0   HEMATOCRIT % 46.3   PLATELETS Thousands/uL 104*   NEUTROS PCT % 59     Results from last 7 days   Lab Units 08/07/23  0743   SODIUM mmol/L 139   POTASSIUM mmol/L 4.4   CHLORIDE mmol/L 106   CO2 mmol/L 30   BUN mg/dL 32*   CREATININE mg/dL 1.62*   CALCIUM mg/dL 9.6   TOTAL BILIRUBIN mg/dL 0.96   ALK PHOS U/L 98   ALT U/L 12   AST U/L 20                       Imaging: I have personally reviewed pertinent reports. XR chest 1 view portable   Final Result by Lesly Quan MD (08/07 1015)      No acute cardiopulmonary disease. Workstation performed: [id number]             XR chest 1 view portable   Final Result      No acute cardiopulmonary disease. Workstation performed: [id number]             EKG, Pathology, and Other Studies Reviewed on Admission:   · EKG: afib     Allscripts / Epic Records Reviewed: Yes     ** Please Note: This note has been constructed using a voice recognition system.  **

## 2023-08-07 NOTE — ASSESSMENT & PLAN NOTE
Lab Results   Component Value Date    EGFR 39 08/07/2023    EGFR 45 07/31/2023    EGFR 44 06/16/2023    CREATININE 1.62 (H) 08/07/2023    CREATININE 1.45 (H) 07/31/2023    CREATININE 1.47 (H) 06/16/2023   Baseline 1.4-1.6, Cr at baseline  Trend

## 2023-08-07 NOTE — ED PROVIDER NOTES
History  Chief Complaint   Patient presents with   • Shortness of Breath     Pt c/o SOB and hypertension     HPI  Patient is an 70-year-old male history of iron deficiency anemia, diastolic heart failure, status post aortic valve replacement, chronic atrial fibrillation, prior CVA, hypertension presenting for evaluation of dyspnea. Patient states that he woke up this morning feeling short of breath. Patient denies any other associated symptoms. Patient denies chest pain, palpitations, syncope or near syncope. Patient denies fevers, chills, cough. Patient denies headache, sore throat, myalgia, recent travel or sick contacts. Patient denies lower extremity pain or swelling, recent immobilization or surgery, history of DVT/PE. Patient states that he does not take his weight at home. Prior to Admission Medications   Prescriptions Last Dose Informant Patient Reported? Taking?   ascorbic acid (VITAMIN C) 250 mg tablet  Self Yes No   Sig: Take 500 mg by mouth daily   aspirin (ECOTRIN LOW STRENGTH) 81 mg EC tablet  Self Yes No   Sig: Take 81 mg by mouth daily Pt to check with surgeon if needed to hold RX.   atorvastatin (LIPITOR) 40 mg tablet  Self No No   Sig: Take 1 tablet (40 mg total) by mouth daily with dinner   cholecalciferol (VITAMIN D3) 1,000 units tablet  Self Yes No   Sig: Take 1,000 Units by mouth daily after lunch     clopidogrel (PLAVIX) 75 mg tablet  Self Yes No   Sig: Take 75 mg by mouth daily Pt  To check with surgeon if needed to hold RX. ferrous sulfate 324 (65 Fe) mg   No No   Sig: Take 1 tablet (324 mg total) by mouth every other day Increase to daily if tolerated.    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[address]) 2020    of brain  being monitored   • Dental disease    • Essential hypertension, long-standing    • Heart murmur     per pt "Aortic Valve replacement 2021 with Watchman device implanted /2021"   • Hematuria     intermittent   • History of cigarette smoking, chronic     62 year smoker at one half pack per day, quit 04/1/17   • History of COVID-19 01/19/2022    recovered at home/chief s/s only sore throat   • History of kidney stones    • History of pneumonia    • HL (hearing loss)    • Hyperlipidemia    • Hypertension    • Irregular heart beat    • Kidney stone    • Muscle weakness     right sided weakness has gotten better/ walks independantly"   • Stroke ([address]) 10/29/2020    right sided  weakness almost full recovery   • Stroke [facility])    • Teeth missing    • Vitamin D deficiency     maintained with 1000 units of D   • Water retention    • Wears glasses        Past Surgical History:   Procedure Laterality Date   • APPENDECTOMY  1960   • CARDIAC SURGERY      watchman [id number]; aortic valve replaced 2021(pig valve)   • CAROTID ENDARTERECTOMY Left 1998    Supposedly no internal stenosis found   • CYSTOSCOPY Right 8/15/2017    Procedure: CYSTOSCOPY RIGHT STENT EXCHANGE;  Surgeon: Indu Lutz MD;  Location: [facility];  Service: Urology   • CYSTOSCOPY     • CYSTOSCOPY N/A 3/11/2022    Procedure: CYSTOSCOPY, RIGHT RETROGRADE PYLEOGRAM;  Surgeon: Noemi Nash MD;  [facility];  Service: Urology   • CYSTOSCOPY W/ URETERAL STENT PLACEMENT Right 6/13/2023    Procedure: CYSTOSCOPY, RETROGRADE PYELOGRAM,EXCHANGE STENT URETERAL;  Surgeon: Noemi Nash MD;  [facility];  Service: Urology   • EXTRACORPOREAL SHOCK WAVE LITHOTRIPSY  03/2017    For nephrolithiasis at [facility]   • [address] [facility]  5/10/2019   • FL RETROGRADE PYELOGRAM  7/30/2019   • FL RETROGRADE PYELOGRAM  10/22/2019   • FL RETROGRADE PYELOGRAM  1/28/2020   • FL RETROGRADE PYELOGRAM  8/11/2020 • FL RETROGRADE PYELOGRAM  12/15/2020   • FL RETROGRADE PYELOGRAM  2/14/2021   • FL RETROGRADE PYELOGRAM  5/11/2021   • FL RETROGRADE PYELOGRAM  10/19/2021   • FL RETROGRADE PYELOGRAM  3/11/2022   • FL RETROGRADE PYELOGRAM  7/15/2022   • FL RETROGRADE PYELOGRAM  1/26/2023   • FL RETROGRADE PYELOGRAM  6/13/2023   • HI CYSTO BLADDER W/URETERAL CATHETERIZATION Right 11/14/2017    Procedure: CYSTOSCOPY RETROGRADE, STENT EXCHANGE;  Surgeon: Luke Whitt MD;  Location: [facility];  Service: Urology   • HI CYSTO BLADDER W/URETERAL CATHETERIZATION Right 5/8/2018    Procedure: Elsa Grsophia;  Surgeon: Luke Whitt MD;  Location: [facility];  Service: Urology   • HI CYSTO BLADDER W/URETERAL CATHETERIZATION Right 8/14/2018    Procedure: CYSTOSCOPY, STENT EXCHANGE;  Surgeon: Luke Whitt MD;  Location: [facility];  Service: Urology   • HI CYSTO BLADDER W/URETERAL CATHETERIZATION Right 11/13/2018    Procedure: CYSTOSCOPY, STENT EXCHANGE, RETROGRADE;  Surgeon: Luke Whitt MD;  Location: [facility];  Service: Urology   • HI CYSTO BLADDER W/URETERAL CATHETERIZATION Right 2/12/2019    Procedure: CYSTOSCOPY, RETROGRADE, STENT REMOVAL AND STENT PLACEMENT;  Surgeon: Luke Whitt MD;  Location: [facility];  Service: Urology   • HI CYSTO BLADDER W/URETERAL CATHETERIZATION Right 5/10/2019    Procedure: CYSTOSCOPY, RETROGRADE, STENT EXCHANGE;  Surgeon: Luke Whitt MD;  Location: [facility];  Service: Urology   • HI CYSTO BLADDER W/URETERAL CATHETERIZATION Right 7/30/2019    Procedure: CYSTOSCOPY, RETROGRADE, STENT REMOVAL AND PLACEMENT OF STENT;  Surgeon: Luke Whitt MD;  Location: [facility];  Service: Urology   • HI CYSTO BLADDER W/URETERAL CATHETERIZATION Right 10/22/2019    Procedure: CYSTOSCOPY, RETROGRADE, STENT EXCHANGE;  Surgeon: Luke Whitt MD;  Location: [facility];  Service: Urology   • HI CYSTO BLADDER W/URETERAL CATHETERIZATION Right 1/28/2020    Procedure: CYSTOSCOPY, RETROGRADE, STENT REMOVAL AND STENT PLACEMENT;  Surgeon: Yasmani Thapa MD;  Location: [facility];  Service: Urology   • CT CYSTO BLADDER W/URETERAL CATHETERIZATION Right 5/22/2020    Procedure: CYSTOSCOPY RETROGRADE, STENT EXCHANGE;  Surgeon: Yasmani Thapa MD;  Location: [facility];  Service: Urology   • CT CYSTO BLADDER W/URETERAL CATHETERIZATION Right 8/11/2020    Procedure: Hunter Heft EXCHANGE, RETROGRADE;  Surgeon: Yasmani Thapa MD;  Location: [facility];  Service: Urology   • CT CYSTO BLADDER W/URETERAL CATHETERIZATION Right 12/15/2020    Procedure: CYSTOSCOPY, RETROGRADE, STENT REMOVAL, AND STENT PLACEMENT;  Surgeon: Yasmani Thapa MD;  Location: [facility];  Service: Urology   • CT CYSTO BLADDER W/URETERAL CATHETERIZATION Right 5/11/2021    Procedure: CYSTOSCOPY RETROGRADE PYELOGRAM WITH STENT EXCHANGE;  Surgeon: Yasmani Thapa MD;  Location: [facility];  Service: Urology   • CT CYSTO BLADDER W/URETERAL CATHETERIZATION Right 10/19/2021    Procedure: CYSTOSCOPY WITH RETROGRADE, STENT EXCHANGE;  Surgeon: Yasmani Thapa MD;  Location: [facility];  Service: Urology   • CT CYSTO BLADDER W/URETERAL CATHETERIZATION Right 7/15/2022    Procedure: CYSTOSCOPY, RETROGRADE PYELOGRAM WITH EXCHANGE STENT URETERAL;  Surgeon: Julieth Raygoza MD;  [facility];  Service: Urology   • CT CYSTO W/INSERT URETERAL STENT Right 11/14/2017    Procedure: EXCHANGE STENT URETERAL;  Surgeon: Yasmani Thapa MD;  Location: [facility];  Service: Urology   • CT CYSTO W/INSERT URETERAL STENT Right 3/11/2022    Procedure: EXCHANGE STENT URETERAL;  Surgeon: Julieth Raygoza MD;  [facility];  Service: Urology   • CT CYSTO W/INSERT URETERAL STENT Right 1/26/2023    Procedure: EXCHANGE STENT URETERAL;  Surgeon: Julieth Raygoza MD;  [facility];  Service: Urology   • CT CYSTO/URETERO W/LITHOTRIPSY &INDWELL STENT INSRT Right 5/2/2017    Procedure: CYSTOSCOPY URETEROSCOPY WITH LITHOTRIPSY HOLMIUM LASER, RETROGRADE PYELOGRAM AND INSERTION STENT URETERAL;  Surgeon: Whit Del Real MD;  Location: [facility];  Service: Urology   • IN CYSTO/URETERO W/LITHOTRIPSY &INDWELL STENT INSRT Right 5/16/2017    Procedure: CYSTOSCOPY, RETROGRADE PYELOGRAM,  FLEXIBLE URETEROSCOPY,  HOLMIUM LASER LITHOTRIPSY, STONE BASKET MANIPULATION,  STENT URETERAL EXCHANGE;  Surgeon: Whit Del Real MD;  Location: [facility];  Service: Urology   • IN CYSTO/URETERO W/LITHOTRIPSY &INDWELL STENT INSRT Right 6/2/2017    Procedure: CYSTOSCOPY, RETROGRADE, STONE MANIPULATION WITH HOLMIUM LASER, STENT PLACEMENT;  Surgeon: Whit Del Real MD;  Location: [facility];  Service: Urology   • IN CYSTO/URETERO W/LITHOTRIPSY &INDWELL STENT INSRT Right 7/18/2017    Procedure: CYSTOSCOPY, RETROGRADE, URETEROSCOPY HOLMIUM LASER Pheobe Esters;  Surgeon: Whit Del Real MD;  Location: [facility];  Service: Urology   • IN CYSTO/URETERO W/LITHOTRIPSY &INDWELL STENT INSRT Right 2/14/2021    Procedure: CYSTOSCOPY URETEROSCOPY, RETROGRADE PYELOGRAM, STONE MANIPULATION AND EXCHANGE STENT URETERAL;  Surgeon: Pete Todd MD;  Location: [facility];  Service: Urology   • PROSTATE SURGERY  2015    Laser Prostatectomy  by Dr. Sunny Buckner   • Genevia Breeding Right 4/18/2017    Procedure: INSERTION STENT URETERAL, RIGHT URETEROSCOPY,  LASER OF URETERAL STRICTURE AND STONE;  Surgeon: Whit Del Real MD;  Location: [facility];  Service:    • URETERAL STENT PLACEMENT Right 2/13/2018    Procedure: Victor Manuel Simentla;  Surgeon: Whit Del Real MD;  Location: [facility];  Service: Urology       Family History   Problem Relation Age of Onset   • Hypertension Mother    • Alcohol abuse Father    • Cirrhosis Father    • Cancer Son 15        cancer-knee and above-left-amputation   • Cancer Sister    • Other Brother         head mass   • Thyroid disease unspecified Sister    • Arthritis Sister    • Cancer Sister    • Other Brother         legally blind in one eye     I have reviewed and agree with the history as documented. E-Cigarette/Vaping   • E-Cigarette Use Never User      E-Cigarette/Vaping Substances   • Nicotine No    • THC No    • CBD No    • Flavoring No    • Other No    • Unknown No      Social History     Tobacco Use   • Smoking status: Former     Packs/day: 0.50     Years: 62.00     Total pack years: 31.00     Types: Cigarettes     Start date: [birth year]/1954     Quit date: 4/15/2017     Years since quittin.3   • Smokeless tobacco: Never   Vaping Use   • Vaping Use: Never used   Substance Use Topics   • Alcohol use: Not Currently     Comment: don't drink anymore. • Drug use: No       Review of Systems   Constitutional: Negative for chills, fatigue and fever. HENT: Negative for congestion, rhinorrhea and sinus pain. Respiratory: Positive for shortness of breath. Negative for cough and wheezing. Cardiovascular: Negative for chest pain, palpitations and leg swelling. Gastrointestinal: Negative for diarrhea, nausea and vomiting. Musculoskeletal: Negative for arthralgias and myalgias. Neurological: Negative for syncope, light-headedness and headaches. Psychiatric/Behavioral: Negative for confusion. All other systems reviewed and are negative. Physical Exam  Physical Exam  Vitals and nursing note reviewed. Constitutional:       General: He is not in acute distress. Appearance: Normal appearance. He is not ill-appearing, toxic-appearing or diaphoretic. Comments: Well-appearing, nontoxic, nondistressed   HENT:      Head: Normocephalic and atraumatic. Comments: Moist mucous membranes     Right Ear: External ear normal.      Left Ear: External ear normal.   Eyes:      General:         Right eye: No discharge. Left eye: No discharge. Cardiovascular:      Comments: Rate controlled atrial fibrillation rate in the 60s. No murmurs rubs or gallops. Extremities warm and well perfused. Trace pitting edema bilaterally in the lower extremities. Pulmonary:      Effort: No respiratory distress. Comments: Mildly increased work of breathing. Lungs clear to auscultation bilaterally without wheezes, rales, rhonchi. Satting 100% on room air indicating adequate oxygenation. Abdominal:      General: There is no distension. Musculoskeletal:         General: No deformity. Cervical back: Normal range of motion. Skin:     Findings: No lesion or rash. Neurological:      Mental Status: He is alert and oriented to person, place, and time. Mental status is at baseline.       Comments: AAOx4, pleasant, interactive   Psychiatric:         Mood and Affect: Mood and affect normal.         Vital Signs  ED Triage Vitals   Temperature Pulse Respirations Blood Pressure SpO2   08/07/23 0731 08/07/23 0724 08/07/23 0724 08/07/23 0724 08/07/23 0724   (!) 95.2 °F (35.1 °C) 67 (!) 24 (!) 207/95 100 %      Temp Source Heart Rate Source Patient Position - Orthostatic VS BP Location FiO2 (%)   08/07/23 0731 -- 08/07/23 0724 08/07/23 0724 --   Tympanic  Lying Right arm       Pain Score       08/07/23 0724       No Pain           Vitals:    08/07/23 0815 08/07/23 0830 08/07/23 0845 08/07/23 0930   BP: 152/87 136/85 161/86 (!) 185/101   Pulse: (!) 54 60 60 65   Patient Position - Orthostatic VS:             Visual Acuity      ED Medications  Medications   furosemide (LASIX) injection 40 mg (40 mg Intravenous Given 8/7/23 0945)       Diagnostic Studies  Results Reviewed     Procedure Component Value Units Date/Time    HS Troponin I 2hr [[id number]] Collected: 08/07/23 0936    Lab Status: No result Specimen: Blood from Arm, Right     COVID/FLU/RSV [[id number]]  (Normal) Collected: 08/07/23 0743    Lab Status: Final result Specimen: Nares from Nose Updated: 08/07/23 0830     SARS-CoV-2 Negative     INFLUENZA A PCR Negative     INFLUENZA B PCR Negative     RSV PCR Negative    Narrative: FOR PEDIATRIC PATIENTS - copy/paste COVID Guidelines URL to browser: [email or web address]    SARS-CoV-2 assay is a Nucleic Acid Amplification assay intended for the  qualitative detection of nucleic acid from SARS-CoV-2 in nasopharyngeal  swabs. Results are for the presumptive identification of SARS-CoV-2 RNA. Positive results are indicative of infection with SARS-CoV-2, the virus  causing COVID-19, but do not rule out bacterial infection or co-infection  with other viruses. Laboratories within the [facility] and its  territories are required to report all positive results to the appropriate  public health authorities. Negative results do not preclude SARS-CoV-2  infection and should not be used as the sole basis for treatment or other  patient management decisions. Negative results must be combined with  clinical observations, patient history, and epidemiological information. This test has not been FDA cleared or approved. This test has been authorized by FDA under an Emergency Use Authorization  (EUA). This test is only authorized for the duration of time the  declaration that circumstances exist justifying the authorization of the  emergency use of an in vitro diagnostic tests for detection of SARS-CoV-2  virus and/or diagnosis of COVID-19 infection under section 564(b)(1) of  the Act, 21 U. S.C. 695DZR-1(I)(8), unless the authorization is terminated  or revoked sooner. The test has been validated but independent review by FDA  and CLIA is pending. Test performed using [employer] GeneXpert: This RT-PCR assay targets N2,  a region unique to SARS-CoV-2. A conserved region in the E-gene was chosen  for pan-Sarbecovirus detection which includes SARS-CoV-2. According to CMS-2020-01-R, this platform meets the definition of high-throughput technology.     TSH, 3rd generation with Free T4 reflex [[id number]]  (Normal) Collected: 08/07/23 0743    Lab Status: Final result Specimen: Blood from Arm, Right Updated: 08/07/23 0828     TSH 3RD GENERATON 3.076 uIU/mL     HS Troponin 0hr (reflex protocol) [[id number]]  (Normal) Collected: 08/07/23 0743    Lab Status: Final result Specimen: Blood from Arm, Right Updated: 08/07/23 0819     hs TnI 0hr 23 ng/L     HS Troponin I 4hr [[id number]]     Lab Status: No result Specimen: Blood     B-Type Natriuretic Peptide(BNP) [[id number]]  (Abnormal) Collected: 08/07/23 0743    Lab Status: Final result Specimen: Blood from Arm, Right Updated: 08/07/23 0817      pg/mL     Comprehensive metabolic panel [[id number]]  (Abnormal) Collected: 08/07/23 0743    Lab Status: Final result Specimen: Blood from Arm, Right Updated: 08/07/23 0812     Sodium 139 mmol/L      Potassium 4.4 mmol/L      Chloride 106 mmol/L      CO2 30 mmol/L      ANION GAP 3 mmol/L      BUN 32 mg/dL      Creatinine 1.62 mg/dL      Glucose 98 mg/dL      Calcium 9.6 mg/dL      AST 20 U/L      ALT 12 U/L      Alkaline Phosphatase 98 U/L      Total Protein 6.3 g/dL      Albumin 3.6 g/dL      Total Bilirubin 0.96 mg/dL      eGFR 39 ml/min/1.73sq m     Narrative:      [facility] guidelines for Chronic Kidney Disease (CKD):   •  Stage 1 with normal or high GFR (GFR > 90 mL/min/1.73 square meters)  •  Stage 2 Mild CKD (GFR = 60-89 mL/min/1.73 square meters)  •  Stage 3A Moderate CKD (GFR = 45-59 mL/min/1.73 square meters)  •  Stage 3B Moderate CKD (GFR = 30-44 mL/min/1.73 square meters)  •  Stage 4 Severe CKD (GFR = 15-29 mL/min/1.73 square meters)  •  Stage 5 End Stage CKD (GFR <15 mL/min/1.73 square meters)  Note: GFR calculation is accurate only with a steady state creatinine    CBC and differential [[id number]]  (Abnormal) Collected: 08/07/23 0743    Lab Status: Final result Specimen: Blood from Arm, Right Updated: 08/07/23 0752     WBC 7.75 Thousand/uL      RBC 5.13 Million/uL      Hemoglobin 15.0 g/dL      Hematocrit 46.3 %      MCV 90 fL MCH 29.2 pg      MCHC 32.4 g/dL      RDW 14.6 %      MPV 9.8 fL      Platelets 623 Thousands/uL      nRBC 0 /100 WBCs      Neutrophils Relative 59 %      Immat GRANS % 1 %      Lymphocytes Relative 24 %      Monocytes Relative 12 %      Eosinophils Relative 3 %      Basophils Relative 1 %      Neutrophils Absolute 4.57 Thousands/µL      Immature Grans Absolute 0.05 Thousand/uL      Lymphocytes Absolute 1.87 Thousands/µL      Monocytes Absolute 0.96 Thousand/µL      Eosinophils Absolute 0.25 Thousand/µL      Basophils Absolute 0.05 Thousands/µL                  XR chest 1 view portable    (Results Pending)              Procedures  Procedures         ED Course                               SBIRT 20yo+    Flowsheet Row Most Recent Value   Initial Alcohol Screen: US AUDIT-C     1. How often do you have a drink containing alcohol? 0 Filed at: 08/07/2023 0728   2. How many drinks containing alcohol do you have on a typical day you are drinking? 0 Filed at: 08/07/2023 0728   3a. Male UNDER 65: How often do you have five or more drinks on one occasion? 0 Filed at: 08/07/2023 0728   3b. FEMALE Any Age, or MALE 65+: How often do you have 4 or more drinks on one occassion? 0 Filed at: 08/07/2023 8607   Audit-C Score 0 Filed at: 08/07/2023 1699   KILEY: How many times in the past year have you. .. Used an illegal drug or used a prescription medication for non-medical reasons? Never Filed at: 08/07/2023 6734                    Medical Decision Making  I obtained history from the patient. I reviewed external medical documentation. Patient was evaluated in this emergency department on 8/5/2023 for hypertension, was asymptomatic with an EKG demonstrating rate controlled atrial fibrillation, ultimately discharged with return precautions at that time. Patient was evaluated for iron deficiency anemia secondary to hematuria. Patient most recently with a hemoglobin of 14.9 on 6/16/2023.   Differential diagnosis includes was not limited to CHF exacerbation/pulmonary edema, pleural effusion, pneumothorax, pneumonia, symptomatic anemia, viral syndrome, atypical presentation of ACS. I ordered and reviewed labs including CBC, CMP, troponin, BNP to evaluate for electrolyte or renal derangement, leukocytosis, anemia, elevated BNP to suggest CHF exacerbation, elevated troponin to suggest cardiac strain. I ordered and independently interpreted an EKG which demonstrates atrial fibrillation with slow ventricular response rate of 53 without ST or T wave abnormalities. I diuresed patient with Lasix. I discussed patient with the hospitalist and admitted patient for CHF exacerbation. Amount and/or Complexity of Data Reviewed  Labs: ordered. Radiology: ordered. Disposition  Final diagnoses:   Hypertension   Dyspnea   CHF exacerbation ([address])     Time reflects when diagnosis was documented in both MDM as applicable and the Disposition within this note     Time User Action Codes Description Comment    8/7/2023  9:46 AM Jennifer Mueller Add [I10] Hypertension     8/7/2023  9:46 AM Jennifer Mueller Add [R06.00] Dyspnea     8/7/2023  9:46 AM Jennifer Mueller Add [I50.9] CHF exacerbation ([address])     8/7/2023  9:46 AM Jennifer Mueller Modify [I10] Hypertension     8/7/2023  9:46 AM Jennifer Mueller Modify [I50.9] CHF exacerbation [facility])       ED Disposition     ED Disposition   Admit    Condition   Stable    Date/Time   Mon Aug 7, 2023  9:45 AM    Comment   Case was discussed with ANDREA and the patient's admission status was agreed to be Admission Status: observation status to the service of Dr. Roselia Bean . Follow-up Information    None         Patient's Medications   Discharge Prescriptions    No medications on file       No discharge procedures on file.     PDMP Review     None          ED Provider  Electronically Signed by           Jennifer Mueller MD  08/07/23 1327

## 2023-08-07 NOTE — CONSULTS
Consultation - Cardiology   [facility]     Abelino Elaine 82 y.o. male MRN: [id number]  : 1940  Unit/Bed#: [address] [place] [address] Encounter: [id number]      Assessment & Plan   1. Shortness of breath. - Presented on 23 for evaluation for shortness of breath. Patient reports he woke this morning feeling short of breath. He denies shortness of breath associated with chest pain, palpitations, lightheadedness, dizziness, headache, nausea, or vomiting. He states since being admitted to the hospital his shortness of breath has resolved. He denies experiencing shortness of breath with exertion in recent weeks, weight gain, bilateral lower extremity edema, orthopnea. - Patient states since admission his shortness of breath has resolved. - Low clinical suspicion patient is in acute phase of chronic diastolic heart failure. Patient is euvolemic on examination. Patient lying flat in bed upon arrival without respiratory distress. - Shortness of breath possibly secondary to anxiety attack?  - Patient is not requiring any supplemental oxygen use. 2. Chronic diastolic heart failure. - Does not appear in acute exacerbation. Patient is euvolemic on examination. Patient lying flat in bed upon arrival without respiratory distress. Patient denies experiencing shortness of breath with exertion in recent weeks, weight gain, bilateral lower extremity edema, orthopnea. He states he only experiences shortness of breath early this morning upon waking.  - 23 BNP: 390.   - 23 CXR: No acute cardiopulmonary disease.  - Follow up 23 TTE.   - 10/27/22 TTE: LVEF 55%. Diastolic function is moderately abnormal, consistent with grade II (pseudonormal) relaxation. Left atrial filling pressure is elevated. Left atrium severely dilated. Right atrium moderately dilated. Prosthetic aortic valve functioning normally. ,  No significant stenosis.   Mild to moderate mitral valve regurgitation. Mild tricuspid valve regurgitation. Right ventricular systolic pressure mildly elevated, 42 mmHg. - Lasix 40 mg IV ordered per primary team. Low clinical suspicion patient is in acute phase of chronic diastolic heart failure. Patient is not on diuretics outpatient. - Continue Lopressor 50 mg twice daily.  - Patient reports that he follows outpatient with cardiologist Dr. Ayaz Rivera.  - Review of home medications notes that patient is not on diuretic outpatient. - Monitor weight. - Monitor I/Os. 3. Chronic atrial fibrillation. - 8/07/23 EKG: Atrial fibrillation with slow ventricular response, rate 53 bpm.  Septal infarct, cited on or before 11/03/20.   - Patient with known history of chronic atrial fibrillation, s/p Watchman procedure in 2021.   - Not on anticoagulation as patient is s/p Watchman procedure. - Continue Lopressor 50 mg twice daily. 4. Aortic stenosis s/p TAVR.     - s/p TAVR in 2021.   - Follow up 8/07/23 TTE.   - 10/27/22 TTE:     Aortic Valve There is a Medtronic CoreValve TAVR bioprosthetic valve. The prosthetic valve appears to be functioning normally. There is no evidence of regurgitation. The aortic valve has no significant stenosis. The aortic valve peak gradient is 18 mmHg. The aortic valve mean gradient is 9 mmHg. The dimensionless velocity index is 0.41.     - Patient reports that he follows outpatient with cardiologist Dr. Ayaz Rivera. 5. Hypertension.    - On presentation noted hypertensive urgency, /95. BP remains elevated. - Continue Lopressor 50 mg twice daily.  - If BP remains elevated may add additional BP medication.  - Continue to monitor BP. 6. CVA. - Left frontal CVA in 10/2020.  - Currently on aspirin 81 mg daily, Plavix 75 mg daily, and Lipitor 40 mg daily. 7. CKD stage III. - Baseline creatinine appears between 1.4 and 1.6.  - Care per primary team.     Summary of Recommendations:         Thank you for your consultation. Physician Requesting Consult: Kian Burton, *    Reason for Consult / Principal Problem: Shortness of breath. Inpatient consult to Cardiology  Consult performed by: Gaston Jimenez PA-C  Consult ordered by: KELLY Medrano          HPI: Lee Garcia is a 82y.o. year old male with PMHx of chronic diastolic heart failure, chronic atrial fibrillation s/p Watchman procedure (2021), AS s/p TAVR (2021), HTN, CVA (left frontal - 10/2020), CKDIII, who presented on 8/07/23 for evaluation for shortness of breath. Patient reports he woke this morning feeling short of breath. He denies shortness of breath associated with chest pain, palpitations, lightheadedness, dizziness, headache, nausea, or vomiting. He states since being admitted to the hospital his shortness of breath has resolved. He denies experiencing shortness of breath with exertion in recent weeks, weight gain, bilateral lower extremity edema, orthopnea. Of note, patient was evaluated on 8/05/23 at [address] [place] [address] ED for concern for hypertension. Was discharged home with plan to follow-up with primary care doctor. Patient reports that he follows outpatient with cardiologist Dr. Karan Thibodeaux. Review of Systems   Constitutional: Negative for activity change, appetite change, chills, fatigue and unexpected weight change. Respiratory: Positive for shortness of breath. Negative for cough, chest tightness and wheezing. Cardiovascular: Negative for chest pain, palpitations and leg swelling. Gastrointestinal: Negative for abdominal distention, abdominal pain, diarrhea, nausea and vomiting. Skin: Negative. Neurological: Negative for dizziness, syncope, weakness, light-headedness, numbness and headaches.        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 brain  being monitored   • Dental disease    • Essential hypertension, long-standing    • Heart murmur     per pt "Aortic Valve replacement 2021 with Watchman device implanted /2021"   • Hematuria     intermittent   • History of cigarette smoking, chronic     62 year smoker at one half pack per day, quit 04/1/17   • History of COVID-19 01/19/2022    recovered at home/chief s/s only sore throat   • History of kidney stones    • History of pneumonia    • HL (hearing loss)    • Hyperlipidemia    • Hypertension    • Irregular heart beat    • Kidney stone    • Muscle weakness     right sided weakness has gotten better/ walks independantly"   • Stroke ([address]) 10/29/2020    right sided  weakness almost full recovery   • Stroke [facility])    • Teeth missing    • Vitamin D deficiency     maintained with 1000 units of D   • Water retention    • Wears glasses      Past Surgical History:   Procedure Laterality Date   • APPENDECTOMY  1960   • CARDIAC SURGERY      watchman [id number]; aortic valve replaced 2021(pig valve)   • CAROTID ENDARTERECTOMY Left 1998    Supposedly no internal stenosis found   • CYSTOSCOPY Right 8/15/2017    Procedure: CYSTOSCOPY RIGHT STENT EXCHANGE;  Surgeon: Luke Whitt MD;  Location: [facility];  Service: Urology   • CYSTOSCOPY     • CYSTOSCOPY N/A 3/11/2022    Procedure: CYSTOSCOPY, RIGHT RETROGRADE PYLEOGRAM;  Surgeon: Richard Cee MD;  [facility];  Service: Urology   • CYSTOSCOPY W/ URETERAL STENT PLACEMENT Right 6/13/2023    Procedure: CYSTOSCOPY, RETROGRADE PYELOGRAM,EXCHANGE STENT URETERAL;  Surgeon: Richard Cee MD;  [facility];  Service: Urology   • EXTRACORPOREAL SHOCK WAVE LITHOTRIPSY  03/2017    For nephrolithiasis at [facility]   • [address] [facility]  5/10/2019   • FL RETROGRADE PYELOGRAM  7/30/2019   • FL RETROGRADE PYELOGRAM  10/22/2019   • FL RETROGRADE PYELOGRAM  1/28/2020   • FL RETROGRADE PYELOGRAM  8/11/2020   • FL RETROGRADE PYELOGRAM  12/15/2020   • FL RETROGRADE PYELOGRAM  2/14/2021   • FL RETROGRADE PYELOGRAM  5/11/2021   • FL RETROGRADE PYELOGRAM  10/19/2021   • FL RETROGRADE PYELOGRAM  3/11/2022   • FL RETROGRADE PYELOGRAM  7/15/2022   • FL RETROGRADE PYELOGRAM  1/26/2023   • FL RETROGRADE PYELOGRAM  6/13/2023   • ND CYSTO BLADDER W/URETERAL CATHETERIZATION Right 11/14/2017    Procedure: CYSTOSCOPY RETROGRADE, STENT EXCHANGE;  Surgeon: Vivek Jaruegui MD;  Location: [facility];  Service: Urology   • ND CYSTO BLADDER W/URETERAL CATHETERIZATION Right 5/8/2018    Procedure: CYSTOSCOPY, STENT EXCHANGE;  Surgeon: Vivek Jauregui MD;  Location: [facility];  Service: Urology   • ND CYSTO BLADDER W/URETERAL CATHETERIZATION Right 8/14/2018    Procedure: CYSTOSCOPY, STENT EXCHANGE;  Surgeon: Vivek Jauregui MD;  Location: [facility];  Service: Urology   • ND CYSTO BLADDER W/URETERAL CATHETERIZATION Right 11/13/2018    Procedure: CYSTOSCOPY, STENT EXCHANGE, RETROGRADE;  Surgeon: Vivek Jauregui MD;  Location: [facility];  Service: Urology   • ND CYSTO BLADDER W/URETERAL CATHETERIZATION Right 2/12/2019    Procedure: CYSTOSCOPY, RETROGRADE, STENT REMOVAL AND STENT PLACEMENT;  Surgeon: Vivek Jauregui MD;  Location: [facility];  Service: Urology   • ND CYSTO BLADDER W/URETERAL CATHETERIZATION Right 5/10/2019    Procedure: CYSTOSCOPY, RETROGRADE, STENT EXCHANGE;  Surgeon: Vivek Jauregui MD;  Location: [facility];  Service: Urology   • ND CYSTO BLADDER W/URETERAL CATHETERIZATION Right 7/30/2019    Procedure: CYSTOSCOPY, RETROGRADE, STENT REMOVAL AND PLACEMENT OF STENT;  Surgeon: Vivek Jauregui MD;  Location: [facility];  Service: Urology   • ND CYSTO BLADDER W/URETERAL CATHETERIZATION Right 10/22/2019    Procedure: CYSTOSCOPY, RETROGRADE, STENT EXCHANGE;  Surgeon: Vivek Jauregui MD;  Location: [facility];  Service: Urology   • ND CYSTO BLADDER W/URETERAL CATHETERIZATION Right 1/28/2020    Procedure: CYSTOSCOPY, RETROGRADE, STENT REMOVAL AND STENT PLACEMENT;  Surgeon: Vivek Jauregui MD;  Location: [facility];  Service: Urology   • OR CYSTO BLADDER W/URETERAL CATHETERIZATION Right 5/22/2020    Procedure: CYSTOSCOPY RETROGRADE, STENT EXCHANGE;  Surgeon: Kevin Anderson MD;  Location: [facility];  Service: Urology   • OR CYSTO BLADDER W/URETERAL CATHETERIZATION Right 8/11/2020    Procedure: Betty Brake EXCHANGE, RETROGRADE;  Surgeon: Kevin Anderson MD;  Location: [facility];  Service: Urology   • OR CYSTO BLADDER W/URETERAL CATHETERIZATION Right 12/15/2020    Procedure: Soy Fishman, STENT REMOVAL, AND STENT PLACEMENT;  Surgeon: Kevin Anderson MD;  Location: [facility];  Service: Urology   • OR CYSTO BLADDER W/URETERAL CATHETERIZATION Right 5/11/2021    Procedure: CYSTOSCOPY RETROGRADE PYELOGRAM WITH STENT EXCHANGE;  Surgeon: Kevin Anderson MD;  Location: [facility];  Service: Urology   • OR CYSTO BLADDER W/URETERAL CATHETERIZATION Right 10/19/2021    Procedure: CYSTOSCOPY WITH RETROGRADE, STENT EXCHANGE;  Surgeon: Kevin Anderson MD;  Location: [facility];  Service: Urology   • OR CYSTO BLADDER W/URETERAL CATHETERIZATION Right 7/15/2022    Procedure: CYSTOSCOPY, RETROGRADE PYELOGRAM WITH EXCHANGE STENT URETERAL;  Surgeon: Terrie Angelo MD;  [facility];  Service: Urology   • OR CYSTO W/INSERT URETERAL STENT Right 11/14/2017    Procedure: EXCHANGE STENT URETERAL;  Surgeon: Kevin Anderson MD;  Location: [facility];  Service: Urology   • OR CYSTO W/INSERT URETERAL STENT Right 3/11/2022    Procedure: EXCHANGE STENT URETERAL;  Surgeon: Terrie Angelo MD;  [facility];  Service: Urology   • OR CYSTO W/INSERT URETERAL STENT Right 1/26/2023    Procedure: EXCHANGE STENT URETERAL;  Surgeon: Terrie Angelo MD;  [facility];  Service: Urology   • OR CYSTO/URETERO W/LITHOTRIPSY &INDWELL STENT INSRT Right 5/2/2017    Procedure: CYSTOSCOPY URETEROSCOPY WITH LITHOTRIPSY HOLMIUM LASER, RETROGRADE PYELOGRAM AND INSERTION STENT URETERAL;  Surgeon: Luke Whitt MD;  Location: [facility];  Service: Urology   • NV CYSTO/URETERO W/LITHOTRIPSY &INDWELL STENT INSRT Right 5/16/2017    Procedure: CYSTOSCOPY, RETROGRADE PYELOGRAM,  FLEXIBLE URETEROSCOPY,  HOLMIUM LASER LITHOTRIPSY, STONE BASKET MANIPULATION,  STENT URETERAL EXCHANGE;  Surgeon: Luke Whitt MD;  Location: [facility];  Service: Urology   • NV CYSTO/URETERO W/LITHOTRIPSY &INDWELL STENT INSRT Right 6/2/2017    Procedure: CYSTOSCOPY, RETROGRADE, STONE MANIPULATION WITH HOLMIUM LASER, STENT PLACEMENT;  Surgeon: Luke Whitt MD;  Location: [facility];  Service: Urology   • NV CYSTO/URETERO W/LITHOTRIPSY &INDWELL STENT INSRT Right 7/18/2017    Procedure: CYSTOSCOPY, RETROGRADE, URETEROSCOPY HOLMIUM LASER [address] [place] [address],  AND STENT PLACEMENT;  Surgeon: Luke Whitt MD;  Location: [facility];  Service: Urology   • NV CYSTO/URETERO W/LITHOTRIPSY &INDWELL STENT INSRT Right 2/14/2021    Procedure: CYSTOSCOPY URETEROSCOPY, RETROGRADE PYELOGRAM, STONE MANIPULATION AND EXCHANGE STENT URETERAL;  Surgeon: Tali Mayo MD;  Location: [facility];  Service: Urology   • PROSTATE SURGERY  2015    Laser Prostatectomy  by Dr. Ana M Brown   • Zoie Stanton Right 4/18/2017    Procedure: INSERTION STENT URETERAL, RIGHT URETEROSCOPY,  LASER OF URETERAL STRICTURE AND STONE;  Surgeon: Luke Whitt MD;  Location: [facility];  Service:    • URETERAL STENT PLACEMENT Right 2/13/2018    Procedure: Elsa Olivera;  Surgeon: Luke Whitt MD;  Location: [facility];  Service: Urology     Social History     Substance and Sexual Activity   Alcohol Use Not Currently    Comment: don't drink anymore.      Social History     Substance and Sexual Activity   Drug Use No     Social History     Tobacco Use   Smoking Status Former   • Packs/day: 0.50   • Years: 62.00   • Total pack years: 31.00   • Types: Cigarettes   • Start date: 6/15/1954   • Quit date: 4/15/2017   • Years since quittin.3   Smokeless Tobacco Never     Family History:   Family History   Problem Relation Age of Onset   • Hypertension Mother    • Alcohol abuse Father    • Cirrhosis Father    • Cancer Son 15        cancer-knee and above-left-amputation   • Cancer Sister    • Other Brother         head mass   • Thyroid disease unspecified Sister    • Arthritis Sister    • Cancer Sister    • Other Brother         legally blind in one eye       Meds/Allergies    PTA meds:    Medications Prior to Admission   Medication   • ascorbic acid (VITAMIN C) 250 mg tablet   • aspirin (ECOTRIN LOW STRENGTH) 81 mg EC tablet   • atorvastatin (LIPITOR) 40 mg tablet   • cholecalciferol (VITAMIN D3) 1,000 units tablet   • clopidogrel (PLAVIX) 75 mg tablet   • ferrous sulfate 324 (65 Fe) mg   • finasteride (PROSCAR) 5 mg tablet   • metoprolol tartrate (LOPRESSOR) 50 mg tablet      No Known Allergies    Current Facility-Administered Medications:   •  aspirin (ECOTRIN LOW STRENGTH) EC tablet 81 mg, 81 mg, Oral, Daily, Thomos Poll, CRNP, 81 mg at 23 1303  •  atorvastatin (LIPITOR) tablet 40 mg, 40 mg, Oral, Daily With Dinner, Thomos Poll, CRNP  •  clopidogrel (PLAVIX) tablet 75 mg, 75 mg, Oral, Daily, Thomos Poll, CRNP, 75 mg at 23 1303  •  [START ON 2023] ferrous sulfate tablet 325 mg, 325 mg, Oral, Daily With Breakfast, Thomos Poll, CRNP  •  finasteride (PROSCAR) tablet 5 mg, 5 mg, Oral, Daily, Thomos Poll, CRNP, 5 mg at 23 1303  •  [START ON 2023] furosemide (LASIX) injection 40 mg, 40 mg, Intravenous, Daily, Thomos Poll, CRNP  •  heparin (porcine) subcutaneous injection 5,000 Units, 5,000 Units, Subcutaneous, Q8H [address], Thomos Poll, CRNP, 5,000 Units at 23 1303  •  metoprolol tartrate (LOPRESSOR) tablet 50 mg, 50 mg, Oral, Q12H [address], Thomos Poll, CRNP, 50 mg at 23 1303    VTE Pharmacologic Prophylaxis:   Heparin    Objective:   Vitals: Blood pressure 170/77, pulse 62, temperature 97.5 °F (36.4 °C), temperature source Oral, resp. rate 20, height 5' 10" (1.778 m), weight 94.9 kg (209 lb 3.2 oz), SpO2 98 %. Body mass index is 30.02 kg/m². Wt Readings from Last 3 Encounters:   08/07/23 94.9 kg (209 lb 3.2 oz)   06/13/23 95.3 kg (210 lb)   04/27/23 93 kg (205 lb)     BP Readings from Last 3 Encounters:   08/07/23 170/77   08/05/23 (!) 179/82   06/13/23 166/82     Orthostatic Blood Pressures    Flowsheet Row Most Recent Value   Blood Pressure 170/77 filed at 08/07/2023 1303   Patient Position - Orthostatic VS Sitting filed at 08/07/2023 1303          Intake/Output Summary (Last 24 hours) at 8/7/2023 1404  Last data filed at 8/7/2023 1257  Gross per 24 hour   Intake --   Output 775 ml   Net -775 ml       Invasive Devices     Peripheral Intravenous Line  Duration           Peripheral IV 08/07/23 Right Antecubital <1 day          Drain  Duration           Ureteral Internal Stent Right ureter 7 Fr. 387 days                  Physical Exam:   Physical Exam  Vitals reviewed. Constitutional:       General: He is not in acute distress. Appearance: He is obese. Cardiovascular:      Rate and Rhythm: Bradycardia present. Rhythm irregular. Pulses: Normal pulses. Heart sounds: Murmur heard. Pulmonary:      Effort: Pulmonary effort is normal. No respiratory distress. Breath sounds: Decreased breath sounds present. Abdominal:      General: Abdomen is flat. There is no distension. Palpations: Abdomen is soft. Tenderness: There is no abdominal tenderness. Musculoskeletal:      Right lower leg: No edema. Left lower leg: No edema. Skin:     General: Skin is warm and dry. Neurological:      Mental Status: He is alert and oriented to person, place, and time.        Labs:   Troponins:  Results from last 7 days   Lab Units 08/07/23  1152 08/07/23  0936   HSTNI D2 ng/L  --  -1   HSTNI D4 ng/L 0  --        CBC with diff:   Results from last 7 days   Lab Units 08/07/23  0743   WBC Thousand/uL 7.75   HEMOGLOBIN g/dL 15.0   HEMATOCRIT % 46.3   MCV fL 90   PLATELETS Thousands/uL 104*   RBC Million/uL 5.13   MCH pg 29.2   MCHC g/dL 32.4   RDW % 14.6   MPV fL 9.8   NRBC AUTO /100 WBCs 0       CMP:   Results from last 7 days   Lab Units 08/07/23  0743   SODIUM mmol/L 139   POTASSIUM mmol/L 4.4   CHLORIDE mmol/L 106   CO2 mmol/L 30   ANION GAP mmol/L 3   BUN mg/dL 32*   CREATININE mg/dL 1.62*   CALCIUM mg/dL 9.6   AST U/L 20   ALT U/L 12   ALK PHOS U/L 98   TOTAL PROTEIN g/dL 6.3*   ALBUMIN g/dL 3.6   TOTAL BILIRUBIN mg/dL 0.96   EGFR ml/min/1.73sq m 39   GLUCOSE RANDOM mg/dL 98       Magnesium:     Coags:     TSH:    Results from last 7 days   Lab Units 08/07/23  0743   TSH 3RD GENERATON uIU/mL 3.076     No components found for: "TSH3"  Lipid Profile:     Lipid Profile:   Lab Results   Component Value Date    CHOLESTEROL 88 10/28/2022    CHOLESTEROL 88 10/28/2022    HDL 34 (L) 10/28/2022    HDL 34 (L) 10/28/2022    LDLCALC 32 10/28/2022    LDLCALC 31 10/28/2022    TRIG 109 10/28/2022    TRIG 113 10/28/2022     Hgb A1c:     NT-proBNP: No results for input(s): "NTBNP" in the last 72 hours. Imaging & Testing     Cardiac testing:     Echo complete with contrast if indicated  Result date: 10/27/22     Left Ventricle Left ventricular cavity size is normal. Wall thickness is mildly increased. The left ventricular ejection fraction is 55% by visual estimation. Systolic function is normal.  Wall motion is normal. Diastolic function is moderately abnormal, consistent with grade II (pseudonormal) relaxation. Left atrial filling pressure is elevated. Right Ventricle Right ventricular cavity size is normal. Systolic function is normal. Wall thickness is normal.      Left Atrium The atrium is severely dilated (>48 mL/m2). Right Atrium The atrium is moderately dilated. Aortic Valve There is a Medtronic CoreValve TAVR bioprosthetic valve.  The prosthetic valve appears to be functioning normally. There is no evidence of regurgitation. The aortic valve has no significant stenosis. The aortic valve peak gradient is 18 mmHg. The aortic valve mean gradient is 9 mmHg. The dimensionless velocity index is 0.41. Mitral Valve The leaflets are not thickened. There is mild calcification. The leaflets exhibit normal mobility. There is mild to moderate regurgitation. There is no evidence of stenosis. Tricuspid Valve Tricuspid valve structure is normal. There is mild regurgitation. There is no evidence of stenosis. The right ventricular systolic pressure is mildly elevated. The estimated right ventricular systolic pressure is 17.38 mmHg. Pulmonic Valve Pulmonic valve structure is normal. There is mild regurgitation. There is no evidence of stenosis. Ascending Aorta The aortic root is normal in size. IVC/SVC The right atrial pressure is estimated at 8.0 mmHg. The inferior vena cava is normal in size. Pericardium There is no pericardial effusion. The pericardium is normal in appearance.             Results for orders placed during the hospital encounter of 10/28/20    Echo complete with contrast if indicated    [address]  [phone]    Transthoracic Echocardiogram  2D, M-mode, Doppler, and Color Doppler    Study date:  29-Oct-2020    Patient: Neo Florentino  MR number: [id number]  Account number: [de-identified]  : 1940  Age: 68 years  Gender: Male  Status: Inpatient  Location: Bedside  Height: 69 in  Weight: 197.6 lb  BP: 134/ 82 mmHg    Indications: Stroke    Diagnoses: I67.9 - Cerebrovascular disease, unspecified    Sonographer:  BERLIN Craven  Primary Physician:  Freeman Morocho PA-C  Referring Physician:  [facility]  Group:  [facility]  Interpreting Physician:  Ed Posada MD    SUMMARY    LEFT VENTRICLE:  Systolic function was at the lower limits of normal. Ejection fraction was estimated in the range of 50 % to 55 % to be 50 %. Although no diagnostic regional wall motion abnormality was identified, this possibility cannot be completely excluded on the basis of this study. Wall thickness was mildly increased. There was mild concentric hypertrophy. LEFT ATRIUM:  The atrium was moderately dilated. RIGHT ATRIUM:  The atrium was mildly dilated. MITRAL VALVE:  There was mild to moderate annular calcification. There was mild regurgitation. AORTIC VALVE:  The valve was trileaflet. Leaflets exhibited moderately increased thickness, moderate calcification, and moderately reduced cuspal separation. Transaortic velocity was increased due to valvular stenosis. There was moderate to severe stenosis. Nish 1.0 cm2, peak gradient 40, mean 26 mm of hg  There was mild regurgitation. TRICUSPID VALVE:  There was mild regurgitation. Estimated peak PA pressure was 30 mmHg. PULMONIC VALVE:  There was mild regurgitation. HISTORY: PRIOR HISTORY: HTN, A- fib, CKD, CVA, CHF, AS    PROCEDURE: The procedure was performed at the bedside. This was a routine study. The transthoracic approach was used. The study included complete 2D imaging, M-mode, complete spectral Doppler, and color Doppler. The heart rate was 102 bpm,  at the start of the study. Echocardiographic views were limited due to restricted patient mobility and poor acoustic window availability. This was a technically difficult study. LEFT VENTRICLE: Size was normal. Systolic function was at the lower limits of normal. Ejection fraction was estimated in the range of 50 % to 55 % to be 50 %. Although no diagnostic regional wall motion abnormality was identified, this  possibility cannot be completely excluded on the basis of this study. Wall thickness was mildly increased. There was mild concentric hypertrophy.  DOPPLER: The study was not technically sufficient to allow evaluation of LV diastolic  function. RIGHT VENTRICLE: The size was normal. Systolic function was normal.    LEFT ATRIUM: The atrium was moderately dilated. RIGHT ATRIUM: The atrium was mildly dilated. MITRAL VALVE: There was mild to moderate annular calcification. There was normal leaflet separation. DOPPLER: The transmitral velocity was within the normal range. There was no evidence for stenosis. There was mild regurgitation. AORTIC VALVE: The valve was trileaflet. Leaflets exhibited moderately increased thickness, moderate calcification, and moderately reduced cuspal separation. DOPPLER: Transaortic velocity was increased due to valvular stenosis. There was  moderate to severe stenosis. Nish 1.0 cm2, peak gradient 40, mean 26 mm of hg There was mild regurgitation. TRICUSPID VALVE: DOPPLER: There was mild regurgitation. Estimated peak PA pressure was 30 mmHg. PULMONIC VALVE: DOPPLER: There was mild regurgitation. PERICARDIUM: There was no thickening or calcification. There was no pericardial effusion. AORTA: The root exhibited normal size. SYSTEMIC VEINS: IVC: The inferior vena cava was not well visualized.     SYSTEM MEASUREMENT TABLES    2D  EF (Teich): 50.37 %    PW  MV E/A Ratio: 0    [employer] Commission Accredited Echocardiography Laboratory    Prepared and electronically signed by    Candida Can MD  Signed 29-Oct-2020 17:19:34    Results for orders placed during the hospital encounter of 04/15/17    NM myocardial perfusion spect (stress and/or rest)    [address]  [phone]    Rest/Stress Gated SPECT Myocardial Perfusion Imaging After Exercise    Patient: Wang Tam  MR number: [id number]  Account number: [de-identified]  : 1940  Age: 78 years  Gender: Male  Status: Inpatient  Location: Stress lab  Height: 70 in  Weight: 74 lb  BP: 120/ 74 mmHg    Allergies: NO KNOWN ALLERGIES    Diagnosis: R06.02 - Shortness of breath, R07.9 - Chest pain, unspecified    Primary Physician:  Jose Montana PA-C  RN:  MIRA Pollock  Group:  Gregg Gillis  Interpreting Physician:  Ya Garrett DO    INDICATIONS: Detection of coronary artery disease. HISTORY: The patient is a 78year old  male. Chest pain status: chest pain. Other symptoms: dyspnea and edema. Coronary artery disease risk factors: dyslipidemia, hypertension, and smoking. Cardiovascular history: arrhythmia-  AFib. Co-morbidity: history of lung disease- COPD and history of renal disease- CKD. Medications: Eliquis, a beta blocker and a diuretic. PHYSICAL EXAM: Baseline physical exam screening: normal and no wheezes audible. REST ECG: Atrial fibrillation. PROCEDURE: The study was performed in the stress lab. Treadmill exercise testing was performed, using the [place] protocol. Gated SPECT myocardial perfusion imaging was performed after stress and at rest. Systolic blood pressure was 120  mmHg, at the start of the study. Diastolic blood pressure was 74 mmHg, at the start of the study. The heart rate was 78 bpm, at the start of the study. IV double checked. ISREAL PROTOCOL:  HR bpm SBP mmHg DBP mmHg Symptoms Rhythm/conduct  Baseline 82 120 74 none A-fib  Stage 1 126 124 74 mild dyspnea --  Stage 2 125 -- -- severe dyspnea --  Immediate 118 146 80 same as above --  Recovery 1 86 122 80 subsiding --  Recovery 2 88 127 76 subsiding --  No medications or fluids given. STRESS SUMMARY: Duration of exercise was 4 min and 19 sec. The patient exercised to protocol stage 2. Maximal work rate was 7 METs. Maximal heart rate during stress was 141 bpm ( 97 % of maximal predicted heart rate). The heart rate  response to stress was normal. There was normal resting blood pressure with an appropriate response to stress. The rate-pressure product for the peak heart rate and blood pressure was 81947. There was no chest pain during stress.  The  stress test was terminated due to achievement of target heart rate and severe dyspnea. Arrhythmia during stress: atrial fibrillation. ISOTOPE ADMINISTRATION:  Resting isotope administration Stress isotope administration  Agent Tetrofosmin Tetrofosmin  Dose 10.9 mCi 32.2 mCi  Date 04/18/2017 04/18/2017    Radiopharmaceutical was administered 3 min, 57 sec into the stress protocol. MYOCARDIAL PERFUSION IMAGING:  The image quality was good. Rotating projection images reveal subdiaphragmatic activity. Left ventricular size was normal. The TID ratio was 1.00. PERFUSION DEFECTS:  -  There were no perfusion defects. GATED SPECT:  The calculated left ventricular ejection fraction was 43 %. Left ventricular ejection fraction was mildly decreased by visual estimate. There was no left ventricular regional abnormality. SUMMARY:  -  Stress results: Duration of exercise was 4 min and 19 sec. Target heart rate was achieved. There was no chest pain during stress. -  ECG conclusions: Arrhythmia during stress: atrial fibrillation.  -  Perfusion imaging: There were no perfusion defects.  -  Gated SPECT: The calculated left ventricular ejection fraction was 43 %. Left ventricular ejection fraction was mildly decreased by visual estimate. There was no left ventricular regional abnormality. IMPRESSIONS: Normal study after maximal exercise. Myocardial perfusion imaging was normal at rest and with stress. Left ventricular systolic function was reduced, without distinct regional wall motion abnormalities. Prepared and signed by    Areli Dawkins DO  Signed 04/18/2017 13:43:32        Imaging: I have personally reviewed pertinent reports. XR chest 1 view portable    Result Date: 8/7/2023    Impression: No acute cardiopulmonary disease. EKG/ Monitor: Personally reviewed. 8/07/23 EKG: Atrial fibrillation with slow ventricular response, rate 53 bpm.  Septal infarct, cited on or before 11/03/20.       Code Status: Level 1 - Full Code  Advance Directive and Living Will:      POLST:        Sara Rodriguez PA-C

## 2023-08-08 ENCOUNTER — APPOINTMENT (OUTPATIENT)
Dept: RADIOLOGY | Facility: HOSPITAL | Age: 83
DRG: 880 | End: 2023-08-08
Payer: MEDICARE

## 2023-08-08 PROBLEM — R25.9 ABNORMAL INVOLUNTARY MOVEMENT: Status: ACTIVE | Noted: 2023-08-08

## 2023-08-08 LAB
ALBUMIN SERPL BCP-MCNC: 3.7 G/DL (ref 3.5–5)
ALP SERPL-CCNC: 92 U/L (ref 34–104)
ALT SERPL W P-5'-P-CCNC: 10 U/L (ref 7–52)
ANION GAP SERPL CALCULATED.3IONS-SCNC: 10 MMOL/L
AST SERPL W P-5'-P-CCNC: 21 U/L (ref 13–39)
BILIRUB SERPL-MCNC: 1.4 MG/DL (ref 0.2–1)
BUN SERPL-MCNC: 31 MG/DL (ref 5–25)
CALCIUM SERPL-MCNC: 9.2 MG/DL (ref 8.4–10.2)
CHLORIDE SERPL-SCNC: 103 MMOL/L (ref 96–108)
CO2 SERPL-SCNC: 24 MMOL/L (ref 21–32)
CREAT SERPL-MCNC: 1.48 MG/DL (ref 0.6–1.3)
ERYTHROCYTE [DISTWIDTH] IN BLOOD BY AUTOMATED COUNT: 15.3 % (ref 11.6–15.1)
GFR SERPL CREATININE-BSD FRML MDRD: 44 ML/MIN/1.73SQ M
GLUCOSE SERPL-MCNC: 98 MG/DL (ref 65–140)
HCT VFR BLD AUTO: 47.4 % (ref 36.5–49.3)
HGB BLD-MCNC: 16.3 G/DL (ref 12–17)
MAGNESIUM SERPL-MCNC: 2 MG/DL (ref 1.9–2.7)
MCH RBC QN AUTO: 30.2 PG (ref 26.8–34.3)
MCHC RBC AUTO-ENTMCNC: 34.4 G/DL (ref 31.4–37.4)
MCV RBC AUTO: 88 FL (ref 82–98)
PLATELET # BLD AUTO: 121 THOUSANDS/UL (ref 149–390)
PMV BLD AUTO: 10.1 FL (ref 8.9–12.7)
POTASSIUM SERPL-SCNC: 3.7 MMOL/L (ref 3.5–5.3)
PROT SERPL-MCNC: 6.5 G/DL (ref 6.4–8.4)
RBC # BLD AUTO: 5.39 MILLION/UL (ref 3.88–5.62)
SODIUM SERPL-SCNC: 137 MMOL/L (ref 135–147)
WBC # BLD AUTO: 8.11 THOUSAND/UL (ref 4.31–10.16)

## 2023-08-08 PROCEDURE — A9539 TC99M PENTETATE: HCPCS

## 2023-08-08 PROCEDURE — 80053 COMPREHEN METABOLIC PANEL: CPT | Performed by: INTERNAL MEDICINE

## 2023-08-08 PROCEDURE — A9540 TC99M MAA: HCPCS

## 2023-08-08 PROCEDURE — 78582 LUNG VENTILAT&PERFUS IMAGING: CPT

## 2023-08-08 PROCEDURE — 99233 SBSQ HOSP IP/OBS HIGH 50: CPT | Performed by: INTERNAL MEDICINE

## 2023-08-08 PROCEDURE — 85027 COMPLETE CBC AUTOMATED: CPT | Performed by: INTERNAL MEDICINE

## 2023-08-08 PROCEDURE — G1004 CDSM NDSC: HCPCS

## 2023-08-08 PROCEDURE — 99232 SBSQ HOSP IP/OBS MODERATE 35: CPT | Performed by: NURSE PRACTITIONER

## 2023-08-08 PROCEDURE — 83735 ASSAY OF MAGNESIUM: CPT | Performed by: INTERNAL MEDICINE

## 2023-08-08 RX ORDER — LANOLIN ALCOHOL/MO/W.PET/CERES
3 CREAM (GRAM) TOPICAL ONCE
Status: COMPLETED | OUTPATIENT
Start: 2023-08-08 | End: 2023-08-08

## 2023-08-08 RX ORDER — HYDRALAZINE HYDROCHLORIDE 20 MG/ML
5 INJECTION INTRAMUSCULAR; INTRAVENOUS ONCE
Status: COMPLETED | OUTPATIENT
Start: 2023-08-08 | End: 2023-08-08

## 2023-08-08 RX ORDER — AMLODIPINE BESYLATE 5 MG/1
5 TABLET ORAL DAILY
Status: DISCONTINUED | OUTPATIENT
Start: 2023-08-08 | End: 2023-08-09 | Stop reason: HOSPADM

## 2023-08-08 RX ORDER — FUROSEMIDE 20 MG/1
20 TABLET ORAL DAILY
Status: DISCONTINUED | OUTPATIENT
Start: 2023-08-08 | End: 2023-08-09 | Stop reason: HOSPADM

## 2023-08-08 RX ADMIN — FERROUS SULFATE TAB 325 MG (65 MG ELEMENTAL FE) 325 MG: 325 (65 FE) TAB at 09:14

## 2023-08-08 RX ADMIN — HEPARIN SODIUM 5000 UNITS: 5000 INJECTION INTRAVENOUS; SUBCUTANEOUS at 15:56

## 2023-08-08 RX ADMIN — ASPIRIN 81 MG: 81 TABLET, COATED ORAL at 09:14

## 2023-08-08 RX ADMIN — METOPROLOL TARTRATE 50 MG: 50 TABLET, FILM COATED ORAL at 09:14

## 2023-08-08 RX ADMIN — HYDRALAZINE HYDROCHLORIDE 5 MG: 20 INJECTION INTRAMUSCULAR; INTRAVENOUS at 03:22

## 2023-08-08 RX ADMIN — FINASTERIDE 5 MG: 5 TABLET, FILM COATED ORAL at 09:14

## 2023-08-08 RX ADMIN — Medication 3 MG: at 05:41

## 2023-08-08 RX ADMIN — FUROSEMIDE 20 MG: 20 TABLET ORAL at 09:15

## 2023-08-08 RX ADMIN — METOPROLOL TARTRATE 50 MG: 50 TABLET, FILM COATED ORAL at 20:05

## 2023-08-08 RX ADMIN — HEPARIN SODIUM 5000 UNITS: 5000 INJECTION INTRAVENOUS; SUBCUTANEOUS at 20:05

## 2023-08-08 RX ADMIN — SERTRALINE HYDROCHLORIDE 50 MG: 50 TABLET ORAL at 20:05

## 2023-08-08 RX ADMIN — HEPARIN SODIUM 5000 UNITS: 5000 INJECTION INTRAVENOUS; SUBCUTANEOUS at 05:29

## 2023-08-08 RX ADMIN — CLOPIDOGREL BISULFATE 75 MG: 75 TABLET ORAL at 09:14

## 2023-08-08 RX ADMIN — AMLODIPINE BESYLATE 5 MG: 5 TABLET ORAL at 09:14

## 2023-08-08 RX ADMIN — ATORVASTATIN CALCIUM 40 MG: 40 TABLET, FILM COATED ORAL at 15:56

## 2023-08-08 NOTE — PLAN OF CARE
Problem: SAFETY ADULT  Goal: Patient will remain free of falls  Description: INTERVENTIONS:  - Educate patient/family on patient safety including physical limitations  - Instruct patient to call for assistance with activity   - Consult OT/PT to assist with strengthening/mobility   - Keep Call bell within reach  - Keep bed low and locked with side rails adjusted as appropriate  - Keep care items and personal belongings within reach  - Initiate and maintain comfort rounds  - Make Fall Risk Sign visible to staff  - Initiate/Maintain bed alarm  - Apply yellow socks and bracelet for high fall risk patients  - Consider moving patient to room near nurses station  Outcome: Progressing  Goal: Maintain or return to baseline ADL function  Description: INTERVENTIONS:  -  Assess patient's ability to carry out ADLs; assess patient's baseline for ADL function and identify physical deficits which impact ability to perform ADLs (bathing, care of mouth/teeth, toileting, grooming, dressing, etc.)  - Assess/evaluate cause of self-care deficits   - Assess range of motion  - Assess patient's mobility; develop plan if impaired  - Assess patient's need for assistive devices and provide as appropriate  - Encourage maximum independence but intervene and supervise when necessary  - Involve family in performance of ADLs  - Assess for home care needs following discharge   - Consider OT consult to assist with ADL evaluation and planning for discharge  - Provide patient education as appropriate  Outcome: Progressing  Goal: Maintains/Returns to pre admission functional level  Description: INTERVENTIONS:  - Perform BMAT or MOVE assessment daily.   - Set and communicate daily mobility goal to care team and patient/family/caregiver.    - Collaborate with rehabilitation services on mobility goals if consulted  - Out of bed for toileting  - Record patient progress and toleration of activity level   Outcome: Progressing

## 2023-08-08 NOTE — PROGRESS NOTES
[address] [facility]  Progress Note  Name: Lula Doshi  MRN: [id number]  Unit/Bed#: 7 [address] I Date of Admission: 8/7/2023   Date of Service: 8/8/2023 I Hospital Day: 0    Assessment/Plan   * Shortness of breath  Assessment & Plan  Patient presented with complaints shortness of breath for 2 days. He has had 2 ER visits in that time period. He denies any chest pain. History of TAVR. Symptoms occur in the nighttime while supine. He denies any weight gain, pedal edema. . Chest x-ray negative    Pt was RRT 8/7 for episode of shortness of breath lasting about 45 minutes for family with some involuntary right arm movement   · Continue telemetry  · Continue lasix  · Repeat echo with no changes from prior, EF 55%, aortic valve well functioning  · Cardiology consultation appreciated   · Daily Weight  · Intake and output  · Low sodium diet  · VQ scan pending     Abnormal involuntary movement  Assessment & Plan  Patient had an episode of involuntary movement to right arm for which RRT was called. Given neuro history, will obtain neuro consult    History of aortic valve replacement  Assessment & Plan  2021 in [facility]    Stroke [facility])  Assessment & Plan  History of stroke  Continue DAPT     Chronic kidney disease  Assessment & Plan  Lab Results   Component Value Date    EGFR 44 08/08/2023    EGFR 39 08/07/2023    EGFR 39 08/07/2023    CREATININE 1.48 (H) 08/08/2023    CREATININE 1.62 (H) 08/07/2023    CREATININE 1.62 (H) 08/07/2023   Baseline 1.4-1.6, Cr at baseline  Trend    Benign essential hypertension  Assessment & Plan  Continue metoprolol    Chronic atrial fibrillation (HCC)  Assessment & Plan  Continue aspirin, statin            VTE Pharmacologic Prophylaxis:   Pharmacologic: Heparin  Mechanical VTE Prophylaxis in Place: Yes    Patient Centered Rounds: I have performed bedside rounds with nursing staff today.   Discussions with Specialists or Other Care Team Provider: Yes  Education and Discussions with Family / Patient:Yes daughter at bedside   Time Spent for Care: 15 minutes. More than 50% of total time spent on counseling and coordination of care as described above. Current Length of Stay: 0 day(s)  Current Patient Status: Inpatient     Discharge Plan: pending work up     Code Status: Level 1 - Full Code      Subjective:   Patient sitting in bed with daughter at bedside. She states that he had an episode of reduced consciousness last night involuntary arm movement. He is at baseline now with no complaints. Objective:     Vitals:   Temp (24hrs), Av.8 °F (36.6 °C), Min:97.4 °F (36.3 °C), Max:98 °F (36.7 °C)    Temp:  [97.4 °F (36.3 °C)-98 °F (36.7 °C)] 98 °F (36.7 °C)  HR:  [58-74] 74  Resp:  [16-20] 16  BP: (126-187)/(72-98) 140/89  SpO2:  [94 %-97 %] 97 %  Body mass index is 29.99 kg/m². Input and Output Summary (last 24 hours): Intake/Output Summary (Last 24 hours) at 2023 1527  Last data filed at 2023 0324  Gross per 24 hour   Intake --   Output 800 ml   Net -800 ml        Physical Exam:     Physical Exam  Vitals and nursing note reviewed. HENT:      Head: Normocephalic. Nose: Nose normal.   Eyes:      Extraocular Movements: Extraocular movements intact. Cardiovascular:      Rate and Rhythm: Normal rate and regular rhythm. Heart sounds: Murmur heard. Pulmonary:      Effort: Pulmonary effort is normal.      Breath sounds: Normal breath sounds. Abdominal:      General: Abdomen is flat. Bowel sounds are normal. There is no distension. Palpations: Abdomen is soft. Musculoskeletal:         General: Normal range of motion. Skin:     General: Skin is warm and dry. Neurological:      General: No focal deficit present. Mental Status: He is alert.          Additional Data:     Labs:    Results from last 7 days   Lab Units 23  0411 23  0743   WBC Thousand/uL 8.11 7.75   HEMOGLOBIN g/dL 16.3 15.0   HEMATOCRIT % 47.4 46.3   PLATELETS Thousands/uL 121* 104*   NEUTROS PCT %  --  59     Results from last 7 days   Lab Units 08/08/23  0410 08/07/23  2254 08/07/23  0743   SODIUM mmol/L 137 138 139   POTASSIUM mmol/L 3.7 3.6 4.4   CHLORIDE mmol/L 103 102 106   CO2 mmol/L 24 28 30   BUN mg/dL 31* 34* 32*   CREATININE mg/dL 1.48* 1.62* 1.62*   CALCIUM mg/dL 9.2 9.2 9.6   TOTAL BILIRUBIN mg/dL 1.40*  --  0.96   ALK PHOS U/L 92  --  98   ALT U/L 10  --  12   AST U/L 21  --  20             Lab Results   Component Value Date/Time    HGBA1C 6.3 (H) 10/28/2022 01:58 PM     Results from last 7 days   Lab Units 08/07/23  1907   POC GLUCOSE mg/dl 140           * I Have Reviewed All Lab Data Listed Above. * Additional Pertinent Lab Tests Reviewed: [address] [place] [address] Admission Reviewed    Imaging:     XR chest 1 view portable   Final Result by Debby Witt MD (08/07 1015)      No acute cardiopulmonary disease.                Workstation performed: [id number]         NM lung ventilation / perfusion    (Results Pending)     Imaging Reports Reviewed by myself    Cultures:   Blood Culture: No results found for: "BLOODCX"  Urine Culture:   Lab Results   Component Value Date    URINECX 30,000-39,000 cfu/ml 06/02/2023    URINECX No Growth <1000 cfu/mL 04/27/2023    [place] <10,000 cfu/ml 01/09/2023     Sputum Culture: No components found for: "Adam Jerez"  Wound Culture: No results found for: "WOUNDCULT"    Last 24 Hours Medication List:   Current Facility-Administered Medications   Medication Dose Route Frequency Provider Last Rate   • amLODIPine  5 mg Oral Daily Jonah Cervantes PA-C     • aspirin  81 mg Oral Daily Jay Silvius, CRNP     • atorvastatin  40 mg Oral Daily With Dinner Jay Silvius, CRNP     • clopidogrel  75 mg Oral Daily Jay Silvius, CRNP     • ferrous sulfate  325 mg Oral Daily With Breakfast Jay Silvius, CRNP     • finasteride  5 mg Oral Daily Jay Silvius, CRNP     • furosemide  20 mg Oral Daily Malcolm Mike PA-C     • heparin (porcine)  5,000 Units Subcutaneous [facility] KELLY Regalado     • metoprolol tartrate  50 mg Oral Q12H [address] [facility], KELLY          Today, Patient Was Seen By: KELLY Regalado    ** Please Note: Dragon 360 Dictation voice to text software may have been used in the creation of this document.  **

## 2023-08-08 NOTE — ASSESSMENT & PLAN NOTE
Patient presented with complaints shortness of breath for 2 days. He has had 2 ER visits in that time period. He denies any chest pain. History of TAVR. Symptoms occur in the nighttime while supine. He denies any weight gain, pedal edema. .   Chest x-ray negative    Pt was RRT 8/7 for episode of shortness of breath lasting about 45 minutes for family with some involuntary right arm movement   · Continue telemetry  · Continue lasix  · Repeat echo with no changes from prior, EF 55%, aortic valve well functioning  · Cardiology consultation appreciated   · Daily Weight  · Intake and output  · Low sodium diet  · VQ scan pending

## 2023-08-08 NOTE — RAPID RESPONSE
Rapid Response Note  Trang Waynen 82 y.o. male MRN: [id number]  Unit/Bed#: [address] [place] [address] Encounter: [id number]    Rapid Response Notification(s):   Response called date/time:  8/7/2023 7:06 PM  Response team arrival date/time:  8/7/2023 7:10 PM  Response end date/time:  8/7/2023 7:10 PM  Level of care:  Medsur  Rapid response location:  [facility] unit  Primary reason for rapid response call: Other (comment) (per nursing, daughter stated was acting weird)    Rapid Response Intervention(s):   Airway:  None  Breathing:  None  Fluids administered:  None  Medications administered:  None       Assessment:   · Patient at baseline with no immediate concerns     Plan:   · SLIM attending to follow up with care - no critical care interventions needed      Rapid Response Outcome:   Transfer:  Remain on floor  Primary service notified of transfer: No    Code Status: Level 1 (Full Code)    Comments:  SLIM attending the bedside upon critical care team arrival.  He stated there were no concerns and rapid was cancelled. Critical care team did not intervene and no assessment was performed.       Critical care time - 0 min    Family notified of transfer: no  Family member contacted: [address] [place] [address] Angelo Coronado

## 2023-08-08 NOTE — PROGRESS NOTES
Progress Note - Cardiology   [facility]     Daily Hercules 82 y.o. male MRN: [id number]  : 1940  Unit/Bed#: [address] [place] [address] Encounter: [id number]    Assessment and Plan:   1. Shortness of breath.     - Presented on 23 for evaluation for shortness of breath. Patient reports he woke this morning feeling short of breath. He denies shortness of breath associated with chest pain, palpitations, lightheadedness, dizziness, headache, nausea, or vomiting. He states since being admitted to the hospital his shortness of breath has resolved. He denies experiencing shortness of breath with exertion in recent weeks, weight gain, bilateral lower extremity edema, orthopnea. - Patient states since admission his shortness of breath has resolved. - Low clinical suspicion patient is in acute phase of chronic diastolic heart failure. Patient is euvolemic on examination. Patient lying flat in bed upon arrival without respiratory distress. - Shortness of breath possibly secondary to anxiety attack? Patient does have known history of anxiety. Has been on Ativan 0.5 mg as needed previously. - Patient is not requiring any supplemental oxygen use.     2. Chronic diastolic heart failure.      - Does not appear in acute exacerbation. Patient is euvolemic on examination. Patient lying flat in bed upon arrival without respiratory distress. Patient denies experiencing shortness of breath with exertion in recent weeks, weight gain, bilateral lower extremity edema, orthopnea. He states he only experiences shortness of breath early this morning upon waking.  - 23 BNP: 390.   - 23 CXR: No acute cardiopulmonary disease.  - 23 TTE: LVEF 55%. Although no diagnostic regional wall motion abnormality was identified, this possibility cannot be completely excluded on the basis of this study. Unable to assess diastolic function due to atrial fibrillation. Left atrium severely dilated.   Right atrium mild to moderately dilated. Aortic TAVR prosthetic valve functioning normally with no evidence of regurgitation, no significant stenosis. Mild mitral valve regurgitation. Mild tricuspid valve regurgitation. Right ventricle systolic pressure is mildly to moderately elevated. Mild pulmonic valve regurgitation. - 10/27/22 TTE: LVEF 55%. Diastolic function is moderately abnormal, consistent with grade II (pseudonormal) relaxation.  Left atrial filling pressure is elevated. Left atrium severely dilated. Right atrium moderately dilated. Prosthetic aortic valve functioning normally. ,  No significant stenosis. Mild to moderate mitral valve regurgitation. Mild tricuspid valve regurgitation. Right ventricular systolic pressure mildly elevated, 42 mmHg. - Lasix 40 mg IV ordered per primary team. Low clinical suspicion patient is in acute phase of chronic diastolic heart failure. Patient is not on diuretics outpatient. - Continue Lopressor 50 mg twice daily.  - Patient reports that he follows outpatient with cardiologist Dr. Ayaz Rivera.  - Review of home medications notes that patient is not on diuretic outpatient. Will start Lasix 20 mg oral daily.  - Monitor weight. - Monitor I/Os. - No further Cardiology work-up at this time.  - Recommend patient follow-up with his outpatient cardiologist Dr. Ayaz Rivera within 7 to 10 days of discharge.  - Recommend follow-up BMP within 1 week of discharge.     3. Chronic atrial fibrillation.     - 8/07/23 EKG: Atrial fibrillation with slow ventricular response, rate 53 bpm.  Septal infarct, cited on or before 11/03/20.   - Patient with known history of chronic atrial fibrillation, s/p Watchman procedure in 2021.   - Not on anticoagulation as patient is s/p Watchman procedure. - Continue Lopressor 50 mg twice daily.     4. Aortic stenosis s/p TAVR.    - s/p TAVR in 2021.   - 8/07/23 TTE:     Aortic Valve There is a Medtronic CoreValve TAVR bioprosthetic valve. This appears to be functioning normally with no evidence of regurgitation. There is no significant stenosis. Peak velocity is 2.31 m/s, mean/peak gradient is 11/21 mmHg, JOCELINE is 1 cm².     - 10/27/22 TTE:      Aortic Valve There is a Medtronic CoreValve TAVR bioprosthetic valve. The prosthetic valve appears to be functioning normally. There is no evidence of regurgitation. The aortic valve has no significant stenosis. The aortic valve peak gradient is 18 mmHg. The aortic valve mean gradient is 9 mmHg. The dimensionless velocity index is 0.41.      - Patient reports that he follows outpatient with cardiologist Dr. Geovani Salmon.     5. Hypertension.     - On presentation noted hypertensive urgency, /95. BP remains elevated. - Continue Lopressor 50 mg twice daily.  - If BP remains elevated may add additional BP medication.  - Continue to monitor BP.     6. CVA.    - Left frontal CVA in 10/2020.  - Currently on aspirin 81 mg daily, Plavix 75 mg daily, and Lipitor 40 mg daily.     7. CKD stage III.    - Baseline creatinine appears between 1.4 and 1.6.  - Care per primary team.     8.  Anxiety.    - Review of chart notes that patient has been on then 0.5 mg as needed for anxiety.  - Not currently on antianxiety medication. Consider ordering. Subjective / Objective:           Rapid response was called yesterday evening at approximately 1900 hrs as daughter reported patient was acting strange. Rapid response was canceled when ICU arrived as patient offers no complaints. Patient reports feeling well this morning. His daughter at bedside states that yesterday evening he started to have rapid breathing and shaking. Patient currently denies chest pain, palpitations, shortness of breath, lightheadedness, dizziness, headache, nausea, or vomiting. Vitals: Blood pressure 140/89, pulse 74, temperature 98 °F (36.7 °C), temperature source Oral, resp.  rate 16, height 5' 10" (1.778 m), weight 94.8 kg (209 lb), SpO2 97 %. Vitals:    08/07/23 1251 08/07/23 1319   Weight: 94.9 kg (209 lb 3.2 oz) 94.8 kg (209 lb)     Body mass index is 29.99 kg/m². BP Readings from Last 3 Encounters:   08/08/23 140/89   08/05/23 (!) 179/82   06/13/23 166/82     Orthostatic Blood Pressures    Flowsheet Row Most Recent Value   Blood Pressure 140/89 filed at 08/08/2023 0745   Patient Position - Orthostatic VS Sitting filed at 08/08/2023 0745        I/O       08/06 0701  08/07 0700 08/07 0701  08/08 0700 08/08 0701  08/09 0700    Urine (mL/kg/hr)  1575     Stool  0     Total Output  1575     Net  -1575            Unmeasured Stool Occurrence  1 x         Invasive Devices     Peripheral Intravenous Line  Duration           Peripheral IV 08/07/23 Right Antecubital 1 day                  Intake/Output Summary (Last 24 hours) at 8/8/2023 1039  Last data filed at 8/8/2023 0324  Gross per 24 hour   Intake --   Output 1575 ml   Net -1575 ml     Physical Exam:   Physical Exam  Vitals reviewed. Constitutional:       General: He is not in acute distress. Appearance: He is obese. Cardiovascular:      Rate and Rhythm: Normal rate and regular rhythm. Pulses: Normal pulses. Heart sounds: Murmur heard. Pulmonary:      Effort: Pulmonary effort is normal. No respiratory distress. Abdominal:      General: Abdomen is flat. There is no distension. Palpations: Abdomen is soft. Tenderness: There is no abdominal tenderness. Musculoskeletal:      Right lower leg: No edema. Left lower leg: No edema. Skin:     General: Skin is warm and dry. Neurological:      Mental Status: He is alert and oriented to person, place, and time.        Medications/ Allergies:     Current Facility-Administered Medications   Medication Dose Route Frequency Provider Last Rate   • amLODIPine  5 mg Oral Daily Jonah Cervantes PA-C     • aspirin  81 mg Oral Daily KELLY Cheung     • atorvastatin  40 mg Oral Daily With [address] [facility] • clopidogrel  75 mg Oral Daily KELLY Orellana     • ferrous sulfate  325 mg Oral Daily With Breakfast KELLY Orellana     • finasteride  5 mg Oral Daily KELLY Orellana     • furosemide  20 mg Oral Daily Marleni Whaley PA-C     • heparin (porcine)  5,000 Units Subcutaneous [facility] KELLY Orellana     • metoprolol tartrate  50 mg Oral Q12H [address] KELLY Orellana          No Known Allergies    VTE Pharmacologic Prophylaxis:   Heparin    Labs:   Troponins:  Results from last 7 days   Lab Units 08/07/23  1152 08/07/23  0936   HSTNI D2 ng/L  --  -1   HSTNI D4 ng/L 0  --          CBC with diff:  Results from last 7 days   Lab Units 08/08/23  0411 08/07/23  0743   WBC Thousand/uL 8.11 7.75   HEMOGLOBIN g/dL 16.3 15.0   HEMATOCRIT % 47.4 46.3   MCV fL 88 90   PLATELETS Thousands/uL 121* 104*   RBC Million/uL 5.39 5.13   MCH pg 30.2 29.2   MCHC g/dL 34.4 32.4   RDW % 15.3* 14.6   MPV fL 10.1 9.8   NRBC AUTO /100 WBCs  --  0       CMP:  Results from last 7 days   Lab Units 08/08/23  0410 08/07/23  2254 08/07/23  0743   SODIUM mmol/L 137 138 139   POTASSIUM mmol/L 3.7 3.6 4.4   CHLORIDE mmol/L 103 102 106   CO2 mmol/L 24 28 30   ANION GAP mmol/L 10 8 3   BUN mg/dL 31* 34* 32*   CREATININE mg/dL 1.48* 1.62* 1.62*   GLUCOSE FASTING mg/dL  --  85  --    CALCIUM mg/dL 9.2 9.2 9.6   AST U/L 21  --  20   ALT U/L 10  --  12   ALK PHOS U/L 92  --  98   TOTAL PROTEIN g/dL 6.5  --  6.3*   ALBUMIN g/dL 3.7  --  3.6   TOTAL BILIRUBIN mg/dL 1.40*  --  0.96   EGFR ml/min/1.73sq m 44 39 39       Magnesium:  Results from last 7 days   Lab Units 08/08/23  0410   MAGNESIUM mg/dL 2.0     Coags:    TSH:  Results from last 7 days   Lab Units 08/07/23  0743   TSH 3RD GENERATON uIU/mL 3.076     Imaging & Testing   I have personally reviewed pertinent reports. XR chest 1 view portable    Result Date: 8/7/2023    Impression: No acute cardiopulmonary disease. EKG / Monitor: Personally reviewed.       Not currently on telemetry. Cardiac testing:     Echo complete w/ contrast if indicated    Result Date: 8/7/2023  Narrative: •  Left Ventricle: Left ventricular cavity size is normal. Wall thickness is mildly increased. Systolic function is normal.  Estimated ejection fraction is 55%. Although no diagnostic regional wall motion abnormality was identified, this possibility cannot be completely excluded on the basis of this study. Unable to assess diastolic function due to atrial fibrillation. •  Right Ventricle: Systolic function appears normal. •  Left Atrium: The atrium visual appears severely dilated visually. •  Right Atrium: The atrium visually appears mild to moderately dilated. •  Aortic Valve: There is a Medtronic CoreValve TAVR bioprosthetic valve. This appears to be functioning normally with no evidence of regurgitation. There is no significant stenosis. Peak velocity is 2.31 m/s, mean/peak gradient is 11/21 mmHg, •  Mitral Valve: There is mild regurgitation. •  Tricuspid Valve: There is mild regurgitation. The right ventricular systolic pressure is mildly to moderately elevated. •  Pulmonic Valve: There is mild regurgitation. •  Prior TTE study available for comparison. Prior study date: 10/27/2022. No significant changes noted compared to the prior study. Echo complete with contrast if indicated  Result date: 10/27/22      Left Ventricle Left ventricular cavity size is normal. Wall thickness is mildly increased. The left ventricular ejection fraction is 55% by visual estimation. Systolic function is normal.  Wall motion is normal. Diastolic function is moderately abnormal, consistent with grade II (pseudonormal) relaxation.  Left atrial filling pressure is elevated. Right Ventricle Right ventricular cavity size is normal. Systolic function is normal. Wall thickness is normal.      Left Atrium The atrium is severely dilated (>48 mL/m2). Right Atrium The atrium is moderately dilated. Aortic Valve There is a Medtronic CoreValve TAVR bioprosthetic valve. The prosthetic valve appears to be functioning normally. There is no evidence of regurgitation. The aortic valve has no significant stenosis. The aortic valve peak gradient is 18 mmHg. The aortic valve mean gradient is 9 mmHg. The dimensionless velocity index is 0.41. Mitral Valve The leaflets are not thickened. There is mild calcification. The leaflets exhibit normal mobility. There is mild to moderate regurgitation. There is no evidence of stenosis. Tricuspid Valve Tricuspid valve structure is normal. There is mild regurgitation. There is no evidence of stenosis. The right ventricular systolic pressure is mildly elevated. The estimated right ventricular systolic pressure is 22.45 mmHg. Pulmonic Valve Pulmonic valve structure is normal. There is mild regurgitation. There is no evidence of stenosis. Ascending Aorta The aortic root is normal in size. IVC/SVC The right atrial pressure is estimated at 8.0 mmHg. The inferior vena cava is normal in size. Pericardium There is no pericardial effusion.  The pericardium is normal in appearance.           Results for orders placed during the hospital encounter of 10/28/20    Echo complete with contrast if indicated    [address]  [phone]    Transthoracic Echocardiogram  2D, M-mode, Doppler, and Color Doppler    Study date:  29-Oct-2020    Patient: Daily Hercules  MR number: [id number]  Account number: [de-identified]  : 1940  Age: 68 years  Gender: Male  Status: Inpatient  Location: Bedside  Height: 69 in  Weight: 197.6 lb  BP: 134/ 82 mmHg    Indications: Stroke    Diagnoses: I67.9 - Cerebrovascular disease, unspecified    Sonographer:  BERLIN Renner  Primary Physician:  True Age, PA-C  Referring Physician:  BAILEY Mathur  Group:  [facility]  Interpreting Physician:  Barb Connolly MD    SUMMARY    LEFT VENTRICLE:  Systolic function was at the lower limits of normal. Ejection fraction was estimated in the range of 50 % to 55 % to be 50 %. Although no diagnostic regional wall motion abnormality was identified, this possibility cannot be completely excluded on the basis of this study. Wall thickness was mildly increased. There was mild concentric hypertrophy. LEFT ATRIUM:  The atrium was moderately dilated. RIGHT ATRIUM:  The atrium was mildly dilated. MITRAL VALVE:  There was mild to moderate annular calcification. There was mild regurgitation. AORTIC VALVE:  The valve was trileaflet. Leaflets exhibited moderately increased thickness, moderate calcification, and moderately reduced cuspal separation. Transaortic velocity was increased due to valvular stenosis. There was moderate to severe stenosis. Nish 1.0 cm2, peak gradient 40, mean 26 mm of hg  There was mild regurgitation. TRICUSPID VALVE:  There was mild regurgitation. Estimated peak PA pressure was 30 mmHg. PULMONIC VALVE:  There was mild regurgitation. HISTORY: PRIOR HISTORY: HTN, A- fib, CKD, CVA, CHF, AS    PROCEDURE: The procedure was performed at the bedside. This was a routine study. The transthoracic approach was used. The study included complete 2D imaging, M-mode, complete spectral Doppler, and color Doppler. The heart rate was 102 bpm,  at the start of the study. Echocardiographic views were limited due to restricted patient mobility and poor acoustic window availability. This was a technically difficult study. LEFT VENTRICLE: Size was normal. Systolic function was at the lower limits of normal. Ejection fraction was estimated in the range of 50 % to 55 % to be 50 %. Although no diagnostic regional wall motion abnormality was identified, this  possibility cannot be completely excluded on the basis of this study.  Wall thickness was mildly increased. There was mild concentric hypertrophy. DOPPLER: The study was not technically sufficient to allow evaluation of LV diastolic  function. RIGHT VENTRICLE: The size was normal. Systolic function was normal.    LEFT ATRIUM: The atrium was moderately dilated. RIGHT ATRIUM: The atrium was mildly dilated. MITRAL VALVE: There was mild to moderate annular calcification. There was normal leaflet separation. DOPPLER: The transmitral velocity was within the normal range. There was no evidence for stenosis. There was mild regurgitation. AORTIC VALVE: The valve was trileaflet. Leaflets exhibited moderately increased thickness, moderate calcification, and moderately reduced cuspal separation. DOPPLER: Transaortic velocity was increased due to valvular stenosis. There was  moderate to severe stenosis. Nish 1.0 cm2, peak gradient 40, mean 26 mm of hg There was mild regurgitation. TRICUSPID VALVE: DOPPLER: There was mild regurgitation. Estimated peak PA pressure was 30 mmHg. PULMONIC VALVE: DOPPLER: There was mild regurgitation. PERICARDIUM: There was no thickening or calcification. There was no pericardial effusion. AORTA: The root exhibited normal size. SYSTEMIC VEINS: IVC: The inferior vena cava was not well visualized.     SYSTEM MEASUREMENT TABLES    2D  EF (Teich): 50.37 %    PW  MV E/A Ratio: 0    [employer] Accredited Echocardiography Laboratory    Prepared and electronically signed by    Malena Strauss MD  Signed 29-Oct-2020 17:19:34    Results for orders placed during the hospital encounter of 04/15/17    NM myocardial perfusion spect (stress and/or rest)    [address]  [phone]    Rest/Stress Gated SPECT Myocardial Perfusion Imaging After Exercise    Patient: Callum Mackay  MR number: [id number]  Account number: [de-identified]  : 1940  Age: 78 years  Gender: Male  Status: Inpatient  Location: Stress lab  Height: 70 in  Weight: 74 lb  BP: 120/ 74 mmHg    Allergies: NO KNOWN ALLERGIES    Diagnosis: R06.02 - Shortness of breath, R07.9 - Chest pain, unspecified    Primary Physician:  Jason Flower PA-C  RN:  MIRA Olvera  Group:  Isabela Newman  Interpreting Physician:  Isabela Carballo DO    INDICATIONS: Detection of coronary artery disease. HISTORY: The patient is a 78year old  male. Chest pain status: chest pain. Other symptoms: dyspnea and edema. Coronary artery disease risk factors: dyslipidemia, hypertension, and smoking. Cardiovascular history: arrhythmia-  AFib. Co-morbidity: history of lung disease- COPD and history of renal disease- CKD. Medications: Eliquis, a beta blocker and a diuretic. PHYSICAL EXAM: Baseline physical exam screening: normal and no wheezes audible. REST ECG: Atrial fibrillation. PROCEDURE: The study was performed in the stress lab. Treadmill exercise testing was performed, using the [place] protocol. Gated SPECT myocardial perfusion imaging was performed after stress and at rest. Systolic blood pressure was 120  mmHg, at the start of the study. Diastolic blood pressure was 74 mmHg, at the start of the study. The heart rate was 78 bpm, at the start of the study. IV double checked. ISREAL PROTOCOL:  HR bpm SBP mmHg DBP mmHg Symptoms Rhythm/conduct  Baseline 82 120 74 none A-fib  Stage 1 126 124 74 mild dyspnea --  Stage 2 125 -- -- severe dyspnea --  Immediate 118 146 80 same as above --  Recovery 1 86 122 80 subsiding --  Recovery 2 88 127 76 subsiding --  No medications or fluids given. STRESS SUMMARY: Duration of exercise was 4 min and 19 sec. The patient exercised to protocol stage 2. Maximal work rate was 7 METs. Maximal heart rate during stress was 141 bpm ( 97 % of maximal predicted heart rate). The heart rate  response to stress was normal. There was normal resting blood pressure with an appropriate response to stress.  The rate-pressure product for the peak heart rate and blood pressure was 64676. There was no chest pain during stress. The  stress test was terminated due to achievement of target heart rate and severe dyspnea. Arrhythmia during stress: atrial fibrillation. ISOTOPE ADMINISTRATION:  Resting isotope administration Stress isotope administration  Agent Tetrofosmin Tetrofosmin  Dose 10.9 mCi 32.2 mCi  Date 04/18/2017 04/18/2017    Radiopharmaceutical was administered 3 min, 57 sec into the stress protocol. MYOCARDIAL PERFUSION IMAGING:  The image quality was good. Rotating projection images reveal subdiaphragmatic activity. Left ventricular size was normal. The TID ratio was 1.00. PERFUSION DEFECTS:  -  There were no perfusion defects. GATED SPECT:  The calculated left ventricular ejection fraction was 43 %. Left ventricular ejection fraction was mildly decreased by visual estimate. There was no left ventricular regional abnormality. SUMMARY:  -  Stress results: Duration of exercise was 4 min and 19 sec. Target heart rate was achieved. There was no chest pain during stress. -  ECG conclusions: Arrhythmia during stress: atrial fibrillation.  -  Perfusion imaging: There were no perfusion defects.  -  Gated SPECT: The calculated left ventricular ejection fraction was 43 %. Left ventricular ejection fraction was mildly decreased by visual estimate. There was no left ventricular regional abnormality. IMPRESSIONS: Normal study after maximal exercise. Myocardial perfusion imaging was normal at rest and with stress. Left ventricular systolic function was reduced, without distinct regional wall motion abnormalities.     Prepared and signed by    Sheeba Vásquez DO  Signed 04/18/2017 13:43:32        Malcolm Mike PA-C

## 2023-08-08 NOTE — PLAN OF CARE
Problem: SAFETY ADULT  Goal: Patient will remain free of falls  Description: INTERVENTIONS:  - Educate patient/family on patient safety including physical limitations  - Instruct patient to call for assistance with activity   - Consult OT/PT to assist with strengthening/mobility   - Keep Call bell within reach  - Keep bed low and locked with side rails adjusted as appropriate  - Keep care items and personal belongings within reach  - Initiate and maintain comfort rounds  - Make Fall Risk Sign visible to staff  - Offer Toileting every 2 Hours, in advance of need  - Initiate/Maintain bed alarm  - Obtain necessary fall risk management equipment: walker  - Apply yellow socks and bracelet for high fall risk patients  - Consider moving patient to room near nurses station  Outcome: Progressing     Problem: PAIN - ADULT  Goal: Verbalizes/displays adequate comfort level or baseline comfort level  Description: Interventions:  - Encourage patient to monitor pain and request assistance  - Assess pain using appropriate pain scale  - Administer analgesics based on type and severity of pain and evaluate response  - Implement non-pharmacological measures as appropriate and evaluate response  - Consider cultural and social influences on pain and pain management  - Notify physician/advanced practitioner if interventions unsuccessful or patient reports new pain  Outcome: Progressing     Problem: Knowledge Deficit  Goal: Patient/family/caregiver demonstrates understanding of disease process, treatment plan, medications, and discharge instructions  Description: Complete learning assessment and assess knowledge base.   Interventions:  - Provide teaching at level of understanding  - Provide teaching via preferred learning methods  Outcome: Progressing     Problem: RESPIRATORY - ADULT  Goal: Achieves optimal ventilation and oxygenation  Description: INTERVENTIONS:  - Assess for changes in respiratory status  - Assess for changes in mentation and behavior  - Position to facilitate oxygenation and minimize respiratory effort  - Oxygen administered by appropriate delivery if ordered  - Initiate smoking cessation education as indicated  - Encourage broncho-pulmonary hygiene including cough, deep breathe, Incentive Spirometry  - Assess the need for suctioning and aspirate as needed  - Assess and instruct to report SOB or any respiratory difficulty  - Respiratory Therapy support as indicated  Outcome: Progressing

## 2023-08-08 NOTE — ASSESSMENT & PLAN NOTE
Patient had an episode of involuntary movement to right arm for which RRT was called.   Given neuro history, will obtain neuro consult

## 2023-08-08 NOTE — ASSESSMENT & PLAN NOTE
Lab Results   Component Value Date    EGFR 44 08/08/2023    EGFR 39 08/07/2023    EGFR 39 08/07/2023    CREATININE 1.48 (H) 08/08/2023    CREATININE 1.62 (H) 08/07/2023    CREATININE 1.62 (H) 08/07/2023   Baseline 1.4-1.6, Cr at baseline  Trend

## 2023-08-09 VITALS
HEIGHT: 70 IN | BODY MASS INDEX: 29.55 KG/M2 | DIASTOLIC BLOOD PRESSURE: 88 MMHG | SYSTOLIC BLOOD PRESSURE: 178 MMHG | WEIGHT: 206.39 LBS | TEMPERATURE: 97.7 F | HEART RATE: 61 BPM | RESPIRATION RATE: 18 BRPM | OXYGEN SATURATION: 94 %

## 2023-08-09 PROBLEM — F41.9 ANXIETY: Status: ACTIVE | Noted: 2023-08-09

## 2023-08-09 LAB
ANION GAP SERPL CALCULATED.3IONS-SCNC: 7 MMOL/L
BASOPHILS # BLD AUTO: 0.05 THOUSANDS/ÂΜL (ref 0–0.1)
BASOPHILS NFR BLD AUTO: 1 % (ref 0–1)
BUN SERPL-MCNC: 34 MG/DL (ref 5–25)
CALCIUM SERPL-MCNC: 9 MG/DL (ref 8.4–10.2)
CHLORIDE SERPL-SCNC: 103 MMOL/L (ref 96–108)
CO2 SERPL-SCNC: 27 MMOL/L (ref 21–32)
CREAT SERPL-MCNC: 1.47 MG/DL (ref 0.6–1.3)
EOSINOPHIL # BLD AUTO: 0.19 THOUSAND/ÂΜL (ref 0–0.61)
EOSINOPHIL NFR BLD AUTO: 2 % (ref 0–6)
ERYTHROCYTE [DISTWIDTH] IN BLOOD BY AUTOMATED COUNT: 16.2 % (ref 11.6–15.1)
GFR SERPL CREATININE-BSD FRML MDRD: 44 ML/MIN/1.73SQ M
GLUCOSE SERPL-MCNC: 106 MG/DL (ref 65–140)
HCT VFR BLD AUTO: 47.1 % (ref 36.5–49.3)
HGB BLD-MCNC: 15.5 G/DL (ref 12–17)
IMM GRANULOCYTES # BLD AUTO: 0.04 THOUSAND/UL (ref 0–0.2)
IMM GRANULOCYTES NFR BLD AUTO: 1 % (ref 0–2)
LYMPHOCYTES # BLD AUTO: 1.89 THOUSANDS/ÂΜL (ref 0.6–4.47)
LYMPHOCYTES NFR BLD AUTO: 23 % (ref 14–44)
MCH RBC QN AUTO: 29.8 PG (ref 26.8–34.3)
MCHC RBC AUTO-ENTMCNC: 32.9 G/DL (ref 31.4–37.4)
MCV RBC AUTO: 90 FL (ref 82–98)
MONOCYTES # BLD AUTO: 1.04 THOUSAND/ÂΜL (ref 0.17–1.22)
MONOCYTES NFR BLD AUTO: 13 % (ref 4–12)
NEUTROPHILS # BLD AUTO: 4.96 THOUSANDS/ÂΜL (ref 1.85–7.62)
NEUTS SEG NFR BLD AUTO: 60 % (ref 43–75)
NRBC BLD AUTO-RTO: 0 /100 WBCS
PLATELET # BLD AUTO: 104 THOUSANDS/UL (ref 149–390)
PMV BLD AUTO: 9.7 FL (ref 8.9–12.7)
POTASSIUM SERPL-SCNC: 4.2 MMOL/L (ref 3.5–5.3)
RBC # BLD AUTO: 5.21 MILLION/UL (ref 3.88–5.62)
SODIUM SERPL-SCNC: 137 MMOL/L (ref 135–147)
WBC # BLD AUTO: 8.17 THOUSAND/UL (ref 4.31–10.16)

## 2023-08-09 PROCEDURE — 99223 1ST HOSP IP/OBS HIGH 75: CPT | Performed by: PSYCHIATRY & NEUROLOGY

## 2023-08-09 PROCEDURE — 99233 SBSQ HOSP IP/OBS HIGH 50: CPT | Performed by: INTERNAL MEDICINE

## 2023-08-09 PROCEDURE — 99239 HOSP IP/OBS DSCHRG MGMT >30: CPT

## 2023-08-09 PROCEDURE — 80048 BASIC METABOLIC PNL TOTAL CA: CPT | Performed by: NURSE PRACTITIONER

## 2023-08-09 PROCEDURE — 85025 COMPLETE CBC W/AUTO DIFF WBC: CPT | Performed by: NURSE PRACTITIONER

## 2023-08-09 RX ORDER — AMLODIPINE BESYLATE 5 MG/1
5 TABLET ORAL DAILY
Qty: 30 TABLET | Refills: 1 | Status: SHIPPED | OUTPATIENT
Start: 2023-08-10

## 2023-08-09 RX ORDER — FUROSEMIDE 20 MG/1
20 TABLET ORAL DAILY
Qty: 30 TABLET | Refills: 0 | Status: SHIPPED | OUTPATIENT
Start: 2023-08-10

## 2023-08-09 RX ADMIN — FERROUS SULFATE TAB 325 MG (65 MG ELEMENTAL FE) 325 MG: 325 (65 FE) TAB at 08:58

## 2023-08-09 RX ADMIN — CLOPIDOGREL BISULFATE 75 MG: 75 TABLET ORAL at 08:58

## 2023-08-09 RX ADMIN — SERTRALINE HYDROCHLORIDE 50 MG: 50 TABLET ORAL at 08:58

## 2023-08-09 RX ADMIN — METOPROLOL TARTRATE 50 MG: 50 TABLET, FILM COATED ORAL at 08:58

## 2023-08-09 RX ADMIN — FUROSEMIDE 20 MG: 20 TABLET ORAL at 08:58

## 2023-08-09 RX ADMIN — FINASTERIDE 5 MG: 5 TABLET, FILM COATED ORAL at 08:58

## 2023-08-09 RX ADMIN — AMLODIPINE BESYLATE 5 MG: 5 TABLET ORAL at 08:58

## 2023-08-09 RX ADMIN — HEPARIN SODIUM 5000 UNITS: 5000 INJECTION INTRAVENOUS; SUBCUTANEOUS at 05:04

## 2023-08-09 RX ADMIN — ASPIRIN 81 MG: 81 TABLET, COATED ORAL at 08:58

## 2023-08-09 NOTE — ASSESSMENT & PLAN NOTE
· Continue metoprolol and lasix  · Started on amlodipine by cardiology for elevated blood pressures  · Follow up with outpatient cardiologist

## 2023-08-09 NOTE — CONSULTS
[facility]   Neurology Initial Consult    Gallito Shaikh is a 82 y.o. male  3 [facility] 323/3 [place]-*          Information obtained from:   Chief Complaint   Patient presents with   • Shortness of Breath     Pt c/o SOB and hypertension         Assessment/Plan:    1. Dyspnea  2. Episodic tremor  3. 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 Left A1/KALYANI, anterior P1/P2 aneurysms  5. Hypertension  6. CHF    -Monitor on telemetry  -Neuro assessment  -Continue home dose of baby aspirin 81 mg daily  -Continue home dose of Plavix 75 mg daily  -Continue atorvastatin 40 mg daily  -Continue working towards normotensive blood pressures  -Cardiology recommendations for exacerbation of CHF  -Consider addition of SSRI for anxiety/depression/grief management, managed by medical team  -Continue with outpatient follow-ups with neurosurgery regarding ongoing monitoring and maintenance of aneurysms  -Patient may benefit from referrals through outpatient grief groups regarding loss. If spiritual and patient agreeable, may benefit from having pastoral care visit    Patient is an 68-year-old male with significant past medical history including iron deficient anemia, heart failure, AVR with bioprosthetic valve, A-fib post watchman's procedure 2021, kidney stones with stent, anxiety/panic, chronic left KALYANI and caudate infarcts and left A1/anterior P1/P2 aneurysms followed by neurosurgery who was brought to the ER after having onset of shortness of breath. Patient had been having issues with monitoring and managing his blood pressures for 2 to 3 days. He initially visited the ER however his blood pressure was managed and patient was discharged home with ongoing follow-ups. Open 8/7/2023 with onset of shortness of breath. He became anxious and was concerned with regards to his blood pressure at that time therefore was brought to the ER for further evaluation.   On arrival patient's blood pressure was 207/95. He presented with trace edema to the lower extremities however had no additional abnormalities. Patient's creatinine was bumped to 1.62, he had slightly elevated BNP. Blood and protein noted in his UA. Patient was given IV Lasix and admitted for further evaluation of possible CHF exacerbation. 8/7/2023, early evening patient reported sudden onset of shortness of breath. He had noted having distress, people were gathering around him. He had developed some numbness to his hands during this event. Medical team came to bedside for further evaluation. Upon arrival patient started having some tremoring in his arms and legs. Patient denied having any tonic-clonic seizure-like activity, there was no jerking of his arms or legs or thrashing about. He was awake alert and oriented during the event. He was able to communicate back-and-forth with his medical team.  There was no reports of incontinence or oral trauma during this event. Patient has no history of seizure activity, no history of seizures with his strokes or aneurysms and no familial history of seizure activity. Patient has had on and off again panic attacks most recently, he reports having significant loss in his family including his sister and his son both of whom passed away fairly recently. He has had multiple family members over the past 8 months who have passed. He has good and bad days and notes that at times he can feel very overwhelmed with his grief. Patient stated he did not feel overtly anxious during this event however was having metabolic issues with exacerbations of COPD and difficulties breathing. This in combination with his underlying anxieties and feeling overwhelmed may have led to tremulous event. His signs and symptoms of this event are atypical of seizure activity, and given his history is of low suspicion for this. From a neurological perspective patient is not in any need of further workup at this time.   Suspect patient's metabolic causes in combination with his anxiety and grief may have led to tremulous event which resolved itself independently of intervention. Would recommend continued medical management of his metabolic conditions as well as consideration for management of anxiety/depression as well as grief. Christiano Flores will not need outpatient follow up with Neurology. He will not require outpatient neurological testing. HPI:  Christiano Flores is an 81yo male with PMH of heart failure, AVR with bioprosthetic valve, A-fib post watchman's procedure 2021, kidney stones with stent, anxiety/panic, iron deficient anemia and left frontal medial KALYANI distribution infarct/left caudate infarct with 2 cerebral aneurysms who was brought to the ER after having increased bouts of shortness of breath. Met with patient and his daughter at bedside, history of events per patient. Patient reported a couple of days ago having issues with his blood pressure, went to the ER on 8/5/2023 and was evaluated. At that time his blood pressure was not significantly high enough for admission therefore he was discharged home with follow-up care. Patient stated on 8/7/2023 he awoke in the morning with shortness of breath and was concerned he was having some cardiac event. Patient had no other associated symptoms however did have some mild trace edema to the lower extremities for which she received Lasix in the ER. Patient was also admitted for further evaluation of this. On 8/7/2023 in the evening, patient had onset of shortness of breath. Patient stated there were people around him, he noted during this event his hands had some numbness. His daughter was at the bedside and got assistance.  came up to evaluate him and noted onset of tremoring in his arms and legs. Patient stated this lasted for a couple minutes and then went away on its own.   Patient stated he was awake during this event, he was communicating with people at his side. His symptoms included a tremoring of his arms and legs. Patient denies any tonic-clonic activity, no thrashing of the arms legs. He had no loss of consciousness or changes in awareness. He was able to communicate adequately with the provider at his bedside. He had no episode of incontinence or trauma to the tongue or mouth. Patient had denied any other symptoms during this time. Patient does have a history significant for cerebrovascular disease. He has had multiple chronic infarct to the left medial KALYANI distribution as well as a left caudate and combination with 3.7 mm KALYANI/left A1 aneurysm and 2 mm anterior P1/P2 segment aneurysms for which she follows with neurosurgery, therefore question the possibility of neurological etiology of patient's shaking. Neurology was consulted for further evaluation. Discussion with patient and his daughter at bedside, they recount the similar story as stated above. According to patient he has no history of seizure activity. He has had no reports or histories of seizures during his strokes. He has no family history of seizures and this event was brief and isolated x 1. Patient indicated his shortness of breath and respiratory distress preceded this event. He did not feel as if he had increased anxiety however patient states his providers have indicated to him that some of his symptoms are related to "panic attacks". Patient reports he does not generally have anxiety or depression however this year, 2023 has been specifically difficult for him. He has had multiple deaths in his family, 1 of which was his son, another was a sister relatively close in timeframe. Patient states that although he does not have history of anxiety or depression he is having difficulties with managing the grief process and has at times felt very overwhelmed. According to patient's daughter he has had ongoing issues with sleeping as well.   Patient states that he has had issues with sleep in the past for which she was started on Zoloft. He took it for a few weeks and this did help him sleep and he had no issues after that until recently. Patient has been receiving IV diuretic for his shortness of breath, monitoring his weight and I&O. VQ scan was done and indicated low suspicion of PE. Patient undergoing some metabolic changes with regards to his respirations and was given diuretics. Patient reports he has been diuresing well, his creatinine had bumped to 1.62, currently is trending downward to 1.47. Suspect patient's SOB and tremor episode related to metabolic changes as well as manifestations of unresolved anxiety and grief. Less likely to be seizure activity as his symptoms are very atypical and he has had no prior seizure activity in the past.  Regarding patient's CVA and aneurysm, continue on baby aspirin/Plavix 75 mg daily for AP therapies, continue his statin per home dosing. Patient continues to follow-up with neurosurgery for ongoing monitoring. Would recommend medical management to continue working towards resolution of patient's metabolic changes as well as management of anxiety and grief.     Past Medical History:   Diagnosis Date   • Anemia     iron deficiency   • Aneurysm ([address]) 2020    of brain  being monitored   • Dental disease    • Essential hypertension, long-standing    • Heart murmur     per pt "Aortic Valve replacement 2021 with Watchman device implanted /2021"   • Hematuria     intermittent   • History of cigarette smoking, chronic     62 year smoker at one half pack per day, quit 04/1/17   • History of COVID-19 01/19/2022    recovered at home/chief s/s only sore throat   • History of kidney stones    • History of pneumonia    • HL (hearing loss)    • Hyperlipidemia    • Hypertension    • Irregular heart beat    • Kidney stone    • Muscle weakness     right sided weakness has gotten better/ walks independantly"   • Stroke ([address]) 10/29/2020    right sided  weakness almost full recovery   • Stroke [facility])    • Teeth missing    • Vitamin D deficiency     maintained with 1000 units of D   • Water retention    • Wears glasses        Past Surgical History:   Procedure Laterality Date   • APPENDECTOMY  1960   • CARDIAC SURGERY      watchman [id number]; aortic valve replaced 2021(pig valve)   • CAROTID ENDARTERECTOMY Left 1998    Supposedly no internal stenosis found   • CYSTOSCOPY Right 8/15/2017    Procedure: CYSTOSCOPY RIGHT STENT EXCHANGE;  Surgeon: Dada Harris MD;  Location: [facility];  Service: Urology   • CYSTOSCOPY     • CYSTOSCOPY N/A 3/11/2022    Procedure: Lorraine Darrell, RIGHT RETROGRADE PYLEOGRAM;  Surgeon: Shannon Ruiz MD;  Location:  MAIN OR;  Service: Urology   • CYSTOSCOPY W/ URETERAL STENT PLACEMENT Right 6/13/2023    Procedure: Denise Gerardo STENT URETERAL;  Surgeon: Shannon Ruiz MD;  [facility];  Service: Urology   • EXTRACORPOREAL SHOCK WAVE LITHOTRIPSY  03/2017    For nephrolithiasis at [facility]   • [address]   5/10/2019   • FL RETROGRADE PYELOGRAM  7/30/2019   • FL RETROGRADE PYELOGRAM  10/22/2019   • FL RETROGRADE PYELOGRAM  1/28/2020   • FL RETROGRADE PYELOGRAM  8/11/2020   • FL RETROGRADE PYELOGRAM  12/15/2020   • FL RETROGRADE PYELOGRAM  2/14/2021   • FL RETROGRADE PYELOGRAM  5/11/2021   • FL RETROGRADE PYELOGRAM  10/19/2021   • FL RETROGRADE PYELOGRAM  3/11/2022   • FL RETROGRADE PYELOGRAM  7/15/2022   • FL RETROGRADE PYELOGRAM  1/26/2023   • FL RETROGRADE PYELOGRAM  6/13/2023   • WA CYSTO BLADDER W/URETERAL CATHETERIZATION Right 11/14/2017    Procedure: CYSTOSCOPY RETROGRADE, STENT EXCHANGE;  Surgeon: Dada Harris MD;  Location: [facility];  Service: Urology   • WA CYSTO BLADDER W/URETERAL CATHETERIZATION Right 5/8/2018    Procedure: CYSTOSCOPY, Wadie [place] EXCHANGE;  Surgeon: Dada Harris MD;  Location: [facility];  Service: Urology   • WA CYSTO BLADDER W/URETERAL CATHETERIZATION Right 8/14/2018    Procedure: CYSTOSCOPY, STENT EXCHANGE;  Surgeon: Ya Escudero MD;  Location: [facility];  Service: Urology   • OH CYSTO BLADDER W/URETERAL CATHETERIZATION Right 11/13/2018    Procedure: Neva Cornet EXCHANGE, RETROGRADE;  Surgeon: Ya Escudero MD;  Location: [facility];  Service: Urology   • OH CYSTO BLADDER W/URETERAL CATHETERIZATION Right 2/12/2019    Procedure: Leatha Leanna, STENT REMOVAL AND STENT PLACEMENT;  Surgeon: Ya Escudero MD;  Location: [facility];  Service: Urology   • OH CYSTO BLADDER W/URETERAL CATHETERIZATION Right 5/10/2019    Procedure: Leatha Leanna, STENT EXCHANGE;  Surgeon: Ya Escudero MD;  Location: [facility];  Service: Urology   • OH CYSTO BLADDER W/URETERAL CATHETERIZATION Right 7/30/2019    Procedure: CYSTOSCOPY, RETROGRADE, STENT REMOVAL AND PLACEMENT OF STENT;  Surgeon: Ya Escudero MD;  Location: [facility];  Service: Urology   • OH CYSTO BLADDER W/URETERAL CATHETERIZATION Right 10/22/2019    Procedure: CYSTOSCOPY, RETROGRADE, STENT EXCHANGE;  Surgeon: Ya Escudero MD;  Location: [facility];  Service: Urology   • OH CYSTO BLADDER W/URETERAL CATHETERIZATION Right 1/28/2020    Procedure: CYSTOSCOPY, RETROGRADE, STENT REMOVAL AND STENT PLACEMENT;  Surgeon: Ya Escudero MD;  Location: [facility];  Service: Urology   • OH CYSTO BLADDER W/URETERAL CATHETERIZATION Right 5/22/2020    Procedure: CYSTOSCOPY RETROGRADE, STENT EXCHANGE;  Surgeon: Ya Escudero MD;  Location: [facility];  Service: Urology   • OH CYSTO BLADDER W/URETERAL CATHETERIZATION Right 8/11/2020    Procedure: CYSTOSCOPY, STENT EXCHANGE, RETROGRADE;  Surgeon: Ya Escudero MD;  Location: [facility];  Service: Urology   • OH CYSTO BLADDER W/URETERAL CATHETERIZATION Right 12/15/2020    Procedure: CYSTOSCOPY, RETROGRADE, STENT REMOVAL, AND STENT PLACEMENT;  Surgeon: Ya Escudero MD;  Location: [facility] OR;  Service: Urology   • MS CYSTO BLADDER W/URETERAL CATHETERIZATION Right 5/11/2021    Procedure: CYSTOSCOPY RETROGRADE PYELOGRAM WITH STENT EXCHANGE;  Surgeon: Yasmani Thapa MD;  Location: [facility];  Service: Urology   • MS CYSTO BLADDER W/URETERAL CATHETERIZATION Right 10/19/2021    Procedure: CYSTOSCOPY WITH RETROGRADE, STENT EXCHANGE;  Surgeon: Yasmani Thapa MD;  Location: [facility];  Service: Urology   • MS CYSTO BLADDER W/URETERAL CATHETERIZATION Right 7/15/2022    Procedure: CYSTOSCOPY, RETROGRADE PYELOGRAM WITH EXCHANGE STENT URETERAL;  Surgeon: Julieth Raygoza MD;  [facility];  Service: Urology   • MS CYSTO W/INSERT URETERAL STENT Right 11/14/2017    Procedure: EXCHANGE STENT URETERAL;  Surgeon: Yasmani Thapa MD;  Location: [facility];  Service: Urology   • MS CYSTO W/INSERT URETERAL STENT Right 3/11/2022    Procedure: EXCHANGE STENT URETERAL;  Surgeon: Julieth Raygoza MD;  [facility];  Service: Urology   • MS CYSTO W/INSERT URETERAL STENT Right 1/26/2023    Procedure: EXCHANGE STENT URETERAL;  Surgeon: Julieth Raygoza MD;  [facility];  Service: Urology   • MS CYSTO/URETERO W/LITHOTRIPSY &INDWELL STENT INSRT Right 5/2/2017    Procedure: CYSTOSCOPY URETEROSCOPY WITH LITHOTRIPSY HOLMIUM LASER, RETROGRADE PYELOGRAM AND INSERTION STENT URETERAL;  Surgeon: Yasmani Thapa MD;  Location: [facility];  Service: Urology   • MS CYSTO/URETERO W/LITHOTRIPSY &INDWELL STENT INSRT Right 5/16/2017    Procedure: CYSTOSCOPY, RETROGRADE PYELOGRAM,  FLEXIBLE URETEROSCOPY,  HOLMIUM LASER LITHOTRIPSY, STONE BASKET MANIPULATION,  STENT URETERAL EXCHANGE;  Surgeon: Yasmani Thapa MD;  Location: [facility];  Service: Urology   • MS CYSTO/URETERO W/LITHOTRIPSY &INDWELL STENT INSRT Right 6/2/2017    Procedure: CYSTOSCOPY, RETROGRADE, STONE MANIPULATION WITH HOLMIUM LASER, STENT PLACEMENT;  Surgeon: Yasmani Thapa MD;  Location: [facility];  Service: Urology   • MS CYSTO/URETERO W/LITHOTRIPSY &INDWELL STENT INSRT Right 7/18/2017    Procedure: CYSTOSCOPY, RETROGRADE, URETEROSCOPY HOLMIUM LASER [address] [place] [address],  AND STENT PLACEMENT;  Surgeon: Jose Murillo MD;  Location: [facility];  Service: Urology   • ND CYSTO/URETERO W/LITHOTRIPSY &INDWELL STENT INSRT Right 2/14/2021    Procedure: CYSTOSCOPY URETEROSCOPY, RETROGRADE PYELOGRAM, STONE MANIPULATION AND EXCHANGE STENT URETERAL;  Surgeon: Radha Cole MD;  Location: [facility];  Service: Urology   • PROSTATE SURGERY  2015    Laser Prostatectomy  by Dr. Marium Avalos   • Radonna Crutch Right 4/18/2017    Procedure: INSERTION STENT URETERAL, RIGHT URETEROSCOPY,  LASER OF URETERAL STRICTURE AND STONE;  Surgeon: Jose Murillo MD;  Location: [facility];  Service:    • URETERAL STENT PLACEMENT Right 2/13/2018    Procedure: Christean Ground EXCHANGE;  Surgeon: Jose Murillo MD;  Location: [facility];  Service: Urology       No Known Allergies      Current Facility-Administered Medications:   •  amLODIPine (NORVASC) tablet 5 mg, 5 mg, Oral, Daily, Leathaera Dwayne, PA-C, 5 mg at 08/09/23 0858  •  aspirin (ECOTRIN LOW STRENGTH) EC tablet 81 mg, 81 mg, Oral, Daily, Sobia Julio, CRNP, 81 mg at 08/09/23 0412  •  atorvastatin (LIPITOR) tablet 40 mg, 40 mg, Oral, Daily With Dinner, Sobia Screws, CRNP, 40 mg at 08/08/23 1556  •  clopidogrel (PLAVIX) tablet 75 mg, 75 mg, Oral, Daily, Sobia Screws, CRNP, 75 mg at 08/09/23 1090  •  ferrous sulfate tablet 325 mg, 325 mg, Oral, Daily With Breakfast, Sobia Screws, CRNP, 325 mg at 08/09/23 7698  •  finasteride (PROSCAR) tablet 5 mg, 5 mg, Oral, Daily, Sobia Screws, CRNP, 5 mg at 08/09/23 1653  •  furosemide (LASIX) tablet 20 mg, 20 mg, Oral, Daily, Moon Rice PA-C, 20 mg at 08/09/23 0858  •  heparin (porcine) subcutaneous injection 5,000 Units, 5,000 Units, Subcutaneous, Q8H [facility], KELLY Gonsalez, 5,000 Units at 08/09/23 7906  • metoprolol tartrate (LOPRESSOR) tablet 50 mg, 50 mg, Oral, Q12H [facility], Pj Odell, NOELNP, 50 mg at 23 [phone]  •  sertraline (ZOLOFT) tablet 50 mg, 50 mg, Oral, Daily, Rita CancholaDO, 50 mg at 23 [phone]    Social History     Socioeconomic History   • Marital status: /Civil Union     Spouse name: Not on file   • Number of children: Not on file   • Years of education: Not on file   • Highest education level: Not on file   Occupational History   • Occupation: RETIRED   Tobacco Use   • Smoking status: Former     Packs/day: 0.50     Years: 62.00     Total pack years: 31.00     Types: Cigarettes     Start date: 6/15/1954     Quit date: 4/15/2017     Years since quittin.3   • Smokeless tobacco: Never   Vaping Use   • Vaping Use: Never used   Substance and Sexual Activity   • Alcohol use: Not Currently     Comment: don't drink anymore. • Drug use: No   • Sexual activity: Not Currently     Partners: Female     Comment: defer   Other Topics Concern   • Not on file   Social History Narrative   • Not on file     Social Determinants of Health     Financial Resource Strain: Not on file   Food Insecurity: No Food Insecurity (2023)    Hunger Vital Sign    • Worried About Running Out of Food in the Last Year: Never true    • Ran Out of Food in the Last Year: Never true   Transportation Needs: No Transportation Needs (2023)    PRAPARE - Transportation    • Lack of Transportation (Medical): No    • Lack of Transportation (Non-Medical):  No   Physical Activity: Not on file   Stress: Not on file   Social Connections: Not on file   Intimate Partner Violence: Not on file   Housing Stability: Low Risk  (2023)    Housing Stability Vital Sign    • Unable to Pay for Housing in the Last Year: No    • Number of Places Lived in the Last Year: 1    • Unstable Housing in the Last Year: No       Family History   Problem Relation Age of Onset   • Hypertension Mother    • Alcohol abuse Father    • Cirrhosis Father • Cancer Son 15        cancer-knee and above-left-amputation   • Cancer Sister    • Other Brother         head mass   • Thyroid disease unspecified Sister    • Arthritis Sister    • Cancer Sister    • Other Brother         legally blind in one eye         Review of systems:  Please see HPI for positive symptoms. Constitutional: No fever, no chills, no weight change. Ocular: No diplopia, no blurred vision, spots/zigzag lines  HEENT:  No sore throat, headache or congestion. COR:  No chest pain. No palpitations. Lungs:  no sob  GI:  no  nausea, no vomiting, no diarrhea, no constipation, no anorexia. :  No dysuria, frequency, or urgency. No hematuria. Musculoskeletal:  No joint pain or swelling   Skin:  No rash or itching. Psychiatric:  no anxiety, no depression. + grief, feelings of being overwhelmed  Endocrine:  No polyuria or polydipsia. Physical examination:  BP (!) 178/88 (BP Location: Left arm)   Pulse 61   Temp 97.7 °F (36.5 °C) (Oral)   Resp 18   Ht 5' 10" (1.778 m)   Wt 93.6 kg (206 lb 6.3 oz)   SpO2 94%   BMI 29.61 kg/m²     GENERAL APPEARANCE:  The patient is alert, oriented. HEENT:  Head is normocephalic. Pupils are equal and reactive. NECK:  Supple without lymphadenopathy. HEART:  Regular rate and rhythm. LUNGS:  No audible wheezing or stridor heard  ABDOMEN:  Soft, nontender, nondistended   EXTREMITIES:  Without cyanosis, clubbing or edema. Mental status: The patient is alert, attentive, and oriented. Speech is clear and fluent, good repetition, comprehension, and naming. Cranial nerves:  CN II: Visual fields are full to confrontation. Fundoscopic exam unable to be assessed. Pupils are 4 mm and briskly reactive to light. CN III, IV, VI: At primary gaze, there is no eye deviation. CN V: Facial sensation is intact in all 3 divisions bilaterally. Corneal responses are intact. CN VII: Face is symmetric with normal eye closure and smile.   CN VIII: Hearing is normal to rubbing fingers  CN IX, X: Palate elevates symmetrically. Phonation is normal.  CN XI: Head turning and shoulder shrug are intact  CN XII: Tongue is midline with normal movements and no atrophy. Motor: There is no pronator drift of out-stretched arms. Muscle bulk and tone are normal.   Pt with mild Rt hip weakness compared to the Lt  Muscle exam  Arm Right Left Leg Right Left   Deltoid 5/5 5/5 Iliopsoas 5-/5 5/5   Biceps 5/5 5/5 Quads 5/5 5/5   Triceps 5/5 5/5 Hamstrings 5/5 5/5   Wrist Extension 5/5 5/5 Ankle Dorsi Flexion 5/5 5/5   Wrist Flexion 5/5 5/5 Ankle Plantar Flexion 5/5 5/5        Reflexes    RJ BJ TJ KJ AJ Plantars Negrete's   Right 2+ 2+  2+ 2+ Downgoing Not present   Left 2+ 2+  2+ 2+ Downgoing Not present      Sensory:  Light touch, Temperature, position sense, and vibration sense are intact in fingers and toes. Coordination:  Rapid alternating movements and fine finger movements are intact. There is no dysmetria on finger-to-nose and heel-knee-shin. There are no abnormal or extraneous movements. Romberg negative. Gait/Stance:  Casual gait steady, difficulty with tandem and toe walking 2nd to RLE weakness. Lab Results   Component Value Date    WBC 8.17 08/09/2023    HGB 15.5 08/09/2023    HCT 47.1 08/09/2023    MCV 90 08/09/2023     (L) 08/09/2023     Lab Results   Component Value Date    HGBA1C 6.3 (H) 10/28/2022     Lab Results   Component Value Date    ALT 10 08/08/2023    AST 21 08/08/2023    ALKPHOS 92 08/08/2023     Lab Results   Component Value Date    GLUCOSE 135 11/03/2020    CALCIUM 9.0 08/09/2023    K 4.2 08/09/2023    CO2 27 08/09/2023     08/09/2023    BUN 34 (H) 08/09/2023    CREATININE 1.47 (H) 08/09/2023         Review of reports and notes reveal:  Independent Interpretation of images or specimens:  NM lung ventilation / perfusion    Result Date: 8/8/2023  The probability for pulmonary embolus is low.  Workstation performed: [id number]     XR chest 1 view portable    Result Date: 8/7/2023  No acute cardiopulmonary disease. Workstation performed: [id number]           Thank you for this consult. Total time of encounter: 70 Minutes  More than 50% of time was spent in counseling and coordination of care of patient.

## 2023-08-09 NOTE — DISCHARGE SUMMARY
[id number] [facility]  Discharge- [place] Ends 1940, 82 y.o. male MRN: [id number]  Unit/Bed#: [address] [place] [address] Encounter: [id number]  Primary Care Provider: Brinda Andersen MD   Date and time admitted to hospital: 8/7/2023  7:17 AM    * Shortness of breath  Assessment & Plan  Patient presented with complaints shortness of breath for 2 days. He has had 2 ER visits in that time period. He denies any chest pain. History of TAVR. Symptoms occur in the nighttime while supine. He denies any weight gain, pedal edema. . Chest x-ray negative    Pt was RRT 8/7 for episode of shortness of breath lasting about 45 minutes for family with some involuntary right arm movement   · Continue lasix 20 mg po daily  · Repeat echo with no changes from prior, EF 55%, aortic valve well functioning  · Cardiology consultation appreciated  · Patient will need follow up with outpatient cardiologist  · VQ scan shows low probability for PE  · Likely due to anxiety as patient has had increasing anxiety and feeling overwhelmed recently  · Recommend outpatient follow up for anxiety and panic attacks  · Patient reports he will follow up with his PCP for ongoing management    Abnormal involuntary movement  Assessment & Plan  Patient had an episode of involuntary movement to right arm for which RRT was called.   · Neurology consulted  · Does not feel this was related to CVA or seizure  · Neuro exam unremarkable  · Patient very anxious and overwhelmed recently  · Discussed with neurology, no plans for further workup  · Recommend management of anxiety and grief    History of aortic valve replacement  Assessment & Plan  S/p TAVR 2021 in [facility]    Stroke [facility])  Assessment & Plan  · History of stroke  · Continue DAPT    Chronic kidney disease  Assessment & Plan  Lab Results   Component Value Date    EGFR 44 08/09/2023    EGFR 44 08/08/2023    EGFR 39 08/07/2023    CREATININE 1.47 (H) 08/09/2023    CREATININE 1.48 (H) 08/08/2023    CREATININE 1.62 (H) 08/07/2023   Baseline 1.4-1.6, Cr at baseline    Benign essential hypertension  Assessment & Plan  · Continue metoprolol and lasix  · Started on amlodipine by cardiology for elevated blood pressures  · Follow up with outpatient cardiologist    Chronic atrial fibrillation [facility])  Assessment & Plan  · Continue aspirin, statin     Medical Problems     Resolved Problems  Date Reviewed: 8/9/2023   None       Discharging Physician / Practitioner: Diane Borck PA-C  PCP: Brinda Andersen MD  Admission Date:   Admission Orders (From admission, onward)     Ordered        08/08/23 1516  Inpatient Admission  Once            08/07/23 0947  Place in Observation  Once                      Discharge Date: 08/09/23    Consultations During Hospital Stay:  · Cardiology  · Neurology    Procedures Performed:   · none    Significant Findings / Test Results:   · CXR: no acute cardiopulmonary disease  · NM lung ventilation/perfusion:The probability for pulmonary embolus is low. Incidental Findings:   · none    Test Results Pending at Discharge (will require follow up):   · none     Outpatient Tests Requested:  · Repeat BMP in 1 week    Complications:  none    Reason for Admission: shortness of breath    Hospital Course:   Lee Garcia is a 82 y.o. male patient who originally presented to the hospital on 8/7/2023 due to shortness of breath. Vitals were stable and he required no supplemental oxygen. X-ray showed no acute cardiopulmonary disease and VQ scan showed probability for pulmonary embolism is low. BNP was mildly elevated at 390 and cardiology was consulted. He was started on Lasix 20 mg daily and should repeat BMP in 1 week and follow-up with his outpatient cardiologist.  Patient did have an episode of right arm shaking witnessed by daughter for which neurology was consulted. They feel this was not related to CVA and was atypical for seizure presentation.   He should follow-up outpatient for better control of anxiety and grief. Discussed with patient and daughter at bedside and all questions were answered. Please see above list of diagnoses and related plan for additional information. Condition at Discharge: good    Discharge Day Visit / Exam:   Subjective: Patient is feeling much better today and very eager to go home. He is agreeable to stay for neurology consultation. He has not been short of breath and has had no further episodes of shaking. He denies headache, dizziness, vision changes, chest pain, shortness of breath, palpitations, nausea, vomiting, or abdominal pain. He has been up walking around without issue. Reports he has follow-up with his PCP on Monday. Vitals: Blood Pressure: (!) 178/88 (08/09/23 0700)  Pulse: 61 (08/09/23 0700)  Temperature: 97.7 °F (36.5 °C) (08/09/23 0700)  Temp Source: Oral (08/09/23 0700)  Respirations: 18 (08/09/23 0700)  Height: 5' 10" (177.8 cm) (08/07/23 1319)  Weight - Scale: 93.6 kg (206 lb 6.3 oz) (08/09/23 0600)  SpO2: 94 % (08/09/23 0700)  Exam:   Physical Exam  Vitals and nursing note reviewed. Constitutional:       General: He is not in acute distress. Appearance: He is well-developed. Cardiovascular:      Rate and Rhythm: Normal rate and regular rhythm. Heart sounds: Murmur heard. Pulmonary:      Effort: Pulmonary effort is normal. No respiratory distress. Breath sounds: Normal breath sounds. Abdominal:      Palpations: Abdomen is soft. Tenderness: There is no abdominal tenderness. Musculoskeletal:         General: No swelling. Skin:     General: Skin is warm and dry. Capillary Refill: Capillary refill takes less than 2 seconds. Neurological:      Mental Status: He is alert. Psychiatric:         Mood and Affect: Mood normal.         Discussion with Family: Updated  (daughter) at bedside.     Discharge instructions/Information to patient and family:   See after visit summary for information provided to patient and family. Provisions for Follow-Up Care:  See after visit summary for information related to follow-up care and any pertinent home health orders. Disposition:   Home    Planned Readmission: none     Discharge Statement:  I spent >30 minutes discharging the patient. This time was spent on the day of discharge. I had direct contact with the patient on the day of discharge. Greater than 50% of the total time was spent examining patient, answering all patient questions, arranging and discussing plan of care with patient as well as directly providing post-discharge instructions. Additional time then spent on discharge activities. Discharge Medications:  See after visit summary for reconciled discharge medications provided to patient and/or family.       **Please Note: This note may have been constructed using a voice recognition system**

## 2023-08-09 NOTE — CASE MANAGEMENT
Case Management Assessment & Discharge Planning Note    Patient name Love Cintron  Location [address] [facility]-* MRN [id number]  : 1940 Date 2023       Current Admission Date: 2023  Current Admission Diagnosis:Shortness of breath   Patient Active Problem List    Diagnosis Date Noted   • Abnormal involuntary movement 2023   • Transient diplopia 10/29/2022   • Prediabetes 10/29/2022   • 6th nerve palsy, left    • History of aortic valve replacement 07/15/2022   • Iron deficiency 2022   • Hematuria 2022   • Benign hypertension with CKD (chronic kidney disease) stage III ([address]) 2022   • Chronic kidney disease-mineral and bone disorder 2022   • Iron deficiency anemia, unspecified 2021   • Closed nondisplaced fracture of nasal bone with routine healing 2021   • Sensorineural hearing loss (SNHL) of both ears 2021   • Aneurysm ([address])    • Secondary hyperparathyroidism ([address]) 2021   • Chronic kidney disease    • Syncope vs seizure 2020   • Vasovagal near syncope 2020   • Onychomycosis 10/31/2020   • Aneurysm of anterior communicating artery 10/29/2020   • Tooth pain 10/29/2020   • Stroke ([address]) 10/29/2020   • CVA (cerebral vascular accident) ([address]) 10/28/2020   • Bilateral carotid artery disease ([address]) 10/28/2020   • Hydronephrosis with ureteral stricture, not elsewhere classified 2020   • Hematuria, gross 05/10/2019   • Vitamin D deficiency 2017   • Nonrheumatic aortic valve stenosis 05/15/2017   • Moderate mitral regurgitation 05/15/2017   • Moderate aortic regurgitation 05/15/2017   • GERD (gastroesophageal reflux disease) 05/15/2017   • Dyslipidemia 05/15/2017   • Chronic diastolic CHF (congestive heart failure) ([address]) 2017   • Stage 3b chronic kidney disease ([address]) 2017   • Calculus of ureter 2017   • Crossing vessel and stricture of ureter without hydronephrosis 2017   • Shortness of breath 04/15/2017   • Chronic atrial fibrillation ([address]) 04/15/2017   • JUNIOR (acute kidney injury) ([address]) 04/15/2017   • Benign essential hypertension 11/22/2016   • Renal calculi 11/22/2016   • Patellofemoral syndrome of left knee 06/08/2016      LOS (days): 1  Geometric Mean LOS (GMLOS) (days): 2.10  Days to GMLOS:1.3     OBJECTIVE:    Risk of Unplanned Readmission Score: 13.41       Current admission status: Inpatient  Referral Reason: Other (Discharge planning)    Preferred Pharmacy:   [id number] [place], [address] [address]  Phone: [phone] Fax: [phone]    Primary Care Provider: Harland Riedel, MD    Primary Insurance: MEDICARE  Secondary Insurance: Danae Hilliard:  [employer]     [address] [place] [address] Representative - Spouse   Primary Phone: [phone] (Home)  Mobile Phone: [phone]                Obs Notice Signed: 08/07/23    Readmission Root Cause  30 Day Readmission: No    Patient Information  Admitted from[de-identified] Home  Mental Status: Alert  During Assessment patient was accompanied by: Not accompanied during assessment  Assessment information provided by[de-identified] Patient  Primary Caregiver: Self  Support Systems: Spouse/significant other, Daughter, Family members  [place] [facility]: [address] [place] [address] do you live in?: [address] [facility] entry access options.  Select all that apply.: Stairs  Number of steps to enter home.: 2  Type of Current Residence: [address] home  Upon entering residence, is there a bedroom on the main floor (no further steps)?: Yes  Upon entering residence, is there a bathroom on the main floor (no further steps)?: Yes  In the last 12 months, was there a time when you were not able to pay the mortgage or rent on time?: No  In the last 12 months, how many places have you lived?: 1  In the last 12 months, was there a time when you did not have a steady place to sleep or slept in a shelter (including now)?: No  Homeless/housing insecurity resource given?: N/A  Living Arrangements: Lives w/ Spouse/significant other, Lives w/ Daughter, Lives w/ Extended Family  Is patient a ?: No    Activities of Daily Living Prior to Admission  Functional Status: Independent  Completes ADLs independently?: Yes  Ambulates independently?: Yes  Does patient use assisted devices?: No  Does patient currently own DME?: No  Does patient have a history of Outpatient Therapy (PT/OT)?: Yes  Does the patient have a history of Short-Term Rehab?: Yes ([facility] after stroke)  Does patient have a history of HHC?: No  Does patient currently have [facility]?: No     Patient Information Continued  Income Source: Pension/prison  Does patient have prescription coverage?: Yes  Within the past 12 months, you worried that your food would run out before you got the money to buy more.: Never true  Within the past 12 months, the food you bought just didn't last and you didn't have money to get more.: Never true  Food insecurity resource given?: N/A  Does patient receive dialysis treatments?: No  Does patient have a history of substance abuse?: No  Does patient have a history of Mental Health Diagnosis?: No     Means of Transportation  Means of Transport to Appts[de-identified] Drives Self  In the past 12 months, has lack of transportation kept you from medical appointments or from getting medications?: No  In the past 12 months, has lack of transportation kept you from meetings, work, or from getting things needed for daily living?: No  Was application for public transport provided?: N/A      DISCHARGE DETAILS:    Discharge planning discussed with[de-identified] Patient  Freedom of Choice: Yes     Comments - Freedom of Choice: SW spoke with patient at bedside to introduce role of CM, conduct assessment and discuss discharge planning. Patient lives in a 3-story home with his wife, daughter, son-in-law and grandchildren. He is independent with all needs, uses no DME and drives. He reported no issues with ambulation and at this time there are no anticipated CM needs. His daughter will pick him up from the hospital when discharged. SW will continue to follow.      CM contacted family/caregiver?: No- see comments (Patient declined call to family)  Were Treatment Team discharge recommendations reviewed with patient/caregiver?: Yes  Did patient/caregiver verbalize understanding of patient care needs?: Yes  Were patient/caregiver advised of the risks associated with not following Treatment Team discharge recommendations?: Yes    Contacts  Patient Contacts: Tiffanie Briones (wife/daughter)  Relationship to Patient[de-identified] Family  Contact Method: Phone  Phone Number: [phone]    [facility]         Is the patient interested in [facility] at discharge?: No    DME Referral Provided  Referral made for DME?: No    Other Referral/Resources/Interventions Provided:  Interventions: None Indicated    Would you like to participate in our [address] service program?  : No - Declined    Treatment Team Recommendation: Home  Discharge Destination Plan[de-identified] Home  Transport at Discharge : Family         IMM Given (Date):: 08/08/23

## 2023-08-09 NOTE — ASSESSMENT & PLAN NOTE
Lab Results   Component Value Date    EGFR 44 08/09/2023    EGFR 44 08/08/2023    EGFR 39 08/07/2023    CREATININE 1.47 (H) 08/09/2023    CREATININE 1.48 (H) 08/08/2023    CREATININE 1.62 (H) 08/07/2023   Baseline 1.4-1.6, Cr at baseline

## 2023-08-09 NOTE — DISCHARGE INSTR - AVS FIRST PAGE
Follow up with your primary care doctor in 1-2 weeks for post hospitalization follow up. Start taking lasix 20 mg daily and follow up with your outpatient cardiologist for repeat blood work to monitor your kidney function.

## 2023-08-09 NOTE — PLAN OF CARE
Problem: SAFETY ADULT  Goal: Maintain or return to baseline ADL function  Description: INTERVENTIONS:  -  Assess patient's ability to carry out ADLs; assess patient's baseline for ADL function and identify physical deficits which impact ability to perform ADLs (bathing, care of mouth/teeth, toileting, grooming, dressing, etc.)  - Assess/evaluate cause of self-care deficits   - Assess range of motion  - Assess patient's mobility; develop plan if impaired  - Assess patient's need for assistive devices and provide as appropriate  - Encourage maximum independence but intervene and supervise when necessary  - Involve family in performance of ADLs  - Assess for home care needs following discharge   - Consider OT consult to assist with ADL evaluation and planning for discharge  - Provide patient education as appropriate  Outcome: Progressing     Problem: PAIN - ADULT  Goal: Verbalizes/displays adequate comfort level or baseline comfort level  Description: Interventions:  - Encourage patient to monitor pain and request assistance  - Assess pain using appropriate pain scale  - Administer analgesics based on type and severity of pain and evaluate response  - Implement non-pharmacological measures as appropriate and evaluate response  - Consider cultural and social influences on pain and pain management  - Notify physician/advanced practitioner if interventions unsuccessful or patient reports new pain  Outcome: Progressing     Problem: INFECTION - ADULT  Goal: Absence or prevention of progression during hospitalization  Description: INTERVENTIONS:  - Assess and monitor for signs and symptoms of infection  - Monitor lab/diagnostic results  - Monitor all insertion sites, i.e. indwelling lines, tubes, and drains  - Monitor endotracheal if appropriate and nasal secretions for changes in amount and color  - [place] appropriate cooling/warming therapies per order  - Administer medications as ordered  - Instruct and encourage patient and family to use good hand hygiene technique  - Identify and instruct in appropriate isolation precautions for identified infection/condition  Outcome: Progressing     Problem: Knowledge Deficit  Goal: Patient/family/caregiver demonstrates understanding of disease process, treatment plan, medications, and discharge instructions  Description: Complete learning assessment and assess knowledge base.   Interventions:  - Provide teaching at level of understanding  - Provide teaching via preferred learning methods  Outcome: Progressing

## 2023-08-09 NOTE — ASSESSMENT & PLAN NOTE
Patient presented with complaints shortness of breath for 2 days. He has had 2 ER visits in that time period. He denies any chest pain. History of TAVR. Symptoms occur in the nighttime while supine. He denies any weight gain, pedal edema. .   Chest x-ray negative    Pt was RRT 8/7 for episode of shortness of breath lasting about 45 minutes for family with some involuntary right arm movement   · Continue lasix 20 mg po daily  · Repeat echo with no changes from prior, EF 55%, aortic valve well functioning  · Cardiology consultation appreciated  · Patient will need follow up with outpatient cardiologist  · VQ scan shows low probability for PE  · Likely due to anxiety as patient has had increasing anxiety and feeling overwhelmed recently  · Recommend outpatient follow up for anxiety and panic attacks  · Patient reports he will follow up with his PCP for ongoing management

## 2023-08-09 NOTE — PLAN OF CARE
Problem: Knowledge Deficit  Goal: Patient/family/caregiver demonstrates understanding of disease process, treatment plan, medications, and discharge instructions  Description: Complete learning assessment and assess knowledge base.   Interventions:  - Provide teaching at level of understanding  - Provide teaching via preferred learning methods  Outcome: Progressing     Problem: MOBILITY - ADULT  Goal: Maintain or return to baseline ADL function  Description: INTERVENTIONS:  -  Assess patient's ability to carry out ADLs; assess patient's baseline for ADL function and identify physical deficits which impact ability to perform ADLs (bathing, care of mouth/teeth, toileting, grooming, dressing, etc.)  - Assess/evaluate cause of self-care deficits   - Assess range of motion  - Assess patient's mobility; develop plan if impaired  - Assess patient's need for assistive devices and provide as appropriate  - Encourage maximum independence but intervene and supervise when necessary  - Involve family in performance of ADLs  - Assess for home care needs following discharge   - Consider OT consult to assist with ADL evaluation and planning for discharge  - Provide patient education as appropriate  Outcome: Progressing     Problem: RESPIRATORY - ADULT  Goal: Achieves optimal ventilation and oxygenation  Description: INTERVENTIONS:  - Assess for changes in respiratory status  - Assess for changes in mentation and behavior  - Position to facilitate oxygenation and minimize respiratory effort  - Oxygen administered by appropriate delivery if ordered  - Initiate smoking cessation education as indicated  - Encourage broncho-pulmonary hygiene including cough, deep breathe, Incentive Spirometry  - Assess the need for suctioning and aspirate as needed  - Assess and instruct to report SOB or any respiratory difficulty  - Respiratory Therapy support as indicated  Outcome: Progressing

## 2023-08-09 NOTE — NURSING NOTE
Patient discharged home with supportive family. Written and verbal discharge instruction provided. All questions answered. IV removed per hospital protocol, occlusive dressing applied. Patient left unit ambulatory with all personal belongings, accompanied by supportive  son.

## 2023-08-09 NOTE — ASSESSMENT & PLAN NOTE
Patient had an episode of involuntary movement to right arm for which RRT was called.   · Neurology consulted  · Does not feel this was related to CVA or seizure  · Neuro exam unremarkable  · Patient very anxious and overwhelmed recently  · Discussed with neurology, no plans for further workup  · Recommend management of anxiety and grief

## 2023-08-09 NOTE — PROGRESS NOTES
Progress Note - Cardiology   [facility]     Christiano Flores 82 y.o. male MRN: [id number]  : 1940  Unit/Bed#: [address] [place], [place] [id number] Encounter: [id number]    Assessment and Plan:   1. Shortness of breath.     - Presented on 23 for evaluation for shortness of breath. Patient reports he woke this morning feeling short of breath. He denies shortness of breath associated with chest pain, palpitations, lightheadedness, dizziness, headache, nausea, or vomiting. He states since being admitted to the hospital his shortness of breath has resolved. He denies experiencing shortness of breath with exertion in recent weeks, weight gain, bilateral lower extremity edema, orthopnea. - Patient states since admission his shortness of breath has resolved. - Low clinical suspicion patient is in acute phase of chronic diastolic heart failure. Patient is euvolemic on examination. Patient lying flat in bed upon arrival without respiratory distress. - Shortness of breath possibly secondary to anxiety attack? Patient does have known history of anxiety. Has been on Ativan 0.5 mg as needed previously. - Patient is not requiring any supplemental oxygen use. - 23 [place] lung ventilation/perfusion: The probability for pulmonary embolus is low.     2. Chronic diastolic heart failure.      - Does not appear in acute exacerbation. Patient is euvolemic on examination. Patient lying flat in bed upon arrival without respiratory distress. Patient denies experiencing shortness of breath with exertion in recent weeks, weight gain, bilateral lower extremity edema, orthopnea. He states he only experiences shortness of breath early this morning upon waking.  - 23 BNP: 390.   - 23 CXR: No acute cardiopulmonary disease.  - 23 TTE: LVEF 55%. Although no diagnostic regional wall motion abnormality was identified, this possibility cannot be completely excluded on the basis of this study.  Unable to assess diastolic function due to atrial fibrillation. Left atrium severely dilated. Right atrium mild to moderately dilated. Aortic TAVR prosthetic valve functioning normally with no evidence of regurgitation, no significant stenosis. Mild mitral valve regurgitation. Mild tricuspid valve regurgitation. Right ventricle systolic pressure is mildly to moderately elevated. Mild pulmonic valve regurgitation. - 10/27/22 TTE: LVEF 55%. Diastolic function is moderately abnormal, consistent with grade II (pseudonormal) relaxation.  Left atrial filling pressure is elevated. Left atrium severely dilated. Right atrium moderately dilated. Prosthetic aortic valve functioning normally. ,  No significant stenosis. Mild to moderate mitral valve regurgitation. Mild tricuspid valve regurgitation. Right ventricular systolic pressure mildly elevated, 42 mmHg. - Lasix 40 mg IV ordered per primary team. Low clinical suspicion patient is in acute phase of chronic diastolic heart failure. Patient is not on diuretics outpatient. - Continue Lopressor 50 mg twice daily.  - Patient reports that he follows outpatient with cardiologist Dr. Belgica Curran.  - Review of home medications notes that patient is not on diuretic outpatient. - Continue lasix 20 mg oral daily.   - Monitor weight. - Monitor I/Os. - No further Cardiology work-up at this time.  - Recommend patient follow-up with his outpatient cardiologist Dr. Belgica Curran within 7 to 10 days of discharge.  - Recommend follow-up BMP within 1 week of discharge.     3. Chronic atrial fibrillation.     - 8/07/23 EKG: Atrial fibrillation with slow ventricular response, rate 53 bpm.  Septal infarct, cited on or before 11/03/20.   - Patient with known history of chronic atrial fibrillation, s/p Watchman procedure in 2021.   - Not on anticoagulation as patient is s/p Watchman procedure. - Continue Lopressor 50 mg twice daily.     4. Aortic stenosis s/p TAVR.       - s/p TAVR in 2021.   - 8/07/23 TTE:     Aortic Valve There is a Medtronic CoreValve TAVR bioprosthetic valve. This appears to be functioning normally with no evidence of regurgitation. There is no significant stenosis. Peak velocity is 2.31 m/s, mean/peak gradient is 11/21 mmHg, JOCELINE is 1 cm².     - 10/27/22 TTE:      Aortic Valve There is a Medtronic CoreValve TAVR bioprosthetic valve. The prosthetic valve appears to be functioning normally. There is no evidence of regurgitation. The aortic valve has no significant stenosis. The aortic valve peak gradient is 18 mmHg. The aortic valve mean gradient is 9 mmHg. The dimensionless velocity index is 0.41.      - Patient reports that he follows outpatient with cardiologist Dr. Corie Zaldivar.     5. Hypertension.     - Continue Lopressor 50 mg twice daily and lasix 20 mg oral daily. - Continue to monitor BP.     6. CVA.    - Left frontal CVA in 10/2020.  - Currently on aspirin 81 mg daily, Plavix 75 mg daily, and Lipitor 40 mg daily.     7. CKD stage III.    - Baseline creatinine appears between 1.4 and 1.6.  - Care per primary team.     8.  Anxiety. - Currently on Zoloft 50 mg daily. - Care per primary team.   Subjective / Objective:           Patient reports feeling well this morning. He offers no complaints. States he is eager to be discharged. Patient currently denies chest pain, palpitations, shortness of breath, lightheadedness, dizziness, headache, nausea, or vomiting. Vitals: Blood pressure (!) 178/88, pulse 61, temperature 97.7 °F (36.5 °C), temperature source Oral, resp. rate 18, height 5' 10" (1.778 m), weight 93.7 kg (206 lb 9.6 oz), SpO2 94 %. Vitals:    08/07/23 1319 08/09/23 0511   Weight: 94.8 kg (209 lb) 93.7 kg (206 lb 9.6 oz)     Body mass index is 29.64 kg/m².   BP Readings from Last 3 Encounters:   08/09/23 (!) 178/88   08/05/23 (!) 179/82   06/13/23 166/82     Orthostatic Blood Pressures    Flowsheet Row Most Recent Value   Blood Pressure 178/88 filed at 08/09/2023 0700   Patient Position - Orthostatic VS Lying filed at 08/09/2023 0700        I/O       08/06 0701 08/07 0700 08/07 0701 08/08 0700 08/08 0701 08/09 0700    Urine (mL/kg/hr)  1575     Stool  0     Total Output  1575     Net  -1575            Unmeasured Stool Occurrence  1 x         Invasive Devices     Peripheral Intravenous Line  Duration           Peripheral IV 08/07/23 Right Antecubital 2 days                  Intake/Output Summary (Last 24 hours) at 8/9/2023 0912  Last data filed at 8/9/2023 0511  Gross per 24 hour   Intake --   Output 900 ml   Net -900 ml     Physical Exam:   Physical Exam  Vitals reviewed. Constitutional:       General: He is not in acute distress. Appearance: He is obese. Cardiovascular:      Rate and Rhythm: Normal rate and regular rhythm. Pulses: Normal pulses. Heart sounds: Murmur heard. Pulmonary:      Effort: Pulmonary effort is normal. No respiratory distress. Abdominal:      General: Abdomen is flat. There is no distension. Palpations: Abdomen is soft. Tenderness: There is no abdominal tenderness. Musculoskeletal:      Right lower leg: No edema. Left lower leg: No edema. Skin:     General: Skin is warm and dry. Neurological:      Mental Status: He is alert and oriented to person, place, and time.        Medications/ Allergies:     Current Facility-Administered Medications   Medication Dose Route Frequency Provider Last Rate   • amLODIPine  5 mg Oral Daily Tamiko Golden PA-C     • aspirin  81 mg Oral Daily KELLY Jeffery     • atorvastatin  40 mg Oral Daily [id number]     • clopidogrel  75 mg Oral Daily KELLY Jeffery     • ferrous sulfate  325 mg Oral Daily With Breakfast KELLY Jeffery     • finasteride  5 mg Oral Daily KELLY Jeffery     • furosemide  20 mg Oral Daily Tamiko Golden PA-C     • heparin (porcine)  5,000 Units Subcutaneous Q8H [facility] • metoprolol tartrate  50 mg Oral Q12H [address] Darlis Dose, CRNP     • sertraline  50 mg Oral Daily Stewart Guerra, DO          No Known Allergies    VTE Pharmacologic Prophylaxis:   Heparin    Labs:   Troponins:  Results from last 7 days   Lab Units 08/07/23  1152 08/07/23  0936   HSTNI D2 ng/L  --  -1   HSTNI D4 ng/L 0  --          CBC with diff:  Results from last 7 days   Lab Units 08/09/23  0457 08/08/23  0411 08/07/23  0743   WBC Thousand/uL 8.17 8.11 7.75   HEMOGLOBIN g/dL 15.5 16.3 15.0   HEMATOCRIT % 47.1 47.4 46.3   MCV fL 90 88 90   PLATELETS Thousands/uL 104* 121* 104*   RBC Million/uL 5.21 5.39 5.13   MCH pg 29.8 30.2 29.2   MCHC g/dL 32.9 34.4 32.4   RDW % 16.2* 15.3* 14.6   MPV fL 9.7 10.1 9.8   NRBC AUTO /100 WBCs 0  --  0       CMP:  Results from last 7 days   Lab Units 08/09/23  0457 08/08/23  0410 08/07/23  2254 08/07/23  0743   SODIUM mmol/L 137 137 138 139   POTASSIUM mmol/L 4.2 3.7 3.6 4.4   CHLORIDE mmol/L 103 103 102 106   CO2 mmol/L 27 24 28 30   ANION GAP mmol/L 7 10 8 3   BUN mg/dL 34* 31* 34* 32*   CREATININE mg/dL 1.47* 1.48* 1.62* 1.62*   GLUCOSE FASTING mg/dL  --   --  85  --    CALCIUM mg/dL 9.0 9.2 9.2 9.6   AST U/L  --  21  --  20   ALT U/L  --  10  --  12   ALK PHOS U/L  --  92  --  98   TOTAL PROTEIN g/dL  --  6.5  --  6.3*   ALBUMIN g/dL  --  3.7  --  3.6   TOTAL BILIRUBIN mg/dL  --  1.40*  --  0.96   EGFR ml/min/1.73sq m 44 44 39 39       Magnesium:  Results from last 7 days   Lab Units 08/08/23  0410   MAGNESIUM mg/dL 2.0     Coags:    TSH:  Results from last 7 days   Lab Units 08/07/23  0743   TSH 3RD GENERATON uIU/mL 3.076     Imaging & Testing   I have personally reviewed pertinent reports. [place] lung ventilation/perfusion    Result Date: 8/8/2023    Impression:  The probability for pulmonary embolus is low. XR chest 1 view portable    Result Date: 8/7/2023    Impression: No acute cardiopulmonary disease. EKG / Monitor: Personally reviewed.       Not currently on telemetry. Cardiac testing:     Echo complete w/ contrast if indicated    Result Date: 8/7/2023  Narrative: •  Left Ventricle: Left ventricular cavity size is normal. Wall thickness is mildly increased. Systolic function is normal.  Estimated ejection fraction is 55%. Although no diagnostic regional wall motion abnormality was identified, this possibility cannot be completely excluded on the basis of this study. Unable to assess diastolic function due to atrial fibrillation. •  Right Ventricle: Systolic function appears normal. •  Left Atrium: The atrium visual appears severely dilated visually. •  Right Atrium: The atrium visually appears mild to moderately dilated. •  Aortic Valve: There is a Medtronic CoreValve TAVR bioprosthetic valve. This appears to be functioning normally with no evidence of regurgitation. There is no significant stenosis. Peak velocity is 2.31 m/s, mean/peak gradient is 11/21 mmHg, •  Mitral Valve: There is mild regurgitation. •  Tricuspid Valve: There is mild regurgitation. The right ventricular systolic pressure is mildly to moderately elevated. •  Pulmonic Valve: There is mild regurgitation. •  Prior TTE study available for comparison. Prior study date: 10/27/2022. No significant changes noted compared to the prior study. Echo complete with contrast if indicated  Result date: 10/27/22      Left Ventricle Left ventricular cavity size is normal. Wall thickness is mildly increased. The left ventricular ejection fraction is 55% by visual estimation. Systolic function is normal.  Wall motion is normal. Diastolic function is moderately abnormal, consistent with grade II (pseudonormal) relaxation.  Left atrial filling pressure is elevated. Right Ventricle Right ventricular cavity size is normal. Systolic function is normal. Wall thickness is normal.      Left Atrium The atrium is severely dilated (>48 mL/m2). Right Atrium The atrium is moderately dilated.       Aortic Valve There is a Medtronic CoreValve TAVR bioprosthetic valve. The prosthetic valve appears to be functioning normally. There is no evidence of regurgitation. The aortic valve has no significant stenosis. The aortic valve peak gradient is 18 mmHg. The aortic valve mean gradient is 9 mmHg. The dimensionless velocity index is 0.41. Mitral Valve The leaflets are not thickened. There is mild calcification. The leaflets exhibit normal mobility. There is mild to moderate regurgitation. There is no evidence of stenosis. Tricuspid Valve Tricuspid valve structure is normal. There is mild regurgitation. There is no evidence of stenosis. The right ventricular systolic pressure is mildly elevated. The estimated right ventricular systolic pressure is 21.16 mmHg. Pulmonic Valve Pulmonic valve structure is normal. There is mild regurgitation. There is no evidence of stenosis. Ascending Aorta The aortic root is normal in size. IVC/SVC The right atrial pressure is estimated at 8.0 mmHg. The inferior vena cava is normal in size. Pericardium There is no pericardial effusion.  The pericardium is normal in appearance.           Results for orders placed during the hospital encounter of 10/28/20    Echo complete with contrast if indicated    [address]  [phone]    Transthoracic Echocardiogram  2D, M-mode, Doppler, and Color Doppler    Study date:  29-Oct-2020    Patient: Donny Goodwin  MR number: [id number]  Account number: [de-identified]  : 1940  Age: 68 years  Gender: Male  Status: Inpatient  Location: Bedside  Height: 69 in  Weight: 197.6 lb  BP: 134/ 82 mmHg    Indications: Stroke    Diagnoses: I67.9 - Cerebrovascular disease, unspecified    Sonographer:  BERLIN Sheldon  Primary Physician:  Jose Carrillo PA-C  Referring Physician:  ROGER LuisMS  Group:  [facility]  Interpreting Physician:  Migdalia Ramos MD    SUMMARY    LEFT VENTRICLE:  Systolic function was at the lower limits of normal. Ejection fraction was estimated in the range of 50 % to 55 % to be 50 %. Although no diagnostic regional wall motion abnormality was identified, this possibility cannot be completely excluded on the basis of this study. Wall thickness was mildly increased. There was mild concentric hypertrophy. LEFT ATRIUM:  The atrium was moderately dilated. RIGHT ATRIUM:  The atrium was mildly dilated. MITRAL VALVE:  There was mild to moderate annular calcification. There was mild regurgitation. AORTIC VALVE:  The valve was trileaflet. Leaflets exhibited moderately increased thickness, moderate calcification, and moderately reduced cuspal separation. Transaortic velocity was increased due to valvular stenosis. There was moderate to severe stenosis. Nish 1.0 cm2, peak gradient 40, mean 26 mm of hg  There was mild regurgitation. TRICUSPID VALVE:  There was mild regurgitation. Estimated peak PA pressure was 30 mmHg. PULMONIC VALVE:  There was mild regurgitation. HISTORY: PRIOR HISTORY: HTN, A- fib, CKD, CVA, CHF, AS    PROCEDURE: The procedure was performed at the bedside. This was a routine study. The transthoracic approach was used. The study included complete 2D imaging, M-mode, complete spectral Doppler, and color Doppler. The heart rate was 102 bpm,  at the start of the study. Echocardiographic views were limited due to restricted patient mobility and poor acoustic window availability. This was a technically difficult study. LEFT VENTRICLE: Size was normal. Systolic function was at the lower limits of normal. Ejection fraction was estimated in the range of 50 % to 55 % to be 50 %. Although no diagnostic regional wall motion abnormality was identified, this  possibility cannot be completely excluded on the basis of this study. Wall thickness was mildly increased.  There was mild concentric hypertrophy. DOPPLER: The study was not technically sufficient to allow evaluation of LV diastolic  function. RIGHT VENTRICLE: The size was normal. Systolic function was normal.    LEFT ATRIUM: The atrium was moderately dilated. RIGHT ATRIUM: The atrium was mildly dilated. MITRAL VALVE: There was mild to moderate annular calcification. There was normal leaflet separation. DOPPLER: The transmitral velocity was within the normal range. There was no evidence for stenosis. There was mild regurgitation. AORTIC VALVE: The valve was trileaflet. Leaflets exhibited moderately increased thickness, moderate calcification, and moderately reduced cuspal separation. DOPPLER: Transaortic velocity was increased due to valvular stenosis. There was  moderate to severe stenosis. Nish 1.0 cm2, peak gradient 40, mean 26 mm of hg There was mild regurgitation. TRICUSPID VALVE: DOPPLER: There was mild regurgitation. Estimated peak PA pressure was 30 mmHg. PULMONIC VALVE: DOPPLER: There was mild regurgitation. PERICARDIUM: There was no thickening or calcification. There was no pericardial effusion. AORTA: The root exhibited normal size. SYSTEMIC VEINS: IVC: The inferior vena cava was not well visualized.     SYSTEM MEASUREMENT TABLES    2D  EF (Teich): 50.37 %    PW  MV E/A Ratio: 0    [employer] Accredited Echocardiography Laboratory    Prepared and electronically signed by    Malena Strauss MD  Signed 29-Oct-2020 17:19:34    Results for orders placed during the hospital encounter of 04/15/17    NM myocardial perfusion spect (stress and/or rest)    [address]  [phone]    Rest/Stress Gated SPECT Myocardial Perfusion Imaging After Exercise    Patient: Callum Mackay  MR number: [id number]  Account number: [de-identified]  : 1940  Age: 78 years  Gender: Male  Status: Inpatient  Location: Stress lab  Height: 70 in  Weight: 74 lb  BP: 120/ 74 mmHg    Allergies: NO KNOWN ALLERGIES    Diagnosis: R06.02 - Shortness of breath, R07.9 - Chest pain, unspecified    Primary Physician:  Claire Reeves PA-C  RN:  MIRA Srivastava  Group:  [facility]  Interpreting Physician:  Bard Arden DO    INDICATIONS: Detection of coronary artery disease. HISTORY: The patient is a 78year old  male. Chest pain status: chest pain. Other symptoms: dyspnea and edema. Coronary artery disease risk factors: dyslipidemia, hypertension, and smoking. Cardiovascular history: arrhythmia-  AFib. Co-morbidity: history of lung disease- COPD and history of renal disease- CKD. Medications: Eliquis, a beta blocker and a diuretic. PHYSICAL EXAM: Baseline physical exam screening: normal and no wheezes audible. REST ECG: Atrial fibrillation. PROCEDURE: The study was performed in the stress lab. Treadmill exercise testing was performed, using the [place] protocol. Gated SPECT myocardial perfusion imaging was performed after stress and at rest. Systolic blood pressure was 120  mmHg, at the start of the study. Diastolic blood pressure was 74 mmHg, at the start of the study. The heart rate was 78 bpm, at the start of the study. IV double checked. ISREAL PROTOCOL:  HR bpm SBP mmHg DBP mmHg Symptoms Rhythm/conduct  Baseline 82 120 74 none A-fib  Stage 1 126 124 74 mild dyspnea --  Stage 2 125 -- -- severe dyspnea --  Immediate 118 146 80 same as above --  Recovery 1 86 122 80 subsiding --  Recovery 2 88 127 76 subsiding --  No medications or fluids given. STRESS SUMMARY: Duration of exercise was 4 min and 19 sec. The patient exercised to protocol stage 2. Maximal work rate was 7 METs. Maximal heart rate during stress was 141 bpm ( 97 % of maximal predicted heart rate). The heart rate  response to stress was normal. There was normal resting blood pressure with an appropriate response to stress.  The rate-pressure product for the peak heart rate and blood pressure was 76776. There was no chest pain during stress. The  stress test was terminated due to achievement of target heart rate and severe dyspnea. Arrhythmia during stress: atrial fibrillation. ISOTOPE ADMINISTRATION:  Resting isotope administration Stress isotope administration  Agent Tetrofosmin Tetrofosmin  Dose 10.9 mCi 32.2 mCi  Date 04/18/2017 04/18/2017    Radiopharmaceutical was administered 3 min, 57 sec into the stress protocol. MYOCARDIAL PERFUSION IMAGING:  The image quality was good. Rotating projection images reveal subdiaphragmatic activity. Left ventricular size was normal. The TID ratio was 1.00. PERFUSION DEFECTS:  -  There were no perfusion defects. GATED SPECT:  The calculated left ventricular ejection fraction was 43 %. Left ventricular ejection fraction was mildly decreased by visual estimate. There was no left ventricular regional abnormality. SUMMARY:  -  Stress results: Duration of exercise was 4 min and 19 sec. Target heart rate was achieved. There was no chest pain during stress. -  ECG conclusions: Arrhythmia during stress: atrial fibrillation.  -  Perfusion imaging: There were no perfusion defects.  -  Gated SPECT: The calculated left ventricular ejection fraction was 43 %. Left ventricular ejection fraction was mildly decreased by visual estimate. There was no left ventricular regional abnormality. IMPRESSIONS: Normal study after maximal exercise. Myocardial perfusion imaging was normal at rest and with stress. Left ventricular systolic function was reduced, without distinct regional wall motion abnormalities.     Prepared and signed by    Nicole Banuelos DO  Signed 04/18/2017 13:43:32        Ebonie Urrutia PA-C

## 2023-08-10 LAB
ATRIAL RATE: 202 BPM
ATRIAL RATE: 208 BPM
QRS AXIS: -64 DEGREES
QRS AXIS: -71 DEGREES
QRSD INTERVAL: 100 MS
QRSD INTERVAL: 100 MS
QT INTERVAL: 382 MS
QT INTERVAL: 386 MS
QTC INTERVAL: 431 MS
QTC INTERVAL: 446 MS
T WAVE AXIS: 36 DEGREES
T WAVE AXIS: 37 DEGREES
VENTRICULAR RATE: 75 BPM
VENTRICULAR RATE: 82 BPM

## 2023-08-10 PROCEDURE — 93010 ELECTROCARDIOGRAM REPORT: CPT | Performed by: INTERNAL MEDICINE

## 2023-08-11 ENCOUNTER — OFFICE VISIT (OUTPATIENT)
Dept: NEPHROLOGY | Facility: CLINIC | Age: 83
End: 2023-08-11

## 2023-08-11 VITALS
HEART RATE: 62 BPM | WEIGHT: 209 LBS | BODY MASS INDEX: 29.92 KG/M2 | HEIGHT: 70 IN | DIASTOLIC BLOOD PRESSURE: 80 MMHG | SYSTOLIC BLOOD PRESSURE: 132 MMHG

## 2023-08-11 DIAGNOSIS — I10 ESSENTIAL HYPERTENSION: ICD-10-CM

## 2023-08-11 DIAGNOSIS — N25.81 SECONDARY HYPERPARATHYROIDISM (HCC): ICD-10-CM

## 2023-08-11 DIAGNOSIS — N18.31 STAGE 3A CHRONIC KIDNEY DISEASE (HCC): Primary | ICD-10-CM

## 2023-08-11 NOTE — LETTER
August 11, 2023     Tamir Moulton, [address] [place] [address]    Patient: Tuan Lopez   YOB: 1940   Date of Visit: 8/11/2023       Dear Dr. Joycelyn Burns:    Thank you for referring Allyson Berman to me for evaluation. Below are my notes for this consultation. If you have questions, please do not hesitate to call me. I look forward to following your patient along with you. Sincerely,        Antonietta Kahn MD        CC: No Recipients    Antonietta Kahn MD  8/11/2023  9:38 AM  Sign when Signing Visit  NEPHROLOGY OFFICE VISIT   Tuan Loepz 82 y.o. male MRN: [id number]  8/11/2023    Reason for Visit: CKD III    ASSESSMENT and PLAN:    I had the pleasure of seeing Mr Mayra Valentin today in the renal clinic for the continued management of CKD III. 82-year-old male with a past medical history of CKD stage III Baseline Cr 1.6-1.8, Nephrolithiasis,HTN,   CVA, A. 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 Patient was found to have right-sided hydronephrosis with ureteral stricture and stone. Patient underwent ureteral stent and eventually had a lithotripsy. Patient presents for follow up visit for CKD. Patient had stent exchange in august 2020 October of 2020-patient was admitted to hospital due to right-sided weakness. There is concern for CVA. Underwent tPA. Patient was subsequently also started on Eliquis. Hydrochlorothiazide was held. November 2020- patient was discharged from rehab. Admitted in February 2021 with flank pain. Had cystoscopy completed with right ureteral stent in place with several calculi and moderate to severe right hydronephrosis. Underwent stent exchange. Had acute kidney injury.   Creatinine peaked to 2.2 mg/dL. Patient also had Watchman device placed. And then on March 1st 2021 had what sounds to be a TAVR procedure    ER visit 7/2021 - with HTN to ER. Anxiety     1/2022 - COVID19 infection     10/2022 - admitted to hospital - diplopia transient; was to have MRI as outpt. completed 11/2022 - no evidence of ischemia or enhancing lesions; old prior KALYANI distribution and caudate infarcts    August 2023-had hypertension and feeling restless and went to the ER. EKG was unremarkable. Blood pressures 875 systolic. Patient then again went to the ER 2 days after. Had shortness of breath. Received Lasix. And required admission for shortness of breath. VQ scan low probability of PE. Had rapid response for abnormal movement of right arm. Neurology did not feel this was CVA. There was concern for possible anxiety. - Echocardiogram August 7 with EF 26%, diastolic function was not able to be calculated. Aortic valve appeared to be functioning normally without stenoses, mild mitral and tricuspid regurgitation, RV pressure mild to moderate elevation no significant change from echo in 2022. 1) CKD III- baseline Cr approx 1.5-1.7 mg/dL. Etiology of CKD likely solitary functional kidney, prior hydro, nephrolithiasis. Patient had acute kidney injury in  February of 2021 at which time the stent was obstructed. - Follows with Urology regarding stent and nephrolithiasis  - split function test prior shows left kidney receives higher flow then the right kidney by approximately 70 vs 30%. - regular stent exchange per Urology team  - January 2023-lab work creatinine stable and at baseline 1.48 mg/dL. Electrolytes are appropriate. - UPCR 0.43  - stent replacement 1/2023 August 11, 2023. Recent admission for volume overload/shortness of breath. Blood pressures in the office 132/80. Weight does not appear to be significantly changed but is slightly higher by 3 pounds.   Lab work on August 9 shows stable creatinine 1.47 mg/dL. Electrolytes are stable. Is thrombocytopenic which the patient has had issues with prior but has had recent normal platelet levels. Has innumerable RBC in the urine. It is unclear in the recent hospitalization if he had volume overload as the imaging and notes do not suggest this but the patient did improve with Lasix. There was also question of panic attack as the patient has had 3 family deaths in the last 9 months. Plan:  - labwork and appt in 3-4 months  - no changes to your medications-continue amlodipine, metoprolol, furosemide  - Patient is seeing cardiologist today and I have given the patient a note to give to his cardiologist about the question regarding RV pressure elevations. If the patient needs sleep study. For now I have asked patient to keep a blood pressure log and weight log and send in 2 weeks  - Continue to follow with hematology and patient has appointment in September. 2) Nephrolithiasis - follows with Urology. - renal stone mainly ca oxalate  - 24 hour urine litholink - reviewed. - pt has increased fluid intake to 2 L  - Needs low salt diet - already following. takes in approx 1.8 gm salt daily. at goal  - changed furosemide to HCTZ in jan 2018  - hydrochlorothiazide held during hospitalization in October of 2020  -Underwent cystoscopy June 2023 with retrograde pyelogram and exchange of ureteral stent     3) HTN - Blood pressures are stable. On metoprolol and amlodipine and Lasix 20 mg daily. - during hospitalization in 8/2023 - was started on amlodipine  - August 11, 2023-blood pressure is appropriate in the office. 4) Volume - history of dCHF. 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 Euvolemic.     - need to monitor fluid intake with HF history  - daily weights  - CXR - no acute cardiopulm disease  - Required recent admission for CHF exacerbation? .  Was started on Lasix. - discharge weight 206 and weight at home 8/11 is same 206.   - August 11, 2023-patient's weight are stable and is euvolemic on exam.     5) CKD MBD -      - Vitamin D level January 2023 is 45.7-appropriate  - PTH 95-appropriate  - Check repeat levels with next set of lab work     6) Anemia - stable Hb. Follows with hematology team.     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 -  Right frontal lobe infarct.  -Sent MRI as outlined above  - Follows vascular surgery team  - Had transient diplopia. Saw neurology team March 2023. MRI unrevealing per their notes. Follow-up as needed. 10) AVR - completed 3/1/2021     11) anxiety - following with PCP. Is now on lorazepam if needed     It was a pleasure evaluating Mr. Isabel Jackson. Thank you for allowing our team to participate in the care of your patient. No problem-specific Assessment & Plan notes found for this encounter. HPI:    Recent admission for SOB; started on furosemide; weight at home is 206.4. Patient states that shortness of breath is essentially resolved but not completely back to baseline yet. No fevers, chills, nausea, vomiting. PATIENT INSTRUCTIONS:    Patient Instructions   1) Avoid NSAIDS - (Example - motrin, advil, ibuprofen, aleve, exederin, etc)  2) Always follow a low salt diet  3) If you have any issues with trouble urinating, blood in the urine, food tasting like metal, confusion, weakness, fatigue that is worsening, please call right away. 4) Please continue to follow regularly with your family physician and any other specialist that you are advised to see and continue to follow their recommendations  5) for now, labwork in 3-4 months but check BMP in 2 weeks  6) send in blood pressure log and weight log in 2-3 weeks  7) no changes to your medications today  8) please ask your heart doctor following - you had recent admission for SOB. Improved with lasix.  ECHO was unchanged but elevated RV pressures. He had question of 'panic attack' also but I am not sure if this was related to vol overload as after he started lasix, he has improved and weight improved by 3 lbs. Anything further to do regarding elevated RV pressures? Does he need sleep study. He has had significant stressors past 9  months also to note. Thank you  9) appointment in 4 months        OBJECTIVE:  Current Weight: Weight - Scale: 94.8 kg (209 lb)  Vitals:    08/11/23 0903   BP: 132/80   BP Location: Left arm   Patient Position: Sitting   Cuff Size: Large   Pulse: 62   Weight: 94.8 kg (209 lb)   Height: 5' 10" (1.778 m)    Body mass index is 29.99 kg/m². REVIEW OF SYSTEMS:    Review of Systems   Constitutional: Negative. Negative for appetite change and fatigue. HENT: Negative. Eyes: Negative. Respiratory:        See HPI   Cardiovascular: Negative. Negative for leg swelling. Gastrointestinal: Negative. Endocrine: Negative. Genitourinary: Negative. Negative for difficulty urinating. Musculoskeletal: Negative. Allergic/Immunologic: Negative. Neurological: Negative. Hematological: Negative. Psychiatric/Behavioral: Negative. All other systems reviewed and are negative. PHYSICAL EXAM:      Physical Exam  Vitals and nursing note reviewed. Constitutional:       General: He is not in acute distress. Appearance: He is well-developed. He is not diaphoretic. HENT:      Head: Normocephalic and atraumatic. Eyes:      General: No scleral icterus. Right eye: No discharge. Left eye: No discharge. Conjunctiva/sclera: Conjunctivae normal.   Cardiovascular:      Rate and Rhythm: Normal rate and regular rhythm. Heart sounds: Normal heart sounds. No murmur heard. No friction rub. No gallop. Pulmonary:      Effort: Pulmonary effort is normal. No respiratory distress. Breath sounds: Normal breath sounds. No wheezing or rales.    Chest:      Chest wall: No tenderness. Abdominal:      General: Bowel sounds are normal. There is no distension. Palpations: Abdomen is soft. Tenderness: There is no abdominal tenderness. There is no rebound. Musculoskeletal:         General: No tenderness or deformity. Normal range of motion. Cervical back: Normal range of motion and neck supple. Skin:     General: Skin is warm and dry. Coloration: Skin is not pale. Findings: No erythema or rash. Neurological:      Mental Status: He is alert and oriented to person, place, and time. Cranial Nerves: No cranial nerve deficit. Coordination: Coordination normal.   Psychiatric:         Behavior: Behavior normal.         Thought Content: Thought content normal.         Judgment: Judgment normal.         Medications:    Current Outpatient Medications:   •  amLODIPine (NORVASC) 5 mg tablet, Take 1 tablet (5 mg total) by mouth daily Do not start before August 10, 2023., Disp: 30 tablet, Rfl: 1  •  ascorbic acid (VITAMIN C) 250 mg tablet, Take 500 mg by mouth daily, Disp: , Rfl:   •  aspirin (ECOTRIN LOW STRENGTH) 81 mg EC tablet, Take 81 mg by mouth daily Pt to check with surgeon if needed to hold RX., Disp: , Rfl:   •  atorvastatin (LIPITOR) 40 mg tablet, Take 1 tablet (40 mg total) by mouth daily with dinner, Disp: 30 tablet, Rfl: 1  •  cholecalciferol (VITAMIN D3) 1,000 units tablet, Take 1,000 Units by mouth daily after lunch  , Disp: , Rfl:   •  clopidogrel (PLAVIX) 75 mg tablet, Take 75 mg by mouth daily Pt  To check with surgeon if needed to hold RX., Disp: , Rfl:   •  ferrous sulfate 324 (65 Fe) mg, Take 1 tablet (324 mg total) by mouth every other day Increase to daily if tolerated. , Disp: 90 tablet, Rfl: 0  •  finasteride (PROSCAR) 5 mg tablet, TAKE 1 TABLET BY MOUTH EVERY DAY, Disp: 90 tablet, Rfl: 2  •  furosemide (LASIX) 20 mg tablet, Take 1 tablet (20 mg total) by mouth daily Do not start before August 10, 2023., Disp: 30 tablet, Rfl: 0  • metoprolol tartrate (LOPRESSOR) 50 mg tablet, Take 1 tablet (50 mg total) by mouth every 12 (twelve) hours, Disp: 60 tablet, Rfl: 1  •  sertraline (ZOLOFT) 50 mg tablet, Take 1 tablet (50 mg total) by mouth daily Do not start before August 10, 2023., Disp: 30 tablet, Rfl: 0    Laboratory Results:  Results from last 7 days   Lab Units 08/09/23  0457 08/08/23  0411 08/08/23  0410 08/07/23  2254 08/07/23  0743   WBC Thousand/uL 8.17 8.11  --   --  7.75   HEMOGLOBIN g/dL 15.5 16.3  --   --  15.0   HEMATOCRIT % 47.1 47.4  --   --  46.3   PLATELETS Thousands/uL 104* 121*  --   --  104*   POTASSIUM mmol/L 4.2  --  3.7 3.6 4.4   CHLORIDE mmol/L 103  --  103 102 106   CO2 mmol/L 27  --  24 28 30   BUN mg/dL 34*  --  31* 34* 32*   CREATININE mg/dL 1.47*  --  1.48* 1.62* 1.62*   CALCIUM mg/dL 9.0  --  9.2 9.2 9.6   MAGNESIUM mg/dL  --   --  2.0  --   --        Results for orders placed or performed during the hospital encounter of 08/07/23   COVID/FLU/RSV    Specimen: Nose; Nares   Result Value Ref Range    SARS-CoV-2 Negative Negative    INFLUENZA A PCR Negative Negative    INFLUENZA B PCR Negative Negative    RSV PCR Negative Negative   CBC and differential   Result Value Ref Range    WBC 7.75 4.31 - 10.16 Thousand/uL    RBC 5.13 3.88 - 5.62 Million/uL    Hemoglobin 15.0 12.0 - 17.0 g/dL    Hematocrit 46.3 36.5 - 49.3 %    MCV 90 82 - 98 fL    MCH 29.2 26.8 - 34.3 pg    MCHC 32.4 31.4 - 37.4 g/dL    RDW 14.6 11.6 - 15.1 %    MPV 9.8 8.9 - 12.7 fL    Platelets 518 (L) 498 - 390 Thousands/uL    nRBC 0 /100 WBCs    Neutrophils Relative 59 43 - 75 %    Immat GRANS % 1 0 - 2 %    Lymphocytes Relative 24 14 - 44 %    Monocytes Relative 12 4 - 12 %    Eosinophils Relative 3 0 - 6 %    Basophils Relative 1 0 - 1 %    Neutrophils Absolute 4.57 1.85 - 7.62 Thousands/µL    Immature Grans Absolute 0.05 0.00 - 0.20 Thousand/uL    Lymphocytes Absolute 1.87 0.60 - 4.47 Thousands/µL    Monocytes Absolute 0.96 0.17 - 1.22 Thousand/µL Eosinophils Absolute 0.25 0.00 - 0.61 Thousand/µL    Basophils Absolute 0.05 0.00 - 0.10 Thousands/µL   Comprehensive metabolic panel   Result Value Ref Range    Sodium 139 135 - 147 mmol/L    Potassium 4.4 3.5 - 5.3 mmol/L    Chloride 106 96 - 108 mmol/L    CO2 30 21 - 32 mmol/L    ANION GAP 3 mmol/L    BUN 32 (H) 5 - 25 mg/dL    Creatinine 1.62 (H) 0.60 - 1.30 mg/dL    Glucose 98 65 - 140 mg/dL    Calcium 9.6 8.4 - 10.2 mg/dL    AST 20 13 - 39 U/L    ALT 12 7 - 52 U/L    Alkaline Phosphatase 98 34 - 104 U/L    Total Protein 6.3 (L) 6.4 - 8.4 g/dL    Albumin 3.6 3.5 - 5.0 g/dL    Total Bilirubin 0.96 0.20 - 1.00 mg/dL    eGFR 39 ml/min/1.73sq m   B-Type Natriuretic Peptide(BNP)   Result Value Ref Range     (H) 0 - 100 pg/mL   HS Troponin 0hr (reflex protocol)   Result Value Ref Range    hs TnI 0hr 23 "Refer to ACS Flowchart"- see link ng/L   TSH, 3rd generation with Free T4 reflex   Result Value Ref Range    TSH 3RD GENERATON 3.076 0.450 - 4.500 uIU/mL   HS Troponin I 2hr   Result Value Ref Range    hs TnI 2hr 22 "Refer to ACS Flowchart"- see link ng/L    Delta 2hr hsTnI -1 <20 ng/L   HS Troponin I 4hr   Result Value Ref Range    hs TnI 4hr 23 "Refer to ACS Flowchart"- see link ng/L    Delta 4hr hsTnI 0 <20 ng/L   Basic metabolic panel   Result Value Ref Range    Sodium 138 135 - 147 mmol/L    Potassium 3.6 3.5 - 5.3 mmol/L    Chloride 102 96 - 108 mmol/L    CO2 28 21 - 32 mmol/L    ANION GAP 8 mmol/L    BUN 34 (H) 5 - 25 mg/dL    Creatinine 1.62 (H) 0.60 - 1.30 mg/dL    Glucose 85 65 - 140 mg/dL    Glucose, Fasting 85 65 - 99 mg/dL    Calcium 9.2 8.4 - 10.2 mg/dL    eGFR 39 ml/min/1.73sq m   Magnesium   Result Value Ref Range    Magnesium 2.0 1.9 - 2.7 mg/dL   Comprehensive metabolic panel   Result Value Ref Range    Sodium 137 135 - 147 mmol/L    Potassium 3.7 3.5 - 5.3 mmol/L    Chloride 103 96 - 108 mmol/L    CO2 24 21 - 32 mmol/L    ANION GAP 10 mmol/L    BUN 31 (H) 5 - 25 mg/dL    Creatinine 1.48 (H) 0.60 - 1.30 mg/dL    Glucose 98 65 - 140 mg/dL    Calcium 9.2 8.4 - 10.2 mg/dL    AST 21 13 - 39 U/L    ALT 10 7 - 52 U/L    Alkaline Phosphatase 92 34 - 104 U/L    Total Protein 6.5 6.4 - 8.4 g/dL    Albumin 3.7 3.5 - 5.0 g/dL    Total Bilirubin 1.40 (H) 0.20 - 1.00 mg/dL    eGFR 44 ml/min/1.73sq m   CBC and Platelet   Result Value Ref Range    WBC 8.11 4.31 - 10.16 Thousand/uL    RBC 5.39 3.88 - 5.62 Million/uL    Hemoglobin 16.3 12.0 - 17.0 g/dL    Hematocrit 47.4 36.5 - 49.3 %    MCV 88 82 - 98 fL    MCH 30.2 26.8 - 34.3 pg    MCHC 34.4 31.4 - 37.4 g/dL    RDW 15.3 (H) 11.6 - 15.1 %    Platelets 269 (L) 464 - 390 Thousands/uL    MPV 10.1 8.9 - 12.7 fL   CBC and differential   Result Value Ref Range    WBC 8.17 4.31 - 10.16 Thousand/uL    RBC 5.21 3.88 - 5.62 Million/uL    Hemoglobin 15.5 12.0 - 17.0 g/dL    Hematocrit 47.1 36.5 - 49.3 %    MCV 90 82 - 98 fL    MCH 29.8 26.8 - 34.3 pg    MCHC 32.9 31.4 - 37.4 g/dL    RDW 16.2 (H) 11.6 - 15.1 %    MPV 9.7 8.9 - 12.7 fL    Platelets 251 (L) 135 - 390 Thousands/uL    nRBC 0 /100 WBCs    Neutrophils Relative 60 43 - 75 %    Immat GRANS % 1 0 - 2 %    Lymphocytes Relative 23 14 - 44 %    Monocytes Relative 13 (H) 4 - 12 %    Eosinophils Relative 2 0 - 6 %    Basophils Relative 1 0 - 1 %    Neutrophils Absolute 4.96 1.85 - 7.62 Thousands/µL    Immature Grans Absolute 0.04 0.00 - 0.20 Thousand/uL    Lymphocytes Absolute 1.89 0.60 - 4.47 Thousands/µL    Monocytes Absolute 1.04 0.17 - 1.22 Thousand/µL    Eosinophils Absolute 0.19 0.00 - 0.61 Thousand/µL    Basophils Absolute 0.05 0.00 - 0.10 Thousands/µL   Basic metabolic panel   Result Value Ref Range    Sodium 137 135 - 147 mmol/L    Potassium 4.2 3.5 - 5.3 mmol/L    Chloride 103 96 - 108 mmol/L    CO2 27 21 - 32 mmol/L    ANION GAP 7 mmol/L    BUN 34 (H) 5 - 25 mg/dL    Creatinine 1.47 (H) 0.60 - 1.30 mg/dL    Glucose 106 65 - 140 mg/dL    Calcium 9.0 8.4 - 10.2 mg/dL    eGFR 44 ml/min/1.73sq m   ECG 12 lead   Result Value Ref Range    Ventricular Rate 53 BPM    Atrial Rate  BPM    IN Interval  ms    QRSD Interval 106 ms    QT Interval 410 ms    QTC Interval 384 ms    P Axis  degrees    QRS Axis -63 degrees    T Wave Axis 46 degrees   Echo complete w/ contrast if indicated   Result Value Ref Range    AV area peak george 1.0 cm2    LA size 5.2 cm    Aortic valve mean velocity 15.90 m/s    Triscuspid Valve Regurgitation Peak Gradient 50.0 mmHg    Tricuspid valve peak regurgitation velocity 3.52 m/s    LVPWd 1.30 cm    Left Atrium Area-systolic Apical Two Chamber 25.5 cm2    Left Atrium Area-systolic Four Chamber 55.3 cm2    MV E' Tissue Velocity Septal 7 cm/s    MV E' Tissue Velocity Lateral 14 cm/s    Tricuspid annular plane systolic excursion 1.54 cm    TR Peak George 3.5 m/s    IVSd 2.12 cm    LV DIASTOLIC VOLUME (MOD BIPLANE) 2D 121 mL    LEFT VENTRICLE SYSTOLIC VOLUME (MOD BIPLANE) 2D 60 mL    Left ventricular stroke volume (2D) 61.00 mL    EF 47 %    A4C EF 35 %    LA length (A2C) 6.60 cm    LVIDd 5.00 cm    IVS 1.3 cm    LVIDS 3.70 cm    FS 26 28 - 44 %    LA volume (BP) 78 mL    Ao root 3.00 cm    RVID d 3.0 cm    LVOT mn grad 1.0 mmHg    AV area by cont VTI 0.9 cm2    AV mean gradient 11 mmHg    AV LVOT peak gradient 2 mmHg    MV mean gradient antegrade 5 mmHg    PV peak gradient antegrade 3 mmHg    LVOT diameter 2.1 cm    LVOT peak george 0.64 m/s    LVOT peak VTI 13.15 cm    Aortic valve peak velocity 2.31 m/s    Ao VTI 53.41 cm    LVOT stroke volume 45.52 cm3    AV peak gradient 21 mmHg    MV peak gradient antegrade 18 mmHg    MV Peak E George 125 cm/s    MV VTI 37.51 cm    MV Peak A George 6.26 m/s    LV Systolic Volume (BP) 49 mL    LV Diastolic Volume (BP) 93 mL    CATIE A4C 25.6 cm2    RAA A4C 25.2 cm2    MV stenosis pressure 1/2 time 0 ms    LVOT Cardiac Output 3.83 l/min    LVOT stroke volume index 22.10 ml/m2    LVOT Cardiac Index 1.80 l/min/m2    LVSV, 2D 61 mL    LVOT area 3.46 cm2    DVI 0.28     AV valve area 0.85 cm2    MV valve area by continuity eq 1.21 cm2   Fingerstick Glucose (POCT)   Result Value Ref Range    POC Glucose 140 65 - 140 mg/dl

## 2023-08-11 NOTE — PATIENT INSTRUCTIONS
1) Avoid NSAIDS - (Example - motrin, advil, ibuprofen, aleve, exederin, etc)  2) Always follow a low salt diet  3) If you have any issues with trouble urinating, blood in the urine, food tasting like metal, confusion, weakness, fatigue that is worsening, please call right away. 4) Please continue to follow regularly with your family physician and any other specialist that you are advised to see and continue to follow their recommendations  5) for now, labwork in 3-4 months but check BMP in 2 weeks  6) send in blood pressure log and weight log in 2-3 weeks  7) no changes to your medications today  8) please ask your heart doctor following - you had recent admission for SOB. Improved with lasix. ECHO was unchanged but elevated RV pressures. He had question of 'panic attack' also but I am not sure if this was related to vol overload as after he started lasix, he has improved and weight improved by 3 lbs. Anything further to do regarding elevated RV pressures? Does he need sleep study. He has had significant stressors past 9  months also to note.  Thank you  9) appointment in 4 months

## 2023-08-11 NOTE — PROGRESS NOTES
NEPHROLOGY OFFICE VISIT   Jonathan Lowe 82 y.o. male MRN: [id number]  8/11/2023    Reason for Visit: CKD III    ASSESSMENT and PLAN:    I had the pleasure of seeing Mr Madhav Mirza today in the renal clinic for the continued management of CKD III. 17-year-old male with a past medical history of CKD stage III Baseline Cr 1.6-1.8, Nephrolithiasis,HTN,   CVA, A. 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 in 3/1/2020, former smoker quit in April, hospitalized at [facility] in April 2017 for shortness of breath at which time nephrology was consulted due to acute kidney injury with a rising creatinine. Patient was found to have right-sided hydronephrosis with ureteral stricture and stone. Patient underwent ureteral stent and eventually had a lithotripsy. Patient presents for follow up visit for CKD.      Patient had stent exchange in august 2020 October of 2020-patient was admitted to hospital due to right-sided weakness. Rosita Montano is concern for CVA.  Underwent tPA. Patient was subsequently also started on Eliquis.  Hydrochlorothiazide was held.  Jose R Liu 2020- patient was discharged from rehab.       Admitted in February 2021 with flank pain.  Had cystoscopy completed with right ureteral stent in place with several calculi and moderate to severe right hydronephrosis.  Underwent stent exchange.  Had acute kidney injury.  Creatinine peaked to 2.2 mg/dL.      Patient also had Watchman device placed.  And then on March 1st 2021 had what sounds to be a TAVR procedure    ER visit 7/2021 - with HTN to ER. Anxiety     1/2022 - COVID19 infection     10/2022 - admitted to hospital - diplopia transient; was to have MRI as outpt. completed 11/2022 - no evidence of ischemia or enhancing lesions; old prior KALYANI distribution and caudate infarcts    August 2023-had hypertension and feeling restless and went to the ER. EKG was unremarkable. Blood pressures 133 systolic.   Patient then again went to the ER 2 days after. Had shortness of breath. Received Lasix. And required admission for shortness of breath. VQ scan low probability of PE. Had rapid response for abnormal movement of right arm. Neurology did not feel this was CVA. There was concern for possible anxiety. - Echocardiogram August 7 with EF 31%, diastolic function was not able to be calculated. Aortic valve appeared to be functioning normally without stenoses, mild mitral and tricuspid regurgitation, RV pressure mild to moderate elevation no significant change from echo in 2022.    1) CKD III- baseline Cr approx 1.5-1.7 mg/dL. Etiology of CKD likely solitary functional kidney, prior hydro, nephrolithiasis. Patient had acute kidney injury in  February of 2021 at which time the stent was obstructed.     - Follows with Urology regarding stent and nephrolithiasis  - split function test prior shows left kidney receives higher flow then the right kidney by approximately 70 vs 30%. - regular stent exchange per Urology team  - January 2023-lab work creatinine stable and at baseline 1.48 mg/dL. Electrolytes are appropriate. - UPCR 0.43  - stent replacement 1/2023 August 11, 2023. Recent admission for volume overload/shortness of breath. Blood pressures in the office 132/80. Weight does not appear to be significantly changed but is slightly higher by 3 pounds. Lab work on August 9 shows stable creatinine 1.47 mg/dL. Electrolytes are stable. Is thrombocytopenic which the patient has had issues with prior but has had recent normal platelet levels. Has innumerable RBC in the urine. It is unclear in the recent hospitalization if he had volume overload as the imaging and notes do not suggest this but the patient did improve with Lasix.   There was also question of panic attack as the patient has had 3 family deaths in the last 9 months.     Plan:  - labwork and appt in 3-4 months  - Recheck BMP in 2 weeks given recent initiation of Lasix in the hospital.  - no changes to your medications-continue amlodipine, metoprolol, furosemide  - Patient is seeing cardiologist today and I have given the patient a note to give to his cardiologist about the question regarding RV pressure elevations. If the patient needs sleep study. For now I have asked patient to keep a blood pressure log and weight log and send in 2 weeks  - Continue to follow with hematology and patient has appointment in September.    2) Nephrolithiasis - follows with Urology.      - renal stone mainly ca oxalate  - 24 hour urine litholink - reviewed. - pt has increased fluid intake to 2 L  - Needs low salt diet - already following. takes in approx 1.8 gm salt daily. at goal  - changed furosemide to HCTZ in jan 2018  - hydrochlorothiazide held during hospitalization in October of 2020  -Underwent cystoscopy June 2023 with retrograde pyelogram and exchange of ureteral stent     3) HTN - Blood pressures are stable. On metoprolol and amlodipine and Lasix 20 mg daily. - during hospitalization in 8/2023 - was started on amlodipine  - August 11, 2023-blood pressure is appropriate in the office.    4) Volume - history of dCHF. 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 Euvolemic.     - need to monitor fluid intake with HF history  - daily weights  - CXR - no acute cardiopulm disease  - Required recent admission for CHF exacerbation? .  Was started on Lasix. - discharge weight 206 and weight at home 8/11 is same 206. - August 11, 2023-patient's weight are stable and is euvolemic on exam.     5) CKD MBD -      - Vitamin D level January 2023 is 45.7-appropriate  - PTH 95-appropriate  - Check repeat levels with next set of lab work     6) Anemia - stable Hb. Follows with hematology team.     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      -  Right frontal lobe infarct.  -Sent MRI as outlined above  - Follows vascular surgery team  - Had transient diplopia. Saw neurology team March 2023. MRI unrevealing per their notes. Follow-up as needed.     10) AVR - completed 3/1/2021     11) anxiety - following with PCP. Is now on lorazepam if needed     It was a pleasure evaluating Mr. Mayra Valentin. Thank you for allowing our team to participate in the care of your patient.     No problem-specific Assessment & Plan notes found for this encounter. HPI:    Recent admission for SOB; started on furosemide; weight at home is 206.4. Patient states that shortness of breath is essentially resolved but not completely back to baseline yet. No fevers, chills, nausea, vomiting. PATIENT INSTRUCTIONS:    Patient Instructions   1) Avoid NSAIDS - (Example - motrin, advil, ibuprofen, aleve, exederin, etc)  2) Always follow a low salt diet  3) If you have any issues with trouble urinating, blood in the urine, food tasting like metal, confusion, weakness, fatigue that is worsening, please call right away. 4) Please continue to follow regularly with your family physician and any other specialist that you are advised to see and continue to follow their recommendations  5) for now, labwork in 3-4 months but check BMP in 2 weeks  6) send in blood pressure log and weight log in 2-3 weeks  7) no changes to your medications today  8) please ask your heart doctor following - you had recent admission for SOB. Improved with lasix. ECHO was unchanged but elevated RV pressures. He had question of 'panic attack' also but I am not sure if this was related to vol overload as after he started lasix, he has improved and weight improved by 3 lbs. Anything further to do regarding elevated RV pressures? Does he need sleep study. He has had significant stressors past 9  months also to note.  Thank you  9) appointment in 4 months        OBJECTIVE:  Current Weight: Weight - Scale: 94.8 kg (209 lb)  Vitals:    08/11/23 0903   BP: 132/80 BP Location: Left arm   Patient Position: Sitting   Cuff Size: Large   Pulse: 62   Weight: 94.8 kg (209 lb)   Height: 5' 10" (1.778 m)    Body mass index is 29.99 kg/m². REVIEW OF SYSTEMS:    Review of Systems   Constitutional: Negative. Negative for appetite change and fatigue. HENT: Negative. Eyes: Negative. Respiratory:        See HPI   Cardiovascular: Negative. Negative for leg swelling. Gastrointestinal: Negative. Endocrine: Negative. Genitourinary: Negative. Negative for difficulty urinating. Musculoskeletal: Negative. Allergic/Immunologic: Negative. Neurological: Negative. Hematological: Negative. Psychiatric/Behavioral: Negative. All other systems reviewed and are negative. PHYSICAL EXAM:      Physical Exam  Vitals and nursing note reviewed. Constitutional:       General: He is not in acute distress. Appearance: He is well-developed. He is not diaphoretic. HENT:      Head: Normocephalic and atraumatic. Eyes:      General: No scleral icterus. Right eye: No discharge. Left eye: No discharge. Conjunctiva/sclera: Conjunctivae normal.   Cardiovascular:      Rate and Rhythm: Normal rate and regular rhythm. Heart sounds: Normal heart sounds. No murmur heard. No friction rub. No gallop. Pulmonary:      Effort: Pulmonary effort is normal. No respiratory distress. Breath sounds: Normal breath sounds. No wheezing or rales. Chest:      Chest wall: No tenderness. Abdominal:      General: Bowel sounds are normal. There is no distension. Palpations: Abdomen is soft. Tenderness: There is no abdominal tenderness. There is no rebound. Musculoskeletal:         General: No tenderness or deformity. Normal range of motion. Cervical back: Normal range of motion and neck supple. Skin:     General: Skin is warm and dry. Coloration: Skin is not pale. Findings: No erythema or rash.    Neurological:      Mental Status: He is alert and oriented to person, place, and time. Cranial Nerves: No cranial nerve deficit. Coordination: Coordination normal.   Psychiatric:         Behavior: Behavior normal.         Thought Content: Thought content normal.         Judgment: Judgment normal.         Medications:    Current Outpatient Medications:   •  amLODIPine (NORVASC) 5 mg tablet, Take 1 tablet (5 mg total) by mouth daily Do not start before August 10, 2023., Disp: 30 tablet, Rfl: 1  •  ascorbic acid (VITAMIN C) 250 mg tablet, Take 500 mg by mouth daily, Disp: , Rfl:   •  aspirin (ECOTRIN LOW STRENGTH) 81 mg EC tablet, Take 81 mg by mouth daily Pt to check with surgeon if needed to hold RX., Disp: , Rfl:   •  atorvastatin (LIPITOR) 40 mg tablet, Take 1 tablet (40 mg total) by mouth daily with dinner, Disp: 30 tablet, Rfl: 1  •  cholecalciferol (VITAMIN D3) 1,000 units tablet, Take 1,000 Units by mouth daily after lunch  , Disp: , Rfl:   •  clopidogrel (PLAVIX) 75 mg tablet, Take 75 mg by mouth daily Pt  To check with surgeon if needed to hold RX., Disp: , Rfl:   •  ferrous sulfate 324 (65 Fe) mg, Take 1 tablet (324 mg total) by mouth every other day Increase to daily if tolerated. , Disp: 90 tablet, Rfl: 0  •  finasteride (PROSCAR) 5 mg tablet, TAKE 1 TABLET BY MOUTH EVERY DAY, Disp: 90 tablet, Rfl: 2  •  furosemide (LASIX) 20 mg tablet, Take 1 tablet (20 mg total) by mouth daily Do not start before August 10, 2023., Disp: 30 tablet, Rfl: 0  •  metoprolol tartrate (LOPRESSOR) 50 mg tablet, Take 1 tablet (50 mg total) by mouth every 12 (twelve) hours, Disp: 60 tablet, Rfl: 1  •  sertraline (ZOLOFT) 50 mg tablet, Take 1 tablet (50 mg total) by mouth daily Do not start before August 10, 2023., Disp: 30 tablet, Rfl: 0    Laboratory Results:  Results from last 7 days   Lab Units 08/09/23  0457 08/08/23  0411 08/08/23  0410 08/07/23  2254 08/07/23  0743   WBC Thousand/uL 8.17 8.11  --   --  7.75   HEMOGLOBIN g/dL 15.5 16.3  -- --  15.0   HEMATOCRIT % 47.1 47.4  --   --  46.3   PLATELETS Thousands/uL 104* 121*  --   --  104*   POTASSIUM mmol/L 4.2  --  3.7 3.6 4.4   CHLORIDE mmol/L 103  --  103 102 106   CO2 mmol/L 27  --  24 28 30   BUN mg/dL 34*  --  31* 34* 32*   CREATININE mg/dL 1.47*  --  1.48* 1.62* 1.62*   CALCIUM mg/dL 9.0  --  9.2 9.2 9.6   MAGNESIUM mg/dL  --   --  2.0  --   --        Results for orders placed or performed during the hospital encounter of 08/07/23   COVID/FLU/RSV    Specimen: Nose; Nares   Result Value Ref Range    SARS-CoV-2 Negative Negative    INFLUENZA A PCR Negative Negative    INFLUENZA B PCR Negative Negative    RSV PCR Negative Negative   CBC and differential   Result Value Ref Range    WBC 7.75 4.31 - 10.16 Thousand/uL    RBC 5.13 3.88 - 5.62 Million/uL    Hemoglobin 15.0 12.0 - 17.0 g/dL    Hematocrit 46.3 36.5 - 49.3 %    MCV 90 82 - 98 fL    MCH 29.2 26.8 - 34.3 pg    MCHC 32.4 31.4 - 37.4 g/dL    RDW 14.6 11.6 - 15.1 %    MPV 9.8 8.9 - 12.7 fL    Platelets 955 (L) 686 - 390 Thousands/uL    nRBC 0 /100 WBCs    Neutrophils Relative 59 43 - 75 %    Immat GRANS % 1 0 - 2 %    Lymphocytes Relative 24 14 - 44 %    Monocytes Relative 12 4 - 12 %    Eosinophils Relative 3 0 - 6 %    Basophils Relative 1 0 - 1 %    Neutrophils Absolute 4.57 1.85 - 7.62 Thousands/µL    Immature Grans Absolute 0.05 0.00 - 0.20 Thousand/uL    Lymphocytes Absolute 1.87 0.60 - 4.47 Thousands/µL    Monocytes Absolute 0.96 0.17 - 1.22 Thousand/µL    Eosinophils Absolute 0.25 0.00 - 0.61 Thousand/µL    Basophils Absolute 0.05 0.00 - 0.10 Thousands/µL   Comprehensive metabolic panel   Result Value Ref Range    Sodium 139 135 - 147 mmol/L    Potassium 4.4 3.5 - 5.3 mmol/L    Chloride 106 96 - 108 mmol/L    CO2 30 21 - 32 mmol/L    ANION GAP 3 mmol/L    BUN 32 (H) 5 - 25 mg/dL    Creatinine 1.62 (H) 0.60 - 1.30 mg/dL    Glucose 98 65 - 140 mg/dL    Calcium 9.6 8.4 - 10.2 mg/dL    AST 20 13 - 39 U/L    ALT 12 7 - 52 U/L    Alkaline Phosphatase 98 34 - 104 U/L    Total Protein 6.3 (L) 6.4 - 8.4 g/dL    Albumin 3.6 3.5 - 5.0 g/dL    Total Bilirubin 0.96 0.20 - 1.00 mg/dL    eGFR 39 ml/min/1.73sq m   B-Type Natriuretic Peptide(BNP)   Result Value Ref Range     (H) 0 - 100 pg/mL   HS Troponin 0hr (reflex protocol)   Result Value Ref Range    hs TnI 0hr 23 "Refer to ACS Flowchart"- see link ng/L   TSH, 3rd generation with Free T4 reflex   Result Value Ref Range    TSH 3RD GENERATON 3.076 0.450 - 4.500 uIU/mL   HS Troponin I 2hr   Result Value Ref Range    hs TnI 2hr 22 "Refer to ACS Flowchart"- see link ng/L    Delta 2hr hsTnI -1 <20 ng/L   HS Troponin I 4hr   Result Value Ref Range    hs TnI 4hr 23 "Refer to ACS Flowchart"- see link ng/L    Delta 4hr hsTnI 0 <20 ng/L   Basic metabolic panel   Result Value Ref Range    Sodium 138 135 - 147 mmol/L    Potassium 3.6 3.5 - 5.3 mmol/L    Chloride 102 96 - 108 mmol/L    CO2 28 21 - 32 mmol/L    ANION GAP 8 mmol/L    BUN 34 (H) 5 - 25 mg/dL    Creatinine 1.62 (H) 0.60 - 1.30 mg/dL    Glucose 85 65 - 140 mg/dL    Glucose, Fasting 85 65 - 99 mg/dL    Calcium 9.2 8.4 - 10.2 mg/dL    eGFR 39 ml/min/1.73sq m   Magnesium   Result Value Ref Range    Magnesium 2.0 1.9 - 2.7 mg/dL   Comprehensive metabolic panel   Result Value Ref Range    Sodium 137 135 - 147 mmol/L    Potassium 3.7 3.5 - 5.3 mmol/L    Chloride 103 96 - 108 mmol/L    CO2 24 21 - 32 mmol/L    ANION GAP 10 mmol/L    BUN 31 (H) 5 - 25 mg/dL    Creatinine 1.48 (H) 0.60 - 1.30 mg/dL    Glucose 98 65 - 140 mg/dL    Calcium 9.2 8.4 - 10.2 mg/dL    AST 21 13 - 39 U/L    ALT 10 7 - 52 U/L    Alkaline Phosphatase 92 34 - 104 U/L    Total Protein 6.5 6.4 - 8.4 g/dL    Albumin 3.7 3.5 - 5.0 g/dL    Total Bilirubin 1.40 (H) 0.20 - 1.00 mg/dL    eGFR 44 ml/min/1.73sq m   CBC and Platelet   Result Value Ref Range    WBC 8.11 4.31 - 10.16 Thousand/uL    RBC 5.39 3.88 - 5.62 Million/uL    Hemoglobin 16.3 12.0 - 17.0 g/dL    Hematocrit 47.4 36.5 - 49.3 % MCV 88 82 - 98 fL    MCH 30.2 26.8 - 34.3 pg    MCHC 34.4 31.4 - 37.4 g/dL    RDW 15.3 (H) 11.6 - 15.1 %    Platelets 056 (L) 587 - 390 Thousands/uL    MPV 10.1 8.9 - 12.7 fL   CBC and differential   Result Value Ref Range    WBC 8.17 4.31 - 10.16 Thousand/uL    RBC 5.21 3.88 - 5.62 Million/uL    Hemoglobin 15.5 12.0 - 17.0 g/dL    Hematocrit 47.1 36.5 - 49.3 %    MCV 90 82 - 98 fL    MCH 29.8 26.8 - 34.3 pg    MCHC 32.9 31.4 - 37.4 g/dL    RDW 16.2 (H) 11.6 - 15.1 %    MPV 9.7 8.9 - 12.7 fL    Platelets 214 (L) 591 - 390 Thousands/uL    nRBC 0 /100 WBCs    Neutrophils Relative 60 43 - 75 %    Immat GRANS % 1 0 - 2 %    Lymphocytes Relative 23 14 - 44 %    Monocytes Relative 13 (H) 4 - 12 %    Eosinophils Relative 2 0 - 6 %    Basophils Relative 1 0 - 1 %    Neutrophils Absolute 4.96 1.85 - 7.62 Thousands/µL    Immature Grans Absolute 0.04 0.00 - 0.20 Thousand/uL    Lymphocytes Absolute 1.89 0.60 - 4.47 Thousands/µL    Monocytes Absolute 1.04 0.17 - 1.22 Thousand/µL    Eosinophils Absolute 0.19 0.00 - 0.61 Thousand/µL    Basophils Absolute 0.05 0.00 - 0.10 Thousands/µL   Basic metabolic panel   Result Value Ref Range    Sodium 137 135 - 147 mmol/L    Potassium 4.2 3.5 - 5.3 mmol/L    Chloride 103 96 - 108 mmol/L    CO2 27 21 - 32 mmol/L    ANION GAP 7 mmol/L    BUN 34 (H) 5 - 25 mg/dL    Creatinine 1.47 (H) 0.60 - 1.30 mg/dL    Glucose 106 65 - 140 mg/dL    Calcium 9.0 8.4 - 10.2 mg/dL    eGFR 44 ml/min/1.73sq m   ECG 12 lead   Result Value Ref Range    Ventricular Rate 53 BPM    Atrial Rate  BPM    DC Interval  ms    QRSD Interval 106 ms    QT Interval 410 ms    QTC Interval 384 ms    P Axis  degrees    QRS Axis -63 degrees    T Wave Axis 46 degrees   Echo complete w/ contrast if indicated   Result Value Ref Range    AV area peak carla 1.0 cm2    LA size 5.2 cm    Aortic valve mean velocity 15.90 m/s    Triscuspid Valve Regurgitation Peak Gradient 50.0 mmHg    Tricuspid valve peak regurgitation velocity 3.52 m/s LVPWd 1.30 cm    Left Atrium Area-systolic Apical Two Chamber 25.5 cm2    Left Atrium Area-systolic Four Chamber 55.7 cm2    MV E' Tissue Velocity Septal 7 cm/s    MV E' Tissue Velocity Lateral 14 cm/s    Tricuspid annular plane systolic excursion 1.84 cm    TR Peak George 3.5 m/s    IVSd 7.40 cm    LV DIASTOLIC VOLUME (MOD BIPLANE) 2D 121 mL    LEFT VENTRICLE SYSTOLIC VOLUME (MOD BIPLANE) 2D 60 mL    Left ventricular stroke volume (2D) 61.00 mL    EF 47 %    A4C EF 35 %    LA length (A2C) 6.60 cm    LVIDd 5.00 cm    IVS 1.3 cm    LVIDS 3.70 cm    FS 26 28 - 44 %    LA volume (BP) 78 mL    Ao root 3.00 cm    RVID d 3.0 cm    LVOT mn grad 1.0 mmHg    AV area by cont VTI 0.9 cm2    AV mean gradient 11 mmHg    AV LVOT peak gradient 2 mmHg    MV mean gradient antegrade 5 mmHg    PV peak gradient antegrade 3 mmHg    LVOT diameter 2.1 cm    LVOT peak george 0.64 m/s    LVOT peak VTI 13.15 cm    Aortic valve peak velocity 2.31 m/s    Ao VTI 53.41 cm    LVOT stroke volume 45.52 cm3    AV peak gradient 21 mmHg    MV peak gradient antegrade 18 mmHg    MV Peak E George 125 cm/s    MV VTI 37.51 cm    MV Peak A George 5.38 m/s    LV Systolic Volume (BP) 49 mL    LV Diastolic Volume (BP) 93 mL    CATIE A4C 25.6 cm2    RAA A4C 25.2 cm2    MV stenosis pressure 1/2 time 0 ms    LVOT Cardiac Output 3.83 l/min    LVOT stroke volume index 22.10 ml/m2    LVOT Cardiac Index 1.80 l/min/m2    LVSV, 2D 61 mL    LVOT area 3.46 cm2    DVI 0.28     AV valve area 0.85 cm2    MV valve area by continuity eq 1.21 cm2   Fingerstick Glucose (POCT)   Result Value Ref Range    POC Glucose 140 65 - 140 mg/dl

## 2023-08-28 ENCOUNTER — LAB (OUTPATIENT)
Dept: LAB | Facility: HOSPITAL | Age: 83
End: 2023-08-28
Payer: MEDICARE

## 2023-08-28 ENCOUNTER — TELEPHONE (OUTPATIENT)
Dept: HEMATOLOGY ONCOLOGY | Facility: CLINIC | Age: 83
End: 2023-08-28

## 2023-08-28 DIAGNOSIS — R31.9 HEMATURIA, UNSPECIFIED TYPE: ICD-10-CM

## 2023-08-28 DIAGNOSIS — R58 BLOOD LOSS: ICD-10-CM

## 2023-08-28 DIAGNOSIS — N18.31 STAGE 3A CHRONIC KIDNEY DISEASE (HCC): ICD-10-CM

## 2023-08-28 DIAGNOSIS — E61.1 IRON DEFICIENCY: ICD-10-CM

## 2023-08-28 DIAGNOSIS — N18.9 CHRONIC KIDNEY DISEASE, UNSPECIFIED CKD STAGE: ICD-10-CM

## 2023-08-28 LAB
ANION GAP SERPL CALCULATED.3IONS-SCNC: 6 MMOL/L
BASOPHILS # BLD AUTO: 0.05 THOUSANDS/ÂΜL (ref 0–0.1)
BASOPHILS NFR BLD AUTO: 1 % (ref 0–1)
BUN SERPL-MCNC: 32 MG/DL (ref 5–25)
CALCIUM SERPL-MCNC: 9.4 MG/DL (ref 8.4–10.2)
CHLORIDE SERPL-SCNC: 104 MMOL/L (ref 96–108)
CO2 SERPL-SCNC: 30 MMOL/L (ref 21–32)
CREAT SERPL-MCNC: 1.68 MG/DL (ref 0.6–1.3)
EOSINOPHIL # BLD AUTO: 0.31 THOUSAND/ÂΜL (ref 0–0.61)
EOSINOPHIL NFR BLD AUTO: 4 % (ref 0–6)
ERYTHROCYTE [DISTWIDTH] IN BLOOD BY AUTOMATED COUNT: 14.6 % (ref 11.6–15.1)
FERRITIN SERPL-MCNC: 81 NG/ML (ref 24–336)
GFR SERPL CREATININE-BSD FRML MDRD: 37 ML/MIN/1.73SQ M
GLUCOSE P FAST SERPL-MCNC: 104 MG/DL (ref 65–99)
HCT VFR BLD AUTO: 45.7 % (ref 36.5–49.3)
HGB BLD-MCNC: 15.2 G/DL (ref 12–17)
IMM GRANULOCYTES # BLD AUTO: 0.05 THOUSAND/UL (ref 0–0.2)
IMM GRANULOCYTES NFR BLD AUTO: 1 % (ref 0–2)
IRON SATN MFR SERPL: 25 % (ref 15–50)
IRON SERPL-MCNC: 71 UG/DL (ref 50–212)
LYMPHOCYTES # BLD AUTO: 1.68 THOUSANDS/ÂΜL (ref 0.6–4.47)
LYMPHOCYTES NFR BLD AUTO: 21 % (ref 14–44)
MCH RBC QN AUTO: 30.6 PG (ref 26.8–34.3)
MCHC RBC AUTO-ENTMCNC: 33.3 G/DL (ref 31.4–37.4)
MCV RBC AUTO: 92 FL (ref 82–98)
MONOCYTES # BLD AUTO: 0.86 THOUSAND/ÂΜL (ref 0.17–1.22)
MONOCYTES NFR BLD AUTO: 11 % (ref 4–12)
NEUTROPHILS # BLD AUTO: 5.26 THOUSANDS/ÂΜL (ref 1.85–7.62)
NEUTS SEG NFR BLD AUTO: 62 % (ref 43–75)
NRBC BLD AUTO-RTO: 0 /100 WBCS
PLATELET # BLD AUTO: 142 THOUSANDS/UL (ref 149–390)
PMV BLD AUTO: 10 FL (ref 8.9–12.7)
POTASSIUM SERPL-SCNC: 4.7 MMOL/L (ref 3.5–5.3)
RBC # BLD AUTO: 4.97 MILLION/UL (ref 3.88–5.62)
SODIUM SERPL-SCNC: 140 MMOL/L (ref 135–147)
TIBC SERPL-MCNC: 280 UG/DL (ref 250–450)
UIBC SERPL-MCNC: 209 UG/DL (ref 155–355)
WBC # BLD AUTO: 8.21 THOUSAND/UL (ref 4.31–10.16)

## 2023-08-28 PROCEDURE — 85025 COMPLETE CBC W/AUTO DIFF WBC: CPT

## 2023-08-28 PROCEDURE — 36415 COLL VENOUS BLD VENIPUNCTURE: CPT

## 2023-08-28 PROCEDURE — 83550 IRON BINDING TEST: CPT

## 2023-08-28 PROCEDURE — 82728 ASSAY OF FERRITIN: CPT

## 2023-08-28 PROCEDURE — 83540 ASSAY OF IRON: CPT

## 2023-08-28 PROCEDURE — 80048 BASIC METABOLIC PNL TOTAL CA: CPT

## 2023-08-28 RX ORDER — FERROUS SULFATE TAB EC 324 MG (65 MG FE EQUIVALENT) 324 (65 FE) MG
324 TABLET DELAYED RESPONSE ORAL EVERY OTHER DAY
Qty: 90 TABLET | Refills: 0 | Status: SHIPPED | OUTPATIENT
Start: 2023-08-28

## 2023-08-28 NOTE — TELEPHONE ENCOUNTER
Medication Refill Request   Who are you speaking with? Patient   If it is not the patient, are they listed on an active communication consent form? N/A   Which medication is being requested for refill? Please list medication name and dosage  ferrous sulfate 324 (65 Fe) mg       How many pills does the patient have left? 0   Preferred Pharmacy / Address  [facility] [address], [address]   [address], [address] [address]   Phone:  [phone]  Fax:  [phone]   [facility] [place] #:  [id number]   Who is the prescribing provider?  Dr. Manan Parks   Call back number [phone]   Relevant Information  n/a

## 2023-08-30 ENCOUNTER — TELEPHONE (OUTPATIENT)
Dept: NEPHROLOGY | Facility: CLINIC | Age: 83
End: 2023-08-30

## 2023-08-30 NOTE — RESULT ENCOUNTER NOTE
Hello    Patient normally is followed up by Ms Faisal Vaughn. Please let the patient know that the kidney numbers are stable with creatinine 1.6 and potassium is okay at 4.7. No changes needed for now. Patient should have blood work already for the next appointment.     Thank you    np

## 2023-08-30 NOTE — TELEPHONE ENCOUNTER
----- Message from Lisa Geronimo MD sent at 8/30/2023 11:28 AM EDT -----  Hello    Patient normally is followed up by Ms Herman Elias. Please let the patient know that the kidney numbers are stable with creatinine 1.6 and potassium is okay at 4.7. No changes needed for now. Patient should have blood work already for the next appointment.     Thank you    np

## 2023-09-20 ENCOUNTER — OFFICE VISIT (OUTPATIENT)
Dept: OTOLARYNGOLOGY | Facility: CLINIC | Age: 83
End: 2023-09-20
Payer: MEDICARE

## 2023-09-20 VITALS — WEIGHT: 212.6 LBS | TEMPERATURE: 97.3 F | BODY MASS INDEX: 30.43 KG/M2 | HEIGHT: 70 IN

## 2023-09-20 DIAGNOSIS — H61.23 BILATERAL IMPACTED CERUMEN: Primary | ICD-10-CM

## 2023-09-20 DIAGNOSIS — H90.3 SENSORINEURAL HEARING LOSS (SNHL), BILATERAL: ICD-10-CM

## 2023-09-20 PROCEDURE — 99213 OFFICE O/P EST LOW 20 MIN: CPT | Performed by: NURSE PRACTITIONER

## 2023-09-20 PROCEDURE — 69210 REMOVE IMPACTED EAR WAX UNI: CPT | Performed by: NURSE PRACTITIONER

## 2023-09-20 NOTE — PROGRESS NOTES
Assessment/Plan:    Sensorineural hearing loss (SNHL) of both ears  History of fall and facial injury with nasal bone fracture. No nasal concerns voiced during visit today.         Bilateral impacted cerumen     On exam noted bilateral cerumen impaction and unable to fully view tympanic membrane. Cerumen impaction removed bilateral eac with alligator forceps and suction, pt tolerated procedure well. Upon removal, improved hearing and decreased clogged sensation of bilateral ears. Discussed routine cerumen care including avoidance of q-tips and cerumen softeners. Encourage ongoing follow up annually to monitor for cerumen and hearing.       Sensorineural hearing loss (SNHL) of both ears  Reviewed last Audiogram 08/25/2021 indicating left mild sloping to severe SNHL, right moderate/severe to profound SNHL.  Word discrimination on right is 52 and 92 on left.  Tymps type A bilaterally.     Continue current hearing aids and hearing aid care with [facility] audiology/ENT. Repeat audiogram in near future.          Diagnoses and all orders for this visit:    Bilateral impacted cerumen    Sensorineural hearing loss (SNHL), bilateral          Subjective:      Patient ID: Mercy Rolle is a 82 y.o. male. Presents today for follow up due to nose and ear concerns. Hearing stable since last visit. No otalgia or otorrhea. Obtained hearing aids after last visit through our [facility] office. No concerns with nose today. The following portions of the patient's history were reviewed and updated as appropriate: allergies, current medications, past family history, past medical history, past social history, past surgical history and problem list.    Review of Systems   Constitutional: Negative. HENT: Positive for hearing loss. Negative for congestion, ear discharge, ear pain, nosebleeds, postnasal drip, rhinorrhea, sinus pressure, sinus pain, sore throat, tinnitus and voice change.     Respiratory: Negative for chest tightness and shortness of breath. Skin: Negative for color change. Neurological: Negative for dizziness, numbness and headaches. Psychiatric/Behavioral: Negative. Objective:      Temp (!) 97.3 °F (36.3 °C) (Temporal)   Ht 5' 10" (1.778 m)   Wt 96.4 kg (212 lb 9.6 oz)   BMI 30.50 kg/m²            Physical Exam  Constitutional:       Appearance: He is well-developed. HENT:      Head: Normocephalic. Right Ear: Hearing, tympanic membrane, ear canal and external ear normal. No decreased hearing noted. No drainage or tenderness. There is impacted cerumen. Tympanic membrane is not perforated or erythematous. Left Ear: Hearing, tympanic membrane, ear canal and external ear normal. No decreased hearing noted. No drainage or tenderness. There is impacted cerumen. Tympanic membrane is not perforated or erythematous. Nose: Nose normal. No nasal deformity or septal deviation. Mouth/Throat:      Mouth: Mucous membranes are not pale and not dry. No oral lesions. Dentition: Normal dentition. Pharynx: Uvula midline. No oropharyngeal exudate. Neck:      Trachea: No tracheal deviation. Pulmonary:      Effort: No accessory muscle usage or respiratory distress. Musculoskeletal:      Cervical back: Neck supple. Lymphadenopathy:      Cervical: No cervical adenopathy. Skin:     General: Skin is warm and dry. Neurological:      Mental Status: He is alert and oriented to person, place, and time. Cranial Nerves: No cranial nerve deficit. Sensory: No sensory deficit. Psychiatric:         Behavior: Behavior is cooperative. Ear cerumen removal    Date/Time: 9/20/2023 9:00 AM    Performed by: KELLY Chin  Authorized by: KELLY Chin  [facility] Protocol:  Consent: Verbal consent obtained.   Risks and benefits: risks, benefits and alternatives were discussed  Consent given by: patient  Patient understanding: patient states understanding of the procedure being performed      Patient location:  Clinic  Procedure details:     Local anesthetic:  None    Location:  L ear and R ear    Approach:  External  Post-procedure details:     Complication:  None    Hearing quality:  Normal    Patient tolerance of procedure:   Tolerated well, no immediate complications

## 2023-09-20 NOTE — ASSESSMENT & PLAN NOTE
History of fall and facial injury with nasal bone fracture. No nasal concerns voiced during visit today.         Bilateral impacted cerumen     On exam noted bilateral cerumen impaction and unable to fully view tympanic membrane. Cerumen impaction removed bilateral eac with alligator forceps and suction, pt tolerated procedure well. Upon removal, improved hearing and decreased clogged sensation of bilateral ears. Discussed routine cerumen care including avoidance of q-tips and cerumen softeners. Encourage ongoing follow up annually to monitor for cerumen and hearing.       Sensorineural hearing loss (SNHL) of both ears  Reviewed last Audiogram 08/25/2021 indicating left mild sloping to severe SNHL, right moderate/severe to profound SNHL.  Word discrimination on right is 52 and 92 on left.  Tymps type A bilaterally.     Continue current hearing aids and hearing aid care with [facility] audiology/ENT.     Repeat audiogram in near future.

## 2023-09-27 ENCOUNTER — OFFICE VISIT (OUTPATIENT)
Dept: HEMATOLOGY ONCOLOGY | Facility: MEDICAL CENTER | Age: 83
End: 2023-09-27
Payer: MEDICARE

## 2023-09-27 VITALS
WEIGHT: 217 LBS | HEART RATE: 71 BPM | TEMPERATURE: 97.9 F | SYSTOLIC BLOOD PRESSURE: 122 MMHG | RESPIRATION RATE: 17 BRPM | OXYGEN SATURATION: 96 % | HEIGHT: 70 IN | DIASTOLIC BLOOD PRESSURE: 80 MMHG | BODY MASS INDEX: 31.07 KG/M2

## 2023-09-27 DIAGNOSIS — E61.1 IRON DEFICIENCY: ICD-10-CM

## 2023-09-27 DIAGNOSIS — E83.9 CHRONIC KIDNEY DISEASE-MINERAL AND BONE DISORDER: Primary | ICD-10-CM

## 2023-09-27 DIAGNOSIS — M89.9 CHRONIC KIDNEY DISEASE-MINERAL AND BONE DISORDER: Primary | ICD-10-CM

## 2023-09-27 DIAGNOSIS — N18.9 CHRONIC KIDNEY DISEASE-MINERAL AND BONE DISORDER: Primary | ICD-10-CM

## 2023-09-27 PROCEDURE — 99214 OFFICE O/P EST MOD 30 MIN: CPT | Performed by: INTERNAL MEDICINE

## 2023-09-27 NOTE — PROGRESS NOTES
Colette Flores  1940  [facility] [place] [facility] [place] [address] [address] [facility] [id number]    DISCUSSION/SUMMARY:    27-year-old male with history of iron deficiency anemia secondary to  blood loss. Patient has received IV iron in the past.  Presently Mr. Jose Carlos Justin feels well and clinically there are no concerning findings. Patient's color is good. Laboratory test results are listed below. CBC parameters and iron studies are all within normal limits. We discussed options. We agree that routine hematology follow-ups are no longer needed. Patient will follow-up with his PCP, can undergo a CBC and iron studies approximately twice a year. If the iron deficiency anemia should return, patient can be referred back to this office. Patient can continue with the oral iron supplement monitoring for GI side effects. We discussed what to monitor for as far as progressive fatigue, pallor,  bleeding, respiratory issues, chewing ice, restless legs. Patient is on aspirin and Plavix, bruises easily on the upper extremities. Patient will monitor. Patient knows to call the hematology/oncology office if there are any other questions or concerns. Carefully review your medication list and verify that the list is accurate and up-to-date. Please call the hematology/oncology office if there are medications missing from the list, medications on the list that you are not currently taking or if there is a dosage or instruction that is different from how you're taking that medication.     Patient goals and areas of care: Monitor CBC parameters and iron studies (by PCP)  Barriers to care: none  Patient is able to self-care  _____________________________________________________________________________________    Chief Complaint   Patient presents with   • Follow-up   • History of iron deficiency anemia     History of Present Illness: 27-year-old male previously seen by KIKE MAYBERRY for evaluation of iron deficiency anemia. Previously patient was found to have  blood loss. Mr. Mayra Barron returns for follow-up. Patient states feeling well, baseline. No  bleeding recently. No shortness of breath or dyspnea on exertion. Activities are baseline. Appetite is good, weight is stable. Routine health maintenance and medical care is up-to-date. Patient is on aspirin and Plavix, bruises easily but no excessive bruising or bleeding. Review of Systems   Constitutional: Negative. HENT: Negative. Eyes: Negative. Respiratory: Negative. Cardiovascular: Negative. Gastrointestinal: Negative. Endocrine: Negative. Genitourinary: Negative. Musculoskeletal: Negative. Skin: Negative. Allergic/Immunologic: Negative. Neurological: Negative. Hematological: Negative. Psychiatric/Behavioral: Negative. All other systems reviewed and are negative.     Patient Active Problem List   Diagnosis   • Shortness of breath   • Chronic atrial fibrillation (HCC)   • Benign essential hypertension   • Renal calculi   • JUNIOR (acute kidney injury) ([address])   • Calculus of ureter   • Crossing vessel and stricture of ureter without hydronephrosis   • Hematuria, gross   • Hydronephrosis with ureteral stricture, not elsewhere classified   • CVA (cerebral vascular accident) ([address])   • Chronic diastolic CHF (congestive heart failure) ([address])   • Nonrheumatic aortic valve stenosis   • Bilateral carotid artery disease (HCC)   • Aneurysm of anterior communicating artery   • Tooth pain   • Onychomycosis   • Syncope vs seizure   • Vasovagal near syncope   • Chronic kidney disease   • Secondary hyperparathyroidism ([address])   • Vitamin D deficiency   • Patellofemoral syndrome of left knee   • Moderate mitral regurgitation   • Moderate aortic regurgitation   • GERD (gastroesophageal reflux disease)   • Dyslipidemia   • Stage 3b chronic kidney disease ([address])   • Aneurysm ([address])   • Stroke ([address]) • Closed nondisplaced fracture of nasal bone with routine healing   • Sensorineural hearing loss (SNHL) of both ears   • Iron deficiency anemia, unspecified   • Benign hypertension with CKD (chronic kidney disease) stage III (HCC)   • Chronic kidney disease-mineral and bone disorder   • Iron deficiency   • Hematuria   • History of aortic valve replacement   • Bilateral impacted cerumen   • 6th nerve palsy, left   • Transient diplopia   • Prediabetes   • Abnormal involuntary movement   • Anxiety     Past Medical History:   Diagnosis Date   • Anemia     iron deficiency   • Aneurysm ([address]) 2020    of brain  being monitored   • Dental disease    • Essential hypertension, long-standing    • Heart murmur     per pt "Aortic Valve replacement 2021 with Watchman device implanted /2021"   • Hematuria     intermittent   • History of cigarette smoking, chronic     62 year smoker at one half pack per day, quit 04/1/17   • History of COVID-19 01/19/2022    recovered at home/chief s/s only sore throat   • History of kidney stones    • History of pneumonia    • HL (hearing loss)    • Hyperlipidemia    • Hypertension    • Irregular heart beat    • Kidney stone    • Muscle weakness     right sided weakness has gotten better/ walks independantly"   • Stroke ([address]) 10/29/2020    right sided  weakness almost full recovery   • Stroke [facility])    • Teeth missing    • Vitamin D deficiency     maintained with 1000 units of D   • Water retention    • Wears glasses      Past Surgical History:   Procedure Laterality Date   • APPENDECTOMY  1960   • CARDIAC SURGERY      watchman [id number]; aortic valve replaced 2021(pig valve)   • CAROTID ENDARTERECTOMY Left 1998    Supposedly no internal stenosis found   • CYSTOSCOPY Right 8/15/2017    Procedure: CYSTOSCOPY RIGHT STENT EXCHANGE;  Surgeon: Andrew Mensah MD;  Location: [facility];  Service: Urology   • CYSTOSCOPY     • CYSTOSCOPY N/A 3/11/2022    Procedure: CYSTOSCOPY, RIGHT RETROGRADE [email or web address];  Surgeon: Mike Voss MD;  [facility];  Service: Urology   • CYSTOSCOPY W/ URETERAL STENT PLACEMENT Right 6/13/2023    Procedure: Zehra Philip PYELOGRAM,EXCHANGE STENT URETERAL;  Surgeon: Mike Voss MD;  [facility];  Service: Urology   • EXTRACORPOREAL SHOCK WAVE LITHOTRIPSY  03/2017    For nephrolithiasis at [facility]   • [address]  5/10/2019   • FL RETROGRADE PYELOGRAM  7/30/2019   • FL RETROGRADE PYELOGRAM  10/22/2019   • FL RETROGRADE PYELOGRAM  1/28/2020   • FL RETROGRADE PYELOGRAM  8/11/2020   • FL RETROGRADE PYELOGRAM  12/15/2020   • FL RETROGRADE PYELOGRAM  2/14/2021   • FL RETROGRADE PYELOGRAM  5/11/2021   • FL RETROGRADE PYELOGRAM  10/19/2021   • FL RETROGRADE PYELOGRAM  3/11/2022   • FL RETROGRADE PYELOGRAM  7/15/2022   • FL RETROGRADE PYELOGRAM  1/26/2023   • FL RETROGRADE PYELOGRAM  6/13/2023   • SD CYSTO BLADDER W/URETERAL CATHETERIZATION Right 11/14/2017    Procedure: CYSTOSCOPY RETROGRADE, STENT EXCHANGE;  Surgeon: Estefani Sandy MD;  Location: [facility];  Service: Urology   • SD CYSTO BLADDER W/URETERAL CATHETERIZATION Right 5/8/2018    Procedure: CYSTOSCOPY, STENT EXCHANGE;  Surgeon: Estefani Sandy MD;  Location: [facility];  Service: Urology   • SD CYSTO BLADDER W/URETERAL CATHETERIZATION Right 8/14/2018    Procedure: CYSTOSCOPY, Gwynneth Reges EXCHANGE;  Surgeon: Estefani Sandy MD;  Location: [facility];  Service: Urology   • SD CYSTO BLADDER W/URETERAL CATHETERIZATION Right 11/13/2018    Procedure: CYSTOSCOPY, STENT EXCHANGE, RETROGRADE;  Surgeon: Estefani Sandy MD;  Location: [facility];  Service: Urology   • SD CYSTO BLADDER W/URETERAL CATHETERIZATION Right 2/12/2019    Procedure: CYSTOSCOPY, RETROGRADE, STENT REMOVAL AND STENT PLACEMENT;  Surgeon: Estefani Sandy MD;  Location: [facility];  Service: Urology   • SD CYSTO BLADDER W/URETERAL CATHETERIZATION Right 5/10/2019    Procedure: CYSTOSCOPY, RETROGRADE, STENT EXCHANGE;  Surgeon: Jordan Arreola MD;  Location: [facility];  Service: Urology   • OR CYSTO BLADDER W/URETERAL CATHETERIZATION Right 7/30/2019    Procedure: CYSTOSCOPY, RETROGRADE, STENT REMOVAL AND PLACEMENT OF STENT;  Surgeon: Jordan Arreola MD;  Location: [facility];  Service: Urology   • OR CYSTO BLADDER W/URETERAL CATHETERIZATION Right 10/22/2019    Procedure: Janit Pesa, STENT EXCHANGE;  Surgeon: Jordan Arreola MD;  Location: [facility];  Service: Urology   • OR CYSTO BLADDER W/URETERAL CATHETERIZATION Right 1/28/2020    Procedure: Janit Pesa, STENT REMOVAL AND STENT PLACEMENT;  Surgeon: Jordan Arreola MD;  Location: [facility];  Service: Urology   • OR CYSTO BLADDER W/URETERAL CATHETERIZATION Right 5/22/2020    Procedure: CYSTOSCOPY RETROGRADE, STENT EXCHANGE;  Surgeon: Jordan Arreola MD;  Location: [facility];  Service: Urology   • OR CYSTO BLADDER W/URETERAL CATHETERIZATION Right 8/11/2020    Procedure: CYSTOSCOPY, STENT EXCHANGE, RETROGRADE;  Surgeon: Jordan Arreola MD;  Location: [facility];  Service: Urology   • OR CYSTO BLADDER W/URETERAL CATHETERIZATION Right 12/15/2020    Procedure: CYSTOSCOPY, RETROGRADE, STENT REMOVAL, AND STENT PLACEMENT;  Surgeon: Jordan Arreola MD;  Location: [facility];  Service: Urology   • OR CYSTO BLADDER W/URETERAL CATHETERIZATION Right 5/11/2021    Procedure: CYSTOSCOPY RETROGRADE PYELOGRAM WITH STENT EXCHANGE;  Surgeon: Jordan Arreola MD;  Location: [facility];  Service: Urology   • OR CYSTO BLADDER W/URETERAL CATHETERIZATION Right 10/19/2021    Procedure: CYSTOSCOPY WITH RETROGRADE, STENT EXCHANGE;  Surgeon: Jordan Arreola MD;  Location: [facility];  Service: Urology   • OR CYSTO BLADDER W/URETERAL CATHETERIZATION Right 7/15/2022    Procedure: CYSTOSCOPY, RETROGRADE PYELOGRAM WITH EXCHANGE STENT URETERAL;  Surgeon: Krysta Alonso MD;  [facility];  Service: Urology   • OR CYSTO W/INSERT URETERAL STENT Right 11/14/2017    Procedure: EXCHANGE STENT URETERAL;  Surgeon: Елена Goodman MD;  Location: [facility];  Service: Urology   • AL CYSTO W/INSERT URETERAL STENT Right 3/11/2022    Procedure: EXCHANGE STENT URETERAL;  Surgeon: Janice Alvarez MD;  Location:  MAIN OR;  Service: Urology   • AL CYSTO W/INSERT URETERAL STENT Right 1/26/2023    Procedure: EXCHANGE STENT URETERAL;  Surgeon: Janice Alvarez MD;  Location:  [facility];  Service: Urology   • AL CYSTO/URETERO W/LITHOTRIPSY &INDWELL STENT INSRT Right 5/2/2017    Procedure: CYSTOSCOPY URETEROSCOPY WITH LITHOTRIPSY HOLMIUM LASER, RETROGRADE PYELOGRAM AND INSERTION STENT URETERAL;  Surgeon: Елена Goodman MD;  Location: [facility];  Service: Urology   • AL CYSTO/URETERO W/LITHOTRIPSY &INDWELL STENT INSRT Right 5/16/2017    Procedure: CYSTOSCOPY, RETROGRADE PYELOGRAM,  FLEXIBLE URETEROSCOPY,  HOLMIUM LASER LITHOTRIPSY, STONE BASKET MANIPULATION,  STENT URETERAL EXCHANGE;  Surgeon: Елена Goodman MD;  Location: [facility];  Service: Urology   • AL CYSTO/URETERO W/LITHOTRIPSY &INDWELL STENT INSRT Right 6/2/2017    Procedure: CYSTOSCOPY, RETROGRADE, STONE MANIPULATION WITH HOLMIUM LASER, STENT PLACEMENT;  Surgeon: Елена Goodman MD;  [facility];  Service: Urology   • AL CYSTO/URETERO W/LITHOTRIPSY &INDWELL STENT INSRT Right 7/18/2017    Procedure: CYSTOSCOPY, RETROGRADE, URETEROSCOPY HOLMIUM LASER Moises Richards;  Surgeon: Елена Goodman MD;  Location: [facility];  Service: Urology   • AL CYSTO/URETERO W/LITHOTRIPSY &INDWELL STENT INSRT Right 2/14/2021    Procedure: CYSTOSCOPY URETEROSCOPY, RETROGRADE PYELOGRAM, STONE MANIPULATION AND EXCHANGE STENT URETERAL;  Surgeon: Luis Enrique Virk MD;  Location: [facility];  Service: Urology   • PROSTATE SURGERY  2015    Laser Prostatectomy  by Dr. Miguel Ángel Hammond   • Jonny Brush Right 4/18/2017    Procedure: INSERTION STENT URETERAL, RIGHT URETEROSCOPY,  LASER OF URETERAL STRICTURE AND STONE;  Surgeon: Eden Dong MD;  Location: [facility];  Service:    • URETERAL STENT PLACEMENT Right 2018    Procedure: Rohan Dominguez;  Surgeon: Eden Dong MD;  Location: [facility];  Service: Urology     Family History   Problem Relation Age of Onset   • Hypertension Mother    • Alcohol abuse Father    • Cirrhosis Father    • Cancer Son 15        cancer-knee and above-left-amputation   • Cancer Sister    • Other Brother         head mass   • Thyroid disease unspecified Sister    • Arthritis Sister    • Cancer Sister    • Other Brother         legally blind in one eye     Social History     Socioeconomic History   • Marital status: /Civil Union     Spouse name: Not on file   • Number of children: Not on file   • Years of education: Not on file   • Highest education level: Not on file   Occupational History   • Occupation: RETIRED   Tobacco Use   • Smoking status: Former     Packs/day: 0.50     Years: 62.00     Total pack years: 31.00     Types: Cigarettes     Start date: [birth year]/1954     Quit date: 4/15/2017     Years since quittin.4   • Smokeless tobacco: Never   Vaping Use   • Vaping Use: Never used   Substance and Sexual Activity   • Alcohol use: Not Currently     Comment: don't drink anymore. • Drug use: No   • Sexual activity: Not Currently     Partners: Female     Comment: defer   Other Topics Concern   • Not on file   Social History Narrative   • Not on file     Social Determinants of Health     Financial Resource Strain: Not on file   Food Insecurity: No Food Insecurity (2023)    Hunger Vital Sign    • Worried About Running Out of Food in the Last Year: Never true    • Ran Out of Food in the Last Year: Never true   Transportation Needs: No Transportation Needs (2023)    PRAPARE - Transportation    • Lack of Transportation (Medical): No    • Lack of Transportation (Non-Medical):  No   Physical Activity: Not on file   Stress: Not on file   Social Connections: Not on file   Intimate Partner Violence: Not on file   Housing Stability: Low Risk  (8/9/2023)    Housing Stability Vital Sign    • Unable to Pay for Housing in the Last Year: No    • Number of Places Lived in the Last Year: 1    • Unstable Housing in the Last Year: No       Current Outpatient Medications:   •  amLODIPine (NORVASC) 5 mg tablet, Take 1 tablet (5 mg total) by mouth daily Do not start before August 10, 2023., Disp: 30 tablet, Rfl: 1  •  ascorbic acid (VITAMIN C) 250 mg tablet, Take 500 mg by mouth daily, Disp: , Rfl:   •  aspirin (ECOTRIN LOW STRENGTH) 81 mg EC tablet, Take 81 mg by mouth daily Pt to check with surgeon if needed to hold RX., Disp: , Rfl:   •  atorvastatin (LIPITOR) 40 mg tablet, Take 1 tablet (40 mg total) by mouth daily with dinner, Disp: 30 tablet, Rfl: 1  •  cholecalciferol (VITAMIN D3) 1,000 units tablet, Take 1,000 Units by mouth daily after lunch  , Disp: , Rfl:   •  clopidogrel (PLAVIX) 75 mg tablet, Take 75 mg by mouth daily Pt  To check with surgeon if needed to hold RX., Disp: , Rfl:   •  ferrous sulfate 324 (65 Fe) mg, Take 1 tablet (324 mg total) by mouth every other day Increase to daily if tolerated. , Disp: 90 tablet, Rfl: 0  •  finasteride (PROSCAR) 5 mg tablet, TAKE 1 TABLET BY MOUTH EVERY DAY, Disp: 90 tablet, Rfl: 2  •  furosemide (LASIX) 20 mg tablet, Take 1 tablet (20 mg total) by mouth daily Do not start before August 10, 2023., Disp: 30 tablet, Rfl: 0  •  metoprolol tartrate (LOPRESSOR) 50 mg tablet, Take 1 tablet (50 mg total) by mouth every 12 (twelve) hours, Disp: 60 tablet, Rfl: 1  •  sertraline (ZOLOFT) 50 mg tablet, Take 1 tablet (50 mg total) by mouth daily Do not start before August 10, 2023., Disp: 30 tablet, Rfl: 0    No Known Allergies    Vitals:    09/27/23 1357   BP: 122/80   Pulse: 71   Resp: 17   Temp: 97.9 °F (36.6 °C)   SpO2: 96%     Physical Exam  Constitutional:       Appearance: He is well-developed. HENT:      Head: Normocephalic and atraumatic. Right Ear: External ear normal.      Left Ear: External ear normal.   Eyes:      Conjunctiva/sclera: Conjunctivae normal.      Pupils: Pupils are equal, round, and reactive to light. Cardiovascular:      Rate and Rhythm: Normal rate and regular rhythm. Heart sounds: Normal heart sounds. Pulmonary:      Effort: Pulmonary effort is normal.      Breath sounds: Normal breath sounds. Abdominal:      General: Bowel sounds are normal.      Palpations: Abdomen is soft. Musculoskeletal:         General: Normal range of motion. Cervical back: Normal range of motion and neck supple. Skin:     General: Skin is warm. Comments: Good color, warm, moist, patient with a number of purpura on both upper extremities in various stages of resolution, few dried scabs, no active bleeding   Neurological:      Mental Status: He is alert and oriented to person, place, and time. Deep Tendon Reflexes: Reflexes are normal and symmetric. Psychiatric:         Behavior: Behavior normal.         Thought Content:  Thought content normal.         Judgment: Judgment normal.     Extremities: No lower extreme edema bilaterally, no cords, pulses are 1+    Labs                8/28/2023 BUN = 32 creatinine = 1.68

## 2023-10-10 ENCOUNTER — OFFICE VISIT (OUTPATIENT)
Dept: UROLOGY | Facility: CLINIC | Age: 83
End: 2023-10-10
Payer: MEDICARE

## 2023-10-10 VITALS
WEIGHT: 211 LBS | BODY MASS INDEX: 30.28 KG/M2 | OXYGEN SATURATION: 96 % | DIASTOLIC BLOOD PRESSURE: 70 MMHG | HEART RATE: 74 BPM | SYSTOLIC BLOOD PRESSURE: 116 MMHG

## 2023-10-10 DIAGNOSIS — N13.5 URETERAL STRICTURE: Primary | ICD-10-CM

## 2023-10-10 LAB
SL AMB  POCT GLUCOSE, UA: NORMAL
SL AMB LEUKOCYTE ESTERASE,UA: NORMAL
SL AMB POCT BILIRUBIN,UA: NORMAL
SL AMB POCT BLOOD,UA: NORMAL
SL AMB POCT CLARITY,UA: NORMAL
SL AMB POCT COLOR,UA: NORMAL
SL AMB POCT KETONES,UA: NORMAL
SL AMB POCT NITRITE,UA: NORMAL
SL AMB POCT PH,UA: 5
SL AMB POCT SPECIFIC GRAVITY,UA: 1.02
SL AMB POCT URINE PROTEIN: NORMAL
SL AMB POCT UROBILINOGEN: 0.2

## 2023-10-10 PROCEDURE — 99213 OFFICE O/P EST LOW 20 MIN: CPT | Performed by: PHYSICIAN ASSISTANT

## 2023-10-10 PROCEDURE — 81002 URINALYSIS NONAUTO W/O SCOPE: CPT | Performed by: PHYSICIAN ASSISTANT

## 2023-10-10 PROCEDURE — 87086 URINE CULTURE/COLONY COUNT: CPT | Performed by: PHYSICIAN ASSISTANT

## 2023-10-10 NOTE — ANESTHESIA PREPROCEDURE EVALUATION
Review of Systems/Medical History  Patient summary reviewed  Chart reviewed  No history of anesthetic complications     Cardiovascular  Hypertension , Valvular heart disease , H/O Heart Murmur, Dysrhythmias , atrial fibrillation, PVD (s/p left CEA),    Pulmonary  Smoker ex-smoker  ,        GI/Hepatic       Kidney stones,        Endo/Other     GYN       Hematology   Musculoskeletal       Neurology   Psychology           Physical Exam    Airway    Mallampati score: II  TM Distance: >3 FB  Neck ROM: full     Dental   upper dentures,     Cardiovascular  Rhythm: regular, Rate: normal,     Pulmonary  Breath sounds clear to auscultation,     Other Findings  Partial upper denture      Anesthesia Plan  ASA Score- 3     Anesthesia Type- IV sedation with anesthesia with ASA Monitors  Additional Monitors:   Airway Plan:         Plan Factors-    Induction- intravenous  Postoperative Plan-     Informed Consent- Anesthetic plan and risks discussed with patient  I personally reviewed this patient with the CRNA  Discussed and agreed on the Anesthesia Plan with the CRNA  Katerine Boogie Eucrisa Counseling: Patient may experience a mild burning sensation during topical application. Eucrisa is not approved in children less than 2 years of age.

## 2023-10-10 NOTE — PROGRESS NOTES
1. Ureteral stricture  POCT urine dip    Urine culture    Case request operating room: [address] INSERTION STENT URETERAL    Case request operating room: CYSTOSCOPY RETROGRADE PYELOGRAM WITH INSERTION STENT URETERAL          Assessment and plan:       1. Nephrolithiasis  2. Right ureteral stricture   -We will coordinate patient for his next right ureteral stent exchange. We discussed ongoing traditional stents versus transitioning to metallic stents. Patient wishes to transition to metallic stents at this time. - urine will be submitted for culture  - consent signed  - all questions answered    Jermaine Russell      Chief Complaint     Ureteral stricture     History of Present Illness     Rico Allen is a 82 y.o. male patient of Dr. Judi Lynn presenting for follow up. The patient has had numerous (at least 7) prior stone surgeries in [facility] and developed a stricture of the right mid and proximal ureter. Hx from Dr. Breaux Ny notes:  -Stent placement and holmium laser of stone and stricture 4/18/17  -Holmium laser of stone and stent exchange 5/2/17  -Renal stones treated elsewhere in [place] in past year (7 procedures)  -Right laser lithotripsy, laser infundibulotomy, and stone basket extraction 6/2/17  -Right CRUSH and laser incision of proximal ureter 7/18/17  -Stent changed 05/10/19-- stone causing near complete obstruction of the right proximal ureter. Required stent to be removed in order for a guidewire to pass. -Stent exchange 10/22/19 found it impossible to advance the wire without removing the stent first.   -Stent exchange 01/28/20 found it once again impossible to advance the wire without removing the stent first.   Stent due for change in 12 wks. -Stent exchange by Dr. Meade Reap 02/14/21 right stent change and ureteroscopy.   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  - Stent exchange 05/11/21 - Difficulty getting wire past the obstruction without removing  - Stent exchange 10/19/2021- partial encrustation, wire could not pass  mid ureter alongside stent until stent was pulled out. 7x26 stent placed. The patient has CKD but creatinine appears stable with GFR of 40 since at least 2018. He follows with Nephrology. stent exchange 7/15/22:  PLAN: We will arrange stent exchange in 3 months. While his hydronephrosis did appear worse today his renal function is stable and his recent Mag 3 Lasix renal scan suggests that his kidney is draining and maintaining function    stent exchange 1/26/23 with Dr. Roselia Wesley. PLAN: We will arrange stent exchange in 4-5 months. His hydronephrosis appears stable or perhaps mildly improved and his  renal function is stable with cr of 1.5 (and his Mag 3 Lasix renal scan in May 2022 suggested that his kidney is draining and maintaining function with stent)    Stent exchange (6/13/23) with Dr. Roselia Wesley  Operative Findings:  Stent exchanged without significant difficulty. The stricture remains significant enough that it prevents passage of the wire around the existing stent requiring wire placement due to stent to facilitate new stent placement. Pyelogram shows moderate to severe right-sided hydronephrosis which appears stable. 7 x 26 stent placed. Patient could have 7x24 stent by 26 used to facilitate stent exchanges       Laboratory     Lab Results   Component Value Date    CREATININE 1.68 (H) 08/28/2023       Review of Systems     Review of Systems   Constitutional:  Negative for activity change, appetite change, chills, diaphoresis, fatigue, fever and unexpected weight change. Respiratory:  Negative for chest tightness and shortness of breath. Cardiovascular:  Negative for chest pain, palpitations and leg swelling. Gastrointestinal:  Negative for abdominal distention, abdominal pain, constipation, diarrhea, nausea and vomiting.    Genitourinary:  Negative for decreased urine volume, difficulty urinating, dysuria, enuresis, flank pain, frequency, genital sores, hematuria and urgency. Musculoskeletal:  Negative for back pain, gait problem and myalgias. Skin:  Negative for color change, pallor, rash and wound. Psychiatric/Behavioral:  Negative for behavioral problems. The patient is not nervous/anxious. Allergies     No Known Allergies    Physical Exam     Physical Exam  Exam conducted with a chaperone present (daughter present). Constitutional:       General: He is not in acute distress. Appearance: Normal appearance. He is normal weight. He is not ill-appearing, toxic-appearing or diaphoretic. HENT:      Head: Normocephalic and atraumatic. Eyes:      General:         Right eye: No discharge. Left eye: No discharge. Conjunctiva/sclera: Conjunctivae normal.   Cardiovascular:      Rate and Rhythm: Normal rate and regular rhythm. Heart sounds: Normal heart sounds. No murmur heard. No friction rub. Pulmonary:      Effort: Pulmonary effort is normal. No respiratory distress. Breath sounds: Normal breath sounds. No stridor. Musculoskeletal:         General: No swelling or tenderness. Normal range of motion. Skin:     General: Skin is warm and dry. Coloration: Skin is not jaundiced or pale. Neurological:      General: No focal deficit present. Mental Status: He is alert and oriented to person, place, and time. Psychiatric:         Mood and Affect: Mood normal.         Behavior: Behavior normal.         Thought Content:  Thought content normal.       Vital Signs     Vitals:    10/10/23 1336   BP: 116/70   BP Location: Left arm   Patient Position: Sitting   Cuff Size: Large   Pulse: 74   SpO2: 96%   Weight: 95.7 kg (211 lb)         Current Medications       Current Outpatient Medications:     amLODIPine (NORVASC) 5 mg tablet, Take 1 tablet (5 mg total) by mouth daily Do not start before August 10, 2023., Disp: 30 tablet, Rfl: 1    ascorbic acid (VITAMIN C) 250 mg tablet, Take 500 mg by mouth daily, Disp: , Rfl:     aspirin (ECOTRIN LOW STRENGTH) 81 mg EC tablet, Take 81 mg by mouth daily Pt to check with surgeon if needed to hold RX., Disp: , Rfl:     atorvastatin (LIPITOR) 40 mg tablet, Take 1 tablet (40 mg total) by mouth daily with dinner, Disp: 30 tablet, Rfl: 1    cholecalciferol (VITAMIN D3) 1,000 units tablet, Take 1,000 Units by mouth daily after lunch  , Disp: , Rfl:     clopidogrel (PLAVIX) 75 mg tablet, Take 75 mg by mouth daily Pt  To check with surgeon if needed to hold RX., Disp: , Rfl:     ferrous sulfate 324 (65 Fe) mg, Take 1 tablet (324 mg total) by mouth every other day Increase to daily if tolerated. , Disp: 90 tablet, Rfl: 0    finasteride (PROSCAR) 5 mg tablet, TAKE 1 TABLET BY MOUTH EVERY DAY, Disp: 90 tablet, Rfl: 2    furosemide (LASIX) 20 mg tablet, Take 1 tablet (20 mg total) by mouth daily Do not start before August 10, 2023., Disp: 30 tablet, Rfl: 0    metoprolol tartrate (LOPRESSOR) 50 mg tablet, Take 1 tablet (50 mg total) by mouth every 12 (twelve) hours, Disp: 60 tablet, Rfl: 1    sertraline (ZOLOFT) 50 mg tablet, Take 1 tablet (50 mg total) by mouth daily Do not start before August 10, 2023., Disp: 30 tablet, Rfl: 0      Active Problems     Patient Active Problem List   Diagnosis    Shortness of breath    Chronic atrial fibrillation (HCC)    Benign essential hypertension    Renal calculi    JUNIOR (acute kidney injury) ([address])    Calculus of ureter    Crossing vessel and stricture of ureter without hydronephrosis    Hematuria, gross    Hydronephrosis with ureteral stricture, not elsewhere classified    CVA (cerebral vascular accident) ([address])    Chronic diastolic CHF (congestive heart failure) ([address])    Nonrheumatic aortic valve stenosis    Bilateral carotid artery disease ([address])    Aneurysm of anterior communicating artery    Tooth pain    Onychomycosis    Syncope vs seizure    Vasovagal near syncope    Chronic kidney disease    Secondary hyperparathyroidism ([address]) Vitamin D deficiency    Patellofemoral syndrome of left knee    Moderate mitral regurgitation    Moderate aortic regurgitation    GERD (gastroesophageal reflux disease)    Dyslipidemia    Stage 3b chronic kidney disease (HCC)    Aneurysm (HCC)    Stroke (HCC)    Closed nondisplaced fracture of nasal bone with routine healing    Sensorineural hearing loss (SNHL) of both ears    Iron deficiency anemia, unspecified    Benign hypertension with CKD (chronic kidney disease) stage III (HCC)    Chronic kidney disease-mineral and bone disorder    Iron deficiency    Hematuria    History of aortic valve replacement    Bilateral impacted cerumen    6th nerve palsy, left    Transient diplopia    Prediabetes    Abnormal involuntary movement    Anxiety         Past Medical History     Past Medical History:   Diagnosis Date    Anemia     iron deficiency    Aneurysm ([address]) 2020    of brain  being monitored    Dental disease     Essential hypertension, long-standing     Heart murmur     per pt "Aortic Valve replacement 2021 with Watchman device implanted /2021"    Hematuria     intermittent    History of cigarette smoking, chronic     64 year smoker at one half pack per day, quit 04/1/17    History of COVID-19 01/19/2022    recovered at home/chief s/s only sore throat    History of kidney stones     History of pneumonia     HL (hearing loss)     Hyperlipidemia     Hypertension     Irregular heart beat     Kidney stone     Muscle weakness     right sided weakness has gotten better/ walks independantly"    Stroke ([address]) 10/29/2020    right sided  weakness almost full recovery    Stroke ([address])     Teeth missing     Vitamin D deficiency     maintained with 1000 units of D    Water retention     Wears glasses          Surgical History     Past Surgical History:   Procedure Laterality Date    APPENDECTOMY  1960    CARDIAC SURGERY      watchman [id number]; aortic valve replaced 2021(pig valve)    CAROTID ENDARTERECTOMY Left 1998 Supposedly no internal stenosis found    CYSTOSCOPY Right 8/15/2017    Procedure: CYSTOSCOPY RIGHT STENT EXCHANGE;  Surgeon: Jessica Morley MD;  Location: [facility] [place];  Service: Urology    CYSTOSCOPY      CYSTOSCOPY N/A 3/11/2022    Procedure: Alexandrea Jayjay, RIGHT RETROGRADE PYLEOGRAM;  Surgeon: Janessa Mckeon MD;  [facility];  Service: Urology    CYSTOSCOPY W/ URETERAL STENT PLACEMENT Right 6/13/2023    Procedure: Saniya Alejandra STENT URETERAL;  Surgeon: Janessa Mckeon MD;  [facility];  Service: Urology    EXTRACORPOREAL SHOCK WAVE LITHOTRIPSY  03/2017    For nephrolithiasis at [id number]  5/10/2019    FL RETROGRADE PYELOGRAM  7/30/2019    FL RETROGRADE PYELOGRAM  10/22/2019    FL RETROGRADE PYELOGRAM  1/28/2020    FL RETROGRADE PYELOGRAM  8/11/2020    FL RETROGRADE PYELOGRAM  12/15/2020    FL RETROGRADE PYELOGRAM  2/14/2021    FL RETROGRADE PYELOGRAM  5/11/2021    FL RETROGRADE PYELOGRAM  10/19/2021    FL RETROGRADE PYELOGRAM  3/11/2022    FL RETROGRADE PYELOGRAM  7/15/2022    FL RETROGRADE PYELOGRAM  1/26/2023    FL RETROGRADE PYELOGRAM  6/13/2023    IA CYSTO BLADDER W/URETERAL CATHETERIZATION Right 11/14/2017    Procedure: CYSTOSCOPY RETROGRADE, STENT EXCHANGE;  Surgeon: Jessica Morley MD;  Location: [facility] [place];  Service: Urology    IA CYSTO BLADDER W/URETERAL CATHETERIZATION Right 5/8/2018    Procedure: Ke [place] EXCHANGE;  Surgeon: Jessica Morley MD;  Location: [facility] [place];  Service: Urology    IA CYSTO BLADDER W/URETERAL CATHETERIZATION Right 8/14/2018    Procedure: Eldavis Excelsior EXCHANGE;  Surgeon: Jessica Morley MD;  Location: [facility] [place];  Service: Urology    IA CYSTO BLADDER W/URETERAL CATHETERIZATION Right 11/13/2018    Procedure: CYSTOSCOPY, STENT EXCHANGE, RETROGRADE;  Surgeon: Jessica Morley MD;  Location: [facility] [place];  Service: Urology    IA CYSTO BLADDER W/URETERAL CATHETERIZATION Right 2/12/2019    Procedure: Radhamesin Joy, STENT REMOVAL AND STENT PLACEMENT;  Surgeon: Jono Florentino MD;  Location: [facility];  Service: Urology    MI CYSTO BLADDER W/URETERAL CATHETERIZATION Right 5/10/2019    Procedure: Sharin Joy, STENT EXCHANGE;  Surgeon: Jono Florentino MD;  Location: [facility];  Service: Urology    MI CYSTO BLADDER W/URETERAL CATHETERIZATION Right 7/30/2019    Procedure: CYSTOSCOPY, RETROGRADE, STENT REMOVAL AND PLACEMENT OF STENT;  Surgeon: Jono Florentino MD;  Location: [facility];  Service: Urology    MI CYSTO BLADDER W/URETERAL CATHETERIZATION Right 10/22/2019    Procedure: Sharin Joy, STENT EXCHANGE;  Surgeon: Jono Florentino MD;  Location: [facility];  Service: Urology    MI CYSTO BLADDER W/URETERAL CATHETERIZATION Right 1/28/2020    Procedure: CYSTOSCOPY, RETROGRADE, STENT REMOVAL AND STENT PLACEMENT;  Surgeon: Jono Florentino MD;  Location: [facility];  Service: Urology    MI CYSTO BLADDER W/URETERAL CATHETERIZATION Right 5/22/2020    Procedure: CYSTOSCOPY RETROGRADE, STENT EXCHANGE;  Surgeon: Jono Florentino MD;  Location: [facility];  Service: Urology    MI CYSTO BLADDER W/URETERAL CATHETERIZATION Right 8/11/2020    Procedure: CYSTOSCOPY, STENT EXCHANGE, RETROGRADE;  Surgeon: Jono Florentino MD;  Location: [facility];  Service: Urology    MI CYSTO BLADDER W/URETERAL CATHETERIZATION Right 12/15/2020    Procedure: CYSTOSCOPY, RETROGRADE, STENT REMOVAL, AND STENT PLACEMENT;  Surgeon: Jono Florentino MD;  Location: [facility];  Service: Urology    MI CYSTO BLADDER W/URETERAL CATHETERIZATION Right 5/11/2021    Procedure: CYSTOSCOPY RETROGRADE PYELOGRAM WITH STENT EXCHANGE;  Surgeon: Jono Florentino MD;  Location: [facility];  Service: Urology    MI CYSTO BLADDER W/URETERAL CATHETERIZATION Right 10/19/2021    Procedure: CYSTOSCOPY WITH RETROGRADE, STENT EXCHANGE;  Surgeon: Jono Florentino MD;  Location: [facility];  Service: Urology    ID CYSTO BLADDER W/URETERAL CATHETERIZATION Right 7/15/2022    Procedure: CYSTOSCOPY, RETROGRADE PYELOGRAM WITH EXCHANGE STENT URETERAL;  Surgeon: Domenic Hernández MD;  [facility];  Service: Urology    ID CYSTO W/INSERT URETERAL STENT Right 11/14/2017    Procedure: EXCHANGE STENT URETERAL;  Surgeon: Michell Mohan MD;  Location: [facility];  Service: Urology    ID CYSTO W/INSERT URETERAL STENT Right 3/11/2022    Procedure: EXCHANGE STENT URETERAL;  Surgeon: Domenic Hernández MD;  [facility];  Service: Urology    ID CYSTO W/INSERT URETERAL STENT Right 1/26/2023    Procedure: EXCHANGE STENT URETERAL;  Surgeon: Domenic Hernández MD;  [facility];  Service: Urology    ID CYSTO/URETERO W/LITHOTRIPSY &INDWELL STENT INSRT Right 5/2/2017    Procedure: CYSTOSCOPY URETEROSCOPY WITH LITHOTRIPSY HOLMIUM LASER, RETROGRADE PYELOGRAM AND INSERTION STENT URETERAL;  Surgeon: Michell Mohan MD;  Location: [facility];  Service: Urology    ID CYSTO/URETERO W/LITHOTRIPSY &INDWELL STENT INSRT Right 5/16/2017    Procedure: CYSTOSCOPY, RETROGRADE PYELOGRAM,  FLEXIBLE URETEROSCOPY,  HOLMIUM LASER LITHOTRIPSY, STONE BASKET MANIPULATION,  STENT URETERAL EXCHANGE;  Surgeon: Michell Mohan MD;  Location: [facility];  Service: Urology    ID CYSTO/URETERO W/LITHOTRIPSY &INDWELL STENT INSRT Right 6/2/2017    Procedure: CYSTOSCOPY, RETROGRADE, STONE MANIPULATION WITH HOLMIUM LASER, STENT PLACEMENT;  Surgeon: Michell Mohan MD;  Location: [facility];  Service: Urology    ID CYSTO/URETERO W/LITHOTRIPSY &INDWELL STENT INSRT Right 7/18/2017    Procedure: CYSTOSCOPY, RETROGRADE, URETEROSCOPY HOLMIUM LASER [address],  [address] [facility];  Surgeon: Michell Mohan MD;  Location: [facility];  Service: Urology    ID CYSTO/URETERO W/LITHOTRIPSY &INDWELL STENT INSRT Right 2/14/2021    Procedure: CYSTOSCOPY URETEROSCOPY, RETROGRADE PYELOGRAM, STONE MANIPULATION AND EXCHANGE STENT URETERAL;  Surgeon: Eliot Steinberg MD;  Location: [facility];  Service: Urology    PROSTATE SURGERY  2015    Laser Prostatectomy  by Dr. Celia Whipple Right 4/18/2017    Procedure: INSERTION STENT URETERAL, RIGHT URETEROSCOPY,  LASER OF URETERAL STRICTURE AND STONE;  Surgeon: Brian Vázquez MD;  Location: [facility];  Service:     URETERAL STENT PLACEMENT Right 2/13/2018    Procedure: Ami Vazquez;  Surgeon: Brian Vázquez MD;  Location: [facility];  Service: Urology         Family History     Family History   Problem Relation Age of Onset    Hypertension Mother     Alcohol abuse Father     Cirrhosis Father     Cancer Son 15        cancer-knee and above-left-amputation    Cancer Sister     Other Brother         head mass    Thyroid disease unspecified Sister     Arthritis Sister     Cancer Sister     Other Brother         legally blind in one eye         Social History     Social History       Radiology

## 2023-10-12 LAB
BACTERIA UR CULT: ABNORMAL
BACTERIA UR CULT: ABNORMAL

## 2023-10-17 ENCOUNTER — TELEPHONE (OUTPATIENT)
Dept: UROLOGY | Facility: CLINIC | Age: 83
End: 2023-10-17

## 2023-10-17 NOTE — RESULT ENCOUNTER NOTE
Hello    Patient normally is followed up by Ms Allie Chun  Pt was just d/c from hosp yest    MA team - can you please ask pt to complete CBC, and BMP prior to appt with Ms González Augustin coming this month       Ms Mariia Pierre    Thank you    np Mohs Method Verbiage: A  time out was conducted by the surgical team prior to incision to ensure that the correct patient, location, and diagnosis were all confirmed before the procedure was started. Patient was positioned for optimal exposure of surgical site and to patient's comfort. Skin was cleansed and anesthetized as noted above.  A thin layer of tissue was surgically excised using a #15 blade by Dr. Loyd. The tissue specimen was then transferred onto a gauze pad, maintaining the orientation of the specimen. The gauze was placed with the fold superior. Hemostasis was obtained using electro-coagulation. The wound site was then covered with a dressing while tissue samples were processed for examination.

## 2023-10-17 NOTE — TELEPHONE ENCOUNTER
Spoke w/ pt/scheduled pt for surgery w/ Dr Sukhdev Hollingsworth @ [facility] 12/4/2023/pre op urine cult/EKG ordered for 11/20/2023/Medication Suspension form request faxed to Dr Keanu Moyer [phone] Takes PLAVIX/Surgical Packet mailed to pt

## 2023-11-10 ENCOUNTER — LAB (OUTPATIENT)
Dept: LAB | Facility: HOSPITAL | Age: 83
End: 2023-11-10
Attending: INTERNAL MEDICINE
Payer: MEDICARE

## 2023-11-10 ENCOUNTER — APPOINTMENT (OUTPATIENT)
Dept: LAB | Facility: HOSPITAL | Age: 83
End: 2023-11-10
Payer: MEDICARE

## 2023-11-10 DIAGNOSIS — Z01.812 PRE-OPERATIVE LABORATORY EXAMINATION: ICD-10-CM

## 2023-11-10 DIAGNOSIS — Z01.810 PRE-OPERATIVE CARDIOVASCULAR EXAMINATION: ICD-10-CM

## 2023-11-10 DIAGNOSIS — N18.31 STAGE 3A CHRONIC KIDNEY DISEASE (HCC): ICD-10-CM

## 2023-11-10 DIAGNOSIS — I10 ESSENTIAL HYPERTENSION: ICD-10-CM

## 2023-11-10 DIAGNOSIS — N13.5 URETERAL STRICTURE: ICD-10-CM

## 2023-11-10 DIAGNOSIS — R39.89 SUSPECTED UTI: ICD-10-CM

## 2023-11-10 DIAGNOSIS — N25.81 SECONDARY HYPERPARATHYROIDISM (HCC): ICD-10-CM

## 2023-11-10 LAB
ANION GAP SERPL CALCULATED.3IONS-SCNC: 7 MMOL/L
BACTERIA UR QL AUTO: ABNORMAL /HPF
BILIRUB UR QL STRIP: NEGATIVE
BUN SERPL-MCNC: 38 MG/DL (ref 5–25)
CALCIUM SERPL-MCNC: 8.9 MG/DL (ref 8.4–10.2)
CHLORIDE SERPL-SCNC: 105 MMOL/L (ref 96–108)
CLARITY UR: ABNORMAL
CO2 SERPL-SCNC: 28 MMOL/L (ref 21–32)
COLOR UR: ABNORMAL
CREAT SERPL-MCNC: 1.86 MG/DL (ref 0.6–1.3)
CREAT UR-MCNC: 73.1 MG/DL
ERYTHROCYTE [DISTWIDTH] IN BLOOD BY AUTOMATED COUNT: 14.7 % (ref 11.6–15.1)
GFR SERPL CREATININE-BSD FRML MDRD: 33 ML/MIN/1.73SQ M
GLUCOSE P FAST SERPL-MCNC: 101 MG/DL (ref 65–99)
GLUCOSE UR STRIP-MCNC: NEGATIVE MG/DL
HCT VFR BLD AUTO: 46 % (ref 36.5–49.3)
HGB BLD-MCNC: 14.9 G/DL (ref 12–17)
HGB UR QL STRIP.AUTO: ABNORMAL
KETONES UR STRIP-MCNC: NEGATIVE MG/DL
LEUKOCYTE ESTERASE UR QL STRIP: ABNORMAL
MAGNESIUM SERPL-MCNC: 2 MG/DL (ref 1.9–2.7)
MCH RBC QN AUTO: 29.8 PG (ref 26.8–34.3)
MCHC RBC AUTO-ENTMCNC: 32.4 G/DL (ref 31.4–37.4)
MCV RBC AUTO: 92 FL (ref 82–98)
NITRITE UR QL STRIP: NEGATIVE
NON-SQ EPI CELLS URNS QL MICRO: ABNORMAL /HPF
PH UR STRIP.AUTO: 6.5 [PH]
PHOSPHATE SERPL-MCNC: 2.8 MG/DL (ref 2.3–4.1)
PLATELET # BLD AUTO: 129 THOUSANDS/UL (ref 149–390)
PMV BLD AUTO: 9.6 FL (ref 8.9–12.7)
POTASSIUM SERPL-SCNC: 4.6 MMOL/L (ref 3.5–5.3)
PROT UR STRIP-MCNC: ABNORMAL MG/DL
PROT UR-MCNC: 25 MG/DL
PROT/CREAT UR: 0.34 MG/G{CREAT} (ref 0–0.1)
PTH-INTACT SERPL-MCNC: 110.8 PG/ML (ref 12–88)
RBC # BLD AUTO: 5 MILLION/UL (ref 3.88–5.62)
RBC #/AREA URNS AUTO: ABNORMAL /HPF
SODIUM SERPL-SCNC: 140 MMOL/L (ref 135–147)
SP GR UR STRIP.AUTO: 1.02 (ref 1–1.03)
UROBILINOGEN UR QL STRIP.AUTO: 0.2 E.U./DL
WBC # BLD AUTO: 7.96 THOUSAND/UL (ref 4.31–10.16)
WBC #/AREA URNS AUTO: ABNORMAL /HPF

## 2023-11-10 PROCEDURE — 36415 COLL VENOUS BLD VENIPUNCTURE: CPT

## 2023-11-10 PROCEDURE — 83735 ASSAY OF MAGNESIUM: CPT

## 2023-11-10 PROCEDURE — 87086 URINE CULTURE/COLONY COUNT: CPT

## 2023-11-10 PROCEDURE — 80048 BASIC METABOLIC PNL TOTAL CA: CPT

## 2023-11-10 PROCEDURE — 84156 ASSAY OF PROTEIN URINE: CPT

## 2023-11-10 PROCEDURE — 83970 ASSAY OF PARATHORMONE: CPT

## 2023-11-10 PROCEDURE — 85027 COMPLETE CBC AUTOMATED: CPT

## 2023-11-10 PROCEDURE — 82570 ASSAY OF URINE CREATININE: CPT

## 2023-11-10 PROCEDURE — 81001 URINALYSIS AUTO W/SCOPE: CPT

## 2023-11-10 PROCEDURE — 84100 ASSAY OF PHOSPHORUS: CPT

## 2023-11-10 NOTE — RESULT ENCOUNTER NOTE
Hello    Patient normally is followed up by Ms Jose Angel Campo. Patient has hematuria. Urology team can you please review the urine studies once they are completed. I believe you had ordered a urine culture.     Thank you    np

## 2023-11-11 LAB
ATRIAL RATE: 84 BPM
BACTERIA UR CULT: NORMAL
QRS AXIS: -49 DEGREES
QRSD INTERVAL: 108 MS
QT INTERVAL: 420 MS
QTC INTERVAL: 462 MS
T WAVE AXIS: 63 DEGREES
VENTRICULAR RATE: 73 BPM

## 2023-11-11 PROCEDURE — 93010 ELECTROCARDIOGRAM REPORT: CPT | Performed by: INTERNAL MEDICINE

## 2023-11-13 ENCOUNTER — TELEPHONE (OUTPATIENT)
Dept: NEPHROLOGY | Facility: CLINIC | Age: 83
End: 2023-11-13

## 2023-11-13 DIAGNOSIS — N18.31 STAGE 3A CHRONIC KIDNEY DISEASE (HCC): Primary | ICD-10-CM

## 2023-11-13 NOTE — TELEPHONE ENCOUNTER
----- Message from Jamie Escoto MD sent at 11/10/2023  5:56 PM EST -----  Can you please asked patient to repeat BMP in 1 month.   Creatinine slightly upper limit of normal.  Thank you

## 2023-11-19 PROBLEM — H61.23 BILATERAL IMPACTED CERUMEN: Status: RESOLVED | Noted: 2022-09-14 | Resolved: 2023-11-19

## 2023-11-20 ENCOUNTER — APPOINTMENT (OUTPATIENT)
Dept: LAB | Facility: HOSPITAL | Age: 83
End: 2023-11-20
Attending: INTERNAL MEDICINE
Payer: MEDICARE

## 2023-11-20 DIAGNOSIS — N18.31 STAGE 3A CHRONIC KIDNEY DISEASE (HCC): ICD-10-CM

## 2023-11-20 DIAGNOSIS — E78.5 HYPERLIPIDEMIA, UNSPECIFIED HYPERLIPIDEMIA TYPE: ICD-10-CM

## 2023-11-20 LAB
CHOLEST SERPL-MCNC: 89 MG/DL
HDLC SERPL-MCNC: 33 MG/DL
LDLC SERPL CALC-MCNC: 33 MG/DL (ref 0–100)
NONHDLC SERPL-MCNC: 56 MG/DL
TRIGL SERPL-MCNC: 113 MG/DL

## 2023-11-20 PROCEDURE — 36415 COLL VENOUS BLD VENIPUNCTURE: CPT

## 2023-11-20 PROCEDURE — 80061 LIPID PANEL: CPT

## 2023-11-20 PROCEDURE — 83695 ASSAY OF LIPOPROTEIN(A): CPT

## 2023-11-21 LAB — LPA SERPL-SCNC: 24.5 NMOL/L

## 2023-11-29 ENCOUNTER — ANESTHESIA EVENT (OUTPATIENT)
Dept: PERIOP | Facility: HOSPITAL | Age: 83
End: 2023-11-29
Payer: MEDICARE

## 2023-11-29 NOTE — PRE-PROCEDURE INSTRUCTIONS
Pre-Surgery Instructions:   Medication Instructions    amLODIPine (NORVASC) 5 mg tablet Take day of surgery. ascorbic acid (VITAMIN C) 250 mg tablet Stop taking 5 days prior to surgery. aspirin (ECOTRIN LOW STRENGTH) 81 mg EC tablet Instructions provided by MD    atorvastatin (LIPITOR) 40 mg tablet Take day of surgery. cholecalciferol (VITAMIN D3) 1,000 units tablet Stop taking 5 days prior to surgery. clopidogrel (PLAVIX) 75 mg tablet Instructions provided by MD    ferrous sulfate 324 (65 Fe) mg Stop taking 5 days prior to surgery. finasteride (PROSCAR) 5 mg tablet Take day of surgery. furosemide (LASIX) 20 mg tablet Hold day of surgery. metoprolol tartrate (LOPRESSOR) 50 mg tablet Take day of surgery. sertraline (ZOLOFT) 50 mg tablet Take day of surgery. Medication instructions for day surgery reviewed. Please use only a sip of water to take your instructed medications. Avoid all over the counter vitamins, supplements and NSAIDS for one week prior to surgery per anesthesia guidelines. Tylenol is ok to take as needed. You will receive a call one business day prior to surgery with an arrival time and hospital directions. If your surgery is scheduled on a Monday, the hospital will be calling you on the Friday prior to your surgery. If you have not heard from anyone by 8pm, please call the hospital supervisor through the hospital  at [phone]. [place] [phone]). Do not eat or drink anything after midnight the night before your surgery, including candy, mints, lifesavers, or chewing gum. Do not drink alcohol 24hrs before your surgery. Try not to smoke at least 24hrs before your surgery. Follow the pre surgery showering instructions as listed in the [facility] Surgical Experience Booklet” or otherwise provided by your surgeon's office. Do not use a blade to shave the surgical area 1 week before surgery.  It is okay to use a clean electric clippers up to 24 hours before surgery. Do not apply any lotions, creams, including makeup, cologne, deodorant, or perfumes after showering on the day of your surgery. Do not use dry shampoo, hair spray, hair gel, or any type of hair products. No contact lenses, eye make-up, or artificial eyelashes. Remove nail polish, including gel polish, and any artificial, gel, or acrylic nails if possible. Remove all jewelry including rings and body piercing jewelry. Wear causal clothing that is easy to take on and off. Consider your type of surgery. Keep any valuables, jewelry, piercings at home. Please bring any specially ordered equipment (sling, braces) if indicated. Arrange for a responsible person to drive you to and from the hospital on the day of your surgery. Visitor Guidelines discussed. Call the surgeon's office with any new illnesses, exposures, or additional questions prior to surgery. Please reference your [facility] Surgical Experience Booklet” for additional information to prepare for your upcoming surgery. Pt. Verbalized an understanding of all instructions reviewed and offers no concerns at this time.

## 2023-12-04 ENCOUNTER — APPOINTMENT (OUTPATIENT)
Dept: RADIOLOGY | Facility: HOSPITAL | Age: 83
End: 2023-12-04
Payer: MEDICARE

## 2023-12-04 ENCOUNTER — TELEPHONE (OUTPATIENT)
Dept: UROLOGY | Facility: AMBULATORY SURGERY CENTER | Age: 83
End: 2023-12-04

## 2023-12-04 ENCOUNTER — ANESTHESIA (OUTPATIENT)
Dept: PERIOP | Facility: HOSPITAL | Age: 83
End: 2023-12-04
Payer: MEDICARE

## 2023-12-04 ENCOUNTER — HOSPITAL ENCOUNTER (OUTPATIENT)
Facility: HOSPITAL | Age: 83
Setting detail: OUTPATIENT SURGERY
Discharge: HOME | End: 2023-12-04
Attending: UROLOGY | Admitting: UROLOGY
Payer: MEDICARE

## 2023-12-04 ENCOUNTER — HOSPITAL ENCOUNTER (EMERGENCY)
Facility: HOSPITAL | Age: 83
Discharge: HOME | End: 2023-12-04
Attending: EMERGENCY MEDICINE
Payer: MEDICARE

## 2023-12-04 VITALS
RESPIRATION RATE: 18 BRPM | SYSTOLIC BLOOD PRESSURE: 141 MMHG | HEART RATE: 58 BPM | HEIGHT: 70 IN | TEMPERATURE: 98 F | WEIGHT: 211 LBS | DIASTOLIC BLOOD PRESSURE: 64 MMHG | OXYGEN SATURATION: 95 % | BODY MASS INDEX: 30.21 KG/M2

## 2023-12-04 VITALS
DIASTOLIC BLOOD PRESSURE: 87 MMHG | TEMPERATURE: 97.5 F | OXYGEN SATURATION: 97 % | SYSTOLIC BLOOD PRESSURE: 174 MMHG | HEART RATE: 75 BPM | RESPIRATION RATE: 18 BRPM

## 2023-12-04 DIAGNOSIS — S31.21XA TEAR OF FRENULUM OF FORESKIN: Primary | ICD-10-CM

## 2023-12-04 DIAGNOSIS — N13.5 URETERAL STRICTURE: Primary | ICD-10-CM

## 2023-12-04 DIAGNOSIS — N13.30 HYDRONEPHROSIS, UNSPECIFIED HYDRONEPHROSIS TYPE: ICD-10-CM

## 2023-12-04 DIAGNOSIS — N13.1 HYDRONEPHROSIS WITH URETERAL STRICTURE, NOT ELSEWHERE CLASSIFIED: ICD-10-CM

## 2023-12-04 LAB
BASOPHILS # BLD AUTO: 0.03 THOUSANDS/ÂΜL (ref 0–0.1)
BASOPHILS NFR BLD AUTO: 0 % (ref 0–1)
EOSINOPHIL # BLD AUTO: 0.02 THOUSAND/ÂΜL (ref 0–0.61)
EOSINOPHIL NFR BLD AUTO: 0 % (ref 0–6)
ERYTHROCYTE [DISTWIDTH] IN BLOOD BY AUTOMATED COUNT: 14.8 % (ref 11.6–15.1)
HCT VFR BLD AUTO: 44.5 % (ref 36.5–49.3)
HGB BLD-MCNC: 14.6 G/DL (ref 12–17)
IMM GRANULOCYTES # BLD AUTO: 0.05 THOUSAND/UL (ref 0–0.2)
IMM GRANULOCYTES NFR BLD AUTO: 1 % (ref 0–2)
LYMPHOCYTES # BLD AUTO: 1.05 THOUSANDS/ÂΜL (ref 0.6–4.47)
LYMPHOCYTES NFR BLD AUTO: 13 % (ref 14–44)
MCH RBC QN AUTO: 30.7 PG (ref 26.8–34.3)
MCHC RBC AUTO-ENTMCNC: 32.8 G/DL (ref 31.4–37.4)
MCV RBC AUTO: 94 FL (ref 82–98)
MONOCYTES # BLD AUTO: 0.14 THOUSAND/ÂΜL (ref 0.17–1.22)
MONOCYTES NFR BLD AUTO: 2 % (ref 4–12)
NEUTROPHILS # BLD AUTO: 7.14 THOUSANDS/ÂΜL (ref 1.85–7.62)
NEUTS SEG NFR BLD AUTO: 84 % (ref 43–75)
NRBC BLD AUTO-RTO: 0 /100 WBCS
PLATELET # BLD AUTO: 130 THOUSANDS/UL (ref 149–390)
PMV BLD AUTO: 10.4 FL (ref 8.9–12.7)
RBC # BLD AUTO: 4.76 MILLION/UL (ref 3.88–5.62)
WBC # BLD AUTO: 8.43 THOUSAND/UL (ref 4.31–10.16)

## 2023-12-04 PROCEDURE — 12001 RPR S/N/AX/GEN/TRNK 2.5CM/<: CPT | Performed by: EMERGENCY MEDICINE

## 2023-12-04 PROCEDURE — C2625 STENT, NON-COR, TEM W/DEL SY: HCPCS | Performed by: UROLOGY

## 2023-12-04 PROCEDURE — 52332 CYSTOSCOPY AND TREATMENT: CPT | Performed by: UROLOGY

## 2023-12-04 PROCEDURE — C1769 GUIDE WIRE: HCPCS | Performed by: UROLOGY

## 2023-12-04 PROCEDURE — 36415 COLL VENOUS BLD VENIPUNCTURE: CPT | Performed by: EMERGENCY MEDICINE

## 2023-12-04 PROCEDURE — 99284 EMERGENCY DEPT VISIT MOD MDM: CPT | Performed by: EMERGENCY MEDICINE

## 2023-12-04 PROCEDURE — C1758 CATHETER, URETERAL: HCPCS | Performed by: UROLOGY

## 2023-12-04 PROCEDURE — NC001 PR NO CHARGE: Performed by: UROLOGY

## 2023-12-04 PROCEDURE — 99283 EMERGENCY DEPT VISIT LOW MDM: CPT

## 2023-12-04 PROCEDURE — 74420 UROGRAPHY RTRGR +-KUB: CPT

## 2023-12-04 PROCEDURE — 93005 ELECTROCARDIOGRAM TRACING: CPT

## 2023-12-04 PROCEDURE — 85025 COMPLETE CBC W/AUTO DIFF WBC: CPT | Performed by: EMERGENCY MEDICINE

## 2023-12-04 DEVICE — STENT URET RESONANCE METALIC 6FR 24CM: Type: IMPLANTABLE DEVICE | Site: URETER | Status: FUNCTIONAL

## 2023-12-04 RX ORDER — DEXAMETHASONE SODIUM PHOSPHATE 10 MG/ML
INJECTION, SOLUTION INTRAMUSCULAR; INTRAVENOUS AS NEEDED
Status: DISCONTINUED | OUTPATIENT
Start: 2023-12-04 | End: 2023-12-04

## 2023-12-04 RX ORDER — MAGNESIUM HYDROXIDE 1200 MG/15ML
LIQUID ORAL AS NEEDED
Status: DISCONTINUED | OUTPATIENT
Start: 2023-12-04 | End: 2023-12-04 | Stop reason: HOSPADM

## 2023-12-04 RX ORDER — GINSENG 100 MG
1 CAPSULE ORAL ONCE
Status: COMPLETED | OUTPATIENT
Start: 2023-12-04 | End: 2023-12-04

## 2023-12-04 RX ORDER — FENTANYL CITRATE 50 UG/ML
INJECTION, SOLUTION INTRAMUSCULAR; INTRAVENOUS AS NEEDED
Status: DISCONTINUED | OUTPATIENT
Start: 2023-12-04 | End: 2023-12-04

## 2023-12-04 RX ORDER — PROPOFOL 10 MG/ML
INJECTION, EMULSION INTRAVENOUS AS NEEDED
Status: DISCONTINUED | OUTPATIENT
Start: 2023-12-04 | End: 2023-12-04

## 2023-12-04 RX ORDER — HYDROMORPHONE HCL IN WATER/PF 6 MG/30 ML
0.2 PATIENT CONTROLLED ANALGESIA SYRINGE INTRAVENOUS
Status: DISCONTINUED | OUTPATIENT
Start: 2023-12-04 | End: 2023-12-04 | Stop reason: HOSPADM

## 2023-12-04 RX ORDER — PROPOFOL 10 MG/ML
INJECTION, EMULSION INTRAVENOUS CONTINUOUS PRN
Status: DISCONTINUED | OUTPATIENT
Start: 2023-12-04 | End: 2023-12-04

## 2023-12-04 RX ORDER — LIDOCAINE HYDROCHLORIDE 10 MG/ML
5 INJECTION, SOLUTION EPIDURAL; INFILTRATION; INTRACAUDAL; PERINEURAL ONCE
Status: COMPLETED | OUTPATIENT
Start: 2023-12-04 | End: 2023-12-04

## 2023-12-04 RX ORDER — ONDANSETRON 2 MG/ML
4 INJECTION INTRAMUSCULAR; INTRAVENOUS ONCE AS NEEDED
Status: DISCONTINUED | OUTPATIENT
Start: 2023-12-04 | End: 2023-12-04 | Stop reason: HOSPADM

## 2023-12-04 RX ORDER — CEFAZOLIN SODIUM 2 G/50ML
2000 SOLUTION INTRAVENOUS ONCE
Status: COMPLETED | OUTPATIENT
Start: 2023-12-04 | End: 2023-12-04

## 2023-12-04 RX ORDER — SODIUM CHLORIDE, SODIUM LACTATE, POTASSIUM CHLORIDE, CALCIUM CHLORIDE 600; 310; 30; 20 MG/100ML; MG/100ML; MG/100ML; MG/100ML
100 INJECTION, SOLUTION INTRAVENOUS CONTINUOUS
Status: DISCONTINUED | OUTPATIENT
Start: 2023-12-04 | End: 2023-12-04 | Stop reason: HOSPADM

## 2023-12-04 RX ORDER — SODIUM CHLORIDE, SODIUM LACTATE, POTASSIUM CHLORIDE, CALCIUM CHLORIDE 600; 310; 30; 20 MG/100ML; MG/100ML; MG/100ML; MG/100ML
INJECTION, SOLUTION INTRAVENOUS CONTINUOUS PRN
Status: DISCONTINUED | OUTPATIENT
Start: 2023-12-04 | End: 2023-12-04

## 2023-12-04 RX ADMIN — CEFAZOLIN SODIUM 2000 MG: 2 SOLUTION INTRAVENOUS at 15:14

## 2023-12-04 RX ADMIN — FENTANYL CITRATE 100 MCG: 50 INJECTION INTRAMUSCULAR; INTRAVENOUS at 15:19

## 2023-12-04 RX ADMIN — SODIUM CHLORIDE, SODIUM LACTATE, POTASSIUM CHLORIDE, AND CALCIUM CHLORIDE: .6; .31; .03; .02 INJECTION, SOLUTION INTRAVENOUS at 14:36

## 2023-12-04 RX ADMIN — SODIUM CHLORIDE, SODIUM LACTATE, POTASSIUM CHLORIDE, AND CALCIUM CHLORIDE: .6; .31; .03; .02 INJECTION, SOLUTION INTRAVENOUS at 15:18

## 2023-12-04 RX ADMIN — PROPOFOL 80 MCG/KG/MIN: 10 INJECTION, EMULSION INTRAVENOUS at 15:19

## 2023-12-04 RX ADMIN — PROPOFOL 20 MG: 10 INJECTION, EMULSION INTRAVENOUS at 15:19

## 2023-12-04 RX ADMIN — LIDOCAINE HYDROCHLORIDE 5 ML: 10 INJECTION, SOLUTION EPIDURAL; INFILTRATION; INTRACAUDAL at 20:56

## 2023-12-04 RX ADMIN — DEXAMETHASONE SODIUM PHOSPHATE 10 MG: 10 INJECTION INTRAMUSCULAR; INTRAVENOUS at 15:20

## 2023-12-04 RX ADMIN — BACITRACIN ZINC 1 LARGE APPLICATION: 500 OINTMENT TOPICAL at 21:21

## 2023-12-04 NOTE — ANESTHESIA POSTPROCEDURE EVALUATION
Post-Op Assessment Note    CV Status:  Stable  Pain Score: 0    Pain management: adequate       Mental Status:  Alert and awake   Hydration Status:  Euvolemic   PONV Controlled:  Controlled   Airway Patency:  Patent  Two or more mitigation strategies used for obstructive sleep apnea   Post Op Vitals Reviewed: Yes    No anethesia notable event occurred.     Staff: Anesthesiologist, CRNA               BP  134/65   Temp  98   Pulse  63   Resp   16   SpO2   97

## 2023-12-04 NOTE — DISCHARGE INSTR - AVS FIRST PAGE
Mr. Valera Vicky,    We placed a metal stent on the right side. We will get an ultrasound in a few months to see how the kidney is doing and if it is doing well we will plan to next exchange the stent 1 year from now unless you are having issues (pain, bleeding) in which case we will have to consider going back to a regular stent. Please call with any questions or concerns.     [address] [place] [address] Urology  [phone]

## 2023-12-04 NOTE — OP NOTE
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 Patient elected to transition [facility] to residence of stent. Operative Findings:  Prior stent with mild to moderate crustacean on the bladder coil. Hydronephrotic drip appreciated with the tiger tail suggesting that prior stent was at least partially obstructed  Retrograde pyelogram showed moderate hydronephrosis  6x24 Wallisian metal resonance stent placed      Complications:   None    Procedure and Technique:  After informed consent was obtained including the risks of bleeding, infection, ureteral injury, and need for secondary procedures, the patient was placed supine in the operating room theater. General anesthesia was administered. The patient was transferred to the dorsal lithotomy position and sterilely prepped and draped in the usual fashion. Cystoscopy was performed to 21 Wallisian cystoscope with 30° lens. The urethra was unremarkable. Inspection of the bladder revealed no significant findings other than trabeculations. Stent was seen emanating from the right ureteral orifice and was moderately encrusted. The stent was then grasped and brought down to the meatus. A solo wire was passed through the stent. The stent was removed.   The cystoscope with a 5fr open ended catheter was passed over the wire until reached the proximal ureter, the wire removed and a hydronephrotic drip was appreciated suggesting that the prior stent was at least partially occluded. The 5 Salvadorean stent was measured to be approximately 23 cm at the UPJ. Retrograde ureteropyelogram performed showing moderate hydronephrosis. The sensor was wire was replaced into the collecting system. A new 6 x 24 double-J stent medical residents stent was then inserted by first placing the clear sheath with indwelling obturator guide over the wire positioning at the UPJ based on fluoroscopy removing the wire and inner obturator and then passing the metal stent through the sheath with the aid of a pusher and after the proximal coil was deployed within the collecting system the sheath was pushed pulled out so that the distal coil was deployed in the bladder. The bladder was drained and patient awakened from anesthesia having tolerated the procedure well. A qualified resident physician was not available. Patient Disposition:  PACU     Plan:   Plan for renal bladder ultrasound in 2 to 3 months to reassess hydronephrosis. If doing well plan for next stent exchange in 1 year.         SIGNATURE: Sammi David MD  DATE: December 4, 2023  TIME: 3:51 PM

## 2023-12-04 NOTE — TELEPHONE ENCOUNTER
Patient had stent exchange and a metal stent was placed. If this does well for him then we can exchange next in 1 year.     Plan for renal bladder ultrasound and bmp in about 2 months to see degree of hydronephrosis present and check GFR (recent GFR of 33 is down from baseline of 40)

## 2023-12-04 NOTE — H&P
----- Message from JOSE Polo sent at 10/11/2023  2:24 PM CDT -----  Please call, fibroids remain,slightly smaller. If having problems with bleeding after stopping Nuva Ring will need to call   UROLOGY HISTORY AND PHYSICAL     Patient Identifiers: Juve Ren (MRN [id number])      Date of Service: 12/4/2023        ASSESSMENT:     82 y.o. 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 PLAN:     The patient I discussed a regular stent versus using a metal stent. He would like to try metal stent to reduce frequency of stent exchange. We discussed this has increased risks of hematuria and stent migration which could necessitate us going back to regular stent. He understands. He wants to move forward with metal stent. History of Present Illness:     Juve Ren is a 82 y.o. old with a right mid ureteral stricture managed with stent. The patient has had numerous (at least 7) prior stone surgeries in [facility] and developed a stricture of the right mid and proximal ureter. Hx from Dr. Miguel Ángel Hammond notes:  -Stent placement and holmium laser of stone and stricture 4/18/17  -Holmium laser of stone and stent exchange 5/2/17  -Renal stones treated elsewhere in [place] in past year (7 procedures)  -Right laser lithotripsy, laser infundibulotomy, and stone basket extraction 6/2/17  -Right CRUSH and laser incision of proximal ureter 7/18/17  -Stent changed 05/10/19-- stone causing near complete obstruction of the right proximal ureter. Required stent to be removed in order for a guidewire to pass. -Stent exchange 10/22/19 found it impossible to advance the wire without removing the stent first.   -Stent exchange 01/28/20 found it once again impossible to advance the wire without removing the stent first.   Stent due for change in 12 wks. -Stent exchange by Dr. Yung Casper 02/14/21 right stent change and ureteroscopy. 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  - Stent exchange 05/11/21 - Difficulty getting wire past the obstruction without removing  - Stent exchange 10/19/2021- partial encrustation, wire could not pass  mid ureter alongside stent until stent was pulled out.  7x26 stent placed. The patient has CKD but creatinine appears stable with GFR of 40 since at least 2018. He follows with Nephrology. stent exchange 7/15/22:  PLAN: We will arrange stent exchange in 3 months. While his hydronephrosis did appear worse today his renal function is stable and his recent Mag 3 Lasix renal scan suggests that his kidney is draining and maintaining function     stent exchange 1/26/23 with Dr. Silvano Mace. PLAN: We will arrange stent exchange in 4-5 months. His hydronephrosis appears stable or perhaps mildly improved and his  renal function is stable with cr of 1.5 (and his Mag 3 Lasix renal scan in May 2022 suggested that his kidney is draining and maintaining function with stent)     Stent exchange (6/13/23) with Dr. Silvano Mace  Operative Findings:  Stent exchanged without significant difficulty. The stricture remains significant enough that it prevents passage of the wire around the existing stent requiring wire placement due to stent to facilitate new stent placement. Pyelogram shows moderate to severe right-sided hydronephrosis which appears stable. 7 x 26 stent placed.   Patient could have 7x24 stent by 26 used to facilitate stent exchanges    Past Medical, Past Surgical History:     Past Medical History:   Diagnosis Date    Anemia     iron deficiency    Aneurysm ([address]) 2020    of brain  being monitored    Dental disease     Essential hypertension, long-standing     Heart murmur     per pt "Aortic Valve replacement 2021 with Watchman device implanted /2021"    Hematuria     intermittent    History of cigarette smoking, chronic     62 year smoker at one half pack per day, quit 04/1/17    History of COVID-19 01/19/2022    recovered at home/chief s/s only sore throat    History of kidney stones     History of pneumonia     HL (hearing loss)     Hyperlipidemia     Hypertension     Irregular heart beat     Kidney stone     Muscle weakness     right sided weakness has gotten better/ walks independantly" Stroke ([address] ) 10/29/2020    right sided  weakness almost full recovery    Stroke [facility])     Teeth missing     Vitamin D deficiency     maintained with 1000 units of D    Water retention     Wears glasses    :    Past Surgical History:   Procedure Laterality Date    [address]  10/02/2023    CARDIAC SURGERY      watchman [id number]; aortic valve replaced 2021(pig valve)    CAROTID ENDARTERECTOMY Left 1998    Supposedly no internal stenosis found    CYSTOSCOPY Right 08/15/2017    Procedure: CYSTOSCOPY RIGHT STENT EXCHANGE;  Surgeon: Елена Goodman MD;  Location: [facility];  Service: Urology    CYSTOSCOPY      CYSTOSCOPY N/A 03/11/2022    Procedure: April Pérez, RIGHT RETROGRADE PYLEOGRAM;  Surgeon: Janice Alvarez MD;  [facility];  Service: Urology    CYSTOSCOPY W/ URETERAL STENT PLACEMENT Right 06/13/2023    Procedure: Mary Mendoza STENT URETERAL;  Surgeon: Janice Alvarez MD;  [facility];  Service: Urology    EXTRACORPOREAL SHOCK WAVE LITHOTRIPSY  03/2017    [address]  05/10/2019    FL RETROGRADE PYELOGRAM  07/30/2019    FL RETROGRADE PYELOGRAM  10/22/2019    FL RETROGRADE PYELOGRAM  01/28/2020    FL RETROGRADE PYELOGRAM  08/11/2020    FL RETROGRADE PYELOGRAM  12/15/2020    FL RETROGRADE PYELOGRAM  02/14/2021    FL RETROGRADE PYELOGRAM  05/11/2021    FL RETROGRADE PYELOGRAM  10/19/2021    FL RETROGRADE PYELOGRAM  03/11/2022    FL RETROGRADE PYELOGRAM  07/15/2022    FL RETROGRADE PYELOGRAM  01/26/2023    FL RETROGRADE PYELOGRAM  06/13/2023    WI CYSTO BLADDER W/URETERAL CATHETERIZATION Right 11/14/2017    Procedure: CYSTOSCOPY RETROGRADE, STENT EXCHANGE;  Surgeon: Елена Goodman MD;  Location: [facility];  Service: Urology    WI CYSTO BLADDER W/URETERAL CATHETERIZATION Right 05/08/2018    Procedure: Joycelyn Double EXCHANGE;  Surgeon: Елена Goodman MD; Location: [facility];  Service: Urology    IL CYSTO BLADDER W/URETERAL CATHETERIZATION Right 08/14/2018    Procedure: Dameon Army EXCHANGE;  Surgeon: Jae Wu MD;  Location: [facility];  Service: Urology    IL CYSTO BLADDER W/URETERAL CATHETERIZATION Right 11/13/2018    Procedure: Dameon Tucker EXCHANGE, RETROGRADE;  Surgeon: Jae Wu MD;  Location: [facility];  Service: Urology    IL CYSTO BLADDER W/URETERAL CATHETERIZATION Right 02/12/2019    Procedure: Gina Marcos, STENT REMOVAL AND STENT PLACEMENT;  Surgeon: Jae Wu MD;  Location: [facility];  Service: Urology    IL CYSTO BLADDER W/URETERAL CATHETERIZATION Right 05/10/2019    Procedure: Gina Marcos, STENT EXCHANGE;  Surgeon: Jae Wu MD;  Location: [facility];  Service: Urology    IL CYSTO BLADDER W/URETERAL CATHETERIZATION Right 07/30/2019    Procedure: CYSTOSCOPY, RETROGRADE, STENT REMOVAL AND PLACEMENT OF STENT;  Surgeon: Jae Wu MD;  Location: [facility];  Service: Urology    IL CYSTO BLADDER W/URETERAL CATHETERIZATION Right 10/22/2019    Procedure: CYSTOSCOPY, RETROGRADE, STENT EXCHANGE;  Surgeon: Jae Wu MD;  Location: [facility];  Service: Urology    IL CYSTO BLADDER W/URETERAL CATHETERIZATION Right 01/28/2020    Procedure: CYSTOSCOPY, RETROGRADE, STENT REMOVAL AND STENT PLACEMENT;  Surgeon: Jae Wu MD;  Location: [facility];  Service: Urology    IL CYSTO BLADDER W/URETERAL CATHETERIZATION Right 05/22/2020    Procedure: CYSTOSCOPY RETROGRADE, STENT EXCHANGE;  Surgeon: Jae Wu MD;  Location: [facility];  Service: Urology    IL CYSTO BLADDER W/URETERAL CATHETERIZATION Right 08/11/2020    Procedure: CYSTOSCOPY, Gerldine Roulette EXCHANGE, RETROGRADE;  Surgeon: Jae Wu MD;  Location: [facility];  Service: Urology    IL CYSTO BLADDER W/URETERAL CATHETERIZATION Right 12/15/2020    Procedure: CYSTOSCOPY, RETROGRADE, STENT REMOVAL, AND STENT PLACEMENT;  Surgeon: Brian Vázquez MD;  Location: [facility];  Service: Urology    LA CYSTO BLADDER W/URETERAL CATHETERIZATION Right 05/11/2021    Procedure: CYSTOSCOPY RETROGRADE PYELOGRAM WITH STENT EXCHANGE;  Surgeon: Brian Vázquez MD;  Location: [facility];  Service: Urology    LA CYSTO BLADDER W/URETERAL CATHETERIZATION Right 10/19/2021    Procedure: CYSTOSCOPY WITH RETROGRADE, STENT EXCHANGE;  Surgeon: Brian Vázquez MD;  Location: [facility];  Service: Urology    LA CYSTO BLADDER W/URETERAL CATHETERIZATION Right 07/15/2022    Procedure: CYSTOSCOPY, RETROGRADE PYELOGRAM WITH EXCHANGE STENT URETERAL;  Surgeon: Brandon Booth MD;  [facility];  Service: Urology    LA CYSTO W/INSERT URETERAL STENT Right 11/14/2017    Procedure: EXCHANGE STENT URETERAL;  Surgeon: Brian Vázquez MD;  Location: [facility];  Service: Urology    LA CYSTO W/INSERT URETERAL STENT Right 03/11/2022    Procedure: EXCHANGE STENT URETERAL;  Surgeon: Brandon Booth MD;  [facility];  Service: Urology    LA CYSTO W/INSERT URETERAL STENT Right 01/26/2023    Procedure: EXCHANGE STENT URETERAL;  Surgeon: Brandon Booth MD;  [facility];  Service: Urology    LA CYSTO/URETERO W/LITHOTRIPSY &INDWELL STENT INSRT Right 05/02/2017    Procedure: CYSTOSCOPY URETEROSCOPY WITH LITHOTRIPSY HOLMIUM LASER, RETROGRADE PYELOGRAM AND INSERTION STENT URETERAL;  Surgeon: Brian Vázquez MD;  Location: [facility];  Service: Urology    LA CYSTO/URETERO W/LITHOTRIPSY &INDWELL STENT INSRT Right 05/16/2017    Procedure: CYSTOSCOPY, RETROGRADE PYELOGRAM,  FLEXIBLE URETEROSCOPY,  HOLMIUM LASER LITHOTRIPSY, STONE BASKET MANIPULATION,  STENT URETERAL EXCHANGE;  Surgeon: Brian Vázquez MD;  Location: [facility];  Service: Urology    LA CYSTO/URETERO W/LITHOTRIPSY &INDWELL STENT INSRT Right 06/02/2017    Procedure: CYSTOSCOPY, RETROGRADE, STONE MANIPULATION WITH HOLMIUM LASER, STENT PLACEMENT;  Surgeon: Brian Vázquez MD;  Location: [facility];  Service: Urology    GA CYSTO/URETERO W/LITHOTRIPSY &INDWELL STENT INSRT Right 2017    Procedure: CYSTOSCOPY, RETROGRADE, URETEROSCOPY HOLMIUM LASER Parish Powell;  Surgeon: Madeleine Funk MD;  Location: [facility];  Service: Urology    GA CYSTO/URETERO W/LITHOTRIPSY &INDWELL STENT INSRT Right 2021    Procedure: CYSTOSCOPY URETEROSCOPY, RETROGRADE PYELOGRAM, STONE MANIPULATION AND EXCHANGE STENT URETERAL;  Surgeon: Poornima Wilson MD;  Location: [facility];  Service: Urology    PROSTATE SURGERY  2015    Laser Prostatectomy  by Dr. Sydnie Smith Right 2017    Procedure: INSERTION STENT URETERAL, RIGHT URETEROSCOPY,  LASER OF URETERAL STRICTURE AND STONE;  Surgeon: Madeleine Funk MD;  Location: [facility];  Service:     URETERAL STENT PLACEMENT Right 2018    Procedure: Bryson Dennison;  Surgeon: Madeleine Funk MD;  Location: [facility];  Service: Urology   :    Medications, Allergies:     Current Facility-Administered Medications:     ceFAZolin (ANCEF) IVPB (premix in dextrose) 2,000 mg 50 mL, 2,000 mg, Intravenous, Once,  E [facility]    Facility-Administered Medications Ordered in Other Encounters:     lactated ringers infusion, , Intravenous, Continuous PRN, Stevie Escalona, CRNA, New Bag at 23 1436    Allergies:  No Known Allergies:    Social and Family History:   Social History:   Social History     Tobacco Use    Smoking status: Former     Packs/day: 0.50     Years: 62.00     Total pack years: 31.00     Types: Cigarettes     Start date: 6/15/1952     Quit date: 4/15/2017     Years since quittin.6    Smokeless tobacco: Never   Vaping Use    Vaping Use: Never used   Substance Use Topics    Alcohol use: Not Currently     Comment: don't drink anymore.     Drug use: No   .    Social History     Tobacco Use   Smoking Status Former    Packs/day: 0.50    Years: 62.00    Total pack years: 31.00    Types: Cigarettes    Start date: [birth year]/15/1954    Quit date: 4/15/2017    Years since quittin.6   Smokeless Tobacco Never       Family History:  Family History   Problem Relation Age of Onset    Hypertension Mother     Alcohol abuse Father     Cirrhosis Father     Cancer Son 15        cancer-knee and above-left-amputation    Cancer Sister     Other Brother         head mass    Thyroid disease unspecified Sister     Arthritis Sister     Cancer Sister     Other Brother         legally blind in one eye   :     Review of Systems:     General: Fever, chills, or night sweats: negative  Cardiac: Negative for chest pain. Pulmonary: Negative for shortness of breath. Gastrointestinal: Abdominal pain negative  Nausea, vomiting, or diarrhea negative  Genitourinary: See HPI above. Patient does nothave hematuria. All other systems queried were negative. Physical Exam:   General: Patient is pleasant and in NAD. Awake and alert  BP (!) 172/74   Pulse 67   Temp (!) 97.1 °F (36.2 °C) (Temporal)   Resp 18   Ht 5' 10" (1.778 m)   Wt 95.7 kg (211 lb)   SpO2 98%   BMI 30.28 kg/m²   HEENT:  Normocephalic atraumatic  Cardiac:  Regular rate and rhythm, Peripheral edema: negative  Pulmonary: Non-labored breathing, CTAB  Abdomen: Soft, non-tender, non-distended. No surgical scars. No masses, tenderness, hernias noted. Genitourinary: negative CVA tenderness, neg suprapubic tenderness.   Extremities: normal movement in all 4       Labs:     Lab Results   Component Value Date    HGB 14.9 11/10/2023    HCT 46.0 11/10/2023    WBC 7.96 11/10/2023     (L) 11/10/2023   ]    Lab Results   Component Value Date    K 4.6 11/10/2023     11/10/2023    CO2 28 11/10/2023    BUN 38 (H) 11/10/2023    CREATININE 1.86 (H) 11/10/2023    CALCIUM 8.9 11/10/2023    GLUCOSE 135 2020   ]    Imaging:   I personally reviewed the images and report of the following studies, and reviewed them with the patient:    No recent imaging    Thank you for allowing me to participate in this patients’ care. Please do not hesitate to call with any additional questions.   Jordan Cummins MD

## 2023-12-04 NOTE — ANESTHESIA PREPROCEDURE EVALUATION
Procedure:  CYSTOSCOPY RETROGRADE PYELOGRAM WITH INSERTION STENT URETERAL (Right: Bladder)    Relevant Problems   ANESTHESIA (within normal limits)      CARDIO   (+) Benign essential hypertension   (+) Chronic atrial fibrillation (HCC)   (+) History of aortic valve replacement   (+) Moderate aortic regurgitation   (+) Moderate mitral regurgitation   (+) Nonrheumatic aortic valve stenosis   (-) Angina at rest   (-) Angina of effort   (-) Chest pain   (-) AMOR (dyspnea on exertion)      ENDO   (+) Secondary hyperparathyroidism (HCC)   (-) Diabetes mellitus type 1 (HCC)   (-) Diabetes mellitus, type 2 (HCC)      GI/HEPATIC   (+) GERD (gastroesophageal reflux disease)   (-) Chronic liver disease      /RENAL   (+) JUNIOR (acute kidney injury) (HCC)   (+) Benign hypertension with CKD (chronic kidney disease) stage III (HCC)   (+) Chronic kidney disease   (+) Chronic kidney disease-mineral and bone disorder   (+) Hydronephrosis with ureteral stricture, not elsewhere classified   (+) Renal calculi   (+) Stage 3b chronic kidney disease (HCC)      HEMATOLOGY   (+) Iron deficiency anemia, unspecified      NEURO/PSYCH   (+) Anxiety   (+) CVA (cerebral vascular accident) ([address])   (+) Stroke (HCC)   (+) Transient diplopia   (-) Seizures (HCC)      PULMONARY   (+) Shortness of breath   (-) Asthma   (-) Chronic obstructive pulmonary disease (HCC)   (-) Sleep apnea      Echo 08/07/2023:  LVEF 55%, normal RV systolic function. S/p TAVR bioprosthetic valve without significant regurgitation or stenosis. Mild MR, mild TR, mild PI    Physical Exam    Airway    Mallampati score: II  TM Distance: >3 FB  Neck ROM: full     Dental    upper dentures    Cardiovascular      Pulmonary      Other Findings        Anesthesia Plan  ASA Score- 3     Anesthesia Type- IV sedation with anesthesia with ASA Monitors. Additional Monitors:     Airway Plan:            Plan Factors-Exercise tolerance (METS): >4 METS. Chart reviewed.   Imaging results reviewed. Existing labs reviewed. Patient summary reviewed. Induction- intravenous. Postoperative Plan-     Informed Consent- Anesthetic plan and risks discussed with patient. I personally reviewed this patient with the CRNA. Discussed and agreed on the Anesthesia Plan with the CRNA. Sarah Lira

## 2023-12-05 LAB
QRS AXIS: -58 DEGREES
QRSD INTERVAL: 102 MS
QT INTERVAL: 408 MS
QTC INTERVAL: 431 MS
T WAVE AXIS: 63 DEGREES
VENTRICULAR RATE: 67 BPM

## 2023-12-05 NOTE — TELEPHONE ENCOUNTER
Post Op Note    Wilmer Roper is a 82 y.o. male s/p stent exchange performed 12/04/2023. Wilmer Roper is a patient of Dr. Dr. Tracee Christianson and is seen at the [facility] office. How would you rate your pain on a scale from 1 to 10, 10 being the worst pain ever? 0  Have you had a fever? No    Have your bowel movements been regular? Yes   Do you have any difficulty urinating? No    Do you have any other questions or concerns that I can address at this time? No.     Pt states is feeling well. No pain. Moving around without issues. Appointment scheduled with [facility] @ [facility] for wound check post ED visit as is unable to travel. Was advised to schedule US and labs in 2 months. Central scheduling number provided. Advised to contact office after US appt scheduled to then schedule follow up visit with AP. Office number provided. Pt verbalized understanding.

## 2023-12-05 NOTE — DISCHARGE INSTRUCTIONS
Return to the ER for further concerns or worsening symptoms  Follow up with your primary care physician and urology in 1-2 days  Apply bacitracin to the affected area twice daily for 1 week

## 2023-12-05 NOTE — ED PROVIDER NOTES
History  Chief Complaint   Patient presents with    Post-op Problem     Pt had metal urethral stent placed today. Pt now experiencing substantial bleeding along with sob with exertion. Pt sts he is on blood thinners. Unsure of name of thinner     51-year-old male with a history of hypertension, hyperlipidemia, prior CVA presents emergency department with complaint of bleeding from his penis. Patient is status post metal ureteral stent placement as treatment for ureteral stricture. The procedure was performed at [address] [place] [address] earlier today. Patient stated he had 1 episode of hematuria shortly afterwards but noticed nonstop bleeding upon arrival at home. He denies other somatic complaints at this time. Patient discontinued aspirin and Plavix approximately 1 week ago in preparation for surgery. He has not resumed it. History provided by:  Patient   used: No        Prior to Admission Medications   Prescriptions Last Dose Informant Patient Reported? Taking? amLODIPine (NORVASC) 5 mg tablet  Self No No   Sig: Take 1 tablet (5 mg total) by mouth daily Do not start before August 10, 2023. ascorbic acid (VITAMIN C) 250 mg tablet  Self Yes No   Sig: Take 500 mg by mouth daily   aspirin (ECOTRIN LOW STRENGTH) 81 mg EC tablet  Self Yes No   Sig: Take 81 mg by mouth daily Pt to check with surgeon if needed to hold RX.   atorvastatin (LIPITOR) 40 mg tablet  Self No No   Sig: Take 1 tablet (40 mg total) by mouth daily with dinner   cholecalciferol (VITAMIN D3) 1,000 units tablet  Self Yes No   Sig: Take 1,000 Units by mouth daily after lunch     clopidogrel (PLAVIX) 75 mg tablet  Self Yes No   Sig: Take 75 mg by mouth daily Pt  To check with surgeon if needed to hold RX. ferrous sulfate 324 (65 Fe) mg  Self No No   Sig: Take 1 tablet (324 mg total) by mouth every other day Increase to daily if tolerated.    finasteride (PROSCAR) 5 mg tablet  Self No No   Sig: TAKE 1 TABLET BY MOUTH EVERY DAY   furosemide (LASIX) 20 mg tablet  Self No No   Sig: Take 1 tablet (20 mg total) by mouth daily Do not start before August 10, 2023. metoprolol tartrate (LOPRESSOR) 50 mg tablet  Self No No   Sig: Take 1 tablet (50 mg total) by mouth every 12 (twelve) hours   sertraline (ZOLOFT) 50 mg tablet  Self No No   Sig: Take 1 tablet (50 mg total) by mouth daily Do not start before August 10, 2023.       Facility-Administered Medications: None       Past Medical History:   Diagnosis Date    Anemia     iron deficiency    Aneurysm ([address]) 2020    of brain  being monitored    Dental disease     Essential hypertension, long-standing     Heart murmur     per pt "Aortic Valve replacement 2021 with Watchman device implanted /2021"    Hematuria     intermittent    History of cigarette smoking, chronic     62 year smoker at one half pack per day, quit 04/1/17    History of COVID-19 01/19/2022    recovered at home/chief s/s only sore throat    History of kidney stones     History of pneumonia     HL (hearing loss)     Hyperlipidemia     Hypertension     Irregular heart beat     Kidney stone     Muscle weakness     right sided weakness has gotten better/ walks independantly"    Stroke ([address]) 10/29/2020    right sided  weakness almost full recovery    Stroke [facility])     Teeth missing     Vitamin D deficiency     maintained with 1000 units of D    Water retention     Wears glasses        Past Surgical History:   Procedure Laterality Date    APPENDECTOMY  1960    CARDIAC CATHETERIZATION  10/02/2023    CARDIAC SURGERY      watchman [id number]; aortic valve replaced 2021(pig valve)    CAROTID ENDARTERECTOMY Left 1998    Supposedly no internal stenosis found    CYSTOSCOPY Right 08/15/2017    Procedure: CYSTOSCOPY RIGHT STENT EXCHANGE;  Surgeon: Jordan Arreola MD;  Location: [facility];  Service: Urology    CYSTOSCOPY      CYSTOSCOPY N/A 03/11/2022    Procedure: Ria Morrissey, RIGHT RETROGRADE PYLEOGRAM;  Surgeon: Rahat Mirza Judi Lynn MD;  [facility];  Service: Urology    CYSTOSCOPY W/ URETERAL STENT PLACEMENT Right 06/13/2023    Procedure: Li Edgar STENT URETERAL;  Surgeon: Cecilia Lin MD;  [facility];  Service: Urology    EXTRACORPOREAL SHOCK WAVE LITHOTRIPSY  03/2017    For nephrolithiasis at [address] [facility] [address]  05/10/2019    FL RETROGRADE PYELOGRAM  07/30/2019    FL RETROGRADE PYELOGRAM  10/22/2019    FL RETROGRADE PYELOGRAM  01/28/2020    FL RETROGRADE PYELOGRAM  08/11/2020    FL RETROGRADE PYELOGRAM  12/15/2020    FL RETROGRADE PYELOGRAM  02/14/2021    FL RETROGRADE PYELOGRAM  05/11/2021    FL RETROGRADE PYELOGRAM  10/19/2021    FL RETROGRADE PYELOGRAM  03/11/2022    FL RETROGRADE PYELOGRAM  07/15/2022    FL RETROGRADE PYELOGRAM  01/26/2023    FL RETROGRADE PYELOGRAM  06/13/2023    AK CYSTO BLADDER W/URETERAL CATHETERIZATION Right 11/14/2017    Procedure: CYSTOSCOPY RETROGRADE, STENT EXCHANGE;  Surgeon: Booker Chan MD;  Location: [facility];  Service: Urology    AK CYSTO BLADDER W/URETERAL CATHETERIZATION Right 05/08/2018    Procedure: Theodore Grate EXCHANGE;  Surgeon: Booker Chan MD;  Location: [facility];  Service: Urology    AK CYSTO BLADDER W/URETERAL CATHETERIZATION Right 08/14/2018    Procedure: Theodore Grate EXCHANGE;  Surgeon: Booker Chan MD;  Location: [facility];  Service: Urology    AK CYSTO BLADDER W/URETERAL CATHETERIZATION Right 11/13/2018    Procedure: CYSTOSCOPY, STENT EXCHANGE, RETROGRADE;  Surgeon: Booker Chan MD;  Location: [facility];  Service: Urology    AK CYSTO BLADDER W/URETERAL CATHETERIZATION Right 02/12/2019    Procedure: CYSTOSCOPY, RETROGRADE, STENT REMOVAL AND STENT PLACEMENT;  Surgeon: Booker Chan MD;  Location: [facility];  Service: Urology    AK CYSTO BLADDER W/URETERAL CATHETERIZATION Right 05/10/2019    Procedure: CYSTOSCOPY, RETROGRADE, STENT EXCHANGE;  Surgeon: Ciro Christianson Paulie Ram MD;  Location: [facility];  Service: Urology    HI CYSTO BLADDER W/URETERAL CATHETERIZATION Right 07/30/2019    Procedure: CYSTOSCOPY, RETROGRADE, STENT REMOVAL AND PLACEMENT OF STENT;  Surgeon: Jordan Arreola MD;  Location: [facility];  Service: Urology    HI CYSTO BLADDER W/URETERAL CATHETERIZATION Right 10/22/2019    Procedure: Janit Pesa, STENT EXCHANGE;  Surgeon: Jordan Arreola MD;  Location: [facility];  Service: Urology    HI CYSTO BLADDER W/URETERAL CATHETERIZATION Right 01/28/2020    Procedure: Janit Pesa, STENT REMOVAL AND STENT PLACEMENT;  Surgeon: Jordan Arreola MD;  Location: [facility];  Service: Urology    HI CYSTO BLADDER W/URETERAL CATHETERIZATION Right 05/22/2020    Procedure: CYSTOSCOPY RETROGRADE, STENT EXCHANGE;  Surgeon: Jordan Arreola MD;  Location: [facility];  Service: Urology    HI CYSTO BLADDER W/URETERAL CATHETERIZATION Right 08/11/2020    Procedure: CYSTOSCOPY, STENT EXCHANGE, RETROGRADE;  Surgeon: Jordan Arreola MD;  Location: [facility];  Service: Urology    HI CYSTO BLADDER W/URETERAL CATHETERIZATION Right 12/15/2020    Procedure: CYSTOSCOPY, RETROGRADE, STENT REMOVAL, AND STENT PLACEMENT;  Surgeon: Jordan Arreola MD;  Location: [facility];  Service: Urology    HI CYSTO BLADDER W/URETERAL CATHETERIZATION Right 05/11/2021    Procedure: CYSTOSCOPY RETROGRADE PYELOGRAM WITH STENT EXCHANGE;  Surgeon: Jordan Arreola MD;  Location: [facility];  Service: Urology    HI CYSTO BLADDER W/URETERAL CATHETERIZATION Right 10/19/2021    Procedure: CYSTOSCOPY WITH RETROGRADE, STENT EXCHANGE;  Surgeon: Jordan Arreola MD;  Location: [facility];  Service: Urology    HI CYSTO BLADDER W/URETERAL CATHETERIZATION Right 07/15/2022    Procedure: CYSTOSCOPY, RETROGRADE PYELOGRAM WITH EXCHANGE STENT URETERAL;  Surgeon: Krysta Alonso MD;  [facility];  Service: Urology    HI CYSTO BLADDER W/URETERAL CATHETERIZATION Right 12/4/2023 Procedure: CYSTOSCOPY RETROGRADE PYELOGRAM WITH INSERTION STENT URETERAL;  Surgeon: Connor Brewer MD;  [facility];  Service: Urology    NH CYSTO W/INSERT URETERAL STENT Right 11/14/2017    Procedure: EXCHANGE STENT URETERAL;  Surgeon: Nolvia Chavez MD;  Location: [facility];  Service: Urology    NH CYSTO W/INSERT URETERAL STENT Right 03/11/2022    Procedure: EXCHANGE STENT URETERAL;  Surgeon: Connor Brewer MD;  [facility];  Service: Urology    NH CYSTO W/INSERT URETERAL STENT Right 01/26/2023    Procedure: EXCHANGE STENT URETERAL;  Surgeon: Connor Brewer MD;  [facility];  Service: Urology    NH CYSTO/URETERO W/LITHOTRIPSY &INDWELL STENT INSRT Right 05/02/2017    Procedure: CYSTOSCOPY URETEROSCOPY WITH LITHOTRIPSY HOLMIUM LASER, RETROGRADE PYELOGRAM AND INSERTION STENT URETERAL;  Surgeon: Nolvia Chavez MD;  Location: [facility];  Service: Urology    NH CYSTO/URETERO W/LITHOTRIPSY &INDWELL STENT INSRT Right 05/16/2017    Procedure: CYSTOSCOPY, RETROGRADE PYELOGRAM,  FLEXIBLE URETEROSCOPY,  HOLMIUM LASER LITHOTRIPSY, STONE BASKET MANIPULATION,  STENT URETERAL EXCHANGE;  Surgeon: Nolvia Chavez MD;  Location: [facility];  Service: Urology    NH CYSTO/URETERO W/LITHOTRIPSY &INDWELL STENT INSRT Right 06/02/2017    Procedure: CYSTOSCOPY, RETROGRADE, STONE MANIPULATION WITH HOLMIUM LASER, STENT PLACEMENT;  Surgeon: Nolvia Chavez MD;  Location: [facility];  Service: Urology    NH CYSTO/URETERO W/LITHOTRIPSY &INDWELL STENT INSRT Right 07/18/2017    Procedure: CYSTOSCOPY, RETROGRADE, URETEROSCOPY HOLMIUM LASER Thalia Lutz;  Surgeon: Nolvia Chavez MD;  Location: [facility];  Service: Urology    NH CYSTO/URETERO W/LITHOTRIPSY &INDWELL STENT INSRT Right 02/14/2021    Procedure: CYSTOSCOPY URETEROSCOPY, RETROGRADE PYELOGRAM, STONE MANIPULATION AND EXCHANGE STENT URETERAL;  Surgeon: Royal Gaines MD;  Location: [facility]; Service: Urology    PROSTATE SURGERY  2015    Laser Prostatectomy  by Dr. Vamsi Lloyd Right 2017    Procedure: INSERTION STENT URETERAL, RIGHT URETEROSCOPY,  LASER OF URETERAL STRICTURE AND STONE;  Surgeon: Nuno Pringle MD;  Location[facility];  Service:     URETERAL STENT PLACEMENT Right 2018    Procedure: Lee Ann Lyon;  Surgeon: Nuno Pringle MD;  Location: [facility];  Service: Urology       Family History   Problem Relation Age of Onset    Hypertension Mother     Alcohol abuse Father     Cirrhosis Father     Cancer Son 15        cancer-knee and above-left-amputation    Cancer Sister     Other Brother         head mass    Thyroid disease unspecified Sister     Arthritis Sister     Cancer Sister     Other Brother         legally blind in one eye     I have reviewed and agree with the history as documented. E-Cigarette/Vaping    E-Cigarette Use Never User      E-Cigarette/Vaping Substances    Nicotine No     THC No     CBD No     Flavoring No     Other No     Unknown No      Social History     Tobacco Use    Smoking status: Former     Packs/day: 0.50     Years: 62.00     Total pack years: 31.00     Types: Cigarettes     Start date: 6/15/1952     Quit date: 4/15/2017     Years since quittin.6    Smokeless tobacco: Never   Vaping Use    Vaping Use: Never used   Substance Use Topics    Alcohol use: Not Currently     Comment: don't drink anymore. Drug use: No       Review of Systems   Constitutional:  Negative for chills and fever. Respiratory:  Negative for cough, shortness of breath and wheezing. Cardiovascular:  Negative for chest pain and palpitations. Gastrointestinal:  Negative for abdominal pain, constipation, diarrhea, nausea and vomiting. Genitourinary:  Negative for dysuria, flank pain, hematuria and urgency. Musculoskeletal:  Negative for back pain. Skin:  Positive for wound. Negative for color change and rash.    All other systems reviewed and are negative. Physical Exam  Physical Exam  Vitals and nursing note reviewed. Constitutional:       General: He is not in acute distress. Appearance: He is well-developed. HENT:      Head: Normocephalic and atraumatic. Right Ear: External ear normal.      Left Ear: External ear normal.      Nose: Nose normal.   Eyes:      Conjunctiva/sclera: Conjunctivae normal.      Pupils: Pupils are equal, round, and reactive to light. Pulmonary:      Effort: Pulmonary effort is normal. No respiratory distress. Genitourinary:     Penis: Uncircumcised. Comments: Frenulum laceration noted to dorsal aspect of penis, with slow bleeding  Musculoskeletal:         General: No tenderness. Normal range of motion. Cervical back: Normal range of motion and neck supple. Skin:     General: Skin is warm and dry. Neurological:      Mental Status: He is alert and oriented to person, place, and time. Psychiatric:         Behavior: Behavior normal.         Thought Content:  Thought content normal.         Judgment: Judgment normal.               Vital Signs  ED Triage Vitals [12/04/23 2039]   Temperature Pulse Respirations Blood Pressure SpO2   97.5 °F (36.4 °C) 75 18 (!) 174/87 97 %      Temp Source Heart Rate Source Patient Position - Orthostatic VS BP Location FiO2 (%)   Tympanic Monitor -- -- --      Pain Score       --           Vitals:    12/04/23 2039   BP: (!) 174/87   Pulse: 75         Visual Acuity      ED Medications  Medications   lidocaine (PF) (XYLOCAINE-MPF) 1 % injection 5 mL (5 mL Infiltration Given 12/4/23 2056)   bacitracin topical ointment 1 large application (1 large application Topical Given 12/4/23 2121)       Diagnostic Studies  Results Reviewed       Procedure Component Value Units Date/Time    CBC and differential [[id number]]  (Abnormal) Collected: 12/04/23 2121    Lab Status: Final result Specimen: Blood from Arm, Left Updated: 12/04/23 2130     WBC 8.43 Thousand/uL      RBC 4.76 Million/uL      Hemoglobin 14.6 g/dL      Hematocrit 44.5 %      MCV 94 fL      MCH 30.7 pg      MCHC 32.8 g/dL      RDW 14.8 %      MPV 10.4 fL      Platelets 225 Thousands/uL      nRBC 0 /100 WBCs      Neutrophils Relative 84 %      Immat GRANS % 1 %      Lymphocytes Relative 13 %      Monocytes Relative 2 %      Eosinophils Relative 0 %      Basophils Relative 0 %      Neutrophils Absolute 7.14 Thousands/µL      Immature Grans Absolute 0.05 Thousand/uL      Lymphocytes Absolute 1.05 Thousands/µL      Monocytes Absolute 0.14 Thousand/µL      Eosinophils Absolute 0.02 Thousand/µL      Basophils Absolute 0.03 Thousands/µL                    No orders to display              Procedures  Universal Protocol:  Consent: Verbal consent obtained. Risks and benefits: risks, benefits and alternatives were discussed  Consent given by: patient  Time out: Immediately prior to procedure a "time out" was called to verify the correct patient, procedure, equipment, support staff and site/side marked as required.   Timeout called at: 12/4/2023 9:00 PM.  Patient understanding: patient states understanding of the procedure being performed  Patient consent: the patient's understanding of the procedure matches consent given  Procedure consent: procedure consent matches procedure scheduled  Required items: required blood products, implants, devices, and special equipment available  Patient identity confirmed: verbally with patient  Laceration repair    Date/Time: 12/4/2023 9:00 PM    Performed by: Elsei Maynard DO  Authorized by: Elsie Maynard DO  Body area: anogenital  Location details: penis  Laceration length: 2.5 cm  Foreign bodies: no foreign bodies  Tendon involvement: none  Nerve involvement: none  Vascular damage: no  Anesthesia: local infiltration    Anesthesia:  Local Anesthetic: lidocaine 1% without epinephrine  Anesthetic total: 2 mL    Sedation:  Patient sedated: no      Wound Dehiscence:  Superficial Wound Dehiscence: simple closure      Procedure Details:  Preparation: Patient was prepped and draped in the usual sterile fashion. Irrigation solution: saline  Irrigation method: tap  Amount of cleaning: standard  Debridement: none  Degree of undermining: none  Wound subcutaneous closure material used: 5.0 fast-absorbing gut. Number of sutures: 5  Technique: simple  Approximation: close  Approximation difficulty: complex  Dressing: antibiotic ointment  Patient tolerance: patient tolerated the procedure well with no immediate complications      ECG 12 Lead Documentation Only    Date/Time: 12/4/2023 10:50 PM    Performed by: Stephy Harper DO  Authorized by: Stephy Harper DO    Indications / Diagnosis:  Bleeding  ECG reviewed by me, the ED Provider: yes    Patient location:  ED  Previous ECG:     Previous ECG:  Unavailable    Comparison to cardiac monitor: Yes    Interpretation:     Interpretation: normal    Rate:     ECG rate:  67bpm    ECG rate assessment: normal    Rhythm:     Rhythm: atrial fibrillation    Ectopy:     Ectopy: none    QRS:     QRS axis:  Left  Conduction:     Conduction: normal    ST segments:     ST segments:  Normal  T waves:     T waves: normal             ED Course  ED Course as of 12/05/23 2034   Mon Dec 04, 2023   2141 I reviewed patient with PA on-call for urology - Enma Ceballos. She agrees with laceration repair. She recommends bacitracin to the affected area for 1 week. Patient also to follow-up as an outpatient. I spoke at length with patient and his daughter. They agree with plan. SBIRT 22yo+      Flowsheet Row Most Recent Value   Initial Alcohol Screen: US AUDIT-C     1. How often do you have a drink containing alcohol? 0 Filed at: 12/04/2023 2042   Audit-C Score 0 Filed at: 12/04/2023 2042   KILEY: How many times in the past year have you. ..     Used an illegal drug or used a prescription medication for non-medical reasons? Never Filed at: 12/04/2023 2042                      Medical Decision Making  Frenulum laceration was repaired, bacitracin applied to the affected area and foreskin was replaced after procedure. I reviewed patient with urology on-call and she states she will notify the attending. I spoke to patient and his daughter and they agree with discharge plan. Amount and/or Complexity of Data Reviewed  Labs: ordered. Risk  OTC drugs. Prescription drug management. Disposition  Final diagnoses:   Tear of frenulum of foreskin     Time reflects when diagnosis was documented in both MDM as applicable and the Disposition within this note       Time User Action Codes Description Comment    12/4/2023  9:33 PM Zoraida Mcrae Add [S31.21XA] Tear of frenulum of foreskin           ED Disposition       ED Disposition   Discharge    Condition   Stable    Date/Time   Mon Dec 4, 2023 2133    Comment   Jose C Favors Paradine discharge to home/self care.                    Follow-up Information       Follow up With Specialties Details Why Contact Info    Srinivasa Gilman MD  Schedule an appointment as soon as possible for a visit in 1 day for follow up [address] [facility] [address] [facility] MD [facility] Schedule an appointment as soon as possible for a visit in 2 days for follow up, For wound re-check [address] [facility]  [phone]              Discharge Medication List as of 12/4/2023  9:34 PM        CONTINUE these medications which have NOT CHANGED    Details   amLODIPine (NORVASC) 5 mg tablet Take 1 tablet (5 mg total) by mouth daily Do not start before August 10, 2023., Starting u 8/10/2023, Normal      ascorbic acid (VITAMIN C) 250 mg tablet Take 500 mg by mouth daily, Historical Med      aspirin (ECOTRIN LOW STRENGTH) 81 mg EC tablet Take 81 mg by mouth daily Pt to check with surgeon if needed to hold RX., Historical Med atorvastatin (LIPITOR) 40 mg tablet Take 1 tablet (40 mg total) by mouth daily with dinner, Starting Tue 11/17/2020, Normal      cholecalciferol (VITAMIN D3) 1,000 units tablet Take 1,000 Units by mouth daily after lunch  , Historical Med      clopidogrel (PLAVIX) 75 mg tablet Take 75 mg by mouth daily Pt  To check with surgeon if needed to hold RX., Historical Med      ferrous sulfate 324 (65 Fe) mg Take 1 tablet (324 mg total) by mouth every other day Increase to daily if tolerated., Starting Mon 8/28/2023, Normal      finasteride (PROSCAR) 5 mg tablet TAKE 1 TABLET BY MOUTH EVERY DAY, Normal      furosemide (LASIX) 20 mg tablet Take 1 tablet (20 mg total) by mouth daily Do not start before August 10, 2023., Starting Thu 8/10/2023, Normal      metoprolol tartrate (LOPRESSOR) 50 mg tablet Take 1 tablet (50 mg total) by mouth every 12 (twelve) hours, Starting Sat 10/29/2022, Normal      sertraline (ZOLOFT) 50 mg tablet Take 1 tablet (50 mg total) by mouth daily Do not start before August 10, 2023., Starting Thu 8/10/2023, Normal             No discharge procedures on file.     PDMP Review       None            ED Provider  Electronically Signed by             Luc Jeff DO  12/05/23 2034

## 2023-12-08 NOTE — TELEPHONE ENCOUNTER
US scheduled in February. Please schedule AP follow up 1-2 weeks after. Patient prefers [facility] location.

## 2023-12-12 ENCOUNTER — TELEPHONE (OUTPATIENT)
Dept: UROLOGY | Facility: CLINIC | Age: 83
End: 2023-12-12

## 2023-12-12 ENCOUNTER — OFFICE VISIT (OUTPATIENT)
Dept: UROLOGY | Facility: CLINIC | Age: 83
End: 2023-12-12
Payer: MEDICARE

## 2023-12-12 VITALS
DIASTOLIC BLOOD PRESSURE: 70 MMHG | BODY MASS INDEX: 30.58 KG/M2 | OXYGEN SATURATION: 97 % | HEIGHT: 70 IN | RESPIRATION RATE: 18 BRPM | SYSTOLIC BLOOD PRESSURE: 122 MMHG | HEART RATE: 67 BPM | WEIGHT: 213.6 LBS

## 2023-12-12 DIAGNOSIS — N13.1 HYDRONEPHROSIS WITH URETERAL STRICTURE, NOT ELSEWHERE CLASSIFIED: ICD-10-CM

## 2023-12-12 DIAGNOSIS — N20.0 RENAL CALCULI: ICD-10-CM

## 2023-12-12 DIAGNOSIS — N17.9 AKI (ACUTE KIDNEY INJURY) (HCC): ICD-10-CM

## 2023-12-12 DIAGNOSIS — N13.5 CROSSING VESSEL AND STRICTURE OF URETER WITHOUT HYDRONEPHROSIS: Primary | ICD-10-CM

## 2023-12-12 PROCEDURE — 99214 OFFICE O/P EST MOD 30 MIN: CPT | Performed by: UROLOGY

## 2023-12-12 NOTE — PROGRESS NOTES
Patient seen in follow-up today. Was being managed by Dr. Ronn Howard at [facility] for exchange of his right stent. He underwent placement of a metallic resonance stent to try to make his stent exchanges less often than every 4 months. Giorgi Gee developed some bleeding postoperatively which required him to go to the emergency department where they put some stitches in the frenulum of the penis for active bleeding. He is here today for a wound check. It is almost completely healed. There is still a couple of Vicryl stitches to dissolve. Regarding the stent he understands that he is to get an ultrasound in the next couple of months just to see how the degree of hydronephrosis might have improved or worsened. I did stress to him that the ultrasound may always show distention of the renal pelvis given his history of stricture and may be at his best to look into the bladder to see if the distal aspect of the metal stent is becoming encrusted. If it is, we might be better to just switch back to the every 4-month standard stents since the metal ones would not be buying us any extra time. I think he is in agreement that it would be worth a look in the bladder. If the stent does not look obstructed and there is hydronephrosis, then there will be a decision that has to be made whether to change the stent just because of the hydronephrosis. No easy way to be sure other than doing a Lasix renal scan but at that point it might just be easier to do the stent exchange than the imaging. Patient Instructions   The plan is to repeat blood work and do an ultrasound of the kidney in early February and then follow-up in the office to have a look in the bladder. Hopefully the kidney is functioning well without swelling and the scope of the end of the stent looks clean and then may be we can go for many more months without needing an operation.   If you develop a lot of pain in the kidney or fevers or chills or blood in the urine please call us sooner.

## 2023-12-12 NOTE — PATIENT INSTRUCTIONS
The plan is to repeat blood work and do an ultrasound of the kidney in early February and then follow-up in the office to have a look in the bladder. Hopefully the kidney is functioning well without swelling and the scope of the end of the stent looks clean and then may be we can go for many more months without needing an operation. If you develop a lot of pain in the kidney or fevers or chills or blood in the urine please call us sooner.

## 2023-12-12 NOTE — TELEPHONE ENCOUNTER
Return in about 2 months (around 2/12/2024) for Cystoscopy, Review X-ray/US, Review labs. ..with alison

## 2024-01-01 ENCOUNTER — TELEPHONE (OUTPATIENT)
Dept: NEPHROLOGY | Facility: CLINIC | Age: 84
End: 2024-01-01

## 2024-01-05 DIAGNOSIS — N13.8 BPH WITH OBSTRUCTION/LOWER URINARY TRACT SYMPTOMS: ICD-10-CM

## 2024-01-05 DIAGNOSIS — R31.0 HEMATURIA, GROSS: ICD-10-CM

## 2024-01-05 DIAGNOSIS — N40.1 BPH WITH OBSTRUCTION/LOWER URINARY TRACT SYMPTOMS: ICD-10-CM

## 2024-01-07 RX ORDER — FINASTERIDE 5 MG/1
5 TABLET, FILM COATED ORAL DAILY
Qty: 90 TABLET | Refills: 3 | Status: SHIPPED | OUTPATIENT
Start: 2024-01-07

## 2024-01-12 DIAGNOSIS — R58 BLOOD LOSS: ICD-10-CM

## 2024-01-12 DIAGNOSIS — R31.9 HEMATURIA, UNSPECIFIED TYPE: ICD-10-CM

## 2024-01-12 DIAGNOSIS — E61.1 IRON DEFICIENCY: ICD-10-CM

## 2024-01-15 RX ORDER — FERROUS SULFATE 324(65)MG
324 TABLET, DELAYED RELEASE (ENTERIC COATED) ORAL EVERY OTHER DAY
Qty: 90 TABLET | Refills: 0 | Status: SHIPPED | OUTPATIENT
Start: 2024-01-15

## 2024-01-18 ENCOUNTER — OFFICE VISIT (OUTPATIENT)
Dept: NEPHROLOGY | Facility: CLINIC | Age: 84
End: 2024-01-18
Payer: MEDICARE

## 2024-01-18 VITALS
DIASTOLIC BLOOD PRESSURE: 68 MMHG | BODY MASS INDEX: 30.78 KG/M2 | HEIGHT: 70 IN | WEIGHT: 215 LBS | SYSTOLIC BLOOD PRESSURE: 122 MMHG | HEART RATE: 54 BPM

## 2024-01-18 DIAGNOSIS — I50.9 HEART FAILURE, UNSPECIFIED HF CHRONICITY, UNSPECIFIED HEART FAILURE TYPE (HCC): ICD-10-CM

## 2024-01-18 DIAGNOSIS — I50.9 CHF EXACERBATION (HCC): ICD-10-CM

## 2024-01-18 DIAGNOSIS — N18.32 STAGE 3B CHRONIC KIDNEY DISEASE (HCC): ICD-10-CM

## 2024-01-18 DIAGNOSIS — I10 ESSENTIAL HYPERTENSION: ICD-10-CM

## 2024-01-18 DIAGNOSIS — N25.81 SECONDARY HYPERPARATHYROIDISM (HCC): ICD-10-CM

## 2024-01-18 DIAGNOSIS — N18.31 STAGE 3A CHRONIC KIDNEY DISEASE (HCC): Primary | ICD-10-CM

## 2024-01-18 PROCEDURE — 99214 OFFICE O/P EST MOD 30 MIN: CPT | Performed by: INTERNAL MEDICINE

## 2024-01-18 RX ORDER — FUROSEMIDE 20 MG/1
TABLET ORAL
Qty: 150 TABLET | Refills: 3 | Status: SHIPPED | OUTPATIENT
Start: 2024-01-18 | End: 2024-01-23 | Stop reason: SDUPTHER

## 2024-01-18 NOTE — PATIENT INSTRUCTIONS
1) Avoid NSAIDS - (Example - motrin, advil, ibuprofen, aleve, exederin, etc)  2) Always follow a low salt diet  3) If you have any issues with trouble with nausea, vomiting, urinating, blood in the urine, food tasting like metal, confusion, weakness, fatigue that is worsening, or any other questions, please call right away.  4) Please continue to follow regularly with your family physician and any other specialist that you are advised to see and continue to follow their recommendations  5) increase lasix to 1 tablet in the morning and one tablet in evening on Monday, Wednesday, Friday (4 pm)  6) send in BP and weight log in 1-2 weeks  7) labwork in 1 week  8) otherwise no changes to your other medications

## 2024-01-18 NOTE — PROGRESS NOTES
NEPHROLOGY OFFICE VISIT   Dioni Melo 83 y.o. male MRN: [id number]  1/18/2024    Reason for Visit: CKD III    ASSESSMENT and PLAN:    I had the pleasure of seeing Mr Melo today in the renal clinic for the continued management of CKD III.     83-year-old male with a past medical history of CKD stage III Baseline Cr 1.6-1.8, Nephrolithiasis,HTN,   CVA, A. 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 Patient was found to have right-sided hydronephrosis with ureteral stricture and stone. Patient underwent ureteral stent and eventually had a lithotripsy. Patient presents for follow up visit for CKD.      Patient had stent exchange in august 2020 October of 2020-patient was admitted to hospital due to right-sided weakness.  There is concern for CVA.  Underwent tPA. Patient was subsequently also started on Eliquis.  Hydrochlorothiazide was held.      November 2020- patient was discharged from rehab.       Admitted in February 2021 with flank pain.  Had cystoscopy completed with right ureteral stent in place with several calculi and moderate to severe right hydronephrosis.  Underwent stent exchange.  Had acute kidney injury.  Creatinine peaked to 2.2 mg/dL.       Patient also had Watchman device placed.  And then on March 1st 2021 had what sounds to be a TAVR procedure    ER visit 7/2021 - with HTN to ER. Anxiety     1/2022 - COVID19 infection     10/2022 - admitted to hospital - diplopia transient; was to have MRI as outpt. completed 11/2022 - no evidence of ischemia or enhancing lesions; old prior KALYANI distribution and caudate infarcts     August 2023-had hypertension and feeling restless and went to the ER.  EKG was unremarkable.   Blood pressures 179 systolic.  Patient then again went to the ER 2 days after.  Had shortness of breath.  Received Lasix.  And required admission for shortness of breath.  VQ scan low probability of PE.  Had rapid response for abnormal movement of right arm.  Neurology did not feel this was CVA.  There was concern for possible anxiety.  - Echocardiogram August 7 with EF 55%, diastolic function was not able to be calculated.  Aortic valve appeared to be functioning normally without stenoses, mild mitral and tricuspid regurgitation, RV pressure mild to moderate elevation no significant change from echo in 2022.    October 2023-patient underwent cardiac cath with PCI placement     1) CKD III- baseline Cr approx 1.5-1.7 mg/dL. Etiology of CKD likely solitary functional kidney, prior hydro, nephrolithiasis. Patient had acute kidney injury in  February of 2021 at which time the stent was obstructed.     - Follows with Urology regarding stent and nephrolithiasis  - split function test prior shows left kidney receives higher flow then the right kidney by approximately 70 vs 30%.  - regular stent exchange per Urology team    Appointment January 18, 2024-patient's last lab work was November 2023 with creatinine 1.8 which was slightly above baseline but overall relatively stable.  Patient had a hemoglobin check in December which was appropriate.  PTH in November was 110.  Urinalysis with innumerable RBC which is not new and generally has this occur around the time of stent exchanges.  UPCR was okay 0.3.  I have advised the patient update lab work in 1 week.  Patient has signs of volume overload with increased edema lower extremities and has gained weight.  I advised the patient to increase Lasix from 20 mg once a day to 20 mg once a day in the morning and 20 mg once in the evening on Monday Wednesday and Fridays.  Patient will send in blood pressure log and weight logs in 1 to 2 weeks.     Plan:  -Lab work in 1 week  -  Increase Lasix to 20 mg daily and 20 mg in the evening 3 times a week in the evening  - No changes to amlodipine  - Continue ultrasound check next month per urology team to evaluate stent  - Appointment in 3 to 4 months  - Reviewed volume status and plans regarding medication changes with the patient's daughter who is also present with the patient.  I reviewed the risk of renal disease progression and attempts to improve progression.  I reviewed that SGLT2 inhibitor is likely not a suitable option for the patient given hardware.     2) Nephrolithiasis - follows with Urology.      - renal stone mainly ca oxalate  - 24 hour urine litholink - reviewed.   - pt has increased fluid intake to 2 L  - Needs low salt diet - already following. takes in approx 1.8 gm salt daily. at goal  - changed furosemide to HCTZ in jan 2018  - hydrochlorothiazide held during hospitalization in October of 2020  -Underwent cystoscopy 12/ 2023 with retrograde pyelogram and exchange of ureteral stent.  Patient underwent metallic stent placement.  Developed bleeding postoperatively which required ER visit requiring stitches in the frenulum of the penis.  Was well-healing and saw urology team December 12.  - Urology team will be following routine ultrasounds and determine if he needs sooner stent exchanges     3) HTN - Blood pressures are stable. On metoprolol and amlodipine and Lasix 20 mg daily.      - during hospitalization in 8/2023 - was started on amlodipine  - August 11, 2023-blood pressure is appropriate in the office.  - 1/18/2024 - SBP stable, at home 110-130s. HT at home 60s     4) Volume - history of dCHF. 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 Euvolemic.     - need to monitor fluid intake with HF history  - daily weights  - CXR - no acute cardiopulm disease  - Required recent admission for CHF exacerbation?.  Was started on Lasix.  - discharge weight 206 and weight at home 8/11 is same 206.  - August 11, 2023-patient's weight are stable  and is euvolemic on exam.  - January 18, 2024-patient has gained weight.  215 pounds.  Has edema increased on lower extremities per my exam.  Increase Lasix as outlined above.     5) CKD MBD -      - Vitamin D level January 2023 is 45.7-appropriate  -  in November 2023  - Check repeat levels with next set of lab work     6) Anemia - stable Hb.  Follows with hematology team.     7) acid/base - bicarb stable     8) a fib     - watchman device  - metoprolol per cardiology team     9)   CVA with also known aneurysm; and also known left ICA 90% stenosis.     -  Right frontal lobe infarct.  -Sent MRI as outlined above  - Follows vascular surgery team  - Had transient diplopia.  Saw neurology team March 2023.  MRI unrevealing per their notes.  Follow-up as needed.     10) AVR - completed 3/1/2021     11) anxiety - following with PCP. Is now on lorazepam if needed    12-CAD-status post recent stent placement October 2023     It was a pleasure evaluating Mr. Melo. Thank you for allowing our team to participate in the care of your patient.       HPI:    Pt has gained weight, approx 9 lbs since 8/2023. Denies fevers, chills, nausea, vomiting, diarrhea, dysuria, hematuria.     PATIENT INSTRUCTIONS:    Patient Instructions   1) Avoid NSAIDS - (Example - motrin, advil, ibuprofen, aleve, exederin, etc)  2) Always follow a low salt diet  3) If you have any issues with trouble with nausea, vomiting, urinating, blood in the urine, food tasting like metal, confusion, weakness, fatigue that is worsening, or any other questions, please call right away.  4) Please continue to follow regularly with your family physician and any other specialist that you are advised to see and continue to follow their recommendations  5) increase lasix to 1 tablet in the morning and one tablet in evening on Monday, Wednesday, Friday (4 pm)  6) send in BP and weight log in 1-2 weeks  7) labwork in 1 week  8) otherwise no changes to your other  "medications      OBJECTIVE:  Current Weight: Weight - Scale: 97.5 kg (215 lb)  Vitals:    01/18/24 1428   BP: 122/68   BP Location: Right arm   Patient Position: Sitting   Cuff Size: Standard   Pulse: (!) 54   Weight: 97.5 kg (215 lb)   Height: 5' 10\" (1.778 m)    Body mass index is 30.85 kg/m².      REVIEW OF SYSTEMS:    Review of Systems    PHYSICAL EXAM:      Physical Exam  Vitals and nursing note reviewed.   Constitutional:       General: He is not in acute distress.     Appearance: He is well-developed. He is not diaphoretic.   HENT:      Head: Normocephalic and atraumatic.   Eyes:      General: No scleral icterus.        Right eye: No discharge.         Left eye: No discharge.      Conjunctiva/sclera: Conjunctivae normal.   Cardiovascular:      Rate and Rhythm: Normal rate and regular rhythm.      Heart sounds: Normal heart sounds. No murmur heard.     No friction rub. No gallop.   Pulmonary:      Effort: Pulmonary effort is normal. No respiratory distress.      Breath sounds: Normal breath sounds. No wheezing or rales.   Chest:      Chest wall: No tenderness.   Abdominal:      General: Bowel sounds are normal. There is no distension.      Palpations: Abdomen is soft.      Tenderness: There is no abdominal tenderness. There is no rebound.   Musculoskeletal:         General: No tenderness or deformity. Normal range of motion.      Cervical back: Normal range of motion and neck supple.      Right lower leg: Edema present.      Left lower leg: Edema present.      Comments: Lower extremity edema 2-3+.   Skin:     General: Skin is warm and dry.      Coloration: Skin is not pale.      Findings: No erythema or rash.   Neurological:      Mental Status: He is alert and oriented to person, place, and time.      Cranial Nerves: No cranial nerve deficit.      Coordination: Coordination normal.   Psychiatric:         Behavior: Behavior normal.         Thought Content: Thought content normal.         Judgment: Judgment " "normal.         Medications:    Current Outpatient Medications:     amLODIPine (NORVASC) 5 mg tablet, Take 1 tablet (5 mg total) by mouth daily Do not start before August 10, 2023., Disp: 30 tablet, Rfl: 1    ascorbic acid (VITAMIN C) 250 mg tablet, Take 500 mg by mouth daily, Disp: , Rfl:     aspirin (ECOTRIN LOW STRENGTH) 81 mg EC tablet, Take 81 mg by mouth daily Pt to check with surgeon if needed to hold RX., Disp: , Rfl:     atorvastatin (LIPITOR) 40 mg tablet, Take 1 tablet (40 mg total) by mouth daily with dinner, Disp: 30 tablet, Rfl: 1    cholecalciferol (VITAMIN D3) 1,000 units tablet, Take 1,000 Units by mouth daily after lunch  , Disp: , Rfl:     clopidogrel (PLAVIX) 75 mg tablet, Take 75 mg by mouth daily Pt  To check with surgeon if needed to hold RX., Disp: , Rfl:     ferrous sulfate 324 (65 Fe) mg, TAKE 1 TABLET (324 MG TOTAL) BY MOUTH EVERY OTHER DAY INCREASE TO DAILY IF TOLERATED., Disp: 90 tablet, Rfl: 0    finasteride (PROSCAR) 5 mg tablet, Take 1 tablet (5 mg total) by mouth daily, Disp: 90 tablet, Rfl: 3    furosemide (LASIX) 20 mg tablet, One tablet in AM daily and one tablet at 4 PM MWF, Disp: 150 tablet, Rfl: 3    metoprolol tartrate (LOPRESSOR) 50 mg tablet, Take 1 tablet (50 mg total) by mouth every 12 (twelve) hours, Disp: 60 tablet, Rfl: 1    sertraline (ZOLOFT) 50 mg tablet, Take 1 tablet (50 mg total) by mouth daily Do not start before August 10, 2023., Disp: 30 tablet, Rfl: 0    Laboratory Results:        Invalid input(s): \"ALBUMIN\"    Results for orders placed or performed during the hospital encounter of 12/04/23   CBC and differential   Result Value Ref Range    WBC 8.43 4.31 - 10.16 Thousand/uL    RBC 4.76 3.88 - 5.62 Million/uL    Hemoglobin 14.6 12.0 - 17.0 g/dL    Hematocrit 44.5 36.5 - 49.3 %    MCV 94 82 - 98 fL    MCH 30.7 26.8 - 34.3 pg    MCHC 32.8 31.4 - 37.4 g/dL    RDW 14.8 11.6 - 15.1 %    MPV 10.4 8.9 - 12.7 fL    Platelets 130 (L) 149 - 390 Thousands/uL    nRBC 0 " /100 WBCs    Neutrophils Relative 84 (H) 43 - 75 %    Immat GRANS % 1 0 - 2 %    Lymphocytes Relative 13 (L) 14 - 44 %    Monocytes Relative 2 (L) 4 - 12 %    Eosinophils Relative 0 0 - 6 %    Basophils Relative 0 0 - 1 %    Neutrophils Absolute 7.14 1.85 - 7.62 Thousands/µL    Immature Grans Absolute 0.05 0.00 - 0.20 Thousand/uL    Lymphocytes Absolute 1.05 0.60 - 4.47 Thousands/µL    Monocytes Absolute 0.14 (L) 0.17 - 1.22 Thousand/µL    Eosinophils Absolute 0.02 0.00 - 0.61 Thousand/µL    Basophils Absolute 0.03 0.00 - 0.10 Thousands/µL   ECG 12 lead   Result Value Ref Range    Ventricular Rate 67 BPM    Atrial Rate  BPM    RI Interval  ms    QRSD Interval 102 ms    QT Interval 408 ms    QTC Interval 431 ms    P Axis  degrees    QRS Axis -58 degrees    T Wave Axis 63 degrees

## 2024-01-18 NOTE — LETTER
January 18, 2024     Chun Esteves MD  [address] [address]    Patient: Dioni Melo   YOB: 1940   Date of Visit: 1/18/2024       Dear Dr. Esteves:    Thank you for referring Dioni Melo to me for evaluation. Below are my notes for this consultation.    If you have questions, please do not hesitate to call me. I look forward to following your patient along with you.         Sincerely,        Celeste Negron MD        CC: No Recipients    Celeste Negron MD  1/18/2024  3:20 PM  Sign when Signing Visit  NEPHROLOGY OFFICE VISIT   Dioni Melo 83 y.o. male MRN: [id number]  1/18/2024    Reason for Visit: CKD III    ASSESSMENT and PLAN:    I had the pleasure of seeing Mr Melo today in the renal clinic for the continued management of CKD III.     83-year-old male with a past medical history of CKD stage III Baseline Cr 1.6-1.8, Nephrolithiasis,HTN,   CVA, A. 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 Patient was found to have right-sided hydronephrosis with ureteral stricture and stone. Patient underwent ureteral stent and eventually had a lithotripsy. Patient presents for follow up visit for CKD.      Patient had stent exchange in august 2020 October of 2020-patient was admitted to hospital due to right-sided weakness.  There is concern for CVA.  Underwent tPA. Patient was subsequently also started on Eliquis.  Hydrochlorothiazide was held.      November 2020- patient was discharged from rehab.       Admitted in February 2021 with flank pain.  Had cystoscopy completed with right ureteral stent in place with several calculi and moderate to severe right hydronephrosis.  Underwent  stent exchange.  Had acute kidney injury.  Creatinine peaked to 2.2 mg/dL.       Patient also had Watchman device placed.  And then on March 1st 2021 had what sounds to be a TAVR procedure    ER visit 7/2021 - with HTN to ER. Anxiety     1/2022 - COVID19 infection     10/2022 - admitted to hospital - diplopia transient; was to have MRI as outpt. completed 11/2022 - no evidence of ischemia or enhancing lesions; old prior KALYANI distribution and caudate infarcts     August 2023-had hypertension and feeling restless and went to the ER.  EKG was unremarkable.  Blood pressures 179 systolic.  Patient then again went to the ER 2 days after.  Had shortness of breath.  Received Lasix.  And required admission for shortness of breath.  VQ scan low probability of PE.  Had rapid response for abnormal movement of right arm.  Neurology did not feel this was CVA.  There was concern for possible anxiety.  - Echocardiogram August 7 with EF 55%, diastolic function was not able to be calculated.  Aortic valve appeared to be functioning normally without stenoses, mild mitral and tricuspid regurgitation, RV pressure mild to moderate elevation no significant change from echo in 2022.    October 2023-patient underwent cardiac cath with PCI placement     1) CKD III- baseline Cr approx 1.5-1.7 mg/dL. Etiology of CKD likely solitary functional kidney, prior hydro, nephrolithiasis. Patient had acute kidney injury in  February of 2021 at which time the stent was obstructed.     - Follows with Urology regarding stent and nephrolithiasis  - split function test prior shows left kidney receives higher flow then the right kidney by approximately 70 vs 30%.  - regular stent exchange per Urology team    Appointment January 18, 2024-patient's last lab work was November 2023 with creatinine 1.8 which was slightly above baseline but overall relatively stable.  Patient had a hemoglobin check in December which was appropriate.  PTH in November was 110.   Urinalysis with innumerable RBC which is not new and generally has this occur around the time of stent exchanges.  UPCR was okay 0.3.  I have advised the patient update lab work in 1 week.  Patient has signs of volume overload with increased edema lower extremities and has gained weight.  I advised the patient to increase Lasix from 20 mg once a day to 20 mg once a day in the morning and 20 mg once in the evening on Monday Wednesday and Fridays.  Patient will send in blood pressure log and weight logs in 1 to 2 weeks.     Plan:  -Lab work in 1 week  - Increase Lasix to 20 mg daily and 20 mg in the evening 3 times a week in the evening  - No changes to amlodipine  - Continue ultrasound check next month per urology team to evaluate stent  - Appointment in 3 to 4 months  - Reviewed volume status and plans regarding medication changes with the patient's daughter who is also present with the patient     2) Nephrolithiasis - follows with Urology.      - renal stone mainly ca oxalate  - 24 hour urine litholink - reviewed.   - pt has increased fluid intake to 2 L  - Needs low salt diet - already following. takes in approx 1.8 gm salt daily. at goal  - changed furosemide to HCTZ in jan 2018  - hydrochlorothiazide held during hospitalization in October of 2020  -Underwent cystoscopy 12/ 2023 with retrograde pyelogram and exchange of ureteral stent.  Patient underwent metallic stent placement.  Developed bleeding postoperatively which required ER visit requiring stitches in the frenulum of the penis.  Was well-healing and saw urology team December 12.  - Urology team will be following routine ultrasounds and determine if he needs sooner stent exchanges     3) HTN - Blood pressures are stable. On metoprolol and amlodipine and Lasix 20 mg daily.      - during hospitalization in 8/2023 - was started on amlodipine  - August 11, 2023-blood pressure is appropriate in the office.  - 1/18/2024 - SBP stable, at home 110-130s. HT at  home 60s     4) Volume - history of dCHF. 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 Euvolemic.     - need to monitor fluid intake with HF history  - daily weights  - CXR - no acute cardiopulm disease  - Required recent admission for CHF exacerbation?.  Was started on Lasix.  - discharge weight 206 and weight at home 8/11 is same 206.  - August 11, 2023-patient's weight are stable and is euvolemic on exam.  - January 18, 2024-patient has gained weight.  215 pounds.  Has edema increased on lower extremities per my exam.  Increase Lasix as outlined above.     5) CKD MBD -      - Vitamin D level January 2023 is 45.7-appropriate  -  in November 2023  - Check repeat levels with next set of lab work     6) Anemia - stable Hb.  Follows with hematology team.     7) acid/base - bicarb stable     8) a fib     - watchman device  - metoprolol per cardiology team     9)   CVA with also known aneurysm; and also known left ICA 90% stenosis.     -  Right frontal lobe infarct.  -Sent MRI as outlined above  - Follows vascular surgery team  - Had transient diplopia.  Saw neurology team March 2023.  MRI unrevealing per their notes.  Follow-up as needed.     10) AVR - completed 3/1/2021     11) anxiety - following with PCP. Is now on lorazepam if needed    12-CAD-status post recent stent placement October 2023     It was a pleasure evaluating Mr. Melo. Thank you for allowing our team to participate in the care of your patient.       HPI:    Pt has gained weight, approx 9 lbs since 8/2023. Denies fevers, chills, nausea, vomiting, diarrhea, dysuria, hematuria.     PATIENT INSTRUCTIONS:    Patient Instructions   1) Avoid NSAIDS - (Example - motrin, advil, ibuprofen, aleve, exederin, etc)  2) Always follow a low salt diet  3) If you have any issues with trouble with nausea, vomiting, urinating, blood in the urine, food tasting like metal, confusion, weakness, fatigue that is worsening, or any other questions, please call right away.  4)  "Please continue to follow regularly with your family physician and any other specialist that you are advised to see and continue to follow their recommendations  5) increase lasix to 1 tablet in the morning and one tablet in evening on Monday, Wednesday, Friday (4 pm)  6) send in BP and weight log in 1-2 weeks  7) labwork in 1 week  8) otherwise no changes to your other medications      OBJECTIVE:  Current Weight: Weight - Scale: 97.5 kg (215 lb)  Vitals:    01/18/24 1428   BP: 122/68   BP Location: Right arm   Patient Position: Sitting   Cuff Size: Standard   Pulse: (!) 54   Weight: 97.5 kg (215 lb)   Height: 5' 10\" (1.778 m)    Body mass index is 30.85 kg/m².      REVIEW OF SYSTEMS:    Review of Systems    PHYSICAL EXAM:      Physical Exam  Vitals and nursing note reviewed.   Constitutional:       General: He is not in acute distress.     Appearance: He is well-developed. He is not diaphoretic.   HENT:      Head: Normocephalic and atraumatic.   Eyes:      General: No scleral icterus.        Right eye: No discharge.         Left eye: No discharge.      Conjunctiva/sclera: Conjunctivae normal.   Cardiovascular:      Rate and Rhythm: Normal rate and regular rhythm.      Heart sounds: Normal heart sounds. No murmur heard.     No friction rub. No gallop.   Pulmonary:      Effort: Pulmonary effort is normal. No respiratory distress.      Breath sounds: Normal breath sounds. No wheezing or rales.   Chest:      Chest wall: No tenderness.   Abdominal:      General: Bowel sounds are normal. There is no distension.      Palpations: Abdomen is soft.      Tenderness: There is no abdominal tenderness. There is no rebound.   Musculoskeletal:         General: No tenderness or deformity. Normal range of motion.      Cervical back: Normal range of motion and neck supple.      Right lower leg: Edema present.      Left lower leg: Edema present.      Comments: Lower extremity edema 2-3+.   Skin:     General: Skin is warm and dry.      " "Coloration: Skin is not pale.      Findings: No erythema or rash.   Neurological:      Mental Status: He is alert and oriented to person, place, and time.      Cranial Nerves: No cranial nerve deficit.      Coordination: Coordination normal.   Psychiatric:         Behavior: Behavior normal.         Thought Content: Thought content normal.         Judgment: Judgment normal.         Medications:    Current Outpatient Medications:   •  amLODIPine (NORVASC) 5 mg tablet, Take 1 tablet (5 mg total) by mouth daily Do not start before August 10, 2023., Disp: 30 tablet, Rfl: 1  •  ascorbic acid (VITAMIN C) 250 mg tablet, Take 500 mg by mouth daily, Disp: , Rfl:   •  aspirin (ECOTRIN LOW STRENGTH) 81 mg EC tablet, Take 81 mg by mouth daily Pt to check with surgeon if needed to hold RX., Disp: , Rfl:   •  atorvastatin (LIPITOR) 40 mg tablet, Take 1 tablet (40 mg total) by mouth daily with dinner, Disp: 30 tablet, Rfl: 1  •  cholecalciferol (VITAMIN D3) 1,000 units tablet, Take 1,000 Units by mouth daily after lunch  , Disp: , Rfl:   •  clopidogrel (PLAVIX) 75 mg tablet, Take 75 mg by mouth daily Pt  To check with surgeon if needed to hold RX., Disp: , Rfl:   •  ferrous sulfate 324 (65 Fe) mg, TAKE 1 TABLET (324 MG TOTAL) BY MOUTH EVERY OTHER DAY INCREASE TO DAILY IF TOLERATED., Disp: 90 tablet, Rfl: 0  •  finasteride (PROSCAR) 5 mg tablet, Take 1 tablet (5 mg total) by mouth daily, Disp: 90 tablet, Rfl: 3  •  furosemide (LASIX) 20 mg tablet, One tablet in AM daily and one tablet at 4 PM MW, Disp: 150 tablet, Rfl: 3  •  metoprolol tartrate (LOPRESSOR) 50 mg tablet, Take 1 tablet (50 mg total) by mouth every 12 (twelve) hours, Disp: 60 tablet, Rfl: 1  •  sertraline (ZOLOFT) 50 mg tablet, Take 1 tablet (50 mg total) by mouth daily Do not start before August 10, 2023., Disp: 30 tablet, Rfl: 0    Laboratory Results:        Invalid input(s): \"ALBUMIN\"    Results for orders placed or performed during the hospital encounter of " 12/04/23   CBC and differential   Result Value Ref Range    WBC 8.43 4.31 - 10.16 Thousand/uL    RBC 4.76 3.88 - 5.62 Million/uL    Hemoglobin 14.6 12.0 - 17.0 g/dL    Hematocrit 44.5 36.5 - 49.3 %    MCV 94 82 - 98 fL    MCH 30.7 26.8 - 34.3 pg    MCHC 32.8 31.4 - 37.4 g/dL    RDW 14.8 11.6 - 15.1 %    MPV 10.4 8.9 - 12.7 fL    Platelets 130 (L) 149 - 390 Thousands/uL    nRBC 0 /100 WBCs    Neutrophils Relative 84 (H) 43 - 75 %    Immat GRANS % 1 0 - 2 %    Lymphocytes Relative 13 (L) 14 - 44 %    Monocytes Relative 2 (L) 4 - 12 %    Eosinophils Relative 0 0 - 6 %    Basophils Relative 0 0 - 1 %    Neutrophils Absolute 7.14 1.85 - 7.62 Thousands/µL    Immature Grans Absolute 0.05 0.00 - 0.20 Thousand/uL    Lymphocytes Absolute 1.05 0.60 - 4.47 Thousands/µL    Monocytes Absolute 0.14 (L) 0.17 - 1.22 Thousand/µL    Eosinophils Absolute 0.02 0.00 - 0.61 Thousand/µL    Basophils Absolute 0.03 0.00 - 0.10 Thousands/µL   ECG 12 lead   Result Value Ref Range    Ventricular Rate 67 BPM    Atrial Rate  BPM    WA Interval  ms    QRSD Interval 102 ms    QT Interval 408 ms    QTC Interval 431 ms    P Axis  degrees    QRS Axis -58 degrees    T Wave Axis 63 degrees

## 2024-01-23 ENCOUNTER — TELEPHONE (OUTPATIENT)
Dept: NEPHROLOGY | Facility: CLINIC | Age: 84
End: 2024-01-23

## 2024-01-23 DIAGNOSIS — I50.9 CHF EXACERBATION (HCC): ICD-10-CM

## 2024-01-23 RX ORDER — FUROSEMIDE 20 MG/1
TABLET ORAL
Qty: 150 TABLET | Refills: 3 | Status: SHIPPED | OUTPATIENT
Start: 2024-01-23

## 2024-01-23 NOTE — TELEPHONE ENCOUNTER
rMedication Refills   Person Calling In  Name if not patient Patient    Medication name  furosemide   Medication Dosage  Medication Frequency 20 mg   2x daily    Pharmacy & Location [facility]    Additional Information

## 2024-02-01 ENCOUNTER — APPOINTMENT (OUTPATIENT)
Dept: LAB | Facility: HOSPITAL | Age: 84
End: 2024-02-01
Payer: MEDICARE

## 2024-02-01 DIAGNOSIS — N13.5 URETERAL STRICTURE: ICD-10-CM

## 2024-02-01 DIAGNOSIS — I50.9 HEART FAILURE, UNSPECIFIED HF CHRONICITY, UNSPECIFIED HEART FAILURE TYPE (HCC): ICD-10-CM

## 2024-02-01 DIAGNOSIS — N25.81 SECONDARY HYPERPARATHYROIDISM (HCC): ICD-10-CM

## 2024-02-01 DIAGNOSIS — N18.31 STAGE 3A CHRONIC KIDNEY DISEASE (HCC): ICD-10-CM

## 2024-02-01 DIAGNOSIS — I10 ESSENTIAL HYPERTENSION: ICD-10-CM

## 2024-02-01 DIAGNOSIS — N13.1 HYDRONEPHROSIS WITH URETERAL STRICTURE, NOT ELSEWHERE CLASSIFIED: ICD-10-CM

## 2024-02-01 LAB
ANION GAP SERPL CALCULATED.3IONS-SCNC: 6 MMOL/L
BACTERIA UR QL AUTO: ABNORMAL /HPF
BILIRUB UR QL STRIP: NEGATIVE
BUN SERPL-MCNC: 35 MG/DL (ref 5–25)
CALCIUM SERPL-MCNC: 9.4 MG/DL (ref 8.4–10.2)
CHLORIDE SERPL-SCNC: 105 MMOL/L (ref 96–108)
CLARITY UR: ABNORMAL
CO2 SERPL-SCNC: 30 MMOL/L (ref 21–32)
COLOR UR: YELLOW
CREAT SERPL-MCNC: 1.63 MG/DL (ref 0.6–1.3)
CREAT UR-MCNC: 62.4 MG/DL
CREAT UR-MCNC: 64.5 MG/DL
ERYTHROCYTE [DISTWIDTH] IN BLOOD BY AUTOMATED COUNT: 15.9 % (ref 11.6–15.1)
GFR SERPL CREATININE-BSD FRML MDRD: 38 ML/MIN/1.73SQ M
GLUCOSE P FAST SERPL-MCNC: 104 MG/DL (ref 65–99)
GLUCOSE UR STRIP-MCNC: NEGATIVE MG/DL
HCT VFR BLD AUTO: 39.1 % (ref 36.5–49.3)
HGB BLD-MCNC: 12.3 G/DL (ref 12–17)
HGB UR QL STRIP.AUTO: ABNORMAL
HYALINE CASTS #/AREA URNS LPF: ABNORMAL /LPF
KETONES UR STRIP-MCNC: NEGATIVE MG/DL
LEUKOCYTE ESTERASE UR QL STRIP: ABNORMAL
MAGNESIUM SERPL-MCNC: 1.9 MG/DL (ref 1.9–2.7)
MCH RBC QN AUTO: 29.4 PG (ref 26.8–34.3)
MCHC RBC AUTO-ENTMCNC: 31.5 G/DL (ref 31.4–37.4)
MCV RBC AUTO: 93 FL (ref 82–98)
MICROALBUMIN UR-MCNC: 47.5 MG/L
MICROALBUMIN/CREAT 24H UR: 74 MG/G CREATININE (ref 0–30)
MUCOUS THREADS UR QL AUTO: ABNORMAL
NITRITE UR QL STRIP: NEGATIVE
NON-SQ EPI CELLS URNS QL MICRO: ABNORMAL /HPF
PH UR STRIP.AUTO: 5.5 [PH]
PHOSPHATE SERPL-MCNC: 2.8 MG/DL (ref 2.3–4.1)
PLATELET # BLD AUTO: 166 THOUSANDS/UL (ref 149–390)
PMV BLD AUTO: 9.7 FL (ref 8.9–12.7)
POTASSIUM SERPL-SCNC: 4.4 MMOL/L (ref 3.5–5.3)
PROT UR STRIP-MCNC: NEGATIVE MG/DL
PROT UR-MCNC: 15 MG/DL
PROT/CREAT UR: 0.24 MG/G{CREAT} (ref 0–0.1)
PTH-INTACT SERPL-MCNC: 134.2 PG/ML (ref 12–88)
RBC # BLD AUTO: 4.19 MILLION/UL (ref 3.88–5.62)
RBC #/AREA URNS AUTO: ABNORMAL /HPF
SODIUM SERPL-SCNC: 141 MMOL/L (ref 135–147)
SP GR UR STRIP.AUTO: 1.01 (ref 1–1.03)
UROBILINOGEN UR QL STRIP.AUTO: 0.2 E.U./DL
WBC # BLD AUTO: 8.47 THOUSAND/UL (ref 4.31–10.16)
WBC #/AREA URNS AUTO: ABNORMAL /HPF

## 2024-02-01 PROCEDURE — 85027 COMPLETE CBC AUTOMATED: CPT

## 2024-02-01 PROCEDURE — 82570 ASSAY OF URINE CREATININE: CPT

## 2024-02-01 PROCEDURE — 82043 UR ALBUMIN QUANTITATIVE: CPT

## 2024-02-01 PROCEDURE — 83735 ASSAY OF MAGNESIUM: CPT

## 2024-02-01 PROCEDURE — 81001 URINALYSIS AUTO W/SCOPE: CPT

## 2024-02-01 PROCEDURE — 83970 ASSAY OF PARATHORMONE: CPT

## 2024-02-01 PROCEDURE — 36415 COLL VENOUS BLD VENIPUNCTURE: CPT

## 2024-02-01 PROCEDURE — 84156 ASSAY OF PROTEIN URINE: CPT

## 2024-02-01 PROCEDURE — 84100 ASSAY OF PHOSPHORUS: CPT

## 2024-02-05 ENCOUNTER — TELEPHONE (OUTPATIENT)
Dept: NEPHROLOGY | Facility: CLINIC | Age: 84
End: 2024-02-05

## 2024-02-05 DIAGNOSIS — N18.32 CHRONIC KIDNEY DISEASE (CKD) STAGE G3B/A3, MODERATELY DECREASED GLOMERULAR FILTRATION RATE (GFR) BETWEEN 30-44 ML/MIN/1.73 SQUARE METER AND ALBUMINURIA CREATININE RATIO GREATER THAN 300 MG/G (HCC): Primary | ICD-10-CM

## 2024-02-05 DIAGNOSIS — I10 HYPERTENSION, UNSPECIFIED TYPE: ICD-10-CM

## 2024-02-05 NOTE — RESULT ENCOUNTER NOTE
Hello    Can you please send the patient lab slips to complete with CMP, CBC, UA, UPCR, magnesium, phosphorus in 3 to 4 months.  I sent him a [employer] message    Thank you

## 2024-02-05 NOTE — TELEPHONE ENCOUNTER
Lab orders mailed to patient to be done in  3 to 4 months per Dr. Negron.(CMP, CBC,UA, UPCR, mag,phos)    ----- Message from Celeste Negron MD sent at 2/5/2024 11:56 AM EST -----    Hello    Can you please send the patient lab slips to complete with CMP, CBC, UA, UPCR, magnesium, phosphorus in 3 to 4 months.  I sent him a MyChart message    Thank you

## 2024-02-12 ENCOUNTER — HOSPITAL ENCOUNTER (OUTPATIENT)
Dept: RADIOLOGY | Facility: HOSPITAL | Age: 84
Discharge: HOME | End: 2024-02-12
Payer: MEDICARE

## 2024-02-12 DIAGNOSIS — N13.1 HYDRONEPHROSIS WITH URETERAL STRICTURE, NOT ELSEWHERE CLASSIFIED: ICD-10-CM

## 2024-02-12 DIAGNOSIS — N13.5 URETERAL STRICTURE: ICD-10-CM

## 2024-02-12 PROCEDURE — 76775 US EXAM ABDO BACK WALL LIM: CPT

## 2024-03-13 ENCOUNTER — TELEPHONE (OUTPATIENT)
Dept: NEPHROLOGY | Facility: CLINIC | Age: 84
End: 2024-03-13

## 2024-03-14 ENCOUNTER — PROCEDURE VISIT (OUTPATIENT)
Dept: UROLOGY | Facility: CLINIC | Age: 84
End: 2024-03-14
Payer: MEDICARE

## 2024-03-14 VITALS
HEART RATE: 79 BPM | DIASTOLIC BLOOD PRESSURE: 68 MMHG | HEIGHT: 70 IN | WEIGHT: 212 LBS | SYSTOLIC BLOOD PRESSURE: 118 MMHG | OXYGEN SATURATION: 97 % | BODY MASS INDEX: 30.35 KG/M2

## 2024-03-14 DIAGNOSIS — R31.0 GROSS HEMATURIA: ICD-10-CM

## 2024-03-14 DIAGNOSIS — N13.1 HYDRONEPHROSIS DUE TO OBSTRUCTION OF URETER: Primary | ICD-10-CM

## 2024-03-14 PROCEDURE — 52000 CYSTOURETHROSCOPY: CPT | Performed by: UROLOGY

## 2024-03-14 NOTE — PROGRESS NOTES
Cystoscopy     Date/Time  3/14/2024 11:00 AM     Performed by  Jossue Leon MD   Authorized by  Jossue Leon MD     Universal Protocol:  Consent: Written consent obtained.  Risks and benefits: risks, benefits and alternatives were discussed  Consent given by: patient  Patient understanding: patient states understanding of the procedure being performed  Patient consent: the patient's understanding of the procedure matches consent given  Procedure consent: procedure consent matches procedure scheduled  Radiology Images displayed and confirmed. If images not available, report reviewed: imaging studies available  Patient identity confirmed: verbally with patient      Procedure Details:  Procedure type: cystoscopy    Additional Procedure Details: After obtaining consent, patient was sterilely prepped and draped.  Cystoscopy was undertaken with a flexible scope.  Urethra was without strictures.  Prostate is moderately obstructive.  Bladder is mildly trabeculated.  Resonance stent seen on retroflexion only due to the elevation of the bladder neck.  No large encrustation.  Only a yellowish film across most of the distal aspect of the stent.  Not clear whether this completely obstructs the stent.  Only way to be safe with him due to his elevated renal function and the possibility of sepsis if the stent obstructs is to exchange him to a fresh stent.  I know the stent placed in December is now about 3 months old.  If we arrange for the stent to be exchanged in a couple of weeks then we will be at about 3-1/2 months.  Patient seems agreeable to do the exchange at [facility].  He thinks his daughter can get him easily to the facility.  He is complaining of bleeding if he drives more than 20 miles in a car.  Possibly it is related to the metal stent.

## 2024-03-14 NOTE — PROGRESS NOTES
"Dioni Melo is a(n) 83 y.o. male. , :  1940    Subjective   Chief Complaint:   Chief Complaint   Patient presents with    Cystoscopy     Diagnoses: There were no encounter diagnoses.    Assessment/Plan  83-year-old gentleman with chronic right ureteral obstruction from stones and stricture.  I have not seen him and exchanged his stent for over 2 years.  He was seen by Dr. العلي who placed a resonance stent.  It is starting to occlude on cystoscopy today.  Creatinine 2024 was 1.6 which is stable.  I know the ultrasound showed that the hydronephrosis was somewhat improved.  Still worse than .  Would be reasonable I think to start to exchange it with the 7 Beninese stents again.  Will be a little more challenging since in the past we used to go through the stent or get a wire really close to the opening while removing the other stent but I think I should be able to remove the metal stent and slip a wire by it.  We will try to get this done in the next few weeks.    There are no Patient Instructions on file for this visit.     Radiology  2024 renal ultrasound  Right kidney  Normal echogenicity and contour.  There is a right lower pole cyst measuring 5.1 x 4.1 x 5.1 cm.  There is mild hydronephrosis much improved from 2017, though slightly increased from the CT from   There is a shadowing calculus measuring 6 mm in the right kidney. There are additional echogenic foci without shadowing which may also represent stones or vascular calcifications     History  Right ureteral strictures.  Last stent exchange 2023 Dr. العلي.      Prior Visits  Numerous    3/14/2024 JOHNNIE Leon  Cystoscopy was performed today.  There is encrustation noted on the resonance stent.  I think it is only a matter of time before he obstructs that side.  I would like to get him back to every 4 month 7 Beninese stent exchanges.  He seems amenable to this.  Consent obtained today.    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)    CAROTID ENDARTERECTOMY Left 1998    Supposedly no internal stenosis found    CYSTOSCOPY Right 08/15/2017    Procedure: CYSTOSCOPY RIGHT STENT EXCHANGE;  Surgeon: Jossue Leon MD;  [facility]: [facility];  Service: Urology    CYSTOSCOPY      CYSTOSCOPY N/A 03/11/2022    Procedure: CYSTOSCOPY, RIGHT RETROGRADE PYLEOGRAM;  Surgeon: Goyo العلي MD;  Location:  MAIN OR;  Service: Urology    CYSTOSCOPY W/ URETERAL STENT PLACEMENT Right 06/13/2023    Procedure: CYSTOSCOPY, RETROGRADE PYELOGRAM,EXCHANGE STENT URETERAL;  Surgeon: Goyo العلي MD;  Location:  MAIN OR;  Service: Urology    EXTRACORPOREAL SHOCK WAVE LITHOTRIPSY  03/2017    For nephrolithiasis at [facility]    FL RETROGRADE PYELOGRAM  05/10/2019    FL RETROGRADE PYELOGRAM  07/30/2019    FL RETROGRADE PYELOGRAM  10/22/2019    FL RETROGRADE PYELOGRAM  01/28/2020    FL RETROGRADE PYELOGRAM  08/11/2020    FL RETROGRADE PYELOGRAM  12/15/2020    FL RETROGRADE PYELOGRAM  02/14/2021    FL RETROGRADE PYELOGRAM  05/11/2021    FL RETROGRADE PYELOGRAM  10/19/2021    FL RETROGRADE PYELOGRAM  03/11/2022    FL RETROGRADE PYELOGRAM  07/15/2022    FL RETROGRADE PYELOGRAM  01/26/2023    FL RETROGRADE PYELOGRAM  06/13/2023    FL RETROGRADE PYELOGRAM  12/4/2023    KS CYSTO BLADDER W/URETERAL CATHETERIZATION Right 11/14/2017    Procedure: CYSTOSCOPY RETROGRADE, STENT EXCHANGE;  Surgeon: Jossue Leon MD;  [facility];  Service: Urology    KS CYSTO BLADDER W/URETERAL CATHETERIZATION Right 05/08/2018    Procedure: CYSTOSCOPY, STENT EXCHANGE;  Surgeon: Jossue Leon MD;  [facility];  Service: Urology    KS CYSTO BLADDER " W/URETERAL CATHETERIZATION Right 08/14/2018    Procedure: CYSTOSCOPY, STENT EXCHANGE;  Surgeon: Jossue Leon MD;  [facility];  Service: Urology    NY CYSTO BLADDER W/URETERAL CATHETERIZATION Right 11/13/2018    Procedure: CYSTOSCOPY, STENT EXCHANGE, RETROGRADE;  Surgeon: Jossue Leon MD;  [facility];  Service: Urology    NY CYSTO BLADDER W/URETERAL CATHETERIZATION Right 02/12/2019    Procedure: CYSTOSCOPY, RETROGRADE, STENT REMOVAL AND STENT PLACEMENT;  Surgeon: Jossue Leon MD;  [facility];  Service: Urology    NY CYSTO BLADDER W/URETERAL CATHETERIZATION Right 05/10/2019    Procedure: CYSTOSCOPY, RETROGRADE, STENT EXCHANGE;  Surgeon: Jossue Leon MD;  [facility];  Service: Urology    NY CYSTO BLADDER W/URETERAL CATHETERIZATION Right 07/30/2019    Procedure: CYSTOSCOPY, RETROGRADE, STENT REMOVAL AND PLACEMENT OF STENT;  Surgeon: Jossue Leon MD;  [facility];  Service: Urology    NY CYSTO BLADDER W/URETERAL CATHETERIZATION Right 10/22/2019    Procedure: CYSTOSCOPY, RETROGRADE, STENT EXCHANGE;  Surgeon: Jossue Leon MD;  [facility];  Service: Urology    NY CYSTO BLADDER W/URETERAL CATHETERIZATION Right 01/28/2020    Procedure: CYSTOSCOPY, RETROGRADE, STENT REMOVAL AND STENT PLACEMENT;  Surgeon: Jossue Leon MD;  [facility];  Service: Urology    NY CYSTO BLADDER W/URETERAL CATHETERIZATION Right 05/22/2020    Procedure: CYSTOSCOPY RETROGRADE, STENT EXCHANGE;  Surgeon: Jossue Leon MD;  [facility];  Service: Urology    NY CYSTO BLADDER W/URETERAL CATHETERIZATION Right 08/11/2020    Procedure: CYSTOSCOPY, STENT EXCHANGE, RETROGRADE;  Surgeon: Jossue Leon MD;  [facility];  Service: Urology    NY CYSTO BLADDER W/URETERAL CATHETERIZATION Right 12/15/2020    Procedure: CYSTOSCOPY, RETROGRADE, STENT REMOVAL, AND STENT PLACEMENT;  Surgeon: Jossue Leon MD;  [facility];   Service: Urology    RI CYSTO BLADDER W/URETERAL CATHETERIZATION Right 05/11/2021    Procedure: CYSTOSCOPY RETROGRADE PYELOGRAM WITH STENT EXCHANGE;  Surgeon: Jossue Leon MD;  [facility];  Service: Urology    RI CYSTO BLADDER W/URETERAL CATHETERIZATION Right 10/19/2021    Procedure: CYSTOSCOPY WITH RETROGRADE, STENT EXCHANGE;  Surgeon: Jossue Leon MD;  [facility];  Service: Urology    RI CYSTO BLADDER W/URETERAL CATHETERIZATION Right 07/15/2022    Procedure: CYSTOSCOPY, RETROGRADE PYELOGRAM WITH EXCHANGE STENT URETERAL;  Surgeon: Goyo العلي MD;  [facility];  Service: Urology    RI CYSTO BLADDER W/URETERAL CATHETERIZATION Right 12/4/2023    Procedure: CYSTOSCOPY RETROGRADE PYELOGRAM WITH INSERTION STENT URETERAL;  Surgeon: Goyo العلي MD;  [facility];  Service: Urology    RI CYSTO W/INSERT URETERAL STENT Right 11/14/2017    Procedure: EXCHANGE STENT URETERAL;  Surgeon: Jossue Leon MD;  [facility];  Service: Urology    RI CYSTO W/INSERT URETERAL STENT Right 03/11/2022    Procedure: EXCHANGE STENT URETERAL;  Surgeon: Goyo العلي MD;  [facility];  Service: Urology    RI CYSTO W/INSERT URETERAL STENT Right 01/26/2023    Procedure: EXCHANGE STENT URETERAL;  Surgeon: Goyo العلي MD;  [facility];  Service: Urology    RI CYSTO/URETERO W/LITHOTRIPSY &INDWELL STENT INSRT Right 05/02/2017    Procedure: CYSTOSCOPY URETEROSCOPY WITH LITHOTRIPSY HOLMIUM LASER, RETROGRADE PYELOGRAM AND INSERTION STENT URETERAL;  Surgeon: Jossue Leon MD;  [facility];  Service: Urology    RI CYSTO/URETERO W/LITHOTRIPSY &INDWELL STENT INSRT Right 05/16/2017    Procedure: CYSTOSCOPY, RETROGRADE PYELOGRAM,  FLEXIBLE URETEROSCOPY,  HOLMIUM LASER LITHOTRIPSY, STONE BASKET MANIPULATION,  STENT URETERAL EXCHANGE;  Surgeon: Jossue Leon MD;  [facility];  Service: Urology    RI CYSTO/URETERO W/LITHOTRIPSY &INDWELL STENT INSRT  Right 06/02/2017    Procedure: CYSTOSCOPY, RETROGRADE, STONE MANIPULATION WITH HOLMIUM LASER, STENT PLACEMENT;  Surgeon: Jossue Leon MD;  [facility];  Service: Urology    TN CYSTO/URETERO W/LITHOTRIPSY &INDWELL STENT INSRT Right 07/18/2017    Procedure: CYSTOSCOPY, RETROGRADE, URETEROSCOPY HOLMIUM LASER LITHOTRIPSYWITH STONE MANIPULATION,  AND STENT PLACEMENT;  Surgeon: Jossue Leon MD;  [facility];  Service: Urology    TN CYSTO/URETERO W/LITHOTRIPSY &INDWELL STENT INSRT Right 02/14/2021    Procedure: CYSTOSCOPY URETEROSCOPY, RETROGRADE PYELOGRAM, STONE MANIPULATION AND EXCHANGE STENT URETERAL;  Surgeon: Reji Childers MD;  [facility];  Service: Urology    PROSTATE SURGERY  2015    Laser Prostatectomy  by Dr. Leon    URETERAL STENT PLACEMENT Right 04/18/2017    Procedure: INSERTION STENT URETERAL, RIGHT URETEROSCOPY,  LASER OF URETERAL STRICTURE AND STONE;  Surgeon: Jossue Leon MD;  [facility];  Service:     URETERAL STENT PLACEMENT Right 02/13/2018    Procedure: CYSTOSCOPY, STENT EXCHANGE;  Surgeon: Jossue Leon MD;  [facility];  Service: Urology       Family History   Problem Relation Age of Onset    Hypertension Mother     Alcohol abuse Father     Cirrhosis Father     Cancer Son 13        cancer-knee and above-left-amputation    Cancer Sister     Other Brother         head mass    Thyroid disease unspecified Sister     Arthritis Sister     Cancer Sister     Other Brother         legally blind in one eye       Social History     Socioeconomic History    Marital status: /Civil Union     Spouse name: Not on file    Number of children: Not on file    Years of education: Not on file    Highest education level: Not on file   Occupational History    Occupation: RETIRED   Tobacco Use    Smoking status: Former     Current packs/day: 0.00     Average packs/day: 0.5 packs/day for 62.8 years (31.4 ttl pk-yrs)     Types: Cigarettes      Start date: 6/15/1954     Quit date: 4/15/2017     Years since quittin.9    Smokeless tobacco: Never   Vaping Use    Vaping status: Never Used   Substance and Sexual Activity    Alcohol use: Not Currently     Comment: don't drink anymore.    Drug use: No    Sexual activity: Not Currently     Partners: Female     Comment: defer   Other Topics Concern    Not on file   Social History Narrative    Not on file     Social Determinants of Health     Financial Resource Strain: Not on file   Food Insecurity: No Food Insecurity (2023)    Hunger Vital Sign     Worried About Running Out of Food in the Last Year: Never true     Ran Out of Food in the Last Year: Never true   Transportation Needs: No Transportation Needs (2023)    PRAPARE - Transportation     Lack of Transportation (Medical): No     Lack of Transportation (Non-Medical): No   Physical Activity: Not on file   Stress: Not on file   Social Connections: Not on file   Intimate Partner Violence: Not on file   Housing Stability: Low Risk  (2023)    Housing Stability Vital Sign     Unable to Pay for Housing in the Last Year: No     Number of Places Lived in the Last Year: 1     Unstable Housing in the Last Year: No         Current Outpatient Medications:     amLODIPine (NORVASC) 5 mg tablet, Take 1 tablet (5 mg total) by mouth daily Do not start before August 10, 2023., Disp: 30 tablet, Rfl: 1    ascorbic acid (VITAMIN C) 250 mg tablet, Take 500 mg by mouth daily, Disp: , Rfl:     aspirin (ECOTRIN LOW STRENGTH) 81 mg EC tablet, Take 81 mg by mouth daily Pt to check with surgeon if needed to hold RX., Disp: , Rfl:     atorvastatin (LIPITOR) 40 mg tablet, Take 1 tablet (40 mg total) by mouth daily with dinner, Disp: 30 tablet, Rfl: 1    cholecalciferol (VITAMIN D3) 1,000 units tablet, Take 1,000 Units by mouth daily after lunch  , Disp: , Rfl:     clopidogrel (PLAVIX) 75 mg tablet, Take 75 mg by mouth daily Pt  To check with surgeon if needed to hold RX.,  "Disp: , Rfl:     ferrous sulfate 324 (65 Fe) mg, TAKE 1 TABLET (324 MG TOTAL) BY MOUTH EVERY OTHER DAY INCREASE TO DAILY IF TOLERATED., Disp: 90 tablet, Rfl: 0    finasteride (PROSCAR) 5 mg tablet, Take 1 tablet (5 mg total) by mouth daily, Disp: 90 tablet, Rfl: 3    furosemide (LASIX) 20 mg tablet, One tablet in AM daily and one tablet at 4 PM MWF, Disp: 150 tablet, Rfl: 3    metoprolol tartrate (LOPRESSOR) 50 mg tablet, Take 1 tablet (50 mg total) by mouth every 12 (twelve) hours, Disp: 60 tablet, Rfl: 1    sertraline (ZOLOFT) 50 mg tablet, Take 1 tablet (50 mg total) by mouth daily Do not start before August 10, 2023., Disp: 30 tablet, Rfl: 0    Objective     /68 (BP Location: Left arm, Patient Position: Sitting, Cuff Size: Adult)   Pulse 79   Ht 5' 10\" (1.778 m)   Wt 96.2 kg (212 lb)   SpO2 97%   BMI 30.42 kg/m²     Physical Exam      Jossue Carolyn, [facility]  "

## 2024-03-18 ENCOUNTER — PREP FOR PROCEDURE (OUTPATIENT)
Dept: UROLOGY | Facility: CLINIC | Age: 84
End: 2024-03-18

## 2024-03-18 ENCOUNTER — TELEPHONE (OUTPATIENT)
Dept: UROLOGY | Facility: CLINIC | Age: 84
End: 2024-03-18

## 2024-03-18 DIAGNOSIS — Z79.01 LONG TERM (CURRENT) USE OF ANTICOAGULANTS: ICD-10-CM

## 2024-03-18 DIAGNOSIS — N13.1 HYDRONEPHROSIS DUE TO OBSTRUCTION OF URETER: Primary | ICD-10-CM

## 2024-03-18 DIAGNOSIS — R39.89 SUSPECTED UTI: ICD-10-CM

## 2024-03-18 DIAGNOSIS — Z01.812 PRE-OPERATIVE LABORATORY EXAMINATION: ICD-10-CM

## 2024-03-18 NOTE — TELEPHONE ENCOUNTER
Spoke w/ pt/scheduled pt for surgery w/ Dr Leon @ [facility] for 3/27/2024/pre op labs, urine cult ordered for 3/19/2024/Pt takes PLAVIX-Medication Suspension form faxed to Dr Pepe Kemp @ [facility]/surgical packet mailed to pt per pt request

## 2024-03-19 ENCOUNTER — TELEPHONE (OUTPATIENT)
Dept: UROLOGY | Facility: CLINIC | Age: 84
End: 2024-03-19

## 2024-03-19 ENCOUNTER — LAB (OUTPATIENT)
Dept: LAB | Facility: HOSPITAL | Age: 84
End: 2024-03-19
Payer: MEDICARE

## 2024-03-19 DIAGNOSIS — I10 HYPERTENSION, UNSPECIFIED TYPE: ICD-10-CM

## 2024-03-19 DIAGNOSIS — Z01.812 PRE-OPERATIVE LABORATORY EXAMINATION: ICD-10-CM

## 2024-03-19 DIAGNOSIS — R39.89 SUSPECTED UTI: ICD-10-CM

## 2024-03-19 DIAGNOSIS — N18.32 CHRONIC KIDNEY DISEASE (CKD) STAGE G3B/A3, MODERATELY DECREASED GLOMERULAR FILTRATION RATE (GFR) BETWEEN 30-44 ML/MIN/1.73 SQUARE METER AND ALBUMINURIA CREATININE RATIO GREATER THAN 300 MG/G (HCC): ICD-10-CM

## 2024-03-19 DIAGNOSIS — N39.0 URINARY TRACT INFECTION WITHOUT HEMATURIA, SITE UNSPECIFIED: Primary | ICD-10-CM

## 2024-03-19 DIAGNOSIS — Z79.01 LONG TERM (CURRENT) USE OF ANTICOAGULANTS: ICD-10-CM

## 2024-03-19 DIAGNOSIS — N13.1 HYDRONEPHROSIS DUE TO OBSTRUCTION OF URETER: ICD-10-CM

## 2024-03-19 LAB
ANION GAP SERPL CALCULATED.3IONS-SCNC: 5 MMOL/L (ref 4–13)
APTT PPP: 33 SECONDS (ref 23–37)
BACTERIA UR QL AUTO: ABNORMAL /HPF
BASOPHILS # BLD AUTO: 0.06 THOUSANDS/ÂΜL (ref 0–0.1)
BASOPHILS NFR BLD AUTO: 1 % (ref 0–1)
BILIRUB UR QL STRIP: NEGATIVE
BUN SERPL-MCNC: 35 MG/DL (ref 5–25)
CALCIUM SERPL-MCNC: 8.9 MG/DL (ref 8.4–10.2)
CHLORIDE SERPL-SCNC: 105 MMOL/L (ref 96–108)
CLARITY UR: ABNORMAL
CO2 SERPL-SCNC: 29 MMOL/L (ref 21–32)
COLOR UR: ABNORMAL
CREAT SERPL-MCNC: 1.58 MG/DL (ref 0.6–1.3)
CREAT UR-MCNC: 95.1 MG/DL
EOSINOPHIL # BLD AUTO: 0.34 THOUSAND/ÂΜL (ref 0–0.61)
EOSINOPHIL NFR BLD AUTO: 4 % (ref 0–6)
ERYTHROCYTE [DISTWIDTH] IN BLOOD BY AUTOMATED COUNT: 15.9 % (ref 11.6–15.1)
GFR SERPL CREATININE-BSD FRML MDRD: 40 ML/MIN/1.73SQ M
GLUCOSE P FAST SERPL-MCNC: 102 MG/DL (ref 65–99)
GLUCOSE UR STRIP-MCNC: NEGATIVE MG/DL
HCT VFR BLD AUTO: 40 % (ref 36.5–49.3)
HGB BLD-MCNC: 12.7 G/DL (ref 12–17)
HGB UR QL STRIP.AUTO: ABNORMAL
IMM GRANULOCYTES # BLD AUTO: 0.05 THOUSAND/UL (ref 0–0.2)
IMM GRANULOCYTES NFR BLD AUTO: 1 % (ref 0–2)
INR PPP: 1.19 (ref 0.84–1.19)
KETONES UR STRIP-MCNC: ABNORMAL MG/DL
LEUKOCYTE ESTERASE UR QL STRIP: ABNORMAL
LYMPHOCYTES # BLD AUTO: 1.58 THOUSANDS/ÂΜL (ref 0.6–4.47)
LYMPHOCYTES NFR BLD AUTO: 17 % (ref 14–44)
MCH RBC QN AUTO: 28.3 PG (ref 26.8–34.3)
MCHC RBC AUTO-ENTMCNC: 31.8 G/DL (ref 31.4–37.4)
MCV RBC AUTO: 89 FL (ref 82–98)
MONOCYTES # BLD AUTO: 1.17 THOUSAND/ÂΜL (ref 0.17–1.22)
MONOCYTES NFR BLD AUTO: 13 % (ref 4–12)
NEUTROPHILS # BLD AUTO: 6.03 THOUSANDS/ÂΜL (ref 1.85–7.62)
NEUTS SEG NFR BLD AUTO: 64 % (ref 43–75)
NITRITE UR QL STRIP: POSITIVE
NON-SQ EPI CELLS URNS QL MICRO: ABNORMAL /HPF
NRBC BLD AUTO-RTO: 0 /100 WBCS
PH UR STRIP.AUTO: 7.5 [PH]
PLATELET # BLD AUTO: 136 THOUSANDS/UL (ref 149–390)
PMV BLD AUTO: 9.9 FL (ref 8.9–12.7)
POTASSIUM SERPL-SCNC: 4.6 MMOL/L (ref 3.5–5.3)
PROT UR STRIP-MCNC: ABNORMAL MG/DL
PROT UR-MCNC: 93 MG/DL
PROT/CREAT UR: 0.98 MG/G{CREAT} (ref 0–0.1)
PROTHROMBIN TIME: 15.3 SECONDS (ref 11.6–14.5)
RBC # BLD AUTO: 4.49 MILLION/UL (ref 3.88–5.62)
RBC #/AREA URNS AUTO: ABNORMAL /HPF
SODIUM SERPL-SCNC: 139 MMOL/L (ref 135–147)
SP GR UR STRIP.AUTO: 1.01 (ref 1–1.03)
UROBILINOGEN UR QL STRIP.AUTO: 0.2 E.U./DL
WBC # BLD AUTO: 9.23 THOUSAND/UL (ref 4.31–10.16)
WBC #/AREA URNS AUTO: ABNORMAL /HPF

## 2024-03-19 PROCEDURE — 80048 BASIC METABOLIC PNL TOTAL CA: CPT

## 2024-03-19 PROCEDURE — 87086 URINE CULTURE/COLONY COUNT: CPT

## 2024-03-19 PROCEDURE — 87077 CULTURE AEROBIC IDENTIFY: CPT

## 2024-03-19 PROCEDURE — 81001 URINALYSIS AUTO W/SCOPE: CPT

## 2024-03-19 PROCEDURE — 85730 THROMBOPLASTIN TIME PARTIAL: CPT

## 2024-03-19 PROCEDURE — 85025 COMPLETE CBC W/AUTO DIFF WBC: CPT

## 2024-03-19 PROCEDURE — 84156 ASSAY OF PROTEIN URINE: CPT

## 2024-03-19 PROCEDURE — 36415 COLL VENOUS BLD VENIPUNCTURE: CPT

## 2024-03-19 PROCEDURE — 85610 PROTHROMBIN TIME: CPT

## 2024-03-19 PROCEDURE — 82570 ASSAY OF URINE CREATININE: CPT

## 2024-03-19 NOTE — TELEPHONE ENCOUNTER
Spoke to pt let him know his kidneys are stable,also can change stent as planned to avoid trouble, also can stay on his thinners.     ----- Message from Jossue Leon MD sent at 3/19/2024 11:28 AM EDT -----  Tell kidneys are stable.  Can change stent as planned to avoid trouble.  Can stay on his thinners.

## 2024-03-20 LAB
BACTERIA UR CULT: ABNORMAL
BACTERIA UR CULT: ABNORMAL

## 2024-03-20 NOTE — RESULT ENCOUNTER NOTE
Hello    Patient normally is followed up by Ms Sofiya Arvizu.     Renal MA team-please find out from the patient if he is having any urinary symptoms.  He recently had a cystoscopy.  The urinalysis was yesterday.  The renal function appears stable.  It appears that his urologist had recommended the lab work to evaluate if it is safe to schedule the stent timing for removal and exchange.  - If he is having any urinary symptoms such as fevers, chills, nausea, vomiting, pain while urinating, bob blood, please let me know  - If he is not having urinary symptoms, we will await the urine culture    Dr. Leon-if his urine culture is positive but he is asymptomatic, do want us to empirically treat while awaiting the stent exchange given the crusting that you had noted?    Thank you    np

## 2024-03-20 NOTE — TELEPHONE ENCOUNTER
Talked with pt.  He denies fever, chills, N/V or painful urination.  He does note some blood in his urine.      ----- Message from Celeste Negron MD sent at 3/20/2024 11:09 AM EDT -----  Hello    Patient normally is followed up by Ms Sofiya Arvizu.     Renal MA team-please find out from the patient if he is having any urinary symptoms.  He recently had a cystoscopy.  The urinalysis was yesterday.  The renal function appears stable.  It appears that his urologist had recommended the lab work to evaluate if it is safe to schedule the stent timing for removal and exchange.  - If he is having any urinary symptoms such as fevers, chills, nausea, vomiting, pain while urinating, bob blood, please let me know  - If he is not having urinary symptoms, we will await the urine culture    Dr. Leon-if his urine culture is positive but he is asymptomatic, do want us to empirically treat while awaiting the stent exchange given the crusting that you had noted?    Thank you    np

## 2024-03-25 RX ORDER — AMOXICILLIN AND CLAVULANATE POTASSIUM 875; 125 MG/1; MG/1
1 TABLET, FILM COATED ORAL EVERY 12 HOURS SCHEDULED
Qty: 14 TABLET | Refills: 0 | Status: SHIPPED | OUTPATIENT
Start: 2024-03-25 | End: 2024-04-01

## 2024-03-25 NOTE — TELEPHONE ENCOUNTER
Spoke to pt and advised:    KIKE Zimmerman-C1 minute ago (11:27 AM)     I sent a course of Augmentin to the pharmacy.  Patient is to take 2 times a day preceding surgery he can take the morning of the day of surgery and then for the 4 days subsequent surgery.  I think if patient is able to  antibiotics today and start that he would be okay to proceed with stent exchange.

## 2024-03-25 NOTE — TELEPHONE ENCOUNTER
Pt called stated that he has stent exchange on 3/27 but he now has UTI and asking if it is still ok to exchange the stent.     Please review

## 2024-03-25 NOTE — TELEPHONE ENCOUNTER
I sent a course of Augmentin to the pharmacy.  Patient is to take 2 times a day preceding surgery he can take the morning of the day of surgery and then for the 4 days subsequent surgery.  I think if patient is able to  antibiotics today and start that he would be okay to proceed with stent exchange.  Will tag in Dr. Leon to comment if he feels otherwise

## 2024-03-26 ENCOUNTER — ANESTHESIA EVENT (OUTPATIENT)
Dept: PERIOP | Facility: HOSPITAL | Age: 84
End: 2024-03-26
Payer: MEDICARE

## 2024-03-26 PROBLEM — N17.9 AKI (ACUTE KIDNEY INJURY) (HCC): Status: RESOLVED | Noted: 2017-04-15 | Resolved: 2024-03-26

## 2024-03-26 NOTE — PROGRESS NOTES
Assessment and Plan:  Chronic kidney disease IIIB:    -Etiology:  Due to solitary functional kidney, prior hydronephrosis nephrolithiasis with JUNIOR due to obstructive uropathy  -Baseline creatinine 1.5-1.7  -Follows with Dr. Negron  -recent creatinine 1.58, GFR 40, at baseline  -UPCr 0.98 g, interval increase but in the setting of hematuria related to cystoscopy.  Will repeat urine studies prior to next appointment.  Being followed by urology  -UA with large blood, + nitrates, trace ketones, 1+ protein, innumerable RBC, 20-30 WBC and moderate bacteria.  Specimen obtained after recent urologic intervention  -electrolytes and acid-base stable  -Renal function stable and at baseline  -SGLT2 inhibitor not suitable option given patient's urologic hardware  -Treat modifiable risk factors  -avoid nephrotoxins, NSAIDs, hypotension and IV contrast if possible.  -stay well hydrated  -Kidney Smart/CKD education: Completed 6/22/2018  -follow-up with Dr. Negron in 4 months with repeat blood and urine studies.  Labs previously ordered by Dr. Negron    Nephrolithiasis:  -Mainly calcium oxalate stones  -With history of right hydronephrosis, s/p ureteral stent and lithotripsy.  Most recent intervention cystoscopy with retrograde pyelogram exchange ureteral stent one week ago c/b urinary retention with moore placement.  Voiding trial scheduled 4/10/2024.  -plan for stent exchanged q 4 months  -Off HCTZ since hospitalization October 2020  -Push hydration to maintain urine output >2L/day  -Low oxalate, low sodium diet  -Followed by urology  -Imaging surveillance, stent management and follow-up per urology    Hypertension:  -BP acceptable  -volume status euvolemic  -current medications: Metoprolol 50 mg twice daily, amlodipine 5 mg nightly, Lasix 20 mg every morning daily with 20 mg every evening on Monday, Wednesday, Friday  -changes: None.  Continue current regimen  -refill sent for lasix to pt pharmacy  -Avoid NSAIDs  -Avoid hypotension  or fluctuations in blood pressure.   -low sodium (2 gm) diet.  Encourage regular exercise  -continue to monitor BP at home    Proteinuria:  -UA with proteinuria.  -UPCR 0.98 g  -Not a candidate for SGLT 2 inhibitors due to urologic hardware  -Optimize glycemic and BP control  -continue to monitor    Anemia of CKD, iron deficiency:  -Hgb 12.7, at goal.  Goal >10  -On daily oral iron  -continue to monitor    Bone Mineral Disease of CKD:  -Calcium stable  -Phosphorus 2.8, at goal  -Secondary hyperparathyroidism of renal origin: .2, interval elevation but we will continue to monitor.  Continue cholecalciferol  -Vitamin D deficiency: Vitamin D 25 45 in January 2023.  Continue cholecalciferol 1000 units daily.  Repeat level prior to next appointment  -continue to monitor    Dyslipidemia:  -stable  -continue statin  -low-cholesterol and low-fat diet, aerobic exercise  -management per PCP    Chronic diastolic congestive heart failure:  -compensated  -echo 8/7/2023:  EF 55%, unable to assess diastolic function due to AF, mild MR, mild TR  -home diuretics: continue Lasix 20 mg daily with additional 20 mg in the evening on MWF  -on beta blockers   -fluid restriction, 2 gm low sodium diet, daily weights  -management per Cardiology    Atrial fibrillation:  -stable, rate controlled  -s/p Watchman procedure  -on beta-blockers  -management per Cardiology    CAD:  -stable without angina  -s/p PCI/stent Oct '23  -On antiplatelet and beta-blocker  -followup and management per Cardiology      I have spent a total time of 35 minutes on 04/01/24 in caring for this patient including Diagnostic results, Instructions for management, Importance of tx compliance, Risk factor reductions, Impressions, Counseling / Coordination of care, Documenting in the medical record, Reviewing / ordering tests, medicine, procedures  , and Obtaining or reviewing history  .    Dioni was seen today for follow-up.    Diagnoses and all orders for this  visit:    Benign hypertension with CKD (chronic kidney disease) stage III (HCC)    Stage 3b chronic kidney disease (HCC)    Hydronephrosis with ureteral stricture, not elsewhere classified    Renal calculi    Chronic kidney disease-mineral and bone disorder    Iron deficiency anemia, unspecified iron deficiency anemia type    Vitamin D deficiency    Dyslipidemia    Secondary hyperparathyroidism of renal origin (HCC)    Chronic diastolic CHF (congestive heart failure) (HCC)    Chronic atrial fibrillation (HCC)    CHF exacerbation (HCC)  -     furosemide (LASIX) 20 mg tablet; One tablet in AM daily and one tablet at 4 PM MWF        Follow up with Dr. Negron in 4 months with repeat blood and urine studies as previously ordered.  Please call the office with any questions or concerns.      Reason for Visit: Follow-up (Stage 3a chronic kidney disease)    HPI: Dioni Melo is a 83 y.o. 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 Patient followed by Dr. Negron, last seen 1/18/2024.  Most recent creatinine 1.58, GFR 40, stable and at baseline.  Patient denies recent hospitalizations but recently underwent cystoscopy with right ureteral stent exchange on 3/27/2024 by Dr. Leon c/b urinary retention requiring moore catheter insertion 3/28/2024.  F/u appt and voiding trial scheduled 4/10/2024.  No hematuria and adequate urine output.  patient denies NSAID use.  Patient denies nausea, vomiting, diarrhea, dyspnea, orthopnea, edema, hematuria or foamy urine.      ROS: A complete review of systems was performed and was negative unless otherwise noted in the history of present illness.    Allergies:   Patient has no known  "allergies.    Medications:     Current Outpatient Medications:     amLODIPine (NORVASC) 5 mg tablet, Take 1 tablet (5 mg total) by mouth daily Do not start before August 10, 2023. (Patient taking differently: Take 5 mg by mouth daily at bedtime), Disp: 30 tablet, Rfl: 1    amoxicillin-clavulanate (AUGMENTIN) 875-125 mg per tablet, Take 1 tablet by mouth every 12 (twelve) hours for 7 days, Disp: 14 tablet, Rfl: 0    ascorbic acid (VITAMIN C) 250 mg tablet, Take 500 mg by mouth daily, Disp: , Rfl:     atorvastatin (LIPITOR) 40 mg tablet, Take 1 tablet (40 mg total) by mouth daily with dinner, Disp: 30 tablet, Rfl: 1    cholecalciferol (VITAMIN D3) 1,000 units tablet, Take 1,000 Units by mouth daily after lunch  , Disp: , Rfl:     clopidogrel (PLAVIX) 75 mg tablet, Take 75 mg by mouth daily Pt  To check with surgeon if needed to hold RX., Disp: , Rfl:     ferrous sulfate 324 (65 Fe) mg, TAKE 1 TABLET (324 MG TOTAL) BY MOUTH EVERY OTHER DAY INCREASE TO DAILY IF TOLERATED., Disp: 90 tablet, Rfl: 0    finasteride (PROSCAR) 5 mg tablet, Take 1 tablet (5 mg total) by mouth daily, Disp: 90 tablet, Rfl: 3    furosemide (LASIX) 20 mg tablet, One tablet in AM daily and one tablet at 4 PM MWF, Disp: 150 tablet, Rfl: 3    metoprolol tartrate (LOPRESSOR) 50 mg tablet, Take 1 tablet (50 mg total) by mouth every 12 (twelve) hours, Disp: 60 tablet, Rfl: 1    sertraline (ZOLOFT) 50 mg tablet, Take 1 tablet (50 mg total) by mouth daily Do not start before August 10, 2023., Disp: 30 tablet, Rfl: 0    aspirin (ECOTRIN LOW STRENGTH) 81 mg EC tablet, Take 81 mg by mouth daily Pt to check with surgeon if needed to hold RX. (Patient not taking: Reported on 3/28/2024), Disp: , Rfl:     Past Medical History:   Diagnosis Date    Anemia     iron deficiency    Aneurysm (HCC) 2020    of brain  being monitored    Dental disease     Essential hypertension, long-standing     Heart murmur     per pt \"Aortic Valve replacement 2021 with Watchman device " "implanted /2021\"    Hematuria     intermittent    History of cigarette smoking, chronic     62 year smoker at one half pack per day, quit 04/1/17    History of COVID-19 01/19/2022    recovered at home/chief s/s only sore throat    History of kidney stones     History of pneumonia     HL (hearing loss)     Hyperlipidemia     Hypertension     Irregular heart beat     Kidney stone     Muscle weakness     right sided weakness has gotten better/ walks independantly\"    Stroke (HCC) 10/29/2020    right sided  weakness almost full recovery    Stroke (HCC)     Teeth missing     Vitamin D deficiency     maintained with 1000 units of D    Water retention     Wears glasses      Past Surgical History:   Procedure Laterality Date    APPENDECTOMY  1960    CARDIAC CATHETERIZATION  10/02/2023    CARDIAC SURGERY      watchman [id number]; aortic valve replaced 2021(pig valve)    CAROTID ENDARTERECTOMY Left 1998    Supposedly no internal stenosis found    CYSTOSCOPY Right 08/15/2017    Procedure: CYSTOSCOPY RIGHT STENT EXCHANGE;  Surgeon: Jossue Leon MD;  [facility]: [facility];  Service: Urology    CYSTOSCOPY      CYSTOSCOPY N/A 03/11/2022    Procedure: CYSTOSCOPY, RIGHT RETROGRADE PYLEOGRAM;  Surgeon: Goyo العلي MD;  Location:  MAIN OR;  Service: Urology    CYSTOSCOPY W/ URETERAL STENT PLACEMENT Right 06/13/2023    Procedure: CYSTOSCOPY, RETROGRADE PYELOGRAM,EXCHANGE STENT URETERAL;  Surgeon: Goyo العلي MD;  Location:  MAIN OR;  Service: Urology    EXTRACORPOREAL SHOCK WAVE LITHOTRIPSY  03/2017    For nephrolithiasis at [facility]    FL RETROGRADE PYELOGRAM  05/10/2019    FL RETROGRADE PYELOGRAM  07/30/2019    FL RETROGRADE PYELOGRAM  10/22/2019    FL RETROGRADE PYELOGRAM  01/28/2020    FL RETROGRADE PYELOGRAM  08/11/2020    FL RETROGRADE PYELOGRAM  12/15/2020    FL RETROGRADE PYELOGRAM  02/14/2021    FL RETROGRADE PYELOGRAM  05/11/2021    FL RETROGRADE PYELOGRAM  10/19/2021    FL RETROGRADE PYELOGRAM  " 03/11/2022    FL RETROGRADE PYELOGRAM  07/15/2022    FL RETROGRADE PYELOGRAM  01/26/2023    FL RETROGRADE PYELOGRAM  06/13/2023    FL RETROGRADE PYELOGRAM  12/4/2023    OK CYSTO BLADDER W/URETERAL CATHETERIZATION Right 11/14/2017    Procedure: CYSTOSCOPY RETROGRADE, STENT EXCHANGE;  Surgeon: Jossue Leon MD;  [facility];  Service: Urology    OK CYSTO BLADDER W/URETERAL CATHETERIZATION Right 05/08/2018    Procedure: CYSTOSCOPY, STENT EXCHANGE;  Surgeon: Jossue eLon MD;  [facility];  Service: Urology    OK CYSTO BLADDER W/URETERAL CATHETERIZATION Right 08/14/2018    Procedure: CYSTOSCOPY, STENT EXCHANGE;  Surgeon: Jossue Leon MD;  [facility];  Service: Urology    OK CYSTO BLADDER W/URETERAL CATHETERIZATION Right 11/13/2018    Procedure: CYSTOSCOPY, STENT EXCHANGE, RETROGRADE;  Surgeon: Jossue Leon MD;  [facility];  Service: Urology    OK CYSTO BLADDER W/URETERAL CATHETERIZATION Right 02/12/2019    Procedure: CYSTOSCOPY, RETROGRADE, STENT REMOVAL AND STENT PLACEMENT;  Surgeon: Jossue Leon MD;  [facility];  Service: Urology    OK CYSTO BLADDER W/URETERAL CATHETERIZATION Right 05/10/2019    Procedure: CYSTOSCOPY, RETROGRADE, STENT EXCHANGE;  Surgeon: oJssue Leon MD;  [facility];  Service: Urology    OK CYSTO BLADDER W/URETERAL CATHETERIZATION Right 07/30/2019    Procedure: CYSTOSCOPY, RETROGRADE, STENT REMOVAL AND PLACEMENT OF STENT;  Surgeon: Jossue Leon MD;  [facility];  Service: Urology    OK CYSTO BLADDER W/URETERAL CATHETERIZATION Right 10/22/2019    Procedure: CYSTOSCOPY, RETROGRADE, STENT EXCHANGE;  Surgeon: Jossue Leon MD;  [facility];  Service: Urology    OK CYSTO BLADDER W/URETERAL CATHETERIZATION Right 01/28/2020    Procedure: CYSTOSCOPY, RETROGRADE, STENT REMOVAL AND STENT PLACEMENT;  Surgeon: Jossue Leon MD;  [facility];  Service: Urology    OK CYSTO BLADDER  W/URETERAL CATHETERIZATION Right 05/22/2020    Procedure: CYSTOSCOPY RETROGRADE, STENT EXCHANGE;  Surgeon: Jossue Leon MD;  [facility];  Service: Urology    UT CYSTO BLADDER W/URETERAL CATHETERIZATION Right 08/11/2020    Procedure: CYSTOSCOPY, STENT EXCHANGE, RETROGRADE;  Surgeon: Jossue Leon MD;  [facility];  Service: Urology    UT CYSTO BLADDER W/URETERAL CATHETERIZATION Right 12/15/2020    Procedure: CYSTOSCOPY, RETROGRADE, STENT REMOVAL, AND STENT PLACEMENT;  Surgeon: Jossue Leon MD;  [facility];  Service: Urology    UT CYSTO BLADDER W/URETERAL CATHETERIZATION Right 05/11/2021    Procedure: CYSTOSCOPY RETROGRADE PYELOGRAM WITH STENT EXCHANGE;  Surgeon: Jossue Leon MD;  [facility];  Service: Urology    UT CYSTO BLADDER W/URETERAL CATHETERIZATION Right 10/19/2021    Procedure: CYSTOSCOPY WITH RETROGRADE, STENT EXCHANGE;  Surgeon: Jossue Leon MD;  [facility];  Service: Urology    UT CYSTO BLADDER W/URETERAL CATHETERIZATION Right 07/15/2022    Procedure: CYSTOSCOPY, RETROGRADE PYELOGRAM WITH EXCHANGE STENT URETERAL;  Surgeon: Goyo العلي MD;  [facility];  Service: Urology    UT CYSTO BLADDER W/URETERAL CATHETERIZATION Right 12/4/2023    Procedure: CYSTOSCOPY RETROGRADE PYELOGRAM WITH INSERTION STENT URETERAL;  Surgeon: Goyo العلي MD;  [facility];  Service: Urology    UT CYSTO W/INSERT URETERAL STENT Right 11/14/2017    Procedure: EXCHANGE STENT URETERAL;  Surgeon: Jossue Leon MD;  [facility];  Service: Urology    UT CYSTO W/INSERT URETERAL STENT Right 03/11/2022    Procedure: EXCHANGE STENT URETERAL;  Surgeon: Goyo العلي MD;  [facility];  Service: Urology    UT CYSTO W/INSERT URETERAL STENT Right 01/26/2023    Procedure: EXCHANGE STENT URETERAL;  Surgeon: Goyo العلي MD;  [facility];  Service: Urology    UT CYSTO W/INSERT URETERAL STENT Right 3/27/2024     Procedure: EXCHANGE STENT URETERAL;  Surgeon: Jossue Leon MD;  [facility];  Service: Urology    CT CYSTO/URETERO W/LITHOTRIPSY &INDWELL STENT INSRT Right 05/02/2017    Procedure: CYSTOSCOPY URETEROSCOPY WITH LITHOTRIPSY HOLMIUM LASER, RETROGRADE PYELOGRAM AND INSERTION STENT URETERAL;  Surgeon: Jossue Leon MD;  [facility];  Service: Urology    CT CYSTO/URETERO W/LITHOTRIPSY &INDWELL STENT INSRT Right 05/16/2017    Procedure: CYSTOSCOPY, RETROGRADE PYELOGRAM,  FLEXIBLE URETEROSCOPY,  HOLMIUM LASER LITHOTRIPSY, STONE BASKET MANIPULATION,  STENT URETERAL EXCHANGE;  Surgeon: Jossue Leon MD;  [facility];  Service: Urology    CT CYSTO/URETERO W/LITHOTRIPSY &INDWELL STENT INSRT Right 06/02/2017    Procedure: CYSTOSCOPY, RETROGRADE, STONE MANIPULATION WITH HOLMIUM LASER, STENT PLACEMENT;  Surgeon: Jossue Leon MD;  [facility];  Service: Urology    CT CYSTO/URETERO W/LITHOTRIPSY &INDWELL STENT INSRT Right 07/18/2017    Procedure: CYSTOSCOPY, RETROGRADE, URETEROSCOPY HOLMIUM LASER LITHOTRIPSYWITH STONE MANIPULATION,  AND STENT PLACEMENT;  Surgeon: Jossue Leon MD;  [facility];  Service: Urology    CT CYSTO/URETERO W/LITHOTRIPSY &INDWELL STENT INSRT Right 02/14/2021    Procedure: CYSTOSCOPY URETEROSCOPY, RETROGRADE PYELOGRAM, STONE MANIPULATION AND EXCHANGE STENT URETERAL;  Surgeon: Reji Childers MD;  [facility];  Service: Urology    PROSTATE SURGERY  2015    Laser Prostatectomy  by Dr. Leon    URETERAL STENT PLACEMENT Right 04/18/2017    Procedure: INSERTION STENT URETERAL, RIGHT URETEROSCOPY,  LASER OF URETERAL STRICTURE AND STONE;  Surgeon: Jossue Leon MD;  [facility];  Service:     URETERAL STENT PLACEMENT Right 02/13/2018    Procedure: CYSTOSCOPY, STENT EXCHANGE;  Surgeon: Jossue Leon MD;  [facility];  Service: Urology     Family History   Problem Relation Age of Onset    Hypertension Mother  "    Alcohol abuse Father     Cirrhosis Father     Cancer Son 13        cancer-knee and above-left-amputation    Cancer Sister     Other Brother         head mass    Thyroid disease unspecified Sister     Arthritis Sister     Cancer Sister     Other Brother         legally blind in one eye      reports that he quit smoking about 6 years ago. His smoking use included cigarettes. He started smoking about 69 years ago. He has a 31.4 pack-year smoking history. He has never been exposed to tobacco smoke. He has never used smokeless tobacco. He reports current alcohol use. He reports that he does not use drugs.    Physical Exam:   Vitals:    04/01/24 1447   Pulse: 65   Weight: 93.2 kg (205 lb 6.4 oz)   Height: 5' 10\" (1.778 m)     Body mass index is 29.47 kg/m².    General:  Awake, alert, appears comfortable and in no acute distress.  Nontoxic.  Skin:  No rash, warm, good skin turgor   Eyes:  PERRL, EOMI, sclerae nonicteric.  no periorbital edema   ENT:  Moist mucous membranes  Neck:  Trachea midline, symmetric.  No JVD.  No carotid bruits.  Chest:  Clear to auscultation bilaterally without wheezes, crackles or rhonchi  CVS:  Regular rate and rhythm without murmur, gallop or rub.  S1 and S2 identified and normal.  No S3, S4.   Abdomen:  Soft, nontender, nondistended without masses.  Normal bowel sounds x 4 quadrants.  No bruit.  Extremities:  Warm, pink, motor and sensory intact and well perfused.  No cyanosis, pallor.  trace BLE edema.  Neuro:  Awake, alert, oriented x3.  Grossly intact  Psych:  Appropriate affect.  Mentating appropriately.  Normal mental status exam  :  +moore catheter in place      Procedure:  No results found for this or any previous visit.    Lab Results   Component Value Date    GLUCOSE 135 11/03/2020    CALCIUM 8.9 03/19/2024    K 4.6 03/19/2024    CO2 29 03/19/2024     03/19/2024    BUN 35 (H) 03/19/2024    CREATININE 1.58 (H) 03/19/2024             Invalid input(s): \"ALBUMIN\"  Imaging " studies:  Renal ultrasound 2/12/2024:  Imaging study and images personally reviewed.  Normal echogenicity and contour bilaterally.  Bilateral renal cysts.  Mild right hydronephrosis improved from 2017 slightly increased from CT of 2021.  Shadowing calculus 6 mm in right kidney with additional echogenic foci which may represent stones.  No perinephric fluid collections.  No left hydronephrosis.  Prostamegaly.    EMR, including Epic, Care Everywhere and outside scanned documents reviewed.  I have personally reviewed the blood work as stated above and in my note.  I have personally reviewed renal ultrasound imaging studies.  I have personally reviewed PCP, consultants and prior nephrology notes.

## 2024-03-26 NOTE — PRE-PROCEDURE INSTRUCTIONS
Pre-Surgery Instructions:   Medication Instructions    amLODIPine (NORVASC) 5 mg tablet Take night before surgery    amoxicillin-clavulanate (AUGMENTIN) 875-125 mg per tablet Take night before surgery    ascorbic acid (VITAMIN C) 250 mg tablet Stop taking 2 days prior to surgery.    aspirin (ECOTRIN LOW STRENGTH) 81 mg EC tablet Stop taking 7 days prior to surgery.    atorvastatin (LIPITOR) 40 mg tablet Take night before surgery    cholecalciferol (VITAMIN D3) 1,000 units tablet Stop taking 2 days prior to surgery.    clopidogrel (PLAVIX) 75 mg tablet Instructions provided by MD    ferrous sulfate 324 (65 Fe) mg Stop taking 2 days prior to surgery.    finasteride (PROSCAR) 5 mg tablet Hold day of surgery.    furosemide (LASIX) 20 mg tablet Hold day of surgery.    metoprolol tartrate (LOPRESSOR) 50 mg tablet Take day of surgery.    sertraline (ZOLOFT) 50 mg tablet Take night before surgery   LD plavix 03/22/24. Medication instructions for day surgery reviewed. Please use only a sip of water to take your instructed medications. Avoid all over the counter vitamins, supplements and NSAIDS for one week prior to surgery per anesthesia guidelines. Tylenol is ok to take as needed.     You will receive a call one business day prior to surgery with an arrival time and hospital directions. If your surgery is scheduled on a Monday, the hospital will be calling you on the Friday prior to your surgery. If you have not heard from anyone by 8pm, please call the hospital supervisor through the hospital  at [phone]. ([place] [phone] or [place] [phone]).    Do not eat or drink anything after midnight the night before your surgery, including candy, mints, lifesavers, or chewing gum. Do not drink alcohol 24hrs before your surgery. Try not to smoke at least 24hrs before your surgery.       Follow the pre surgery showering instructions as listed in the “My Surgical Experience Booklet” or otherwise provided by your  surgeon's office. Do not use a blade to shave the surgical area 1 week before surgery. It is okay to use a clean electric clippers up to 24 hours before surgery. Do not apply any lotions, creams, including makeup, cologne, deodorant, or perfumes after showering on the day of your surgery. Do not use dry shampoo, hair spray, hair gel, or any type of hair products.     No contact lenses, eye make-up, or artificial eyelashes. Remove nail polish, including gel polish, and any artificial, gel, or acrylic nails if possible. Remove all jewelry including rings and body piercing jewelry.     Wear causal clothing that is easy to take on and off. Consider your type of surgery.    Keep any valuables, jewelry, piercings at home. Please bring any specially ordered equipment (sling, braces) if indicated.    Arrange for a responsible person to drive you to and from the hospital on the day of your surgery. Please confirm the visitor policy for the day of your procedure when you receive your phone call with an arrival time.     Call the surgeon's office with any new illnesses, exposures, or additional questions prior to surgery.    Please reference your “My Surgical Experience Booklet” for additional information to prepare for your upcoming surgery.

## 2024-03-26 NOTE — PATIENT INSTRUCTIONS
Your kidney function remains stable.  Most recent creatinine 1.58, stable and at baseline.  We will continue routine surveillance as outlined below.  refill for Lasix sent to your pharmacy  Avoid all NSAIDs to include ibuprofen, Motrin, Aleve, Advil, Naproxen, Celebrex, Indomethacin, Toradol.  Stay well hydrated.   Continue all prescribed medications.  Call if blood pressure consistently more than 140/90 or less than 110/50s.  High and low blood pressures may affect your kidney function.  Recommend low salt diet (2 gm sodium diet).  Please let us know if you are scheduled for any studies with IV contrast (ex: CT scan, arteriogram or cardiac catheterization)   Follow up in 4 months with Dr. Negron with repeat labs prior to appointment.  Please contact the office with new symptoms or concerns.

## 2024-03-27 ENCOUNTER — ANESTHESIA (OUTPATIENT)
Dept: PERIOP | Facility: HOSPITAL | Age: 84
End: 2024-03-27
Payer: MEDICARE

## 2024-03-27 ENCOUNTER — HOSPITAL ENCOUNTER (OUTPATIENT)
Facility: HOSPITAL | Age: 84
Setting detail: OUTPATIENT SURGERY
Discharge: HOME | End: 2024-03-27
Attending: UROLOGY | Admitting: UROLOGY
Payer: MEDICARE

## 2024-03-27 ENCOUNTER — APPOINTMENT (OUTPATIENT)
Dept: RADIOLOGY | Facility: HOSPITAL | Age: 84
End: 2024-03-27
Payer: MEDICARE

## 2024-03-27 VITALS
OXYGEN SATURATION: 99 % | RESPIRATION RATE: 18 BRPM | DIASTOLIC BLOOD PRESSURE: 80 MMHG | SYSTOLIC BLOOD PRESSURE: 123 MMHG | HEART RATE: 71 BPM | TEMPERATURE: 97.2 F

## 2024-03-27 PROCEDURE — C1769 GUIDE WIRE: HCPCS | Performed by: UROLOGY

## 2024-03-27 PROCEDURE — C2617 STENT, NON-COR, TEM W/O DEL: HCPCS | Performed by: UROLOGY

## 2024-03-27 PROCEDURE — NC001 PR NO CHARGE: Performed by: UROLOGY

## 2024-03-27 PROCEDURE — 52332 CYSTOSCOPY AND TREATMENT: CPT | Performed by: UROLOGY

## 2024-03-27 PROCEDURE — 74420 UROGRAPHY RTRGR +-KUB: CPT

## 2024-03-27 DEVICE — INLAY URETERAL STENT W/O GUIDEWIRE
Type: IMPLANTABLE DEVICE | Site: URETER | Status: FUNCTIONAL
Brand: BARD® INLAY® URETERAL STENT

## 2024-03-27 RX ORDER — SODIUM CHLORIDE, SODIUM LACTATE, POTASSIUM CHLORIDE, CALCIUM CHLORIDE 600; 310; 30; 20 MG/100ML; MG/100ML; MG/100ML; MG/100ML
100 INJECTION, SOLUTION INTRAVENOUS CONTINUOUS
Status: DISCONTINUED | OUTPATIENT
Start: 2024-03-27 | End: 2024-03-27 | Stop reason: HOSPADM

## 2024-03-27 RX ORDER — PROPOFOL 10 MG/ML
INJECTION, EMULSION INTRAVENOUS AS NEEDED
Status: DISCONTINUED | OUTPATIENT
Start: 2024-03-27 | End: 2024-03-27

## 2024-03-27 RX ORDER — FENTANYL CITRATE 50 UG/ML
INJECTION, SOLUTION INTRAMUSCULAR; INTRAVENOUS AS NEEDED
Status: DISCONTINUED | OUTPATIENT
Start: 2024-03-27 | End: 2024-03-27

## 2024-03-27 RX ORDER — MAGNESIUM HYDROXIDE 1200 MG/15ML
LIQUID ORAL AS NEEDED
Status: DISCONTINUED | OUTPATIENT
Start: 2024-03-27 | End: 2024-03-27 | Stop reason: HOSPADM

## 2024-03-27 RX ORDER — CEFAZOLIN SODIUM 2 G/50ML
2000 SOLUTION INTRAVENOUS ONCE
Status: COMPLETED | OUTPATIENT
Start: 2024-03-27 | End: 2024-03-27

## 2024-03-27 RX ORDER — ONDANSETRON 2 MG/ML
4 INJECTION INTRAMUSCULAR; INTRAVENOUS ONCE AS NEEDED
Status: DISCONTINUED | OUTPATIENT
Start: 2024-03-27 | End: 2024-03-27 | Stop reason: HOSPADM

## 2024-03-27 RX ORDER — SODIUM CHLORIDE, SODIUM LACTATE, POTASSIUM CHLORIDE, CALCIUM CHLORIDE 600; 310; 30; 20 MG/100ML; MG/100ML; MG/100ML; MG/100ML
125 INJECTION, SOLUTION INTRAVENOUS CONTINUOUS
Status: DISCONTINUED | OUTPATIENT
Start: 2024-03-27 | End: 2024-03-27

## 2024-03-27 RX ORDER — LIDOCAINE HYDROCHLORIDE 10 MG/ML
INJECTION, SOLUTION EPIDURAL; INFILTRATION; INTRACAUDAL; PERINEURAL AS NEEDED
Status: DISCONTINUED | OUTPATIENT
Start: 2024-03-27 | End: 2024-03-27

## 2024-03-27 RX ORDER — SODIUM CHLORIDE, SODIUM LACTATE, POTASSIUM CHLORIDE, CALCIUM CHLORIDE 600; 310; 30; 20 MG/100ML; MG/100ML; MG/100ML; MG/100ML
75 INJECTION, SOLUTION INTRAVENOUS CONTINUOUS
Status: DISCONTINUED | OUTPATIENT
Start: 2024-03-27 | End: 2024-03-27 | Stop reason: HOSPADM

## 2024-03-27 RX ORDER — FENTANYL CITRATE/PF 50 MCG/ML
50 SYRINGE (ML) INJECTION
Status: DISCONTINUED | OUTPATIENT
Start: 2024-03-27 | End: 2024-03-27 | Stop reason: HOSPADM

## 2024-03-27 RX ORDER — PROPOFOL 10 MG/ML
INJECTION, EMULSION INTRAVENOUS CONTINUOUS PRN
Status: DISCONTINUED | OUTPATIENT
Start: 2024-03-27 | End: 2024-03-27

## 2024-03-27 RX ADMIN — PROPOFOL 80 MCG/KG/MIN: 10 INJECTION, EMULSION INTRAVENOUS at 08:37

## 2024-03-27 RX ADMIN — PROPOFOL 50 MG: 10 INJECTION, EMULSION INTRAVENOUS at 08:37

## 2024-03-27 RX ADMIN — LIDOCAINE HYDROCHLORIDE 50 MG: 10 INJECTION, SOLUTION EPIDURAL; INFILTRATION; INTRACAUDAL; PERINEURAL at 08:37

## 2024-03-27 RX ADMIN — FENTANYL CITRATE 25 MCG: 50 INJECTION, SOLUTION INTRAMUSCULAR; INTRAVENOUS at 08:37

## 2024-03-27 RX ADMIN — FENTANYL CITRATE 25 MCG: 50 INJECTION, SOLUTION INTRAMUSCULAR; INTRAVENOUS at 08:47

## 2024-03-27 RX ADMIN — SODIUM CHLORIDE, SODIUM LACTATE, POTASSIUM CHLORIDE, AND CALCIUM CHLORIDE 125 ML/HR: .6; .31; .03; .02 INJECTION, SOLUTION INTRAVENOUS at 08:22

## 2024-03-27 RX ADMIN — CEFAZOLIN SODIUM 2000 MG: 2 SOLUTION INTRAVENOUS at 08:35

## 2024-03-27 NOTE — H&P
"History & Physical - [facility] Urology  Dioni Melo 83 y.o. 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  Resonance stent was placed December 2023.  Renal function is roughly stable with creatinine 1.58 recently.  Cystoscopy in the office showed the metal stent starting to encrust distally.  Renal ultrasound showing worsening hydronephrosis from 2021 but improved from 2017 so hard to say if this is actually functioning properly.  Patient without sepsis.  Aerococcus UTI.  On antibiotics. Consent signed for right side.    PLAN:  Cystoscopy right retrograde right ureteral stent placement    HISTORY OF PRESENT ILLNESS:  83-year-old gentleman with longstanding history of right ureteral obstruction.  Was going through every 3 month stent exchanges for the last several years without incident.  Was switched to a resonance stent in December.    PAST MEDICAL HISTORY:  Past Medical History:   Diagnosis Date    Anemia     iron deficiency    Aneurysm (HCC) 2020    of brain  being monitored    Dental disease     Essential hypertension, long-standing     Heart murmur     per pt \"Aortic Valve replacement 2021 with Watchman device implanted /2021\"    Hematuria     intermittent    History of cigarette smoking, chronic     62 year smoker at one half pack per day, quit 04/1/17    History of COVID-19 01/19/2022    recovered at home/chief s/s only sore throat    History of kidney stones     History of pneumonia     HL (hearing loss)     Hyperlipidemia     Hypertension     Irregular heart beat     Kidney stone     Muscle weakness     right sided weakness has gotten better/ walks independantly\"    Stroke (HCC) 10/29/2020    right sided  weakness almost full recovery    Stroke ([facility])     Teeth missing     Vitamin D deficiency     maintained with 1000 units of D    Water retention     Wears glasses        PAST SURGICAL " HISTORY:  Past Surgical History:   Procedure Laterality Date    APPENDECTOMY  1960    CARDIAC CATHETERIZATION  10/02/2023    CARDIAC SURGERY      watchman [id number]; aortic valve replaced 2021(pig valve)    CAROTID ENDARTERECTOMY Left 1998    Supposedly no internal stenosis found    CYSTOSCOPY Right 08/15/2017    Procedure: CYSTOSCOPY RIGHT STENT EXCHANGE;  Surgeon: Jossue Leon MD;  Location: [facility];  Service: Urology    CYSTOSCOPY      CYSTOSCOPY N/A 03/11/2022    Procedure: CYSTOSCOPY, RIGHT RETROGRADE PYLEOGRAM;  Surgeon: Goyo العلي MD;  Location:  MAIN OR;  Service: Urology    CYSTOSCOPY W/ URETERAL STENT PLACEMENT Right 06/13/2023    Procedure: CYSTOSCOPY, RETROGRADE PYELOGRAM,EXCHANGE STENT URETERAL;  Surgeon: Goyo العلي MD;  Location:  MAIN OR;  Service: Urology    EXTRACORPOREAL SHOCK WAVE LITHOTRIPSY  03/2017    For nephrolithiasis at [facility]    FL RETROGRADE PYELOGRAM  05/10/2019    FL RETROGRADE PYELOGRAM  07/30/2019    FL RETROGRADE PYELOGRAM  10/22/2019    FL RETROGRADE PYELOGRAM  01/28/2020    FL RETROGRADE PYELOGRAM  08/11/2020    FL RETROGRADE PYELOGRAM  12/15/2020    FL RETROGRADE PYELOGRAM  02/14/2021    FL RETROGRADE PYELOGRAM  05/11/2021    FL RETROGRADE PYELOGRAM  10/19/2021    FL RETROGRADE PYELOGRAM  03/11/2022    FL RETROGRADE PYELOGRAM  07/15/2022    FL RETROGRADE PYELOGRAM  01/26/2023    FL RETROGRADE PYELOGRAM  06/13/2023    FL RETROGRADE PYELOGRAM  12/4/2023    AR CYSTO BLADDER W/URETERAL CATHETERIZATION Right 11/14/2017    Procedure: CYSTOSCOPY RETROGRADE, STENT EXCHANGE;  Surgeon: Jossue Leon MD;  [facility];  Service: Urology    AR CYSTO BLADDER W/URETERAL CATHETERIZATION Right 05/08/2018    Procedure: CYSTOSCOPY, STENT EXCHANGE;  Surgeon: Jossue Leon MD;  [facility];  Service: Urology    AR CYSTO BLADDER W/URETERAL CATHETERIZATION Right 08/14/2018    Procedure: CYSTOSCOPY, STENT EXCHANGE;  Surgeon: Jossue  MD Carolyn;  [facility];  Service: Urology    MN CYSTO BLADDER W/URETERAL CATHETERIZATION Right 11/13/2018    Procedure: CYSTOSCOPY, STENT EXCHANGE, RETROGRADE;  Surgeon: Jossue Leon MD;  [facility];  Service: Urology    MN CYSTO BLADDER W/URETERAL CATHETERIZATION Right 02/12/2019    Procedure: CYSTOSCOPY, RETROGRADE, STENT REMOVAL AND STENT PLACEMENT;  Surgeon: Jossue Leon MD;  [facility];  Service: Urology    MN CYSTO BLADDER W/URETERAL CATHETERIZATION Right 05/10/2019    Procedure: CYSTOSCOPY, RETROGRADE, STENT EXCHANGE;  Surgeon: Jossue Leon MD;  [facility];  Service: Urology    MN CYSTO BLADDER W/URETERAL CATHETERIZATION Right 07/30/2019    Procedure: CYSTOSCOPY, RETROGRADE, STENT REMOVAL AND PLACEMENT OF STENT;  Surgeon: Jossue Leon MD;  [facility];  Service: Urology    MN CYSTO BLADDER W/URETERAL CATHETERIZATION Right 10/22/2019    Procedure: CYSTOSCOPY, RETROGRADE, STENT EXCHANGE;  Surgeon: Jossue Leon MD;  [facility];  Service: Urology    MN CYSTO BLADDER W/URETERAL CATHETERIZATION Right 01/28/2020    Procedure: CYSTOSCOPY, RETROGRADE, STENT REMOVAL AND STENT PLACEMENT;  Surgeon: Jossue eLon MD;  [facility];  Service: Urology    MN CYSTO BLADDER W/URETERAL CATHETERIZATION Right 05/22/2020    Procedure: CYSTOSCOPY RETROGRADE, STENT EXCHANGE;  Surgeon: Jossue Leon MD;  [facility];  Service: Urology    MN CYSTO BLADDER W/URETERAL CATHETERIZATION Right 08/11/2020    Procedure: CYSTOSCOPY, STENT EXCHANGE, RETROGRADE;  Surgeon: Jossue Leon MD;  [facility];  Service: Urology    MN CYSTO BLADDER W/URETERAL CATHETERIZATION Right 12/15/2020    Procedure: CYSTOSCOPY, RETROGRADE, STENT REMOVAL, AND STENT PLACEMENT;  Surgeon: Jossue Leon MD;  [facility];  Service: Urology    MN CYSTO BLADDER W/URETERAL CATHETERIZATION Right 05/11/2021    Procedure:  CYSTOSCOPY RETROGRADE PYELOGRAM WITH STENT EXCHANGE;  Surgeon: Jossue Leon MD;  [facility];  Service: Urology    DE CYSTO BLADDER W/URETERAL CATHETERIZATION Right 10/19/2021    Procedure: CYSTOSCOPY WITH RETROGRADE, STENT EXCHANGE;  Surgeon: Jossue Leon MD;  [facility];  Service: Urology    DE CYSTO BLADDER W/URETERAL CATHETERIZATION Right 07/15/2022    Procedure: CYSTOSCOPY, RETROGRADE PYELOGRAM WITH EXCHANGE STENT URETERAL;  Surgeon: Goyo العلي MD;  [facility];  Service: Urology    DE CYSTO BLADDER W/URETERAL CATHETERIZATION Right 12/4/2023    Procedure: CYSTOSCOPY RETROGRADE PYELOGRAM WITH INSERTION STENT URETERAL;  Surgeon: Goyo العلي MD;  [facility];  Service: Urology    DE CYSTO W/INSERT URETERAL STENT Right 11/14/2017    Procedure: EXCHANGE STENT URETERAL;  Surgeon: Jossue Leon MD;  [facility];  Service: Urology    DE CYSTO W/INSERT URETERAL STENT Right 03/11/2022    Procedure: EXCHANGE STENT URETERAL;  Surgeon: Goyo العلي MD;  [facility];  Service: Urology    DE CYSTO W/INSERT URETERAL STENT Right 01/26/2023    Procedure: EXCHANGE STENT URETERAL;  Surgeon: Goyo العلي MD;  [facility];  Service: Urology    DE CYSTO/URETERO W/LITHOTRIPSY &INDWELL STENT INSRT Right 05/02/2017    Procedure: CYSTOSCOPY URETEROSCOPY WITH LITHOTRIPSY HOLMIUM LASER, RETROGRADE PYELOGRAM AND INSERTION STENT URETERAL;  Surgeon: Jossue Leon MD;  [facility];  Service: Urology    DE CYSTO/URETERO W/LITHOTRIPSY &INDWELL STENT INSRT Right 05/16/2017    Procedure: CYSTOSCOPY, RETROGRADE PYELOGRAM,  FLEXIBLE URETEROSCOPY,  HOLMIUM LASER LITHOTRIPSY, STONE BASKET MANIPULATION,  STENT URETERAL EXCHANGE;  Surgeon: Jossue Leon MD;  [facility];  Service: Urology    DE CYSTO/URETERO W/LITHOTRIPSY &INDWELL STENT INSRT Right 06/02/2017    Procedure: CYSTOSCOPY, RETROGRADE, STONE MANIPULATION WITH HOLMIUM LASER,  STENT PLACEMENT;  Surgeon: Jossue Leon MD;  [facility];  Service: Urology    MO CYSTO/URETERO W/LITHOTRIPSY &INDWELL STENT INSRT Right 2017    Procedure: CYSTOSCOPY, RETROGRADE, URETEROSCOPY HOLMIUM LASER LITHOTRIPSYWITH STONE MANIPULATION,  AND STENT PLACEMENT;  Surgeon: Jossue Leon MD;  [facility];  Service: Urology    MO CYSTO/URETERO W/LITHOTRIPSY &INDWELL STENT INSRT Right 2021    Procedure: CYSTOSCOPY URETEROSCOPY, RETROGRADE PYELOGRAM, STONE MANIPULATION AND EXCHANGE STENT URETERAL;  Surgeon: Reji Childers MD;  [facility];  Service: Urology    PROSTATE SURGERY  2015    Laser Prostatectomy  by Dr. Leon    URETERAL STENT PLACEMENT Right 2017    Procedure: INSERTION STENT URETERAL, RIGHT URETEROSCOPY,  LASER OF URETERAL STRICTURE AND STONE;  Surgeon: Jossue Leon MD;  [facility];  Service:     URETERAL STENT PLACEMENT Right 2018    Procedure: CYSTOSCOPY, STENT EXCHANGE;  Surgeon: Jossue Leon MD;  [facility];  Service: Urology       ALLERGIES:  No Known Allergies    SOCIAL HISTORY:  Social History     Substance and Sexual Activity   Alcohol Use Not Currently    Comment: don't drink anymore.     Social History     Substance and Sexual Activity   Drug Use No     Social History     Tobacco Use   Smoking Status Former    Current packs/day: 0.00    Average packs/day: 0.5 packs/day for 62.8 years (31.4 ttl pk-yrs)    Types: Cigarettes    Start date: 6/15/1954    Quit date: 4/15/2017    Years since quittin.9    Passive exposure: Never   Smokeless Tobacco Never       FAMILY HISTORY:  Family History   Problem Relation Age of Onset    Hypertension Mother     Alcohol abuse Father     Cirrhosis Father     Cancer Son 13        cancer-knee and above-left-amputation    Cancer Sister     Other Brother         head mass    Thyroid disease unspecified Sister     Arthritis Sister     Cancer Sister     Other Brother          "legally blind in one eye       MEDICATIONS:  No current facility-administered medications for this encounter.    Review of Systems    PHYSICAL EXAM:  Physical Exam  Constitutional:       Appearance: He is not ill-appearing.   HENT:      Head: Normocephalic.   Cardiovascular:      Rate and Rhythm: Normal rate.   Pulmonary:      Effort: Pulmonary effort is normal.   Abdominal:      General: Abdomen is flat.   Skin:     General: Skin is warm.   Neurological:      General: No focal deficit present.      Mental Status: He is alert.   Psychiatric:         Mood and Affect: Mood normal.         LAB RESULTS:  Lab Results   Component Value Date    WBC 9.23 03/19/2024    HGB 12.7 03/19/2024    HCT 40.0 03/19/2024    MCV 89 03/19/2024     (L) 03/19/2024     Lab Results   Component Value Date    SODIUM 139 03/19/2024    K 4.6 03/19/2024     03/19/2024    CO2 29 03/19/2024    BUN 35 (H) 03/19/2024    CREATININE 1.58 (H) 03/19/2024    GLUC 106 08/09/2023    CALCIUM 8.9 03/19/2024     Lab Results   Component Value Date    CALCIUM 8.9 03/19/2024    PHOS 2.8 02/01/2024     No results found for: \"PSA\"    OTHER STUDIES:  Ultrasound kidneys bladder 2/12/2024  1.  Mild right-sided hydronephrosis and proximal hydroureter with stent in place. This is greatly improved from 2017 though appears mildly increased from the most recent CT from 2021.     2.  6 mm nonobstructing right renal calculus. Additional echogenic foci may represent vascular calcifications as there is no shadowing though additional small stones not excluded.     3.  Bilateral simple appearing renal cysts without suspicious features.     4.  Prostatomegaly.    Jossue Leon MD  03/27/24    Portions of the record may have been created with voice recognition software.  Occasional wrong word or \"sound alike\" substitutions may have occurred due to the inherent limitations of voice recognition software.  Read the chart carefully and recognize, using context, " where substitutions have occurred.

## 2024-03-27 NOTE — ANESTHESIA POSTPROCEDURE EVALUATION
Post-Op Assessment Note    CV Status:  Stable  Pain Score: 0    Pain management: adequate       Mental Status:  Sleepy   Hydration Status:  Stable   PONV Controlled:  None   Airway Patency:  Patent and adequate     Post Op Vitals Reviewed: Yes    No anethesia notable event occurred.    Staff: CRNA               BP      Temp      Pulse     Resp      SpO2

## 2024-03-27 NOTE — ANESTHESIA PREPROCEDURE EVALUATION
Procedure:  EXCHANGE STENT URETERAL (Right: Abdomen)    Relevant Problems   ANESTHESIA (within normal limits)      CARDIO   (+) Benign essential hypertension   (+) Chronic atrial fibrillation (HCC)   (+) History of aortic valve replacement   (+) Moderate aortic regurgitation   (+) Moderate mitral regurgitation   (+) Nonrheumatic aortic valve stenosis      ENDO   (+) Secondary hyperparathyroidism of renal origin (HCC)      GI/HEPATIC   (+) GERD (gastroesophageal reflux disease)      /RENAL   (+) Benign hypertension with CKD (chronic kidney disease) stage III ([facility])   (+) Chronic kidney disease-mineral and bone disorder   (+) Hydronephrosis with ureteral stricture, not elsewhere classified   (+) Renal calculi   (+) Stage 3b chronic kidney disease (HCC)      HEMATOLOGY   (+) Iron deficiency anemia, unspecified      NEURO/PSYCH   (+) Anxiety   (+) CVA (cerebral vascular accident) ([facility])   (+) Stroke ([facility])   (+) Transient diplopia      PULMONARY   (+) Shortness of breath      Echo 08/07/2023:  LVEF 55%, normal RV systolic function. S/p TAVR bioprosthetic valve without significant regurgitation or stenosis. Mild MR, mild TR, mild PI    Physical Exam    Airway    Mallampati score: II  TM Distance: >3 FB  Neck ROM: full     Dental    upper dentures    Cardiovascular  Rhythm: regular, Rate: normal    Pulmonary   Breath sounds clear to auscultation    Other Findings        Anesthesia Plan  ASA Score- 3     Anesthesia Type- IV sedation with anesthesia with ASA Monitors.         Additional Monitors:     Airway Plan:            Plan Factors-Exercise tolerance (METS): >4 METS.    Chart reviewed.  Imaging results reviewed. Existing labs reviewed. Patient summary reviewed.    Patient is not a current smoker.      Obstructive sleep apnea risk education given perioperatively.        Induction- intravenous.    Postoperative Plan- Plan for postoperative opioid use. Planned trial extubation    Informed Consent- Anesthetic plan and risks  discussed with patient.  I personally reviewed this patient with the CRNA. Discussed and agreed on the Anesthesia Plan with the CRNA..

## 2024-03-27 NOTE — DISCHARGE INSTR - AVS FIRST PAGE
The stent was exchanged without any complication.  Actually looked pretty clean once I got it out and looked at the length of it.  Might be something that we could go back to in the future if the open plastic stents become an issue.  Be sure to finish your antibiotics.  Drink plenty of water.

## 2024-03-27 NOTE — OP NOTE
OPERATIVE REPORT- Dr. Leon  PATIENT NAME: Dioni Melo    :  1940  MRN: [id number]  Pt Location: [facility] ROOM 04    SURGERY DATE: 3/27/2024    Surgeon: Jossue Leon MD    Pre-op Diagnosis:  Right ureteral obstruction    Post-op Diagnosis:  Same    Procedure:  Cystoscopy, fluoroscopy, right retrograde pyelography, right ureteral stent exchange.  Resonance stent removed new 7 [place]awk plastic stent replaced    Specimen(s): * No specimens in log *    Estimated Blood Loss: Minimal    Complications: None    Drains: 7 [place]awk 26 cm right ureteral stent    Anesthesia type: Gen LMA    Indications for surgery: Right ureteral obstruction from strictures from prior surgeries    Findings: Wire would not go alongside stent.  Required stent to be removed down past the obstruction for the wire to pass proximally.  Mild purulent debris in the right collecting system.  Mild purulent material and bladder irrigated out at the start of the case.  The end of the resonance stent was starting to calcified.  The stent once it was removed showed that the midportion of the stent, within the ureter, was actually fairly clear.    Procedure and Technique:   After obtaining consent and identifying the patient, antibiotics were given as ordered and the patient was brought to the room.  All appropriate leads and monitors were placed and the patient was appropriately positioned on the table.  Anesthesia was administered and the patient was sterilely prepped and draped.  A timeout was performed where the patient name, , procedure, antibiotics, allergies, etc. were discussed.  All in the room were satisfied before the start of the operative procedure.  What follows are the operative findings and events.     Cystoscopy was undertaken.  Bladder was irrigated clear.  Wire was put up alongside the resonance stent.  Doubled back on itself.  Stent was grasped and pulled through the sheath of the scope under x-ray guidance.  The wire  "was passed up into the kidney as soon as the resonance stent was past the point of obstruction.  The open-ended catheter was placed over the wire up to the kidney.  Fluid was removed from the right renal pelvis.  Gentle contrast was injected to outline the renal pelvis.  Slight hydronephrosis.  Stones in the lower pole.  Wire reintroduced.  Catheter removed.  Wire used to to place stent over into the kidney.  Wire removed.  Good coils proximally and distally on x-ray and visibly.  Bladder was drained.  The procedure was terminated and the patient was awakened without incident and transferred to the PACU in satisfactory condition.    Plan:  1.  Stent exchange in 3 to 4 months.    SIGNATURE: Jossue Leon MD  DATE: March 27, 2024  TIME: 9:08 AM    Portions of the record may have been created with voice recognition software.  Occasional wrong word or \"sound alike\" substitutions may have occurred due to the inherent limitations of voice recognition software.  Read the chart carefully and recognize, using context, where substitutions have occurred.  "

## 2024-03-27 NOTE — PERIOPERATIVE NURSING NOTE
Received from PACU,  denies any pain, IV infusing as ordered.  Taking fluids by mouth,  Resting comfortable.

## 2024-03-27 NOTE — PERIOPERATIVE NURSING NOTE
Asissted to bathroom, steady gait.  Voided without difficulty. Complete discharge instructions reviewed with patient and daughter,.

## 2024-03-28 ENCOUNTER — PROCEDURE VISIT (OUTPATIENT)
Dept: UROLOGY | Facility: MEDICAL CENTER | Age: 84
End: 2024-03-28

## 2024-03-28 ENCOUNTER — TELEPHONE (OUTPATIENT)
Dept: UROLOGY | Facility: MEDICAL CENTER | Age: 84
End: 2024-03-28

## 2024-03-28 VITALS
HEIGHT: 70 IN | WEIGHT: 207 LBS | BODY MASS INDEX: 29.63 KG/M2 | HEART RATE: 63 BPM | SYSTOLIC BLOOD PRESSURE: 138 MMHG | DIASTOLIC BLOOD PRESSURE: 78 MMHG

## 2024-03-28 DIAGNOSIS — N20.0 RENAL CALCULI: Primary | ICD-10-CM

## 2024-03-28 DIAGNOSIS — R33.9 URINARY RETENTION: Primary | ICD-10-CM

## 2024-03-28 LAB — POST-VOID RESIDUAL VOLUME, ML POC: 602 ML

## 2024-03-28 NOTE — PROGRESS NOTES
"3/28/2024  Dioni Melo is a 83 y.o. male  [id number]    Diagnosis:  Chief Complaint    Urinary Retention         Patient presents for follow up post void residual s/p EXCHANGE STENT URETERAL (Right: Abdomen) on 3/27/24 with Dr. Leon      Plan:    Return on 4/10/24 for moore removal/void trial.  Pt's daughter unable to schedule visit any earlier than this date.      Assessment:    Pt's daughter contacted office to state pt was having trouble urinating since yesterday's surgery.  Pt voided upon arrival to the office.  PVR performed measuring 602 mls.  Moore catheter placed and bladder drained of 700 mls cloudy brown urine.      Vitals:    03/28/24 1349   BP: 138/78   Pulse: 63   Weight: 93.9 kg (207 lb)   Height: 5' 10\" (1.778 m)       Recent Results (from the past 6 hour(s))   POCT Measure PVR    Collection Time: 03/28/24  2:47 PM   Result Value Ref Range    POST-VOID RESIDUAL VOLUME, ML  mL           Ewa Salcedo RN     "

## 2024-03-28 NOTE — TELEPHONE ENCOUNTER
Patient of Dr. Leon, surgery done 3/27/24    Miguelito, patients daughter calling in with concerns that patient is c/o of urgency, urine only dribbling out, feels like he isn't emptying, brownish pink urine, burning with  urination at times, pain level 5 out of 10. Patient states he has had these sxs for 2 days.    Patient is on abx for UTI and had stent exchange yesterday. Drinks about 16 ounces of water daily.    Did discuss some of his sxs are to be expected after stent exchange. Reviewed ED precautions.     Please advise

## 2024-03-28 NOTE — PROGRESS NOTES
Universal Protocol:  Consent: Verbal consent obtained.  Risks and benefits: risks, benefits and alternatives were discussed  Consent given by: patient  Patient understanding: patient states understanding of the procedure being performed  Patient identity confirmed: verbally with patient    Bladder catheterization    Date/Time: 3/28/2024 2:00 PM    Performed by: Ewa Salcedo RN  Authorized by: Jossue Leon MD    Patient location:  Other (comment)  Consent:     Consent given by:  Patient  Universal protocol:     Procedure explained and questions answered to patient or proxy's satisfaction: yes      Patient identity confirmed:  Verbally with patient  Pre-procedure details:     Procedure purpose:  Therapeutic    Preparation: Patient was prepped and draped in usual sterile fashion    Anesthesia (see MAR for exact dosages):     Anesthesia method:  Topical application    Topical anesthetic:  Lidocaine gel  Procedure details:     Bladder irrigation: no      Catheter insertion:  Temporary indwelling    Catheter type:  Coude, Farooq and latex    Catheter size:  16 Fr    Number of attempts:  1    Successful placement: yes      Urine appearance: cloudy brown.  Post-procedure details:     Patient tolerance of procedure:  Tolerated well, no immediate complications  Comments:      Farooq attached to leg bag with stat lock.  Bladder drained of 700 mls cloudy brown urine.

## 2024-03-28 NOTE — TELEPHONE ENCOUNTER
Spoke to pt's daughter per comm consent.  Advised of Murphy's recommendations in previous note. Daughter states pt feels like he is in retention and that this happened before with a previous stent exchange.      Pt had stent placement yesterday with Dr. Leon.  They live in [place].  Advised that they would receive a call back regarding nurse visit for PVR this afternoon as the offices are all booked.

## 2024-03-28 NOTE — TELEPHONE ENCOUNTER
Please advise patient that with the stent in place having blood within the urine, dysuria, flank discomfort, suprapubic pressure, increased frequency and urgency is unfortunately to be somewhat expected.  Can trial medication such as Flomax (dizziness or lightheadedness and change in body position to be the main concern with this medication) or oxybutynin 5 mg for bladder spasms (would help with sense of urgency but side effects can include dry eyes, dry mouth, constipation, possible cognitive effects).  I do think that patient should be seen for nurse visit for PVR due to his concern for dribbling of urine and recent anesthesia to ensure that patient is not in retention.  Would not initiate oxybutynin until patient had PVR

## 2024-03-29 ENCOUNTER — TELEPHONE (OUTPATIENT)
Dept: UROLOGY | Facility: CLINIC | Age: 84
End: 2024-03-29

## 2024-03-29 NOTE — TELEPHONE ENCOUNTER
Patient had stent exchange 3/27/24 with Dr Leon.     Plan per OP note:  Plan:  1.  Stent exchange in 3 to 4 months.

## 2024-04-01 ENCOUNTER — OFFICE VISIT (OUTPATIENT)
Dept: NEPHROLOGY | Facility: CLINIC | Age: 84
End: 2024-04-01
Payer: MEDICARE

## 2024-04-01 VITALS — WEIGHT: 205.4 LBS | HEIGHT: 70 IN | HEART RATE: 65 BPM | BODY MASS INDEX: 29.41 KG/M2

## 2024-04-01 DIAGNOSIS — N18.9 CHRONIC KIDNEY DISEASE-MINERAL AND BONE DISORDER: ICD-10-CM

## 2024-04-01 DIAGNOSIS — D50.9 IRON DEFICIENCY ANEMIA, UNSPECIFIED IRON DEFICIENCY ANEMIA TYPE: ICD-10-CM

## 2024-04-01 DIAGNOSIS — I48.20 CHRONIC ATRIAL FIBRILLATION (HCC): ICD-10-CM

## 2024-04-01 DIAGNOSIS — N18.32 STAGE 3B CHRONIC KIDNEY DISEASE (HCC): ICD-10-CM

## 2024-04-01 DIAGNOSIS — M89.9 CHRONIC KIDNEY DISEASE-MINERAL AND BONE DISORDER: ICD-10-CM

## 2024-04-01 DIAGNOSIS — N13.1 HYDRONEPHROSIS WITH URETERAL STRICTURE, NOT ELSEWHERE CLASSIFIED: ICD-10-CM

## 2024-04-01 DIAGNOSIS — E83.9 CHRONIC KIDNEY DISEASE-MINERAL AND BONE DISORDER: ICD-10-CM

## 2024-04-01 DIAGNOSIS — N18.30 BENIGN HYPERTENSION WITH CKD (CHRONIC KIDNEY DISEASE) STAGE III (HCC): Primary | ICD-10-CM

## 2024-04-01 DIAGNOSIS — E78.5 DYSLIPIDEMIA: ICD-10-CM

## 2024-04-01 DIAGNOSIS — I50.9 CHF EXACERBATION (HCC): ICD-10-CM

## 2024-04-01 DIAGNOSIS — N20.0 RENAL CALCULI: ICD-10-CM

## 2024-04-01 DIAGNOSIS — I12.9 BENIGN HYPERTENSION WITH CKD (CHRONIC KIDNEY DISEASE) STAGE III (HCC): Primary | ICD-10-CM

## 2024-04-01 DIAGNOSIS — E55.9 VITAMIN D DEFICIENCY: ICD-10-CM

## 2024-04-01 DIAGNOSIS — I50.32 CHRONIC DIASTOLIC CHF (CONGESTIVE HEART FAILURE) (HCC): ICD-10-CM

## 2024-04-01 DIAGNOSIS — N25.81 SECONDARY HYPERPARATHYROIDISM OF RENAL ORIGIN (HCC): ICD-10-CM

## 2024-04-01 PROCEDURE — 99214 OFFICE O/P EST MOD 30 MIN: CPT | Performed by: PHYSICIAN ASSISTANT

## 2024-04-01 RX ORDER — FUROSEMIDE 20 MG/1
TABLET ORAL
Qty: 150 TABLET | Refills: 3 | Status: SHIPPED | OUTPATIENT
Start: 2024-04-01

## 2024-04-04 NOTE — TELEPHONE ENCOUNTER
Katrina Cordova16 minutes ago (12:58 PM)     LV  Patient would like to cancel his surgery - he is transferring his care to [facility]

## 2024-04-04 NOTE — TELEPHONE ENCOUNTER
Called and LVM to ask pt to confirm that 4/10 at [place] is ok. There is not availability at [place], so they need to go to this [place] appt. CB# given

## 2024-04-05 NOTE — TELEPHONE ENCOUNTER
Called and spoke to pt wife. Pt was not able to come to the phone so she wrote down CB# and appt 4/10 at [place] and asked pt to call back and confirm.

## 2024-04-10 ENCOUNTER — PROCEDURE VISIT (OUTPATIENT)
Dept: UROLOGY | Facility: CLINIC | Age: 84
End: 2024-04-10

## 2024-04-10 DIAGNOSIS — R33.9 URINARY RETENTION: Primary | ICD-10-CM

## 2024-04-10 PROCEDURE — 99024 POSTOP FOLLOW-UP VISIT: CPT

## 2024-04-10 NOTE — PROGRESS NOTES
"4/10/2024    Dioni Melo  1940  [id number]    Diagnosis  Chief Complaint    Urinary Retention         Patient presents for TOV managed by Dr. Leon     Plan  Patient will call office with any concerns with urination     Procedure Farooq removal/voiding trial    Farooq catheter removed after deflation of an intact balloon. Patient tolerated well. Encouraged patient to hydrate well and return this afternoon for post void residual.  he knows he may return early if uncomfortable and unable to urinate. Patient agrees to this plan.    Patient did not show up for appt this afternoon. Called patient and patient states able to void. Patient voided 4 times at home and it \"feels like normal\".  Advised patient that we would encourage him to still come in for bladder scan but patient declined returning. Advised to proceed to ER overnight if urination decreases or he starts having pain     No results found for this or any previous visit (from the past 4 hour(s)).        There were no vitals filed for this visit.        Tianna King RN       "

## 2024-04-12 DIAGNOSIS — R58 BLOOD LOSS: ICD-10-CM

## 2024-04-12 DIAGNOSIS — R31.9 HEMATURIA, UNSPECIFIED TYPE: ICD-10-CM

## 2024-04-12 DIAGNOSIS — E61.1 IRON DEFICIENCY: ICD-10-CM

## 2024-04-12 RX ORDER — FERROUS SULFATE 324(65)MG
324 TABLET, DELAYED RELEASE (ENTERIC COATED) ORAL EVERY OTHER DAY
Qty: 90 TABLET | Refills: 0 | Status: SHIPPED | OUTPATIENT
Start: 2024-04-12

## 2024-05-05 NOTE — H&P
[address]  H&P- Sandie Dills 1940, [de-identified] y o  male MRN: [id number]  Unit/Bed#: [address] [address] Encounter: [id number]  Primary Care Provider: Eric Flynn MD   Date and time admitted to hospital: 10/28/2022  1:19 PM    Stroke-like symptoms  Assessment & Plan  Historical, recurrent  · NIHSS-0  · CTA no acute hemorrhagic findings  · MRI pending  · History of aneurysms  · Continue aspirin, Plavix, statin  · Pending A1c and repeat lipid panel  · Control blood pressure  · Swallow eval  · Called the [facility], a division of [facility] [facility] for Neuroscience at [phone]  · Consult to Neurology and sent message that she on TT    Aneurysm [facility])  [facility], July 2, 2020:  Neurosurgery note: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  He will return in one year with a repeat MRA  We will likely stop surveillance at that time if stable  · CT HEAD:-No acute intracranial abnormality  -Chronic infarcts in left parasagittal frontal lobe and left caudate with mild chronic microangiopathy      · CTA head and neck: -Negative for large vessel occlusion, dissection, or high-grade stenosis  -Unchanged 0 4 cm aneurysm in anterior communicating artery projecting inferiorly  Recommend consultation with the [facility], a division of [address] [place] [address] Neuroscience at [phone]  Unchanged 0 2 cm aneurysm in left posterior cerebral artery P1/P2 junction projecting anteriorly  Recommend consultation with the [facility], a division of [address] [place] [address] Neuroscience at [phone]  -Moderate stenosis (approximately 60% narrowing) in right ICA proximal cervical segment due to calcified atherosclerotic disease  -Moderate stenosis in left ICA cavernous segment  -Moderate stenosis in left vertebral artery distal V4 segment    · Neurology consult and  · Uses home aspirin and statin which was held due to stent placement by Urology      Bilateral carotid artery disease ([facility] [address] )  Assessment & Plan  Chronic,  · Noted on CTA today:-Moderate stenosis (approximately 60% narrowing) in right ICA proximal cervical segment due to calcified atherosclerotic disease -Moderate stenosis in left ICA cavernous segment  -Moderate stenosis in left vertebral artery distal V4 segment  · Was not on anticoagulation due to Uro procedure      Chronic diastolic CHF (congestive heart failure) ([facility] [address] )  Assessment & Plan  Wt Readings from Last 3 Encounters:   10/28/22 96 2 kg (212 lb)   10/27/22 93 4 kg (206 lb)   10/27/22 96 2 kg (212 lb)     Chronic, controlled    · Appears euvolemic at this time  · Monitor volume status, daily weights, I/O  · Add diuretic if needed and renal function allows      Benign essential hypertension  Assessment & Plan  Chronic,  · Elevated from admission today 176/91  · Possibly due to acute event  · Restarted home medicine, consider p r n  Hydralazine    Chronic atrial fibrillation [facility])  Assessment & Plan  Chronic, asymptomatic    · Patient declined anticoagulation on multiple occasions as documented by previous cardiologist notes  · Had watchman device implanted 01/07/2021 and will remain on eliquis for 4-6 weeks per cards  · Clarity regarding anticoagulation, Eliquis not noted on home meds    VTE Pharmacologic Prophylaxis:   Moderate Risk (Score 3-4) - Pharmacological DVT Prophylaxis Ordered: enoxaparin (Lovenox)  Code Status: Level 1 - Full Code   Discussion with family: Updated  (daughter) at bedside  Anticipated Length of Stay: Patient will be admitted on an inpatient basis with an anticipated length of stay of greater than 2 midnights secondary to Clinical course the care  Total Time for Visit, including Counseling / Coordination of Care: 60 minutes Greater than 50% of this total time spent on direct patient counseling and coordination of care      Chief Complaint:  Stroke-like symptom    History of Present Illness:  Ami Locke is a [de-identified] y o  male with a PMH of CVA, carotid artery stenosis, hypertension ACOM aneurysm who presents with double vision and ambulation problems  Patient stated that he was eating lunch and suddenly had blurry vision for 2 hours  Patient stated by the time he got to the hospital that his symptoms started to improved  Patient denied any headache, chest pain, palpitations, nausea, vomiting, diarrhea, weakness  Patient stated that he is not on any anticoagulation for his AFib at this time per cardio recs  Patient's daughter was in ED with him and said mom is medical decision maker  Review of Systems:  Review of Systems   Constitutional: Negative for chills and fever  HENT: Negative for ear pain and sore throat  Eyes: Negative for pain and visual disturbance  Respiratory: Negative for cough and shortness of breath  Cardiovascular: Negative for chest pain and palpitations  Gastrointestinal: Negative for abdominal pain and vomiting  Genitourinary: Negative for dysuria and hematuria  Musculoskeletal: Negative for arthralgias and back pain  Skin: Negative for color change and rash  Neurological: Negative for dizziness, seizures, syncope, weakness and light-headedness  Psychiatric/Behavioral: Negative  All other systems reviewed and are negative        Past Medical and Surgical History:   Past Medical History:   Diagnosis Date   • Anemia     iron deficiency   • Aneurysm ([address] ) 2020    of brain  being monitored   • Dental disease    • Essential hypertension, long-standing    • Heart murmur     per pt "Aortic Valve replacement 2021 with Watchman device implanted /2021"   • Hematuria     intermittent   • History of cigarette smoking, chronic     64 year smoker at one half pack per day, quit 04/1/17   • History of COVID-19 01/19/2022    recovered at home/chief s/s only sore throat   • History of kidney stones    • History of pneumonia    • HL (hearing loss)    • Hyperlipidemia    • Hypertension    • Irregular heart beat    • Kidney stone    • Muscle weakness     right sided weakness has gotten better/ walks independantly"   • Stroke ([address] ) 10/29/2020    right sided  weakness almost full recovery   • Stroke [facility])    • Teeth missing    • Vitamin D deficiency     maintained with 1000 units of D   • Water retention    • Wears glasses        Past Surgical History:   Procedure Laterality Date   • APPENDECTOMY  1960   • CARDIAC SURGERY      watchman [id number]; aortic valve replaced 2021(pig valve)   • CAROTID ENDARTERECTOMY Left 1998    Supposedly no internal stenosis found   • CYSTOSCOPY Right 8/15/2017    Procedure: CYSTOSCOPY RIGHT STENT EXCHANGE;  Surgeon: Tracey Larson MD;  Location: [address] [place] [address];  Service: Urology   • CYSTOSCOPY     • CYSTOSCOPY N/A 3/11/2022    Procedure: Christine Gutierrez;  Surgeon: Charlie Reardon MD;  Location: [facility];  Service: Urology   • EXTRACORPOREAL SHOCK WAVE LITHOTRIPSY  03/2017    For nephrolithiasis at [facility]   • [address]  5/10/2019   • FL RETROGRADE PYELOGRAM  7/30/2019   • FL RETROGRADE PYELOGRAM  10/22/2019   • FL RETROGRADE PYELOGRAM  1/28/2020   • FL RETROGRADE PYELOGRAM  8/11/2020   • FL RETROGRADE PYELOGRAM  12/15/2020   • FL RETROGRADE PYELOGRAM  2/14/2021   • FL RETROGRADE PYELOGRAM  5/11/2021   • FL RETROGRADE PYELOGRAM  10/19/2021   • FL RETROGRADE PYELOGRAM  3/11/2022   • FL RETROGRADE PYELOGRAM  7/15/2022   • UT CYSTO/URETERO W/LITHOTRIPSY &INDWELL STENT INSRT Right 5/2/2017    Procedure: CYSTOSCOPY URETEROSCOPY WITH LITHOTRIPSY HOLMIUM LASER, RETROGRADE PYELOGRAM AND INSERTION STENT URETERAL;  Surgeon: Tracey Larson MD;  Location: [address] [place] [address];  Service: Urology   • UT CYSTO/URETERO W/LITHOTRIPSY &INDWELL STENT INSRT Right 5/16/2017    Procedure: CYSTOSCOPY, RETROGRADE PYELOGRAM,  FLEXIBLE URETEROSCOPY,  HOLMIUM LASER LITHOTRIPSY, STONE BASKET MANIPULATION,  STENT URETERAL EXCHANGE;  Surgeon: Ana María Chris Reyna Sharpe MD;  Location: [address] [place] [address];  Service: Urology   • ID CYSTO/URETERO W/LITHOTRIPSY &INDWELL STENT INSRT Right 6/2/2017    Procedure: CYSTOSCOPY, RETROGRADE, STONE MANIPULATION WITH HOLMIUM LASER, STENT PLACEMENT;  Surgeon: Mario Linder MD;  Location: [address] [place] [address];  Service: Urology   • ID CYSTO/URETERO W/LITHOTRIPSY &INDWELL STENT INSRT Right 7/18/2017    Procedure: CYSTOSCOPY, RETROGRADE, URETEROSCOPY HOLMIUM LASER [place],  AND STENT PLACEMENT;  Surgeon: Mario Linder MD;  Location: [address] [place] [address];  Service: Urology   • ID CYSTO/URETERO W/LITHOTRIPSY &INDWELL STENT INSRT Right 2/14/2021    Procedure: CYSTOSCOPY URETEROSCOPY, RETROGRADE PYELOGRAM, STONE MANIPULATION AND EXCHANGE STENT URETERAL;  Surgeon: Leydi Gagnon MD;  Location: [address] [place] [address];  Service: Urology   • ID CYSTOSCOPY,INSERT URETERAL STENT Right 11/14/2017    Procedure: EXCHANGE STENT URETERAL;  Surgeon: Mario Linder MD;  Location: [address] [place] [address];  Service: Urology   • ID CYSTOSCOPY,INSERT URETERAL STENT Right 3/11/2022    Procedure: EXCHANGE STENT URETERAL;  Surgeon: Valarie Shaver MD;  Location: [facility];  Service: Urology   • ID CYSTOURETHROSCOPY,URETER CATHETER Right 11/14/2017    Procedure: CYSTOSCOPY RETROGRADE, STENT EXCHANGE;  Surgeon: Mario Linder MD;  Location: [address] [place] [address];  Service: Urology   • ID CYSTOURETHROSCOPY,URETER CATHETER Right 5/8/2018    Procedure: Cristian Otoole;  Surgeon: Mario Linder MD;  Location: [address] [place] [address];  Service: Urology   • ID CYSTOURETHROSCOPY,URETER CATHETER Right 8/14/2018    Procedure: CYSTOSCOPY, Calvillo Corwin EXCHANGE;  Surgeon: Mario Linder MD;  Location: [address] [place] [address];  Service: Urology   • ID CYSTOURETHROSCOPY,URETER CATHETER Right 11/13/2018    Procedure: CYSTOSCOPY, STENT EXCHANGE, RETROGRADE;  Surgeon: Mario Linder MD;  Location: [address] [place] [address];  Service: Urology   • ID CYSTOURETHROSCOPY,URETER CATHETER Right 2/12/2019    Procedure: CYSTOSCOPY, RETROGRADE, STENT REMOVAL AND STENT PLACEMENT;  Surgeon: Shilpa Shah MD;  Location: [address] [place] [address];  Service: Urology   • CA CYSTOURETHROSCOPY,URETER CATHETER Right 5/10/2019    Procedure: Haily Sluder, STENT EXCHANGE;  Surgeon: Shilpa Shah MD;  Location: [address] [place] [address];  Service: Urology   • CA CYSTOURETHROSCOPY,URETER CATHETER Right 7/30/2019    Procedure: Haily Sluder, STENT REMOVAL AND PLACEMENT OF STENT;  Surgeon: Shilpa Shah MD;  Location: [address] [place] [address];  Service: Urology   • CA CYSTOURETHROSCOPY,URETER CATHETER Right 10/22/2019    Procedure: Reardan Sluder, STENT EXCHANGE;  Surgeon: Shilpa Shah MD;  Location: [address] [place] [address];  Service: Urology   • CA CYSTOURETHROSCOPY,URETER CATHETER Right 1/28/2020    Procedure: Reardan Sluder, STENT REMOVAL AND STENT PLACEMENT;  Surgeon: Shilpa Shah MD;  Location: [address] [place] [address];  Service: Urology   • CA CYSTOURETHROSCOPY,URETER CATHETER Right 5/22/2020    Procedure: CYSTOSCOPY RETROGRADE, STENT EXCHANGE;  Surgeon: Shilpa Shah MD;  Location: [address] [place] [address];  Service: Urology   • CA CYSTOURETHROSCOPY,URETER CATHETER Right 8/11/2020    Procedure: CYSTOSCOPY, STENT EXCHANGE, RETROGRADE;  Surgeon: Shilpa Shah MD;  Location: [address] [place] [address];  Service: Urology   • CA CYSTOURETHROSCOPY,URETER CATHETER Right 12/15/2020    Procedure: CYSTOSCOPY, RETROGRADE, STENT REMOVAL, AND STENT PLACEMENT;  Surgeon: Shilpa Shah MD;  Location: [address] [place] [address];  Service: Urology   • CA CYSTOURETHROSCOPY,URETER CATHETER Right 5/11/2021    Procedure: CYSTOSCOPY RETROGRADE PYELOGRAM WITH STENT EXCHANGE;  Surgeon: Shilpa Shah MD;  Location: [address] [place] [address];  Service: Urology   • CA CYSTOURETHROSCOPY,URETER CATHETER Right 10/19/2021    Procedure: CYSTOSCOPY WITH RETROGRADE, STENT EXCHANGE;  Surgeon: Shilpa Shah MD;  Location: [address] [place] [address];  Service: Urology   • CA CYSTOURETHROSCOPY,URETER CATHETER Right 7/15/2022    Procedure: CYSTOSCOPY, RETROGRADE PYELOGRAM WITH EXCHANGE STENT URETERAL;  Surgeon: Sheldon Ruby MD;  [facility];  Service: Urology   • PROSTATE SURGERY  2015    Laser Prostatectomy  by Dr Landy Waite   • Sandie Anita Right 4/18/2017    Procedure: INSERTION STENT URETERAL, RIGHT URETEROSCOPY,  LASER OF URETERAL STRICTURE AND STONE;  Surgeon: Olga Saucedo MD;  Location: [address] [place] [address];  Service:    • URETERAL STENT PLACEMENT Right 2/13/2018    Procedure: Sparkle Kenji;  Surgeon: Olga Saucedo MD;  Location: [address] [place] [address];  Service: Urology       Meds/Allergies:  Prior to Admission medications    Medication Sig Start Date End Date Taking? Authorizing Provider   ascorbic acid (VITAMIN C) 250 mg tablet Take 500 mg by mouth daily    Historical Provider, MD   aspirin (ECOTRIN LOW STRENGTH) 81 mg EC tablet Take 81 mg by mouth daily Pt to check with surgeon if needed to hold RX  Historical Provider, MD   atorvastatin (LIPITOR) 40 mg tablet Take 1 tablet (40 mg total) by mouth daily with dinner 11/17/20   Domenic Valles DO   cholecalciferol (VITAMIN D3) 1,000 units tablet Take 1,000 Units by mouth daily after lunch      Historical Provider, MD   clopidogrel (PLAVIX) 75 mg tablet Take 75 mg by mouth daily Pt  To check with surgeon if needed to hold RX  Historical Provider, MD   ferrous sulfate 324 (65 Fe) mg Take 1 tablet (324 mg total) by mouth every other day Increase to daily if tolerated  10/27/22   Shannon San PA-C   finasteride (PROSCAR) 5 mg tablet TAKE 1 TABLET BY MOUTH EVERY DAY 8/1/22   John Yanez MD   metoprolol tartrate (LOPRESSOR) 25 mg tablet Take 1 tablet (25 mg total) by mouth every 8 (eight) hours 2/17/22   John Yanez MD     I have reviewed home medications with patient personally      Allergies: No Known Allergies    Social History:  Marital Status: /Civil Union   Occupation: n/a  Patient Pre-hospital Living Situation: Home  Patient Pre-hospital Level of Mobility: walks  Patient Pre-hospital Diet Restrictions:   Substance Use History:   Social History     Substance and Sexual Activity   Alcohol Use Not Currently    Comment: don't drink anymore  Social History     Tobacco Use   Smoking Status Former Smoker   • Packs/day: 0 50   • Years: 62 00   • Pack years: 31 00   • Types: Cigarettes   • Start date: 6/15/1954   • Quit date: 4/15/2017   • Years since quittin 5   Smokeless Tobacco Never Used     Social History     Substance and Sexual Activity   Drug Use No       Family History:  Family History   Problem Relation Age of Onset   • Hypertension Mother    • Alcohol abuse Father    • Cirrhosis Father    • Cancer Son 15        cancer-knee and above-left-amputation   • Cancer Sister    • Other Brother         head mass   • Thyroid disease unspecified Sister    • Arthritis Sister    • Cancer Sister    • Other Brother         legally blind in one eye       Physical Exam:     Vitals:   Blood Pressure: (!) 176/91 (10/28/22 1530)  Pulse: 70 (10/28/22 1530)  Temperature: 97 5 °F (36 4 °C) (10/28/22 1314)  Respirations: 18 (10/28/22 1314)  Height: 5' 10" (177 8 cm) (10/28/22 1314)  Weight - Scale: 96 2 kg (212 lb) (10/28/22 1314)  SpO2: 98 % (10/28/22 1530)    Physical Exam  Vitals and nursing note reviewed  Constitutional:       Appearance: He is well-developed  HENT:      Head: Normocephalic and atraumatic  Eyes:      Conjunctiva/sclera: Conjunctivae normal    Cardiovascular:      Rate and Rhythm: Normal rate and regular rhythm  Heart sounds: No murmur heard  Pulmonary:      Effort: Pulmonary effort is normal  No respiratory distress  Breath sounds: Normal breath sounds  Abdominal:      General: There is no distension  Palpations: Abdomen is soft  Tenderness: There is no abdominal tenderness  There is no guarding  Musculoskeletal:      Cervical back: Neck supple  Right lower leg: No edema  Left lower leg: No edema     Skin: General: Skin is warm and dry  Neurological:      Mental Status: He is alert and oriented to person, place, and time  Cranial Nerves: No cranial nerve deficit  Sensory: No sensory deficit  Motor: No weakness  Coordination: Coordination normal       Gait: Gait normal       Deep Tendon Reflexes: Reflexes normal    Psychiatric:         Mood and Affect: Mood normal          Thought Content: Thought content normal          Judgment: Judgment normal          Additional Data:     Lab Results:  Results from last 7 days   Lab Units 10/28/22  1358   WBC Thousand/uL 7 02   HEMOGLOBIN g/dL 13 4   HEMATOCRIT % 42 8   PLATELETS Thousands/uL 127*   NEUTROS PCT % 60   LYMPHS PCT % 21   MONOS PCT % 13*   EOS PCT % 4     Results from last 7 days   Lab Units 10/28/22  1358   SODIUM mmol/L 139   POTASSIUM mmol/L 5 0   CHLORIDE mmol/L 105   CO2 mmol/L 30   BUN mg/dL 26*   CREATININE mg/dL 1 61*   ANION GAP mmol/L 4   CALCIUM mg/dL 8 9   GLUCOSE RANDOM mg/dL 123                       Imaging: Reviewed radiology reports from this admission including: CT head/CTA  CTA head and neck with and without contrast   Final Result by Reena Coffey MD (10/28 7180)      CT HEAD   -No acute intracranial abnormality    -Chronic infarcts in left parasagittal frontal lobe and left caudate with mild chronic microangiopathy  CTA head and neck   -Negative for large vessel occlusion, dissection, or high-grade stenosis  -Unchanged 0 4 cm aneurysm in anterior communicating artery projecting inferiorly  Recommend consultation with the Neurovascular Center, a division of [facility] for Neuroscience at [phone]    -Unchanged 0 2 cm aneurysm in left posterior cerebral artery P1/P2 junction projecting anteriorly   Recommend consultation with the [facility] Center, a division of [facility] at [phone]    -Moderate stenosis (approximately 60% narrowing) in right ICA proximal cervical segment due to calcified atherosclerotic disease    -Moderate stenosis in left ICA cavernous segment  -Moderate stenosis in left vertebral artery distal V4 segment  Additional chronic/incidental findings as detailed above  The study was marked in [place] for immediate notification  Workstation performed: [id number]         MRI Inpatient Order    (Results Pending)       EKG and Other Studies Reviewed on Admission:   · EKG: No EKG obtained  ** Please Note: This note has been constructed using a voice recognition system   ** You can access the FollowMyHealth Patient Portal offered by [facility] by registering at the following website: [email or web address]. By joining [facility]’s FollowMyHealth portal, you will also be able to view your health information using other applications (apps) compatible with our system.

## 2024-05-06 ENCOUNTER — LAB (OUTPATIENT)
Dept: LAB | Facility: HOSPITAL | Age: 84
End: 2024-05-06
Payer: MEDICARE

## 2024-05-06 ENCOUNTER — TELEPHONE (OUTPATIENT)
Dept: NEPHROLOGY | Facility: CLINIC | Age: 84
End: 2024-05-06

## 2024-05-06 DIAGNOSIS — N18.32 CHRONIC KIDNEY DISEASE (CKD) STAGE G3B/A3, MODERATELY DECREASED GLOMERULAR FILTRATION RATE (GFR) BETWEEN 30-44 ML/MIN/1.73 SQUARE METER AND ALBUMINURIA CREATININE RATIO GREATER THAN 300 MG/G (HCC): ICD-10-CM

## 2024-05-06 DIAGNOSIS — I10 HYPERTENSION, UNSPECIFIED TYPE: ICD-10-CM

## 2024-05-06 DIAGNOSIS — N18.32 STAGE 3B CHRONIC KIDNEY DISEASE (HCC): Primary | ICD-10-CM

## 2024-05-06 LAB
ALBUMIN SERPL BCP-MCNC: 3.6 G/DL (ref 3.5–5)
ALP SERPL-CCNC: 107 U/L (ref 34–104)
ALT SERPL W P-5'-P-CCNC: 13 U/L (ref 7–52)
ANION GAP SERPL CALCULATED.3IONS-SCNC: 5 MMOL/L (ref 4–13)
AST SERPL W P-5'-P-CCNC: 21 U/L (ref 13–39)
BASOPHILS # BLD AUTO: 0.06 THOUSANDS/ÂΜL (ref 0–0.1)
BASOPHILS NFR BLD AUTO: 1 % (ref 0–1)
BILIRUB SERPL-MCNC: 0.7 MG/DL (ref 0.2–1)
BUN SERPL-MCNC: 26 MG/DL (ref 5–25)
CALCIUM SERPL-MCNC: 9.4 MG/DL (ref 8.4–10.2)
CHLORIDE SERPL-SCNC: 104 MMOL/L (ref 96–108)
CO2 SERPL-SCNC: 31 MMOL/L (ref 21–32)
CREAT SERPL-MCNC: 1.48 MG/DL (ref 0.6–1.3)
EOSINOPHIL # BLD AUTO: 0.28 THOUSAND/ÂΜL (ref 0–0.61)
EOSINOPHIL NFR BLD AUTO: 3 % (ref 0–6)
ERYTHROCYTE [DISTWIDTH] IN BLOOD BY AUTOMATED COUNT: 18 % (ref 11.6–15.1)
GFR SERPL CREATININE-BSD FRML MDRD: 43 ML/MIN/1.73SQ M
GLUCOSE P FAST SERPL-MCNC: 100 MG/DL (ref 65–99)
HCT VFR BLD AUTO: 42.5 % (ref 36.5–49.3)
HGB BLD-MCNC: 13.3 G/DL (ref 12–17)
IMM GRANULOCYTES # BLD AUTO: 0.06 THOUSAND/UL (ref 0–0.2)
IMM GRANULOCYTES NFR BLD AUTO: 1 % (ref 0–2)
LYMPHOCYTES # BLD AUTO: 1.58 THOUSANDS/ÂΜL (ref 0.6–4.47)
LYMPHOCYTES NFR BLD AUTO: 19 % (ref 14–44)
MAGNESIUM SERPL-MCNC: 1.8 MG/DL (ref 1.9–2.7)
MCH RBC QN AUTO: 27.7 PG (ref 26.8–34.3)
MCHC RBC AUTO-ENTMCNC: 31.3 G/DL (ref 31.4–37.4)
MCV RBC AUTO: 89 FL (ref 82–98)
MONOCYTES # BLD AUTO: 0.97 THOUSAND/ÂΜL (ref 0.17–1.22)
MONOCYTES NFR BLD AUTO: 12 % (ref 4–12)
NEUTROPHILS # BLD AUTO: 5.44 THOUSANDS/ÂΜL (ref 1.85–7.62)
NEUTS SEG NFR BLD AUTO: 64 % (ref 43–75)
NRBC BLD AUTO-RTO: 0 /100 WBCS
PHOSPHATE SERPL-MCNC: 2.8 MG/DL (ref 2.3–4.1)
PLATELET # BLD AUTO: 141 THOUSANDS/UL (ref 149–390)
PMV BLD AUTO: 10 FL (ref 8.9–12.7)
POTASSIUM SERPL-SCNC: 4.6 MMOL/L (ref 3.5–5.3)
PROT SERPL-MCNC: 6.6 G/DL (ref 6.4–8.4)
RBC # BLD AUTO: 4.8 MILLION/UL (ref 3.88–5.62)
SODIUM SERPL-SCNC: 140 MMOL/L (ref 135–147)
WBC # BLD AUTO: 8.39 THOUSAND/UL (ref 4.31–10.16)

## 2024-05-06 PROCEDURE — 83735 ASSAY OF MAGNESIUM: CPT

## 2024-05-06 PROCEDURE — 36415 COLL VENOUS BLD VENIPUNCTURE: CPT

## 2024-05-06 PROCEDURE — 80053 COMPREHEN METABOLIC PANEL: CPT

## 2024-05-06 PROCEDURE — 85025 COMPLETE CBC W/AUTO DIFF WBC: CPT

## 2024-05-06 PROCEDURE — 84100 ASSAY OF PHOSPHORUS: CPT

## 2024-05-06 NOTE — TELEPHONE ENCOUNTER
Spoke with patient about the following, he expressed understanding and thanked us for the call:    ----- Message from Celeste Negron MD sent at 5/6/2024 10:04 AM EDT -----  Hello    Patient normally is followed up by Ms Sofiya Arvizu.     Please let the patient know that the renal function is stable.  Do we know why he completed this lab work now?  When he saw Nellie, he was advised to have lab work before his next appointment which was in August.  Can you please advise the patient to complete a CMP, CBC, UA, UPCR, magnesium, phosphorus before his appointment with me in August.    Thank you    np

## 2024-05-06 NOTE — RESULT ENCOUNTER NOTE
Hello    Patient normally is followed up by Ms Sofiya Arvizu.     Please let the patient know that the renal function is stable.  Do we know why he completed this lab work now?  When he saw Nellie, he was advised to have lab work before his next appointment which was in August.  Can you please advise the patient to complete a CMP, CBC, UA, UPCR, magnesium, phosphorus before his appointment with me in August.    Thank you    np

## 2024-05-18 NOTE — TELEPHONE ENCOUNTER
LM for pt informing him appointment with Nellie 4/1 has been moved from 3:30 to 3:00 due to change in provider schedule  
59.9

## 2024-06-14 NOTE — TELEPHONE ENCOUNTER
Maribel, an NP with [facility] in Neuro ICU, requesting that a doctor provide a summary or plan of care for patient as patient will be there for quite some time and would like to discuss with provider.  Please call Maribel back at [phone].

## 2024-06-14 NOTE — TELEPHONE ENCOUNTER
Looks like patient was somewhat lost to follow-up.  He had a stent exchange with Dr. Leon  on 3/27/2024 with Dr. Leon noting documentation for stent exchange in 3 to 4 months which would take us to this month or next.  Patient was perhaps lost to follow-up to the fact that he recently called the office stating that he was transferring his care to [facility] but then later called back seen that he wanted to remain with [facility].      I called Maribel back.  Patient is currently hospitalized for a subarachnoid hemorrhage and is intubated in the ICU.  Maribel wanted to touch base on presence of stent.  Discussed as long as creatinine seems to be stable I do not see a urgent need to have exchange by urology provider at that facility.  Depending on patient's recovery and his discharge from hospital could consider exchange in July which would be the 4-month anamaria or a little bit later in August which would be the 5-month anamaria    Abdulkadir Dunham to make aware so pt on stent list  in Dr. Leon as a FYI

## 2024-07-13 NOTE — TELEPHONE ENCOUNTER
Patient had right stent exchange today  There was more hydronephrosis on retrograde compared to last exchange but in the interval since last exchange his kidney function is stable on blood work and a nuclear medicine study showed preserved differential function of 37% with adequate drainage alongside stent so therefore we will plan for future stent exchange in 3 months  No

## 2025-05-05 NOTE — ASSESSMENT & PLAN NOTE
See above    Lab Results   Component Value Date    EGFR 33 11/10/2023    EGFR 37 08/28/2023    EGFR 44 08/09/2023    CREATININE 1.86 (H) 11/10/2023    CREATININE 1.68 (H) 08/28/2023    CREATININE 1.47 (H) 08/09/2023     
No

## (undated) DEVICE — URETERAL CATH 5 FR

## (undated) DEVICE — CYSTOSCOPY PACK: Brand: CONVERTORS

## (undated) DEVICE — RADIOLOGY STERILE LABELS: Brand: CENTURION

## (undated) DEVICE — POUCH UR CATCHER STERILE

## (undated) DEVICE — GLIDEWIRE ZIPWIRE TM 035/151

## (undated) DEVICE — GUIDEWIRE STRGHT TIP 0.038 IN SOLO PLUS

## (undated) DEVICE — FABRIC REINFORCED, SURGICAL GOWN, XL: Brand: CONVERTORS

## (undated) DEVICE — SEAL ADJUST BIOPSY PORT Y ADAPTOR

## (undated) DEVICE — LUBRICANT SURGILUBE TUBE 4 OZ  FLIP TOP

## (undated) DEVICE — GLOVE SRG BIOGEL 7.5

## (undated) DEVICE — CATH URET .038 10FR 50CM DUAL LUMEN

## (undated) DEVICE — CYSTO TUBING TUR Y IRRIGATION

## (undated) DEVICE — Device

## (undated) DEVICE — GUIDEWIRE STRGHT TIP 0.035 IN  SOLO PLUS

## (undated) DEVICE — CATH URETERAL 5FR X 70 CM FLEX TIP POLYUR BARD

## (undated) DEVICE — STERILE SURGICAL LUBRICANT,  TUBE: Brand: SURGILUBE

## (undated) DEVICE — SYRINGE 10ML LL CONTROL TOP

## (undated) DEVICE — PROVE COVER: Brand: UNBRANDED

## (undated) DEVICE — LUBRICANT JELLY SURGILUBE TUBE 4OZ FLIP TOP

## (undated) DEVICE — GLOVE SRG BIOGEL 8

## (undated) DEVICE — PACK TUR

## (undated) DEVICE — SPONGE STICK WITH PVP-I: Brand: KENDALL

## (undated) DEVICE — LASER FIBER HOLMIUM 272MICRON

## (undated) DEVICE — LASER HOLMIUM FIBER 365 MIC

## (undated) DEVICE — GLOVE SRG BIOGEL ECLIPSE 7.5

## (undated) DEVICE — SPECIMEN CONTAINER STERILE PEEL PACK

## (undated) DEVICE — SYRINGE 10ML LL

## (undated) DEVICE — UROCATCH BAG

## (undated) DEVICE — PREMIUM DRY TRAY LF: Brand: MEDLINE INDUSTRIES, INC.

## (undated) DEVICE — SHEATH URETHERAL ACCESS FLEXOR 12FR 35CM

## (undated) DEVICE — NGAGE, NITINOL STONE EXTRACTOR: Brand: NGAGE

## (undated) DEVICE — SHEATH URETERAL ACCESS 12/14FR 45CM PROXIS

## (undated) DEVICE — CHLORHEXIDINE 4PCT 4 OZ

## (undated) DEVICE — INVIEW CLEAR LEGGINGS: Brand: CONVERTORS

## (undated) DEVICE — URETERAL CATHETER POLLACK OPEN ENDED 5FR

## (undated) DEVICE — UROLOGIC DRAIN BAG: Brand: UNBRANDED

## (undated) DEVICE — SPECIMEN CONTAINER: Brand: CARDINAL HEALTH

## (undated) DEVICE — SHEATH URETERAL ACCESS FLEXOR 12FR 28CM

## (undated) DEVICE — NGAGE, NITINOL STONE EXTRACTOR MODIFIED BASKET: Brand: NGAGE

## (undated) DEVICE — STR GUIDEWIRE BOWL WITH LID PK: Brand: CARDINAL HEALTH

## (undated) DEVICE — POUCH URO CATCHER II STERILE

## (undated) DEVICE — TOWEL SET X-RAY